# Patient Record
Sex: FEMALE | Race: WHITE | Employment: PART TIME | URBAN - NONMETROPOLITAN AREA
[De-identification: names, ages, dates, MRNs, and addresses within clinical notes are randomized per-mention and may not be internally consistent; named-entity substitution may affect disease eponyms.]

---

## 2017-02-11 DIAGNOSIS — E03.9 HYPOTHYROIDISM, UNSPECIFIED TYPE: ICD-10-CM

## 2017-02-11 DIAGNOSIS — E78.2 MIXED HYPERLIPIDEMIA: ICD-10-CM

## 2017-02-13 RX ORDER — SIMVASTATIN 20 MG
TABLET ORAL
Qty: 30 TABLET | Refills: 1 | Status: SHIPPED | OUTPATIENT
Start: 2017-02-13 | End: 2017-04-09 | Stop reason: SDUPTHER

## 2017-02-13 RX ORDER — LEVOTHYROXINE SODIUM 0.15 MG/1
TABLET ORAL
Qty: 30 TABLET | Refills: 1 | Status: SHIPPED | OUTPATIENT
Start: 2017-02-13 | End: 2017-04-09 | Stop reason: SDUPTHER

## 2017-02-22 RX ORDER — TRAZODONE HYDROCHLORIDE 50 MG/1
TABLET ORAL
Qty: 30 TABLET | Refills: 5 | Status: SHIPPED | OUTPATIENT
Start: 2017-02-22 | End: 2017-07-27 | Stop reason: ALTCHOICE

## 2017-04-09 DIAGNOSIS — E03.9 HYPOTHYROIDISM, UNSPECIFIED TYPE: ICD-10-CM

## 2017-04-09 DIAGNOSIS — E78.2 MIXED HYPERLIPIDEMIA: ICD-10-CM

## 2017-04-10 RX ORDER — LEVOTHYROXINE SODIUM 0.15 MG/1
TABLET ORAL
Qty: 30 TABLET | Refills: 1 | Status: SHIPPED | OUTPATIENT
Start: 2017-04-10 | End: 2017-06-29 | Stop reason: DRUGHIGH

## 2017-04-10 RX ORDER — CELECOXIB 100 MG/1
CAPSULE ORAL
Qty: 60 CAPSULE | Refills: 3 | Status: SHIPPED | OUTPATIENT
Start: 2017-04-10 | End: 2017-08-01 | Stop reason: SDUPTHER

## 2017-04-10 RX ORDER — SIMVASTATIN 20 MG
TABLET ORAL
Qty: 30 TABLET | Refills: 1 | Status: SHIPPED | OUTPATIENT
Start: 2017-04-10 | End: 2018-07-25 | Stop reason: SDUPTHER

## 2017-05-10 ENCOUNTER — TELEPHONE (OUTPATIENT)
Dept: FAMILY MEDICINE CLINIC | Age: 62
End: 2017-05-10

## 2017-05-10 DIAGNOSIS — E11.9 DIET-CONTROLLED TYPE 2 DIABETES MELLITUS (HCC): ICD-10-CM

## 2017-05-10 DIAGNOSIS — E03.9 HYPOTHYROIDISM, UNSPECIFIED TYPE: ICD-10-CM

## 2017-05-10 DIAGNOSIS — E78.2 MIXED HYPERLIPIDEMIA: Primary | ICD-10-CM

## 2017-05-13 ENCOUNTER — HOSPITAL ENCOUNTER (OUTPATIENT)
Dept: OTHER | Age: 62
Discharge: HOME | End: 2017-05-13
Attending: NURSE PRACTITIONER | Admitting: NURSE PRACTITIONER

## 2017-05-13 LAB
ALBUMIN SERPL-MCNC: 3.9 G/DL (ref 3.4–5)
ALP BLD-CCNC: 66 U/L (ref 40–129)
ALT SERPL-CCNC: 10 U/L (ref 10–40)
AST SERPL-CCNC: 19 U/L (ref 15–37)
BILIRUB SERPL-MCNC: 0.7 MG/DL (ref 0–1)
BILIRUBIN DIRECT: <0.2 MG/DL (ref 0–0.3)
BILIRUBIN, INDIRECT: NORMAL MG/DL (ref 0–1)
CHOLESTEROL, TOTAL: 157 MG/DL (ref 0–199)
HDLC SERPL-MCNC: 65 MG/DL (ref 40–60)
LDL CHOLESTEROL CALCULATED: 70 MG/DL
TOTAL PROTEIN: 6.7 G/DL (ref 6.4–8.2)
TRIGL SERPL-MCNC: 110 MG/DL (ref 0–150)
TSH SERPL DL<=0.05 MIU/L-ACNC: 0.52 UIU/ML (ref 0.27–4.2)
VLDLC SERPL CALC-MCNC: 22 MG/DL

## 2017-05-15 LAB
ESTIMATED AVERAGE GLUCOSE: 108.3 MG/DL
HBA1C MFR BLD: 5.4 %

## 2017-05-23 DIAGNOSIS — D64.9 ANEMIA, UNSPECIFIED TYPE: Primary | ICD-10-CM

## 2017-05-23 RX ORDER — FERROUS SULFATE 325(65) MG
TABLET ORAL
Qty: 90 TABLET | Refills: 1 | Status: SHIPPED | OUTPATIENT
Start: 2017-05-23 | End: 2017-11-16 | Stop reason: SDUPTHER

## 2017-05-24 RX ORDER — DULOXETIN HYDROCHLORIDE 60 MG/1
CAPSULE, DELAYED RELEASE ORAL
Qty: 30 CAPSULE | Refills: 5 | Status: SHIPPED | OUTPATIENT
Start: 2017-05-24 | End: 2017-10-30 | Stop reason: SDUPTHER

## 2017-06-16 DIAGNOSIS — M53.3 SACROILIAC PAIN: ICD-10-CM

## 2017-06-19 RX ORDER — PREDNISONE 1 MG/1
TABLET ORAL
Qty: 30 TABLET | Refills: 4 | Status: SHIPPED | OUTPATIENT
Start: 2017-06-19 | End: 2017-08-21 | Stop reason: SDUPTHER

## 2017-06-21 RX ORDER — BUSPIRONE HYDROCHLORIDE 10 MG/1
TABLET ORAL
Qty: 90 TABLET | Refills: 5 | Status: SHIPPED | OUTPATIENT
Start: 2017-06-21 | End: 2018-02-27 | Stop reason: ALTCHOICE

## 2017-06-29 ENCOUNTER — OFFICE VISIT (OUTPATIENT)
Dept: FAMILY MEDICINE CLINIC | Age: 62
End: 2017-06-29

## 2017-06-29 VITALS
BODY MASS INDEX: 38.99 KG/M2 | SYSTOLIC BLOOD PRESSURE: 110 MMHG | DIASTOLIC BLOOD PRESSURE: 74 MMHG | WEIGHT: 228.4 LBS | OXYGEN SATURATION: 96 % | HEART RATE: 86 BPM | HEIGHT: 64 IN

## 2017-06-29 DIAGNOSIS — D50.0 IRON DEFICIENCY ANEMIA DUE TO CHRONIC BLOOD LOSS: ICD-10-CM

## 2017-06-29 DIAGNOSIS — Z23 NEED FOR TDAP VACCINATION: ICD-10-CM

## 2017-06-29 DIAGNOSIS — F32.A ANXIETY AND DEPRESSION: ICD-10-CM

## 2017-06-29 DIAGNOSIS — M25.551 BILATERAL HIP PAIN: Primary | ICD-10-CM

## 2017-06-29 DIAGNOSIS — E78.2 MIXED HYPERLIPIDEMIA: ICD-10-CM

## 2017-06-29 DIAGNOSIS — E53.8 B12 DEFICIENCY: ICD-10-CM

## 2017-06-29 DIAGNOSIS — E03.9 HYPOTHYROIDISM, UNSPECIFIED TYPE: ICD-10-CM

## 2017-06-29 DIAGNOSIS — Z11.4 SCREENING FOR HIV (HUMAN IMMUNODEFICIENCY VIRUS): ICD-10-CM

## 2017-06-29 DIAGNOSIS — E55.9 VITAMIN D DEFICIENCY: ICD-10-CM

## 2017-06-29 DIAGNOSIS — I10 ESSENTIAL HYPERTENSION: ICD-10-CM

## 2017-06-29 DIAGNOSIS — M25.552 BILATERAL HIP PAIN: Primary | ICD-10-CM

## 2017-06-29 DIAGNOSIS — Z12.11 COLON CANCER SCREENING: ICD-10-CM

## 2017-06-29 DIAGNOSIS — F41.9 ANXIETY AND DEPRESSION: ICD-10-CM

## 2017-06-29 DIAGNOSIS — R10.12 LEFT UPPER QUADRANT PAIN: ICD-10-CM

## 2017-06-29 DIAGNOSIS — E11.9 DIET-CONTROLLED TYPE 2 DIABETES MELLITUS (HCC): Primary | ICD-10-CM

## 2017-06-29 LAB
BASOPHILS ABSOLUTE: 0.1 K/UL (ref 0–0.2)
BASOPHILS RELATIVE PERCENT: 0.7 %
EOSINOPHILS ABSOLUTE: 0.2 K/UL (ref 0–0.6)
EOSINOPHILS RELATIVE PERCENT: 2.8 %
HCT VFR BLD CALC: 38.1 % (ref 36–48)
HEMOGLOBIN: 12.6 G/DL (ref 12–16)
LYMPHOCYTES ABSOLUTE: 1.4 K/UL (ref 1–5.1)
LYMPHOCYTES RELATIVE PERCENT: 17.6 %
MCH RBC QN AUTO: 32.4 PG (ref 26–34)
MCHC RBC AUTO-ENTMCNC: 33.2 G/DL (ref 31–36)
MCV RBC AUTO: 97.7 FL (ref 80–100)
MONOCYTES ABSOLUTE: 0.5 K/UL (ref 0–1.3)
MONOCYTES RELATIVE PERCENT: 6.9 %
NEUTROPHILS ABSOLUTE: 5.7 K/UL (ref 1.7–7.7)
NEUTROPHILS RELATIVE PERCENT: 72 %
PDW BLD-RTO: 14.9 % (ref 12.4–15.4)
PLATELET # BLD: 198 K/UL (ref 135–450)
PMV BLD AUTO: 9.6 FL (ref 5–10.5)
RBC # BLD: 3.9 M/UL (ref 4–5.2)
WBC # BLD: 7.9 K/UL (ref 4–11)

## 2017-06-29 PROCEDURE — 36415 COLL VENOUS BLD VENIPUNCTURE: CPT | Performed by: NURSE PRACTITIONER

## 2017-06-29 PROCEDURE — 90715 TDAP VACCINE 7 YRS/> IM: CPT | Performed by: NURSE PRACTITIONER

## 2017-06-29 PROCEDURE — G0444 DEPRESSION SCREEN ANNUAL: HCPCS | Performed by: NURSE PRACTITIONER

## 2017-06-29 PROCEDURE — 99214 OFFICE O/P EST MOD 30 MIN: CPT | Performed by: NURSE PRACTITIONER

## 2017-06-29 PROCEDURE — 73521 X-RAY EXAM HIPS BI 2 VIEWS: CPT | Performed by: ORTHOPAEDIC SURGERY

## 2017-06-29 PROCEDURE — 90471 IMMUNIZATION ADMIN: CPT | Performed by: NURSE PRACTITIONER

## 2017-06-29 RX ORDER — NEBIVOLOL 2.5 MG/1
TABLET ORAL
Qty: 30 TABLET | Refills: 11 | Status: SHIPPED | OUTPATIENT
Start: 2017-06-29 | End: 2017-10-25 | Stop reason: ALTCHOICE

## 2017-06-29 RX ORDER — LEVOTHYROXINE SODIUM 0.15 MG/1
150 TABLET ORAL DAILY
COMMUNITY
End: 2017-09-28 | Stop reason: SDUPTHER

## 2017-06-29 ASSESSMENT — PATIENT HEALTH QUESTIONNAIRE - PHQ9
2. FEELING DOWN, DEPRESSED OR HOPELESS: 3
10. IF YOU CHECKED OFF ANY PROBLEMS, HOW DIFFICULT HAVE THESE PROBLEMS MADE IT FOR YOU TO DO YOUR WORK, TAKE CARE OF THINGS AT HOME, OR GET ALONG WITH OTHER PEOPLE: 0
7. TROUBLE CONCENTRATING ON THINGS, SUCH AS READING THE NEWSPAPER OR WATCHING TELEVISION: 3
6. FEELING BAD ABOUT YOURSELF - OR THAT YOU ARE A FAILURE OR HAVE LET YOURSELF OR YOUR FAMILY DOWN: 3
8. MOVING OR SPEAKING SO SLOWLY THAT OTHER PEOPLE COULD HAVE NOTICED. OR THE OPPOSITE, BEING SO FIGETY OR RESTLESS THAT YOU HAVE BEEN MOVING AROUND A LOT MORE THAN USUAL: 3
1. LITTLE INTEREST OR PLEASURE IN DOING THINGS: 3
SUM OF ALL RESPONSES TO PHQ QUESTIONS 1-9: 24
5. POOR APPETITE OR OVEREATING: 3
3. TROUBLE FALLING OR STAYING ASLEEP: 3
9. THOUGHTS THAT YOU WOULD BE BETTER OFF DEAD, OR OF HURTING YOURSELF: 0
SUM OF ALL RESPONSES TO PHQ9 QUESTIONS 1 & 2: 6
4. FEELING TIRED OR HAVING LITTLE ENERGY: 3

## 2017-06-29 ASSESSMENT — ENCOUNTER SYMPTOMS
EYES NEGATIVE: 1
RESPIRATORY NEGATIVE: 1
GASTROINTESTINAL NEGATIVE: 1

## 2017-06-30 LAB
AMYLASE: 48 U/L (ref 25–115)
FERRITIN: 98.1 NG/ML (ref 15–150)
FOLATE: >20 NG/ML (ref 4.78–24.2)
HIV-1 AND HIV-2 ANTIBODIES: NORMAL
IRON SATURATION: 21 % (ref 15–50)
IRON: 63 UG/DL (ref 37–145)
LIPASE: 38 U/L (ref 13–60)
TOTAL IRON BINDING CAPACITY: 304 UG/DL (ref 260–445)
VITAMIN B-12: 326 PG/ML (ref 211–911)
VITAMIN D 25-HYDROXY: 48.1 NG/ML

## 2017-07-07 ENCOUNTER — NURSE ONLY (OUTPATIENT)
Dept: FAMILY MEDICINE CLINIC | Age: 62
End: 2017-07-07

## 2017-07-07 DIAGNOSIS — Z12.11 COLON CANCER SCREENING: ICD-10-CM

## 2017-07-07 DIAGNOSIS — R19.5 POSITIVE FIT (FECAL IMMUNOCHEMICAL TEST): Primary | ICD-10-CM

## 2017-07-07 LAB
CONTROL: PRESENT
HEMOCCULT STL QL: POSITIVE

## 2017-07-07 PROCEDURE — 82274 ASSAY TEST FOR BLOOD FECAL: CPT | Performed by: NURSE PRACTITIONER

## 2017-07-12 ENCOUNTER — OFFICE VISIT (OUTPATIENT)
Dept: ORTHOPEDIC SURGERY | Age: 62
End: 2017-07-12

## 2017-07-12 VITALS
HEART RATE: 74 BPM | HEIGHT: 63 IN | BODY MASS INDEX: 40.47 KG/M2 | SYSTOLIC BLOOD PRESSURE: 121 MMHG | WEIGHT: 228.4 LBS | DIASTOLIC BLOOD PRESSURE: 73 MMHG

## 2017-07-12 DIAGNOSIS — M51.36 DDD (DEGENERATIVE DISC DISEASE), LUMBAR: Primary | ICD-10-CM

## 2017-07-12 DIAGNOSIS — M43.16 LUMBAR ADJACENT SEGMENT DISEASE WITH SPONDYLOLISTHESIS: ICD-10-CM

## 2017-07-12 DIAGNOSIS — M51.36 LUMBAR ADJACENT SEGMENT DISEASE WITH SPONDYLOLISTHESIS: ICD-10-CM

## 2017-07-12 DIAGNOSIS — M54.16 LUMBAR RADICULITIS: ICD-10-CM

## 2017-07-12 PROCEDURE — 72110 X-RAY EXAM L-2 SPINE 4/>VWS: CPT | Performed by: PHYSICIAN ASSISTANT

## 2017-07-12 PROCEDURE — 99214 OFFICE O/P EST MOD 30 MIN: CPT | Performed by: PHYSICIAN ASSISTANT

## 2017-07-12 RX ORDER — HYDROCODONE BITARTRATE AND ACETAMINOPHEN 5; 325 MG/1; MG/1
TABLET ORAL
Qty: 21 TABLET | Refills: 0 | Status: SHIPPED | OUTPATIENT
Start: 2017-07-12 | End: 2017-10-09

## 2017-07-20 ENCOUNTER — HOSPITAL ENCOUNTER (OUTPATIENT)
Dept: MRI IMAGING | Age: 62
Discharge: HOME | End: 2017-07-20
Attending: PHYSICIAN ASSISTANT | Admitting: PHYSICIAN ASSISTANT

## 2017-07-20 DIAGNOSIS — M43.16 LUMBAR ADJACENT SEGMENT DISEASE WITH SPONDYLOLISTHESIS: ICD-10-CM

## 2017-07-20 DIAGNOSIS — M51.36 LUMBAR ADJACENT SEGMENT DISEASE WITH SPONDYLOLISTHESIS: ICD-10-CM

## 2017-07-20 DIAGNOSIS — M54.16 LUMBAR RADICULITIS: ICD-10-CM

## 2017-07-20 DIAGNOSIS — M51.36 OTHER INTERVERTEBRAL DISC DEGENERATION, LUMBAR REGION: ICD-10-CM

## 2017-07-20 DIAGNOSIS — M51.36 DDD (DEGENERATIVE DISC DISEASE), LUMBAR: ICD-10-CM

## 2017-07-24 ENCOUNTER — OFFICE VISIT (OUTPATIENT)
Dept: ORTHOPEDIC SURGERY | Age: 62
End: 2017-07-24

## 2017-07-24 VITALS
DIASTOLIC BLOOD PRESSURE: 72 MMHG | WEIGHT: 228.4 LBS | SYSTOLIC BLOOD PRESSURE: 114 MMHG | HEIGHT: 63 IN | HEART RATE: 74 BPM | BODY MASS INDEX: 40.47 KG/M2

## 2017-07-24 DIAGNOSIS — M54.16 LUMBAR RADICULITIS: ICD-10-CM

## 2017-07-24 DIAGNOSIS — M51.36 LUMBAR ADJACENT SEGMENT DISEASE WITH SPONDYLOLISTHESIS: ICD-10-CM

## 2017-07-24 DIAGNOSIS — M43.16 LUMBAR ADJACENT SEGMENT DISEASE WITH SPONDYLOLISTHESIS: ICD-10-CM

## 2017-07-24 DIAGNOSIS — M51.36 DDD (DEGENERATIVE DISC DISEASE), LUMBAR: Primary | ICD-10-CM

## 2017-07-24 PROCEDURE — 99214 OFFICE O/P EST MOD 30 MIN: CPT | Performed by: PHYSICIAN ASSISTANT

## 2017-07-25 ENCOUNTER — TELEPHONE (OUTPATIENT)
Dept: ORTHOPEDIC SURGERY | Age: 62
End: 2017-07-25

## 2017-07-27 ENCOUNTER — PAT TELEPHONE (OUTPATIENT)
Dept: PREADMISSION TESTING | Age: 62
End: 2017-07-27

## 2017-08-01 ENCOUNTER — HOSPITAL ENCOUNTER (OUTPATIENT)
Dept: PAIN MANAGEMENT | Age: 62
Discharge: HOME | End: 2017-08-01
Attending: PHYSICAL MEDICINE & REHABILITATION | Admitting: PHYSICAL MEDICINE & REHABILITATION

## 2017-08-01 VITALS
WEIGHT: 228 LBS | HEART RATE: 61 BPM | OXYGEN SATURATION: 100 % | BODY MASS INDEX: 37.99 KG/M2 | TEMPERATURE: 97 F | HEIGHT: 65 IN | RESPIRATION RATE: 16 BRPM | DIASTOLIC BLOOD PRESSURE: 76 MMHG | SYSTOLIC BLOOD PRESSURE: 118 MMHG

## 2017-08-01 RX ORDER — CELECOXIB 100 MG/1
CAPSULE ORAL
Qty: 60 CAPSULE | Refills: 0 | Status: SHIPPED | OUTPATIENT
Start: 2017-08-01 | End: 2017-09-02 | Stop reason: SDUPTHER

## 2017-08-01 ASSESSMENT — PAIN DESCRIPTION - DESCRIPTORS: DESCRIPTORS: BURNING;NUMBNESS;SORE

## 2017-08-01 ASSESSMENT — PAIN - FUNCTIONAL ASSESSMENT
PAIN_FUNCTIONAL_ASSESSMENT: 0-10
PAIN_FUNCTIONAL_ASSESSMENT: 0-10

## 2017-08-01 ASSESSMENT — ACTIVITIES OF DAILY LIVING (ADL): EFFECT OF PAIN ON DAILY ACTIVITIES: STANDING, WALKING

## 2017-08-14 RX ORDER — GABAPENTIN 100 MG/1
CAPSULE ORAL
Qty: 90 CAPSULE | Refills: 2 | Status: SHIPPED | OUTPATIENT
Start: 2017-08-14 | End: 2018-01-30 | Stop reason: SDUPTHER

## 2017-08-15 ENCOUNTER — OFFICE VISIT (OUTPATIENT)
Dept: ORTHOPEDIC SURGERY | Age: 62
End: 2017-08-15

## 2017-08-15 ENCOUNTER — TELEPHONE (OUTPATIENT)
Dept: ORTHOPEDIC SURGERY | Age: 62
End: 2017-08-15

## 2017-08-15 VITALS
WEIGHT: 227.96 LBS | DIASTOLIC BLOOD PRESSURE: 78 MMHG | BODY MASS INDEX: 37.98 KG/M2 | HEART RATE: 83 BPM | SYSTOLIC BLOOD PRESSURE: 130 MMHG | HEIGHT: 65 IN

## 2017-08-15 DIAGNOSIS — M51.36 DDD (DEGENERATIVE DISC DISEASE), LUMBAR: Primary | ICD-10-CM

## 2017-08-15 DIAGNOSIS — M48.061 LUMBAR STENOSIS: ICD-10-CM

## 2017-08-15 DIAGNOSIS — M54.16 LUMBAR RADICULITIS: ICD-10-CM

## 2017-08-15 PROCEDURE — 99214 OFFICE O/P EST MOD 30 MIN: CPT | Performed by: PHYSICIAN ASSISTANT

## 2017-08-16 ENCOUNTER — OFFICE VISIT (OUTPATIENT)
Dept: FAMILY MEDICINE CLINIC | Age: 62
End: 2017-08-16

## 2017-08-16 VITALS
HEIGHT: 64 IN | WEIGHT: 236.2 LBS | BODY MASS INDEX: 40.33 KG/M2 | SYSTOLIC BLOOD PRESSURE: 124 MMHG | DIASTOLIC BLOOD PRESSURE: 82 MMHG | OXYGEN SATURATION: 98 % | HEART RATE: 67 BPM

## 2017-08-16 DIAGNOSIS — Z12.4 CERVICAL CANCER SCREENING: ICD-10-CM

## 2017-08-16 DIAGNOSIS — F32.A ANXIETY AND DEPRESSION: ICD-10-CM

## 2017-08-16 DIAGNOSIS — Z01.419 WELL WOMAN EXAM WITH ROUTINE GYNECOLOGICAL EXAM: ICD-10-CM

## 2017-08-16 DIAGNOSIS — Z23 NEED FOR INFLUENZA VACCINATION: ICD-10-CM

## 2017-08-16 DIAGNOSIS — F41.9 ANXIETY AND DEPRESSION: ICD-10-CM

## 2017-08-16 DIAGNOSIS — Z00.00 ROUTINE PHYSICAL EXAMINATION: Primary | ICD-10-CM

## 2017-08-16 DIAGNOSIS — Z78.0 MENOPAUSE: ICD-10-CM

## 2017-08-16 PROCEDURE — 99396 PREV VISIT EST AGE 40-64: CPT | Performed by: NURSE PRACTITIONER

## 2017-08-16 RX ORDER — ARIPIPRAZOLE 5 MG/1
5 TABLET ORAL DAILY
Qty: 30 TABLET | Refills: 3 | Status: SHIPPED | OUTPATIENT
Start: 2017-08-16 | End: 2017-12-30 | Stop reason: SDUPTHER

## 2017-08-21 ENCOUNTER — HOSPITAL ENCOUNTER (OUTPATIENT)
Dept: PAIN MANAGEMENT | Age: 62
Discharge: HOME | End: 2017-08-21
Attending: PHYSICAL MEDICINE & REHABILITATION | Admitting: PHYSICAL MEDICINE & REHABILITATION

## 2017-08-21 VITALS
SYSTOLIC BLOOD PRESSURE: 144 MMHG | RESPIRATION RATE: 18 BRPM | HEART RATE: 63 BPM | BODY MASS INDEX: 39.15 KG/M2 | HEIGHT: 65 IN | OXYGEN SATURATION: 96 % | WEIGHT: 235 LBS | DIASTOLIC BLOOD PRESSURE: 83 MMHG | TEMPERATURE: 97.4 F

## 2017-08-21 DIAGNOSIS — M53.3 SACROILIAC PAIN: ICD-10-CM

## 2017-08-21 LAB
GLUCOSE BLD-MCNC: 89 MG/DL (ref 70–99)
HPV COMMENT: NORMAL
HPV TYPE 16: NOT DETECTED
HPV TYPE 18: NOT DETECTED
HPVOH (OTHER TYPES): NOT DETECTED
PERFORMED ON: NORMAL

## 2017-08-21 RX ORDER — TRAZODONE HYDROCHLORIDE 50 MG/1
TABLET ORAL
Qty: 30 TABLET | Refills: 5 | Status: SHIPPED | OUTPATIENT
Start: 2017-08-21 | End: 2017-09-07

## 2017-08-21 RX ORDER — PREDNISONE 1 MG/1
TABLET ORAL
Qty: 30 TABLET | Refills: 4 | Status: SHIPPED | OUTPATIENT
Start: 2017-08-21 | End: 2017-10-09

## 2017-08-21 ASSESSMENT — PAIN - FUNCTIONAL ASSESSMENT
PAIN_FUNCTIONAL_ASSESSMENT: 0-10
PAIN_FUNCTIONAL_ASSESSMENT: 0-10

## 2017-08-21 ASSESSMENT — ACTIVITIES OF DAILY LIVING (ADL): EFFECT OF PAIN ON DAILY ACTIVITIES: WALKING, STANDING

## 2017-08-21 ASSESSMENT — PAIN DESCRIPTION - DESCRIPTORS: DESCRIPTORS: BURNING

## 2017-08-22 ENCOUNTER — HOSPITAL ENCOUNTER (OUTPATIENT)
Dept: PAIN MANAGEMENT | Age: 62
Discharge: HOME | End: 2017-08-22
Attending: PHYSICAL MEDICINE & REHABILITATION | Admitting: PHYSICAL MEDICINE & REHABILITATION

## 2017-09-06 RX ORDER — CELECOXIB 100 MG/1
CAPSULE ORAL
Qty: 60 CAPSULE | Refills: 11 | Status: SHIPPED | OUTPATIENT
Start: 2017-09-06 | End: 2018-08-22

## 2017-09-06 RX ORDER — FOLIC ACID 1 MG/1
TABLET ORAL
Qty: 30 TABLET | Refills: 11 | Status: SHIPPED | OUTPATIENT
Start: 2017-09-06 | End: 2018-08-07 | Stop reason: SDUPTHER

## 2017-09-12 ENCOUNTER — OFFICE VISIT (OUTPATIENT)
Dept: ORTHOPEDIC SURGERY | Age: 62
End: 2017-09-12

## 2017-09-12 VITALS
WEIGHT: 235.01 LBS | HEIGHT: 65 IN | BODY MASS INDEX: 39.16 KG/M2 | SYSTOLIC BLOOD PRESSURE: 113 MMHG | DIASTOLIC BLOOD PRESSURE: 68 MMHG | HEART RATE: 66 BPM

## 2017-09-12 DIAGNOSIS — M54.16 LUMBAR RADICULITIS: ICD-10-CM

## 2017-09-12 DIAGNOSIS — M51.36 DDD (DEGENERATIVE DISC DISEASE), LUMBAR: Primary | ICD-10-CM

## 2017-09-12 DIAGNOSIS — M48.061 LUMBAR STENOSIS: ICD-10-CM

## 2017-09-12 PROCEDURE — 99213 OFFICE O/P EST LOW 20 MIN: CPT | Performed by: PHYSICIAN ASSISTANT

## 2017-09-15 ENCOUNTER — HOSPITAL ENCOUNTER (OUTPATIENT)
Dept: VASCULAR LAB | Age: 62
Discharge: HOME | End: 2017-09-15
Attending: NURSE PRACTITIONER | Admitting: NURSE PRACTITIONER

## 2017-09-27 ENCOUNTER — OFFICE VISIT (OUTPATIENT)
Dept: FAMILY MEDICINE CLINIC | Age: 62
End: 2017-09-27

## 2017-09-27 ENCOUNTER — HOSPITAL ENCOUNTER (OUTPATIENT)
Dept: OTHER | Age: 62
Discharge: HOME | End: 2017-09-27
Attending: NURSE PRACTITIONER | Admitting: NURSE PRACTITIONER

## 2017-09-27 VITALS
SYSTOLIC BLOOD PRESSURE: 120 MMHG | DIASTOLIC BLOOD PRESSURE: 74 MMHG | HEIGHT: 64 IN | BODY MASS INDEX: 42.2 KG/M2 | HEART RATE: 68 BPM | WEIGHT: 247.2 LBS | OXYGEN SATURATION: 98 %

## 2017-09-27 DIAGNOSIS — R60.9 EDEMA, UNSPECIFIED TYPE: ICD-10-CM

## 2017-09-27 DIAGNOSIS — R53.83 FATIGUE, UNSPECIFIED TYPE: Primary | ICD-10-CM

## 2017-09-27 DIAGNOSIS — R63.5 WEIGHT GAIN: ICD-10-CM

## 2017-09-27 LAB
A/G RATIO: 1.5 (ref 1.1–2.2)
ALBUMIN SERPL-MCNC: 4.2 G/DL (ref 3.4–5)
ALP BLD-CCNC: 63 U/L (ref 40–129)
ALT SERPL-CCNC: 24 U/L (ref 10–40)
ANION GAP SERPL CALCULATED.3IONS-SCNC: 15 MMOL/L (ref 3–16)
AST SERPL-CCNC: 40 U/L (ref 15–37)
BASOPHILS ABSOLUTE: 0.1 K/UL (ref 0–0.2)
BASOPHILS RELATIVE PERCENT: 0.8 %
BILIRUB SERPL-MCNC: 0.4 MG/DL (ref 0–1)
BILIRUBIN, POC: NEGATIVE
BLOOD URINE, POC: NEGATIVE
BUN BLDV-MCNC: 15 MG/DL (ref 7–20)
CALCIUM SERPL-MCNC: 9.6 MG/DL (ref 8.3–10.6)
CHLORIDE BLD-SCNC: 99 MMOL/L (ref 99–110)
CLARITY, POC: YELLOW
CO2: 28 MMOL/L (ref 21–32)
COLOR, POC: NORMAL
CREAT SERPL-MCNC: 1.2 MG/DL (ref 0.6–1.2)
EOSINOPHILS ABSOLUTE: 0.2 K/UL (ref 0–0.6)
EOSINOPHILS RELATIVE PERCENT: 3.3 %
GFR AFRICAN AMERICAN: 55
GFR NON-AFRICAN AMERICAN: 45
GLOBULIN: 2.8 G/DL
GLUCOSE BLD-MCNC: 121 MG/DL (ref 70–99)
GLUCOSE URINE, POC: NEGATIVE
HCT VFR BLD CALC: 34.7 % (ref 36–48)
HEMOGLOBIN: 11.7 G/DL (ref 12–16)
IRON SATURATION: 26 % (ref 15–50)
IRON: 85 UG/DL (ref 37–145)
KETONES, POC: NEGATIVE
LEUKOCYTE EST, POC: NEGATIVE
LYMPHOCYTES ABSOLUTE: 1.5 K/UL (ref 1–5.1)
LYMPHOCYTES RELATIVE PERCENT: 22.7 %
MCH RBC QN AUTO: 34.2 PG (ref 26–34)
MCHC RBC AUTO-ENTMCNC: 33.7 G/DL (ref 31–36)
MCV RBC AUTO: 101.5 FL (ref 80–100)
MONOCYTES ABSOLUTE: 0.4 K/UL (ref 0–1.3)
MONOCYTES RELATIVE PERCENT: 5.5 %
NEUTROPHILS ABSOLUTE: 4.5 K/UL (ref 1.7–7.7)
NEUTROPHILS RELATIVE PERCENT: 67.7 %
NITRITE, POC: NEGATIVE
PDW BLD-RTO: 15 % (ref 12.4–15.4)
PH, POC: 7
PLATELET # BLD: 188 K/UL (ref 135–450)
PMV BLD AUTO: 10 FL (ref 5–10.5)
POTASSIUM SERPL-SCNC: 4.3 MMOL/L (ref 3.5–5.1)
PROTEIN, POC: NEGATIVE
RBC # BLD: 3.41 M/UL (ref 4–5.2)
SODIUM BLD-SCNC: 142 MMOL/L (ref 136–145)
SPECIFIC GRAVITY, POC: 1.02
TOTAL IRON BINDING CAPACITY: 330 UG/DL (ref 260–445)
TOTAL PROTEIN: 7 G/DL (ref 6.4–8.2)
UROBILINOGEN, POC: 1
WBC # BLD: 6.6 K/UL (ref 4–11)

## 2017-09-27 PROCEDURE — 81002 URINALYSIS NONAUTO W/O SCOPE: CPT | Performed by: NURSE PRACTITIONER

## 2017-09-27 PROCEDURE — 36415 COLL VENOUS BLD VENIPUNCTURE: CPT | Performed by: NURSE PRACTITIONER

## 2017-09-27 PROCEDURE — 99213 OFFICE O/P EST LOW 20 MIN: CPT | Performed by: NURSE PRACTITIONER

## 2017-09-27 RX ORDER — FUROSEMIDE 20 MG/1
20 TABLET ORAL DAILY
Qty: 3 TABLET | Refills: 0 | Status: ON HOLD | OUTPATIENT
Start: 2017-09-27 | End: 2017-10-12 | Stop reason: HOSPADM

## 2017-09-27 ASSESSMENT — ENCOUNTER SYMPTOMS
VOMITING: 0
VISUAL CHANGE: 0
NAUSEA: 0
SWOLLEN GLANDS: 0
ABDOMINAL PAIN: 0
CHANGE IN BOWEL HABIT: 0
SORE THROAT: 0
COUGH: 0

## 2017-09-28 ENCOUNTER — NURSE ONLY (OUTPATIENT)
Dept: FAMILY MEDICINE CLINIC | Age: 62
End: 2017-09-28

## 2017-09-28 DIAGNOSIS — E03.9 HYPOTHYROIDISM, UNSPECIFIED TYPE: ICD-10-CM

## 2017-09-28 DIAGNOSIS — D64.9 LOW HEMOGLOBIN AND LOW HEMATOCRIT: ICD-10-CM

## 2017-09-28 DIAGNOSIS — D64.9 LOW HEMOGLOBIN AND LOW HEMATOCRIT: Primary | ICD-10-CM

## 2017-09-28 LAB
FERRITIN: 131.6 NG/ML (ref 15–150)
FOLATE: >20 NG/ML (ref 4.78–24.2)
TSH SERPL DL<=0.05 MIU/L-ACNC: 166.8 UIU/ML (ref 0.27–4.2)
VITAMIN B-12: 272 PG/ML (ref 211–911)

## 2017-09-28 RX ORDER — LEVOTHYROXINE SODIUM 0.2 MG/1
150 TABLET ORAL DAILY
Qty: 30 TABLET | Refills: 3 | Status: SHIPPED | OUTPATIENT
Start: 2017-09-28 | End: 2018-01-23 | Stop reason: SDUPTHER

## 2017-09-28 ASSESSMENT — ENCOUNTER SYMPTOMS
RESPIRATORY NEGATIVE: 1
EYES NEGATIVE: 1
ORTHOPNEA: 0
GASTROINTESTINAL NEGATIVE: 1

## 2017-10-02 ENCOUNTER — NURSE ONLY (OUTPATIENT)
Dept: FAMILY MEDICINE CLINIC | Age: 62
End: 2017-10-02

## 2017-10-02 DIAGNOSIS — D64.9 LOW HEMOGLOBIN AND LOW HEMATOCRIT: Primary | ICD-10-CM

## 2017-10-02 LAB
HEMOCCULT STL QL: NEGATIVE
HEMOCCULT STL QL: NEGATIVE
HEMOCCULT STL QL: NORMAL

## 2017-10-02 PROCEDURE — 82272 OCCULT BLD FECES 1-3 TESTS: CPT | Performed by: NURSE PRACTITIONER

## 2017-10-09 PROBLEM — A41.9 SEPSIS (HCC): Status: ACTIVE | Noted: 2017-10-09

## 2017-10-10 PROBLEM — R57.9 SHOCK (HCC): Status: ACTIVE | Noted: 2017-10-10

## 2017-10-19 ENCOUNTER — HOSPITAL ENCOUNTER (OUTPATIENT)
Dept: MRI IMAGING | Age: 62
Discharge: HOME | End: 2017-10-19
Attending: ORTHOPAEDIC SURGERY | Admitting: ORTHOPAEDIC SURGERY

## 2017-10-19 ENCOUNTER — HOSPITAL ENCOUNTER (OUTPATIENT)
Dept: ULTRASOUND IMAGING | Age: 62
Discharge: HOME | End: 2017-10-19
Attending: NURSE PRACTITIONER | Admitting: NURSE PRACTITIONER

## 2017-10-19 DIAGNOSIS — M25.562 LEFT KNEE PAIN, UNSPECIFIED CHRONICITY: ICD-10-CM

## 2017-10-19 DIAGNOSIS — E03.9 HYPOTHYROIDISM: ICD-10-CM

## 2017-10-19 DIAGNOSIS — M25.562 PAIN IN LEFT KNEE: ICD-10-CM

## 2017-10-19 DIAGNOSIS — E03.9 HYPOTHYROIDISM, UNSPECIFIED TYPE: ICD-10-CM

## 2017-10-25 ENCOUNTER — OFFICE VISIT (OUTPATIENT)
Dept: FAMILY MEDICINE CLINIC | Age: 62
End: 2017-10-25

## 2017-10-25 VITALS
WEIGHT: 235.4 LBS | BODY MASS INDEX: 39.22 KG/M2 | OXYGEN SATURATION: 99 % | DIASTOLIC BLOOD PRESSURE: 79 MMHG | HEIGHT: 65 IN | SYSTOLIC BLOOD PRESSURE: 121 MMHG | HEART RATE: 83 BPM

## 2017-10-25 DIAGNOSIS — A41.9 SEPSIS, DUE TO UNSPECIFIED ORGANISM: Primary | ICD-10-CM

## 2017-10-25 DIAGNOSIS — E03.9 HYPOTHYROIDISM, UNSPECIFIED TYPE: ICD-10-CM

## 2017-10-25 DIAGNOSIS — Z09 HOSPITAL DISCHARGE FOLLOW-UP: ICD-10-CM

## 2017-10-25 DIAGNOSIS — R10.12 LEFT UPPER QUADRANT PAIN: ICD-10-CM

## 2017-10-25 DIAGNOSIS — R57.9 SHOCK (HCC): ICD-10-CM

## 2017-10-25 DIAGNOSIS — E83.42 HYPOMAGNESEMIA: ICD-10-CM

## 2017-10-25 DIAGNOSIS — I10 ESSENTIAL HYPERTENSION: ICD-10-CM

## 2017-10-25 LAB
BASOPHILS ABSOLUTE: 0 K/UL (ref 0–0.2)
BASOPHILS RELATIVE PERCENT: 0.9 %
EOSINOPHILS ABSOLUTE: 0 K/UL (ref 0–0.6)
EOSINOPHILS RELATIVE PERCENT: 0.6 %
HCT VFR BLD CALC: 35.6 % (ref 36–48)
HEMOGLOBIN: 11.7 G/DL (ref 12–16)
LYMPHOCYTES ABSOLUTE: 1 K/UL (ref 1–5.1)
LYMPHOCYTES RELATIVE PERCENT: 19.1 %
MCH RBC QN AUTO: 33.1 PG (ref 26–34)
MCHC RBC AUTO-ENTMCNC: 32.9 G/DL (ref 31–36)
MCV RBC AUTO: 100.7 FL (ref 80–100)
MONOCYTES ABSOLUTE: 0.3 K/UL (ref 0–1.3)
MONOCYTES RELATIVE PERCENT: 6.4 %
NEUTROPHILS ABSOLUTE: 4 K/UL (ref 1.7–7.7)
NEUTROPHILS RELATIVE PERCENT: 73 %
PDW BLD-RTO: 14.2 % (ref 12.4–15.4)
PLATELET # BLD: 231 K/UL (ref 135–450)
PMV BLD AUTO: 10 FL (ref 5–10.5)
RBC # BLD: 3.54 M/UL (ref 4–5.2)
WBC # BLD: 5.4 K/UL (ref 4–11)

## 2017-10-25 PROCEDURE — 36415 COLL VENOUS BLD VENIPUNCTURE: CPT | Performed by: NURSE PRACTITIONER

## 2017-10-25 PROCEDURE — 99213 OFFICE O/P EST LOW 20 MIN: CPT | Performed by: NURSE PRACTITIONER

## 2017-10-25 NOTE — PROGRESS NOTES
Post-Discharge Transitional Care Management Services      Jakob Smith   YOB: 1955    Date of Visit:  10/25/2017    Allergies   Allergen Reactions    Pravastatin Rash    Wellbutrin [Bupropion] Other (See Comments) and Rash     Caused sores in mouth     No outpatient prescriptions have been marked as taking for the 10/25/17 encounter (Appointment) with Kareem Peralta CNP. Vitals:    10/25/17 1135   BP: 121/79   Site: Left Arm   Position: Sitting   Cuff Size: Large Adult   Pulse: 83   SpO2: 99%   Weight: 235 lb 6.4 oz (106.8 kg)   Height: 5' 5\" (1.651 m)     Body mass index is 39.17 kg/m². Wt Readings from Last 3 Encounters:   10/12/17 250 lb 3.3 oz (113.5 kg)   09/27/17 247 lb 3.2 oz (112.1 kg)   09/12/17 235 lb 0.2 oz (106.6 kg)     BP Readings from Last 3 Encounters:   10/12/17 (!) 138/90   09/27/17 120/74   09/12/17 113/68        Patient was admitted to [facility] from 10/9/17 to 10/12/17 for septic shock. Inpatient course: Discharge summary reviewed- see chart. Current status: has finished atb (levaquin). Still feels fatigued and weak. Had low magnesium level in hospital.      Review of Systems:  A comprehensive review of systems was negative except for what was noted in the HPI.     Physical Exam:  General Appearance: alert and oriented to person, place and time, well developed and well- nourished, in no acute distress  Skin: warm and dry, no rash or erythema  Head: normocephalic and atraumatic  Eyes: pupils equal, round, and reactive to light, extraocular eye movements intact, conjunctivae normal  ENT: tympanic membrane, external ear and ear canal normal bilaterally, nose without deformity, nasal mucosa and turbinates normal without polyps  Neck: supple and non-tender without mass, no thyromegaly or thyroid nodules, no cervical lymphadenopathy  Pulmonary/Chest: clear to auscultation bilaterally- no wheezes, rales or rhonchi, normal air movement, no respiratory distress  Cardiovascular: normal rate, regular rhythm, normal S1 and S2, no murmurs, rubs, clicks, or gallops, distal pulses intact, no carotid bruits  Abdomen: soft, non-tender, non-distended, normal bowel sounds, no masses or organomegaly  Extremities: no cyanosis, clubbing or edema  Musculoskeletal: normal range of motion, no joint swelling, deformity or tenderness  Neurologic: reflexes normal and symmetric, no cranial nerve deficit, gait, coordination and speech normal        Assessment/Plan:  Ahmet Gallegos was seen today for follow-up from hospital.    Diagnoses and all orders for this visit:    Sepsis, due to unspecified organism ([facility]    Shock ([facility]    Left upper quadrant pain  -     Comprehensive Metabolic Panel    Essential hypertension  -     CBC Auto Differential    Hypothyroidism, unspecified type  -     TSH without Reflex    Hypomagnesemia  -     [facility] discharge follow-up          Diagnostic test results reviewed: inpatient labs, EKG, chest x-ray and CT-abd/pelvis    Patient risk of morbidity and mortality: moderate    Medical Decision Making: moderate complexity

## 2017-10-26 LAB
A/G RATIO: 1.2 (ref 1.1–2.2)
ALBUMIN SERPL-MCNC: 3.9 G/DL (ref 3.4–5)
ALP BLD-CCNC: 64 U/L (ref 40–129)
ALT SERPL-CCNC: 12 U/L (ref 10–40)
ANION GAP SERPL CALCULATED.3IONS-SCNC: 17 MMOL/L (ref 3–16)
AST SERPL-CCNC: 20 U/L (ref 15–37)
BILIRUB SERPL-MCNC: 0.6 MG/DL (ref 0–1)
BUN BLDV-MCNC: 28 MG/DL (ref 7–20)
CALCIUM SERPL-MCNC: 9.5 MG/DL (ref 8.3–10.6)
CHLORIDE BLD-SCNC: 101 MMOL/L (ref 99–110)
CO2: 23 MMOL/L (ref 21–32)
CREAT SERPL-MCNC: 1.2 MG/DL (ref 0.6–1.2)
GFR AFRICAN AMERICAN: 55
GFR NON-AFRICAN AMERICAN: 45
GLOBULIN: 3.3 G/DL
GLUCOSE BLD-MCNC: 124 MG/DL (ref 70–99)
MAGNESIUM: 1.7 MG/DL (ref 1.8–2.4)
POTASSIUM SERPL-SCNC: 4.2 MMOL/L (ref 3.5–5.1)
SODIUM BLD-SCNC: 141 MMOL/L (ref 136–145)
TOTAL PROTEIN: 7.2 G/DL (ref 6.4–8.2)
TSH SERPL DL<=0.05 MIU/L-ACNC: 0.67 UIU/ML (ref 0.27–4.2)

## 2017-10-30 RX ORDER — DULOXETIN HYDROCHLORIDE 60 MG/1
60 CAPSULE, DELAYED RELEASE ORAL DAILY
Qty: 90 CAPSULE | Refills: 1 | Status: SHIPPED | OUTPATIENT
Start: 2017-10-30 | End: 2018-02-27 | Stop reason: ALTCHOICE

## 2017-11-16 DIAGNOSIS — D64.9 ANEMIA, UNSPECIFIED TYPE: ICD-10-CM

## 2017-11-17 RX ORDER — FERROUS SULFATE 325(65) MG
TABLET ORAL
Qty: 90 TABLET | Refills: 1 | Status: SHIPPED | OUTPATIENT
Start: 2017-11-17 | End: 2018-06-14 | Stop reason: SDUPTHER

## 2017-11-19 ENCOUNTER — PATIENT MESSAGE (OUTPATIENT)
Dept: FAMILY MEDICINE CLINIC | Age: 62
End: 2017-11-19

## 2017-11-20 NOTE — TELEPHONE ENCOUNTER
From: Debi Broom  To: Zachery Pleitez CNP  Sent: 11/19/2017 12:18 PM EST  Subject: Non-Urgent Medical Question    Just a note to say I had my annual eye exam this month. Minor changes but improvement. Now have cataracts but deemed not serious enough to operate in order to get covered by criteria of Medicare (which other insurers take their lead on). Test taken at [facility].

## 2017-12-13 ENCOUNTER — OFFICE VISIT (OUTPATIENT)
Dept: FAMILY MEDICINE CLINIC | Age: 62
End: 2017-12-13

## 2017-12-13 VITALS
OXYGEN SATURATION: 96 % | HEIGHT: 65 IN | BODY MASS INDEX: 38.39 KG/M2 | TEMPERATURE: 97.9 F | RESPIRATION RATE: 16 BRPM | WEIGHT: 230.4 LBS | HEART RATE: 101 BPM | SYSTOLIC BLOOD PRESSURE: 112 MMHG | DIASTOLIC BLOOD PRESSURE: 70 MMHG

## 2017-12-13 DIAGNOSIS — E11.9 DIET-CONTROLLED TYPE 2 DIABETES MELLITUS (HCC): ICD-10-CM

## 2017-12-13 DIAGNOSIS — L03.119 CELLULITIS OF LOWER EXTREMITY, UNSPECIFIED LATERALITY: Primary | ICD-10-CM

## 2017-12-13 LAB
CREATININE URINE: 80.6 MG/DL (ref 28–259)
HBA1C MFR BLD: 5.5 %
MICROALBUMIN UR-MCNC: <1.2 MG/DL
MICROALBUMIN/CREAT UR-RTO: NORMAL MG/G (ref 0–30)

## 2017-12-13 PROCEDURE — 99213 OFFICE O/P EST LOW 20 MIN: CPT | Performed by: NURSE PRACTITIONER

## 2017-12-13 PROCEDURE — 83036 HEMOGLOBIN GLYCOSYLATED A1C: CPT | Performed by: NURSE PRACTITIONER

## 2017-12-13 RX ORDER — PREDNISONE 1 MG/1
TABLET ORAL
Refills: 4 | COMMUNITY
Start: 2017-12-02 | End: 2018-06-01 | Stop reason: ALTCHOICE

## 2017-12-13 RX ORDER — CEPHALEXIN 500 MG/1
500 CAPSULE ORAL 3 TIMES DAILY
Qty: 30 CAPSULE | Refills: 0 | Status: SHIPPED | OUTPATIENT
Start: 2017-12-13 | End: 2017-12-23

## 2017-12-13 NOTE — PROGRESS NOTES
Past Medical History:   Diagnosis Date    Acute pancreatitis 10/21/2014    Acute renal failure (ARF) ([facility] 7/10/2015    Angina pectoris ([facility] 5/23/2011    Arthritis     osteoarthritis/rheumatoid    Cellulitis of left leg     Clostridium difficile infection 7/9/15, 6/4/15    Diabetes mellitus ([facility]     diet controlled    Hyperlipidemia     Hypertension     Palpitations 7/17/2013    [facility]     Varicose veins     Venous ulcer of left leg ([facility]      Family History   Problem Relation Age of Onset    Diabetes Mother     Alzheimer's Disease Mother     Anemia Mother     Diabetes Father     Cancer Father 68     pancreatic    Cancer Maternal Grandmother [de-identified]     Bone Marrow Ca     Past Surgical History:   Procedure Laterality Date    APPENDECTOMY      CARDIAC CATHETERIZATION  05/2014    COLONOSCOPY      HIP ARTHROPLASTY Left 1/25/16    JOINT REPLACEMENT Left 01/19/2016    [facility]    TONSILLECTOMY      VARICOSE VEIN SURGERY       Social History     Social History    Marital status:      Spouse name: Kristal Segundo Number of children: N/A    Years of education: N/A     Occupational History    Not on file. Social History Main Topics    Smoking status: Never Smoker    Smokeless tobacco: Never Used      Comment: never even tried it    Alcohol use No      Comment: rarely    Drug use: No    Sexual activity: Not Currently     Other Topics Concern    Not on file     Social History Narrative    No narrative on file     Current Outpatient Prescriptions   Medication Sig Dispense Refill    ferrous sulfate 325 (65 Fe) MG tablet TAKE 1 TABLET BY MOUTH DAILY.  90 tablet 1    DULoxetine (CYMBALTA) 60 MG extended release capsule Take 1 capsule by mouth daily 90 capsule 1    Magnesium 100 MG TABS Take 1 tablet by mouth daily 30 tablet 5    levothyroxine (SYNTHROID) 200 MCG tablet Take 1 tablet by mouth daily 30 tablet 3    folic acid (FOLVITE) 1 MG tablet TAKE 1 TABLET BY MOUTH DAILY 30 tablet 11    celecoxib (CELEBREX) 100 MG capsule TAKE 2 CAPSULES BY MOUTH DAILY (Patient taking differently: one tablet BID) 60 capsule 11    ARIPiprazole (ABILIFY) 5 MG tablet Take 1 tablet by mouth daily 30 tablet 3    gabapentin (NEURONTIN) 100 MG capsule TAKE 1 CAPSULE BY MOUTH TWICE A DAY AND AT BEDTIME (Patient taking differently: TAKE 1 CAPSULE BY MOUTH TWICE A DAY One during the day and one  AT BEDTIME) 90 capsule 2    busPIRone (BUSPAR) 10 MG tablet TAKE 1 TABLET BY MOUTH 3 TIMES DAILY AS NEEDED (Patient taking differently: TAKE 2 TABLET BY MOUTH daily) 90 tablet 5    MAXIMUM D3 56824 UNITS CAPS capsule TAKE ONE CAPSULE BY MOUTH EVERY WEEK 5 capsule 5    simvastatin (ZOCOR) 20 MG tablet TAKE 1 TABLET BY MOUTH NIGHTLY 30 tablet 1    ranolazine (RANEXA) 500 MG extended release tablet Take 1 tablet by mouth 2 times daily 60 tablet 11    aspirin 325 MG EC tablet Take 325 mg by mouth daily      mesalamine (LIALDA) 1.2 G EC tablet Take 300 mg by mouth 4 times daily  120 tablet 0    Elastic Bandages & Supports (T.E.D. BELOW KNEE/L X-LGTH) MISC On q am and remove q bedtime 2 each 2    ACCU-CHEK FASTCLIX LANCETS MISC Patient test TID  Dx : 250.00 200 each 3    albuterol (VENTOLIN HFA) 108 (90 BASE) MCG/ACT inhaler Inhale 2 puffs into the lungs every 6 hours as needed for Wheezing. 1 Inhaler 1    Glucose Blood (BLOOD GLUCOSE TEST STRIPS) STRP Check  Glucose three times daily 200 strip 11    predniSONE (DELTASONE) 5 MG tablet TAKE 1 TABLET DAILY  4     No current facility-administered medications for this visit. Objective:       Physical Exam   Constitutional: She is oriented to person, place, and time. Vital signs are normal. She appears well-developed and well-nourished. She is cooperative. Non-toxic appearance. She does not have a sickly appearance. No distress. HENT:   Head: Normocephalic and atraumatic.    Right Ear: Hearing, tympanic membrane and ear canal reviewed and updated as appropriate.

## 2017-12-29 ASSESSMENT — ENCOUNTER SYMPTOMS
GASTROINTESTINAL NEGATIVE: 1
EYES NEGATIVE: 1
RESPIRATORY NEGATIVE: 1

## 2017-12-30 DIAGNOSIS — F32.A ANXIETY AND DEPRESSION: ICD-10-CM

## 2017-12-30 DIAGNOSIS — F41.9 ANXIETY AND DEPRESSION: ICD-10-CM

## 2018-01-02 RX ORDER — ARIPIPRAZOLE 5 MG/1
5 TABLET ORAL DAILY
Qty: 30 TABLET | Refills: 5 | Status: SHIPPED | OUTPATIENT
Start: 2018-01-02 | End: 2018-02-27 | Stop reason: ALTCHOICE

## 2018-01-08 RX ORDER — RANOLAZINE 500 MG/1
500 TABLET, FILM COATED, EXTENDED RELEASE ORAL 2 TIMES DAILY
Qty: 60 TABLET | Refills: 0 | Status: SHIPPED | OUTPATIENT
Start: 2018-01-08 | End: 2018-02-23 | Stop reason: SDUPTHER

## 2018-01-12 ENCOUNTER — OFFICE VISIT (OUTPATIENT)
Dept: FAMILY MEDICINE CLINIC | Age: 63
End: 2018-01-12

## 2018-01-12 VITALS
OXYGEN SATURATION: 94 % | WEIGHT: 230 LBS | HEIGHT: 65 IN | DIASTOLIC BLOOD PRESSURE: 80 MMHG | HEART RATE: 95 BPM | BODY MASS INDEX: 38.32 KG/M2 | SYSTOLIC BLOOD PRESSURE: 122 MMHG

## 2018-01-12 DIAGNOSIS — L03.116 CELLULITIS OF LEFT LOWER EXTREMITY: Primary | ICD-10-CM

## 2018-01-12 PROCEDURE — 99213 OFFICE O/P EST LOW 20 MIN: CPT | Performed by: NURSE PRACTITIONER

## 2018-01-12 RX ORDER — AMOXICILLIN AND CLAVULANATE POTASSIUM 875; 125 MG/1; MG/1
1 TABLET, FILM COATED ORAL 2 TIMES DAILY
Qty: 20 TABLET | Refills: 0 | Status: SHIPPED | OUTPATIENT
Start: 2018-01-12 | End: 2018-01-22

## 2018-01-12 RX ORDER — CEPHALEXIN 500 MG/1
500 CAPSULE ORAL 3 TIMES DAILY
Qty: 30 CAPSULE | Refills: 0 | Status: SHIPPED | OUTPATIENT
Start: 2018-01-12 | End: 2018-01-22

## 2018-01-12 ASSESSMENT — ENCOUNTER SYMPTOMS
RESPIRATORY NEGATIVE: 1
GASTROINTESTINAL NEGATIVE: 1
EYES NEGATIVE: 1

## 2018-01-12 NOTE — PROGRESS NOTES
1 TABLET BY MOUTH 3 TIMES DAILY AS NEEDED (Patient taking differently: TAKE 2 TABLET BY MOUTH daily) 90 tablet 5    MAXIMUM D3 67873 UNITS CAPS capsule TAKE ONE CAPSULE BY MOUTH EVERY WEEK 5 capsule 5    simvastatin (ZOCOR) 20 MG tablet TAKE 1 TABLET BY MOUTH NIGHTLY 30 tablet 1    aspirin 325 MG EC tablet Take 325 mg by mouth daily      mesalamine (LIALDA) 1.2 G EC tablet Take 300 mg by mouth 4 times daily  120 tablet 0    Elastic Bandages & Supports (T.E.D. BELOW KNEE/L X-LGTH) MISC On q am and remove q bedtime 2 each 2    ACCU-CHEK FASTCLIX LANCETS MISC Patient test TID  Dx : 250.00 200 each 3    albuterol (VENTOLIN HFA) 108 (90 BASE) MCG/ACT inhaler Inhale 2 puffs into the lungs every 6 hours as needed for Wheezing. 1 Inhaler 1    Glucose Blood (BLOOD GLUCOSE TEST STRIPS) STRP Check  Glucose three times daily 200 strip 11     No current facility-administered medications for this visit. Objective:       Physical Exam   Constitutional: She is oriented to person, place, and time. Vital signs are normal. She appears well-developed and well-nourished. She is cooperative. Non-toxic appearance. She does not have a sickly appearance. No distress. HENT:   Head: Normocephalic and atraumatic. Right Ear: Hearing, tympanic membrane and ear canal normal.   Left Ear: Hearing, tympanic membrane and ear canal normal.   Nose: Nose normal.   Mouth/Throat: Uvula is midline, oropharynx is clear and moist and mucous membranes are normal.   Eyes: Conjunctivae and lids are normal.   Neck: Neck supple. Cardiovascular: Normal rate, regular rhythm, normal heart sounds and normal pulses. Pulmonary/Chest: Effort normal and breath sounds normal. No accessory muscle usage. No respiratory distress. Abdominal: Soft. Normal appearance. There is no tenderness. Lymphadenopathy:        Head (right side): No submental and no submandibular adenopathy present.         Head (left side): No submental and no submandibular

## 2018-01-15 ENCOUNTER — OFFICE VISIT (OUTPATIENT)
Dept: FAMILY MEDICINE CLINIC | Age: 63
End: 2018-01-15

## 2018-01-15 VITALS
BODY MASS INDEX: 38.07 KG/M2 | SYSTOLIC BLOOD PRESSURE: 130 MMHG | OXYGEN SATURATION: 98 % | DIASTOLIC BLOOD PRESSURE: 70 MMHG | HEART RATE: 93 BPM | WEIGHT: 228.8 LBS

## 2018-01-15 DIAGNOSIS — S81.802D OPEN WOUND OF LEFT LOWER LEG, SUBSEQUENT ENCOUNTER: ICD-10-CM

## 2018-01-15 DIAGNOSIS — L03.116 CELLULITIS OF LEFT LEG: Primary | ICD-10-CM

## 2018-01-15 LAB
GRAM STAIN RESULT: ABNORMAL
ORGANISM: ABNORMAL
WOUND/ABSCESS: ABNORMAL
WOUND/ABSCESS: ABNORMAL

## 2018-01-15 PROCEDURE — 99213 OFFICE O/P EST LOW 20 MIN: CPT | Performed by: NURSE PRACTITIONER

## 2018-01-15 NOTE — PROGRESS NOTES
Problem Relation Age of Onset    Diabetes Mother     Alzheimer's Disease Mother     Anemia Mother     Diabetes Father     Cancer Father 68     pancreatic    Cancer Maternal Grandmother [de-identified]     Bone Marrow Ca     Past Surgical History:   Procedure Laterality Date    APPENDECTOMY      CARDIAC CATHETERIZATION  05/2014    COLONOSCOPY      HIP ARTHROPLASTY Left 1/25/16    JOINT REPLACEMENT Left 01/19/2016    [facility] Maninder Hong    TONSILLECTOMY      VARICOSE VEIN SURGERY       Social History     Social History    Marital status:      Spouse name: Deysi Santamaria Number of children: N/A    Years of education: N/A     Occupational History    Not on file. Social History Main Topics    Smoking status: Never Smoker    Smokeless tobacco: Never Used      Comment: never even tried it    Alcohol use No      Comment: rarely    Drug use: No    Sexual activity: Not Currently     Other Topics Concern    Not on file     Social History Narrative    No narrative on file     Current Outpatient Prescriptions   Medication Sig Dispense Refill    amoxicillin-clavulanate (AUGMENTIN) 875-125 MG per tablet Take 1 tablet by mouth 2 times daily for 10 days 20 tablet 0    cephALEXin (KEFLEX) 500 MG capsule Take 1 capsule by mouth 3 times daily for 10 days 30 capsule 0    RANEXA 500 MG extended release tablet TAKE 1 TABLET BY MOUTH 2 TIMES DAILY 60 tablet 0    ARIPiprazole (ABILIFY) 5 MG tablet TAKE 1 TABLET BY MOUTH DAILY 30 tablet 5    predniSONE (DELTASONE) 5 MG tablet TAKE 1 TABLET DAILY  4    ferrous sulfate 325 (65 Fe) MG tablet TAKE 1 TABLET BY MOUTH DAILY.  90 tablet 1    DULoxetine (CYMBALTA) 60 MG extended release capsule Take 1 capsule by mouth daily 90 capsule 1    Magnesium 100 MG TABS Take 1 tablet by mouth daily 30 tablet 5    levothyroxine (SYNTHROID) 200 MCG tablet Take 1 tablet by mouth daily 30 tablet 3    folic acid (FOLVITE) 1 MG tablet TAKE 1 TABLET BY MOUTH DAILY 30 tablet 11    rhythm, normal heart sounds and normal pulses. Pulmonary/Chest: Effort normal and breath sounds normal. No accessory muscle usage. No respiratory distress. Abdominal: Soft. Normal appearance. There is no tenderness. Lymphadenopathy:        Head (right side): No submental and no submandibular adenopathy present. Head (left side): No submental and no submandibular adenopathy present. She has no cervical adenopathy. Neurological: She is alert and oriented to person, place, and time. Skin: Skin is warm and dry. No lesion and no rash noted. Psychiatric: She has a normal mood and affect. Her speech is normal and behavior is normal. Cognition and memory are normal.       /70 (Site: Left Arm, Position: Sitting, Cuff Size: Large Adult)   Pulse 93   Wt 228 lb 12.8 oz (103.8 kg)   SpO2 98%   BMI 38.07 kg/m²   Body mass index is 38.07 kg/m². BP Readings from Last 2 Encounters:   01/15/18 130/70   01/12/18 122/80       Wt Readings from Last 3 Encounters:   01/15/18 228 lb 12.8 oz (103.8 kg)   01/12/18 230 lb (104.3 kg)   12/13/17 230 lb 6.4 oz (104.5 kg)       Lab Review   Office Visit on 01/12/2018   Component Date Value    Gram Stain Result 01/14/2018 *                    Value:No WBC's seen  1+ Gram positive cocci      Organism 01/14/2018 Staphylococcus species*    WOUND/ABSCESS 01/14/2018                      Value: Moderate growth  Information to follow     Office Visit on 12/13/2017   Component Date Value    Microalbumin, Random Uri* 12/13/2017 <1.20     Creatinine, Ur 12/13/2017 80.6     Microalbumin Creatinine * 12/13/2017 see below     Hemoglobin A1C 12/13/2017 5.5        No results found for this visit on 01/15/18. Assessment:       1. Cellulitis of left leg    2. Open wound of left lower leg, subsequent encounter               Plan:       Isael Govea was seen today for cellulitis.     Diagnoses and all orders for this visit:    Cellulitis of left leg  Continue antibiotics until wound culture is finalized. Patient is afebrile with a fair appetite. She was educated if she develops fever, chills, nausea or vomiting she is to report to the ER immediately. Open wound of left lower leg, subsequent encounter  Patient has been to the wound clinic previously. Did place called to the wound clinic today and patient will be seen later this week. .  Left lower extremity was cleansed with Hibiclens and rinsed with normal saline. A dry sterile dressing was placed to the area and covered with gauze. The patient was instructed to remove the dressing and clean the wounds one to 2 times per day until seen by the wound clinic        Patient has been instructed call the office immediately with new symptoms, change in symptoms or worsening of symptoms. If this is not feasible, patient is instructed to report to the emergency room. Medication profile reviewed. Medication side effects and possible impairments from medications were discussed as applicable. Allergies were reviewed. Health maintenance was reviewed and updated as appropriate.

## 2018-01-17 ENCOUNTER — HOSPITAL ENCOUNTER (OUTPATIENT)
Dept: WOUND CARE | Age: 63
Discharge: HOME | End: 2018-01-17
Attending: INTERNAL MEDICINE | Admitting: INTERNAL MEDICINE

## 2018-01-17 VITALS
RESPIRATION RATE: 18 BRPM | TEMPERATURE: 97 F | SYSTOLIC BLOOD PRESSURE: 148 MMHG | DIASTOLIC BLOOD PRESSURE: 87 MMHG | BODY MASS INDEX: 37.79 KG/M2 | WEIGHT: 226.8 LBS | HEART RATE: 93 BPM | HEIGHT: 65 IN

## 2018-01-17 DIAGNOSIS — R60.0 BILATERAL LOWER EXTREMITY EDEMA: ICD-10-CM

## 2018-01-17 DIAGNOSIS — L97.921 CHRONIC ULCER OF LEFT LEG, LIMITED TO BREAKDOWN OF SKIN (HCC): ICD-10-CM

## 2018-01-17 DIAGNOSIS — I83.90 VARICOSE VEIN OF LEG: ICD-10-CM

## 2018-01-17 PROCEDURE — 99214 OFFICE O/P EST MOD 30 MIN: CPT | Performed by: INTERNAL MEDICINE

## 2018-01-17 PROCEDURE — 29581 APPL MULTLAYER CMPRN SYS LEG: CPT | Performed by: INTERNAL MEDICINE

## 2018-01-17 RX ORDER — SULFAMETHOXAZOLE AND TRIMETHOPRIM 800; 160 MG/1; MG/1
1 TABLET ORAL 2 TIMES DAILY
Qty: 20 TABLET | Refills: 0 | Status: SHIPPED | OUTPATIENT
Start: 2018-01-17 | End: 2018-01-27

## 2018-01-19 ASSESSMENT — ENCOUNTER SYMPTOMS
VOMITING: 0
RESPIRATORY NEGATIVE: 1
NAUSEA: 0
DIARRHEA: 0
GASTROINTESTINAL NEGATIVE: 1
EYES NEGATIVE: 1

## 2018-01-24 ENCOUNTER — HOSPITAL ENCOUNTER (OUTPATIENT)
Dept: WOUND CARE | Age: 63
Discharge: HOME | End: 2018-01-24
Attending: INTERNAL MEDICINE | Admitting: INTERNAL MEDICINE

## 2018-01-24 VITALS
HEART RATE: 70 BPM | TEMPERATURE: 98.2 F | SYSTOLIC BLOOD PRESSURE: 127 MMHG | HEIGHT: 65 IN | RESPIRATION RATE: 18 BRPM | WEIGHT: 222 LBS | BODY MASS INDEX: 36.99 KG/M2 | DIASTOLIC BLOOD PRESSURE: 77 MMHG

## 2018-01-24 PROCEDURE — 29581 APPL MULTLAYER CMPRN SYS LEG: CPT | Performed by: INTERNAL MEDICINE

## 2018-01-25 PROBLEM — A41.9 SEPSIS (HCC): Status: RESOLVED | Noted: 2017-10-09 | Resolved: 2018-01-25

## 2018-01-25 PROBLEM — R60.0 BILATERAL LOWER EXTREMITY EDEMA: Status: ACTIVE | Noted: 2018-01-25

## 2018-01-25 PROBLEM — R57.9 SHOCK (HCC): Status: RESOLVED | Noted: 2017-10-10 | Resolved: 2018-01-25

## 2018-01-25 PROBLEM — L97.921 CHRONIC ULCER OF LEFT LEG, LIMITED TO BREAKDOWN OF SKIN (HCC): Status: ACTIVE | Noted: 2018-01-25

## 2018-01-25 NOTE — PROGRESS NOTES
Triamcinolone to red areas, aquaphor to dry areas, adaptic to fragile areas, ABD, compri 2 and single F tubi
not drink alcohol or use drugs. Her current medication list consists of ACCU-CHEK FASTCLIX LANCETS, ARIPiprazole, BLOOD GLUCOSE TEST STRIPS, DULoxetine, Magnesium, albuterol sulfate HFA, aspirin, busPIRone, celecoxib, ferrous sulfate, folic acid, gabapentin, levothyroxine, mesalamine, predniSONE, ranolazine, simvastatin, Augmentin, Keflex, and vitamin D. Allergies: Pravastatin and Wellbutrin [bupropion]    Pertinent items from the review of systems are discussed in the HPI; the remainder of the ROS was reviewed and is negative. Objective:     Vitals:    01/17/18 1244   BP: (!) 148/87   Pulse: 93   Resp: 18   Temp: 97 °F (36.1 °C)   TempSrc: Oral   Weight: 226 lb 12.8 oz (102.9 kg)   Height: 5' 5\" (1.651 m)     REANNA today: 1.07 right, 9.78 left (arm systolics 337 & 574, right ankle 148 & 150, left ankle 148 & 154). Constitutional:  well-developed, well-nourished, overweight, NAD  Psychiatric:  oriented to person, place and time; mood and affect appropriate for the situation   Eyes:  pupils equal, round and reactive to light; sclerae anicteric, conjunctivae not pale  ENT: no thrush or oral ulcers, mucous membranes moist  Abd: soft, NT, ND, good BS  Cardiovascular:  bilateral pedal pulses palpable; moderate lower extremity edema, some prominent distal cutaneous veins, mild hemosiderin changes, mild stasis dermatitis, some peeling hyperkeratosis  Lymphatic:  no inguinal or popliteal adenopathy, no angitis, no evidence of cellulitis   Musculoskeletal:  no clubbing, cyanosis or petechiae; RLE and LLE with no gross effusions, joint misalignment or acute arthritis  Lulu-ulcer skin:  Induration, Erythema, Warmth, Pink and hyperkeratotic. Ulcer(s):  partial thickness, pink-red, part granular, part just bland exposed dermal tissue, some loose fibrin, serous exudate, no pus, no deep tissue exposed. Today's ulcer measurements are in the electronic flowsheet. Photos also saved in electronic chart.     Lab Results

## 2018-01-29 RX ORDER — LIDOCAINE HCL 4 %
CREAM (GRAM) TOPICAL
Qty: 5 CAPSULE | Refills: 5 | Status: SHIPPED | OUTPATIENT
Start: 2018-01-29 | End: 2018-08-03 | Stop reason: SDUPTHER

## 2018-01-30 PROBLEM — L97.921 CHRONIC ULCER OF LEFT LEG, LIMITED TO BREAKDOWN OF SKIN (HCC): Status: RESOLVED | Noted: 2018-01-25 | Resolved: 2018-01-30

## 2018-01-30 NOTE — PROGRESS NOTES
blister-Wound Width (cm): 0 cm    Wound 01/17/18 #2- L medial lower leg, venous, partial thickness, onset 1/2018, blister-Wound Depth (cm) : 0  Wound 01/17/18 #1- L pretib, venous, partial thickness, onset 1/2018, blister-Wound Depth (cm) : 0    Assessment:     Patient Active Problem List   Diagnosis Code    Diet-controlled type 2 diabetes mellitus ([facility] E11.9    Hyperlipidemia E78.5    HTN (hypertension) I10    Ulcerative colitis (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 L97.921    Bilateral lower extremity edema R60.0       Assessment of today's active condition(s): venous insufficiency, chronic skin changes, chronic leg edema, recent stasis dermatitis and shallow ulcers associated with increased edema, perhaps cellulitis but that's now resolved; at risk for more ulcerations in the future, but if she can maintain good compression, elevation, leg exercise, etc, she could do quite well. Factors contributing to occurrence and/or persistence of the chronic ulcer include edema, venous stasis, decreased mobility, obesity and immunosuppression. Medical necessity of today's visit is shown by the above documentation. Sharp debridement is not indicated today, based upon the exam findings in the wound(s) above. She does have an indication for a weekly compression wrap. Procedure note:     Consent obtained. Time out performed per [facility] policy.     Anesthetic  Anesthetic: 4% Lidocaine Liquid Topical     After cleansing of the wounds with saline and applying skin-care and/or dressing materials as listed below, Bilateral application of a

## 2018-01-31 RX ORDER — GABAPENTIN 100 MG/1
CAPSULE ORAL
Qty: 90 CAPSULE | Refills: 1 | Status: SHIPPED | OUTPATIENT
Start: 2018-01-31 | End: 2018-07-25 | Stop reason: ALTCHOICE

## 2018-02-05 ENCOUNTER — TELEPHONE (OUTPATIENT)
Dept: ORTHOPEDIC SURGERY | Age: 63
End: 2018-02-05

## 2018-02-07 RX ORDER — PREDNISONE 1 MG/1
5 TABLET ORAL DAILY
Qty: 90 TABLET | Refills: 3 | OUTPATIENT
Start: 2018-02-07

## 2018-02-19 ENCOUNTER — OFFICE VISIT (OUTPATIENT)
Dept: ORTHOPEDIC SURGERY | Age: 63
End: 2018-02-19

## 2018-02-19 ENCOUNTER — TELEPHONE (OUTPATIENT)
Dept: ORTHOPEDIC SURGERY | Age: 63
End: 2018-02-19

## 2018-02-19 VITALS
BODY MASS INDEX: 36.99 KG/M2 | DIASTOLIC BLOOD PRESSURE: 58 MMHG | HEIGHT: 65 IN | SYSTOLIC BLOOD PRESSURE: 122 MMHG | WEIGHT: 222 LBS | HEART RATE: 71 BPM

## 2018-02-19 DIAGNOSIS — M54.5 LOW BACK PAIN, UNSPECIFIED BACK PAIN LATERALITY, UNSPECIFIED CHRONICITY, WITH SCIATICA PRESENCE UNSPECIFIED: Primary | ICD-10-CM

## 2018-02-19 PROCEDURE — 99214 OFFICE O/P EST MOD 30 MIN: CPT | Performed by: PHYSICIAN ASSISTANT

## 2018-02-19 NOTE — ADDENDUM NOTE
Encounter addended by: Tammie Bernal MA on: 2/19/2018  3:46 PM<BR>    Actions taken: Letter status changed

## 2018-02-23 ENCOUNTER — PAT TELEPHONE (OUTPATIENT)
Dept: PREADMISSION TESTING | Age: 63
End: 2018-02-23

## 2018-02-24 RX ORDER — RANOLAZINE 500 MG/1
500 TABLET, EXTENDED RELEASE ORAL 2 TIMES DAILY
Qty: 60 TABLET | Refills: 0 | Status: SHIPPED | OUTPATIENT
Start: 2018-02-24 | End: 2018-03-26 | Stop reason: SDUPTHER

## 2018-02-26 NOTE — H&P
TABLET BY MOUTH NIGHTLY 4/10/17   Bekah Murphy CNP   aspirin 325 MG EC tablet Take 325 mg by mouth daily    Historical Provider, MD   mesalamine (LIALDA) 1.2 G EC tablet Take 300 mg by mouth 4 times daily  6/28/16   Bekah Murphy CNP   ACCU-CHEK FASTCLIX LANCETS [facility] [place] test TID  Dx : 250.00 12/4/14   Bekah Murphy CNP   albuterol (VENTOLIN HFA) 108 (90 BASE) MCG/ACT inhaler Inhale 2 puffs into the lungs every 6 hours as needed for Wheezing. 11/15/14   Gurdeep Hawthorne MD   Glucose Blood (BLOOD GLUCOSE TEST STRIPS) STRP Check  Glucose three times daily 10/30/14   Bekah Murphy CNP       Vitals: Stable       PHYSICAL EXAM:  HENT: Airway patent and reviewed  Cardiovascular: Normal rate, regular rhythm, normal heart sounds. Pulmonary/Chest: No wheezes. No rhonchi. No rales. Abdominal: Soft. Bowel sounds are normal. No distension. ASA CLASS:         []   I. Normal, healthy adult           [x]   II.  Mild systemic disease            []   III. Severe systemic disease      Sedation plan:   [x]  Local              []  Minimal                  []  General anesthesia    Patient's condition acceptable for planned procedure/sedation. Post Procedure Plan   Return to same level of care   ______________________     The risks and benefits as well as alternatives to the procedure have been discussed with the patient and or family. The patient and or next of kin understands and agrees to proceed.     Pelon Blanca M.D.

## 2018-02-27 ENCOUNTER — HOSPITAL ENCOUNTER (OUTPATIENT)
Dept: PAIN MANAGEMENT | Age: 63
Discharge: HOME | End: 2018-02-27
Attending: PHYSICAL MEDICINE & REHABILITATION | Admitting: PHYSICAL MEDICINE & REHABILITATION

## 2018-02-27 VITALS
HEART RATE: 68 BPM | RESPIRATION RATE: 16 BRPM | BODY MASS INDEX: 35.82 KG/M2 | OXYGEN SATURATION: 98 % | SYSTOLIC BLOOD PRESSURE: 142 MMHG | HEIGHT: 65 IN | DIASTOLIC BLOOD PRESSURE: 78 MMHG | TEMPERATURE: 97.2 F | WEIGHT: 215 LBS

## 2018-02-27 LAB
GLUCOSE BLD-MCNC: 76 MG/DL (ref 70–99)
PERFORMED ON: NORMAL

## 2018-02-27 ASSESSMENT — PAIN - FUNCTIONAL ASSESSMENT
PAIN_FUNCTIONAL_ASSESSMENT: 0-10
PAIN_FUNCTIONAL_ASSESSMENT: 0-10

## 2018-02-27 ASSESSMENT — ACTIVITIES OF DAILY LIVING (ADL): EFFECT OF PAIN ON DAILY ACTIVITIES: USING CANE FOR AMBULATION

## 2018-02-27 NOTE — PROGRESS NOTES
Pt arrived to PACU,A&O,no  numbness or tingling, vitals stable, site unremarkable  States pain is tolerable

## 2018-02-27 NOTE — PROGRESS NOTES
Lumbar Transforaminal Injection   Injection Note    Sight marked/ confirmed with x-ray: yes  Position: Prone with head on pillow  Prepped with: Chloraprep      Depomedrol (40 mg/ml)-2ml  Omnipaque 240mg/ml-2ml    Monitor by: Nolvia Johnson RN  C - Arm operated by: Alissa Leung RT  Tech: JOANNA Wise SA  Prepped by:  Hugo Monge SA

## 2018-03-13 ENCOUNTER — OFFICE VISIT (OUTPATIENT)
Dept: ORTHOPEDIC SURGERY | Age: 63
End: 2018-03-13

## 2018-03-13 VITALS
SYSTOLIC BLOOD PRESSURE: 117 MMHG | DIASTOLIC BLOOD PRESSURE: 63 MMHG | HEIGHT: 65 IN | WEIGHT: 214.95 LBS | HEART RATE: 95 BPM | BODY MASS INDEX: 35.81 KG/M2

## 2018-03-13 DIAGNOSIS — M54.16 LUMBAR RADICULITIS: ICD-10-CM

## 2018-03-13 DIAGNOSIS — M51.36 DDD (DEGENERATIVE DISC DISEASE), LUMBAR: Primary | ICD-10-CM

## 2018-03-13 DIAGNOSIS — M48.062 SPINAL STENOSIS OF LUMBAR REGION WITH NEUROGENIC CLAUDICATION: ICD-10-CM

## 2018-03-13 PROCEDURE — 99212 OFFICE O/P EST SF 10 MIN: CPT | Performed by: PHYSICIAN ASSISTANT

## 2018-03-26 RX ORDER — RANOLAZINE 500 MG/1
500 TABLET, EXTENDED RELEASE ORAL 2 TIMES DAILY
Qty: 60 TABLET | Refills: 0 | Status: SHIPPED | OUTPATIENT
Start: 2018-03-26 | End: 2018-04-16 | Stop reason: SDUPTHER

## 2018-04-16 DIAGNOSIS — E03.9 HYPOTHYROIDISM, UNSPECIFIED TYPE: ICD-10-CM

## 2018-04-16 RX ORDER — LEVOTHYROXINE SODIUM 0.2 MG/1
200 TABLET ORAL DAILY
Qty: 30 TABLET | Refills: 3 | Status: SHIPPED | OUTPATIENT
Start: 2018-04-16 | End: 2018-04-17 | Stop reason: SDUPTHER

## 2018-04-16 RX ORDER — RANOLAZINE 500 MG/1
500 TABLET, EXTENDED RELEASE ORAL 2 TIMES DAILY
Qty: 60 TABLET | Refills: 0 | Status: SHIPPED | OUTPATIENT
Start: 2018-04-16 | End: 2018-04-17 | Stop reason: SDUPTHER

## 2018-04-17 DIAGNOSIS — E03.9 HYPOTHYROIDISM, UNSPECIFIED TYPE: ICD-10-CM

## 2018-04-18 RX ORDER — LEVOTHYROXINE SODIUM 0.2 MG/1
200 TABLET ORAL DAILY
Qty: 90 TABLET | Refills: 0 | Status: SHIPPED | OUTPATIENT
Start: 2018-04-18 | End: 2018-07-25 | Stop reason: SDUPTHER

## 2018-04-18 RX ORDER — RANOLAZINE 500 MG/1
500 TABLET, EXTENDED RELEASE ORAL 2 TIMES DAILY
Qty: 180 TABLET | Refills: 2 | Status: SHIPPED | OUTPATIENT
Start: 2018-04-18 | End: 2018-07-25

## 2018-04-20 ENCOUNTER — OFFICE VISIT (OUTPATIENT)
Dept: FAMILY MEDICINE CLINIC | Age: 63
End: 2018-04-20

## 2018-04-20 VITALS
BODY MASS INDEX: 36.15 KG/M2 | WEIGHT: 217 LBS | SYSTOLIC BLOOD PRESSURE: 138 MMHG | HEIGHT: 65 IN | DIASTOLIC BLOOD PRESSURE: 80 MMHG

## 2018-04-20 DIAGNOSIS — Z23 NEED FOR SHINGLES VACCINE: ICD-10-CM

## 2018-04-20 DIAGNOSIS — M54.16 LEFT LUMBAR RADICULITIS: ICD-10-CM

## 2018-04-20 DIAGNOSIS — I10 ESSENTIAL HYPERTENSION: Primary | ICD-10-CM

## 2018-04-20 PROCEDURE — 99213 OFFICE O/P EST LOW 20 MIN: CPT | Performed by: NURSE PRACTITIONER

## 2018-04-20 RX ORDER — PREDNISONE 1 MG/1
TABLET ORAL
Refills: 4 | Status: CANCELLED | OUTPATIENT
Start: 2018-04-20

## 2018-04-20 RX ORDER — NEBIVOLOL 2.5 MG/1
2.5 TABLET ORAL DAILY
Qty: 30 TABLET | Refills: 3 | Status: CANCELLED | OUTPATIENT
Start: 2018-04-20

## 2018-04-20 RX ORDER — NEBIVOLOL 2.5 MG/1
TABLET ORAL
Qty: 30 TABLET | Refills: 11 | Status: SHIPPED | OUTPATIENT
Start: 2018-04-20 | End: 2018-09-12 | Stop reason: SDUPTHER

## 2018-04-25 ENCOUNTER — OFFICE VISIT (OUTPATIENT)
Dept: ORTHOPEDIC SURGERY | Age: 63
End: 2018-04-25

## 2018-04-25 ENCOUNTER — TELEPHONE (OUTPATIENT)
Dept: ORTHOPEDIC SURGERY | Age: 63
End: 2018-04-25

## 2018-04-25 VITALS
SYSTOLIC BLOOD PRESSURE: 129 MMHG | WEIGHT: 216.93 LBS | HEART RATE: 92 BPM | HEIGHT: 65 IN | DIASTOLIC BLOOD PRESSURE: 67 MMHG | BODY MASS INDEX: 36.14 KG/M2

## 2018-04-25 DIAGNOSIS — M51.36 LUMBAR ADJACENT SEGMENT DISEASE WITH SPONDYLOLISTHESIS: ICD-10-CM

## 2018-04-25 DIAGNOSIS — M48.062 SPINAL STENOSIS OF LUMBAR REGION WITH NEUROGENIC CLAUDICATION: ICD-10-CM

## 2018-04-25 DIAGNOSIS — R20.0 LEFT LEG NUMBNESS: Primary | ICD-10-CM

## 2018-04-25 DIAGNOSIS — M43.16 LUMBAR ADJACENT SEGMENT DISEASE WITH SPONDYLOLISTHESIS: ICD-10-CM

## 2018-04-25 PROCEDURE — 99214 OFFICE O/P EST MOD 30 MIN: CPT | Performed by: PHYSICIAN ASSISTANT

## 2018-04-25 RX ORDER — PREGABALIN 75 MG/1
CAPSULE ORAL
Qty: 60 CAPSULE | Refills: 2 | Status: SHIPPED | OUTPATIENT
Start: 2018-04-25 | End: 2018-06-14 | Stop reason: ALTCHOICE

## 2018-05-03 DIAGNOSIS — F41.9 ANXIETY: Primary | ICD-10-CM

## 2018-05-03 RX ORDER — BUSPIRONE HYDROCHLORIDE 15 MG/1
15 TABLET ORAL 3 TIMES DAILY PRN
Qty: 90 TABLET | Refills: 1 | Status: SHIPPED | OUTPATIENT
Start: 2018-05-03 | End: 2018-06-01 | Stop reason: ALTCHOICE

## 2018-05-05 ASSESSMENT — ENCOUNTER SYMPTOMS
GASTROINTESTINAL NEGATIVE: 1
RESPIRATORY NEGATIVE: 1
EYES NEGATIVE: 1

## 2018-06-01 ENCOUNTER — OFFICE VISIT (OUTPATIENT)
Dept: FAMILY MEDICINE CLINIC | Age: 63
End: 2018-06-01

## 2018-06-01 DIAGNOSIS — M79.89 LEG SWELLING: ICD-10-CM

## 2018-06-01 DIAGNOSIS — L03.116 CELLULITIS OF LEFT LOWER EXTREMITY: Primary | ICD-10-CM

## 2018-06-01 DIAGNOSIS — I87.2 VENOUS INSUFFICIENCY: ICD-10-CM

## 2018-06-01 PROCEDURE — 99214 OFFICE O/P EST MOD 30 MIN: CPT | Performed by: NURSE PRACTITIONER

## 2018-06-01 RX ORDER — SULFAMETHOXAZOLE AND TRIMETHOPRIM 800; 160 MG/1; MG/1
1 TABLET ORAL 2 TIMES DAILY
Qty: 20 TABLET | Refills: 0 | Status: SHIPPED | OUTPATIENT
Start: 2018-06-01 | End: 2018-06-11

## 2018-06-01 ASSESSMENT — ENCOUNTER SYMPTOMS
NAUSEA: 0
RECTAL PAIN: 0
VOMITING: 0

## 2018-06-04 ENCOUNTER — OFFICE VISIT (OUTPATIENT)
Dept: FAMILY MEDICINE CLINIC | Age: 63
End: 2018-06-04

## 2018-06-04 VITALS
HEART RATE: 81 BPM | WEIGHT: 215.6 LBS | HEIGHT: 65 IN | TEMPERATURE: 98.8 F | SYSTOLIC BLOOD PRESSURE: 112 MMHG | BODY MASS INDEX: 35.92 KG/M2 | DIASTOLIC BLOOD PRESSURE: 71 MMHG | OXYGEN SATURATION: 98 %

## 2018-06-04 VITALS
BODY MASS INDEX: 36.14 KG/M2 | DIASTOLIC BLOOD PRESSURE: 80 MMHG | SYSTOLIC BLOOD PRESSURE: 142 MMHG | WEIGHT: 217.2 LBS | HEART RATE: 84 BPM

## 2018-06-04 DIAGNOSIS — Z22.322 MRSA (METHICILLIN RESISTANT STAPH AUREUS) CULTURE POSITIVE: ICD-10-CM

## 2018-06-04 DIAGNOSIS — L53.9 LEG ERYTHEMA: ICD-10-CM

## 2018-06-04 DIAGNOSIS — L03.116 CELLULITIS OF LEFT LOWER EXTREMITY: Primary | ICD-10-CM

## 2018-06-04 LAB
GRAM STAIN RESULT: ABNORMAL
ORGANISM: ABNORMAL
WOUND/ABSCESS: ABNORMAL
WOUND/ABSCESS: ABNORMAL

## 2018-06-04 PROCEDURE — 99214 OFFICE O/P EST MOD 30 MIN: CPT | Performed by: NURSE PRACTITIONER

## 2018-06-04 ASSESSMENT — ENCOUNTER SYMPTOMS
GASTROINTESTINAL NEGATIVE: 1
GASTROINTESTINAL NEGATIVE: 1
COLOR CHANGE: 0
RESPIRATORY NEGATIVE: 1
RESPIRATORY NEGATIVE: 1
COLOR CHANGE: 1

## 2018-06-05 DIAGNOSIS — R79.0 LOW BLOOD MAGNESIUM: Primary | ICD-10-CM

## 2018-06-05 DIAGNOSIS — E83.42 HYPOMAGNESEMIA: Primary | ICD-10-CM

## 2018-06-05 LAB
A/G RATIO: 1.4 (ref 1.1–2.2)
ALBUMIN SERPL-MCNC: 4.2 G/DL (ref 3.4–5)
ALP BLD-CCNC: 76 U/L (ref 40–129)
ALT SERPL-CCNC: 10 U/L (ref 10–40)
ANION GAP SERPL CALCULATED.3IONS-SCNC: 22 MMOL/L (ref 3–16)
AST SERPL-CCNC: 20 U/L (ref 15–37)
BASOPHILS ABSOLUTE: 0 K/UL (ref 0–0.2)
BASOPHILS RELATIVE PERCENT: 0.7 %
BILIRUB SERPL-MCNC: 0.3 MG/DL (ref 0–1)
BUN BLDV-MCNC: 25 MG/DL (ref 7–20)
CALCIUM SERPL-MCNC: 9.8 MG/DL (ref 8.3–10.6)
CHLORIDE BLD-SCNC: 102 MMOL/L (ref 99–110)
CO2: 19 MMOL/L (ref 21–32)
CREAT SERPL-MCNC: 1.4 MG/DL (ref 0.6–1.2)
EOSINOPHILS ABSOLUTE: 0.4 K/UL (ref 0–0.6)
EOSINOPHILS RELATIVE PERCENT: 5.1 %
GFR AFRICAN AMERICAN: 46
GFR NON-AFRICAN AMERICAN: 38
GLOBULIN: 3 G/DL
GLUCOSE BLD-MCNC: 117 MG/DL (ref 70–99)
HCT VFR BLD CALC: 34 % (ref 36–48)
HEMOGLOBIN: 11.7 G/DL (ref 12–16)
LYMPHOCYTES ABSOLUTE: 1.4 K/UL (ref 1–5.1)
LYMPHOCYTES RELATIVE PERCENT: 19.9 %
MAGNESIUM: 1.6 MG/DL (ref 1.8–2.4)
MCH RBC QN AUTO: 31.7 PG (ref 26–34)
MCHC RBC AUTO-ENTMCNC: 34.4 G/DL (ref 31–36)
MCV RBC AUTO: 92.1 FL (ref 80–100)
MONOCYTES ABSOLUTE: 0.5 K/UL (ref 0–1.3)
MONOCYTES RELATIVE PERCENT: 7.7 %
NEUTROPHILS ABSOLUTE: 4.6 K/UL (ref 1.7–7.7)
NEUTROPHILS RELATIVE PERCENT: 66.6 %
PDW BLD-RTO: 14.5 % (ref 12.4–15.4)
PLATELET # BLD: 204 K/UL (ref 135–450)
PMV BLD AUTO: 9.6 FL (ref 5–10.5)
POTASSIUM SERPL-SCNC: 4.6 MMOL/L (ref 3.5–5.1)
RBC # BLD: 3.69 M/UL (ref 4–5.2)
SODIUM BLD-SCNC: 143 MMOL/L (ref 136–145)
TOTAL PROTEIN: 7.2 G/DL (ref 6.4–8.2)
WBC # BLD: 6.9 K/UL (ref 4–11)

## 2018-06-05 RX ORDER — MAGNESIUM OXIDE 400 MG/1
800 TABLET ORAL 2 TIMES DAILY
Qty: 60 TABLET | Refills: 0 | Status: SHIPPED | OUTPATIENT
Start: 2018-06-05 | End: 2018-06-22 | Stop reason: SDUPTHER

## 2018-06-06 ENCOUNTER — OFFICE VISIT (OUTPATIENT)
Dept: FAMILY MEDICINE CLINIC | Age: 63
End: 2018-06-06

## 2018-06-06 VITALS
WEIGHT: 216 LBS | BODY MASS INDEX: 35.99 KG/M2 | HEIGHT: 65 IN | TEMPERATURE: 98.4 F | SYSTOLIC BLOOD PRESSURE: 110 MMHG | DIASTOLIC BLOOD PRESSURE: 70 MMHG

## 2018-06-06 DIAGNOSIS — Z22.322 MRSA (METHICILLIN RESISTANT STAPH AUREUS) CULTURE POSITIVE: ICD-10-CM

## 2018-06-06 DIAGNOSIS — L03.116 CELLULITIS OF LEFT LOWER EXTREMITY: Primary | ICD-10-CM

## 2018-06-06 DIAGNOSIS — T14.8XXA WOUND OF SKIN: ICD-10-CM

## 2018-06-06 PROCEDURE — 99213 OFFICE O/P EST LOW 20 MIN: CPT | Performed by: NURSE PRACTITIONER

## 2018-06-06 RX ORDER — SULFAMETHOXAZOLE AND TRIMETHOPRIM 800; 160 MG/1; MG/1
1 TABLET ORAL 2 TIMES DAILY
Qty: 8 TABLET | Refills: 0 | Status: SHIPPED | OUTPATIENT
Start: 2018-06-06 | End: 2018-06-10

## 2018-06-07 ASSESSMENT — ENCOUNTER SYMPTOMS
GASTROINTESTINAL NEGATIVE: 1
EYES NEGATIVE: 1
RESPIRATORY NEGATIVE: 1

## 2018-06-08 ENCOUNTER — HOSPITAL ENCOUNTER (OUTPATIENT)
Dept: MRI IMAGING | Age: 63
Discharge: HOME | End: 2018-06-08
Attending: PHYSICIAN ASSISTANT | Admitting: PHYSICIAN ASSISTANT

## 2018-06-08 DIAGNOSIS — R20.0 ANESTHESIA OF SKIN: ICD-10-CM

## 2018-06-08 DIAGNOSIS — M43.16 LUMBAR ADJACENT SEGMENT DISEASE WITH SPONDYLOLISTHESIS: ICD-10-CM

## 2018-06-08 DIAGNOSIS — M51.36 LUMBAR ADJACENT SEGMENT DISEASE WITH SPONDYLOLISTHESIS: ICD-10-CM

## 2018-06-13 ENCOUNTER — TELEPHONE (OUTPATIENT)
Dept: RHEUMATOLOGY | Age: 63
End: 2018-06-13

## 2018-06-14 ENCOUNTER — OFFICE VISIT (OUTPATIENT)
Dept: FAMILY MEDICINE CLINIC | Age: 63
End: 2018-06-14

## 2018-06-14 VITALS
HEIGHT: 65 IN | BODY MASS INDEX: 36.49 KG/M2 | HEART RATE: 95 BPM | DIASTOLIC BLOOD PRESSURE: 80 MMHG | WEIGHT: 219 LBS | SYSTOLIC BLOOD PRESSURE: 122 MMHG | OXYGEN SATURATION: 96 %

## 2018-06-14 DIAGNOSIS — L03.116 CELLULITIS OF LEFT LOWER EXTREMITY: Primary | ICD-10-CM

## 2018-06-14 DIAGNOSIS — D64.9 ANEMIA, UNSPECIFIED TYPE: ICD-10-CM

## 2018-06-14 PROCEDURE — 99212 OFFICE O/P EST SF 10 MIN: CPT | Performed by: NURSE PRACTITIONER

## 2018-06-14 RX ORDER — FERROUS SULFATE 325(65) MG
TABLET ORAL
Qty: 90 TABLET | Refills: 1 | Status: SHIPPED | OUTPATIENT
Start: 2018-06-14 | End: 2018-12-11 | Stop reason: SDUPTHER

## 2018-06-15 RX ORDER — GABAPENTIN 100 MG/1
200 CAPSULE ORAL 3 TIMES DAILY
COMMUNITY
Start: 2018-06-14 | End: 2018-10-23 | Stop reason: SDUPTHER

## 2018-06-19 ASSESSMENT — ENCOUNTER SYMPTOMS
RESPIRATORY NEGATIVE: 1
VOMITING: 0
EYES NEGATIVE: 1
GASTROINTESTINAL NEGATIVE: 1
NAUSEA: 0
ROS SKIN COMMENTS: LLE CELLULITIS

## 2018-06-22 DIAGNOSIS — E83.42 HYPOMAGNESEMIA: ICD-10-CM

## 2018-06-22 RX ORDER — MAGNESIUM OXIDE 400 MG/1
TABLET ORAL
Qty: 60 TABLET | Refills: 5 | Status: SHIPPED | OUTPATIENT
Start: 2018-06-22 | End: 2018-09-12 | Stop reason: SDUPTHER

## 2018-06-29 ENCOUNTER — OFFICE VISIT (OUTPATIENT)
Dept: ORTHOPEDIC SURGERY | Age: 63
End: 2018-06-29

## 2018-06-29 DIAGNOSIS — M54.16 LUMBAR RADICULOPATHY: Primary | ICD-10-CM

## 2018-06-29 PROCEDURE — 95886 MUSC TEST DONE W/N TEST COMP: CPT | Performed by: PHYSICAL MEDICINE & REHABILITATION

## 2018-06-29 PROCEDURE — 95908 NRV CNDJ TST 3-4 STUDIES: CPT | Performed by: PHYSICAL MEDICINE & REHABILITATION

## 2018-07-16 ENCOUNTER — OFFICE VISIT (OUTPATIENT)
Dept: ORTHOPEDIC SURGERY | Age: 63
End: 2018-07-16

## 2018-07-16 VITALS
SYSTOLIC BLOOD PRESSURE: 128 MMHG | DIASTOLIC BLOOD PRESSURE: 65 MMHG | BODY MASS INDEX: 49.99 KG/M2 | HEART RATE: 84 BPM | WEIGHT: 216 LBS | HEIGHT: 55 IN

## 2018-07-16 DIAGNOSIS — M48.062 LUMBAR STENOSIS WITH NEUROGENIC CLAUDICATION: ICD-10-CM

## 2018-07-16 DIAGNOSIS — M54.16 LUMBAR RADICULITIS: ICD-10-CM

## 2018-07-16 DIAGNOSIS — M51.36 DDD (DEGENERATIVE DISC DISEASE), LUMBAR: Primary | ICD-10-CM

## 2018-07-16 PROCEDURE — 99214 OFFICE O/P EST MOD 30 MIN: CPT | Performed by: PHYSICIAN ASSISTANT

## 2018-07-16 RX ORDER — GABAPENTIN 100 MG/1
CAPSULE ORAL
Qty: 180 CAPSULE | Refills: 2 | Status: ON HOLD | OUTPATIENT
Start: 2018-07-16 | End: 2018-09-03

## 2018-07-25 ENCOUNTER — OFFICE VISIT (OUTPATIENT)
Dept: FAMILY MEDICINE CLINIC | Age: 63
End: 2018-07-25

## 2018-07-25 VITALS
HEIGHT: 65 IN | BODY MASS INDEX: 34.66 KG/M2 | DIASTOLIC BLOOD PRESSURE: 60 MMHG | WEIGHT: 208 LBS | SYSTOLIC BLOOD PRESSURE: 112 MMHG

## 2018-07-25 DIAGNOSIS — E11.9 DIET-CONTROLLED TYPE 2 DIABETES MELLITUS (HCC): ICD-10-CM

## 2018-07-25 DIAGNOSIS — F41.9 ANXIETY AND DEPRESSION: ICD-10-CM

## 2018-07-25 DIAGNOSIS — E03.9 HYPOTHYROIDISM, UNSPECIFIED TYPE: ICD-10-CM

## 2018-07-25 DIAGNOSIS — E55.9 VITAMIN D DEFICIENCY: ICD-10-CM

## 2018-07-25 DIAGNOSIS — D50.0 IRON DEFICIENCY ANEMIA DUE TO CHRONIC BLOOD LOSS: ICD-10-CM

## 2018-07-25 DIAGNOSIS — E83.42 HYPOMAGNESEMIA: ICD-10-CM

## 2018-07-25 DIAGNOSIS — E53.8 FOLATE DEFICIENCY: ICD-10-CM

## 2018-07-25 DIAGNOSIS — I10 ESSENTIAL HYPERTENSION: ICD-10-CM

## 2018-07-25 DIAGNOSIS — L03.116 CELLULITIS OF LEFT LEG: Primary | ICD-10-CM

## 2018-07-25 DIAGNOSIS — F32.A ANXIETY AND DEPRESSION: ICD-10-CM

## 2018-07-25 DIAGNOSIS — Z23 NEED FOR SHINGLES VACCINE: ICD-10-CM

## 2018-07-25 DIAGNOSIS — E53.8 B12 DEFICIENCY: ICD-10-CM

## 2018-07-25 DIAGNOSIS — E78.2 MIXED HYPERLIPIDEMIA: ICD-10-CM

## 2018-07-25 PROCEDURE — 99213 OFFICE O/P EST LOW 20 MIN: CPT | Performed by: NURSE PRACTITIONER

## 2018-07-25 RX ORDER — SIMVASTATIN 20 MG
TABLET ORAL
Qty: 30 TABLET | Refills: 1 | Status: SHIPPED | OUTPATIENT
Start: 2018-07-25 | End: 2018-09-12 | Stop reason: SDUPTHER

## 2018-07-25 RX ORDER — LEVOTHYROXINE SODIUM 0.2 MG/1
200 TABLET ORAL DAILY
Qty: 90 TABLET | Refills: 0 | Status: SHIPPED | OUTPATIENT
Start: 2018-07-25 | End: 2018-07-30 | Stop reason: SDUPTHER

## 2018-07-25 RX ORDER — ISOSORBIDE MONONITRATE 60 MG/1
60 TABLET, EXTENDED RELEASE ORAL EVERY MORNING
Qty: 30 TABLET | Refills: 3 | Status: SHIPPED | OUTPATIENT
Start: 2018-07-25 | End: 2018-09-12 | Stop reason: SDUPTHER

## 2018-07-25 NOTE — PROGRESS NOTES
Superficial thrombophlebitis of left leg 7/8/2015    Venous ulcer of left leg (HCC)      Family History   Problem Relation Age of Onset    Diabetes Mother     Alzheimer's Disease Mother     Anemia Mother     Diabetes Father     Cancer Father 68        pancreatic    Cancer Maternal Grandmother [de-identified]        Bone Marrow Ca     Past Surgical History:   Procedure Laterality Date    APPENDECTOMY      CARDIAC CATHETERIZATION  05/2014    COLONOSCOPY      HIP ARTHROPLASTY Left 01/19/2016    TONSILLECTOMY      VARICOSE VEIN SURGERY       Social History     Social History    Marital status:      Spouse name: Shalonda Pratt Number of children: N/A    Years of education: N/A     Occupational History    Not on file. Social History Main Topics    Smoking status: Never Smoker    Smokeless tobacco: Never Used      Comment: never even tried it    Alcohol use No      Comment: rarely    Drug use: No    Sexual activity: Not Currently     Other Topics Concern    Not on file     Social History Narrative    No narrative on file     Current Outpatient Prescriptions   Medication Sig Dispense Refill    gabapentin (NEURONTIN) 100 MG capsule I-II PO TID. 180 capsule 2    magnesium oxide (MAG-OX) 400 MG tablet TAKE 2 TABLETS BY MOUTH TWICE A DAY 60 tablet 5    gabapentin (NEURONTIN) 100 MG capsule Take 2 capsules by mouth 3 times daily. Ashley Rosario ferrous sulfate 325 (65 Fe) MG tablet TAKE 1 TABLET BY MOUTH DAILY.  90 tablet 1    nebivolol (BYSTOLIC) 2.5 MG tablet TAKE 1 TABLET BY MOUTH DAILY 30 tablet 11    levothyroxine (SYNTHROID) 200 MCG tablet Take 1 tablet by mouth daily 90 tablet 0    gabapentin (NEURONTIN) 100 MG capsule TAKE 1 CAPSULE BY MOUTH TWICE A DAY AND AT BEDTIME 90 capsule 1    CVS VITAMIN D3 47254 units CAPS capsule TAKE ONE CAPSULE BY MOUTH EVERY WEEK 5 capsule 5    folic acid (FOLVITE) 1 MG tablet TAKE 1 TABLET BY MOUTH DAILY 30 tablet 11    celecoxib (CELEBREX) 100 MG capsule TAKE 2 CAPSULES BY 06/04/2018 38*    GFR  06/04/2018 46*    Calcium 06/04/2018 9.8     Total Protein 06/04/2018 7.2     Alb 06/04/2018 4.2     Albumin/Globulin Ratio 06/04/2018 1.4     Total Bilirubin 06/04/2018 0.3     Alkaline Phosphatase 06/04/2018 76     ALT 06/04/2018 10     AST 06/04/2018 20     Globulin 06/04/2018 3.0     Magnesium 06/04/2018 1.60*   Office Visit on 06/01/2018   Component Date Value    Gram Stain Result 06/01/2018 *                    Value:No WBC's seen  No Epithelial Cells seen  3+ Gram positive cocci      Organism 06/01/2018 Staph aureus MRSA*    WOUND/ABSCESS 06/01/2018 *                    Value:Heavy growth  CONTACT PRECAUTIONS INDICATED         No results found for this visit on 07/25/18. Assessment:       1. Cellulitis of left leg    2. Essential hypertension    3. Hypothyroidism, unspecified type    4. Mixed hyperlipidemia    5. Anxiety and depression    6. Diet-controlled type 2 diabetes mellitus ([address]    7. B12 deficiency    8. Iron deficiency anemia due to chronic blood loss    9. Folate deficiency    10. Hypomagnesemia    11. Vitamin D deficiency    12. Need for shingles vaccine        No results found for this visit on 07/25/18. Plan:       Raul Connelly was seen today for cellulitis, medication adjustment and medication problem. Diagnoses and all orders for this visit:    Cellulitis of left leg  Resolving. Patient should finish any antibiotics that she is on    Essential hypertension/angina per patient report  Discontinue Ranexa due to cost and start the following  -     isosorbide mononitrate (IMDUR) 60 MG extended release tablet; Take 1 tablet by mouth every morning  -     CBC Auto Differential; Future  -     Comprehensive Metabolic Panel; Future    Hypothyroidism, unspecified type  -     Discontinue: levothyroxine (SYNTHROID) 200 MCG tablet; Take 1 tablet by mouth daily  -     TSH without Reflex;  Future    Mixed hyperlipidemia  -     simvastatin (ZOCOR)

## 2018-07-29 DIAGNOSIS — E03.9 HYPOTHYROIDISM, UNSPECIFIED TYPE: ICD-10-CM

## 2018-07-30 ENCOUNTER — TELEPHONE (OUTPATIENT)
Dept: FAMILY MEDICINE CLINIC | Age: 63
End: 2018-07-30

## 2018-07-30 DIAGNOSIS — L22 DIAPER RASH: Primary | ICD-10-CM

## 2018-07-30 DIAGNOSIS — R21 SKIN RASH: ICD-10-CM

## 2018-07-30 RX ORDER — LEVOTHYROXINE SODIUM 0.2 MG/1
200 TABLET ORAL DAILY
Qty: 90 TABLET | Refills: 0 | Status: SHIPPED | OUTPATIENT
Start: 2018-07-30 | End: 2018-08-02 | Stop reason: SDUPTHER

## 2018-07-30 RX ORDER — UREA 40 %
CREAM (GRAM) TOPICAL
Qty: 28 G | Refills: 0 | Status: ON HOLD | OUTPATIENT
Start: 2018-07-30 | End: 2018-09-03

## 2018-08-01 DIAGNOSIS — R21 SKIN RASH: ICD-10-CM

## 2018-08-01 DIAGNOSIS — E03.9 HYPOTHYROIDISM, UNSPECIFIED TYPE: ICD-10-CM

## 2018-08-01 RX ORDER — GLUCOSAMINE HCL/CHONDROITIN SU 500-400 MG
CAPSULE ORAL
Qty: 200 STRIP | Refills: 2 | Status: SHIPPED | OUTPATIENT
Start: 2018-08-01 | End: 2018-08-02 | Stop reason: SDUPTHER

## 2018-08-01 NOTE — TELEPHONE ENCOUNTER
Recommend patient waiting to see what Reino Patches would recommend. She may try Amlactin OTC until then.

## 2018-08-01 NOTE — TELEPHONE ENCOUNTER
I LM for pt with Bibi's OTC recommendation and stated we're going to wait until Germain Byrnes returns next week to decide what we should substitute the urea cream with. Pt advised to call back and ask to speak with me with questions.

## 2018-08-02 RX ORDER — GLUCOSAMINE HCL/CHONDROITIN SU 500-400 MG
CAPSULE ORAL
Qty: 200 STRIP | Refills: 1 | Status: SHIPPED | OUTPATIENT
Start: 2018-08-02 | End: 2018-08-13 | Stop reason: SDUPTHER

## 2018-08-02 RX ORDER — LEVOTHYROXINE SODIUM 0.2 MG/1
200 TABLET ORAL DAILY
Qty: 90 TABLET | Refills: 0 | Status: SHIPPED | OUTPATIENT
Start: 2018-08-02 | End: 2018-08-03 | Stop reason: SDUPTHER

## 2018-08-07 RX ORDER — UREA 40 %
CREAM (GRAM) TOPICAL
Qty: 28 G | Refills: 0 | OUTPATIENT
Start: 2018-08-07

## 2018-08-11 ENCOUNTER — HOSPITAL ENCOUNTER (OUTPATIENT)
Age: 63
Discharge: HOME | End: 2018-08-11
Payer: COMMERCIAL

## 2018-08-11 DIAGNOSIS — E53.8 B12 DEFICIENCY: ICD-10-CM

## 2018-08-11 DIAGNOSIS — E11.9 DIET-CONTROLLED TYPE 2 DIABETES MELLITUS (HCC): ICD-10-CM

## 2018-08-11 DIAGNOSIS — I10 ESSENTIAL HYPERTENSION: ICD-10-CM

## 2018-08-11 DIAGNOSIS — E78.2 MIXED HYPERLIPIDEMIA: ICD-10-CM

## 2018-08-11 DIAGNOSIS — D50.0 IRON DEFICIENCY ANEMIA DUE TO CHRONIC BLOOD LOSS: ICD-10-CM

## 2018-08-11 DIAGNOSIS — E03.9 HYPOTHYROIDISM, UNSPECIFIED TYPE: ICD-10-CM

## 2018-08-11 DIAGNOSIS — E55.9 VITAMIN D DEFICIENCY: ICD-10-CM

## 2018-08-11 DIAGNOSIS — E83.42 HYPOMAGNESEMIA: ICD-10-CM

## 2018-08-11 LAB
A/G RATIO: 1 (ref 1.1–2.2)
ALBUMIN SERPL-MCNC: 3.7 G/DL (ref 3.4–5)
ALP BLD-CCNC: 81 U/L (ref 40–129)
ALT SERPL-CCNC: 8 U/L (ref 10–40)
ANION GAP SERPL CALCULATED.3IONS-SCNC: 10 MMOL/L (ref 3–16)
AST SERPL-CCNC: 17 U/L (ref 15–37)
BASOPHILS ABSOLUTE: 0 K/UL (ref 0–0.2)
BASOPHILS RELATIVE PERCENT: 0.7 %
BILIRUB SERPL-MCNC: 0.7 MG/DL (ref 0–1)
BUN BLDV-MCNC: 14 MG/DL (ref 7–20)
CALCIUM SERPL-MCNC: 9.5 MG/DL (ref 8.3–10.6)
CHLORIDE BLD-SCNC: 105 MMOL/L (ref 99–110)
CO2: 27 MMOL/L (ref 21–32)
CREAT SERPL-MCNC: 0.8 MG/DL (ref 0.6–1.2)
EOSINOPHILS ABSOLUTE: 0.3 K/UL (ref 0–0.6)
EOSINOPHILS RELATIVE PERCENT: 5.6 %
GFR AFRICAN AMERICAN: >60
GFR NON-AFRICAN AMERICAN: >60
GLOBULIN: 3.6 G/DL
GLUCOSE BLD-MCNC: 85 MG/DL (ref 70–99)
HCT VFR BLD CALC: 32.2 % (ref 36–48)
HEMOGLOBIN: 10.9 G/DL (ref 12–16)
LYMPHOCYTES ABSOLUTE: 1.1 K/UL (ref 1–5.1)
LYMPHOCYTES RELATIVE PERCENT: 20.9 %
MAGNESIUM: 1.5 MG/DL (ref 1.8–2.4)
MCH RBC QN AUTO: 30.7 PG (ref 26–34)
MCHC RBC AUTO-ENTMCNC: 33.7 G/DL (ref 31–36)
MCV RBC AUTO: 91 FL (ref 80–100)
MONOCYTES ABSOLUTE: 0.4 K/UL (ref 0–1.3)
MONOCYTES RELATIVE PERCENT: 6.7 %
NEUTROPHILS ABSOLUTE: 3.6 K/UL (ref 1.7–7.7)
NEUTROPHILS RELATIVE PERCENT: 66.1 %
PDW BLD-RTO: 14.7 % (ref 12.4–15.4)
PLATELET # BLD: 173 K/UL (ref 135–450)
PMV BLD AUTO: 8.7 FL (ref 5–10.5)
POTASSIUM SERPL-SCNC: 4.5 MMOL/L (ref 3.5–5.1)
RBC # BLD: 3.54 M/UL (ref 4–5.2)
SODIUM BLD-SCNC: 142 MMOL/L (ref 136–145)
TOTAL PROTEIN: 7.3 G/DL (ref 6.4–8.2)
WBC # BLD: 5.5 K/UL (ref 4–11)

## 2018-08-11 PROCEDURE — 80053 COMPREHEN METABOLIC PANEL: CPT

## 2018-08-11 PROCEDURE — 83540 ASSAY OF IRON: CPT

## 2018-08-11 PROCEDURE — 82728 ASSAY OF FERRITIN: CPT

## 2018-08-11 PROCEDURE — 82746 ASSAY OF FOLIC ACID SERUM: CPT

## 2018-08-11 PROCEDURE — 82306 VITAMIN D 25 HYDROXY: CPT

## 2018-08-11 PROCEDURE — 84443 ASSAY THYROID STIM HORMONE: CPT

## 2018-08-11 PROCEDURE — 85025 COMPLETE CBC W/AUTO DIFF WBC: CPT

## 2018-08-11 PROCEDURE — 83036 HEMOGLOBIN GLYCOSYLATED A1C: CPT

## 2018-08-11 PROCEDURE — 83735 ASSAY OF MAGNESIUM: CPT

## 2018-08-11 PROCEDURE — 80061 LIPID PANEL: CPT

## 2018-08-11 PROCEDURE — 83550 IRON BINDING TEST: CPT

## 2018-08-11 PROCEDURE — 82607 VITAMIN B-12: CPT

## 2018-08-11 PROCEDURE — 36415 COLL VENOUS BLD VENIPUNCTURE: CPT

## 2018-08-12 LAB
CHOLESTEROL, TOTAL: 143 MG/DL (ref 0–199)
ESTIMATED AVERAGE GLUCOSE: 93.9 MG/DL
FERRITIN: 177.8 NG/ML (ref 15–150)
FOLATE: >20 NG/ML (ref 4.78–24.2)
HBA1C MFR BLD: 4.9 %
HDLC SERPL-MCNC: 48 MG/DL (ref 40–60)
IRON SATURATION: 26 % (ref 15–50)
IRON: 66 UG/DL (ref 37–145)
LDL CHOLESTEROL CALCULATED: 76 MG/DL
TOTAL IRON BINDING CAPACITY: 255 UG/DL (ref 260–445)
TRIGL SERPL-MCNC: 97 MG/DL (ref 0–150)
TSH SERPL DL<=0.05 MIU/L-ACNC: 0.39 UIU/ML (ref 0.27–4.2)
VITAMIN B-12: 455 PG/ML (ref 211–911)
VITAMIN D 25-HYDROXY: 55.1 NG/ML
VLDLC SERPL CALC-MCNC: 19 MG/DL

## 2018-08-13 RX ORDER — BLOOD-GLUCOSE METER
1 EACH MISCELLANEOUS DAILY PRN
Qty: 1 KIT | Refills: 0 | Status: SHIPPED | OUTPATIENT
Start: 2018-08-13 | End: 2018-09-06 | Stop reason: SDUPTHER

## 2018-08-17 ASSESSMENT — ENCOUNTER SYMPTOMS
EYES NEGATIVE: 1
GASTROINTESTINAL NEGATIVE: 1
RESPIRATORY NEGATIVE: 1

## 2018-08-22 RX ORDER — DICLOFENAC SODIUM 75 MG/1
75 TABLET, DELAYED RELEASE ORAL 2 TIMES DAILY PRN
Qty: 60 TABLET | Refills: 5 | Status: SHIPPED | OUTPATIENT
Start: 2018-08-22 | End: 2018-08-24

## 2018-08-24 ENCOUNTER — HOSPITAL ENCOUNTER (EMERGENCY)
Age: 63
Discharge: HOME | End: 2018-08-24
Attending: EMERGENCY MEDICINE
Payer: COMMERCIAL

## 2018-08-24 VITALS
SYSTOLIC BLOOD PRESSURE: 171 MMHG | WEIGHT: 204 LBS | BODY MASS INDEX: 33.99 KG/M2 | OXYGEN SATURATION: 95 % | RESPIRATION RATE: 16 BRPM | TEMPERATURE: 98.4 F | HEART RATE: 79 BPM | DIASTOLIC BLOOD PRESSURE: 99 MMHG | HEIGHT: 65 IN

## 2018-08-24 DIAGNOSIS — E86.0 DEHYDRATION: ICD-10-CM

## 2018-08-24 DIAGNOSIS — M54.32 SCIATICA OF LEFT SIDE: Primary | ICD-10-CM

## 2018-08-24 DIAGNOSIS — N17.9 AKI (ACUTE KIDNEY INJURY) (HCC): ICD-10-CM

## 2018-08-24 LAB
A/G RATIO: 1.1 (ref 1.1–2.2)
ALBUMIN SERPL-MCNC: 3.5 G/DL (ref 3.4–5)
ALP BLD-CCNC: 88 U/L (ref 40–129)
ALT SERPL-CCNC: 10 U/L (ref 10–40)
ANION GAP SERPL CALCULATED.3IONS-SCNC: 11 MMOL/L (ref 3–16)
AST SERPL-CCNC: 27 U/L (ref 15–37)
BILIRUB SERPL-MCNC: 0.4 MG/DL (ref 0–1)
BUN BLDV-MCNC: 23 MG/DL (ref 7–20)
CALCIUM SERPL-MCNC: 8.7 MG/DL (ref 8.3–10.6)
CHLORIDE BLD-SCNC: 109 MMOL/L (ref 99–110)
CO2: 23 MMOL/L (ref 21–32)
CREAT SERPL-MCNC: 1.7 MG/DL (ref 0.6–1.2)
GFR AFRICAN AMERICAN: 37
GFR NON-AFRICAN AMERICAN: 30
GLOBULIN: 3.1 G/DL
GLUCOSE BLD-MCNC: 85 MG/DL (ref 70–99)
POTASSIUM SERPL-SCNC: 4 MMOL/L (ref 3.5–5.1)
SODIUM BLD-SCNC: 143 MMOL/L (ref 136–145)
TOTAL PROTEIN: 6.6 G/DL (ref 6.4–8.2)

## 2018-08-24 PROCEDURE — 80053 COMPREHEN METABOLIC PANEL: CPT

## 2018-08-24 PROCEDURE — 96361 HYDRATE IV INFUSION ADD-ON: CPT

## 2018-08-24 PROCEDURE — 2580000003 HC RX 258: Performed by: EMERGENCY MEDICINE

## 2018-08-24 PROCEDURE — 6370000000 HC RX 637 (ALT 250 FOR IP): Performed by: EMERGENCY MEDICINE

## 2018-08-24 PROCEDURE — 6360000002 HC RX W HCPCS: Performed by: EMERGENCY MEDICINE

## 2018-08-24 PROCEDURE — 99283 EMERGENCY DEPT VISIT LOW MDM: CPT

## 2018-08-24 PROCEDURE — 96374 THER/PROPH/DIAG INJ IV PUSH: CPT

## 2018-08-24 RX ORDER — CYCLOBENZAPRINE HCL 10 MG
10 TABLET ORAL ONCE
Status: COMPLETED | OUTPATIENT
Start: 2018-08-24 | End: 2018-08-24

## 2018-08-24 RX ORDER — CELECOXIB 100 MG/1
100 CAPSULE ORAL 2 TIMES DAILY
Status: ON HOLD | COMMUNITY
End: 2018-09-03

## 2018-08-24 RX ORDER — HYDROCODONE BITARTRATE AND ACETAMINOPHEN 5; 325 MG/1; MG/1
1 TABLET ORAL ONCE
Status: COMPLETED | OUTPATIENT
Start: 2018-08-24 | End: 2018-08-24

## 2018-08-24 RX ORDER — CYCLOBENZAPRINE HCL 10 MG
10 TABLET ORAL 3 TIMES DAILY PRN
Qty: 21 TABLET | Refills: 0 | Status: SHIPPED | OUTPATIENT
Start: 2018-08-24 | End: 2018-08-31

## 2018-08-24 RX ORDER — PREDNISONE 10 MG/1
40 TABLET ORAL DAILY
Qty: 20 TABLET | Refills: 0 | Status: SHIPPED | OUTPATIENT
Start: 2018-08-24 | End: 2018-08-29

## 2018-08-24 RX ORDER — 0.9 % SODIUM CHLORIDE 0.9 %
500 INTRAVENOUS SOLUTION INTRAVENOUS ONCE
Status: COMPLETED | OUTPATIENT
Start: 2018-08-24 | End: 2018-08-24

## 2018-08-24 RX ORDER — DEXAMETHASONE SODIUM PHOSPHATE 10 MG/ML
10 INJECTION INTRAMUSCULAR; INTRAVENOUS ONCE
Status: COMPLETED | OUTPATIENT
Start: 2018-08-24 | End: 2018-08-24

## 2018-08-24 RX ADMIN — CYCLOBENZAPRINE HYDROCHLORIDE 10 MG: 10 TABLET, FILM COATED ORAL at 08:07

## 2018-08-24 RX ADMIN — SODIUM CHLORIDE 500 ML: 9 INJECTION, SOLUTION INTRAVENOUS at 08:06

## 2018-08-24 RX ADMIN — DEXAMETHASONE SODIUM PHOSPHATE 10 MG: 10 INJECTION, SOLUTION INTRAMUSCULAR; INTRAVENOUS at 08:08

## 2018-08-24 RX ADMIN — HYDROCODONE BITARTRATE AND ACETAMINOPHEN 1 TABLET: 5; 325 TABLET ORAL at 08:07

## 2018-08-24 ASSESSMENT — PAIN SCALES - GENERAL: PAINLEVEL_OUTOF10: 1

## 2018-08-24 NOTE — ED PROVIDER NOTES
CHIEF COMPLAINT  Back Pain (Left sided back pain radiates down left side onset yesterday, became lightheaded at work this am. )      HISTORY OF PRESENT ILLNESS  Radha Ramirez is a 60 y.o. female presents to the ED with pain down her left leg, diarrhea and lightheadedness. The patient has been dealing with sciatic pain involving her left leg for the past month, she is minimal point with the spine surgeon on Monday, and secondarily has been taking less of her mesalamine because of cost as been having increasing diarrhea. She states that she is on her feet all day yesterday was having a fair amount of pain, went to work this morning was having more pain, has been having more and more loose stools as well, felt lightheaded and an ambulance was called. She did not faint did not feel palpitations not any chest pain, shortness of breath or fever. She's had no swelling in either of her legs. She denies bowel or bladder incontinence, denies trauma or saddle anesthesia. She has no weakness in her legs. No other complaints, modifying factors or associated symptoms. I have reviewed the following from the nursing documentation.     Past Medical History:   Diagnosis Date    Acute pancreatitis 10/21/2014    Acute renal failure with tubular necrosis (HCC) 7/10/2015    Acute stasis dermatitis of left lower extremity 7/8/2015    Angina pectoris ([address] 5/23/2011    Cellulitis of left leg     Clostridium difficile infection 7/9/15, 6/4/15    Left THR 1/25/2016    Palpitations 7/17/2013    Primary osteoarthritis of left hip 11/18/2015    Shock ([address] 10/10/2017    Superficial thrombophlebitis of left leg 7/8/2015    Venous ulcer of left leg [facility])      Past Surgical History:   Procedure Laterality Date    APPENDECTOMY      CARDIAC CATHETERIZATION  05/2014    COLONOSCOPY      HIP ARTHROPLASTY Left 01/19/2016    TONSILLECTOMY      VARICOSE VEIN SURGERY       Family History   Problem Relation Age of Onset   

## 2018-08-27 ENCOUNTER — OFFICE VISIT (OUTPATIENT)
Dept: ORTHOPEDIC SURGERY | Age: 63
End: 2018-08-27

## 2018-08-27 VITALS
BODY MASS INDEX: 33.82 KG/M2 | HEART RATE: 86 BPM | WEIGHT: 203 LBS | HEIGHT: 65 IN | DIASTOLIC BLOOD PRESSURE: 82 MMHG | SYSTOLIC BLOOD PRESSURE: 142 MMHG

## 2018-08-27 DIAGNOSIS — M48.062 LUMBAR STENOSIS WITH NEUROGENIC CLAUDICATION: Primary | ICD-10-CM

## 2018-08-27 PROCEDURE — 99243 OFF/OP CNSLTJ NEW/EST LOW 30: CPT | Performed by: PHYSICIAN ASSISTANT

## 2018-08-30 ENCOUNTER — OFFICE VISIT (OUTPATIENT)
Dept: ORTHOPEDIC SURGERY | Age: 63
End: 2018-08-30

## 2018-08-30 VITALS
SYSTOLIC BLOOD PRESSURE: 128 MMHG | WEIGHT: 199 LBS | HEART RATE: 85 BPM | BODY MASS INDEX: 33.15 KG/M2 | DIASTOLIC BLOOD PRESSURE: 79 MMHG | HEIGHT: 65 IN

## 2018-08-30 DIAGNOSIS — M43.10 DEGENERATIVE SPONDYLOLISTHESIS: Primary | ICD-10-CM

## 2018-08-30 PROCEDURE — 99213 OFFICE O/P EST LOW 20 MIN: CPT | Performed by: ORTHOPAEDIC SURGERY

## 2018-09-03 ENCOUNTER — APPOINTMENT (OUTPATIENT)
Dept: GENERAL RADIOLOGY | Age: 63
DRG: 683 | End: 2018-09-03
Payer: COMMERCIAL

## 2018-09-03 ENCOUNTER — HOSPITAL ENCOUNTER (INPATIENT)
Age: 63
LOS: 2 days | Discharge: HOME | DRG: 683 | End: 2018-09-05
Attending: EMERGENCY MEDICINE | Admitting: INTERNAL MEDICINE
Payer: COMMERCIAL

## 2018-09-03 DIAGNOSIS — E86.0 DEHYDRATION: Primary | ICD-10-CM

## 2018-09-03 DIAGNOSIS — N30.00 ACUTE CYSTITIS WITHOUT HEMATURIA: ICD-10-CM

## 2018-09-03 DIAGNOSIS — I95.9 HYPOTENSION, UNSPECIFIED HYPOTENSION TYPE: ICD-10-CM

## 2018-09-03 DIAGNOSIS — R19.7 DIARRHEA, UNSPECIFIED TYPE: ICD-10-CM

## 2018-09-03 PROBLEM — N39.0 UTI (URINARY TRACT INFECTION): Status: ACTIVE | Noted: 2018-09-03

## 2018-09-03 PROBLEM — R53.1 GENERALIZED WEAKNESS: Status: ACTIVE | Noted: 2018-09-03

## 2018-09-03 LAB
A/G RATIO: 1.2 (ref 1.1–2.2)
ALBUMIN SERPL-MCNC: 3.6 G/DL (ref 3.4–5)
ALP BLD-CCNC: 91 U/L (ref 40–129)
ALT SERPL-CCNC: 9 U/L (ref 10–40)
AMORPHOUS: ABNORMAL /HPF
ANION GAP SERPL CALCULATED.3IONS-SCNC: 12 MMOL/L (ref 3–16)
AST SERPL-CCNC: 14 U/L (ref 15–37)
BACTERIA: ABNORMAL /HPF
BASOPHILS ABSOLUTE: 0 K/UL (ref 0–0.2)
BASOPHILS RELATIVE PERCENT: 0.1 %
BILIRUB SERPL-MCNC: 0.8 MG/DL (ref 0–1)
BILIRUBIN URINE: ABNORMAL
BLOOD, URINE: NEGATIVE
BUN BLDV-MCNC: 25 MG/DL (ref 7–20)
CALCIUM SERPL-MCNC: 9.2 MG/DL (ref 8.3–10.6)
CHLORIDE BLD-SCNC: 104 MMOL/L (ref 99–110)
CLARITY: ABNORMAL
CO2: 24 MMOL/L (ref 21–32)
COLOR: YELLOW
CREAT SERPL-MCNC: 1.4 MG/DL (ref 0.6–1.2)
EOSINOPHILS ABSOLUTE: 0.1 K/UL (ref 0–0.6)
EOSINOPHILS RELATIVE PERCENT: 0.4 %
EPITHELIAL CELLS, UA: ABNORMAL /HPF
GFR AFRICAN AMERICAN: 46
GFR NON-AFRICAN AMERICAN: 38
GLOBULIN: 3.1 G/DL
GLUCOSE BLD-MCNC: 110 MG/DL (ref 70–99)
GLUCOSE BLD-MCNC: 74 MG/DL (ref 70–99)
GLUCOSE BLD-MCNC: 92 MG/DL (ref 70–99)
GLUCOSE BLD-MCNC: 94 MG/DL (ref 70–99)
GLUCOSE URINE: NEGATIVE MG/DL
HCT VFR BLD CALC: 37.6 % (ref 36–48)
HEMOGLOBIN: 12.2 G/DL (ref 12–16)
KETONES, URINE: ABNORMAL MG/DL
LACTIC ACID: 1.1 MMOL/L (ref 0.4–2)
LACTIC ACID: 1.5 MMOL/L (ref 0.4–2)
LEUKOCYTE ESTERASE, URINE: NEGATIVE
LIPASE: 49 U/L (ref 13–60)
LYMPHOCYTES ABSOLUTE: 0.5 K/UL (ref 1–5.1)
LYMPHOCYTES RELATIVE PERCENT: 2.4 %
MCH RBC QN AUTO: 30.3 PG (ref 26–34)
MCHC RBC AUTO-ENTMCNC: 32.4 G/DL (ref 31–36)
MCV RBC AUTO: 93.5 FL (ref 80–100)
MICROSCOPIC EXAMINATION: YES
MONOCYTES ABSOLUTE: 0.1 K/UL (ref 0–1.3)
MONOCYTES RELATIVE PERCENT: 0.6 %
MUCUS: ABNORMAL /LPF
NEUTROPHILS ABSOLUTE: 19.1 K/UL (ref 1.7–7.7)
NEUTROPHILS RELATIVE PERCENT: 96.5 %
NITRITE, URINE: POSITIVE
PDW BLD-RTO: 15.8 % (ref 12.4–15.4)
PERFORMED ON: NORMAL
PH UA: 5
PLATELET # BLD: 148 K/UL (ref 135–450)
PMV BLD AUTO: 9 FL (ref 5–10.5)
POTASSIUM SERPL-SCNC: 3.5 MMOL/L (ref 3.5–5.1)
PROTEIN UA: ABNORMAL MG/DL
RBC # BLD: 4.02 M/UL (ref 4–5.2)
RBC UA: ABNORMAL /HPF (ref 0–2)
SODIUM BLD-SCNC: 140 MMOL/L (ref 136–145)
SPECIFIC GRAVITY UA: 1.02
TOTAL CK: 24 U/L (ref 26–192)
TOTAL PROTEIN: 6.7 G/DL (ref 6.4–8.2)
URINE REFLEX TO CULTURE: YES
URINE TYPE: ABNORMAL
UROBILINOGEN, URINE: 0.2 E.U./DL
WBC # BLD: 19.8 K/UL (ref 4–11)
WBC UA: ABNORMAL /HPF (ref 0–5)

## 2018-09-03 PROCEDURE — 80053 COMPREHEN METABOLIC PANEL: CPT

## 2018-09-03 PROCEDURE — 6370000000 HC RX 637 (ALT 250 FOR IP): Performed by: PHYSICIAN ASSISTANT

## 2018-09-03 PROCEDURE — 85025 COMPLETE CBC W/AUTO DIFF WBC: CPT

## 2018-09-03 PROCEDURE — 2580000003 HC RX 258: Performed by: EMERGENCY MEDICINE

## 2018-09-03 PROCEDURE — G8978 MOBILITY CURRENT STATUS: HCPCS

## 2018-09-03 PROCEDURE — 84443 ASSAY THYROID STIM HORMONE: CPT

## 2018-09-03 PROCEDURE — 2580000003 HC RX 258

## 2018-09-03 PROCEDURE — 71045 X-RAY EXAM CHEST 1 VIEW: CPT

## 2018-09-03 PROCEDURE — G8988 SELF CARE GOAL STATUS: HCPCS

## 2018-09-03 PROCEDURE — 6360000002 HC RX W HCPCS: Performed by: EMERGENCY MEDICINE

## 2018-09-03 PROCEDURE — 93010 ELECTROCARDIOGRAM REPORT: CPT | Performed by: INTERNAL MEDICINE

## 2018-09-03 PROCEDURE — 83690 ASSAY OF LIPASE: CPT

## 2018-09-03 PROCEDURE — 97530 THERAPEUTIC ACTIVITIES: CPT

## 2018-09-03 PROCEDURE — 93005 ELECTROCARDIOGRAM TRACING: CPT | Performed by: EMERGENCY MEDICINE

## 2018-09-03 PROCEDURE — G8979 MOBILITY GOAL STATUS: HCPCS

## 2018-09-03 PROCEDURE — 87086 URINE CULTURE/COLONY COUNT: CPT

## 2018-09-03 PROCEDURE — 82550 ASSAY OF CK (CPK): CPT

## 2018-09-03 PROCEDURE — 1200000000 HC SEMI PRIVATE

## 2018-09-03 PROCEDURE — G8987 SELF CARE CURRENT STATUS: HCPCS

## 2018-09-03 PROCEDURE — 99285 EMERGENCY DEPT VISIT HI MDM: CPT

## 2018-09-03 PROCEDURE — 2580000003 HC RX 258: Performed by: PHYSICIAN ASSISTANT

## 2018-09-03 PROCEDURE — 94664 DEMO&/EVAL PT USE INHALER: CPT

## 2018-09-03 PROCEDURE — 36415 COLL VENOUS BLD VENIPUNCTURE: CPT

## 2018-09-03 PROCEDURE — 81001 URINALYSIS AUTO W/SCOPE: CPT

## 2018-09-03 PROCEDURE — 83036 HEMOGLOBIN GLYCOSYLATED A1C: CPT

## 2018-09-03 PROCEDURE — 96361 HYDRATE IV INFUSION ADD-ON: CPT

## 2018-09-03 PROCEDURE — 6360000002 HC RX W HCPCS: Performed by: PHYSICIAN ASSISTANT

## 2018-09-03 PROCEDURE — 97165 OT EVAL LOW COMPLEX 30 MIN: CPT

## 2018-09-03 PROCEDURE — 97161 PT EVAL LOW COMPLEX 20 MIN: CPT

## 2018-09-03 PROCEDURE — 94150 VITAL CAPACITY TEST: CPT

## 2018-09-03 PROCEDURE — 96374 THER/PROPH/DIAG INJ IV PUSH: CPT

## 2018-09-03 PROCEDURE — G8989 SELF CARE D/C STATUS: HCPCS

## 2018-09-03 PROCEDURE — 83605 ASSAY OF LACTIC ACID: CPT

## 2018-09-03 RX ORDER — 0.9 % SODIUM CHLORIDE 0.9 %
1000 INTRAVENOUS SOLUTION INTRAVENOUS ONCE
Status: COMPLETED | OUTPATIENT
Start: 2018-09-03 | End: 2018-09-03

## 2018-09-03 RX ORDER — FOLIC ACID 1 MG/1
1000 TABLET ORAL DAILY
Status: DISCONTINUED | OUTPATIENT
Start: 2018-09-03 | End: 2018-09-05 | Stop reason: HOSPADM

## 2018-09-03 RX ORDER — DEXTROSE MONOHYDRATE 25 G/50ML
12.5 INJECTION, SOLUTION INTRAVENOUS PRN
Status: DISCONTINUED | OUTPATIENT
Start: 2018-09-03 | End: 2018-09-05 | Stop reason: HOSPADM

## 2018-09-03 RX ORDER — SODIUM CHLORIDE 9 MG/ML
INJECTION, SOLUTION INTRAVENOUS
Status: DISCONTINUED
Start: 2018-09-03 | End: 2018-09-03

## 2018-09-03 RX ORDER — GLUCAGON 1 MG/ML
1 KIT INJECTION PRN
Status: DISCONTINUED | OUTPATIENT
Start: 2018-09-03 | End: 2018-09-05 | Stop reason: HOSPADM

## 2018-09-03 RX ORDER — FERROUS SULFATE 325(65) MG
325 TABLET ORAL DAILY
Status: DISCONTINUED | OUTPATIENT
Start: 2018-09-03 | End: 2018-09-05 | Stop reason: HOSPADM

## 2018-09-03 RX ORDER — GABAPENTIN 100 MG/1
200 CAPSULE ORAL 3 TIMES DAILY
Status: DISCONTINUED | OUTPATIENT
Start: 2018-09-03 | End: 2018-09-05 | Stop reason: HOSPADM

## 2018-09-03 RX ORDER — LEVOTHYROXINE SODIUM 0.2 MG/1
200 TABLET ORAL
Status: DISCONTINUED | OUTPATIENT
Start: 2018-09-03 | End: 2018-09-05 | Stop reason: HOSPADM

## 2018-09-03 RX ORDER — SODIUM CHLORIDE 9 MG/ML
INJECTION, SOLUTION INTRAVENOUS
Status: COMPLETED
Start: 2018-09-03 | End: 2018-09-03

## 2018-09-03 RX ORDER — MESALAMINE 400 MG/1
400 CAPSULE, DELAYED RELEASE ORAL 3 TIMES DAILY
Status: DISCONTINUED | OUTPATIENT
Start: 2018-09-03 | End: 2018-09-05 | Stop reason: HOSPADM

## 2018-09-03 RX ORDER — NICOTINE POLACRILEX 4 MG
15 LOZENGE BUCCAL PRN
Status: DISCONTINUED | OUTPATIENT
Start: 2018-09-03 | End: 2018-09-05 | Stop reason: HOSPADM

## 2018-09-03 RX ORDER — SODIUM CHLORIDE 0.9 % (FLUSH) 0.9 %
10 SYRINGE (ML) INJECTION PRN
Status: DISCONTINUED | OUTPATIENT
Start: 2018-09-03 | End: 2018-09-05 | Stop reason: HOSPADM

## 2018-09-03 RX ORDER — SODIUM CHLORIDE 0.9 % (FLUSH) 0.9 %
10 SYRINGE (ML) INJECTION EVERY 12 HOURS SCHEDULED
Status: DISCONTINUED | OUTPATIENT
Start: 2018-09-03 | End: 2018-09-05 | Stop reason: HOSPADM

## 2018-09-03 RX ORDER — ONDANSETRON 2 MG/ML
4 INJECTION INTRAMUSCULAR; INTRAVENOUS EVERY 6 HOURS PRN
Status: DISCONTINUED | OUTPATIENT
Start: 2018-09-03 | End: 2018-09-05 | Stop reason: HOSPADM

## 2018-09-03 RX ORDER — DICLOFENAC SODIUM 75 MG/1
75 TABLET, DELAYED RELEASE ORAL 2 TIMES DAILY
COMMUNITY
End: 2018-09-12 | Stop reason: ALTCHOICE

## 2018-09-03 RX ORDER — ACETAMINOPHEN 325 MG/1
650 TABLET ORAL EVERY 4 HOURS PRN
Status: DISCONTINUED | OUTPATIENT
Start: 2018-09-03 | End: 2018-09-05 | Stop reason: HOSPADM

## 2018-09-03 RX ORDER — DEXTROSE MONOHYDRATE 50 MG/ML
100 INJECTION, SOLUTION INTRAVENOUS PRN
Status: DISCONTINUED | OUTPATIENT
Start: 2018-09-03 | End: 2018-09-05 | Stop reason: HOSPADM

## 2018-09-03 RX ORDER — ALBUTEROL SULFATE 90 UG/1
2 AEROSOL, METERED RESPIRATORY (INHALATION) EVERY 6 HOURS PRN
Status: DISCONTINUED | OUTPATIENT
Start: 2018-09-03 | End: 2018-09-05 | Stop reason: HOSPADM

## 2018-09-03 RX ORDER — SODIUM CHLORIDE 9 MG/ML
INJECTION, SOLUTION INTRAVENOUS CONTINUOUS
Status: DISCONTINUED | OUTPATIENT
Start: 2018-09-03 | End: 2018-09-04

## 2018-09-03 RX ADMIN — SODIUM CHLORIDE 1000 ML: 9 INJECTION, SOLUTION INTRAVENOUS at 05:41

## 2018-09-03 RX ADMIN — CEFTRIAXONE SODIUM 1 G: 1 INJECTION, POWDER, FOR SOLUTION INTRAMUSCULAR; INTRAVENOUS at 08:34

## 2018-09-03 RX ADMIN — MESALAMINE 400 MG: 400 CAPSULE, DELAYED RELEASE ORAL at 14:13

## 2018-09-03 RX ADMIN — SODIUM CHLORIDE 1000 ML: 9 INJECTION, SOLUTION INTRAVENOUS at 08:35

## 2018-09-03 RX ADMIN — FOLIC ACID 1000 MCG: 1 TABLET ORAL at 12:18

## 2018-09-03 RX ADMIN — SODIUM CHLORIDE 1000 ML: 9 INJECTION, SOLUTION INTRAVENOUS at 06:26

## 2018-09-03 RX ADMIN — MESALAMINE 400 MG: 400 CAPSULE, DELAYED RELEASE ORAL at 20:21

## 2018-09-03 RX ADMIN — Medication 10 ML: at 12:18

## 2018-09-03 RX ADMIN — ENOXAPARIN SODIUM 40 MG: 100 INJECTION SUBCUTANEOUS at 12:19

## 2018-09-03 RX ADMIN — GABAPENTIN 200 MG: 100 CAPSULE ORAL at 20:21

## 2018-09-03 RX ADMIN — LEVOTHYROXINE SODIUM 200 MCG: 200 TABLET ORAL at 12:23

## 2018-09-03 RX ADMIN — Medication 1000 ML: at 08:35

## 2018-09-03 RX ADMIN — GABAPENTIN 200 MG: 100 CAPSULE ORAL at 14:13

## 2018-09-03 RX ADMIN — SODIUM CHLORIDE: 9 INJECTION, SOLUTION INTRAVENOUS at 12:20

## 2018-09-03 RX ADMIN — FERROUS SULFATE TAB 325 MG (65 MG ELEMENTAL FE) 325 MG: 325 (65 FE) TAB at 14:13

## 2018-09-03 RX ADMIN — ASPIRIN 325 MG: 325 TABLET, DELAYED RELEASE ORAL at 12:18

## 2018-09-03 ASSESSMENT — PAIN SCALES - GENERAL: PAINLEVEL_OUTOF10: 0

## 2018-09-03 NOTE — PLAN OF CARE
Problem: DAILY CARE  Goal: Daily care needs are met  Outcome: Ongoing      Problem: KNOWLEDGE DEFICIT  Goal: Patient/S.O. demonstrates understanding of disease process, treatment plan, medications, and discharge instructions.   Outcome: Ongoing      Problem: Falls - Risk of:  Goal: Will remain free from falls  Will remain free from falls   Outcome: Ongoing      Problem: Sensory:  Goal: General experience of comfort will improve  General experience of comfort will improve   Outcome: Ongoing      Problem: Urinary Elimination:  Goal: Ability to reestablish a normal urinary elimination pattern will improve - after catheter removal  Ability to reestablish a normal urinary elimination pattern will improve   Outcome: Ongoing    Goal: Complications related to the disease process, condition or treatment will be avoided or minimized  Complications related to the disease process, condition or treatment will be avoided or minimized   Outcome: Ongoing

## 2018-09-03 NOTE — PROGRESS NOTES
4 Eyes Skin Assessment     The patient is being assess for   Admission    I agree that 2 RN's have performed a thorough Head to Toe Skin Assessment on the patient. ALL assessment sites listed below have been assessed. Areas assessed by both nurses:   [x]   Head, Face, and Ears   [x]   Shoulders, Back, and Chest, Abdomen  [x]   Arms, Elbows, and Hands   [x]   Coccyx, Sacrum, and Ischium  [x]   Legs, Feet, and Heels        Scattered scabs to BLE, from cellulitis in the past.     **SHARE this note so that the co-signing nurse is able to place an eSignature**    Co-signer eSignature: {Esignature:[id number]}    Does the Patient have Skin Breakdown?   No          Ernie Prevention initiated:  No   Wound Care Orders initiated:  No      [facility] nurse consulted for Pressure Injury (Stage 3,4, Unstageable, DTI, NWPT, Complex wounds)and New or Established Ostomies:  No      Primary Nurse eSignature: Electronically signed by Kaila Bailey RN on 9/3/18 at 3:49 PM

## 2018-09-03 NOTE — ED NOTES
Pt denies need to void after 1L NS infused. Pt remains hypotensive at 86/57. Dizziness is \"a little better\" per pt.      [facility]  09/03/18 1724

## 2018-09-03 NOTE — ED NOTES
Pt ambulated with stand-by assist and tolerated well.   Pt unable to void at this time     [facility] Numbers, [id number] [facility]  09/03/18 0294

## 2018-09-03 NOTE — PROGRESS NOTES
Strengths for achieving goals include: PLOF, motivation  Barriers to achieving goals include: weakness, impaired mobility    Plan    To be seen for 1-2 follow-up visits to progress toward goals while in acute care setting for therapeutic exercises, transfers, progressive gait training, balance training, and family/patient education. Timed Code Treatment Minutes: 10 minutes  Total Treatment Time:  20 minutes    Tate Hurst PT, DPT # [id number]    If patient discharges from this facility prior to next visit, this note will serve as the Discharge Summary.

## 2018-09-03 NOTE — PROGRESS NOTES
Signs   /65   Pulse 65   Temp 97 °F (36.1 °C) (Oral)   Resp 16   Ht 5' 5\" (1.651 m)   Wt 210 lb 6.4 oz (95.4 kg)   SpO2 99%   BMI 35.01 kg/m²   SPO2 (COPD values may differ): Greater than or equal to 92% on room air = 0    Peak Flow (asthma only): not applicable = 0    RSI: 0-4 = See once and convert to home regimen or discontinue        Plan       Goals: none apparent    Patient/caregiver was educated on the proper method of use for Respiratory Care Devices:  Yes      Level of patient/caregiver understanding able to:   [] Verbalize understanding   [] Demonstrate understanding       [] Teach back        [] Needs reinforcement       []  No available caregiver               []  Other:     Response to education:  Good     Is patient being placed on Home Treatment Regimen? Yes     Does the patient have everything they need prior to discharge? Yes     Comments: patient assessed/interviewed    Plan of Care: continue home routine    Electronically signed by Rick Oscar RCP on 9/3/2018 at 1:23 PM    Respiratory Protocol Guidelines     1. Assessment and treatment by Respiratory Therapy will be initiated for medication and therapeutic interventions upon initiation of aerosolized medication. 2. Physician will be contacted for respiratory rate (RR) greater than 35 breaths per minute. Therapy will be held for heart rate (HR) greater than 140 beats per minute, pending direction from physician. 3. Bronchodilators will be administered via Metered Dose Inhaler (MDI) with spacer when the following criteria are met:  a. Alert and cooperative     b. HR < 140 bpm  c. RR < 30 bpm                d. Can demonstrate a 2-3 second inspiratory hold  4. Bronchodilators will be administered via Hand Held Nebulizer ELLEN [facility]) to patients when ANY of the following criteria are met  a. Incognizant or uncooperative          b. Patients treated with HHN at Home        c.  Unable to demonstrate proper use of MDI with spacer     d. RR > 30 bpm   5. Bronchodilators will be delivered via Metered Dose Inhaler (MDI), HHN, Aerogen to intubated patients on mechanical ventilation. 6. Inhalation medication orders will be delivered and/or substituted as outlined below. Aerosolized Medications Ordering and Administration Guidelines:    1. All Medications will be ordered by a physician, and their frequency and/or modality will be adjusted as defined by the patients Respiratory Severity Index (RSI) score. 2. If the patient does not have documented COPD, consider discontinuing anticholinergics when RSI is less than 9.  3. If the bronchospasm worsens (increased RSI), then the bronchodilator frequency can be increased to a maximum of every 4 hours. If greater than every 4 hours is required, the physician will be contacted. 4. If the bronchospasm improves, the frequency of the bronchodilator can be decreased, based on the patient's RSI, but not less than home treatment regimen frequency. 5. Bronchodilator(s) will be discontinued if patient has a RSI less than 9 and has received no scheduled or as needed treatment for 72  Hrs. Patients Ordered on a Mucolytic Agent:    1. Must always be administered with a bronchodilator. 2. Discontinue if patient experiences worsened bronchospasm, or secretions have lessened to the point that the patient is able to clear them with a cough. Anti-inflammatory and Combination Medications:    1. If the patient lacks prior history of lung disease, is not using inhaled anti-inflammatory medication at home, and lacks wheezing by examination or by history for at least 24 hours, contact physician for possible discontinuation.

## 2018-09-03 NOTE — ED NOTES
This RN in room and spoke with patient, patient states she lives alone and has no family to speak of. Patient states she has multiple step-children, however, none of her own and the step-children are not involved with her \"since their father . \"       Harrison López, RN  [phone]

## 2018-09-03 NOTE — PROGRESS NOTES
intact    Coordination and Tone:    WNL     Balance:    Static Sitting:IND  Dynamic Sitting:IND  Static Standing:IND  Dynamic Standing:IND    Bed mobility:    Supine to sit:IND  Sit to supine:NT  Scooting to head of bed:NT  Scooting in sitting:IND  Rolling:NT  Bridging:NT    Transfers:    Sit to stand:IND from EOB  Stand to sit:IND to EOB  Bed to Chair:Ambulatory with sup  Bed to BSC:Ambulatory with Sup    Dressing:   UE:NT  LE:IND to elton shoes sitting EOB    Bathing:  UE:NT  LE:NT    Eating:    IND    Activity Tolerance   BP sitting EOB =  157/87; asymptomatic  BP standing = 121/81; asymptomatic         Positioning Needs:   Call light and needs in reach    Exercise / Activities Initiated:   none    Patient/Family Education:   Role of OT    Assessment of Deficits: Self Care [id number]  Pt demonstrated functioning at or very near her baseline for functional moibility and ADLs. Pt reports she is limited at times by Left hip pain, and uses her cane as needed at home and in the community. Pt demos no acute therapy needs at this time. Pt. Limited during evaluation by pain in LLE/hip with mobility     . At end of evaluation, pt. Sitting EOB, with call light and needs within reach, lunch tray setup         Plan:  DC from OT caseload.      Timed Code Treatment Minutes: 10    Total Treatment Time: 20     minutes    KULWANT Albright/L  Lic # [id number]            If patient discharges from this facility prior to next visit, this note will serve as the Discharge Summary

## 2018-09-03 NOTE — ED PROVIDER NOTES
 gabapentin (NEURONTIN) 100 MG capsule Take 2 capsules by mouth 3 times daily. Lita العراقي ferrous sulfate 325 (65 Fe) MG tablet TAKE 1 TABLET BY MOUTH DAILY. 90 tablet 1    nebivolol (BYSTOLIC) 2.5 MG tablet TAKE 1 TABLET BY MOUTH DAILY 30 tablet 11    aspirin 325 MG EC tablet Take 325 mg by mouth daily      mesalamine (LIALDA) 1.2 G EC tablet Take 300 mg by mouth 2 times daily  120 tablet 0    [facility]  Dx : 250.00 200 each 3    albuterol (VENTOLIN HFA) 108 (90 BASE) MCG/ACT inhaler Inhale 2 puffs into the lungs every 6 hours as needed for Wheezing. 1 Inhaler 1    gabapentin (NEURONTIN) 100 MG capsule I-II PO TID. 180 capsule 2     Allergies   Allergen Reactions    Pravastatin Rash    Wellbutrin [Bupropion] Other (See Comments) and Rash     Caused sores in mouth     Nursing Notes Reviewed    Physical Exam:  ED Triage Vitals [09/03/18 0529]   Enc Vitals Group      BP 90/61      Pulse 79      Resp 15      Temp 97.6 °F (36.4 °C)      Temp Source Oral      SpO2 94 %      Weight 200 lb (90.7 kg)      Height 5' 5\" (1.651 m)      Head Circumference       Peak Flow       Pain Score       Pain Loc       Pain Edu? Excl. in [address]? GENERAL APPEARANCE: A well-developed well-nourished pleasant uncomfortable 60-year-old female in mild to moderate distress   HEAD: Normocephalic, atraumatic  EYES: Sclera anicteric.no conjunctival injection,   ENT: Mucous membranes moist, no nasal discharge, no stridor, dysphonia, dysphagia or trismus  NECK: Supple, no meningismus, no adenopathy,  HEART: RRR without rubs murmurs or gallops  LUNGS:  Clear good air movement no wheezing no retraction or accessory muscle use,  ABDOMEN: Soft, non-tender to palpation, no guarding or rebound. , no mass or distention and no hepatosplenomegaly.  Clearly no evidence of an acute abdomen or peritoneal signs at time of exam  EXTREMITIES: No acute deformities, no peripheral edema, no cord no erythema no unilateral leg swelling  SKIN: Warm and dry.  Normal color, no rash,  capillary refill less than 2 seconds  MENTAL STATUS: Alert, oriented, interactive,   NEUROLOGICAL:  No facial drooping. moves all 4 extremities, sensation intact, no focal findings     Nursing note and vital signs reviewed     I have reviewed and interpreted all of the currently available lab results from this visit (if applicable):  Results for orders placed or performed during the hospital encounter of 09/03/18   CBC Auto Differential   Result Value Ref Range    WBC 19.8 (H) 4.0 - 11.0 K/uL    RBC 4.02 4.00 - 5.20 M/uL    Hemoglobin 12.2 12.0 - 16.0 g/dL    Hematocrit 37.6 36.0 - 48.0 %    MCV 93.5 80.0 - 100.0 fL    MCH 30.3 26.0 - 34.0 pg    MCHC 32.4 31.0 - 36.0 g/dL    RDW 15.8 (H) 12.4 - 15.4 %    Platelets 470 036 - 644 K/uL    MPV 9.0 5.0 - 10.5 fL    Neutrophils % 96.5 %    Lymphocytes % 2.4 %    Monocytes % 0.6 %    Eosinophils % 0.4 %    Basophils % 0.1 %    Neutrophils # 19.1 (H) 1.7 - 7.7 K/uL    Lymphocytes # 0.5 (L) 1.0 - 5.1 K/uL    Monocytes # 0.1 0.0 - 1.3 K/uL    Eosinophils # 0.1 0.0 - 0.6 K/uL    Basophils # 0.0 0.0 - 0.2 K/uL   Comprehensive Metabolic Panel   Result Value Ref Range    Sodium 140 136 - 145 mmol/L    Potassium 3.5 3.5 - 5.1 mmol/L    Chloride 104 99 - 110 mmol/L    CO2 24 21 - 32 mmol/L    Anion Gap 12 3 - 16    Glucose 110 (H) 70 - 99 mg/dL    BUN 25 (H) 7 - 20 mg/dL    CREATININE 1.4 (H) 0.6 - 1.2 mg/dL    GFR Non- 38 (A) >60    GFR  46 (A) >60    Calcium 9.2 8.3 - 10.6 mg/dL    Total Protein 6.7 6.4 - 8.2 g/dL    Alb 3.6 3.4 - 5.0 g/dL    Albumin/Globulin Ratio 1.2 1.1 - 2.2    Total Bilirubin 0.8 0.0 - 1.0 mg/dL    Alkaline Phosphatase 91 40 - 129 U/L    ALT 9 (L) 10 - 40 U/L    AST 14 (L) 15 - 37 U/L    Globulin 3.1 g/dL   Lipase   Result Value Ref Range    Lipase 49.0 13.0 - 60.0 U/L   Lactic Acid, Plasma   Result Value Ref Range    Lactic Acid 1.5 0.4 - 2.0 mmol/L Radiographs (if obtained):  [] Radiologist's Report Reviewed:  XR CHEST PORTABLE    (Results Pending)       [] Discussed with Radiologist:     [] The following radiograph was interpreted by myself in the absence of a radiologist:     EKG (if obtained): (All EKG's are interpreted by myself in the absence of a cardiologist)  EKG demonstrates normal sinus rhythm with sinus arrhythmia, a rightward axis, she has inferior and anterior Q waves but no other acute ischemic changes or arrhythmia identified and this is much the same as her previous EKG    MDM:   Patient with her second visit in 2 weeks for dehydration. She felt generally weak as though she were about to fall but was not vertiginous. She did not have a headache chest pain or abdominal pain but has had continued diarrhea. Vision has had poor overall fluid consumption. Her labs were not diagnostically abnormal although her urine is still pending and she is being hydrated at present. She'll be checked out to Dr. Mala Francis who is my relief this morning and she will add an addendum to this chart with the results and her ultimate disposition. Final Impression:  1. Dehydration    2. Diarrhea, unspecified type        Critical Care:       Disposition referral (if applicable):  No follow-up provider specified. Disposition medications (if applicable):  New Prescriptions    No medications on file       Comment: Please note this report has been produced using speech recognition software and may contain errors related to that system including errors in grammar, punctuation, and spelling, as well as words and phrases that may be inappropriate. If there are any questions or concerns please feel free to contact the dictating provider for clarification.       (Please note that portions of this note may have been completed with a voice recognition program. Efforts were made to edit the dictations but occasionally words are mis-transcribed.)    Blanco Byrd MD Cole Rowe MD  09/03/18 7099

## 2018-09-04 LAB
ANION GAP SERPL CALCULATED.3IONS-SCNC: 9 MMOL/L (ref 3–16)
BASOPHILS ABSOLUTE: 0 K/UL (ref 0–0.2)
BASOPHILS RELATIVE PERCENT: 0.5 %
BUN BLDV-MCNC: 19 MG/DL (ref 7–20)
CALCIUM SERPL-MCNC: 8.7 MG/DL (ref 8.3–10.6)
CHLORIDE BLD-SCNC: 109 MMOL/L (ref 99–110)
CO2: 23 MMOL/L (ref 21–32)
CREAT SERPL-MCNC: 0.8 MG/DL (ref 0.6–1.2)
EKG ATRIAL RATE: 73 BPM
EKG DIAGNOSIS: NORMAL
EKG P AXIS: 60 DEGREES
EKG P-R INTERVAL: 156 MS
EKG Q-T INTERVAL: 418 MS
EKG QRS DURATION: 110 MS
EKG QTC CALCULATION (BAZETT): 460 MS
EKG R AXIS: 103 DEGREES
EKG T AXIS: -2 DEGREES
EKG VENTRICULAR RATE: 73 BPM
EOSINOPHILS ABSOLUTE: 0.3 K/UL (ref 0–0.6)
EOSINOPHILS RELATIVE PERCENT: 5.2 %
ESTIMATED AVERAGE GLUCOSE: 99.7 MG/DL
GFR AFRICAN AMERICAN: >60
GFR NON-AFRICAN AMERICAN: >60
GLUCOSE BLD-MCNC: 60 MG/DL (ref 70–99)
GLUCOSE BLD-MCNC: 67 MG/DL (ref 70–99)
GLUCOSE BLD-MCNC: 69 MG/DL (ref 70–99)
GLUCOSE BLD-MCNC: 74 MG/DL (ref 70–99)
GLUCOSE BLD-MCNC: 78 MG/DL (ref 70–99)
GLUCOSE BLD-MCNC: 87 MG/DL (ref 70–99)
GLUCOSE BLD-MCNC: 98 MG/DL (ref 70–99)
HBA1C MFR BLD: 5.1 %
HCT VFR BLD CALC: 33.8 % (ref 36–48)
HEMOGLOBIN: 11.2 G/DL (ref 12–16)
LYMPHOCYTES ABSOLUTE: 1.1 K/UL (ref 1–5.1)
LYMPHOCYTES RELATIVE PERCENT: 18.9 %
MCH RBC QN AUTO: 30.9 PG (ref 26–34)
MCHC RBC AUTO-ENTMCNC: 33.1 G/DL (ref 31–36)
MCV RBC AUTO: 93.3 FL (ref 80–100)
MONOCYTES ABSOLUTE: 0.5 K/UL (ref 0–1.3)
MONOCYTES RELATIVE PERCENT: 8 %
NEUTROPHILS ABSOLUTE: 3.9 K/UL (ref 1.7–7.7)
NEUTROPHILS RELATIVE PERCENT: 67.4 %
PDW BLD-RTO: 16.1 % (ref 12.4–15.4)
PERFORMED ON: ABNORMAL
PERFORMED ON: NORMAL
PLATELET # BLD: 119 K/UL (ref 135–450)
PMV BLD AUTO: 9.4 FL (ref 5–10.5)
POTASSIUM REFLEX MAGNESIUM: 4.1 MMOL/L (ref 3.5–5.1)
RBC # BLD: 3.63 M/UL (ref 4–5.2)
SODIUM BLD-SCNC: 141 MMOL/L (ref 136–145)
TSH REFLEX: 1.22 UIU/ML (ref 0.27–4.2)
WBC # BLD: 5.8 K/UL (ref 4–11)

## 2018-09-04 PROCEDURE — 2580000003 HC RX 258: Performed by: PHYSICIAN ASSISTANT

## 2018-09-04 PROCEDURE — 6370000000 HC RX 637 (ALT 250 FOR IP): Performed by: PHYSICIAN ASSISTANT

## 2018-09-04 PROCEDURE — 6370000000 HC RX 637 (ALT 250 FOR IP): Performed by: INTERNAL MEDICINE

## 2018-09-04 PROCEDURE — 85025 COMPLETE CBC W/AUTO DIFF WBC: CPT

## 2018-09-04 PROCEDURE — 6360000002 HC RX W HCPCS: Performed by: PHYSICIAN ASSISTANT

## 2018-09-04 PROCEDURE — 80048 BASIC METABOLIC PNL TOTAL CA: CPT

## 2018-09-04 PROCEDURE — 1200000000 HC SEMI PRIVATE

## 2018-09-04 PROCEDURE — 36415 COLL VENOUS BLD VENIPUNCTURE: CPT

## 2018-09-04 PROCEDURE — 99223 1ST HOSP IP/OBS HIGH 75: CPT | Performed by: PHYSICIAN ASSISTANT

## 2018-09-04 RX ORDER — ISOSORBIDE MONONITRATE 30 MG/1
30 TABLET, EXTENDED RELEASE ORAL EVERY MORNING
Status: DISCONTINUED | OUTPATIENT
Start: 2018-09-04 | End: 2018-09-05 | Stop reason: HOSPADM

## 2018-09-04 RX ORDER — LOPERAMIDE HYDROCHLORIDE 2 MG/1
2 CAPSULE ORAL 4 TIMES DAILY PRN
Status: DISCONTINUED | OUTPATIENT
Start: 2018-09-04 | End: 2018-09-05 | Stop reason: HOSPADM

## 2018-09-04 RX ORDER — HYDROCODONE BITARTRATE AND ACETAMINOPHEN 5; 325 MG/1; MG/1
1 TABLET ORAL EVERY 6 HOURS PRN
Status: DISCONTINUED | OUTPATIENT
Start: 2018-09-04 | End: 2018-09-05 | Stop reason: HOSPADM

## 2018-09-04 RX ADMIN — SODIUM CHLORIDE: 9 INJECTION, SOLUTION INTRAVENOUS at 09:01

## 2018-09-04 RX ADMIN — FERROUS SULFATE TAB 325 MG (65 MG ELEMENTAL FE) 325 MG: 325 (65 FE) TAB at 08:35

## 2018-09-04 RX ADMIN — LEVOTHYROXINE SODIUM 200 MCG: 200 TABLET ORAL at 06:15

## 2018-09-04 RX ADMIN — GABAPENTIN 200 MG: 100 CAPSULE ORAL at 08:37

## 2018-09-04 RX ADMIN — FOLIC ACID 1000 MCG: 1 TABLET ORAL at 08:36

## 2018-09-04 RX ADMIN — ISOSORBIDE MONONITRATE 30 MG: 30 TABLET ORAL at 12:12

## 2018-09-04 RX ADMIN — LOPERAMIDE HYDROCHLORIDE 2 MG: 2 CAPSULE ORAL at 15:13

## 2018-09-04 RX ADMIN — MESALAMINE 400 MG: 400 CAPSULE, DELAYED RELEASE ORAL at 14:45

## 2018-09-04 RX ADMIN — GABAPENTIN 200 MG: 100 CAPSULE ORAL at 14:45

## 2018-09-04 RX ADMIN — ASPIRIN 325 MG: 325 TABLET, DELAYED RELEASE ORAL at 08:36

## 2018-09-04 RX ADMIN — GABAPENTIN 200 MG: 100 CAPSULE ORAL at 20:39

## 2018-09-04 RX ADMIN — ENOXAPARIN SODIUM 40 MG: 100 INJECTION SUBCUTANEOUS at 08:36

## 2018-09-04 RX ADMIN — Medication 10 ML: at 20:39

## 2018-09-04 RX ADMIN — MESALAMINE 400 MG: 400 CAPSULE, DELAYED RELEASE ORAL at 08:36

## 2018-09-04 RX ADMIN — CEFTRIAXONE SODIUM 1 G: 1 INJECTION, POWDER, FOR SOLUTION INTRAMUSCULAR; INTRAVENOUS at 08:36

## 2018-09-04 RX ADMIN — MESALAMINE 400 MG: 400 CAPSULE, DELAYED RELEASE ORAL at 20:39

## 2018-09-04 ASSESSMENT — PAIN DESCRIPTION - PROGRESSION: CLINICAL_PROGRESSION: NOT CHANGED

## 2018-09-04 ASSESSMENT — PAIN DESCRIPTION - DESCRIPTORS: DESCRIPTORS: ACHING

## 2018-09-04 ASSESSMENT — PAIN DESCRIPTION - ORIENTATION: ORIENTATION: LOWER

## 2018-09-04 ASSESSMENT — PAIN DESCRIPTION - PAIN TYPE: TYPE: CHRONIC PAIN

## 2018-09-04 ASSESSMENT — PAIN SCALES - GENERAL
PAINLEVEL_OUTOF10: 0
PAINLEVEL_OUTOF10: 6
PAINLEVEL_OUTOF10: 0

## 2018-09-04 ASSESSMENT — PAIN DESCRIPTION - FREQUENCY: FREQUENCY: CONTINUOUS

## 2018-09-04 ASSESSMENT — PAIN DESCRIPTION - ONSET: ONSET: ON-GOING

## 2018-09-04 ASSESSMENT — PAIN DESCRIPTION - LOCATION: LOCATION: BACK

## 2018-09-04 NOTE — FLOWSHEET NOTE
09/04/18 0815   Vital Signs   Temp 96.5 °F (35.8 °C)   Temp Source Axillary   Pulse 86   Heart Rate Source Monitor   Resp 16   /89   BP Location Left upper arm   BP Upper/Lower Upper   Patient Position Sitting  (up in chair )   Level of Consciousness 0   MEWS Score 1   Patient Currently in Pain Denies   Oxygen Therapy   SpO2 98 %   O2 Device None (Room air)   AM assessment completed, see flow sheet. Pt is alert and oriented. Vital signs are WNL. Respirations are even & easy. No c/o flank pain, urgency, or burning. Urine now a light yellow color and is clear without any foul odor. Pt tearful this AM because of a friend that is sick across the country. Writer listened to pt as pt spoke about her friend and was able to talk about her feeling regarding friend. Offered pt to have  come to see her. Pt refused at this time because she states that she won't be able to talk, just cry. Pt reassured that  and nurse are here and that if pt needs to talk to one of us regarding this, or anything, to please let writer know. Pt verbalize understanding. Pt denies needs at this time. SR up x 2, and bed in low position. Call light is within reach. Will monitor.

## 2018-09-04 NOTE — CARE COORDINATION
Case Management Assessment  Initial Evaluation    Date/Time of Evaluation: 9/4/2018 10:30 AM  Assessment Completed by: Frankie Chiang    Patient Name: Deyanira Warren  YOB: 1955  Diagnosis: Generalized weakness [R53.1]  Generalized weakness [R53.1]  UTI (urinary tract infection) [N39.0]  UTI (urinary tract infection) [N39.0]  Date / Time: 9/3/2018  5:27 AM  Admission status/Date: Inpatient 9/3/2018  Chart Reviewed: Yes      Patient Interviewed: Yes   Family Interviewed:  No      Hospitalization in the last 30 days:  No    Contacts  : Rosie Manzano    Relationship to Patient:sister in law   Phone Number: [phone] home   [phone]  Alternate Contact:     Relationship to Patient:     Phone Number:      Current PCP- YAHAIRA Fields CNP    Financial  [employer]    ADLS  Support Systems: Other (Comment)  Transportation: self    Meal Preparation: self    Housing  Home Environment: house/alone  Steps: ramp  Plans to Return to Present Housing: Yes  Other Identified Issues: NA    Home Care Information  [address] active with [address] : No  Type of Home Care Services: None  Passport/Waiver : No  Passport/Waiver Services: NA    Durable Medical Equipment   DME Provider: NA  Equipment: cane (PRN) walker    Has Home O2 in place on admit:  No  Informed of need to bring portable home O2 tank on day of discharge for nursing to connect prior to leaving:   No  Verbalized agreement/Understanding:   No    Community Service Affiliation  Dialysis:  No  Outpatient PT/OT: No    Cancer Center: No     CHF Clinic: No     Pulmonary Rehab: No  Pain Clinic: No  Community Mental Health: No    Wound Clinic: No     Other: NA    DISCHARGE PLAN: Chart reviewed. Met with pt at bedside and explained the role of the CM. Plan: Lives alone in house. Recently . Ambulates w/ cane. Will return home. Denies any HC needs.

## 2018-09-04 NOTE — PROGRESS NOTES
Pt c/o sciatic/back pain. She takes diclofenac at home for this and it is currently not being given during this admission. Spoke with Kellee Velasquez, regarding this and she stated she will look at what we can do for pts pain.

## 2018-09-04 NOTE — PLAN OF CARE
Problem: SAFETY  Goal: Free from accidental physical injury  Outcome: Ongoing      Problem: DAILY CARE  Goal: Daily care needs are met  Outcome: Ongoing      Problem: SKIN INTEGRITY  Goal: Skin integrity is maintained or improved  Outcome: Ongoing      Problem: Falls - Risk of:  Goal: Will remain free from falls  Will remain free from falls   Outcome: Ongoing      Problem: Sensory:  Goal: General experience of comfort will improve  General experience of comfort will improve   Outcome: Ongoing      Problem: Urinary Elimination:  Goal: Ability to reestablish a normal urinary elimination pattern will improve - after catheter removal  Ability to reestablish a normal urinary elimination pattern will improve   Outcome: Ongoing

## 2018-09-04 NOTE — H&P
Hospital Medicine History & Physical      PCP: YAHAIRA Perez CNP    Date of Admission: 9/3/2018    Date of Service: Pt seen/examined on 9/4/2018     Chief Complaint:    Chief Complaint   Patient presents with    Extremity Weakness         History Of Present Illness: The patient is a 60 y.o. female with a PMH of Ulcerative Colitis (prev followed with Dr. Megha Bolivar) who presented to [facility] ED with complaint of increasing weakness and fatigue. Pt states she noticed over the last couple of days she has been more fatigued. Yesterday, pt stood up from a seated position and felt dizzy & lightheaded, like she might pass out. She did not lose consciousness. No facial drooping, paresthesias, hx of seizures, or difficulty with gait. Pt state she has had poor PO intake over the last month 2/2 to death of her spouse. Pt states it has been a very tough time adjusting. Upon admission, pt was noted to be hypotensive in the 61H systolic and her Cr was elevated at 1.4. Pt also had positive nitrites in her urine. She denies any urinary symptoms of hematuria, frequency or dysuria. Pt was admitted to Med Surg for further work up and management.     Past Medical History:        Diagnosis Date    Acute pancreatitis 10/21/2014    Acute renal failure with tubular necrosis (HCC) 7/10/2015    Acute stasis dermatitis of left lower extremity 7/8/2015    Angina pectoris ([address] 5/23/2011    Cellulitis of left leg     Clostridium difficile infection 7/9/15, 6/4/15    Left THR 1/25/2016    Palpitations 7/17/2013    Primary osteoarthritis of left hip 11/18/2015    Shock ([address] 10/10/2017    Superficial thrombophlebitis of left leg 7/8/2015    Venous ulcer of left leg [facility])        Past Surgical History:        Procedure Laterality Date    APPENDECTOMY      CARDIAC CATHETERIZATION  05/2014    COLONOSCOPY      HIP ARTHROPLASTY Left 01/19/2016    TONSILLECTOMY      VARICOSE VEIN SURGERY         Medications Prior to replaced nonspecific T wave abnormality in Inferior leads  Confirmed by Sea Cervantes MD, Liz Campbell ([id number]) on 9/3/2018 1:10:11 PM     RADIOLOGY    XR CHEST PORTABLE 9/3/2018   Final Result   No acute cardiopulmonary abnormality identified. Pertinent previous results reviewed     TTE 6/19/2014  Summary   Normal left ventricle size and systolic function with an estimated ejection   fraction of 60-70%. No regional wall motion abnormalities are seen.  Dale [place] is mild concentric left ventricular hypertrophy.  Dale [place] is reversal of E/A inflow velocities across the mitral valve   suggesting impaired left ventricular relaxation.   Mitral valve is structurally normal.   Mitral valve leaflets appear to open adequately.   Mild mitral regurgitation is present.   The aortic valve is normal in structure and function. There is trivial   aortic regurgitation.   Right ventricular systolic function is normal .   The right ventricle is mildly enlarged.   Tricuspid valve is structurally normal.   The tricuspid valve leaflets appear to open adequately.   There is trivial tricuspid regurgitation with the systolic pulmonary artery   pressure (SPAP) estimated at 30 mmHg (estimated RA pressure of 3 mmHg   included).     ASSESSMENT/PLAN:    Generalized Weakness  Hypotension  - BP noted to be in 25A systolic on arrival  - given fluids in ED with some improvement  - Admit to Med Surg  - IVF, held statin, held BB  - PT/OT    NICKI - Resolved  - Cr on arrival: 1.4  - IVF, hold Diclofenac  - monitor renal function  - repeat Cr 0.8    UTI  - Nitrite positive  - check urine cx  - Rocephin    Leukocytosis - Resolved  - 19.8  - likely 2/2 UTI and above  - mgmt as above  - monitor --> repeat 5.8    HTN  - as above  - held home BP meds  - controlled w/o meds  - monitor    Type II DM  - controlled w/ diet  - last Hgb A1c: 4.9 on 8/11/18  - low dose SSI  - POC Glucose, monitor    Hypothyroidism  - check TSH  - cont Synthroid 200 mcg    Iron Deficiency Anemia  - hgb on arrival: 12.2  - baseline: ~11.7  - cont Ferrous sulfate    Ulcerative Colitis  - previously saw Dr. Melissa Leon of spouse  - discussed with patient about having spiritual services or psych come and talk with her. Pt declines to be seen at this current time       DVT Prophylaxis: Lovenox  Diet: DIET CARB CONTROL;  Code Status: Full Code     Discussed plan of care with Dr. Anibal Erickson.     Kiara Robledo PA-C 11:32 AM 9/4/2018

## 2018-09-05 VITALS
TEMPERATURE: 98.6 F | SYSTOLIC BLOOD PRESSURE: 127 MMHG | HEIGHT: 65 IN | HEART RATE: 96 BPM | OXYGEN SATURATION: 96 % | BODY MASS INDEX: 35.06 KG/M2 | WEIGHT: 210.4 LBS | DIASTOLIC BLOOD PRESSURE: 82 MMHG | RESPIRATION RATE: 16 BRPM

## 2018-09-05 PROBLEM — N39.0 UTI (URINARY TRACT INFECTION): Status: RESOLVED | Noted: 2018-09-03 | Resolved: 2018-09-05

## 2018-09-05 PROBLEM — R53.1 GENERALIZED WEAKNESS: Status: RESOLVED | Noted: 2018-09-03 | Resolved: 2018-09-05

## 2018-09-05 LAB
ANION GAP SERPL CALCULATED.3IONS-SCNC: 11 MMOL/L (ref 3–16)
BUN BLDV-MCNC: 14 MG/DL (ref 7–20)
CALCIUM SERPL-MCNC: 9.4 MG/DL (ref 8.3–10.6)
CHLORIDE BLD-SCNC: 104 MMOL/L (ref 99–110)
CO2: 23 MMOL/L (ref 21–32)
CREAT SERPL-MCNC: 0.8 MG/DL (ref 0.6–1.2)
GFR AFRICAN AMERICAN: >60
GFR NON-AFRICAN AMERICAN: >60
GLUCOSE BLD-MCNC: 70 MG/DL (ref 70–99)
GLUCOSE BLD-MCNC: 81 MG/DL (ref 70–99)
GLUCOSE BLD-MCNC: 82 MG/DL (ref 70–99)
GLUCOSE BLD-MCNC: 88 MG/DL (ref 70–99)
HCT VFR BLD CALC: 37.2 % (ref 36–48)
HEMOGLOBIN: 12.4 G/DL (ref 12–16)
MAGNESIUM: 1.5 MG/DL (ref 1.8–2.4)
MCH RBC QN AUTO: 30.9 PG (ref 26–34)
MCHC RBC AUTO-ENTMCNC: 33.3 G/DL (ref 31–36)
MCV RBC AUTO: 92.8 FL (ref 80–100)
PDW BLD-RTO: 16 % (ref 12.4–15.4)
PERFORMED ON: NORMAL
PHOSPHORUS: 2.8 MG/DL (ref 2.5–4.9)
PLATELET # BLD: 175 K/UL (ref 135–450)
PMV BLD AUTO: 9.6 FL (ref 5–10.5)
POTASSIUM SERPL-SCNC: 4 MMOL/L (ref 3.5–5.1)
RBC # BLD: 4.01 M/UL (ref 4–5.2)
SODIUM BLD-SCNC: 138 MMOL/L (ref 136–145)
URINE CULTURE, ROUTINE: NORMAL
WBC # BLD: 6.7 K/UL (ref 4–11)

## 2018-09-05 PROCEDURE — 85027 COMPLETE CBC AUTOMATED: CPT

## 2018-09-05 PROCEDURE — 6370000000 HC RX 637 (ALT 250 FOR IP): Performed by: INTERNAL MEDICINE

## 2018-09-05 PROCEDURE — 6370000000 HC RX 637 (ALT 250 FOR IP): Performed by: PHYSICIAN ASSISTANT

## 2018-09-05 PROCEDURE — 99238 HOSP IP/OBS DSCHRG MGMT 30/<: CPT | Performed by: INTERNAL MEDICINE

## 2018-09-05 PROCEDURE — 2580000003 HC RX 258: Performed by: PHYSICIAN ASSISTANT

## 2018-09-05 PROCEDURE — 80048 BASIC METABOLIC PNL TOTAL CA: CPT

## 2018-09-05 PROCEDURE — 84100 ASSAY OF PHOSPHORUS: CPT

## 2018-09-05 PROCEDURE — 6360000002 HC RX W HCPCS: Performed by: PHYSICIAN ASSISTANT

## 2018-09-05 PROCEDURE — G8979 MOBILITY GOAL STATUS: HCPCS

## 2018-09-05 PROCEDURE — 36415 COLL VENOUS BLD VENIPUNCTURE: CPT

## 2018-09-05 PROCEDURE — 83735 ASSAY OF MAGNESIUM: CPT

## 2018-09-05 PROCEDURE — G8980 MOBILITY D/C STATUS: HCPCS

## 2018-09-05 RX ADMIN — FOLIC ACID 1000 MCG: 1 TABLET ORAL at 09:33

## 2018-09-05 RX ADMIN — GABAPENTIN 200 MG: 100 CAPSULE ORAL at 09:08

## 2018-09-05 RX ADMIN — Medication 10 ML: at 09:34

## 2018-09-05 RX ADMIN — GABAPENTIN 200 MG: 100 CAPSULE ORAL at 14:34

## 2018-09-05 RX ADMIN — ISOSORBIDE MONONITRATE 30 MG: 30 TABLET ORAL at 09:08

## 2018-09-05 RX ADMIN — FERROUS SULFATE TAB 325 MG (65 MG ELEMENTAL FE) 325 MG: 325 (65 FE) TAB at 09:08

## 2018-09-05 RX ADMIN — ENOXAPARIN SODIUM 40 MG: 100 INJECTION SUBCUTANEOUS at 09:09

## 2018-09-05 RX ADMIN — MESALAMINE 400 MG: 400 CAPSULE, DELAYED RELEASE ORAL at 09:32

## 2018-09-05 RX ADMIN — MESALAMINE 400 MG: 400 CAPSULE, DELAYED RELEASE ORAL at 14:34

## 2018-09-05 RX ADMIN — ASPIRIN 325 MG: 325 TABLET, DELAYED RELEASE ORAL at 09:07

## 2018-09-05 RX ADMIN — CEFTRIAXONE SODIUM 1 G: 1 INJECTION, POWDER, FOR SOLUTION INTRAMUSCULAR; INTRAVENOUS at 09:08

## 2018-09-05 RX ADMIN — LEVOTHYROXINE SODIUM 200 MCG: 200 TABLET ORAL at 06:10

## 2018-09-05 ASSESSMENT — PAIN SCALES - GENERAL
PAINLEVEL_OUTOF10: 0

## 2018-09-05 NOTE — PLAN OF CARE
Problem: SAFETY  Goal: Free from accidental physical injury  Outcome: Ongoing      Problem: PAIN  Goal: Patient's pain/discomfort is manageable  Outcome: Ongoing      Problem: SKIN INTEGRITY  Goal: Skin integrity is maintained or improved  Outcome: Ongoing

## 2018-09-05 NOTE — DISCHARGE SUMMARY
09/03/18   0535  09/04/18   0537  09/05/18   0548   NA  140  141  138   K  3.5  4.1  4.0   CL  104  109  104   CO2  24  23  23   PHOS   --    --   2.8   BUN  25*  19  14   CREATININE  1.4*  0.8  0.8     LIVER PROFILE:   Recent Labs      09/03/18   0535   AST  14*   ALT  9*   LIPASE  49.0   BILITOT  0.8   ALKPHOS  91     UA:  Recent Labs      09/03/18   0650   COLORU  Yellow   PHUR  5.0   WBCUA  3-5   RBCUA  0-2   MUCUS  Rare*   BACTERIA  Rare*   CLARITYU  SL CLOUDY*   SPECGRAV  1.025   LEUKOCYTESUR  Negative   UROBILINOGEN  0.2   BILIRUBINUR  MODERATE*   BLOODU  Negative   GLUCOSEU  Negative   AMORPHOUS  3+*     CULTURES    Urine cx: NGTD    RADIOLOGY    XR CHEST PORTABLE 9/3/2018   Final Result   No acute cardiopulmonary abnormality identified. Discharge Medications     Medication List      START taking these medications    mupirocin 2 % ointment  Commonly known as:  BACTROBAN  Apply 2cm to each nostril BID for 5 days prior to surgery        CHANGE how you take these medications    gabapentin 100 MG capsule  Commonly known as:  NEURONTIN  What changed:  Another medication with the same name was removed. Continue taking this medication, and follow the directions you see here.     magnesium oxide 400 MG tablet  Commonly known as:  MAG-OX  TAKE 2 TABLETS BY MOUTH TWICE A DAY  What changed:  See the new instructions. CONTINUE taking these medications    * ACCU-CHEK FASTCLIX LANCETS Misc  Patient test TID  Dx : 250.00     * ONE TOUCH LANCETS Misc  1 each by Does not apply route daily     albuterol sulfate  (90 Base) MCG/ACT inhaler  Commonly known as:  VENTOLIN HFA  Inhale 2 puffs into the lungs every 6 hours as needed for Wheezing. aspirin 325 MG EC tablet     blood glucose test strips strip  Commonly known as:  ASCENSIA AUTODISC VI;ONE TOUCH ULTRA TEST VI  1 each by In Vitro route daily As needed.      [employer] VITAMIN D3 07165 units Caps capsule  Generic drug:  vitamin D  TAKE ONE CAPSULE BY MOUTH EVERY WEEK     diclofenac 75 MG EC tablet  Commonly known as:  VOLTAREN     ferrous sulfate 325 (65 Fe) MG tablet  TAKE 1 TABLET BY MOUTH DAILY. folic acid 1 MG tablet  Commonly known as:  FOLVITE  TAKE 1 TABLET BY MOUTH DAILY     isosorbide mononitrate 60 MG extended release tablet  Commonly known as:  IMDUR  Take 1 tablet by mouth every morning     levothyroxine 200 MCG tablet  Commonly known as:  SYNTHROID  TAKE 1 TABLET BY MOUTH DAILY     mesalamine 1.2 g EC tablet  Commonly known as:  LIALDA     nebivolol 2.5 MG tablet  Commonly known as:  BYSTOLIC  TAKE 1 TABLET BY MOUTH DAILY     ONE TOUCH ULTRA 2 w/Device Kit  1 kit by Does not apply route daily as needed (daily)     simvastatin 20 MG tablet  Commonly known as:  ZOCOR  TAKE 1 TABLET BY MOUTH NIGHTLY        * This list has 2 medication(s) that are the same as other medications prescribed for you. Read the directions carefully, and ask your doctor or other care provider to review them with you. STOP taking these medications    celecoxib 100 MG capsule  Commonly known as:  CELEBREX     dicloxacillin 500 MG capsule  Commonly known as:  DYNAPEN     Urea 40 % cream  Commonly known as:  CARMOL           Where to Get Your Medications      These medications were sent to [facility] [address] [place] [id number]    Phone:  [phone]   · mupirocin 2 % ointment            Discharged in stable condition to home. Follow Up: Follow up with PCP in 1 week.         Celi Plunkett MD   9/5/18

## 2018-09-05 NOTE — PROGRESS NOTES
Shift assessment complete. See flow sheet. Due medications administered per MD orders. See MAR. Vital signs stable. Patient is alert and oriented x 4. Pt denies any present needs/concerns. Call light explained and in reach. Will continue to monitor.

## 2018-09-05 NOTE — PROGRESS NOTES
Pt has been educated to hospital falls prevention policy. They are aware they will be assessed every shift, and with any condition changes, by the nursing staff on their ability to perform their ADL's without need for assistance. Pt understands that based on the number of their score they are given a base score that will assign a level of low, medium, or high risk for falls. This patient has rated a high which requires a bed / chair alarm for their safety to prevent a fall. The patient is alert and oriented, and acknowledges and understands the need for intervention but refuses the application and use of the bed / chair alarm that is required per policy. Pt agrees to use call light and wait for help to arrive to assist them to get up when they need to. Call light is within reach and all other safety measures in place.

## 2018-09-05 NOTE — CARE COORDINATION
DISCHARGE ORDER  Date/Time 2018 3:36 PM  Completed by: Abel Nieves, Case Management    Patient Name: Braeden Guerrero    : 1955  Admitting Diagnosis: Generalized weakness [R53.1]  Generalized weakness [R53.1]  UTI (urinary tract infection) [N39.0]  UTI (urinary tract infection) [N39.0]  Admit Date/Time: 9/3/2018  5:27 AM    Noted discharge order. Confirmed discharge plan with patient: Yes   Discharge Plan: Order for dc noted. Met with pt who cont  home alone. tis having surgery on back on the . Discussed HHC and pt declines states will be fine at home. tfeels able to care for self and not feeling weak any longer. Chart reviewed and no other dc needs identified.

## 2018-09-06 ENCOUNTER — TELEPHONE (OUTPATIENT)
Dept: ORTHOPEDIC SURGERY | Age: 63
End: 2018-09-06

## 2018-09-06 DIAGNOSIS — E03.9 HYPOTHYROIDISM, UNSPECIFIED TYPE: ICD-10-CM

## 2018-09-06 RX ORDER — LEVOTHYROXINE SODIUM 0.2 MG/1
200 TABLET ORAL DAILY
Qty: 90 TABLET | Refills: 3 | Status: SHIPPED | OUTPATIENT
Start: 2018-09-06 | End: 2018-09-12 | Stop reason: SDUPTHER

## 2018-09-06 RX ORDER — BLOOD-GLUCOSE METER
1 EACH MISCELLANEOUS DAILY PRN
Qty: 1 KIT | Refills: 0 | Status: SHIPPED | OUTPATIENT
Start: 2018-09-06 | End: 2018-09-12

## 2018-09-12 ENCOUNTER — OFFICE VISIT (OUTPATIENT)
Dept: FAMILY MEDICINE CLINIC | Age: 63
End: 2018-09-12

## 2018-09-12 VITALS
OXYGEN SATURATION: 98 % | BODY MASS INDEX: 33.49 KG/M2 | SYSTOLIC BLOOD PRESSURE: 118 MMHG | WEIGHT: 201 LBS | HEIGHT: 65 IN | DIASTOLIC BLOOD PRESSURE: 64 MMHG | HEART RATE: 83 BPM

## 2018-09-12 DIAGNOSIS — E78.2 MIXED HYPERLIPIDEMIA: ICD-10-CM

## 2018-09-12 DIAGNOSIS — I10 ESSENTIAL HYPERTENSION: ICD-10-CM

## 2018-09-12 DIAGNOSIS — J45.909 REACTIVE AIRWAY DISEASE WITHOUT COMPLICATION, UNSPECIFIED ASTHMA SEVERITY, UNSPECIFIED WHETHER PERSISTENT: ICD-10-CM

## 2018-09-12 DIAGNOSIS — E53.8 B12 DEFICIENCY: ICD-10-CM

## 2018-09-12 DIAGNOSIS — E83.42 HYPOMAGNESEMIA: ICD-10-CM

## 2018-09-12 DIAGNOSIS — D64.9 NORMOCYTIC ANEMIA: ICD-10-CM

## 2018-09-12 DIAGNOSIS — Z01.818 PRE-OP EXAMINATION: Primary | ICD-10-CM

## 2018-09-12 DIAGNOSIS — F32.A ANXIETY AND DEPRESSION: ICD-10-CM

## 2018-09-12 DIAGNOSIS — F41.9 ANXIETY AND DEPRESSION: ICD-10-CM

## 2018-09-12 DIAGNOSIS — I87.2 VENOUS INSUFFICIENCY OF BOTH LOWER EXTREMITIES: ICD-10-CM

## 2018-09-12 DIAGNOSIS — E11.9 DIET-CONTROLLED TYPE 2 DIABETES MELLITUS (HCC): ICD-10-CM

## 2018-09-12 DIAGNOSIS — E03.9 HYPOTHYROIDISM, UNSPECIFIED TYPE: ICD-10-CM

## 2018-09-12 DIAGNOSIS — M43.10 DEGENERATIVE SPONDYLOLISTHESIS: ICD-10-CM

## 2018-09-12 DIAGNOSIS — I83.90 VARICOSE VEIN OF LEG: ICD-10-CM

## 2018-09-12 PROCEDURE — 93000 ELECTROCARDIOGRAM COMPLETE: CPT | Performed by: NURSE PRACTITIONER

## 2018-09-12 PROCEDURE — 99242 OFF/OP CONSLTJ NEW/EST SF 20: CPT | Performed by: NURSE PRACTITIONER

## 2018-09-12 RX ORDER — SIMVASTATIN 20 MG
TABLET ORAL
Qty: 90 TABLET | Refills: 0 | Status: SHIPPED | OUTPATIENT
Start: 2018-09-12 | End: 2018-10-31 | Stop reason: SDUPTHER

## 2018-09-12 RX ORDER — ISOSORBIDE MONONITRATE 60 MG/1
60 TABLET, EXTENDED RELEASE ORAL EVERY MORNING
Qty: 90 TABLET | Refills: 0 | Status: ON HOLD | OUTPATIENT
Start: 2018-09-12 | End: 2018-10-01 | Stop reason: HOSPADM

## 2018-09-12 RX ORDER — LEVOTHYROXINE SODIUM 0.2 MG/1
200 TABLET ORAL DAILY
Qty: 90 TABLET | Refills: 0 | Status: SHIPPED | OUTPATIENT
Start: 2018-09-12 | End: 2018-11-18 | Stop reason: SDUPTHER

## 2018-09-12 RX ORDER — MAGNESIUM OXIDE 400 MG/1
TABLET ORAL
Qty: 180 TABLET | Refills: 1 | Status: SHIPPED | OUTPATIENT
Start: 2018-09-12 | End: 2019-02-16 | Stop reason: SDUPTHER

## 2018-09-12 RX ORDER — CELECOXIB 100 MG/1
100 CAPSULE ORAL 2 TIMES DAILY
Qty: 180 CAPSULE | Refills: 3 | Status: ON HOLD | OUTPATIENT
Start: 2018-09-12 | End: 2018-10-01 | Stop reason: HOSPADM

## 2018-09-12 RX ORDER — NEBIVOLOL 2.5 MG/1
TABLET ORAL
Qty: 90 TABLET | Refills: 0 | Status: ON HOLD | OUTPATIENT
Start: 2018-09-12 | End: 2018-10-01 | Stop reason: HOSPADM

## 2018-09-12 RX ORDER — FOLIC ACID 1 MG/1
TABLET ORAL
Qty: 90 TABLET | Refills: 1 | Status: SHIPPED | OUTPATIENT
Start: 2018-09-12 | End: 2018-11-07 | Stop reason: SDUPTHER

## 2018-09-12 ASSESSMENT — PATIENT HEALTH QUESTIONNAIRE - PHQ9
2. FEELING DOWN, DEPRESSED OR HOPELESS: 0
SUM OF ALL RESPONSES TO PHQ9 QUESTIONS 1 & 2: 0
SUM OF ALL RESPONSES TO PHQ QUESTIONS 1-9: 0
SUM OF ALL RESPONSES TO PHQ QUESTIONS 1-9: 0
1. LITTLE INTEREST OR PLEASURE IN DOING THINGS: 0

## 2018-09-12 NOTE — PROGRESS NOTES
Acute renal failure with tubular necrosis (HCC) 7/10/2015    Acute stasis dermatitis of left lower extremity 7/8/2015    [address]ina p[address]is ([facility] [address] 5/23/2011    Cellulitis of left leg     Clostridium difficile infection 7/9/15, 6/4/15    Left THR 1/25/2016    Palpitations 7/17/2013    Primary osteoarthritis of left hip 11/18/2015    Shock ([address] 10/10/2017    Superficial thrombophlebitis of left leg 7/8/2015    Venous ulcer of left leg [facility])      Past Surgical History:   Procedure Laterality Date    APPENDECTOMY      CARDIAC CATHETERIZATION  05/2014    COLONOSCOPY      HIP ARTHROPLASTY Left 01/19/2016    TONSILLECTOMY      VARICOSE VEIN SURGERY       Family History   Problem Relation Age of Onset    Diabetes Mother     Alzheimer's Disease Mother     Anemia Mother     Diabetes Father     Cancer Father 68        pancreatic    Cancer Maternal Grandmother [de-identified]        Bone Marrow Ca     Social History     Social History    Marital status:      Spouse name: Mark Carbone Number of children: N/A    Years of education: N/A     Occupational History    Not on file. Social History Main Topics    Smoking status: Never Smoker    Smokeless tobacco: Never Used      Comment: never even tried it    Alcohol use No      Comment: rarely    Drug use: No    Sexual activity: Not Currently     Other Topics Concern    Not on file     Social History Narrative    No narrative on file       Review of Systems  A comprehensive review of systems was negative except for what was noted in the HPI. Physical Exam   Constitutional: She is oriented to person, place, and time. She appears well-developed and well-nourished. No distress. HENT:   Head: Normocephalic and atraumatic. Mouth/Throat: Uvula is midline, oropharynx is clear and moist and mucous membranes are normal.   Eyes: Conjunctivae and EOM are normal. Pupils are equal, round, and reactive to light. Neck: Trachea normal and normal range of motion.  Neck supple. No JVD present. Carotid bruit is not present. No mass and no thyromegaly present. Cardiovascular: Normal rate, regular rhythm, normal heart sounds and intact distal pulses. Exam reveals no gallop and no friction rub. No murmur heard. Pulmonary/Chest: Effort normal and breath sounds normal. No respiratory distress. She has no wheezes. She has no rales. Abdominal: Soft. Normal aorta and bowel sounds are normal. She exhibits no distension and no mass. There is no hepatosplenomegaly. No tenderness. Musculoskeletal: She exhibits no edema and no tenderness. Neurological: She is alert and oriented to person, place, and time. She has normal strength. No cranial nerve deficit or sensory deficit. Coordination and gait normal.   Skin: Skin is warm and dry. No rash noted. No erythema. Psychiatric: She has a normal mood and affect.  Her behavior is normal.     EKG Interpretation:  Normal sinus rhythm with sinus arrhythmiaRightward axisPoor R wave progressionAbnormal ECGWhen compared with ECG of 09-OCT-2017 07:45,Questionable change in initial forces of Anterior leadsInverted T waves have replaced nonspecific T wave abnormality in Inferior leadsConfirmed by Adryan Dexter MD, Mario Pleitez ([id number]) on 9/3/2018 1:10:11 PM      Lab Review   Admission on 09/03/2018, Discharged on 09/05/2018   Component Date Value    WBC 09/03/2018 19.8*    RBC 09/03/2018 4.02     Hemoglobin 09/03/2018 12.2     Hematocrit 09/03/2018 37.6     MCV 09/03/2018 93.5     MCH 09/03/2018 30.3     MCHC 09/03/2018 32.4     RDW 09/03/2018 15.8*    Platelets 15/67/0020 148     MPV 09/03/2018 9.0     Neutrophils % 09/03/2018 96.5     Lymphocytes % 09/03/2018 2.4     Monocytes % 09/03/2018 0.6     Eosinophils % 09/03/2018 0.4     Basophils % 09/03/2018 0.1     Neutrophils # 09/03/2018 19.1*    Lymphocytes # 09/03/2018 0.5*    Monocytes # 09/03/2018 0.1     Eosinophils # 09/03/2018 0.1     Basophils # 09/03/2018 0.0     Sodium 09/03/2018 140 09/05/2018 30.9     MCHC 09/05/2018 33.3     RDW 09/05/2018 16.0*    Platelets 58/86/4121 175     MPV 09/05/2018 9.6     Sodium 09/05/2018 138     Potassium 09/05/2018 4.0     Chloride 09/05/2018 104     CO2 09/05/2018 23     Anion Gap 09/05/2018 11     Glucose 09/05/2018 88     BUN 09/05/2018 14     CREATININE 09/05/2018 0.8     GFR Non- 09/05/2018 >60     GFR  09/05/2018 >60     Calcium 09/05/2018 9.4     Magnesium 09/05/2018 1.50*    Phosphorus 09/05/2018 2.8     POC Glucose 09/04/2018 98     Performed on 09/04/2018 ACCU-CHEK     POC Glucose 09/05/2018 81     Performed on 09/05/2018 ACCU-CHEK     POC Glucose 09/05/2018 82     Performed on 09/05/2018 ACCU-CHEK     POC Glucose 09/05/2018 70     Performed on 09/05/2018 ACCU-CHEK    Admission on 08/24/2018, Discharged on 08/24/2018   Component Date Value    Sodium 08/24/2018 143     Potassium 08/24/2018 4.0     Chloride 08/24/2018 109     CO2 08/24/2018 23     Anion Gap 08/24/2018 11     Glucose 08/24/2018 85     BUN 08/24/2018 23*    CREATININE 08/24/2018 1.7*    GFR Non- 08/24/2018 30*    GFR  08/24/2018 37*    Calcium 08/24/2018 8.7     Total Protein 08/24/2018 6.6     Alb 08/24/2018 3.5     Albumin/Globulin Ratio 08/24/2018 1.1     Total Bilirubin 08/24/2018 0.4     Alkaline Phosphatase 08/24/2018 88     ALT 08/24/2018 10     AST 08/24/2018 27     Globulin 08/24/2018 3.1    Hospital Outpatient Visit on 08/11/2018   Component Date Value    WBC 08/11/2018 5.5     RBC 08/11/2018 3.54*    Hemoglobin 08/11/2018 10.9*    Hematocrit 08/11/2018 32.2*    MCV 08/11/2018 91.0     MCH 08/11/2018 30.7     MCHC 08/11/2018 33.7     RDW 08/11/2018 14.7     Platelets 81/47/5276 173     MPV 08/11/2018 8.7     Neutrophils % 08/11/2018 66.1     Lymphocytes % 08/11/2018 20.9     Monocytes % 08/11/2018 6.7     Eosinophils % 08/11/2018 5.6     Basophils % 08/11/2018 0.7     Neutrophils # 08/11/2018 3.6     Lymphocytes # 08/11/2018 1.1     Monocytes # 08/11/2018 0.4     Eosinophils # 08/11/2018 0.3     Basophils # 08/11/2018 0.0     Sodium 08/11/2018 142     Potassium 08/11/2018 4.5     Chloride 08/11/2018 105     CO2 08/11/2018 27     Anion Gap 08/11/2018 10     Glucose 08/11/2018 85     BUN 08/11/2018 14     CREATININE 08/11/2018 0.8     GFR Non- 08/11/2018 >60     GFR  08/11/2018 >60     Calcium 08/11/2018 9.5     Total Protein 08/11/2018 7.3     Alb 08/11/2018 3.7     Albumin/Globulin Ratio 08/11/2018 1.0*    Total Bilirubin 08/11/2018 0.7     Alkaline Phosphatase 08/11/2018 81     ALT 08/11/2018 8*    AST 08/11/2018 17     Globulin 08/11/2018 3.6     TSH 08/11/2018 0.39     Vit D, 25-Hydroxy 08/11/2018 55.1     Cholesterol, Total 08/11/2018 143     Triglycerides 08/11/2018 97     HDL 08/11/2018 48     LDL Calculated 08/11/2018 76     VLDL Cholesterol Calcula* 08/11/2018 19     Vitamin B-12 08/11/2018 455     Folate 08/11/2018 >20.00     Ferritin 08/11/2018 177.8*    Iron 08/11/2018 66     TIBC 08/11/2018 255*    Iron Saturation 08/11/2018 26     Hemoglobin A1C 08/11/2018 4.9     eAG 08/11/2018 93.9     Magnesium 08/11/2018 1.50*   Office Visit on 06/04/2018   Component Date Value    WBC 06/04/2018 6.9     RBC 06/04/2018 3.69*    Hemoglobin 06/04/2018 11.7*    Hematocrit 06/04/2018 34.0*    MCV 06/04/2018 92.1     MCH 06/04/2018 31.7     MCHC 06/04/2018 34.4     RDW 06/04/2018 14.5     Platelets 49/71/8209 204     MPV 06/04/2018 9.6     Neutrophils % 06/04/2018 66.6     Lymphocytes % 06/04/2018 19.9     Monocytes % 06/04/2018 7.7     Eosinophils % 06/04/2018 5.1     Basophils % 06/04/2018 0.7     Neutrophils # 06/04/2018 4.6     Lymphocytes # 06/04/2018 1.4     Monocytes # 06/04/2018 0.5     Eosinophils # 06/04/2018 0.4     Basophils # 06/04/2018 0.0     Sodium 06/04/2018 days before surgery, Discontinue NSAIDs (diclofenac) 7 days before surgery  3. Prophylaxis for cardiac events with perioperative beta-blockers: Currently taking  bystolic  ACC/AHA indications for pre-operative beta-blocker use:    · Vascular surgery with history of postitive stress test  · Intermediate or high risk surgery with history of CAD   · Intermediate or high risk surgery with multiple clinical predictors of CAD- 2 of the following: history of compensated or prior heart failure, history of cerebrovascular disease, DM, or renal insufficiency    Routine administration of higher-dose, long-acting metoprolol in beta-blockernaïve patients on the day of surgery, and in the absence of dose titration is associated with an overall increase in mortality. Beta-blockers should be started days to weeks prior to surgery and titrated to pulse < 70.  4. Deep vein thrombosis prophylaxis: regimen to be chosen by surgical team  5. Patient needs cardiac Clearance due to recent EKG changes. Addendum 9/19/18  Received cardiac clearance from her cardiologist. The patient may proceed with surgery as scheduled    If you have questions, please do not hesitate to call me ([phone]). Sincerely,    Ramy Barger.  Kaushal Rodriguez, CNP

## 2018-09-17 ENCOUNTER — OFFICE VISIT (OUTPATIENT)
Dept: CARDIOLOGY CLINIC | Age: 63
End: 2018-09-17

## 2018-09-17 ENCOUNTER — TELEPHONE (OUTPATIENT)
Dept: FAMILY MEDICINE CLINIC | Age: 63
End: 2018-09-17

## 2018-09-17 VITALS
WEIGHT: 201 LBS | SYSTOLIC BLOOD PRESSURE: 114 MMHG | DIASTOLIC BLOOD PRESSURE: 64 MMHG | HEIGHT: 65 IN | BODY MASS INDEX: 33.49 KG/M2 | OXYGEN SATURATION: 97 % | HEART RATE: 72 BPM

## 2018-09-17 DIAGNOSIS — R94.31 PRE-OPERATIVE CARDIOVASCULAR EXAM, NEW EKG ABNORMALITIES C/W ISCHEMIA: Primary | ICD-10-CM

## 2018-09-17 DIAGNOSIS — Z01.810 PRE-OPERATIVE CARDIOVASCULAR EXAM, NEW EKG ABNORMALITIES C/W ISCHEMIA: Primary | ICD-10-CM

## 2018-09-17 PROCEDURE — 99214 OFFICE O/P EST MOD 30 MIN: CPT | Performed by: INTERNAL MEDICINE

## 2018-09-17 NOTE — PROGRESS NOTES
[address]     Outpatient Follow Up Note    Subjective:      Jaison Ley is 60 y.o. female who presents today for a cardiology preoperatve evaluation after abnormal EKG with PCP. She is scheduled to undergo back surgery 9/26/18 with Dr Hubert Hood  She has back pain and sciatica left leg with spontaneous left leg numbness. Today she reports to feeling well from cardiac standpoint. Denies chest pain, shortness of breath, edema, dizziness, palpitations and syncope. She reports  feeling adrenaline surge in extremities at times. CHIEF COMPLAINT / HPI:  She has history of angina with normal cor angio. Her most recent stress test from 8/12 that showed Fixed defect involving the anterior wall of the left ventricle may represent breast attenuation. An area of previous myocardial infarction is considered less likely an EKG correlation may be helpful. No reversible defects to suggest myocardial ischemia. LVEF 73 % 24 hr holter showed infrequent PVC and PAC. She is sp [facility] 5/22/14 which showed ectatic LAD otherwise  normal coronaries. EF 55%.           Past Medical History:   Diagnosis Date    Acute pancreatitis 10/21/2014    Acute renal failure with tubular necrosis (HCC) 7/10/2015    Acute stasis dermatitis of left lower extremity 7/8/2015    Angina pectoris ([address] 5/23/2011    Cellulitis of left leg     Clostridium difficile infection 7/9/15, 6/4/15    Left THR 1/25/2016    Palpitations 7/17/2013    Primary osteoarthritis of left hip 11/18/2015    Shock ([address] 10/10/2017    Superficial thrombophlebitis of left leg 7/8/2015    Venous ulcer of left leg (HCC)      Social History:    History   Smoking Status    Never Smoker   Smokeless Tobacco    Never Used     Comment: never even tried it     Current Medications:  Current Outpatient Prescriptions   Medication Sig Dispense Refill    isosorbide mononitrate (IMDUR) 60 MG extended release tablet Take 1 tablet by mouth every morning 90 tablet 0    supple, no elevation of JVP, normal carotid pulses with no bruits and thyroid normal size. LUNGS:  No increased work of breathing and clear to auscultation, no crackles or wheezing  CARDIOVASCULAR:  Regular rate and rhythm HR 56 bpmwith 2/6 systolic ejection murmurs in aortic area, No gallops, rubs,  normal PMI. The apical impulses not displaced  JVP less than 8 cm H2O  The carotid upstroke is normal in amplitude and contour without delay or bruit  JVP is not elevated  ABDOMEN:  Normal bowel sounds, non-distended and non-tender to palpation  EXT: 1+ BLE  edema, no calf tenderness. Pulses are present bilaterally. Bilat venous insuff. Imaging:    EKG 9/12/18 my  interpretation  Sinus  Rhythm Low voltage in limb leads. poor R-wave progression otherwise normal EKG  She never had atrialflutter EKG reading in epic is misread. EKG 9/3/18  Diagnosis Final 09/03/2018 5:45 AM 14   Normal sinus rhythm with sinus arrhythmiaRightward axisPoor R wave progressionAbnormal ECGWhen compared with ECG of 09-OCT-2017 07:45,Questionable change in initial forces of Anterior leadsInverted T waves have replaced nonspecific T wave abnormality in Inferior leadsConfirmed by Sandra Reinoso MD, Daniel Rogel ([id number]) on 9/3/2018 1:10:11 PM    ABNORMAL   Cardiac Cath 5/22/14:   Normal coronary arteries   LVEF 55%  CTPA 5/23/14  IMPRESSION: No definite scan evidence for pulmonary embolus or other acute process. Echo 6-19-14  Summary  Normal left ventricle size and systolic function with an estimated   Ejection fraction of 60-70%. No regional wall motion abnormalities are seen. There is mild concentric left ventricular hypertrophy. There is reversal of E/A inflow velocities across the mitral valve  suggesting impaired left ventricular relaxation. Mitral valve is structurally normal.  Mitral valve leaflets appear to open adequately. Mild mitral regurgitation is present. The aortic valve is normal in structure and function.  There is trivial  aortic

## 2018-09-17 NOTE — COMMUNICATION BODY
[address]     Outpatient Follow Up Note    Subjective:      Ed Vuong is 60 y.o. female who presents today for a cardiology preoperatve evaluation after abnormal EKG with PCP. She is scheduled to undergo back surgery 9/26/18 with Dr Martinez  She has back pain and sciatica left leg with spontaneous left leg numbness. Today she reports to feeling well from cardiac standpoint. Denies chest pain, shortness of breath, edema, dizziness, palpitations and syncope. She reports  feeling adrenaline surge in extremities at times. CHIEF COMPLAINT / HPI:  She has history of angina with normal cor angio. Her most recent stress test from 8/12 that showed Fixed defect involving the anterior wall of the left ventricle may represent breast attenuation. An area of previous myocardial infarction is considered less likely an EKG correlation may be helpful. No reversible defects to suggest myocardial ischemia. LVEF 73 % 24 hr holter showed infrequent PVC and PAC. She is sp [facility] 5/22/14 which showed ectatic LAD otherwise  normal coronaries. EF 55%.           Past Medical History:   Diagnosis Date    Acute pancreatitis 10/21/2014    Acute renal failure with tubular necrosis (HCC) 7/10/2015    Acute stasis dermatitis of left lower extremity 7/8/2015    Angina pectoris ([address] 5/23/2011    Cellulitis of left leg     Clostridium difficile infection 7/9/15, 6/4/15    Left THR 1/25/2016    Palpitations 7/17/2013    Primary osteoarthritis of left hip 11/18/2015    Shock ([address] 10/10/2017    Superficial thrombophlebitis of left leg 7/8/2015    Venous ulcer of left leg (HCC)      Social History:    History   Smoking Status    Never Smoker   Smokeless Tobacco    Never Used     Comment: never even tried it     Current Medications:  Current Outpatient Prescriptions   Medication Sig Dispense Refill    isosorbide mononitrate (IMDUR) 60 MG extended release tablet Take 1 tablet by mouth every morning 90 tablet 0    supple, no elevation of JVP, normal carotid pulses with no bruits and thyroid normal size. LUNGS:  No increased work of breathing and clear to auscultation, no crackles or wheezing  CARDIOVASCULAR:  Regular rate and rhythm HR 56 bpmwith 2/6 systolic ejection murmurs in aortic area, No gallops, rubs,  normal PMI. The apical impulses not displaced  JVP less than 8 cm H2O  The carotid upstroke is normal in amplitude and contour without delay or bruit  JVP is not elevated  ABDOMEN:  Normal bowel sounds, non-distended and non-tender to palpation  EXT: 1+ BLE  edema, no calf tenderness. Pulses are present bilaterally. Bilat venous insuff. Imaging:    EKG 9/12/18 my  interpretation  Sinus  Rhythm Low voltage in limb leads. poor R-wave progression otherwise normal EKG  She never had atrialflutter EKG reading in epic is misread. EKG 9/3/18  Diagnosis Final 09/03/2018 5:45 AM 14   Normal sinus rhythm with sinus arrhythmiaRightward axisPoor R wave progressionAbnormal ECGWhen compared with ECG of 09-OCT-2017 07:45,Questionable change in initial forces of Anterior leadsInverted T waves have replaced nonspecific T wave abnormality in Inferior leadsConfirmed by Carrol Dietrich MD, Virginia Martell ([id number]) on 9/3/2018 1:10:11 PM    ABNORMAL   Cardiac Cath 5/22/14:   Normal coronary arteries   LVEF 55%  CTPA 5/23/14  IMPRESSION: No definite scan evidence for pulmonary embolus or other acute process. Echo 6-19-14  Summary  Normal left ventricle size and systolic function with an estimated   Ejection fraction of 60-70%. No regional wall motion abnormalities are seen. There is mild concentric left ventricular hypertrophy. There is reversal of E/A inflow velocities across the mitral valve  suggesting impaired left ventricular relaxation. Mitral valve is structurally normal.  Mitral valve leaflets appear to open adequately. Mild mitral regurgitation is present. The aortic valve is normal in structure and function.  There is trivial  aortic

## 2018-09-17 NOTE — PATIENT INSTRUCTIONS
1. Advised to stop aspirin no strong indication of need  2. Otherwise she is doing well overall. 3. She will follow up with me prn   4.  She is low cardiac risk for intermediate risk surgery OK to proceed with planned surgery without further cardiac testing

## 2018-09-17 NOTE — TELEPHONE ENCOUNTER
Pt called and wanted to let us know she's okay for surgery by Dr. Mariela Houser and that there will be stuff for PCP to sign off on.

## 2018-09-21 ENCOUNTER — TELEPHONE (OUTPATIENT)
Dept: FAMILY MEDICINE CLINIC | Age: 63
End: 2018-09-21

## 2018-09-21 DIAGNOSIS — I10 ESSENTIAL HYPERTENSION: ICD-10-CM

## 2018-09-21 DIAGNOSIS — K51.90 ULCERATIVE COLITIS WITHOUT COMPLICATIONS, UNSPECIFIED LOCATION (HCC): Primary | ICD-10-CM

## 2018-09-21 NOTE — TELEPHONE ENCOUNTER
Pt said she was at work when she felt this way at first she at some salty food and took her bp when she got home it was  100/76 pulse of 75  She said she just feels really tired right now

## 2018-09-21 NOTE — TELEPHONE ENCOUNTER
No bleeding or diarrhea  Recommend patient decrease her imdur to 30 mg daily and check CBC and CMP on Monday also monitor blood pressure daily and report those on Monday as well  To ER for any new symptoms

## 2018-09-24 ENCOUNTER — HOSPITAL ENCOUNTER (EMERGENCY)
Age: 63
Discharge: HOME | End: 2018-09-24
Attending: EMERGENCY MEDICINE
Payer: COMMERCIAL

## 2018-09-24 VITALS
DIASTOLIC BLOOD PRESSURE: 72 MMHG | SYSTOLIC BLOOD PRESSURE: 101 MMHG | OXYGEN SATURATION: 98 % | RESPIRATION RATE: 16 BRPM | HEART RATE: 62 BPM | WEIGHT: 200 LBS | BODY MASS INDEX: 33.32 KG/M2 | HEIGHT: 65 IN

## 2018-09-24 DIAGNOSIS — R42 ORTHOSTATIC LIGHTHEADEDNESS: Primary | ICD-10-CM

## 2018-09-24 DIAGNOSIS — N28.9 ACUTE RENAL INSUFFICIENCY: ICD-10-CM

## 2018-09-24 DIAGNOSIS — E86.0 DEHYDRATION: ICD-10-CM

## 2018-09-24 LAB
A/G RATIO: 0.9 (ref 1.1–2.2)
ALBUMIN SERPL-MCNC: 2.8 G/DL (ref 3.4–5)
ALP BLD-CCNC: 136 U/L (ref 40–129)
ALT SERPL-CCNC: 8 U/L (ref 10–40)
ANION GAP SERPL CALCULATED.3IONS-SCNC: 10 MMOL/L (ref 3–16)
ANION GAP SERPL CALCULATED.3IONS-SCNC: 14 MMOL/L (ref 3–16)
AST SERPL-CCNC: 19 U/L (ref 15–37)
BACTERIA: ABNORMAL /HPF
BASOPHILS ABSOLUTE: 0 K/UL (ref 0–0.2)
BASOPHILS RELATIVE PERCENT: 0.4 %
BILIRUB SERPL-MCNC: 0.4 MG/DL (ref 0–1)
BILIRUBIN URINE: NEGATIVE
BLOOD, URINE: NEGATIVE
BUN BLDV-MCNC: 37 MG/DL (ref 7–20)
BUN BLDV-MCNC: 40 MG/DL (ref 7–20)
C DIFFICILE TOXIN, EIA: NORMAL
CALCIUM SERPL-MCNC: 8.1 MG/DL (ref 8.3–10.6)
CALCIUM SERPL-MCNC: 8.5 MG/DL (ref 8.3–10.6)
CASTS: ABNORMAL /LPF
CHLORIDE BLD-SCNC: 109 MMOL/L (ref 99–110)
CHLORIDE BLD-SCNC: 110 MMOL/L (ref 99–110)
CLARITY: ABNORMAL
CO2: 18 MMOL/L (ref 21–32)
CO2: 21 MMOL/L (ref 21–32)
COLOR: ABNORMAL
CREAT SERPL-MCNC: 1.6 MG/DL (ref 0.6–1.2)
CREAT SERPL-MCNC: 1.8 MG/DL (ref 0.6–1.2)
EOSINOPHILS ABSOLUTE: 0 K/UL (ref 0–0.6)
EOSINOPHILS RELATIVE PERCENT: 0.4 %
EPITHELIAL CELLS, UA: ABNORMAL /HPF
GFR AFRICAN AMERICAN: 34
GFR AFRICAN AMERICAN: 39
GFR NON-AFRICAN AMERICAN: 28
GFR NON-AFRICAN AMERICAN: 32
GLOBULIN: 3 G/DL
GLUCOSE BLD-MCNC: 120 MG/DL (ref 70–99)
GLUCOSE BLD-MCNC: 131 MG/DL (ref 70–99)
GLUCOSE URINE: NEGATIVE MG/DL
HCT VFR BLD CALC: 32.4 % (ref 36–48)
HEMOGLOBIN: 10.8 G/DL (ref 12–16)
KETONES, URINE: ABNORMAL MG/DL
LEUKOCYTE ESTERASE, URINE: NEGATIVE
LIPASE: 59 U/L (ref 13–60)
LYMPHOCYTES ABSOLUTE: 0.3 K/UL (ref 1–5.1)
LYMPHOCYTES RELATIVE PERCENT: 6.4 %
MCH RBC QN AUTO: 29.9 PG (ref 26–34)
MCHC RBC AUTO-ENTMCNC: 33.3 G/DL (ref 31–36)
MCV RBC AUTO: 89.8 FL (ref 80–100)
MICROSCOPIC EXAMINATION: YES
MONOCYTES ABSOLUTE: 0.3 K/UL (ref 0–1.3)
MONOCYTES RELATIVE PERCENT: 5.4 %
NEUTROPHILS ABSOLUTE: 4.6 K/UL (ref 1.7–7.7)
NEUTROPHILS RELATIVE PERCENT: 87.4 %
NITRITE, URINE: NEGATIVE
OCCULT BLOOD DIAGNOSTIC: NORMAL
PDW BLD-RTO: 15.4 % (ref 12.4–15.4)
PH UA: 5
PLATELET # BLD: 100 K/UL (ref 135–450)
PMV BLD AUTO: 9.7 FL (ref 5–10.5)
POTASSIUM SERPL-SCNC: 4 MMOL/L (ref 3.5–5.1)
POTASSIUM SERPL-SCNC: 4.3 MMOL/L (ref 3.5–5.1)
PROTEIN UA: 30 MG/DL
RBC # BLD: 3.61 M/UL (ref 4–5.2)
RBC UA: ABNORMAL /HPF (ref 0–2)
SODIUM BLD-SCNC: 141 MMOL/L (ref 136–145)
SODIUM BLD-SCNC: 141 MMOL/L (ref 136–145)
SPECIFIC GRAVITY UA: 1.02
TOTAL PROTEIN: 5.8 G/DL (ref 6.4–8.2)
TROPONIN: <0.01 NG/ML
URINE TYPE: ABNORMAL
UROBILINOGEN, URINE: 0.2 E.U./DL
WBC # BLD: 5.3 K/UL (ref 4–11)
WBC UA: ABNORMAL /HPF (ref 0–5)

## 2018-09-24 PROCEDURE — 87324 CLOSTRIDIUM AG IA: CPT

## 2018-09-24 PROCEDURE — 96360 HYDRATION IV INFUSION INIT: CPT

## 2018-09-24 PROCEDURE — 6370000000 HC RX 637 (ALT 250 FOR IP): Performed by: EMERGENCY MEDICINE

## 2018-09-24 PROCEDURE — 99284 EMERGENCY DEPT VISIT MOD MDM: CPT

## 2018-09-24 PROCEDURE — 2580000003 HC RX 258: Performed by: EMERGENCY MEDICINE

## 2018-09-24 PROCEDURE — 83690 ASSAY OF LIPASE: CPT

## 2018-09-24 PROCEDURE — 96372 THER/PROPH/DIAG INJ SC/IM: CPT

## 2018-09-24 PROCEDURE — 81001 URINALYSIS AUTO W/SCOPE: CPT

## 2018-09-24 PROCEDURE — 6360000002 HC RX W HCPCS: Performed by: EMERGENCY MEDICINE

## 2018-09-24 PROCEDURE — 85025 COMPLETE CBC W/AUTO DIFF WBC: CPT

## 2018-09-24 PROCEDURE — 87493 C DIFF AMPLIFIED PROBE: CPT

## 2018-09-24 PROCEDURE — 87449 NOS EACH ORGANISM AG IA: CPT

## 2018-09-24 PROCEDURE — 93005 ELECTROCARDIOGRAM TRACING: CPT | Performed by: EMERGENCY MEDICINE

## 2018-09-24 PROCEDURE — 80053 COMPREHEN METABOLIC PANEL: CPT

## 2018-09-24 PROCEDURE — 84484 ASSAY OF TROPONIN QUANT: CPT

## 2018-09-24 PROCEDURE — G0328 FECAL BLOOD SCRN IMMUNOASSAY: HCPCS

## 2018-09-24 PROCEDURE — 93010 ELECTROCARDIOGRAM REPORT: CPT | Performed by: INTERNAL MEDICINE

## 2018-09-24 PROCEDURE — 96361 HYDRATE IV INFUSION ADD-ON: CPT

## 2018-09-24 RX ORDER — 0.9 % SODIUM CHLORIDE 0.9 %
1000 INTRAVENOUS SOLUTION INTRAVENOUS ONCE
Status: COMPLETED | OUTPATIENT
Start: 2018-09-24 | End: 2018-09-24

## 2018-09-24 RX ORDER — LOPERAMIDE HYDROCHLORIDE 2 MG/1
2 CAPSULE ORAL ONCE
Status: COMPLETED | OUTPATIENT
Start: 2018-09-24 | End: 2018-09-24

## 2018-09-24 RX ORDER — DICYCLOMINE HYDROCHLORIDE 10 MG/ML
20 INJECTION INTRAMUSCULAR ONCE
Status: COMPLETED | OUTPATIENT
Start: 2018-09-24 | End: 2018-09-24

## 2018-09-24 RX ADMIN — SODIUM CHLORIDE 1000 ML: 9 INJECTION, SOLUTION INTRAVENOUS at 07:37

## 2018-09-24 RX ADMIN — SODIUM CHLORIDE 1000 ML: 9 INJECTION, SOLUTION INTRAVENOUS at 10:14

## 2018-09-24 RX ADMIN — LOPERAMIDE HYDROCHLORIDE 2 MG: 2 CAPSULE ORAL at 11:56

## 2018-09-24 RX ADMIN — DICYCLOMINE HYDROCHLORIDE 20 MG: 20 INJECTION, SOLUTION INTRAMUSCULAR at 11:56

## 2018-09-24 NOTE — ED PROVIDER NOTES
CHIEF COMPLAINT  Dizziness (was at work and felt lightheaded and dizzy; this is the 4th time this has happened in the last 2 weeks; was seen at [facility] for 2 of the times)      [address] is a 60 y.o. female with a history of pancreatitis, angina pectoris, and recent diarrhea who presents to the ED complaining of dizziness. Patient was reportedly getting up for work this morning when she began feeling lightheaded and dizzy. She reports is the fourth time over the last couple of weeks with similar. She has been seen and admitted within the month for similar as well. Patient reports intermittent episodes of diarrhea consistent with previous colitis. No abdominal pain noted at this time. No fevers. She also reports that during her last admission that her Imdur was. No other complaints, modifying factors or associated symptoms. I have reviewed the following from the nursing documentation.     Past Medical History:   Diagnosis Date    Acute pancreatitis 10/21/2014    Acute renal failure with tubular necrosis (HCC) 7/10/2015    Acute stasis dermatitis of left lower extremity 7/8/2015    Angina pectoris ([address] 5/23/2011    Cellulitis of left leg     Clostridium difficile infection 7/9/15, 6/4/15    Hyperlipidemia     Hypertension     Left THR 1/25/2016    Palpitations 7/17/2013    Primary osteoarthritis of left hip 11/18/2015    Shock ([address] 10/10/2017    Superficial thrombophlebitis of left leg 7/8/2015    Thyroid disease     Venous ulcer of left leg [facility])      Past Surgical History:   Procedure Laterality Date    APPENDECTOMY      CARDIAC CATHETERIZATION  05/2014    COLONOSCOPY      HIP ARTHROPLASTY Left 01/19/2016    TONSILLECTOMY      VARICOSE VEIN SURGERY       Family History   Problem Relation Age of Onset    Diabetes Mother     Alzheimer's Disease Mother     Anemia Mother     Diabetes Father     Cancer Father 68        pancreatic    gabapentin (NEURONTIN) 100 MG capsule Take 2 capsules by mouth 3 times daily. [place] Jordin ferrous sulfate 325 (65 Fe) MG tablet TAKE 1 TABLET BY MOUTH DAILY. 90 tablet 1    mesalamine (LIALDA) 1.2 G EC tablet Take 300 mg by mouth 2 times daily  120 tablet 0    albuterol (VENTOLIN HFA) 108 (90 BASE) MCG/ACT inhaler Inhale 2 puffs into the lungs every 6 hours as needed for Wheezing. 1 Inhaler 1     Allergies   Allergen Reactions    Pravastatin Rash    Wellbutrin [Bupropion] Other (See Comments) and Rash     Caused sores in mouth       REVIEW OF SYSTEMS  10 systems reviewed, pertinent positives per HPI otherwise noted to be negative. PHYSICAL EXAM  BP (!) 74/50   Pulse 78   Resp 17   Ht 5' 5\" (1.651 m)   Wt 200 lb (90.7 kg)   SpO2 93%   BMI 33.28 kg/m²   GENERAL APPEARANCE: Awake and alert. Cooperative. No acute distress. HEAD: Normocephalic. Atraumatic. EYES: PERRL. EOM's grossly intact. ENT: Mucous membranes are moist.   NECK: Supple, trachea midline. HEART: RRR. Normal S1S2, no rubs, gallops, or murmurs noted  LUNGS: Respirations unlabored. CTAB. Good air exchange. No wheezes, rales, or rhonchi. Speaking comfortably in full sentences. ABDOMEN: Soft. Non-distended. Non-tender. No guarding or rebound. Normal bowel sounds. EXTREMITIES: No peripheral edema. MAEE. No acute deformities. SKIN: Warm and dry. No acute rashes. NEUROLOGICAL: Alert and oriented X 3. CN II-XII intact. No gross facial drooping. Strength 5/5, sensation intact. Normal coordination. No pronator drift. Gait normal.   PSYCHIATRIC: Normal mood and affect. LABS  I have reviewed all labs for this visit.    Results for orders placed or performed during the hospital encounter of 09/24/18   CBC Auto Differential   Result Value Ref Range    WBC 5.3 4.0 - 11.0 K/uL    RBC 3.61 (L) 4.00 - 5.20 M/uL    Hemoglobin 10.8 (L) 12.0 - 16.0 g/dL    Hematocrit 32.4 (L) 36.0 - 48.0 %    MCV 89.8 80.0 - 100.0 fL    MCH 29.9 26.0 - 34.0 pg    MCHC

## 2018-09-24 NOTE — ED NOTES
Pt resting comfortably in cart. No needs at this time. Will continue to monitor.      Nevin Gottlieb RN  09/24/18 [id number]

## 2018-09-25 ENCOUNTER — HOSPITAL ENCOUNTER (OUTPATIENT)
Age: 63
Discharge: HOME | DRG: 684 | End: 2018-09-25
Payer: COMMERCIAL

## 2018-09-25 ENCOUNTER — TELEPHONE (OUTPATIENT)
Dept: FAMILY MEDICINE CLINIC | Age: 63
End: 2018-09-25

## 2018-09-25 LAB
A/G RATIO: 0.8 (ref 1.1–2.2)
ALBUMIN SERPL-MCNC: 2.7 G/DL (ref 3.4–5)
ALP BLD-CCNC: 165 U/L (ref 40–129)
ALT SERPL-CCNC: 9 U/L (ref 10–40)
ANION GAP SERPL CALCULATED.3IONS-SCNC: 13 MMOL/L (ref 3–16)
AST SERPL-CCNC: 21 U/L (ref 15–37)
BILIRUB SERPL-MCNC: 0.5 MG/DL (ref 0–1)
BUN BLDV-MCNC: 33 MG/DL (ref 7–20)
CALCIUM SERPL-MCNC: 8.7 MG/DL (ref 8.3–10.6)
CHLORIDE BLD-SCNC: 109 MMOL/L (ref 99–110)
CLOSTRIDIUM DIFFICILE DNA AMPLIFICATION: NORMAL
CO2: 18 MMOL/L (ref 21–32)
CREAT SERPL-MCNC: 1.4 MG/DL (ref 0.6–1.2)
EKG ATRIAL RATE: 79 BPM
EKG DIAGNOSIS: NORMAL
EKG P AXIS: 41 DEGREES
EKG P-R INTERVAL: 142 MS
EKG Q-T INTERVAL: 408 MS
EKG QRS DURATION: 102 MS
EKG QTC CALCULATION (BAZETT): 467 MS
EKG R AXIS: -2 DEGREES
EKG T AXIS: 7 DEGREES
EKG VENTRICULAR RATE: 79 BPM
GFR AFRICAN AMERICAN: 46
GFR NON-AFRICAN AMERICAN: 38
GLOBULIN: 3.4 G/DL
GLUCOSE BLD-MCNC: 151 MG/DL (ref 70–99)
HCT VFR BLD CALC: 32.9 % (ref 36–48)
HEMOGLOBIN: 10.9 G/DL (ref 12–16)
MCH RBC QN AUTO: 29.8 PG (ref 26–34)
MCHC RBC AUTO-ENTMCNC: 33 G/DL (ref 31–36)
MCV RBC AUTO: 90.5 FL (ref 80–100)
PDW BLD-RTO: 15.5 % (ref 12.4–15.4)
PLATELET # BLD: 87 K/UL (ref 135–450)
PMV BLD AUTO: 9.8 FL (ref 5–10.5)
POTASSIUM REFLEX MAGNESIUM: 4.4 MMOL/L (ref 3.5–5.1)
RBC # BLD: 3.64 M/UL (ref 4–5.2)
SODIUM BLD-SCNC: 140 MMOL/L (ref 136–145)
TOTAL PROTEIN: 6.1 G/DL (ref 6.4–8.2)
WBC # BLD: 4.9 K/UL (ref 4–11)

## 2018-09-25 PROCEDURE — 85027 COMPLETE CBC AUTOMATED: CPT

## 2018-09-25 PROCEDURE — 80053 COMPREHEN METABOLIC PANEL: CPT

## 2018-09-25 PROCEDURE — 36415 COLL VENOUS BLD VENIPUNCTURE: CPT

## 2018-09-26 ENCOUNTER — APPOINTMENT (OUTPATIENT)
Dept: CT IMAGING | Age: 63
End: 2018-09-26
Payer: COMMERCIAL

## 2018-09-26 ENCOUNTER — TELEPHONE (OUTPATIENT)
Dept: FAMILY MEDICINE CLINIC | Age: 63
End: 2018-09-26

## 2018-09-26 ENCOUNTER — HOSPITAL ENCOUNTER (EMERGENCY)
Age: 63
Discharge: HOME | End: 2018-09-26
Attending: EMERGENCY MEDICINE
Payer: COMMERCIAL

## 2018-09-26 VITALS
WEIGHT: 199 LBS | TEMPERATURE: 98.6 F | RESPIRATION RATE: 16 BRPM | SYSTOLIC BLOOD PRESSURE: 138 MMHG | HEART RATE: 86 BPM | OXYGEN SATURATION: 97 % | BODY MASS INDEX: 33.15 KG/M2 | DIASTOLIC BLOOD PRESSURE: 80 MMHG | HEIGHT: 65 IN

## 2018-09-26 DIAGNOSIS — R10.84 GENERALIZED ABDOMINAL PAIN: ICD-10-CM

## 2018-09-26 DIAGNOSIS — R11.0 NAUSEA: ICD-10-CM

## 2018-09-26 DIAGNOSIS — K52.9 COLITIS: Primary | ICD-10-CM

## 2018-09-26 DIAGNOSIS — N17.9 ACUTE KIDNEY INJURY (HCC): ICD-10-CM

## 2018-09-26 LAB
A/G RATIO: 0.9 (ref 1.1–2.2)
ALBUMIN SERPL-MCNC: 3.1 G/DL (ref 3.4–5)
ALP BLD-CCNC: 191 U/L (ref 40–129)
ALT SERPL-CCNC: 10 U/L (ref 10–40)
ANION GAP SERPL CALCULATED.3IONS-SCNC: 12 MMOL/L (ref 3–16)
AST SERPL-CCNC: 22 U/L (ref 15–37)
BACTERIA: ABNORMAL /HPF
BANDED NEUTROPHILS RELATIVE PERCENT: 22 % (ref 0–7)
BASOPHILS ABSOLUTE: 0 K/UL (ref 0–0.2)
BASOPHILS RELATIVE PERCENT: 0 %
BILIRUB SERPL-MCNC: 0.9 MG/DL (ref 0–1)
BILIRUBIN URINE: NEGATIVE
BLOOD, URINE: NEGATIVE
BUN BLDV-MCNC: 21 MG/DL (ref 7–20)
CALCIUM SERPL-MCNC: 9.2 MG/DL (ref 8.3–10.6)
CASTS: ABNORMAL /LPF
CHLORIDE BLD-SCNC: 104 MMOL/L (ref 99–110)
CLARITY: CLEAR
CO2: 22 MMOL/L (ref 21–32)
COLOR: YELLOW
CREAT SERPL-MCNC: 1 MG/DL (ref 0.6–1.2)
EOSINOPHILS ABSOLUTE: 0 K/UL (ref 0–0.6)
EOSINOPHILS RELATIVE PERCENT: 0 %
EPITHELIAL CELLS, UA: ABNORMAL /HPF
GFR AFRICAN AMERICAN: >60
GFR NON-AFRICAN AMERICAN: 56
GLOBULIN: 3.6 G/DL
GLUCOSE BLD-MCNC: 85 MG/DL (ref 70–99)
GLUCOSE URINE: NEGATIVE MG/DL
HCT VFR BLD CALC: 35.7 % (ref 36–48)
HEMOGLOBIN: 12.1 G/DL (ref 12–16)
KETONES, URINE: NEGATIVE MG/DL
LEUKOCYTE ESTERASE, URINE: ABNORMAL
LIPASE: 42 U/L (ref 13–60)
LYMPHOCYTES ABSOLUTE: 2 K/UL (ref 1–5.1)
LYMPHOCYTES RELATIVE PERCENT: 28 %
MCH RBC QN AUTO: 30.2 PG (ref 26–34)
MCHC RBC AUTO-ENTMCNC: 33.9 G/DL (ref 31–36)
MCV RBC AUTO: 89.1 FL (ref 80–100)
MICROSCOPIC EXAMINATION: YES
MONOCYTES ABSOLUTE: 0.4 K/UL (ref 0–1.3)
MONOCYTES RELATIVE PERCENT: 6 %
MUCUS: ABNORMAL /LPF
NEUTROPHILS ABSOLUTE: 4.8 K/UL (ref 1.7–7.7)
NEUTROPHILS RELATIVE PERCENT: 44 %
NITRITE, URINE: NEGATIVE
PDW BLD-RTO: 15 % (ref 12.4–15.4)
PH UA: 6
PLATELET # BLD: 131 K/UL (ref 135–450)
PLATELET SLIDE REVIEW: ADEQUATE
PMV BLD AUTO: 9.4 FL (ref 5–10.5)
POTASSIUM REFLEX MAGNESIUM: 3.9 MMOL/L (ref 3.5–5.1)
PROTEIN UA: ABNORMAL MG/DL
RBC # BLD: 4.01 M/UL (ref 4–5.2)
RBC UA: ABNORMAL /HPF (ref 0–2)
SLIDE REVIEW: ABNORMAL
SODIUM BLD-SCNC: 138 MMOL/L (ref 136–145)
SPECIFIC GRAVITY UA: 1.02
TOTAL PROTEIN: 6.7 G/DL (ref 6.4–8.2)
URINE REFLEX TO CULTURE: YES
URINE TYPE: ABNORMAL
UROBILINOGEN, URINE: 0.2 E.U./DL
WBC # BLD: 7.2 K/UL (ref 4–11)
WBC UA: ABNORMAL /HPF (ref 0–5)

## 2018-09-26 PROCEDURE — 87086 URINE CULTURE/COLONY COUNT: CPT

## 2018-09-26 PROCEDURE — 80053 COMPREHEN METABOLIC PANEL: CPT

## 2018-09-26 PROCEDURE — 74177 CT ABD & PELVIS W/CONTRAST: CPT

## 2018-09-26 PROCEDURE — 85025 COMPLETE CBC W/AUTO DIFF WBC: CPT

## 2018-09-26 PROCEDURE — 6370000000 HC RX 637 (ALT 250 FOR IP): Performed by: PHYSICIAN ASSISTANT

## 2018-09-26 PROCEDURE — 99284 EMERGENCY DEPT VISIT MOD MDM: CPT

## 2018-09-26 PROCEDURE — 83690 ASSAY OF LIPASE: CPT

## 2018-09-26 PROCEDURE — 81001 URINALYSIS AUTO W/SCOPE: CPT

## 2018-09-26 PROCEDURE — 93010 ELECTROCARDIOGRAM REPORT: CPT | Performed by: INTERNAL MEDICINE

## 2018-09-26 PROCEDURE — 93005 ELECTROCARDIOGRAM TRACING: CPT | Performed by: PHYSICIAN ASSISTANT

## 2018-09-26 PROCEDURE — 2500000003 HC RX 250 WO HCPCS: Performed by: PHYSICIAN ASSISTANT

## 2018-09-26 PROCEDURE — 2580000003 HC RX 258: Performed by: PHYSICIAN ASSISTANT

## 2018-09-26 PROCEDURE — S0028 INJECTION, FAMOTIDINE, 20 MG: HCPCS | Performed by: PHYSICIAN ASSISTANT

## 2018-09-26 PROCEDURE — 96375 TX/PRO/DX INJ NEW DRUG ADDON: CPT

## 2018-09-26 PROCEDURE — 6360000004 HC RX CONTRAST MEDICATION: Performed by: PHYSICIAN ASSISTANT

## 2018-09-26 PROCEDURE — 96367 TX/PROPH/DG ADDL SEQ IV INF: CPT

## 2018-09-26 PROCEDURE — 96361 HYDRATE IV INFUSION ADD-ON: CPT

## 2018-09-26 PROCEDURE — 6360000002 HC RX W HCPCS: Performed by: PHYSICIAN ASSISTANT

## 2018-09-26 PROCEDURE — 96365 THER/PROPH/DIAG IV INF INIT: CPT

## 2018-09-26 RX ORDER — PANTOPRAZOLE SODIUM 20 MG/1
40 TABLET, DELAYED RELEASE ORAL DAILY
Qty: 30 TABLET | Refills: 0 | Status: SHIPPED | OUTPATIENT
Start: 2018-09-26 | End: 2019-05-21 | Stop reason: ALTCHOICE

## 2018-09-26 RX ORDER — METRONIDAZOLE 500 MG/1
500 TABLET ORAL 2 TIMES DAILY
Qty: 20 TABLET | Refills: 0 | Status: SHIPPED | OUTPATIENT
Start: 2018-09-26 | End: 2018-10-06

## 2018-09-26 RX ORDER — CIPROFLOXACIN 2 MG/ML
400 INJECTION, SOLUTION INTRAVENOUS ONCE
Status: COMPLETED | OUTPATIENT
Start: 2018-09-26 | End: 2018-09-26

## 2018-09-26 RX ORDER — BACITRACIN ZINC AND POLYMYXIN B SULFATE 500; 10000 [USP'U]/G; [USP'U]/G
1 OINTMENT TOPICAL DAILY
Qty: 30 CAPSULE | Refills: 0 | Status: SHIPPED | OUTPATIENT
Start: 2018-09-26 | End: 2018-10-26

## 2018-09-26 RX ORDER — 0.9 % SODIUM CHLORIDE 0.9 %
2000 INTRAVENOUS SOLUTION INTRAVENOUS ONCE
Status: COMPLETED | OUTPATIENT
Start: 2018-09-26 | End: 2018-09-26

## 2018-09-26 RX ORDER — LACTOBACILLUS RHAMNOSUS GG 10B CELL
1 CAPSULE ORAL
Status: DISCONTINUED | OUTPATIENT
Start: 2018-09-27 | End: 2018-09-26

## 2018-09-26 RX ORDER — ONDANSETRON 2 MG/ML
4 INJECTION INTRAMUSCULAR; INTRAVENOUS ONCE
Status: COMPLETED | OUTPATIENT
Start: 2018-09-26 | End: 2018-09-26

## 2018-09-26 RX ORDER — CIPROFLOXACIN 500 MG/1
500 TABLET, FILM COATED ORAL 2 TIMES DAILY
Qty: 20 TABLET | Refills: 0 | Status: SHIPPED | OUTPATIENT
Start: 2018-09-26 | End: 2018-10-06

## 2018-09-26 RX ORDER — LACTOBACILLUS RHAMNOSUS GG 10B CELL
1 CAPSULE ORAL
Status: DISCONTINUED | OUTPATIENT
Start: 2018-09-26 | End: 2018-09-26 | Stop reason: HOSPADM

## 2018-09-26 RX ADMIN — CIPROFLOXACIN 400 MG: 2 INJECTION, SOLUTION INTRAVENOUS at 15:12

## 2018-09-26 RX ADMIN — Medication 1 CAPSULE: at 14:36

## 2018-09-26 RX ADMIN — IOPAMIDOL 75 ML: 755 INJECTION, SOLUTION INTRAVENOUS at 12:40

## 2018-09-26 RX ADMIN — FAMOTIDINE 20 MG: 10 INJECTION, SOLUTION INTRAVENOUS at 14:08

## 2018-09-26 RX ADMIN — METRONIDAZOLE 500 MG: 500 INJECTION, SOLUTION INTRAVENOUS at 14:09

## 2018-09-26 RX ADMIN — SODIUM CHLORIDE 2000 ML: 9 INJECTION, SOLUTION INTRAVENOUS at 11:57

## 2018-09-26 RX ADMIN — ONDANSETRON 4 MG: 2 INJECTION INTRAMUSCULAR; INTRAVENOUS at 14:09

## 2018-09-26 ASSESSMENT — ENCOUNTER SYMPTOMS
ABDOMINAL PAIN: 1
DIARRHEA: 1
EYES NEGATIVE: 1
BACK PAIN: 1
SHORTNESS OF BREATH: 0
HEARTBURN: 0
NAUSEA: 1
VOMITING: 1
BLOOD IN STOOL: 1
CONSTIPATION: 0

## 2018-09-26 ASSESSMENT — PAIN DESCRIPTION - PAIN TYPE: TYPE: ACUTE PAIN

## 2018-09-26 ASSESSMENT — PAIN DESCRIPTION - DESCRIPTORS: DESCRIPTORS: DISCOMFORT

## 2018-09-26 ASSESSMENT — PAIN SCALES - GENERAL: PAINLEVEL_OUTOF10: 2

## 2018-09-26 ASSESSMENT — PAIN DESCRIPTION - LOCATION: LOCATION: ABDOMEN

## 2018-09-26 ASSESSMENT — PAIN DESCRIPTION - ORIENTATION: ORIENTATION: MID;UPPER

## 2018-09-26 NOTE — ED PROVIDER NOTES
I independently performed a history and physical on [address] [place] [address]. This is a very pleasant 60 y.o. female  who presents with abdominal pain. Patient is in the process of getting back surgery but she was treated for UTI. She now presents with abdominal cramping. Focused exam:  The physical exam reveals an alert and oriented patient who does not appear to be confused, non-ill-appearing, no abnormal heart or lung sounds, mild diffuse lower abdominal tenderness without rebound and without regarding    Brief ED course/MDM:    This is a very pleasant patient with abdominal pain without significant evidence of toxicity, shock, sepsis, hemodynamic or cardiopulmonary instability, acute appendicitis, acute cholecystitis, AAA, bowel obstruction, bowel perforation, herpes zoster, a cardiac or pulmonary etiology as a cause for the abdominal symptoms, or any disease process requiring other immediate surgical or medical intervention at this time. After treatment in the ER, patient had improvement of their symptoms. On repeat physical exam, patient has a benign abdominal exam.  Pain management and follow-up plan were discussed with the patient. It is understood that if the patient is not improving as expected or if other new symptoms or signs of concern develop, other etiologies or diagnoses may need to be considered requiring other tests, treatments, consultations, and/or admission. The diagnosis, plan, expected course, follow-up, and return precautions were discussed and all questions were answered. Final Impression    1. Colitis    2. Generalized abdominal pain    3. Nausea    4. Acute kidney injury ([address]        Blood pressure 138/80, pulse 86, temperature 98.6 °F (37 °C), resp. rate 16, height 5' 5\" (1.651 m), weight 199 lb (90.3 kg), SpO2 97 %, not currently breastfeeding. All diagnostic, treatment, and disposition decisions were made by myself in conjunction with the TEJ/resident.   For further details

## 2018-09-26 NOTE — TELEPHONE ENCOUNTER
Patient called in, had a bad night, she has started vomiting, has a high fever and started bleeding from her bowels this morning. Sent pt to ED for STAT evaluation. Patient stated she fainted 2 days ago as well. Her surgery for today was cancelled because surgeon thought she had a UTI and wanted her evaluated and re-cleared once it was resolved.     Cancelled appt with our office today for the UTI

## 2018-09-26 NOTE — ED PROVIDER NOTES
CHIEF COMPLAINT   Abdominal Pain (abd pain started 2 weeks ago, multiple issues with bowel (r/o rectal bleeding and UTI)  Pt was denied back sugery today because of +UTI and last night pt started with the abd cramping again.  )      PATIENT INFORMATION  Chani Ford is a 60 y.o. female with a history of pancreatitis, angina pectoris, and recent diarrhea who presents to the ED for evaluation of 2-3 weeks of near syncopal episodes, which occur with standing, accompanied by vision changes, she has had work ups for these advised they are due to dehydration. Also c/o abdominal pain which is 2/10 cramping, diffuse with decreased appetite, last episode on Monday. Tues dx with uti. Work ups said it was dehydration. +emesis, mild fever. Loose stool, bright red blood in her stool last night, this am.   Right now complains her stomach is \"sore\" all over, was able to eat two pieces of toast this am.   H/o colitis years ago. She reports is the fifth time over the last couple of weeks with similar episode. She has been seen and admitted within the month for similar episodes as well. Patient reports intermittent episodes of diarrhea consistent with previous colitis. .  No fevers. She also reports that during her last admission that her Imdur was. Believes imdur maybe contributing to symptoms   No other complaints, modifying factors or associated symptoms.        I have reviewed the following from the nursing documentation, and I have confirmed the past medical history, medications, allergies, social history and family history with the patient.     Past Medical History:   Diagnosis Date    Acute pancreatitis 10/21/2014    Acute renal failure with tubular necrosis (HCC) 7/10/2015    Acute stasis dermatitis of left lower extremity 7/8/2015    [facility]ina p[facility] [address] 5/23/2011    Cellulitis of left leg     Clostridium difficile infection 7/9/15, 6/4/15    Hyperlipidemia     Hypertension     Left THR have seen and evaluated this patient with supervising physician. On reevaluation the patient is feeling improved will be discharged in stable condition with close follow-up with her primary care provider. Old records reviewed. Labs and imaging reviewed and results discussed. Patient was given the following medications in the ED:  Medications   0.9 % sodium chloride bolus (0 mLs Intravenous Stopped 9/26/18 1257)   iopamidol (ISOVUE-370) 76 % injection 75 mL (75 mLs Intravenous Given 9/26/18 1240)   famotidine (PEPCID) injection 20 mg (20 mg Intravenous Given 9/26/18 1408)   ondansetron (ZOFRAN) injection 4 mg (4 mg Intravenous Given 9/26/18 1409)   ciprofloxacin (CIPRO) IVPB 400 mg (0 mg Intravenous Stopped 9/26/18 1625)   metronidazole (FLAGYL) 500 mg in NaCl 100 mL IVPB premix (0 mg Intravenous Stopped 9/26/18 1509)     At this time, patient is ready for d/c      Patient was given scripts for the following medications. I counseled patient how to take these medications. Discharge Medication List as of 9/26/2018  2:51 PM      START taking these medications    Details   ciprofloxacin (CIPRO) 500 MG tablet Take 1 tablet by mouth 2 times daily for 10 days, Disp-20 tablet, R-0Print      metroNIDAZOLE (FLAGYL) 500 MG tablet Take 1 tablet by mouth 2 times daily for 10 days, Disp-20 tablet, R-0Print      pantoprazole (PROTONIX) 20 MG tablet Take 2 tablets by mouth daily, Disp-30 tablet, R-0Print      Bacillus Coagulans-Inulin (PROBIOTIC FORMULA) 1-250 BILLION-MG CAPS Take 1 Dose by mouth daily, Disp-30 capsule, R-0Print           Results for orders placed or performed during the hospital encounter of 09/26/18   Urine Culture   Result Value Ref Range    Urine Culture, Routine       <50,000 CFU/ml mixed skin/urogenital maggy.  No further workup   CBC auto differential   Result Value Ref Range    WBC 7.2 4.0 - 11.0 K/uL    RBC 4.01 4.00 - 5.20 M/uL    Hemoglobin 12.1 12.0 - 16.0 g/dL    Hematocrit 35.7 (L) 36.0 - 48.0 %    MCV 89.1 80.0 - 100.0 fL    MCH 30.2 26.0 - 34.0 pg    MCHC 33.9 31.0 - 36.0 g/dL    RDW 15.0 12.4 - 15.4 %    Platelets 764 (L) 502 - 450 K/uL    MPV 9.4 5.0 - 10.5 fL    PLATELET SLIDE REVIEW Adequate     SLIDE REVIEW see below     Neutrophils % 44.0 %    Lymphocytes % 28.0 %    Monocytes % 6.0 %    Eosinophils % 0.0 %    Basophils % 0.0 %    Neutrophils # 4.8 1.7 - 7.7 K/uL    Lymphocytes # 2.0 1.0 - 5.1 K/uL    Monocytes # 0.4 0.0 - 1.3 K/uL    Eosinophils # 0.0 0.0 - 0.6 K/uL    Basophils # 0.0 0.0 - 0.2 K/uL    Bands Relative 22 (H) 0 - 7 %   Comprehensive Metabolic Panel w/ Reflex to MG   Result Value Ref Range    Sodium 138 136 - 145 mmol/L    Potassium reflex Magnesium 3.9 3.5 - 5.1 mmol/L    Chloride 104 99 - 110 mmol/L    CO2 22 21 - 32 mmol/L    Anion Gap 12 3 - 16    Glucose 85 70 - 99 mg/dL    BUN 21 (H) 7 - 20 mg/dL    CREATININE 1.0 0.6 - 1.2 mg/dL    GFR Non- 56 (A) >60    GFR African American >60 >60    Calcium 9.2 8.3 - 10.6 mg/dL    Total Protein 6.7 6.4 - 8.2 g/dL    Alb 3.1 (L) 3.4 - 5.0 g/dL    Albumin/Globulin Ratio 0.9 (L) 1.1 - 2.2    Total Bilirubin 0.9 0.0 - 1.0 mg/dL    Alkaline Phosphatase 191 (H) 40 - 129 U/L    ALT 10 10 - 40 U/L    AST 22 15 - 37 U/L    Globulin 3.6 g/dL   Lipase   Result Value Ref Range    Lipase 42.0 13.0 - 60.0 U/L   Urine Reflex to Culture   Result Value Ref Range    Color, UA Yellow Straw/Yellow    Clarity, UA Clear Clear    Glucose, Ur Negative Negative mg/dL    Bilirubin Urine Negative Negative    Ketones, Urine Negative Negative mg/dL    Specific Gravity, UA 1.025 1.005 - 1.030    Blood, Urine Negative Negative    pH, UA 6.0 5.0 - 8.0    Protein, UA TRACE (A) Negative mg/dL    Urobilinogen, Urine 0.2 <2.0 E.U./dL    Nitrite, Urine Negative Negative    Leukocyte Esterase, Urine TRACE (A) Negative    Microscopic Examination YES     Urine Reflex to Culture Yes     Urine Type Not Specified    Microscopic Urinalysis   Result Value Ref Range Casts 0-1 Coarse Gran (A) /LPF    Mucus, UA 1+ (A) /LPF    WBC, UA 3-5 0 - 5 /HPF    RBC, UA None seen 0 - 2 /HPF    Epi Cells 3-5 /HPF    Bacteria, UA 1+ (A) /HPF   EKG 12 lead   Result Value Ref Range    Ventricular Rate 87 BPM    Atrial Rate 87 BPM    P-R Interval 140 ms    QRS Duration 96 ms    Q-T Interval 360 ms    QTc Calculation (Bazett) 433 ms    P Axis 53 degrees    R Axis -34 degrees    T Axis 11 degrees    Diagnosis       Normal sinus rhythm with sinus arrhythmiaLeft axis deviationPoor R wave progressionNonspecific ST abnormalityAbnormal ECGNo previous ECGs availableConfirmed by ALFONSO SWANSON MD ([id number]) on 9/26/2018 3:22:51 PM         I estimate there is LOW risk for ACUTE APPENDICITIS, BOWEL OBSTRUCTION, CHOLECYSTITIS, DIVERTICULITIS, INCARCERATED HERNIA, PANCREATITIS, or PERFORATED BOWEL or ULCER, thus I consider the discharge disposition reasonable. Also, there is no evidence or peritonitis, sepsis, or toxicity. Quinton Wise and I have discussed the diagnosis and risks, and we agree with discharging home to follow-up with their primary doctor. We also discussed returning to the Emergency Department immediately if new or worsening symptoms occur. We have discussed the symptoms which are most concerning (e.g., bloody stool, fever, changing or worsening pain, vomiting) that necessitate immediate return. FINAL Impression    1. Colitis    2. Generalized abdominal pain    3. Nausea    4. Acute kidney injury ([address]        Blood pressure 138/80, pulse 86, temperature 98.6 °F (37 °C), resp. rate 16, height 5' 5\" (1.651 m), weight 199 lb (90.3 kg), SpO2 97 %, not currently breastfeeding. DISPOSITION  Quinton Nurse is in stable condition upon Discharge to home.           Kaylene Paulson PA-C  09/28/18 [id number]

## 2018-09-27 LAB — URINE CULTURE, ROUTINE: NORMAL

## 2018-09-28 ENCOUNTER — HOSPITAL ENCOUNTER (INPATIENT)
Age: 63
LOS: 3 days | Discharge: HOME | DRG: 684 | End: 2018-10-01
Attending: EMERGENCY MEDICINE | Admitting: INTERNAL MEDICINE
Payer: COMMERCIAL

## 2018-09-28 ENCOUNTER — APPOINTMENT (OUTPATIENT)
Dept: GENERAL RADIOLOGY | Age: 63
DRG: 684 | End: 2018-09-28
Payer: COMMERCIAL

## 2018-09-28 ENCOUNTER — APPOINTMENT (OUTPATIENT)
Dept: CT IMAGING | Age: 63
DRG: 684 | End: 2018-09-28
Payer: COMMERCIAL

## 2018-09-28 ENCOUNTER — TELEPHONE (OUTPATIENT)
Dept: ORTHOPEDIC SURGERY | Age: 63
End: 2018-09-28

## 2018-09-28 DIAGNOSIS — K52.9 COLITIS: ICD-10-CM

## 2018-09-28 DIAGNOSIS — I95.9 HYPOTENSION, UNSPECIFIED HYPOTENSION TYPE: Primary | ICD-10-CM

## 2018-09-28 LAB
A/G RATIO: 0.9 (ref 1.1–2.2)
ALBUMIN SERPL-MCNC: 2.8 G/DL (ref 3.4–5)
ALP BLD-CCNC: 128 U/L (ref 40–129)
ALT SERPL-CCNC: 10 U/L (ref 10–40)
AMMONIA: 33 UMOL/L (ref 11–51)
AMORPHOUS: ABNORMAL /HPF
ANION GAP SERPL CALCULATED.3IONS-SCNC: 13 MMOL/L (ref 3–16)
AST SERPL-CCNC: 24 U/L (ref 15–37)
BACTERIA: ABNORMAL /HPF
BASOPHILS ABSOLUTE: 0.1 K/UL (ref 0–0.2)
BASOPHILS RELATIVE PERCENT: 0.7 %
BILIRUB SERPL-MCNC: 0.4 MG/DL (ref 0–1)
BILIRUBIN URINE: NEGATIVE
BLOOD, URINE: NEGATIVE
BUN BLDV-MCNC: 17 MG/DL (ref 7–20)
CALCIUM SERPL-MCNC: 8.8 MG/DL (ref 8.3–10.6)
CHLORIDE BLD-SCNC: 105 MMOL/L (ref 99–110)
CLARITY: ABNORMAL
CO2: 24 MMOL/L (ref 21–32)
COLOR: YELLOW
CREAT SERPL-MCNC: 1.6 MG/DL (ref 0.6–1.2)
EKG ATRIAL RATE: 87 BPM
EKG DIAGNOSIS: NORMAL
EKG P AXIS: 53 DEGREES
EKG P-R INTERVAL: 140 MS
EKG Q-T INTERVAL: 360 MS
EKG QRS DURATION: 96 MS
EKG QTC CALCULATION (BAZETT): 433 MS
EKG R AXIS: -34 DEGREES
EKG T AXIS: 11 DEGREES
EKG VENTRICULAR RATE: 87 BPM
EOSINOPHILS ABSOLUTE: 0.1 K/UL (ref 0–0.6)
EOSINOPHILS RELATIVE PERCENT: 0.5 %
EPITHELIAL CELLS, UA: ABNORMAL /HPF
GFR AFRICAN AMERICAN: 39
GFR NON-AFRICAN AMERICAN: 32
GLOBULIN: 3.1 G/DL
GLUCOSE BLD-MCNC: 140 MG/DL (ref 70–99)
GLUCOSE URINE: NEGATIVE MG/DL
HCT VFR BLD CALC: 30.1 % (ref 36–48)
HEMOGLOBIN: 10.2 G/DL (ref 12–16)
KETONES, URINE: NEGATIVE MG/DL
LACTIC ACID: 1.7 MMOL/L (ref 0.4–2)
LEUKOCYTE ESTERASE, URINE: ABNORMAL
LIPASE: 74 U/L (ref 13–60)
LYMPHOCYTES ABSOLUTE: 0.6 K/UL (ref 1–5.1)
LYMPHOCYTES RELATIVE PERCENT: 6 %
MCH RBC QN AUTO: 30.6 PG (ref 26–34)
MCHC RBC AUTO-ENTMCNC: 34.1 G/DL (ref 31–36)
MCV RBC AUTO: 89.8 FL (ref 80–100)
MICROSCOPIC EXAMINATION: YES
MONOCYTES ABSOLUTE: 0.9 K/UL (ref 0–1.3)
MONOCYTES RELATIVE PERCENT: 8.6 %
MUCUS: ABNORMAL /LPF
NEUTROPHILS ABSOLUTE: 8.4 K/UL (ref 1.7–7.7)
NEUTROPHILS RELATIVE PERCENT: 84.2 %
NITRITE, URINE: NEGATIVE
PDW BLD-RTO: 15 % (ref 12.4–15.4)
PH UA: 5.5
PLATELET # BLD: 135 K/UL (ref 135–450)
PMV BLD AUTO: 8.6 FL (ref 5–10.5)
POTASSIUM SERPL-SCNC: 3.9 MMOL/L (ref 3.5–5.1)
PROTEIN UA: ABNORMAL MG/DL
RBC # BLD: 3.35 M/UL (ref 4–5.2)
RBC UA: ABNORMAL /HPF (ref 0–2)
SODIUM BLD-SCNC: 142 MMOL/L (ref 136–145)
SPECIFIC GRAVITY UA: 1.02
TOTAL PROTEIN: 5.9 G/DL (ref 6.4–8.2)
TROPONIN: <0.01 NG/ML
URINE TYPE: ABNORMAL
UROBILINOGEN, URINE: 0.2 E.U./DL
WBC # BLD: 10 K/UL (ref 4–11)
WBC UA: ABNORMAL /HPF (ref 0–5)

## 2018-09-28 PROCEDURE — 82140 ASSAY OF AMMONIA: CPT

## 2018-09-28 PROCEDURE — 74176 CT ABD & PELVIS W/O CONTRAST: CPT

## 2018-09-28 PROCEDURE — 6370000000 HC RX 637 (ALT 250 FOR IP): Performed by: INTERNAL MEDICINE

## 2018-09-28 PROCEDURE — 81001 URINALYSIS AUTO W/SCOPE: CPT

## 2018-09-28 PROCEDURE — 99285 EMERGENCY DEPT VISIT HI MDM: CPT

## 2018-09-28 PROCEDURE — 83690 ASSAY OF LIPASE: CPT

## 2018-09-28 PROCEDURE — 71045 X-RAY EXAM CHEST 1 VIEW: CPT

## 2018-09-28 PROCEDURE — 96360 HYDRATION IV INFUSION INIT: CPT

## 2018-09-28 PROCEDURE — 2580000003 HC RX 258: Performed by: EMERGENCY MEDICINE

## 2018-09-28 PROCEDURE — 96361 HYDRATE IV INFUSION ADD-ON: CPT

## 2018-09-28 PROCEDURE — 85025 COMPLETE CBC W/AUTO DIFF WBC: CPT

## 2018-09-28 PROCEDURE — 1200000000 HC SEMI PRIVATE

## 2018-09-28 PROCEDURE — 36415 COLL VENOUS BLD VENIPUNCTURE: CPT

## 2018-09-28 PROCEDURE — 83605 ASSAY OF LACTIC ACID: CPT

## 2018-09-28 PROCEDURE — 84484 ASSAY OF TROPONIN QUANT: CPT

## 2018-09-28 PROCEDURE — 80053 COMPREHEN METABOLIC PANEL: CPT

## 2018-09-28 PROCEDURE — 93005 ELECTROCARDIOGRAM TRACING: CPT | Performed by: EMERGENCY MEDICINE

## 2018-09-28 RX ORDER — ALBUTEROL SULFATE 90 UG/1
2 AEROSOL, METERED RESPIRATORY (INHALATION) EVERY 6 HOURS PRN
Status: DISCONTINUED | OUTPATIENT
Start: 2018-09-28 | End: 2018-09-29

## 2018-09-28 RX ORDER — SODIUM CHLORIDE 0.9 % (FLUSH) 0.9 %
10 SYRINGE (ML) INJECTION EVERY 12 HOURS SCHEDULED
Status: DISCONTINUED | OUTPATIENT
Start: 2018-09-28 | End: 2018-10-02 | Stop reason: HOSPADM

## 2018-09-28 RX ORDER — 0.9 % SODIUM CHLORIDE 0.9 %
1000 INTRAVENOUS SOLUTION INTRAVENOUS ONCE
Status: COMPLETED | OUTPATIENT
Start: 2018-09-28 | End: 2018-09-28

## 2018-09-28 RX ORDER — POTASSIUM CHLORIDE 20MEQ/15ML
40 LIQUID (ML) ORAL PRN
Status: DISCONTINUED | OUTPATIENT
Start: 2018-09-28 | End: 2018-10-02 | Stop reason: HOSPADM

## 2018-09-28 RX ORDER — ONDANSETRON 2 MG/ML
4 INJECTION INTRAMUSCULAR; INTRAVENOUS EVERY 6 HOURS PRN
Status: DISCONTINUED | OUTPATIENT
Start: 2018-09-28 | End: 2018-10-02 | Stop reason: HOSPADM

## 2018-09-28 RX ORDER — CIPROFLOXACIN 500 MG/1
500 TABLET, FILM COATED ORAL 2 TIMES DAILY
Status: DISCONTINUED | OUTPATIENT
Start: 2018-09-28 | End: 2018-10-02 | Stop reason: HOSPADM

## 2018-09-28 RX ORDER — SIMVASTATIN 10 MG
20 TABLET ORAL NIGHTLY
Status: DISCONTINUED | OUTPATIENT
Start: 2018-09-28 | End: 2018-10-02 | Stop reason: HOSPADM

## 2018-09-28 RX ORDER — SODIUM CHLORIDE 0.9 % (FLUSH) 0.9 %
10 SYRINGE (ML) INJECTION PRN
Status: DISCONTINUED | OUTPATIENT
Start: 2018-09-28 | End: 2018-10-02 | Stop reason: HOSPADM

## 2018-09-28 RX ORDER — MAGNESIUM SULFATE 1 G/100ML
1 INJECTION INTRAVENOUS PRN
Status: DISCONTINUED | OUTPATIENT
Start: 2018-09-28 | End: 2018-10-02 | Stop reason: HOSPADM

## 2018-09-28 RX ORDER — MESALAMINE 400 MG/1
400 CAPSULE, DELAYED RELEASE ORAL 3 TIMES DAILY
Status: DISCONTINUED | OUTPATIENT
Start: 2018-09-29 | End: 2018-10-02 | Stop reason: HOSPADM

## 2018-09-28 RX ORDER — PANTOPRAZOLE SODIUM 40 MG/1
40 TABLET, DELAYED RELEASE ORAL DAILY
Status: DISCONTINUED | OUTPATIENT
Start: 2018-09-29 | End: 2018-10-02 | Stop reason: HOSPADM

## 2018-09-28 RX ORDER — FOLIC ACID 1 MG/1
1 TABLET ORAL DAILY
Status: DISCONTINUED | OUTPATIENT
Start: 2018-09-29 | End: 2018-10-02 | Stop reason: HOSPADM

## 2018-09-28 RX ORDER — POTASSIUM CHLORIDE 7.45 MG/ML
10 INJECTION INTRAVENOUS PRN
Status: DISCONTINUED | OUTPATIENT
Start: 2018-09-28 | End: 2018-10-02 | Stop reason: HOSPADM

## 2018-09-28 RX ORDER — GABAPENTIN 100 MG/1
200 CAPSULE ORAL 3 TIMES DAILY
Status: DISCONTINUED | OUTPATIENT
Start: 2018-09-28 | End: 2018-10-02 | Stop reason: HOSPADM

## 2018-09-28 RX ORDER — METRONIDAZOLE 250 MG/1
500 TABLET ORAL 2 TIMES DAILY
Status: DISCONTINUED | OUTPATIENT
Start: 2018-09-28 | End: 2018-10-02 | Stop reason: HOSPADM

## 2018-09-28 RX ORDER — LEVOTHYROXINE SODIUM 0.2 MG/1
200 TABLET ORAL DAILY
Status: DISCONTINUED | OUTPATIENT
Start: 2018-09-29 | End: 2018-10-02 | Stop reason: HOSPADM

## 2018-09-28 RX ORDER — LACTOBACILLUS RHAMNOSUS GG 10B CELL
1 CAPSULE ORAL DAILY
Status: DISCONTINUED | OUTPATIENT
Start: 2018-09-29 | End: 2018-09-29

## 2018-09-28 RX ORDER — FERROUS SULFATE 325(65) MG
325 TABLET ORAL DAILY
Status: DISCONTINUED | OUTPATIENT
Start: 2018-09-29 | End: 2018-09-29

## 2018-09-28 RX ORDER — POTASSIUM CHLORIDE 20 MEQ/1
40 TABLET, EXTENDED RELEASE ORAL PRN
Status: DISCONTINUED | OUTPATIENT
Start: 2018-09-28 | End: 2018-10-02 | Stop reason: HOSPADM

## 2018-09-28 RX ORDER — SODIUM CHLORIDE 9 MG/ML
INJECTION, SOLUTION INTRAVENOUS CONTINUOUS
Status: DISCONTINUED | OUTPATIENT
Start: 2018-09-28 | End: 2018-10-02 | Stop reason: HOSPADM

## 2018-09-28 RX ADMIN — SODIUM CHLORIDE 1000 ML: 9 INJECTION, SOLUTION INTRAVENOUS at 16:15

## 2018-09-28 RX ADMIN — SODIUM CHLORIDE 1000 ML: 9 INJECTION, SOLUTION INTRAVENOUS at 17:25

## 2018-09-28 ASSESSMENT — ENCOUNTER SYMPTOMS
SORE THROAT: 0
SHORTNESS OF BREATH: 0
BACK PAIN: 0
VOMITING: 0

## 2018-09-28 NOTE — ED PROVIDER NOTES
9/3/2018  EXAMINATION: SINGLE XRAY VIEW OF THE CHEST 9/3/2018 5:39 am COMPARISON: 10/09/2017 HISTORY: ORDERING SYSTEM PROVIDED HISTORY: Dizziness and hypotension TECHNOLOGIST PROVIDED HISTORY: Reason for exam:->Dizziness and hypotension Ordering Physician Provided Reason for Exam: Extremity Weakness Acuity: Acute Type of Exam: Initial FINDINGS: Mediastinal silhouette is prominent. Artifact projecting over left chest wall external to the patient. Lungs appear clear no acute bony abnormality detected. No acute cardiopulmonary abnormality identified. EKG Interpretation. EKG interpreted by Rhonda Lazcano MD:    Rhythm: normal sinus  and sinus arrhythmia  Rate: 75 bpm  Axis: normal  Ectopy: none  Conduction: normal  ST Segments: no acute change  T Waves: no acute change  Q Waves: none      Emergency Department Course:  Patient states that when she has been to the emergency Department or when she was admitted a changes were made her blood pressure medications. She has not had fevers or chills earaches or sore throat no cough no cold. She states that when she \"doesn't feel good\" she takes her blood pressure and it has been low. Discussed with the patient admission to the hospital for further evaluation and treatment and possible ages and medications if it was indicated. Also hydration. Patient is agreeable to this.  5:29 PM  Spoke with Dr. Yuliya Garcia and discussed symptoms, exam, objective data and clinical course. Disposition and plan agreed upon. She will admit the patient and give/enter admitting orders. She remembers this patient well from her previous admission. I spoke with the patient and she again remains agreeable to admission to the hospital.  She awaits transportation. Disposition and examination and disposition were discussed with Dr. Servando Vasquez who will be the oncoming ER physician will be present while the patient awaits transfer.        This document serves as a record of the services

## 2018-09-29 LAB
ANION GAP SERPL CALCULATED.3IONS-SCNC: 12 MMOL/L (ref 3–16)
BANDED NEUTROPHILS RELATIVE PERCENT: 11 % (ref 0–7)
BASOPHILS ABSOLUTE: 0 K/UL (ref 0–0.2)
BASOPHILS RELATIVE PERCENT: 0 %
BUN BLDV-MCNC: 16 MG/DL (ref 7–20)
CALCIUM SERPL-MCNC: 8.2 MG/DL (ref 8.3–10.6)
CHLORIDE BLD-SCNC: 105 MMOL/L (ref 99–110)
CO2: 23 MMOL/L (ref 21–32)
CREAT SERPL-MCNC: 1.3 MG/DL (ref 0.6–1.2)
EOSINOPHILS ABSOLUTE: 0.1 K/UL (ref 0–0.6)
EOSINOPHILS RELATIVE PERCENT: 2 %
FERRITIN: 310.2 NG/ML (ref 15–150)
GFR AFRICAN AMERICAN: 50
GFR NON-AFRICAN AMERICAN: 41
GLUCOSE BLD-MCNC: 94 MG/DL (ref 70–99)
HCT VFR BLD CALC: 28 % (ref 36–48)
HEMOGLOBIN: 9.6 G/DL (ref 12–16)
IRON SATURATION: 17 % (ref 15–50)
IRON: 37 UG/DL (ref 37–145)
LYMPHOCYTES ABSOLUTE: 1.2 K/UL (ref 1–5.1)
LYMPHOCYTES RELATIVE PERCENT: 23 %
MCH RBC QN AUTO: 30.3 PG (ref 26–34)
MCHC RBC AUTO-ENTMCNC: 34.3 G/DL (ref 31–36)
MCV RBC AUTO: 88.3 FL (ref 80–100)
METAMYELOCYTES RELATIVE PERCENT: 3 %
MONOCYTES ABSOLUTE: 0.1 K/UL (ref 0–1.3)
MONOCYTES RELATIVE PERCENT: 2 %
MYELOCYTE PERCENT: 2 %
NEUTROPHILS ABSOLUTE: 3.7 K/UL (ref 1.7–7.7)
NEUTROPHILS RELATIVE PERCENT: 57 %
PDW BLD-RTO: 14.7 % (ref 12.4–15.4)
PLATELET # BLD: 111 K/UL (ref 135–450)
PLATELET SLIDE REVIEW: ABNORMAL
PMV BLD AUTO: 8.8 FL (ref 5–10.5)
POTASSIUM REFLEX MAGNESIUM: 3.9 MMOL/L (ref 3.5–5.1)
RBC # BLD: 3.18 M/UL (ref 4–5.2)
SLIDE REVIEW: ABNORMAL
SODIUM BLD-SCNC: 140 MMOL/L (ref 136–145)
TOTAL IRON BINDING CAPACITY: 215 UG/DL (ref 260–445)
TOXIC GRANULATION: PRESENT
WBC # BLD: 5 K/UL (ref 4–11)

## 2018-09-29 PROCEDURE — 83540 ASSAY OF IRON: CPT

## 2018-09-29 PROCEDURE — 6370000000 HC RX 637 (ALT 250 FOR IP): Performed by: INTERNAL MEDICINE

## 2018-09-29 PROCEDURE — 85025 COMPLETE CBC W/AUTO DIFF WBC: CPT

## 2018-09-29 PROCEDURE — 1200000000 HC SEMI PRIVATE

## 2018-09-29 PROCEDURE — 83550 IRON BINDING TEST: CPT

## 2018-09-29 PROCEDURE — 82728 ASSAY OF FERRITIN: CPT

## 2018-09-29 PROCEDURE — 82746 ASSAY OF FOLIC ACID SERUM: CPT

## 2018-09-29 PROCEDURE — 2580000003 HC RX 258: Performed by: INTERNAL MEDICINE

## 2018-09-29 PROCEDURE — 82607 VITAMIN B-12: CPT

## 2018-09-29 PROCEDURE — 6370000000 HC RX 637 (ALT 250 FOR IP): Performed by: PHYSICIAN ASSISTANT

## 2018-09-29 PROCEDURE — 6360000002 HC RX W HCPCS: Performed by: INTERNAL MEDICINE

## 2018-09-29 PROCEDURE — 80048 BASIC METABOLIC PNL TOTAL CA: CPT

## 2018-09-29 PROCEDURE — 36415 COLL VENOUS BLD VENIPUNCTURE: CPT

## 2018-09-29 PROCEDURE — 99222 1ST HOSP IP/OBS MODERATE 55: CPT | Performed by: PHYSICIAN ASSISTANT

## 2018-09-29 PROCEDURE — 93010 ELECTROCARDIOGRAM REPORT: CPT | Performed by: INTERNAL MEDICINE

## 2018-09-29 RX ORDER — LANOLIN ALCOHOL/MO/W.PET/CERES
3 CREAM (GRAM) TOPICAL NIGHTLY PRN
Status: DISCONTINUED | OUTPATIENT
Start: 2018-09-29 | End: 2018-10-02 | Stop reason: HOSPADM

## 2018-09-29 RX ORDER — FERROUS SULFATE 325(65) MG
325 TABLET ORAL NIGHTLY
Status: DISCONTINUED | OUTPATIENT
Start: 2018-09-29 | End: 2018-10-02 | Stop reason: HOSPADM

## 2018-09-29 RX ORDER — LACTOBACILLUS RHAMNOSUS GG 10B CELL
1 CAPSULE ORAL
Status: DISCONTINUED | OUTPATIENT
Start: 2018-09-29 | End: 2018-10-02 | Stop reason: HOSPADM

## 2018-09-29 RX ORDER — ALBUTEROL SULFATE 90 UG/1
2 AEROSOL, METERED RESPIRATORY (INHALATION) EVERY 6 HOURS PRN
Status: DISCONTINUED | OUTPATIENT
Start: 2018-09-29 | End: 2018-10-02 | Stop reason: HOSPADM

## 2018-09-29 RX ADMIN — MESALAMINE 400 MG: 400 CAPSULE, DELAYED RELEASE ORAL at 08:25

## 2018-09-29 RX ADMIN — PANTOPRAZOLE SODIUM 40 MG: 40 TABLET, DELAYED RELEASE ORAL at 08:24

## 2018-09-29 RX ADMIN — GABAPENTIN 200 MG: 100 CAPSULE ORAL at 14:39

## 2018-09-29 RX ADMIN — METRONIDAZOLE 500 MG: 250 TABLET ORAL at 08:24

## 2018-09-29 RX ADMIN — MESALAMINE 400 MG: 400 CAPSULE, DELAYED RELEASE ORAL at 14:40

## 2018-09-29 RX ADMIN — SODIUM CHLORIDE: 9 INJECTION, SOLUTION INTRAVENOUS at 11:10

## 2018-09-29 RX ADMIN — SIMVASTATIN 20 MG: 10 TABLET, FILM COATED ORAL at 21:51

## 2018-09-29 RX ADMIN — CIPROFLOXACIN 500 MG: 500 TABLET, FILM COATED ORAL at 08:24

## 2018-09-29 RX ADMIN — Medication 1 CAPSULE: at 13:07

## 2018-09-29 RX ADMIN — GABAPENTIN 200 MG: 100 CAPSULE ORAL at 00:24

## 2018-09-29 RX ADMIN — GABAPENTIN 200 MG: 100 CAPSULE ORAL at 08:23

## 2018-09-29 RX ADMIN — Medication 1 CAPSULE: at 18:13

## 2018-09-29 RX ADMIN — GABAPENTIN 200 MG: 100 CAPSULE ORAL at 21:51

## 2018-09-29 RX ADMIN — Medication 400 MG: at 08:24

## 2018-09-29 RX ADMIN — FOLIC ACID 1 MG: 1 TABLET ORAL at 08:24

## 2018-09-29 RX ADMIN — METRONIDAZOLE 500 MG: 250 TABLET ORAL at 00:24

## 2018-09-29 RX ADMIN — Medication 10 ML: at 00:20

## 2018-09-29 RX ADMIN — Medication 10 ML: at 21:52

## 2018-09-29 RX ADMIN — CIPROFLOXACIN 500 MG: 500 TABLET, FILM COATED ORAL at 00:25

## 2018-09-29 RX ADMIN — Medication 400 MG: at 21:51

## 2018-09-29 RX ADMIN — MELATONIN 3 MG ORAL TABLET 3 MG: 3 TABLET ORAL at 21:50

## 2018-09-29 RX ADMIN — CIPROFLOXACIN 500 MG: 500 TABLET, FILM COATED ORAL at 21:50

## 2018-09-29 RX ADMIN — LEVOTHYROXINE SODIUM 200 MCG: 200 TABLET ORAL at 08:24

## 2018-09-29 RX ADMIN — ENOXAPARIN SODIUM 40 MG: 40 INJECTION SUBCUTANEOUS at 08:23

## 2018-09-29 RX ADMIN — FERROUS SULFATE TAB 325 MG (65 MG ELEMENTAL FE) 325 MG: 325 (65 FE) TAB at 21:50

## 2018-09-29 RX ADMIN — Medication 400 MG: at 00:25

## 2018-09-29 RX ADMIN — METRONIDAZOLE 500 MG: 250 TABLET ORAL at 21:51

## 2018-09-29 RX ADMIN — SODIUM CHLORIDE: 9 INJECTION, SOLUTION INTRAVENOUS at 00:13

## 2018-09-29 RX ADMIN — VITAMIN D, TAB 1000IU (100/BT) 10000 UNITS: 25 TAB at 08:24

## 2018-09-29 RX ADMIN — Medication 1 CAPSULE: at 08:24

## 2018-09-29 RX ADMIN — MESALAMINE 400 MG: 400 CAPSULE, DELAYED RELEASE ORAL at 21:52

## 2018-09-29 ASSESSMENT — PAIN SCALES - GENERAL: PAINLEVEL_OUTOF10: 0

## 2018-09-29 NOTE — PROGRESS NOTES
Bedside report given to Mukesh KULKARNI at this time. Pt in stable condition and has no needs at this time.  Errol Dixon RN

## 2018-09-29 NOTE — H&P
Neurological: Negative for syncope   Hematological: Negative for adenopathy   Psychiatric/Behavorial: Negative for anxiety    PHYSICAL EXAM:    /69   Pulse 60   Temp 97.3 °F (36.3 °C) (Oral)   Resp 16   Ht 5' 5\" (1.651 m)   Wt 208 lb 1.6 oz (94.4 kg)   SpO2 98%   BMI 34.63 kg/m²     Gen: Middle aged female. No distress. Alert. Eyes: PERRL. No sclera icterus. No conjunctival injection. ENT: No discharge. Pharynx clear. Neck: No JVD. No Carotid Bruit. Trachea midline. Resp: No accessory muscle use. No crackles. No wheezes. No rhonchi. CV: Regular rate. Regular rhythm. + systolic murmur. No rub. No edema. Capillary Refill: Brisk,< 3 seconds   Peripheral Pulses: +2 palpable, equal bilaterally   GI: Non-tender. Non-distended. No masses. No organomegaly. Normal bowel sounds. No hernia. Skin: Warm and dry. No nodule on exposed extremities. No rash on exposed extremities. Chronic skin changes to lower extremities   M/S: No cyanosis. No joint deformity. No clubbing. Neuro: Awake. Grossly nonfocal    Psych: Oriented x 3. No anxiety or agitation.      CBC:   Recent Labs      09/26/18 1135 09/28/18   1525  09/29/18   0607   WBC  7.2  10.0  5.0   HGB  12.1  10.2*  9.6*   HCT  35.7*  30.1*  28.0*   MCV  89.1  89.8  88.3   PLT  131*  135  111*     BMP:   Recent Labs      09/26/18 1135 09/28/18   1525  09/29/18   0607   NA  138  142  140   K  3.9  3.9  3.9   CL  104  105  105   CO2  22  24  23   BUN  21*  17  16   CREATININE  1.0  1.6*  1.3*     LIVER PROFILE:   Recent Labs      09/26/18 1135  09/28/18   1525   AST  22  24   ALT  10  10   LIPASE  42.0  74.0*   BILITOT  0.9  0.4   ALKPHOS  191*  128     UA:  Recent Labs      09/26/18   1135  09/28/18   1720   COLORU  Yellow  Yellow   PHUR  6.0  5.5   LABCAST  0-1 Coarse Gran*   --    WBCUA  3-5  3-5   RBCUA  None seen  0-2   MUCUS  1+*  3+*   BACTERIA  1+*  Rare*   CLARITYU  Clear  SL CLOUDY*   SPECGRAV  1.025  1.025   LEUKOCYTESUR  TRACE*

## 2018-09-30 LAB
ANION GAP SERPL CALCULATED.3IONS-SCNC: 9 MMOL/L (ref 3–16)
ANISOCYTOSIS: ABNORMAL
BASOPHILS ABSOLUTE: 0 K/UL (ref 0–0.2)
BASOPHILS RELATIVE PERCENT: 0 %
BUN BLDV-MCNC: 10 MG/DL (ref 7–20)
CALCIUM SERPL-MCNC: 8.1 MG/DL (ref 8.3–10.6)
CHLORIDE BLD-SCNC: 104 MMOL/L (ref 99–110)
CO2: 23 MMOL/L (ref 21–32)
CREAT SERPL-MCNC: 1 MG/DL (ref 0.6–1.2)
EOSINOPHILS ABSOLUTE: 0.3 K/UL (ref 0–0.6)
EOSINOPHILS RELATIVE PERCENT: 4 %
FOLATE: >20 NG/ML (ref 4.78–24.2)
GFR AFRICAN AMERICAN: >60
GFR NON-AFRICAN AMERICAN: 56
GLUCOSE BLD-MCNC: 90 MG/DL (ref 70–99)
HCT VFR BLD CALC: 28.5 % (ref 36–48)
HEMOGLOBIN: 9.6 G/DL (ref 12–16)
LYMPHOCYTES ABSOLUTE: 2 K/UL (ref 1–5.1)
LYMPHOCYTES RELATIVE PERCENT: 29 %
MCH RBC QN AUTO: 30 PG (ref 26–34)
MCHC RBC AUTO-ENTMCNC: 33.9 G/DL (ref 31–36)
MCV RBC AUTO: 88.5 FL (ref 80–100)
MONOCYTES ABSOLUTE: 0.3 K/UL (ref 0–1.3)
MONOCYTES RELATIVE PERCENT: 4 %
NEUTROPHILS ABSOLUTE: 4.3 K/UL (ref 1.7–7.7)
NEUTROPHILS RELATIVE PERCENT: 63 %
NUCLEATED RED BLOOD CELLS: 1 /100 WBC
PDW BLD-RTO: 15.1 % (ref 12.4–15.4)
PLATELET # BLD: 128 K/UL (ref 135–450)
PLATELET SLIDE REVIEW: ABNORMAL
PMV BLD AUTO: 9.1 FL (ref 5–10.5)
POLYCHROMASIA: ABNORMAL
POTASSIUM REFLEX MAGNESIUM: 3.8 MMOL/L (ref 3.5–5.1)
RBC # BLD: 3.22 M/UL (ref 4–5.2)
SLIDE REVIEW: ABNORMAL
SODIUM BLD-SCNC: 136 MMOL/L (ref 136–145)
VITAMIN B-12: 885 PG/ML (ref 211–911)
WBC # BLD: 6.9 K/UL (ref 4–11)

## 2018-09-30 PROCEDURE — 6370000000 HC RX 637 (ALT 250 FOR IP): Performed by: INTERNAL MEDICINE

## 2018-09-30 PROCEDURE — 80048 BASIC METABOLIC PNL TOTAL CA: CPT

## 2018-09-30 PROCEDURE — 85025 COMPLETE CBC W/AUTO DIFF WBC: CPT

## 2018-09-30 PROCEDURE — 36415 COLL VENOUS BLD VENIPUNCTURE: CPT

## 2018-09-30 PROCEDURE — 2580000003 HC RX 258: Performed by: INTERNAL MEDICINE

## 2018-09-30 PROCEDURE — 99232 SBSQ HOSP IP/OBS MODERATE 35: CPT | Performed by: INTERNAL MEDICINE

## 2018-09-30 PROCEDURE — 1200000000 HC SEMI PRIVATE

## 2018-09-30 PROCEDURE — 6370000000 HC RX 637 (ALT 250 FOR IP): Performed by: PHYSICIAN ASSISTANT

## 2018-09-30 PROCEDURE — 6360000002 HC RX W HCPCS: Performed by: INTERNAL MEDICINE

## 2018-09-30 RX ADMIN — ENOXAPARIN SODIUM 40 MG: 40 INJECTION SUBCUTANEOUS at 08:54

## 2018-09-30 RX ADMIN — SODIUM CHLORIDE: 9 INJECTION, SOLUTION INTRAVENOUS at 14:38

## 2018-09-30 RX ADMIN — Medication 1 CAPSULE: at 17:43

## 2018-09-30 RX ADMIN — Medication 10 ML: at 21:51

## 2018-09-30 RX ADMIN — MESALAMINE 400 MG: 400 CAPSULE, DELAYED RELEASE ORAL at 08:55

## 2018-09-30 RX ADMIN — Medication 1 CAPSULE: at 12:11

## 2018-09-30 RX ADMIN — Medication 400 MG: at 21:51

## 2018-09-30 RX ADMIN — PANTOPRAZOLE SODIUM 40 MG: 40 TABLET, DELAYED RELEASE ORAL at 08:54

## 2018-09-30 RX ADMIN — SIMVASTATIN 20 MG: 10 TABLET, FILM COATED ORAL at 21:51

## 2018-09-30 RX ADMIN — Medication 400 MG: at 08:54

## 2018-09-30 RX ADMIN — GABAPENTIN 200 MG: 100 CAPSULE ORAL at 21:51

## 2018-09-30 RX ADMIN — METRONIDAZOLE 500 MG: 250 TABLET ORAL at 21:50

## 2018-09-30 RX ADMIN — METRONIDAZOLE 500 MG: 250 TABLET ORAL at 08:54

## 2018-09-30 RX ADMIN — CIPROFLOXACIN 500 MG: 500 TABLET, FILM COATED ORAL at 08:54

## 2018-09-30 RX ADMIN — MELATONIN 3 MG ORAL TABLET 3 MG: 3 TABLET ORAL at 21:52

## 2018-09-30 RX ADMIN — MESALAMINE 400 MG: 400 CAPSULE, DELAYED RELEASE ORAL at 22:06

## 2018-09-30 RX ADMIN — MESALAMINE 400 MG: 400 CAPSULE, DELAYED RELEASE ORAL at 15:09

## 2018-09-30 RX ADMIN — FOLIC ACID 1 MG: 1 TABLET ORAL at 08:54

## 2018-09-30 RX ADMIN — Medication 1 CAPSULE: at 08:54

## 2018-09-30 RX ADMIN — CIPROFLOXACIN 500 MG: 500 TABLET, FILM COATED ORAL at 21:51

## 2018-09-30 RX ADMIN — GABAPENTIN 200 MG: 100 CAPSULE ORAL at 14:39

## 2018-09-30 RX ADMIN — LEVOTHYROXINE SODIUM 200 MCG: 200 TABLET ORAL at 08:54

## 2018-09-30 RX ADMIN — FERROUS SULFATE TAB 325 MG (65 MG ELEMENTAL FE) 325 MG: 325 (65 FE) TAB at 21:50

## 2018-09-30 RX ADMIN — SODIUM CHLORIDE: 9 INJECTION, SOLUTION INTRAVENOUS at 01:54

## 2018-09-30 RX ADMIN — GABAPENTIN 200 MG: 100 CAPSULE ORAL at 08:54

## 2018-09-30 NOTE — PROGRESS NOTES
Progress Note    Admit Date:  9/28/2018    Subjective:  Ms. Jona Monahan feels much better today    awake, alert. BP stable . she recalls that she had been taking trazadone at home - likely causing hypotension      C/O persistent diarrhea     Objective:   /70   Pulse 73   Temp 97.6 °F (36.4 °C) (Oral)   Resp 16   Ht 5' 5\" (1.651 m)   Wt 211 lb 6.4 oz (95.9 kg)   SpO2 97%   BMI 35.18 kg/m²     Intake/Output Summary (Last 24 hours) at 09/30/18 1606  Last data filed at 09/30/18 1312   Gross per 24 hour   Intake              980 ml   Output                0 ml   Net              980 ml         Physical Exam:  Gen: Middle aged female. No distress. Alert. Eyes: PERRL. No sclera icterus. No conjunctival injection. ENT: No discharge. Pharynx clear. Neck: No JVD. No Carotid Bruit. Trachea midline. Resp: No accessory muscle use. No crackles. No wheezes. No rhonchi. CV: Regular rate. Regular rhythm. + systolic murmur. No rub. No edema. Capillary Refill: Brisk,< 3 seconds   Peripheral Pulses: +2 palpable, equal bilaterally   GI: Non-tender. Non-distended. No masses. No organomegaly. Normal bowel sounds. No hernia. Skin: Warm and dry. No nodule on exposed extremities. No rash on exposed extremities. Chronic skin changes to lower extremities   M/S: No cyanosis. No joint deformity. No clubbing. Neuro: Awake. Grossly nonfocal    Psych: Oriented x 3. No anxiety or agitation.        Medications:   Scheduled Meds:   ferrous sulfate  325 mg Oral Nightly    lactobacillus  1 capsule Oral TID     mesalamine  400 mg Oral TID    gabapentin  200 mg Oral TID    vitamin D  10,000 Units Oral Weekly    simvastatin  20 mg Oral Nightly    magnesium oxide  400 mg Oral BID    folic acid  1 mg Oral Daily    levothyroxine  200 mcg Oral Daily    mupirocin   Topical TID    ciprofloxacin  500 mg Oral BID    metroNIDAZOLE  500 mg Oral BID    pantoprazole  40 mg Oral Daily    sodium chloride flush  10 mL episodes, near syncope, and murmur heard on exam  - cont IVF, her BP is now improved  - treat colitis  - encourage PO intake  - stop imdur and bystolic and trazodone - do not resume on discharge   - ECHO Pending      Acute renal failure   - suspect pre renal related to hypotension  - improved with IVF  - cont to monitor      Hypothyroidism  - home dose of synthroid continued   - TSH was 1.22 on 9/3/18     Acute colitis  - continued on cipro and flagyl as prescribed form ER  - repeat CT scan with improvement     History of UC on mesalamine  - she has worsening diarrhea. Has not seen GI recently. Will consult GI      Chronic anemia  - on iron supplements  - check iron studies   - should have colonoscopy- reports not having one for many years      DVT Prophylaxis: Lovenox   Diet: DIET GENERAL;  Code Status: Full Code     Dispo: likely dc In AM  pending echo.   Await Gi consult  Renal function, and BP- stable         Stewart Gibbons MD 9/30/2018 4:06 PM

## 2018-09-30 NOTE — PROGRESS NOTES
4 Eyes Skin Assessment     The patient is being assess for   Admission    I agree that 2 RN's have performed a thorough Head to Toe Skin Assessment on the patient. ALL assessment sites listed below have been assessed. Areas assessed by both nurses:   [x]   Head, Face, and Ears   [x]   Shoulders, Back, and Chest, Abdomen  [x]   Arms, Elbows, and Hands   [x]   Coccyx, Sacrum, and Ischium  [x]   Legs, Feet, and Heels (scattered abrasions on BLE from scratching)  Scattered bruising. **SHARE this note so that the co-signing nurse is able to place an eSignature**    Co-signer eSignature: Electronically signed by Georgi Payton RN on 9/29/18 at 11:47 PM    Does the Patient have Skin Breakdown?   No          Ernie Prevention initiated:  No   Wound Care Orders initiated:  No      [facility] nurse consulted for Pressure Injury (Stage 3,4, Unstageable, DTI, NWPT, Complex wounds)and New or Established Ostomies:  No      Primary Nurse eSignature: Electronically signed by Michelle Paulino RN on 9/29/18 at 11:43 PM

## 2018-09-30 NOTE — PROGRESS NOTES
Bedside report given to DANAY GUTIERREZ [facility], RN, pt stable at this time. Call light within reach.

## 2018-10-01 VITALS
SYSTOLIC BLOOD PRESSURE: 105 MMHG | TEMPERATURE: 99 F | RESPIRATION RATE: 15 BRPM | OXYGEN SATURATION: 96 % | DIASTOLIC BLOOD PRESSURE: 64 MMHG | BODY MASS INDEX: 34.26 KG/M2 | HEART RATE: 70 BPM | WEIGHT: 205.6 LBS | HEIGHT: 65 IN

## 2018-10-01 LAB
INR BLD: 1.23 (ref 0.86–1.14)
LV EF: 58 %
LVEF MODALITY: NORMAL
PROTHROMBIN TIME: 14 SEC (ref 9.8–13)

## 2018-10-01 PROCEDURE — 2580000003 HC RX 258: Performed by: INTERNAL MEDICINE

## 2018-10-01 PROCEDURE — 36415 COLL VENOUS BLD VENIPUNCTURE: CPT

## 2018-10-01 PROCEDURE — 85610 PROTHROMBIN TIME: CPT

## 2018-10-01 PROCEDURE — 6360000002 HC RX W HCPCS: Performed by: INTERNAL MEDICINE

## 2018-10-01 PROCEDURE — 99238 HOSP IP/OBS DSCHRG MGMT 30/<: CPT | Performed by: INTERNAL MEDICINE

## 2018-10-01 PROCEDURE — 6370000000 HC RX 637 (ALT 250 FOR IP): Performed by: INTERNAL MEDICINE

## 2018-10-01 PROCEDURE — 93306 TTE W/DOPPLER COMPLETE: CPT

## 2018-10-01 PROCEDURE — 6370000000 HC RX 637 (ALT 250 FOR IP): Performed by: PHYSICIAN ASSISTANT

## 2018-10-01 RX ADMIN — CIPROFLOXACIN 500 MG: 500 TABLET, FILM COATED ORAL at 09:45

## 2018-10-01 RX ADMIN — METRONIDAZOLE 500 MG: 250 TABLET ORAL at 09:45

## 2018-10-01 RX ADMIN — LEVOTHYROXINE SODIUM 200 MCG: 200 TABLET ORAL at 09:45

## 2018-10-01 RX ADMIN — MESALAMINE 400 MG: 400 CAPSULE, DELAYED RELEASE ORAL at 14:41

## 2018-10-01 RX ADMIN — Medication 1 CAPSULE: at 16:56

## 2018-10-01 RX ADMIN — FOLIC ACID 1 MG: 1 TABLET ORAL at 09:45

## 2018-10-01 RX ADMIN — GABAPENTIN 200 MG: 100 CAPSULE ORAL at 09:46

## 2018-10-01 RX ADMIN — Medication 400 MG: at 09:46

## 2018-10-01 RX ADMIN — GABAPENTIN 200 MG: 100 CAPSULE ORAL at 14:41

## 2018-10-01 RX ADMIN — SODIUM CHLORIDE: 9 INJECTION, SOLUTION INTRAVENOUS at 03:29

## 2018-10-01 RX ADMIN — Medication 1 CAPSULE: at 11:57

## 2018-10-01 RX ADMIN — Medication 10 ML: at 09:41

## 2018-10-01 RX ADMIN — PANTOPRAZOLE SODIUM 40 MG: 40 TABLET, DELAYED RELEASE ORAL at 09:46

## 2018-10-01 RX ADMIN — Medication 1 CAPSULE: at 09:46

## 2018-10-01 RX ADMIN — ENOXAPARIN SODIUM 40 MG: 40 INJECTION SUBCUTANEOUS at 09:45

## 2018-10-01 RX ADMIN — MESALAMINE 400 MG: 400 CAPSULE, DELAYED RELEASE ORAL at 09:51

## 2018-10-01 NOTE — DISCHARGE SUMMARY
artery   pressure. Discharge Medications     Medication List      CHANGE how you take these medications    ferrous sulfate 325 (65 Fe) MG tablet  TAKE 1 TABLET BY MOUTH DAILY. What changed:  See the new instructions. CONTINUE taking these medications    albuterol sulfate  (90 Base) MCG/ACT inhaler  Commonly known as:  VENTOLIN HFA  Inhale 2 puffs into the lungs every 6 hours as needed for Wheezing. blood glucose test strips strip  Commonly known as:  ASCENSIA AUTODISC VI;ONE TOUCH ULTRA TEST VI  1 each by In Vitro route daily As needed. ciprofloxacin 500 MG tablet  Commonly known as:  CIPRO  Take 1 tablet by mouth 2 times daily for 10 days     [employer] VITAMIN D3 54358 units Caps capsule  Generic drug:  vitamin D  TAKE ONE CAPSULE BY MOUTH EVERY WEEK     folic acid 1 MG tablet  Commonly known as:  FOLVITE  TAKE 1 TABLET BY MOUTH DAILY     gabapentin 100 MG capsule  Commonly known as:  NEURONTIN     levothyroxine 200 MCG tablet  Commonly known as:  SYNTHROID  Take 1 tablet by mouth daily     magnesium oxide 400 MG tablet  Commonly known as:  MAG-OX  1 tab bid     mesalamine 1.2 g EC tablet  Commonly known as:  LIALDA     metroNIDAZOLE 500 MG tablet  Commonly known as:  FLAGYL  Take 1 tablet by mouth 2 times daily for 10 days     ONE TOUCH LANCETS Misc  1 each by Does not apply route daily     pantoprazole 20 MG tablet  Commonly known as:  PROTONIX  Take 2 tablets by mouth daily     PROBIOTIC FORMULA 1-250 BILLION-MG Caps  Take 1 Dose by mouth daily     simvastatin 20 MG tablet  Commonly known as:  ZOCOR  TAKE 1 TABLET BY MOUTH NIGHTLY        STOP taking these medications    BACTROBAN 2 % ointment  Generic drug:  mupirocin     celecoxib 100 MG capsule  Commonly known as:  CELEBREX     isosorbide mononitrate 60 MG extended release tablet  Commonly known as:  IMDUR     nebivolol 2.5 MG tablet  Commonly known as:  BYSTOLIC              Discharged in stable condition to home. Follow Up:   Follow up

## 2018-10-01 NOTE — FLOWSHEET NOTE
10/01/18 0803   Vital Signs   Temp 98.2 °F (36.8 °C)   Temp Source Oral   Pulse 68   Heart Rate Source Monitor   Resp 17   /71   BP Location Left upper arm   BP Upper/Lower Upper   Patient Position Semi fowlers   Level of Consciousness 0   MEWS Score 1   Oxygen Therapy   SpO2 99 %   O2 Device None (Room air)     AM assessment complete. Pt going off the floor for ECHO.

## 2018-10-01 NOTE — CONSULTS
ALKPHOS  128       Assessment:     60year old female with history of thyroid disease, arthritis, HTN, HLP and ulcerative colitis admitted for hypotension and acute renal failure likely due to medications, poor po intake, and diarrhea is seen in consultation regarding persistent diarrhea. Plan:   1. Please do not discharge until patient is seen by Dr. Brigitte Perry  2. Continue Cipro/flagyl  3. Follow up as outpatient for further evaluation of diarrhea, UC, and liver workup. 4. Will eventually need colonoscopy      Attending Supervising [de-identified] Attestation Statement  The patient is a 60 y.o. female. I have performed a history and physical examination of the patient. I discussed the case with my physician assistant Nupur Dixon. I reviewed the patient's Past Medical History, Past Surgical History, Medications, and Allergies. Physical Exam:  Vitals:    10/01/18 0242 10/01/18 0803 10/01/18 1136 10/01/18 1514   BP: 112/70 123/71 100/65 105/64   Pulse: 63 68 72 70   Resp: 16 17 16 15   Temp: 97.9 °F (36.6 °C) 98.2 °F (36.8 °C) 98.3 °F (36.8 °C) 99 °F (37.2 °C)   TempSrc: Oral Oral Oral Oral   SpO2: 98% 99% 97% 96%   Weight: 205 lb 9.6 oz (93.3 kg)      Height:           Physical Examination: General appearance - alert, well appearing, and in no distress  Mental status - alert, oriented to person, place, and time  Eyes - pupils equal and reactive, extraocular eye movements intact  Neck - supple, no significant adenopathy  Chest - clear to auscultation, no wheezes, rales or rhonchi, symmetric air entry  Heart - normal rate, regular rhythm, normal S1, S2, no murmurs, rubs, clicks or gallops  Abdomen - soft, nontender, nondistended, no masses or organomegaly  Extremities - peripheral pulses normal, no pedal edema, no clubbing or cyanosis  Skin - normal coloration and turgor, no rashes, no suspicious skin lesions noted    Service planning patient discharge today.   Will need further workup of her reported nodular liver

## 2018-10-02 ENCOUNTER — TELEPHONE (OUTPATIENT)
Dept: ORTHOPEDIC SURGERY | Age: 63
End: 2018-10-02

## 2018-10-02 NOTE — TELEPHONE ENCOUNTER
Patient is calling back to reschedule a surgery she had earlier that she had to cancel. She also wants to have her chart reviewed so she can be told what her next step should be.

## 2018-10-04 ENCOUNTER — OFFICE VISIT (OUTPATIENT)
Dept: FAMILY MEDICINE CLINIC | Age: 63
End: 2018-10-04
Payer: COMMERCIAL

## 2018-10-04 VITALS
SYSTOLIC BLOOD PRESSURE: 108 MMHG | DIASTOLIC BLOOD PRESSURE: 68 MMHG | WEIGHT: 200 LBS | OXYGEN SATURATION: 98 % | HEIGHT: 65 IN | HEART RATE: 115 BPM | BODY MASS INDEX: 33.32 KG/M2

## 2018-10-04 DIAGNOSIS — I95.9 HYPOTENSION, UNSPECIFIED HYPOTENSION TYPE: ICD-10-CM

## 2018-10-04 DIAGNOSIS — K52.9 COLITIS: Primary | ICD-10-CM

## 2018-10-04 DIAGNOSIS — N28.9 RENAL INSUFFICIENCY: ICD-10-CM

## 2018-10-04 DIAGNOSIS — Z09 HOSPITAL DISCHARGE FOLLOW-UP: ICD-10-CM

## 2018-10-04 LAB
ANION GAP SERPL CALCULATED.3IONS-SCNC: 15 MMOL/L (ref 3–16)
BUN BLDV-MCNC: 9 MG/DL (ref 7–20)
CALCIUM SERPL-MCNC: 8.6 MG/DL (ref 8.3–10.6)
CHLORIDE BLD-SCNC: 102 MMOL/L (ref 99–110)
CO2: 23 MMOL/L (ref 21–32)
CREAT SERPL-MCNC: 1 MG/DL (ref 0.6–1.2)
GFR AFRICAN AMERICAN: >60
GFR NON-AFRICAN AMERICAN: 56
GLUCOSE BLD-MCNC: 127 MG/DL (ref 70–99)
POTASSIUM SERPL-SCNC: 3.9 MMOL/L (ref 3.5–5.1)
SODIUM BLD-SCNC: 140 MMOL/L (ref 136–145)

## 2018-10-04 PROCEDURE — 99214 OFFICE O/P EST MOD 30 MIN: CPT | Performed by: NURSE PRACTITIONER

## 2018-10-04 PROCEDURE — 1111F DSCHRG MED/CURRENT MED MERGE: CPT | Performed by: NURSE PRACTITIONER

## 2018-10-04 NOTE — PROGRESS NOTES
ciprofloxacin (CIPRO) 500 MG tablet  Take 1 tablet by mouth 2 times daily for 10 days             [employer]S VITAMIN D3 34839 units CAPS capsule  TAKE ONE CAPSULE BY MOUTH EVERY WEEK             ferrous sulfate 325 (65 Fe) MG tablet  TAKE 1 TABLET BY MOUTH DAILY. folic acid (FOLVITE) 1 MG tablet  TAKE 1 TABLET BY MOUTH DAILY             gabapentin (NEURONTIN) 100 MG capsule  Take 2 capsules by mouth 3 times daily. Ricardo Palm              levothyroxine (SYNTHROID) 200 MCG tablet  Take 1 tablet by mouth daily             magnesium oxide (MAG-OX) 400 MG tablet  1 tab bid             mesalamine (LIALDA) 1.2 G EC tablet  Take 300 mg by mouth 2 times daily              metroNIDAZOLE (FLAGYL) 500 MG tablet  Take 1 tablet by mouth 2 times daily for 10 days             ONE TOUCH LANCETS MISC  1 each by Does not apply route daily             pantoprazole (PROTONIX) 20 MG tablet  Take 2 tablets by mouth daily             simvastatin (ZOCOR) 20 MG tablet  TAKE 1 TABLET BY MOUTH NIGHTLY                   Medications marked \"taking\" at this time  Outpatient Prescriptions Marked as Taking for the 10/4/18 encounter (Office Visit) with YAHAIRA Lau CNP   Medication Sig Dispense Refill    ciprofloxacin (CIPRO) 500 MG tablet Take 1 tablet by mouth 2 times daily for 10 days 20 tablet 0    metroNIDAZOLE (FLAGYL) 500 MG tablet Take 1 tablet by mouth 2 times daily for 10 days 20 tablet 0    pantoprazole (PROTONIX) 20 MG tablet Take 2 tablets by mouth daily 30 tablet 0    Bacillus Coagulans-Inulin (PROBIOTIC FORMULA) 1-250 BILLION-MG CAPS Take 1 Dose by mouth daily 30 capsule 0    simvastatin (ZOCOR) 20 MG tablet TAKE 1 TABLET BY MOUTH NIGHTLY 90 tablet 0    magnesium oxide (MAG-OX) 400 MG tablet 1 tab bid 951 tablet 1    folic acid (FOLVITE) 1 MG tablet TAKE 1 TABLET BY MOUTH DAILY 90 tablet 1    levothyroxine (SYNTHROID) 200 MCG tablet Take 1 tablet by mouth daily 90 tablet 0    blood glucose test strips (ASCENSIA Wt Readings from Last 3 Encounters:   10/04/18 200 lb (90.7 kg)   10/01/18 205 lb 9.6 oz (93.3 kg)   09/26/18 199 lb (90.3 kg)     BP Readings from Last 3 Encounters:   10/04/18 (!) 98/52   10/01/18 105/64   09/26/18 138/80        Physical Exam:  General Appearance: alert and oriented to person, place and time, well developed and well- nourished, in no acute distress  Skin: warm and dry, no rash or erythema  Head: normocephalic and atraumatic  Eyes: pupils equal, round, and reactive to light, extraocular eye movements intact, conjunctivae normal  ENT: tympanic membrane, external ear and ear canal normal bilaterally, nose without deformity, nasal mucosa and turbinates normal without polyps  Neck: supple and non-tender without mass, no thyromegaly or thyroid nodules, no cervical lymphadenopathy  Pulmonary/Chest: clear to auscultation bilaterally- no wheezes, rales or rhonchi, normal air movement, no respiratory distress  Cardiovascular: normal rate, regular rhythm, normal S1 and S2, no murmurs, rubs, clicks, or gallops, distal pulses intact, no carotid bruits  Abdomen: soft, non-tender, non-distended, normal bowel sounds, no masses or organomegaly  Extremities: no cyanosis, clubbing or edema  Musculoskeletal: normal range of motion, no joint swelling, deformity or tenderness  Neurologic: reflexes normal and symmetric, no cranial nerve deficit, gait, coordination and speech normal    Assessment/Plan:  1. Colitis  Complete cipro and flagyl  Follow up with GI  Needs colonoscopy and repeat CT abdomen for follow up on nodular liver found during hospital stay  Continue with mesalamine    2. Renal insufficiency  - Basic Metabolic Panel  If kidney function back to baseline then patient would like to restart celebrex    3.  Hypotension, unspecified hypotension type  Stay off imdur and bystolic  You should monitor your blood pressure and pulse closely at home, then call or send a [employer] message in 1-2 weeks to report the

## 2018-10-05 LAB
EKG ATRIAL RATE: 75 BPM
EKG DIAGNOSIS: NORMAL
EKG P AXIS: 73 DEGREES
EKG P-R INTERVAL: 144 MS
EKG Q-T INTERVAL: 428 MS
EKG QRS DURATION: 104 MS
EKG QTC CALCULATION (BAZETT): 477 MS
EKG R AXIS: 84 DEGREES
EKG T AXIS: 0 DEGREES
EKG VENTRICULAR RATE: 75 BPM

## 2018-10-08 RX ORDER — ALBUTEROL SULFATE 90 UG/1
2 AEROSOL, METERED RESPIRATORY (INHALATION) EVERY 6 HOURS PRN
Qty: 1 INHALER | Refills: 1 | Status: SHIPPED | OUTPATIENT
Start: 2018-10-08 | End: 2020-04-06

## 2018-10-10 ENCOUNTER — HOSPITAL ENCOUNTER (OUTPATIENT)
Age: 63
Discharge: HOME | End: 2018-10-10
Payer: COMMERCIAL

## 2018-10-10 PROCEDURE — 83993 ASSAY FOR CALPROTECTIN FECAL: CPT

## 2018-10-14 LAB — CALPROTECTIN: >1250 UG/G

## 2018-10-15 ENCOUNTER — ANESTHESIA EVENT (OUTPATIENT)
Dept: ENDOSCOPY | Age: 63
End: 2018-10-15
Payer: COMMERCIAL

## 2018-10-15 ENCOUNTER — HOSPITAL ENCOUNTER (OUTPATIENT)
Age: 63
Setting detail: OUTPATIENT SURGERY
Discharge: HOME | End: 2018-10-15
Attending: INTERNAL MEDICINE | Admitting: INTERNAL MEDICINE
Payer: COMMERCIAL

## 2018-10-15 ENCOUNTER — ANESTHESIA (OUTPATIENT)
Dept: ENDOSCOPY | Age: 63
End: 2018-10-15
Payer: COMMERCIAL

## 2018-10-15 VITALS
OXYGEN SATURATION: 99 % | DIASTOLIC BLOOD PRESSURE: 84 MMHG | HEART RATE: 65 BPM | SYSTOLIC BLOOD PRESSURE: 132 MMHG | RESPIRATION RATE: 20 BRPM | HEIGHT: 65 IN | WEIGHT: 200 LBS | TEMPERATURE: 96.9 F | BODY MASS INDEX: 33.32 KG/M2

## 2018-10-15 VITALS — SYSTOLIC BLOOD PRESSURE: 139 MMHG | OXYGEN SATURATION: 94 % | DIASTOLIC BLOOD PRESSURE: 85 MMHG

## 2018-10-15 LAB
GLUCOSE BLD-MCNC: 82 MG/DL (ref 70–99)
GLUCOSE BLD-MCNC: 83 MG/DL (ref 70–99)
PERFORMED ON: NORMAL
PERFORMED ON: NORMAL

## 2018-10-15 PROCEDURE — 6360000002 HC RX W HCPCS: Performed by: NURSE ANESTHETIST, CERTIFIED REGISTERED

## 2018-10-15 PROCEDURE — 3609018500 HC EGD US SCOPE W/ADJACENT STRUCTURES: Performed by: INTERNAL MEDICINE

## 2018-10-15 PROCEDURE — 3609012400 HC EGD TRANSORAL BIOPSY SINGLE/MULTIPLE: Performed by: INTERNAL MEDICINE

## 2018-10-15 PROCEDURE — 3700000000 HC ANESTHESIA ATTENDED CARE: Performed by: INTERNAL MEDICINE

## 2018-10-15 PROCEDURE — 88342 IMHCHEM/IMCYTCHM 1ST ANTB: CPT

## 2018-10-15 PROCEDURE — 2709999900 HC NON-CHARGEABLE SUPPLY: Performed by: INTERNAL MEDICINE

## 2018-10-15 PROCEDURE — 7100000011 HC PHASE II RECOVERY - ADDTL 15 MIN: Performed by: INTERNAL MEDICINE

## 2018-10-15 PROCEDURE — 3700000001 HC ADD 15 MINUTES (ANESTHESIA): Performed by: INTERNAL MEDICINE

## 2018-10-15 PROCEDURE — 88305 TISSUE EXAM BY PATHOLOGIST: CPT

## 2018-10-15 PROCEDURE — 2580000003 HC RX 258: Performed by: INTERNAL MEDICINE

## 2018-10-15 PROCEDURE — 7100000010 HC PHASE II RECOVERY - FIRST 15 MIN: Performed by: INTERNAL MEDICINE

## 2018-10-15 PROCEDURE — 88313 SPECIAL STAINS GROUP 2: CPT

## 2018-10-15 RX ORDER — HYDROMORPHONE HCL 110MG/55ML
0.5 PATIENT CONTROLLED ANALGESIA SYRINGE INTRAVENOUS EVERY 5 MIN PRN
Status: DISCONTINUED | OUTPATIENT
Start: 2018-10-15 | End: 2018-10-15 | Stop reason: HOSPADM

## 2018-10-15 RX ORDER — OXYCODONE HYDROCHLORIDE AND ACETAMINOPHEN 5; 325 MG/1; MG/1
1 TABLET ORAL PRN
Status: DISCONTINUED | OUTPATIENT
Start: 2018-10-15 | End: 2018-10-15 | Stop reason: HOSPADM

## 2018-10-15 RX ORDER — MAGNESIUM OXIDE 500 MG
1 TABLET ORAL NIGHTLY
COMMUNITY
End: 2019-11-27 | Stop reason: ALTCHOICE

## 2018-10-15 RX ORDER — MORPHINE SULFATE 10 MG/ML
2 INJECTION, SOLUTION INTRAMUSCULAR; INTRAVENOUS EVERY 5 MIN PRN
Status: DISCONTINUED | OUTPATIENT
Start: 2018-10-15 | End: 2018-10-15 | Stop reason: HOSPADM

## 2018-10-15 RX ORDER — PROPOFOL 10 MG/ML
INJECTION, EMULSION INTRAVENOUS PRN
Status: DISCONTINUED | OUTPATIENT
Start: 2018-10-15 | End: 2018-10-15 | Stop reason: SDUPTHER

## 2018-10-15 RX ORDER — OXYCODONE HYDROCHLORIDE AND ACETAMINOPHEN 5; 325 MG/1; MG/1
2 TABLET ORAL PRN
Status: DISCONTINUED | OUTPATIENT
Start: 2018-10-15 | End: 2018-10-15 | Stop reason: HOSPADM

## 2018-10-15 RX ORDER — DIPHENHYDRAMINE HYDROCHLORIDE 50 MG/ML
12.5 INJECTION INTRAMUSCULAR; INTRAVENOUS
Status: DISCONTINUED | OUTPATIENT
Start: 2018-10-15 | End: 2018-10-15 | Stop reason: HOSPADM

## 2018-10-15 RX ORDER — SODIUM CHLORIDE, SODIUM LACTATE, POTASSIUM CHLORIDE, CALCIUM CHLORIDE 600; 310; 30; 20 MG/100ML; MG/100ML; MG/100ML; MG/100ML
INJECTION, SOLUTION INTRAVENOUS CONTINUOUS
Status: DISCONTINUED | OUTPATIENT
Start: 2018-10-15 | End: 2018-10-15 | Stop reason: HOSPADM

## 2018-10-15 RX ORDER — HYDRALAZINE HYDROCHLORIDE 20 MG/ML
5 INJECTION INTRAMUSCULAR; INTRAVENOUS EVERY 10 MIN PRN
Status: DISCONTINUED | OUTPATIENT
Start: 2018-10-15 | End: 2018-10-15 | Stop reason: HOSPADM

## 2018-10-15 RX ORDER — HYDROMORPHONE HCL 110MG/55ML
0.25 PATIENT CONTROLLED ANALGESIA SYRINGE INTRAVENOUS EVERY 5 MIN PRN
Status: DISCONTINUED | OUTPATIENT
Start: 2018-10-15 | End: 2018-10-15 | Stop reason: HOSPADM

## 2018-10-15 RX ORDER — MEPERIDINE HYDROCHLORIDE 25 MG/ML
12.5 INJECTION INTRAMUSCULAR; INTRAVENOUS; SUBCUTANEOUS EVERY 5 MIN PRN
Status: DISCONTINUED | OUTPATIENT
Start: 2018-10-15 | End: 2018-10-15 | Stop reason: HOSPADM

## 2018-10-15 RX ORDER — PROMETHAZINE HYDROCHLORIDE 25 MG/ML
6.25 INJECTION, SOLUTION INTRAMUSCULAR; INTRAVENOUS
Status: DISCONTINUED | OUTPATIENT
Start: 2018-10-15 | End: 2018-10-15 | Stop reason: HOSPADM

## 2018-10-15 RX ORDER — CELECOXIB 100 MG/1
200 CAPSULE ORAL 2 TIMES DAILY
COMMUNITY
End: 2019-08-28 | Stop reason: SDUPTHER

## 2018-10-15 RX ORDER — LABETALOL HYDROCHLORIDE 5 MG/ML
5 INJECTION, SOLUTION INTRAVENOUS EVERY 10 MIN PRN
Status: DISCONTINUED | OUTPATIENT
Start: 2018-10-15 | End: 2018-10-15 | Stop reason: HOSPADM

## 2018-10-15 RX ORDER — ONDANSETRON 2 MG/ML
4 INJECTION INTRAMUSCULAR; INTRAVENOUS PRN
Status: DISCONTINUED | OUTPATIENT
Start: 2018-10-15 | End: 2018-10-15 | Stop reason: HOSPADM

## 2018-10-15 RX ORDER — MORPHINE SULFATE 10 MG/ML
1 INJECTION, SOLUTION INTRAMUSCULAR; INTRAVENOUS EVERY 5 MIN PRN
Status: DISCONTINUED | OUTPATIENT
Start: 2018-10-15 | End: 2018-10-15 | Stop reason: HOSPADM

## 2018-10-15 RX ORDER — ACETAMINOPHEN 500 MG
1000 TABLET ORAL EVERY 6 HOURS PRN
COMMUNITY
End: 2019-05-21 | Stop reason: ALTCHOICE

## 2018-10-15 RX ADMIN — PROPOFOL 50 MG: 10 INJECTION, EMULSION INTRAVENOUS at 09:30

## 2018-10-15 RX ADMIN — PROPOFOL 50 MG: 10 INJECTION, EMULSION INTRAVENOUS at 09:35

## 2018-10-15 RX ADMIN — PROPOFOL 100 MG: 10 INJECTION, EMULSION INTRAVENOUS at 09:22

## 2018-10-15 RX ADMIN — PROPOFOL 50 MG: 10 INJECTION, EMULSION INTRAVENOUS at 09:32

## 2018-10-15 RX ADMIN — PROPOFOL 50 MG: 10 INJECTION, EMULSION INTRAVENOUS at 09:28

## 2018-10-15 RX ADMIN — SODIUM CHLORIDE, POTASSIUM CHLORIDE, SODIUM LACTATE AND CALCIUM CHLORIDE: 600; 310; 30; 20 INJECTION, SOLUTION INTRAVENOUS at 09:16

## 2018-10-15 RX ADMIN — PROPOFOL 50 MG: 10 INJECTION, EMULSION INTRAVENOUS at 09:37

## 2018-10-15 RX ADMIN — PROPOFOL 100 MG: 10 INJECTION, EMULSION INTRAVENOUS at 09:25

## 2018-10-15 ASSESSMENT — PULMONARY FUNCTION TESTS
PIF_VALUE: 0

## 2018-10-15 ASSESSMENT — PAIN - FUNCTIONAL ASSESSMENT: PAIN_FUNCTIONAL_ASSESSMENT: 0-10

## 2018-10-15 NOTE — PROGRESS NOTES
Recovery completed. Patient/family received discharge instructions, understanding was verbalized. Patient was discharged via wheelchair to personal vehicle.

## 2018-10-15 NOTE — H&P
Return to same level of care    I assessed the patient and find that the patient is in satisfactory condition to proceed with the planned procedure and sedation plan. I have explained the risk, benefits, and alternatives to the procedure; the patient understands and agrees to proceed.        Erin Bray  10/15/2018

## 2018-10-16 ENCOUNTER — TELEPHONE (OUTPATIENT)
Dept: FAMILY MEDICINE CLINIC | Age: 63
End: 2018-10-16

## 2018-10-16 DIAGNOSIS — K52.9 COLITIS: ICD-10-CM

## 2018-10-16 DIAGNOSIS — M07.60 ENTEROPATHIC ARTHRITIS: ICD-10-CM

## 2018-10-16 DIAGNOSIS — M16.12 PRIMARY OSTEOARTHRITIS OF LEFT HIP: ICD-10-CM

## 2018-10-16 DIAGNOSIS — I87.2 VENOUS INSUFFICIENCY OF BOTH LOWER EXTREMITIES: Primary | ICD-10-CM

## 2018-10-23 ENCOUNTER — TELEPHONE (OUTPATIENT)
Dept: FAMILY MEDICINE CLINIC | Age: 63
End: 2018-10-23

## 2018-10-23 NOTE — LETTER
[facility]  [address] [facility]  Phone: [phone]  Fax: [phone]    YAHAIRA Cheney CNP        October 24, 2018     Patient: Tru Carrington   YOB: 1955   Date of Visit: 10/23/2018       To Whom It May Concern: It is my medical opinion that Evaline Blocker was off work starting on 9/28/18 through 10/15/18 due to her medical condition. If you have any questions or concerns, please don't hesitate to call.     Sincerely,        YAHAIRA Cheney CNP

## 2018-10-25 ENCOUNTER — TELEPHONE (OUTPATIENT)
Dept: FAMILY MEDICINE CLINIC | Age: 63
End: 2018-10-25

## 2018-10-25 RX ORDER — GABAPENTIN 100 MG/1
200 CAPSULE ORAL 3 TIMES DAILY
Qty: 180 CAPSULE | Refills: 1 | Status: SHIPPED | OUTPATIENT
Start: 2018-10-25 | End: 2018-11-07 | Stop reason: SDUPTHER

## 2018-10-26 ENCOUNTER — TELEPHONE (OUTPATIENT)
Dept: ORTHOPEDIC SURGERY | Age: 63
End: 2018-10-26

## 2018-10-26 NOTE — TELEPHONE ENCOUNTER
Spoke to the patient about her surgery date. We went with 12-5-18 at [facility], so she could get her paperwork done. She will get her H&P done at primary care doctor.

## 2018-10-29 ENCOUNTER — TELEPHONE (OUTPATIENT)
Dept: FAMILY MEDICINE CLINIC | Age: 63
End: 2018-10-29

## 2018-10-29 NOTE — TELEPHONE ENCOUNTER
Please see my previous telephone note from 10/23/18 stating the following    She can have note for 9/24/18 through 10/15/18 when she saw GI. She will need a separate note from Dr John Bourne regarding her back and how long she will need off.

## 2018-10-31 ENCOUNTER — TELEPHONE (OUTPATIENT)
Dept: FAMILY MEDICINE CLINIC | Age: 63
End: 2018-10-31

## 2018-10-31 ENCOUNTER — TELEPHONE (OUTPATIENT)
Dept: ORTHOPEDIC SURGERY | Age: 63
End: 2018-10-31

## 2018-10-31 DIAGNOSIS — E78.2 MIXED HYPERLIPIDEMIA: ICD-10-CM

## 2018-10-31 RX ORDER — SIMVASTATIN 20 MG
TABLET ORAL
Qty: 90 TABLET | Refills: 0 | Status: SHIPPED | OUTPATIENT
Start: 2018-10-31 | End: 2018-11-08 | Stop reason: SDUPTHER

## 2018-11-07 ENCOUNTER — TELEPHONE (OUTPATIENT)
Dept: ORTHOPEDIC SURGERY | Age: 63
End: 2018-11-07

## 2018-11-07 ENCOUNTER — OFFICE VISIT (OUTPATIENT)
Dept: FAMILY MEDICINE CLINIC | Age: 63
End: 2018-11-07
Payer: COMMERCIAL

## 2018-11-07 VITALS
SYSTOLIC BLOOD PRESSURE: 122 MMHG | HEIGHT: 65 IN | WEIGHT: 199 LBS | DIASTOLIC BLOOD PRESSURE: 80 MMHG | HEART RATE: 71 BPM | OXYGEN SATURATION: 98 % | BODY MASS INDEX: 33.15 KG/M2

## 2018-11-07 DIAGNOSIS — K51.90 ULCERATIVE COLITIS WITHOUT COMPLICATIONS, UNSPECIFIED LOCATION (HCC): ICD-10-CM

## 2018-11-07 DIAGNOSIS — F32.A ANXIETY AND DEPRESSION: ICD-10-CM

## 2018-11-07 DIAGNOSIS — G89.29 CHRONIC LOW BACK PAIN, UNSPECIFIED BACK PAIN LATERALITY, WITH SCIATICA PRESENCE UNSPECIFIED: ICD-10-CM

## 2018-11-07 DIAGNOSIS — D64.9 NORMOCYTIC ANEMIA: ICD-10-CM

## 2018-11-07 DIAGNOSIS — I87.2 VENOUS INSUFFICIENCY OF BOTH LOWER EXTREMITIES: ICD-10-CM

## 2018-11-07 DIAGNOSIS — M54.5 CHRONIC LOW BACK PAIN, UNSPECIFIED BACK PAIN LATERALITY, WITH SCIATICA PRESENCE UNSPECIFIED: ICD-10-CM

## 2018-11-07 DIAGNOSIS — E78.2 MIXED HYPERLIPIDEMIA: ICD-10-CM

## 2018-11-07 DIAGNOSIS — E11.9 DIET-CONTROLLED TYPE 2 DIABETES MELLITUS (HCC): ICD-10-CM

## 2018-11-07 DIAGNOSIS — Z23 NEED FOR TUBERCULOSIS VACCINATION: ICD-10-CM

## 2018-11-07 DIAGNOSIS — I10 ESSENTIAL HYPERTENSION: ICD-10-CM

## 2018-11-07 DIAGNOSIS — J45.909 REACTIVE AIRWAY DISEASE WITHOUT COMPLICATION, UNSPECIFIED ASTHMA SEVERITY, UNSPECIFIED WHETHER PERSISTENT: ICD-10-CM

## 2018-11-07 DIAGNOSIS — F41.9 ANXIETY AND DEPRESSION: ICD-10-CM

## 2018-11-07 DIAGNOSIS — Z01.818 PRE-OP EXAMINATION: Primary | ICD-10-CM

## 2018-11-07 DIAGNOSIS — E03.9 HYPOTHYROIDISM, UNSPECIFIED TYPE: ICD-10-CM

## 2018-11-07 DIAGNOSIS — E53.8 B12 DEFICIENCY: ICD-10-CM

## 2018-11-07 LAB
A/G RATIO: 1.6 (ref 1.1–2.2)
ALBUMIN SERPL-MCNC: 4.4 G/DL (ref 3.4–5)
ALP BLD-CCNC: 81 U/L (ref 40–129)
ALT SERPL-CCNC: 15 U/L (ref 10–40)
ANION GAP SERPL CALCULATED.3IONS-SCNC: 13 MMOL/L (ref 3–16)
AST SERPL-CCNC: 22 U/L (ref 15–37)
BILIRUB SERPL-MCNC: 0.8 MG/DL (ref 0–1)
BUN BLDV-MCNC: 18 MG/DL (ref 7–20)
CALCIUM SERPL-MCNC: 9.7 MG/DL (ref 8.3–10.6)
CHLORIDE BLD-SCNC: 104 MMOL/L (ref 99–110)
CO2: 27 MMOL/L (ref 21–32)
CREAT SERPL-MCNC: 0.9 MG/DL (ref 0.6–1.2)
GFR AFRICAN AMERICAN: >60
GFR NON-AFRICAN AMERICAN: >60
GLOBULIN: 2.8 G/DL
GLUCOSE BLD-MCNC: 86 MG/DL (ref 70–99)
HCT VFR BLD CALC: 36 % (ref 36–48)
HEMOGLOBIN: 11.9 G/DL (ref 12–16)
MCH RBC QN AUTO: 30.3 PG (ref 26–34)
MCHC RBC AUTO-ENTMCNC: 33 G/DL (ref 31–36)
MCV RBC AUTO: 91.7 FL (ref 80–100)
PDW BLD-RTO: 17.4 % (ref 12.4–15.4)
PLATELET # BLD: 196 K/UL (ref 135–450)
PMV BLD AUTO: 9.1 FL (ref 5–10.5)
POTASSIUM SERPL-SCNC: 5 MMOL/L (ref 3.5–5.1)
RBC # BLD: 3.92 M/UL (ref 4–5.2)
SODIUM BLD-SCNC: 144 MMOL/L (ref 136–145)
TOTAL PROTEIN: 7.2 G/DL (ref 6.4–8.2)
WBC # BLD: 7.2 K/UL (ref 4–11)

## 2018-11-07 PROCEDURE — 86580 TB INTRADERMAL TEST: CPT | Performed by: NURSE PRACTITIONER

## 2018-11-07 PROCEDURE — 99213 OFFICE O/P EST LOW 20 MIN: CPT | Performed by: NURSE PRACTITIONER

## 2018-11-07 RX ORDER — FOLIC ACID 1 MG/1
TABLET ORAL
Qty: 90 TABLET | Refills: 1 | Status: SHIPPED | OUTPATIENT
Start: 2018-11-07 | End: 2019-05-17 | Stop reason: SDUPTHER

## 2018-11-07 RX ORDER — GABAPENTIN 100 MG/1
200 CAPSULE ORAL 3 TIMES DAILY
Qty: 540 CAPSULE | Refills: 0 | Status: SHIPPED | OUTPATIENT
Start: 2018-11-07 | End: 2019-05-21 | Stop reason: ALTCHOICE

## 2018-11-07 NOTE — PROGRESS NOTES
 magnesium oxide (MAG-OX) 400 MG tablet 1 tab bid 857 tablet 1    folic acid (FOLVITE) 1 MG tablet TAKE 1 TABLET BY MOUTH DAILY 90 tablet 1    levothyroxine (SYNTHROID) 200 MCG tablet Take 1 tablet by mouth daily 90 tablet 0    blood glucose test strips (ASCENSIA AUTODISC VI;ONE TOUCH ULTRA TEST VI) strip 1 each by In Vitro route daily As needed. 100 each 3    ONE TOUCH LANCETS MISC 1 each by Does not apply route daily 100 each 3    ferrous sulfate 325 (65 Fe) MG tablet TAKE 1 TABLET BY MOUTH DAILY. (Patient taking differently: PT TAKES AT NIGHT.) 90 tablet 1    mesalamine (LIALDA) 1.2 G EC tablet Take 300 mg by mouth 2 times daily  120 tablet 0       This patient presents to the office today for a preoperative consultation at the request of surgeon, Dr. Jak Abarca, who plans on performing Microlaminectomy L4-5, probable spinal fusion L4-5 with fibrillar allograft, possible microlumbar laminectomy L3-4 on December 5 at [facility].  The current problem began several months ago, and symptoms have been worsening with time. Conservative therapy: N/A.     Planned anesthesia: General   Known anesthesia problems: None   Bleeding risk: No recent or remote history of abnormal bleeding  Personal or FH of DVT/PE: No    Patient objection to receiving blood products: No    Patient Active Problem List   Diagnosis    Diet-controlled type 2 diabetes mellitus ([address]    Hyperlipidemia    HTN (hypertension)    Ulcerative colitis ([address]    Venous insufficiency of both lower extremities    Hypothyroidism    Obesity    Reactive airway disease    Anxiety and depression    Normocytic anemia    Baker's cyst in left popliteal fossa    Anemia    B12 deficiency    Enteropathic arthritis    Varicose vein of leg    Bilateral lower extremity edema    Urinary tract infection without hematuria    Colitis    Primary osteoarthritis of left hip       Past Medical History:   Diagnosis Date    Acute  Glucose, Ur 09/26/2018 Negative     Bilirubin Urine 09/26/2018 Negative     Ketones, Urine 09/26/2018 Negative     Specific Gravity, UA 09/26/2018 1.025     Blood, Urine 09/26/2018 Negative     pH, UA 09/26/2018 6.0     Protein, UA 09/26/2018 TRACE*    Urobilinogen, Urine 09/26/2018 0.2     Nitrite, Urine 09/26/2018 Negative     Leukocyte Esterase, Urine 09/26/2018 TRACE*    Microscopic Examination 09/26/2018 YES     Urine Reflex to Culture 09/26/2018 Yes     Urine Type 09/26/2018 Not Specified     Urine Culture, Routine 09/26/2018 <50,000 CFU/ml mixed skin/urogenital maggy.  No further workup     Casts 09/26/2018 0-1 Coarse Gran*    Mucus, UA 09/26/2018 1+*    WBC, UA 09/26/2018 3-5     RBC, UA 09/26/2018 None seen     Epi Cells 09/26/2018 3-5     Bacteria, UA 09/26/2018 1+*   Hospital Outpatient Visit on 09/25/2018   Component Date Value    Sodium 09/25/2018 140     Potassium reflex Magnesi* 09/25/2018 4.4     Chloride 09/25/2018 109     CO2 09/25/2018 18*    Anion Gap 09/25/2018 13     Glucose 09/25/2018 151*    BUN 09/25/2018 33*    CREATININE 09/25/2018 1.4*    GFR Non- 09/25/2018 38*    GFR  09/25/2018 46*    Calcium 09/25/2018 8.7     Total Protein 09/25/2018 6.1*    Alb 09/25/2018 2.7*    Albumin/Globulin Ratio 09/25/2018 0.8*    Total Bilirubin 09/25/2018 0.5     Alkaline Phosphatase 09/25/2018 165*    ALT 09/25/2018 9*    AST 09/25/2018 21     Globulin 09/25/2018 3.4     WBC 09/25/2018 4.9     RBC 09/25/2018 3.64*    Hemoglobin 09/25/2018 10.9*    Hematocrit 09/25/2018 32.9*    MCV 09/25/2018 90.5     MCH 09/25/2018 29.8     MCHC 09/25/2018 33.0     RDW 09/25/2018 15.5*    Platelets 92/41/6484 87*    MPV 09/25/2018 9.8    Admission on 09/24/2018, Discharged on 09/24/2018   Component Date Value    WBC 09/24/2018 5.3     RBC 09/24/2018 3.61*    Hemoglobin 09/24/2018 10.8*    Hematocrit 09/24/2018 32.4*    MCV 09/24/2018 09/03/2018 3.5     Chloride 09/03/2018 104     CO2 09/03/2018 24     Anion Gap 09/03/2018 12     Glucose 09/03/2018 110*    BUN 09/03/2018 25*    CREATININE 09/03/2018 1.4*    GFR Non- 09/03/2018 38*    GFR  09/03/2018 46*    Calcium 09/03/2018 9.2     Total Protein 09/03/2018 6.7     Alb 09/03/2018 3.6     Albumin/Globulin Ratio 09/03/2018 1.2     Total Bilirubin 09/03/2018 0.8     Alkaline Phosphatase 09/03/2018 91     ALT 09/03/2018 9*    AST 09/03/2018 14*    Globulin 09/03/2018 3.1     Lipase 09/03/2018 49.0     Lactic Acid 09/03/2018 1.5     Ventricular Rate 09/03/2018 73     Atrial Rate 09/03/2018 73     P-R Interval 09/03/2018 156     QRS Duration 09/03/2018 110     Q-T Interval 09/03/2018 418     QTc Calculation (Bazett) 09/03/2018 460     P Axis 09/03/2018 60     R Axis 09/03/2018 103     T Axis 09/03/2018 -2     Diagnosis 09/03/2018 Normal sinus rhythm with sinus arrhythmiaRightward axisPoor R wave progressionAbnormal ECGWhen compared with ECG of 09-OCT-2017 07:45,Questionable change in initial forces of Anterior leadsInverted T waves have replaced nonspecific T wave abnormality in Inferior leadsConfirmed by Ori Robles MD, Manual Push ([id number]) on 9/3/2018 1:10:11 PM     Color, UA 09/03/2018 Yellow     Clarity, UA 09/03/2018 SL CLOUDY*    Glucose, Ur 09/03/2018 Negative     Bilirubin Urine 09/03/2018 MODERATE*    Ketones, Urine 09/03/2018 TRACE*    Specific Gravity, UA 09/03/2018 1.025     Blood, Urine 09/03/2018 Negative     pH, UA 09/03/2018 5.0     Protein, UA 09/03/2018 TRACE*    Urobilinogen, Urine 09/03/2018 0.2     Nitrite, Urine 09/03/2018 POSITIVE*    Leukocyte Esterase, Urine 09/03/2018 Negative     Microscopic Examination 09/03/2018 YES     Urine Reflex to Culture 09/03/2018 Yes     Urine Type 09/03/2018 Not Specified     Urine Culture, Routine 09/03/2018 No growth at 18-36 hours     Mucus, UA 09/03/2018 Rare*    WBC, UA 08/11/2018 0.7     Neutrophils # 08/11/2018 3.6     Lymphocytes # 08/11/2018 1.1     Monocytes # 08/11/2018 0.4     Eosinophils # 08/11/2018 0.3     Basophils # 08/11/2018 0.0     Sodium 08/11/2018 142     Potassium 08/11/2018 4.5     Chloride 08/11/2018 105     CO2 08/11/2018 27     Anion Gap 08/11/2018 10     Glucose 08/11/2018 85     BUN 08/11/2018 14     CREATININE 08/11/2018 0.8     GFR Non- 08/11/2018 >60     GFR  08/11/2018 >60     Calcium 08/11/2018 9.5     Total Protein 08/11/2018 7.3     Alb 08/11/2018 3.7     Albumin/Globulin Ratio 08/11/2018 1.0*    Total Bilirubin 08/11/2018 0.7     Alkaline Phosphatase 08/11/2018 81     ALT 08/11/2018 8*    AST 08/11/2018 17     Globulin 08/11/2018 3.6     TSH 08/11/2018 0.39     Vit D, 25-Hydroxy 08/11/2018 55.1     Cholesterol, Total 08/11/2018 143     Triglycerides 08/11/2018 97     HDL 08/11/2018 48     LDL Calculated 08/11/2018 76     VLDL Cholesterol Calcula* 08/11/2018 19     Vitamin B-12 08/11/2018 455     Folate 08/11/2018 >20.00     Ferritin 08/11/2018 177.8*    Iron 08/11/2018 66     TIBC 08/11/2018 255*    Iron Saturation 08/11/2018 26     Hemoglobin A1C 08/11/2018 4.9     eAG 08/11/2018 93.9     Magnesium 08/11/2018 1.50*   There may be more visits with results that are not included. Assessment:       60 y.o. patient with planned surgery as above. Known risk factors for perioperative complications: Anemia, Asthma, Hypertension, Renal dysfunction  Current medications which may produce withdrawal symptoms if withheld perioperatively: none     1. Pre-op examination    2. Diet-controlled type 2 diabetes mellitus ([address]    3. Ulcerative colitis without complications, unspecified location ([address]    4. Mixed hyperlipidemia    5. Essential hypertension    6. Venous insufficiency of both lower extremities    7. Hypothyroidism, unspecified type    8.  Reactive airway disease without

## 2018-11-08 DIAGNOSIS — E78.2 MIXED HYPERLIPIDEMIA: ICD-10-CM

## 2018-11-08 RX ORDER — SIMVASTATIN 20 MG
TABLET ORAL
Qty: 90 TABLET | Refills: 1 | Status: SHIPPED | OUTPATIENT
Start: 2018-11-08 | End: 2019-09-04 | Stop reason: SDUPTHER

## 2018-11-09 ENCOUNTER — TELEPHONE (OUTPATIENT)
Dept: ORTHOPEDIC SURGERY | Age: 63
End: 2018-11-09

## 2018-11-09 LAB
INDURATION: NORMAL
TB SKIN TEST: NORMAL

## 2018-11-09 NOTE — TELEPHONE ENCOUNTER
FAXED [id number] [facility] 08/30/2018 DR. Zeng 77 @ [address] [facility] MANAGEMENT @ [phone]

## 2018-11-12 ENCOUNTER — OFFICE VISIT (OUTPATIENT)
Dept: RHEUMATOLOGY | Age: 63
End: 2018-11-12
Payer: COMMERCIAL

## 2018-11-12 VITALS
DIASTOLIC BLOOD PRESSURE: 72 MMHG | BODY MASS INDEX: 32.99 KG/M2 | HEIGHT: 65 IN | SYSTOLIC BLOOD PRESSURE: 132 MMHG | HEART RATE: 70 BPM | WEIGHT: 198 LBS | TEMPERATURE: 97.8 F

## 2018-11-12 DIAGNOSIS — M15.9 OSTEOARTHRITIS, GENERALIZED: ICD-10-CM

## 2018-11-12 DIAGNOSIS — K51.80 OTHER ULCERATIVE COLITIS WITHOUT COMPLICATION (HCC): ICD-10-CM

## 2018-11-12 DIAGNOSIS — M07.60 ENTEROPATHIC ARTHRITIS: Primary | ICD-10-CM

## 2018-11-12 PROCEDURE — 99245 OFF/OP CONSLTJ NEW/EST HI 55: CPT | Performed by: INTERNAL MEDICINE

## 2018-11-12 RX ORDER — BUDESONIDE 9 MG/1
9 TABLET, FILM COATED, EXTENDED RELEASE ORAL DAILY
COMMUNITY
End: 2019-05-21 | Stop reason: ALTCHOICE

## 2018-11-12 NOTE — PROGRESS NOTES
[address], [place]                                                           [address] [place] [address] [address] [facility] [facility]                                                             [phone] (D) [phone] (F)      Dear Dr. Oksana Harper, APRN - CNP:  Please find Rheumatology assessment. Thank you for giving me the opportunity to be involved in Saran Conte's care and I look forward following Saran Ortiz along with you. If you have any questions or concerns please feel free to reach me. Note is transcribed using voice recognition software. Inadvertent computerized transcription errors may be present. Patient identification: Cynthia Conte,: 1955,62 y.o. Sex: female     Assessment / Plan:  Saran Ortiz was seen today for joint pain. Diagnoses and all orders for this visit:    Enteropathic arthritis    Osteoarthritis, generalized    Other ulcerative colitis without complication ([facility]      Joint pain-combination of noninflammatory arthritis-osteoarthritis ( DIPS, knees, hips and spine), as well as inflammatory arthritis ( tanna MCPs, PIP and ankles)-likely related to ulcerative colitis. Joint pain were much better on Humira, however had recurrent  infections. Jackqueline Ing is planned. Methotrexate- ? Pancreatitis per patient. RF, CCP negative. Plan-  Recommend anti TNF agent ( Remicade or Cimzia) that will likely to help with inflammatory arthritis as well as inflammatory bowel diseases. She is open on trying anti TNF agent, was reserved in past because of recurrent infections and Humira. Recommend that she minimizes use of Celebrex, is taking 400 mg daily at this time. Encouraged patient to try Tylenol prn. She is also on budesonide 9 mg a day.     Lumbar fusion is planned early

## 2018-11-14 ENCOUNTER — TELEPHONE (OUTPATIENT)
Dept: FAMILY MEDICINE CLINIC | Age: 63
End: 2018-11-14

## 2018-11-16 ENCOUNTER — TELEPHONE (OUTPATIENT)
Dept: ORTHOPEDIC SURGERY | Age: 63
End: 2018-11-16

## 2018-11-16 NOTE — TELEPHONE ENCOUNTER
DOS   12/05/2018  CPT   77709  [id number]  [id number]  [id number]   DX   M43.10  OP SX AUTH  NPR    MICROLUMBAR LAMINECTOMY  L4 - L5  PROBABLE SPINAL FUSION L4 - L5  WITH FIBULAR ALLOGRAGT, POSSIBLE MICROLUMBAR LAMINECTOMY  L3 - L4  DR. LIZBETH RUELAS  INSURANCE:   BCBS  . ..  AN PLAN

## 2018-11-18 DIAGNOSIS — E03.9 HYPOTHYROIDISM, UNSPECIFIED TYPE: ICD-10-CM

## 2018-11-19 RX ORDER — LEVOTHYROXINE SODIUM 0.2 MG/1
TABLET ORAL
Qty: 90 TABLET | Refills: 0 | Status: SHIPPED | OUTPATIENT
Start: 2018-11-19 | End: 2019-02-16 | Stop reason: SDUPTHER

## 2018-11-29 ENCOUNTER — TELEPHONE (OUTPATIENT)
Dept: ORTHOPEDIC SURGERY | Age: 63
End: 2018-11-29

## 2018-12-03 ENCOUNTER — OFFICE VISIT (OUTPATIENT)
Dept: ORTHOPEDIC SURGERY | Age: 63
End: 2018-12-03
Payer: COMMERCIAL

## 2018-12-03 VITALS
DIASTOLIC BLOOD PRESSURE: 73 MMHG | HEART RATE: 84 BPM | HEIGHT: 65 IN | SYSTOLIC BLOOD PRESSURE: 126 MMHG | BODY MASS INDEX: 33.65 KG/M2 | WEIGHT: 201.94 LBS

## 2018-12-03 DIAGNOSIS — M48.062 SPINAL STENOSIS OF LUMBAR REGION WITH NEUROGENIC CLAUDICATION: Primary | ICD-10-CM

## 2018-12-03 PROCEDURE — 99213 OFFICE O/P EST LOW 20 MIN: CPT | Performed by: ORTHOPAEDIC SURGERY

## 2018-12-05 ENCOUNTER — TELEPHONE (OUTPATIENT)
Dept: ORTHOPEDIC SURGERY | Age: 63
End: 2018-12-05

## 2018-12-11 DIAGNOSIS — D64.9 ANEMIA, UNSPECIFIED TYPE: ICD-10-CM

## 2018-12-11 RX ORDER — FERROUS SULFATE 325(65) MG
TABLET ORAL
Qty: 90 TABLET | Refills: 3 | Status: SHIPPED | OUTPATIENT
Start: 2018-12-11 | End: 2018-12-31 | Stop reason: SDUPTHER

## 2018-12-31 DIAGNOSIS — D64.9 ANEMIA, UNSPECIFIED TYPE: ICD-10-CM

## 2018-12-31 RX ORDER — FERROUS SULFATE 325(65) MG
TABLET ORAL
Qty: 90 TABLET | Refills: 0 | Status: SHIPPED | OUTPATIENT
Start: 2018-12-31 | End: 2019-04-16 | Stop reason: SDUPTHER

## 2018-12-31 NOTE — TELEPHONE ENCOUNTER
From: Katy Atkinson  Sent: 12/29/2018 3:34 PM EST  Subject: Medication Renewal Request    Rory Gaytan.  Bakari Patel would like a refill of the following medications:     ferrous sulfate 325 (65 Fe) MG tablet Naty Filler Aris Jordan, APRN - CNP]   Patient Comment: please order 90 day supply from [facility]    Preferred pharmacy: [address], [address] [facility] [phone] - F [phone]

## 2019-01-09 ENCOUNTER — OFFICE VISIT (OUTPATIENT)
Dept: ORTHOPEDIC SURGERY | Age: 64
End: 2019-01-09
Payer: COMMERCIAL

## 2019-01-09 VITALS
DIASTOLIC BLOOD PRESSURE: 80 MMHG | HEART RATE: 93 BPM | HEIGHT: 65 IN | SYSTOLIC BLOOD PRESSURE: 146 MMHG | BODY MASS INDEX: 33.65 KG/M2 | WEIGHT: 201.94 LBS

## 2019-01-09 DIAGNOSIS — M43.10 DEGENERATIVE SPONDYLOLISTHESIS: ICD-10-CM

## 2019-01-09 DIAGNOSIS — M48.062 SPINAL STENOSIS OF LUMBAR REGION WITH NEUROGENIC CLAUDICATION: Primary | ICD-10-CM

## 2019-01-09 PROCEDURE — 99214 OFFICE O/P EST MOD 30 MIN: CPT | Performed by: PHYSICIAN ASSISTANT

## 2019-01-16 RX ORDER — GABAPENTIN 100 MG/1
CAPSULE ORAL
Qty: 540 CAPSULE | Refills: 0 | OUTPATIENT
Start: 2019-01-16

## 2019-01-21 ENCOUNTER — TELEPHONE (OUTPATIENT)
Dept: ORTHOPEDIC SURGERY | Age: 64
End: 2019-01-21

## 2019-01-22 ENCOUNTER — HOSPITAL ENCOUNTER (OUTPATIENT)
Age: 64
Setting detail: OUTPATIENT SURGERY
Discharge: HOME | End: 2019-01-22
Attending: PHYSICAL MEDICINE & REHABILITATION | Admitting: PHYSICAL MEDICINE & REHABILITATION
Payer: COMMERCIAL

## 2019-01-22 VITALS
HEART RATE: 68 BPM | WEIGHT: 208 LBS | HEIGHT: 65 IN | RESPIRATION RATE: 16 BRPM | BODY MASS INDEX: 34.66 KG/M2 | OXYGEN SATURATION: 100 % | SYSTOLIC BLOOD PRESSURE: 166 MMHG | TEMPERATURE: 97 F | DIASTOLIC BLOOD PRESSURE: 66 MMHG

## 2019-01-22 LAB
GLUCOSE BLD-MCNC: 81 MG/DL (ref 70–99)
PERFORMED ON: NORMAL

## 2019-01-22 PROCEDURE — 3600000002 HC SURGERY LEVEL 2 BASE: Performed by: PHYSICAL MEDICINE & REHABILITATION

## 2019-01-22 PROCEDURE — 6360000004 HC RX CONTRAST MEDICATION: Performed by: PHYSICAL MEDICINE & REHABILITATION

## 2019-01-22 PROCEDURE — 6360000002 HC RX W HCPCS: Performed by: PHYSICAL MEDICINE & REHABILITATION

## 2019-01-22 PROCEDURE — 2709999900 HC NON-CHARGEABLE SUPPLY: Performed by: PHYSICAL MEDICINE & REHABILITATION

## 2019-01-22 PROCEDURE — 7100000010 HC PHASE II RECOVERY - FIRST 15 MIN: Performed by: PHYSICAL MEDICINE & REHABILITATION

## 2019-01-22 RX ORDER — METHYLPREDNISOLONE ACETATE 40 MG/ML
INJECTION, SUSPENSION INTRA-ARTICULAR; INTRALESIONAL; INTRAMUSCULAR; SOFT TISSUE PRN
Status: DISCONTINUED | OUTPATIENT
Start: 2019-01-22 | End: 2019-01-22 | Stop reason: HOSPADM

## 2019-01-22 ASSESSMENT — PAIN - FUNCTIONAL ASSESSMENT
PAIN_FUNCTIONAL_ASSESSMENT: 0-10
PAIN_FUNCTIONAL_ASSESSMENT: PREVENTS OR INTERFERES SOME ACTIVE ACTIVITIES AND ADLS

## 2019-01-22 ASSESSMENT — PAIN DESCRIPTION - DESCRIPTORS: DESCRIPTORS: ACHING;SHARP

## 2019-02-05 ENCOUNTER — OFFICE VISIT (OUTPATIENT)
Dept: ORTHOPEDIC SURGERY | Age: 64
End: 2019-02-05
Payer: COMMERCIAL

## 2019-02-05 VITALS
DIASTOLIC BLOOD PRESSURE: 67 MMHG | HEIGHT: 65 IN | BODY MASS INDEX: 34.64 KG/M2 | SYSTOLIC BLOOD PRESSURE: 119 MMHG | WEIGHT: 207.89 LBS | HEART RATE: 88 BPM

## 2019-02-05 DIAGNOSIS — M43.10 DEGENERATIVE SPONDYLOLISTHESIS: ICD-10-CM

## 2019-02-05 DIAGNOSIS — M48.062 SPINAL STENOSIS OF LUMBAR REGION WITH NEUROGENIC CLAUDICATION: Primary | ICD-10-CM

## 2019-02-05 DIAGNOSIS — M48.062 LUMBAR STENOSIS WITH NEUROGENIC CLAUDICATION: ICD-10-CM

## 2019-02-05 PROCEDURE — 99212 OFFICE O/P EST SF 10 MIN: CPT | Performed by: PHYSICIAN ASSISTANT

## 2019-02-11 ENCOUNTER — OFFICE VISIT (OUTPATIENT)
Dept: RHEUMATOLOGY | Age: 64
End: 2019-02-11
Payer: COMMERCIAL

## 2019-02-11 VITALS
DIASTOLIC BLOOD PRESSURE: 78 MMHG | HEIGHT: 65 IN | HEART RATE: 74 BPM | TEMPERATURE: 98.5 F | BODY MASS INDEX: 35.82 KG/M2 | WEIGHT: 215 LBS | SYSTOLIC BLOOD PRESSURE: 118 MMHG

## 2019-02-11 DIAGNOSIS — Z79.1 NSAID LONG-TERM USE: ICD-10-CM

## 2019-02-11 DIAGNOSIS — M15.9 GENERALIZED OSTEOARTHRITIS: ICD-10-CM

## 2019-02-11 DIAGNOSIS — M25.50 POLYARTHRALGIA: Primary | ICD-10-CM

## 2019-02-11 PROCEDURE — 99214 OFFICE O/P EST MOD 30 MIN: CPT | Performed by: INTERNAL MEDICINE

## 2019-02-16 DIAGNOSIS — E83.42 HYPOMAGNESEMIA: ICD-10-CM

## 2019-02-16 DIAGNOSIS — E03.9 HYPOTHYROIDISM, UNSPECIFIED TYPE: ICD-10-CM

## 2019-02-18 RX ORDER — LEVOTHYROXINE SODIUM 0.2 MG/1
TABLET ORAL
Qty: 90 TABLET | Refills: 0 | Status: SHIPPED | OUTPATIENT
Start: 2019-02-18 | End: 2019-05-19 | Stop reason: SDUPTHER

## 2019-02-27 ENCOUNTER — TELEPHONE (OUTPATIENT)
Dept: ADMINISTRATIVE | Age: 64
End: 2019-02-27

## 2019-02-28 ENCOUNTER — HOSPITAL ENCOUNTER (OUTPATIENT)
Age: 64
Discharge: HOME | End: 2019-02-28
Payer: COMMERCIAL

## 2019-02-28 ENCOUNTER — HOSPITAL ENCOUNTER (OUTPATIENT)
Dept: MAMMOGRAPHY | Age: 64
Discharge: HOME | End: 2019-03-05
Payer: COMMERCIAL

## 2019-02-28 DIAGNOSIS — Z12.31 VISIT FOR SCREENING MAMMOGRAM: ICD-10-CM

## 2019-02-28 DIAGNOSIS — Z79.1 NSAID LONG-TERM USE: ICD-10-CM

## 2019-02-28 LAB
BASOPHILS ABSOLUTE: 0.1 K/UL (ref 0–0.2)
BASOPHILS RELATIVE PERCENT: 0.5 %
CREAT SERPL-MCNC: 1 MG/DL (ref 0.6–1.2)
EOSINOPHILS ABSOLUTE: 0.2 K/UL (ref 0–0.6)
EOSINOPHILS RELATIVE PERCENT: 1.4 %
GFR AFRICAN AMERICAN: >60
GFR NON-AFRICAN AMERICAN: 56
HCT VFR BLD CALC: 38 % (ref 36–48)
HEMOGLOBIN: 13 G/DL (ref 12–16)
LYMPHOCYTES ABSOLUTE: 1.8 K/UL (ref 1–5.1)
LYMPHOCYTES RELATIVE PERCENT: 15.5 %
MCH RBC QN AUTO: 31 PG (ref 26–34)
MCHC RBC AUTO-ENTMCNC: 34.2 G/DL (ref 31–36)
MCV RBC AUTO: 90.6 FL (ref 80–100)
MONOCYTES ABSOLUTE: 0.6 K/UL (ref 0–1.3)
MONOCYTES RELATIVE PERCENT: 5 %
NEUTROPHILS ABSOLUTE: 8.9 K/UL (ref 1.7–7.7)
NEUTROPHILS RELATIVE PERCENT: 77.6 %
PDW BLD-RTO: 14.2 % (ref 12.4–15.4)
PLATELET # BLD: 220 K/UL (ref 135–450)
PMV BLD AUTO: 8.7 FL (ref 5–10.5)
RBC # BLD: 4.2 M/UL (ref 4–5.2)
WBC # BLD: 11.5 K/UL (ref 4–11)

## 2019-02-28 PROCEDURE — 77067 SCR MAMMO BI INCL CAD: CPT

## 2019-02-28 PROCEDURE — 36415 COLL VENOUS BLD VENIPUNCTURE: CPT

## 2019-02-28 PROCEDURE — 85025 COMPLETE CBC W/AUTO DIFF WBC: CPT

## 2019-02-28 PROCEDURE — 82565 ASSAY OF CREATININE: CPT

## 2019-03-30 ENCOUNTER — HOSPITAL ENCOUNTER (OUTPATIENT)
Age: 64
Discharge: HOME | End: 2019-03-30
Payer: COMMERCIAL

## 2019-03-30 PROCEDURE — 87340 HEPATITIS B SURFACE AG IA: CPT

## 2019-03-30 PROCEDURE — 36415 COLL VENOUS BLD VENIPUNCTURE: CPT

## 2019-03-30 PROCEDURE — 87385 HISTOPLASMA CAPSUL AG IA: CPT

## 2019-03-30 PROCEDURE — 86706 HEP B SURFACE ANTIBODY: CPT

## 2019-03-31 LAB
HBV SURFACE AB TITR SER: <3.5 MIU/ML
HEPATITIS B SURFACE ANTIGEN INTERPRETATION: NORMAL

## 2019-04-03 ENCOUNTER — OFFICE VISIT (OUTPATIENT)
Dept: ORTHOPEDIC SURGERY | Age: 64
End: 2019-04-03
Payer: COMMERCIAL

## 2019-04-03 VITALS
HEIGHT: 65 IN | SYSTOLIC BLOOD PRESSURE: 122 MMHG | WEIGHT: 214.95 LBS | HEART RATE: 88 BPM | DIASTOLIC BLOOD PRESSURE: 79 MMHG | BODY MASS INDEX: 35.81 KG/M2

## 2019-04-03 DIAGNOSIS — M48.062 LUMBAR STENOSIS WITH NEUROGENIC CLAUDICATION: Primary | ICD-10-CM

## 2019-04-03 DIAGNOSIS — M54.16 LUMBAR RADICULITIS: ICD-10-CM

## 2019-04-03 LAB
HISTOPLASMA ANTIGEN, SERUM: <2 U/ML
HISTOPLASMA INTERPETATION: NEGATIVE

## 2019-04-03 PROCEDURE — 99214 OFFICE O/P EST MOD 30 MIN: CPT | Performed by: PHYSICIAN ASSISTANT

## 2019-04-03 RX ORDER — GABAPENTIN 100 MG/1
CAPSULE ORAL
Qty: 180 CAPSULE | Refills: 2 | Status: SHIPPED | OUTPATIENT
Start: 2019-04-03 | End: 2019-09-24 | Stop reason: SDUPTHER

## 2019-04-03 NOTE — PROGRESS NOTES
Follow up: SPINE    CHIEF COMPLAINT:    Chief Complaint   Patient presents with    Back Pain       HISTORY OF PRESENT ILLNESS:                The patient is a 58 y.o. female well known to us, here for a history of subacute/chronic recurrent aching and burning left low back/buttock/hip pain with numbness in the calf and now even extending to the lateral thigh. Symptoms have been increased over the last 2-3mo without trauma. Pain was worsened with prolonged walking or standing and improves some with sitting or resting. She received significant is very temporary relief with her last ANDRAE which was in January 2019. She is looking for a more permanent solution. She currently denies any contralateral radiating symptoms, no progressive weakness. No recent bowel or bladder changes. Past/Current Treatment       PT: Yes & aquatic therapy   Chiro: no   Injections:   8/16/16: Rt L2 TX ANDRAE  11/8/16: Rt L2-3, L4-5 TX ANDRAE-->50% improved   8/1/17: Lt L4-5 TX ANDRAE--40% improved  8/21/17 Lt L4-5 TX ANDRAE--70% improved  2/27/18 Left L4 5 TX ANDRAE--90% improved  1/22/19 Left L4 5 TX ANDRAE #1--85% IMPROVED--3-4wks only     Medications: Prednisone 5mg qd chronically, Gabapentin 100 I-II TID, NSAIDs, norco 5 sparingly   Surgery: Dr. Duncan Alt con't ESIs prior to sx.  S/p L THR--Dr. Wolf Fearing w/prior eval     Employment: [employer]     Past Medical History: Medical history form was reviewed & scanned into the chart until Media tab  Past Medical History:   Diagnosis Date    Acute pancreatitis 10/21/2014    Acute renal failure with tubular necrosis (HCC) 7/10/2015    Acute stasis dermatitis of left lower extremity 7/8/2015    Angina pectoris ([address] 5/23/2011    Cellulitis of left leg     Clostridium difficile infection 7/9/15, 6/4/15    Colitis     Hyperlipidemia     Hypertension     Left THR 1/25/2016    Palpitations 7/17/2013    Primary osteoarthritis of left hip 11/18/2015    Shock ([address] 10/10/2017    Superficial thrombophlebitis of left leg 7/8/2015    Thyroid disease     Venous ulcer of left leg (HCC)         REVIEW OF SYSTEMS:   CONSTITUTIONAL: Denies unexplained weight loss, fevers, chills or fatigue  NEUROLOGIC: Denies tremors or seizures         PHYSICAL EXAM:    Vitals: Blood pressure 122/79, pulse 88, height 5' 5\" (1.651 m), weight 214 lb 15.2 oz (97.5 kg), not currently breastfeeding. GENERAL EXAM:  · General Apparence: Patient is adequately groomed with no evidence of malnutrition. · Orientation: The patient is oriented to time, place and person. · Mood & Affect:The patient's mood and affect are appropriate   · Sensation: Sensation is intact without deficit  ·   LUMBAR/SACRAL EXAMINATION:  · Inspection: Local inspection shows no step-off or bruising. Mild kyphosis. · Palpation:   No evidence of tenderness at the midline. No tenderness bilaterally at the paraspinal or trochanters. There is no step-off or paraspinal spasm. · Range of Motion: 45° of flexion, 10° extension. Flexion alleviates her pain  · Strength:   Strength testing is 5/5 in all muscle groups tested. · Special Tests:   Straight leg raise and crossed SLR negative. Leg length and pelvis level.  0 out of 5 Coni's signs. · Skin: There are no rashes, ulcerations or lesions. · Reflexes: Reflexes are symmetrically 1+ at the patellar and ankle tendons exception of left patellar trace. Clonus absent bilaterally at the feet. · Gait & station: forward flexed with cane     · Additional Examinations:   · RIGHT LOWER EXTREMITY: Inspection/examination of the right lower extremity does not show any tenderness, deformity or injury. Range of motion is full. There is no gross instability. There are no rashes, ulcerations or lesions. Strength and tone are normal.  · LEFT LOWER EXTREMITY:  Inspection/examination of the left lower extremity does not show any tenderness, deformity or injury. Range of motion is full.  There is no gross instability. There are no rashes, ulcerations or lesions. Strength and tone are normal.    Diagnostic Testing:   Updated lumbar MRI scan report reviewed from 6/8/2018 showing L4 5 spondylolisthesis with moderate to severe central stenosis and severe left foraminal stenosis correlating with her clinical symptoms, multilevel DDD/spondylosis and facet arthropathy. Left lower extremity EMG report reviewed 6/29/2018 showing mild chronic left L5 radiculopathy    2 views lumbar spine 2018 show L4 5 spondylolisthesis with multilevel moderate to severe DDD/spondylosis and facet arthropathy, appears progressed compared to prior. Likely multilevel degenerative endplate changes mostly L1-2 (reviewed w/[facility]V)     Updated lumbar MRI scan report reviewed 7/20/2017 L4 5 spondylolisthesis with moderate central and moderate to severe left foraminal stenosis, moderate central stenosis L3 4 severe DDD L5-S1      Bilateral lower extremity EMGs 2016 shows chronic mild bilateral L5 and S1 polyradiculopathy       WBCs normal from 6-              Impression:  1) Subacute/chronic left LBP, left lumbar radiculitis, neurogenic claudication  2) Mod-severe L4-5 stenosis, spondy, DDD  3) Hip eval, Dr. Elina Renee, s/p L THR  4) DM, Colitis w/chronic prednisone  5) Sx consult, Dr. Kayy Rico prior to sx, temporary relief with recent ESIs      Plan:  1) We had a long discussion. Her last ANDRAE granted benefit only for a few weeks.   I recommend follow up with Dr. Sandra Thomas for reevaluation  2) Gabapentin 100mg I-II PO TID   3) Declining time off work  4) We discussed she may call directly if wishing to proceed with left L4 5 TX ANDRAE #2          Avery Dan [facility]

## 2019-04-05 ENCOUNTER — TELEPHONE (OUTPATIENT)
Dept: FAMILY MEDICINE CLINIC | Age: 64
End: 2019-04-05

## 2019-04-05 DIAGNOSIS — Z23 NEED FOR HEPATITIS A IMMUNIZATION: Primary | ICD-10-CM

## 2019-04-09 NOTE — TELEPHONE ENCOUNTER
HPV is normally recommended by the CDC until the age of 32. Will route to Ade to see her recommendations. Please wait for Ade's response:  Shaji Mijares may come in for her Hep A vaccine - this is a 2 shot series. She will get one today and then again in 6 months.

## 2019-04-09 NOTE — TELEPHONE ENCOUNTER
Called the pt back to clarify and she stated that she needs two injections. One for Hep A and the other HPV. Pt stated there are No orders.

## 2019-04-10 NOTE — TELEPHONE ENCOUNTER
Okay for the hepatitis A vaccine. I recommend that the patient call her rheumatologist and clarify that she really once her to have an HPV vaccine since it is not indicated at her age.

## 2019-04-11 DIAGNOSIS — K52.9 COLITIS: Primary | ICD-10-CM

## 2019-04-11 DIAGNOSIS — K51.90 ULCERATIVE COLITIS WITHOUT COMPLICATIONS, UNSPECIFIED LOCATION (HCC): ICD-10-CM

## 2019-04-11 DIAGNOSIS — M07.60 ENTEROPATHIC ARTHRITIS: ICD-10-CM

## 2019-04-11 DIAGNOSIS — D84.9 IMMUNOCOMPROMISED (HCC): ICD-10-CM

## 2019-04-11 NOTE — TELEPHONE ENCOUNTER
I spoke to Dr Amanda Phillips (GI) who will be putting patient on Rhonda Fellamy and will be imunocompromised.     He recommends (as well as cdc recommends) shingles vaccine, hep A, prevnar 13 (she had pneumovax in 2014) and HPV

## 2019-04-11 NOTE — TELEPHONE ENCOUNTER
Pt informed and Hep A is future ordered pt will have th  who is suggesting these injections call us to confirm they meant HPV.

## 2019-04-16 ENCOUNTER — NURSE ONLY (OUTPATIENT)
Dept: FAMILY MEDICINE CLINIC | Age: 64
End: 2019-04-16
Payer: COMMERCIAL

## 2019-04-16 DIAGNOSIS — M07.60 ENTEROPATHIC ARTHRITIS: ICD-10-CM

## 2019-04-16 DIAGNOSIS — D84.9 IMMUNOCOMPROMISED (HCC): ICD-10-CM

## 2019-04-16 DIAGNOSIS — D64.9 ANEMIA, UNSPECIFIED TYPE: ICD-10-CM

## 2019-04-16 DIAGNOSIS — K51.90 ULCERATIVE COLITIS WITHOUT COMPLICATIONS, UNSPECIFIED LOCATION (HCC): ICD-10-CM

## 2019-04-16 PROCEDURE — 90472 IMMUNIZATION ADMIN EACH ADD: CPT | Performed by: NURSE PRACTITIONER

## 2019-04-16 PROCEDURE — 90632 HEPA VACCINE ADULT IM: CPT | Performed by: NURSE PRACTITIONER

## 2019-04-16 PROCEDURE — 90471 IMMUNIZATION ADMIN: CPT | Performed by: NURSE PRACTITIONER

## 2019-04-16 PROCEDURE — 90670 PCV13 VACCINE IM: CPT | Performed by: NURSE PRACTITIONER

## 2019-04-17 RX ORDER — FERROUS SULFATE 325(65) MG
TABLET ORAL
Qty: 90 TABLET | Refills: 0 | Status: SHIPPED | OUTPATIENT
Start: 2019-04-17 | End: 2019-06-23 | Stop reason: SDUPTHER

## 2019-04-18 ENCOUNTER — OFFICE VISIT (OUTPATIENT)
Dept: ORTHOPEDIC SURGERY | Age: 64
End: 2019-04-18
Payer: COMMERCIAL

## 2019-04-18 ENCOUNTER — PRE-EVALUATION (OUTPATIENT)
Dept: ORTHOPEDIC SURGERY | Age: 64
End: 2019-04-18

## 2019-04-18 VITALS — BODY MASS INDEX: 35.81 KG/M2 | HEIGHT: 65 IN | WEIGHT: 214.95 LBS

## 2019-04-18 DIAGNOSIS — M48.062 SPINAL STENOSIS OF LUMBAR REGION WITH NEUROGENIC CLAUDICATION: Primary | ICD-10-CM

## 2019-04-18 DIAGNOSIS — M48.062 LUMBAR STENOSIS WITH NEUROGENIC CLAUDICATION: ICD-10-CM

## 2019-04-18 DIAGNOSIS — M43.10 DEGENERATIVE SPONDYLOLISTHESIS: ICD-10-CM

## 2019-04-18 PROCEDURE — 99213 OFFICE O/P EST LOW 20 MIN: CPT | Performed by: ORTHOPAEDIC SURGERY

## 2019-04-18 PROCEDURE — APPNB30 APP NON BILLABLE TIME 0-30 MINS: Performed by: PHYSICIAN ASSISTANT

## 2019-04-18 RX ORDER — TOFACITINIB 10 MG/1
TABLET, FILM COATED ORAL
COMMUNITY
Start: 2019-04-13 | End: 2019-06-20

## 2019-04-18 NOTE — PROGRESS NOTES
History of present illness:   Ms. Ellie Hollins  is a pleasant 58 y.o. female with a PMH of venous ulcer, C difficile, colitis, and pancreatitis, kindly referred by Vera Heart PA-C and Gregg Tan PA-C for consultation regarding her LBP and left leg numbness. She states her pain began insidiously about 2 years ago. The pain originates in her left low back and radiates into her left buttock. She notes numbness and tingling down the anterior aspect of her left leg to her foot. She denies saddle numbness or bowel or bladder dysfunction. Her pain is worst with prolonged standing, relieved with walking and sitting. However, she does note pain with sitting after working a full day. She states she can sit for a maximum of 1 hour and stand for a maximum of 10 minutes. The pain moderately disrupts her sleep. She has tried several epidural injections with temporary relief as well as physical therapy and aquatic therapy. She takes Celebrex, Gabapentin, and Flexeril. General exam:  She is well-developed and well-nourished, is in obvious pain and alert and oriented to person, place, and time. She demonstrates appropriate mood and affect. Her skin is warm and dry. Her gait is normal and she walks heel to toe without limp or instability. Back:  She stands with slight lumbar flexion. Her lumbar flexion, extension and lateral bending are moderately reduced with pain. She has mild tenderness over her lumbar spine without obvious muscle spasm. The skin over her lumbar spine is normal without a surgical scar. Lower extremities:  She has 5/5 motor strength of bilateral lower extremities. She has a negative straight leg raise, bilaterally. Deep tendon reflexes at knees and achilles are 2+. Sensation is intact to light touch L3 to S1 bilaterally. She has no clonus. Hip range of motion painless. Abdomen:  Non-tender and non-distended. Abdomen is not obese. No rash.     Imaging:  I reviewed MRI images of her lumbar spine from 6/8/18. They note multilevel spondylosis with degenerative scoliosis, grade 1 spondylolisthesis and severe central stenosis at L4-5, moderate to severe left foraminal stenosis for L5, and moderate stenosis L2-3 and L3-4. Assessment:  Lumbar degenerative scoliosis, degenerative spondylolisthesis with severe central stenosis, and moderate to severe foraminal stenosis L4-L5 and moderate central stenosis L2-L3    Plan:  We discussed treatment options including observation, additional oral steroids, physical therapy, epidural injection and microlumbar decompression or microlumbar decompression with fibula allograft. She wishes to proceed with L4-5 MLL with posterior fusion with fibula allograft possible and pssibile MLL L2-L3. We discussed the risks, benefits, and alternatives to surgery including the risks of nerve or vessel injury, paralysis, spinal blood clot, spinal fluid leak, death, infection, need for additional surgery for recurrent herniation or low back pain, failure of surgery to alleviate her symptoms and worse symptoms following surgery. She understands these risks and wishes to proceed with surgery. Her questions were answered. She was instructed to call us emergently if she begins to experience bowel or bladder dysfunction, saddle anesthesia, increasing muscle weakness, or worsening leg symptoms.

## 2019-04-19 ENCOUNTER — TELEPHONE (OUTPATIENT)
Dept: ORTHOPEDIC SURGERY | Age: 64
End: 2019-04-19

## 2019-04-19 NOTE — TELEPHONE ENCOUNTER
Call patient she is changing facilites with her mri , she now wants to go to [facility].

## 2019-04-19 NOTE — TELEPHONE ENCOUNTER
Left message for patient regarding MRI approval. Patient can call and schedule appointment at [facility] and Dr. Saravanan Henderson will give her a call with her results.

## 2019-04-23 NOTE — TELEPHONE ENCOUNTER
Spoke with patient and let her know that I will have to put in for a facility change on the MRI. We will call her once it has been approved again.

## 2019-04-24 ENCOUNTER — TELEPHONE (OUTPATIENT)
Dept: ORTHOPEDIC SURGERY | Age: 64
End: 2019-04-24

## 2019-04-24 NOTE — TELEPHONE ENCOUNTER
Called patient to let them know of MRI/CT SCAN approval. Gagandeep Laura has been faxed auth# and demographic information. Patient was instructed to call and set up appointment and Dr. Jimmy Maurer office will call with results.

## 2019-04-29 ENCOUNTER — TELEPHONE (OUTPATIENT)
Dept: ORTHOPEDIC SURGERY | Age: 64
End: 2019-04-29

## 2019-04-29 ENCOUNTER — TELEPHONE (OUTPATIENT)
Dept: FAMILY MEDICINE CLINIC | Age: 64
End: 2019-04-29

## 2019-04-29 NOTE — TELEPHONE ENCOUNTER
Pt called following up on a request she made last week to send a Rx for Shingles vaccine to be sent to [facility] to be sent to our office for pt to get vaccine(Not seeing anything documented in her chart regarding this). Pt also requesting a Rx for HPV vaccine to be sent to [facility] as well. Call back pt [phone].

## 2019-04-30 DIAGNOSIS — Z23 NEED FOR SHINGLES VACCINE: ICD-10-CM

## 2019-04-30 DIAGNOSIS — Z23 NEED FOR HPV VACCINE: Primary | ICD-10-CM

## 2019-05-01 DIAGNOSIS — Z23 NEED FOR HPV VACCINE: ICD-10-CM

## 2019-05-08 ENCOUNTER — TELEPHONE (OUTPATIENT)
Dept: ORTHOPEDIC SURGERY | Age: 64
End: 2019-05-08

## 2019-05-09 ENCOUNTER — TELEPHONE (OUTPATIENT)
Dept: ORTHOPEDIC SURGERY | Age: 64
End: 2019-05-09

## 2019-05-09 ENCOUNTER — NURSE ONLY (OUTPATIENT)
Dept: FAMILY MEDICINE CLINIC | Age: 64
End: 2019-05-09
Payer: COMMERCIAL

## 2019-05-09 DIAGNOSIS — Z23 NEED FOR HPV VACCINE: Primary | ICD-10-CM

## 2019-05-09 PROCEDURE — 90651 9VHPV VACCINE 2/3 DOSE IM: CPT | Performed by: NURSE PRACTITIONER

## 2019-05-09 PROCEDURE — 90471 IMMUNIZATION ADMIN: CPT | Performed by: NURSE PRACTITIONER

## 2019-05-17 RX ORDER — FOLIC ACID 1 MG/1
TABLET ORAL
Qty: 90 TABLET | Refills: 0 | Status: SHIPPED | OUTPATIENT
Start: 2019-05-17 | End: 2019-08-16 | Stop reason: SDUPTHER

## 2019-05-19 DIAGNOSIS — E03.9 HYPOTHYROIDISM, UNSPECIFIED TYPE: ICD-10-CM

## 2019-05-20 RX ORDER — LEVOTHYROXINE SODIUM 0.2 MG/1
TABLET ORAL
Qty: 90 TABLET | Refills: 1 | Status: SHIPPED | OUTPATIENT
Start: 2019-05-20 | End: 2019-05-22

## 2019-05-21 ENCOUNTER — OFFICE VISIT (OUTPATIENT)
Dept: FAMILY MEDICINE CLINIC | Age: 64
End: 2019-05-21
Payer: COMMERCIAL

## 2019-05-21 VITALS
WEIGHT: 222 LBS | OXYGEN SATURATION: 97 % | HEART RATE: 94 BPM | SYSTOLIC BLOOD PRESSURE: 127 MMHG | DIASTOLIC BLOOD PRESSURE: 83 MMHG | HEIGHT: 65 IN | BODY MASS INDEX: 36.99 KG/M2 | TEMPERATURE: 98 F

## 2019-05-21 DIAGNOSIS — R53.82 CHRONIC FATIGUE: ICD-10-CM

## 2019-05-21 DIAGNOSIS — E03.9 HYPOTHYROIDISM, UNSPECIFIED TYPE: ICD-10-CM

## 2019-05-21 DIAGNOSIS — Z01.818 PRE-OP EXAM: Primary | ICD-10-CM

## 2019-05-21 DIAGNOSIS — E11.9 DIET-CONTROLLED TYPE 2 DIABETES MELLITUS (HCC): ICD-10-CM

## 2019-05-21 DIAGNOSIS — R01.1 HEART MURMUR: ICD-10-CM

## 2019-05-21 LAB
A/G RATIO: 1.6 (ref 1.1–2.2)
ALBUMIN SERPL-MCNC: 4.5 G/DL (ref 3.4–5)
ALP BLD-CCNC: 83 U/L (ref 40–129)
ALT SERPL-CCNC: 13 U/L (ref 10–40)
ANION GAP SERPL CALCULATED.3IONS-SCNC: 16 MMOL/L (ref 3–16)
AST SERPL-CCNC: 24 U/L (ref 15–37)
BASOPHILS ABSOLUTE: 0 K/UL (ref 0–0.2)
BASOPHILS RELATIVE PERCENT: 0.5 %
BILIRUB SERPL-MCNC: 1.3 MG/DL (ref 0–1)
BUN BLDV-MCNC: 19 MG/DL (ref 7–20)
CALCIUM SERPL-MCNC: 9.9 MG/DL (ref 8.3–10.6)
CHLORIDE BLD-SCNC: 101 MMOL/L (ref 99–110)
CO2: 25 MMOL/L (ref 21–32)
CREAT SERPL-MCNC: 1 MG/DL (ref 0.6–1.2)
EOSINOPHILS ABSOLUTE: 0.2 K/UL (ref 0–0.6)
EOSINOPHILS RELATIVE PERCENT: 3 %
GFR AFRICAN AMERICAN: >60
GFR NON-AFRICAN AMERICAN: 56
GLOBULIN: 2.8 G/DL
GLUCOSE BLD-MCNC: 80 MG/DL (ref 70–99)
HCT VFR BLD CALC: 33 % (ref 36–48)
HEMOGLOBIN: 11.4 G/DL (ref 12–16)
LYMPHOCYTES ABSOLUTE: 1.2 K/UL (ref 1–5.1)
LYMPHOCYTES RELATIVE PERCENT: 19.5 %
MAGNESIUM: 1.5 MG/DL (ref 1.8–2.4)
MCH RBC QN AUTO: 31.6 PG (ref 26–34)
MCHC RBC AUTO-ENTMCNC: 34.7 G/DL (ref 31–36)
MCV RBC AUTO: 91 FL (ref 80–100)
MONOCYTES ABSOLUTE: 0.6 K/UL (ref 0–1.3)
MONOCYTES RELATIVE PERCENT: 10.4 %
NEUTROPHILS ABSOLUTE: 4.1 K/UL (ref 1.7–7.7)
NEUTROPHILS RELATIVE PERCENT: 66.6 %
PDW BLD-RTO: 14.5 % (ref 12.4–15.4)
PLATELET # BLD: 185 K/UL (ref 135–450)
PMV BLD AUTO: 9.2 FL (ref 5–10.5)
POTASSIUM SERPL-SCNC: 4.1 MMOL/L (ref 3.5–5.1)
PRO-BNP: 253 PG/ML (ref 0–124)
RBC # BLD: 3.62 M/UL (ref 4–5.2)
SODIUM BLD-SCNC: 142 MMOL/L (ref 136–145)
T4 FREE: 2.1 NG/DL (ref 0.9–1.8)
TOTAL PROTEIN: 7.3 G/DL (ref 6.4–8.2)
TSH REFLEX: <0.01 UIU/ML (ref 0.27–4.2)
WBC # BLD: 6.2 K/UL (ref 4–11)

## 2019-05-21 PROCEDURE — 99243 OFF/OP CNSLTJ NEW/EST LOW 30: CPT | Performed by: NURSE PRACTITIONER

## 2019-05-21 PROCEDURE — 93000 ELECTROCARDIOGRAM COMPLETE: CPT | Performed by: NURSE PRACTITIONER

## 2019-05-21 PROCEDURE — 36415 COLL VENOUS BLD VENIPUNCTURE: CPT | Performed by: NURSE PRACTITIONER

## 2019-05-21 ASSESSMENT — PATIENT HEALTH QUESTIONNAIRE - PHQ9
SUM OF ALL RESPONSES TO PHQ QUESTIONS 1-9: 0
2. FEELING DOWN, DEPRESSED OR HOPELESS: 0
1. LITTLE INTEREST OR PLEASURE IN DOING THINGS: 0
SUM OF ALL RESPONSES TO PHQ QUESTIONS 1-9: 0
SUM OF ALL RESPONSES TO PHQ9 QUESTIONS 1 & 2: 0

## 2019-05-21 NOTE — PROGRESS NOTES
[address]                             Preoperative Evaluation        Micah Torres  YOB: 1955    Date of Service:  5/21/2019    Vitals:    05/21/19 1344   BP: 127/83   Site: Left Upper Arm   Position: Sitting   Cuff Size: Large Adult   Pulse: 94   Temp: 98 °F (36.7 °C)   SpO2: 97%   Weight: 222 lb (100.7 kg)   Height: 5' 5\" (1.651 m)      Wt Readings from Last 2 Encounters:   05/21/19 222 lb (100.7 kg)   04/18/19 214 lb 15.2 oz (97.5 kg)     BP Readings from Last 3 Encounters:   05/21/19 127/83   04/03/19 122/79   02/11/19 118/78        Chief Complaint   Patient presents with    Pre-op Exam     pt is here for pre op for spinal surgery. SUrgery is being done by Dr Norma Andrew on 6/5/19 at [facility] Fax [phone]     Allergies   Allergen Reactions    Amoxicillin      \"colitis\"    Bactroban [Mupirocin Calcium]      Abdominal pain    Pravastatin Rash    Wellbutrin [Bupropion] Other (See Comments) and Rash     Caused sores in mouth     Outpatient Medications Marked as Taking for the 5/21/19 encounter (Office Visit) with YAHAIRA Lara CNP   Medication Sig Dispense Refill    levothyroxine (SYNTHROID) 200 MCG tablet TAKE 1 TABLET DAILY 90 tablet 1    folic acid (FOLVITE) 1 MG tablet TAKE 1 TABLET DAILY 90 tablet 0    ferrous sulfate 325 (65 Fe) MG tablet TAKE 1 TABLET DAILY 90 tablet 0    gabapentin (NEURONTIN) 100 MG capsule I-II PO  capsule 2    magnesium oxide (MAG-OX) 400 (241.3 Mg) MG TABS tablet TAKE 1 TABLET TWICE A  tablet 1    Cholecalciferol (VITAMIN D3) 72848 units CAPS TAKE 1 CAPSULE EVERY WEEK 5 capsule 1    blood glucose test strips (ASCENSIA AUTODISC VI;ONE TOUCH ULTRA TEST VI) strip 1 each by In Vitro route daily As needed.  100 each 3    simvastatin (ZOCOR) 20 MG tablet TAKE 1 TABLET BY MOUTH NIGHTLY 90 tablet 1    celecoxib (CELEBREX) 100 MG capsule Take 200 mg by mouth 2 times daily      Melatonin CR 3 MG TBCR Take 1 tablet by mouth nightly       albuterol sulfate HFA (VENTOLIN HFA) 108 (90 Base) MCG/ACT inhaler Inhale 2 puffs into the lungs every 6 hours as needed for Wheezing 1 Inhaler 1    ONE TOUCH LANCETS MISC 1 each by Does not apply route daily 100 each 3    mesalamine (LIALDA) 1.2 G EC tablet Take 2,400 mg by mouth 2 times daily  120 tablet 0       This patient presents to the office today for a preoperative consultation at the request of surgeon, Dr. Arlet Zheng who plans on performing microlumbar laminectomy L4-L5, probable spinal fusion of L4 -L5 with fibular allograft, possible microlumbar laminectomy L3 to L4 on June 5 at [address] [place] [address].  The current problem began 2 years ago, and symptoms have been worsening with time. Conservative therapy: epidural injections, physical therapy, aquatic therapy, Celebrex, Gabapentin, and Flexeril.     Planned anesthesia: General   Known anesthesia problems: None   Bleeding risk: No recent or remote history of abnormal bleeding  Personal or FH of DVT/PE: No      Patient Active Problem List   Diagnosis    Diet-controlled type 2 diabetes mellitus ([address]    Hyperlipidemia    HTN (hypertension)    Ulcerative colitis ([address]    Venous insufficiency of both lower extremities    Hypothyroidism    Obesity    Reactive airway disease    Anxiety and depression    Normocytic anemia    Baker's cyst in left popliteal fossa    Anemia    B12 deficiency    Enteropathic arthritis    Varicose vein of leg    Bilateral lower extremity edema    Urinary tract infection without hematuria    Colitis    Primary osteoarthritis of left hip       Past Medical History:   Diagnosis Date    Acute pancreatitis 10/21/2014    Acute renal failure with tubular necrosis (HCC) 7/10/2015    Acute stasis dermatitis of left lower extremity 7/8/2015    Angina pectoris ([address] 5/23/2011    Cellulitis of left leg  Chronic back pain     Clostridium difficile infection 7/9/15, 6/4/15    Colitis     Hyperlipidemia     Hypertension     Left THR 1/25/2016    Palpitations 7/17/2013    Primary osteoarthritis of left hip 11/18/2015    Shock ([address] 10/10/2017    Superficial thrombophlebitis of left leg 7/8/2015    Thyroid disease     Venous ulcer of left leg [facility])      Past Surgical History:   Procedure Laterality Date    APPENDECTOMY      CARDIAC CATHETERIZATION  05/2014    COLONOSCOPY      EPIDURAL STEROID INJECTION Left 1/22/2019    LEFT LUMBAR FOUR FIVE EPIDURAL STEROID INJECTION SITE CONFIRMED BY FLUOROSCOPY performed by Jeremiah Coffey MD at [address] [facility] Left 01/19/2016    [place] EGD INTRMURAL NEEDLE ASPIR/BIOP ALTERED ANATOMY  10/15/2018    EGD ESOPHAGOGASTRODUODENOSCOPY ULTRASOUND 0403, 1583 performed by Leonardo Nunez DO at [address] EGD TRANSORAL BIOPSY SINGLE/MULTIPLE  10/15/2018    EGD BIOPSY performed by Leonardo Nunez DO at [address] [facility], [address] ENDOSCOPY  10/15/2018    EGD [facility] EUS    VARICOSE VEIN SURGERY       Family History   Problem Relation Age of Onset    Diabetes Mother     Alzheimer's Disease Mother     Anemia Mother     Diabetes Father     Cancer Father 68        pancreatic    Cancer Maternal Grandmother [de-identified]        Bone Marrow Ca     Social History     Socioeconomic History    Marital status:       Spouse name: Dorothy Recio Number of children: Not on file    Years of education: Not on file    Highest education level: Not on file   Occupational History    Not on file   Social Needs    Financial resource strain: Not on file    Food insecurity:     Worry: Not on file     Inability: Not on file    Transportation needs:     Medical: Not on file     Non-medical: Not on file   Tobacco Use    Smoking status: Never Smoker    Smokeless tobacco: Never Used    Tobacco comment: never even tried it   Substance and Sexual Activity    Alcohol use: No     Alcohol/week: 0.0 oz     Comment: rarely    Drug use: No    Sexual activity: Not Currently   Lifestyle    Physical activity:     Days per week: Not on file     Minutes per session: Not on file    Stress: Not on file   Relationships    Social connections:     Talks on phone: Not on file     Gets together: Not on file     Attends Oriental orthodox service: Not on file     Active member of club or organization: Not on file     Attends meetings of clubs or organizations: Not on file     Relationship status: Not on file    Intimate partner violence:     Fear of current or ex partner: Not on file     Emotionally abused: Not on file     Physically abused: Not on file     Forced sexual activity: Not on file   Other Topics Concern    Not on file   Social History Narrative    Not on file       Review of Systems  A comprehensive review of systems was negative except for: Fatigue, weight gain, low back pain     Physical Exam   Constitutional: She is oriented to person, place, and time. She appears well-developed and well-nourished. No distress. Obese  HENT:   Head: Normocephalic and atraumatic. Mouth/Throat: Uvula is midline, oropharynx is clear and moist and mucous membranes are normal.   Eyes: Conjunctivae and EOM are normal. Pupils are equal, round, and reactive to light. Neck: Trachea normal and normal range of motion. Neck supple. No JVD present. Carotid bruit is not present. No mass and no thyromegaly present. Cardiovascular: Normal rate, regular rhythm, normal heart sounds and intact distal pulses. Exam reveals no gallop and no friction rub. 2/6 systolic murmur noted. Pulmonary/Chest: Effort normal and breath sounds normal. No respiratory distress. She has no wheezes. She has no rales. Abdominal: Soft. Normal aorta and bowel sounds are normal. She exhibits no distension and no mass. There is no hepatosplenomegaly. No tenderness. Musculoskeletal: She exhibits no edema and no tenderness. Neurological: She is alert and oriented to person, place, and time. She has normal strength. No cranial nerve deficit or sensory deficit. Coordination and gait normal.   Skin: Skin is warm and dry. No rash noted. No erythema. Psychiatric: She has a normal mood and affect. Her behavior is normal.     EKG Interpretation:  Sinus arrhythmia, PVC noted. .    Lab Review   Hospital Outpatient Visit on 03/30/2019   Component Date Value    Histoplasma Antigen, Ser* 03/30/2019 <2.0     HISTOPLASMA INTERPETATION 03/30/2019 Negative     Hep B S Ag Interp 03/30/2019 Non-reactive     Hep B S Ab 03/30/2019 <3.50    Hospital Outpatient Visit on 02/28/2019   Component Date Value    WBC 02/28/2019 11.5*    RBC 02/28/2019 4.20     Hemoglobin 02/28/2019 13.0     Hematocrit 02/28/2019 38.0     MCV 02/28/2019 90.6     MCH 02/28/2019 31.0     MCHC 02/28/2019 34.2     RDW 02/28/2019 14.2     Platelets 33/68/3872 220     MPV 02/28/2019 8.7     Neutrophils % 02/28/2019 77.6     Lymphocytes % 02/28/2019 15.5     Monocytes % 02/28/2019 5.0     Eosinophils % 02/28/2019 1.4     Basophils % 02/28/2019 0.5     Neutrophils # 02/28/2019 8.9*    Lymphocytes # 02/28/2019 1.8     Monocytes # 02/28/2019 0.6     Eosinophils # 02/28/2019 0.2     Basophils # 02/28/2019 0.1     CREATININE 02/28/2019 1.0     GFR Non- 02/28/2019 56*    GFR  02/28/2019 >60    Admission on 01/22/2019, Discharged on 01/22/2019   Component Date Value    POC Glucose 01/22/2019 81     Performed on 01/22/2019 ACCU-CHEK            Assessment:       58 y.o. patient with planned surgery as above. Known risk factors for perioperative complications: DM, reactive airway disease, HTN, HLD  Current medications which may produce withdrawal symptoms if withheld perioperatively: none     1. Pre-op exam    2. Hypothyroidism, unspecified type    3.  Diet-controlled type (BNP)    Chronic fatigue  -     Vitamin B12 & Folate  -     Vitamin D 25 Hydroxy

## 2019-05-21 NOTE — PATIENT INSTRUCTIONS
Patient Education        Learning About How to Prepare for Surgery  How can you prepare before surgery? You can do some things that will help you safely prepare for surgery. · Understand exactly what surgery is planned. You should know the risks, benefits, and other options. · Tell your doctors ALL the medicines, vitamins, supplements, and herbal remedies you take. Some of these can increase the risk of bleeding. Or they may interact with anesthesia. · Follow your doctor's instructions about which medicines to take or stop before your surgery. ? You may need to stop taking some medicines a week or more before surgery. ? If you take aspirin or some other blood thinner, be sure to talk to your doctor. · Follow any other instructions your doctor gave you. · If you have an advance directive, let your doctor know, and bring a copy to the hospital.   It may include a living will and a durable power of  for health care. It lets your doctor and loved ones know your health care wishes. If you don't have one, you may want to prepare one. How can you prepare on the day of surgery? Here are some tips about what to do at home before you leave for your surgery. · If your doctor told you to take your medicines on the day of surgery, take them with only a sip of water. · Follow the instructions about when to stop eating and drinking. If you don't, your surgery may be canceled. · Follow your doctor's instructions about when to bathe or shower before your surgery. · Do not shave the surgical site yourself. · Take off all jewelry and piercings. · Take out contact lenses, if you wear them. · Have a picture ID ready to take with you. Your ID will be checked before your surgery. · Know when to call your doctor. Call your doctor if you:  ? Become ill before surgery. ? Need to reschedule. ? Have changed your mind about having the surgery. What happens before surgery?   Here are some things you can expect to happen before your surgery. · Your picture ID will be checked. · The area of your body that needs surgery is often marked to make sure there are no errors. · You will be kept comfortable and safe by your anesthesia provider. The anesthesia may make you sleep. Or it may just numb the area being worked on. What happens when you are ready to go home? Be sure you have someone drive you home. Anesthesia and pain medicine make it unsafe for you to drive. You will get instructions about recovering from your surgery. This is called a discharge plan. It will cover things like diet, wound care, follow-up care, driving, and getting back to your normal routine. Follow-up care is a key part of your treatment and safety. Be sure to make and go to all appointments, and call your doctor if you are having problems. It's also a good idea to know your test results and keep a list of the medicines you take. Where can you learn more? Go to https://Practice Management e-Tools.[employer]. org and sign in to your [facility] account. Enter Q270 in the Keego box to learn more about \"Learning About How to Prepare for Surgery. \"     If you do not have an account, please click on the \"Sign Up Now\" link. Current as of: December 13, 2018  Content Version: 12.0  © 2744-8297 Healthwise, Incorporated. Care instructions adapted under license by [facility] ([facility]). If you have questions about a medical condition or this instruction, always ask your healthcare professional. [place] [address] any warranty or liability for your use of this information.

## 2019-05-22 DIAGNOSIS — D64.9 ANEMIA, UNSPECIFIED TYPE: ICD-10-CM

## 2019-05-22 DIAGNOSIS — D64.9 ANEMIA, UNSPECIFIED TYPE: Primary | ICD-10-CM

## 2019-05-22 DIAGNOSIS — E83.42 HYPOMAGNESEMIA: ICD-10-CM

## 2019-05-22 DIAGNOSIS — E53.8 LOW SERUM VITAMIN B12: ICD-10-CM

## 2019-05-22 DIAGNOSIS — E03.9 HYPOTHYROIDISM, UNSPECIFIED TYPE: ICD-10-CM

## 2019-05-22 LAB
ESTIMATED AVERAGE GLUCOSE: 116.9 MG/DL
FERRITIN: 258.9 NG/ML (ref 15–150)
FOLATE: >20 NG/ML (ref 4.78–24.2)
HBA1C MFR BLD: 5.7 %
IRON SATURATION: 19 % (ref 15–50)
IRON: 62 UG/DL (ref 37–145)
TOTAL IRON BINDING CAPACITY: 331 UG/DL (ref 260–445)
VITAMIN B-12: 261 PG/ML (ref 211–911)
VITAMIN D 25-HYDROXY: 57 NG/ML

## 2019-05-22 RX ORDER — LEVOTHYROXINE SODIUM 175 UG/1
175 TABLET ORAL DAILY
Qty: 30 TABLET | Refills: 2 | Status: SHIPPED | OUTPATIENT
Start: 2019-05-22 | End: 2019-06-06 | Stop reason: SDUPTHER

## 2019-05-23 ENCOUNTER — TELEPHONE (OUTPATIENT)
Dept: ORTHOPEDIC SURGERY | Age: 64
End: 2019-05-23

## 2019-05-23 LAB
MRSA CULTURE ONLY: ABNORMAL
MRSA CULTURE ONLY: ABNORMAL
ORGANISM: ABNORMAL

## 2019-06-06 DIAGNOSIS — E03.9 HYPOTHYROIDISM, UNSPECIFIED TYPE: ICD-10-CM

## 2019-06-06 RX ORDER — LEVOTHYROXINE SODIUM 175 UG/1
175 TABLET ORAL DAILY
Qty: 90 TABLET | Refills: 4 | Status: SHIPPED | OUTPATIENT
Start: 2019-06-06 | End: 2019-06-25

## 2019-06-07 ENCOUNTER — TELEPHONE (OUTPATIENT)
Dept: ORTHOPEDIC SURGERY | Age: 64
End: 2019-06-07

## 2019-06-08 ENCOUNTER — HOSPITAL ENCOUNTER (OUTPATIENT)
Age: 64
Discharge: HOME | End: 2019-06-08
Payer: COMMERCIAL

## 2019-06-08 PROCEDURE — 83993 ASSAY FOR CALPROTECTIN FECAL: CPT

## 2019-06-11 ENCOUNTER — TELEPHONE (OUTPATIENT)
Dept: FAMILY MEDICINE CLINIC | Age: 64
End: 2019-06-11

## 2019-06-12 LAB — CALPROTECTIN: 362 UG/G

## 2019-06-13 NOTE — TELEPHONE ENCOUNTER
Lew Bolton the fax regarding clarification on this yesterday.   It is okay to fill medication as requested

## 2019-06-18 NOTE — PROGRESS NOTES
Sharla Celeste    Age 58 y.o.    female    1955    MRN [id number]    Date___________   Arrival Time_____________  OR Time____________Duration____     Procedure(s):  PHACOEMULSIFICATION OF LEFT EYE CATARACT WITH INTRAOCULAR LENS IMPLANT    Surgeon  ________________________________  [facility]   General   [place]       Phone [phone] (home)       [address]  Cell Work  ______________________________________________________________________________________________________________________________________________________________________________________________________________________________________________________________________________________________________________________________________________________________    PCP__________________________Phone__________________________________      H&P__________________Bringing      Chart              Epic    DOS           Called_______  EKG__________________Bringing      Chart              Epic    DOS           Called_______  LAB__________________ Bringing      Chart              Epic    DOS           Called_______  CardiacClearance _______Bringing      Chart              Epic    DOS           Called_______      Cardiologist________________________ Phone___________________________      ? Muslim concerns / Waiver on Chart            PAT Communications________________  ? Pre-op Instructions Given [place]          _________________________________  ? Directions to Surgery Center                          _________________________________  ? Transportation Home_______________      _________________________________  ?  Crutches/Walker__________________        _________________________________      ________Pre-op Orders   _______Transcribed    _______Wt.  ________Pharmacy          _______SCD  ______VTE     ______[employer]  ________Consent

## 2019-06-23 DIAGNOSIS — D64.9 ANEMIA, UNSPECIFIED TYPE: ICD-10-CM

## 2019-06-24 ENCOUNTER — OFFICE VISIT (OUTPATIENT)
Dept: FAMILY MEDICINE CLINIC | Age: 64
End: 2019-06-24
Payer: COMMERCIAL

## 2019-06-24 VITALS
HEART RATE: 86 BPM | SYSTOLIC BLOOD PRESSURE: 130 MMHG | WEIGHT: 221 LBS | TEMPERATURE: 98.6 F | OXYGEN SATURATION: 97 % | DIASTOLIC BLOOD PRESSURE: 78 MMHG | HEIGHT: 65 IN | BODY MASS INDEX: 36.82 KG/M2

## 2019-06-24 DIAGNOSIS — H25.813 COMBINED FORMS OF AGE-RELATED CATARACT OF BOTH EYES: ICD-10-CM

## 2019-06-24 DIAGNOSIS — Z01.818 PRE-OP EXAM: Primary | ICD-10-CM

## 2019-06-24 LAB
BASOPHILS ABSOLUTE: 0 K/UL (ref 0–0.2)
BASOPHILS RELATIVE PERCENT: 0.7 %
EOSINOPHILS ABSOLUTE: 0.3 K/UL (ref 0–0.6)
EOSINOPHILS RELATIVE PERCENT: 4.6 %
HCT VFR BLD CALC: 36.4 % (ref 36–48)
HEMOGLOBIN: 12.4 G/DL (ref 12–16)
LYMPHOCYTES ABSOLUTE: 1.1 K/UL (ref 1–5.1)
LYMPHOCYTES RELATIVE PERCENT: 16.8 %
MCH RBC QN AUTO: 31.7 PG (ref 26–34)
MCHC RBC AUTO-ENTMCNC: 34.2 G/DL (ref 31–36)
MCV RBC AUTO: 92.7 FL (ref 80–100)
MONOCYTES ABSOLUTE: 0.6 K/UL (ref 0–1.3)
MONOCYTES RELATIVE PERCENT: 8.9 %
NEUTROPHILS ABSOLUTE: 4.5 K/UL (ref 1.7–7.7)
NEUTROPHILS RELATIVE PERCENT: 69 %
PDW BLD-RTO: 14.7 % (ref 12.4–15.4)
PLATELET # BLD: 191 K/UL (ref 135–450)
PMV BLD AUTO: 9.9 FL (ref 5–10.5)
RBC # BLD: 3.92 M/UL (ref 4–5.2)
WBC # BLD: 6.5 K/UL (ref 4–11)

## 2019-06-24 PROCEDURE — 36415 COLL VENOUS BLD VENIPUNCTURE: CPT | Performed by: NURSE PRACTITIONER

## 2019-06-24 PROCEDURE — 99243 OFF/OP CNSLTJ NEW/EST LOW 30: CPT | Performed by: NURSE PRACTITIONER

## 2019-06-24 PROCEDURE — 93000 ELECTROCARDIOGRAM COMPLETE: CPT | Performed by: NURSE PRACTITIONER

## 2019-06-24 RX ORDER — MOXIFLOXACIN 5 MG/ML
SOLUTION/ DROPS OPHTHALMIC
COMMUNITY
Start: 2019-06-10 | End: 2019-10-02

## 2019-06-24 RX ORDER — PREDNISOLONE ACETATE 10 MG/ML
SUSPENSION/ DROPS OPHTHALMIC
Refills: 3 | COMMUNITY
Start: 2019-06-10 | End: 2019-10-02

## 2019-06-24 RX ORDER — FERROUS SULFATE 325(65) MG
TABLET ORAL
Qty: 90 TABLET | Refills: 0 | Status: SHIPPED | OUTPATIENT
Start: 2019-06-24 | End: 2019-10-11 | Stop reason: ALTCHOICE

## 2019-06-24 RX ORDER — BROMFENAC 0.76 MG/ML
SOLUTION/ DROPS OPHTHALMIC
Refills: 3 | COMMUNITY
Start: 2019-06-10 | End: 2019-10-02

## 2019-06-24 RX ORDER — OMEGA-3S/DHA/EPA/FISH OIL/D3 300MG-1000
CAPSULE ORAL
COMMUNITY
End: 2019-10-02

## 2019-06-24 NOTE — PATIENT INSTRUCTIONS
Patient Education        Cataract Surgery: Before Your Surgery  What is cataract surgery? Cataracts are cloudy areas in the lens of your eye. Your lens is behind the colored part of your eye (iris). Its job is to focus light onto the back of your eye. In some people, cataracts prevent light from reaching the back of the eye. This can cause vision problems. Cataract surgery helps you see better. It replaces your natural lens, which has become cloudy, with a clear artificial one. There are two types of cataract surgery. Phacoemulsification (say \"usav-lb-od-yih-dpu-ebk-ADRIEN-shun\") is the most common type. The doctor makes a small cut in your eye. This cut is called an incision. The doctor uses a special ultrasound tool to break your cloudy lens apart. Sometimes a laser is used too. Then he or she removes the small pieces of the lens through the incision. In most cases, the doctor then inserts an artificial lens through the incision. Most people do not need stitches, because the incision is so small. If the doctor is not able to put in an artificial lens, you can wear a contact lens or thick glasses in place of your natural lens. Extracapsular extraction is a less common type of cataract surgery. The doctor makes a larger incision to remove the whole lens at once. After the doctor removes the lens, he or she stitches up the incision. Recovery from this type of surgery takes longer. Before either surgery, the doctor puts numbing drops in your eye. Some doctors use a shot instead. You may also get medicine to make you feel relaxed. You probably will not feel much pain. The surgery takes about 20 to 40 minutes. After surgery, you may have a bandage or shield on your eye. You will probably go home from surgery after 1 hour in the recovery room. Most people see better in 1 to 3 days. You may be able to go back to work or your normal routine in a few days.  It could take 3 to 10 weeks for your eye to completely heal.

## 2019-06-24 NOTE — PROGRESS NOTES
[address]                             Preoperative Evaluation        Bruce Hyde  YOB: 1955    Date of Service:  6/24/2019    Vitals:    06/24/19 1307   BP: 130/78   Site: Left Upper Arm   Position: Sitting   Cuff Size: Large Adult   Pulse: 86   Temp: 98.6 °F (37 °C)   SpO2: 97%   Weight: 221 lb (100.2 kg)   Height: 5' 5\" (1.651 m)      Wt Readings from Last 2 Encounters:   06/24/19 221 lb (100.2 kg)   05/21/19 222 lb (100.7 kg)     BP Readings from Last 3 Encounters:   06/24/19 130/78   05/21/19 127/83   04/03/19 122/79        Chief Complaint   Patient presents with    Pre-op Exam     pt is here for pre op for cataract surfgery for her left eye. Surgery is being done by Dr Elina Rangel on 4/26/19 at surgery center in [facility].  Fax [phone], [phone]     Allergies   Allergen Reactions    Amoxicillin      \"colitis\"    Pravastatin Rash    Wellbutrin [Bupropion] Other (See Comments) and Rash     Caused sores in mouth     Outpatient Medications Marked as Taking for the 6/24/19 encounter (Office Visit) with YAHAIRA Borrego CNP   Medication Sig Dispense Refill    ferrous sulfate 325 (65 Fe) MG tablet TAKE 1 TABLET DAILY 90 tablet 0    BROMSITE 0.075 % SOLN   3    moxifloxacin (VIGAMOX) 0.5 % ophthalmic solution       prednisoLONE acetate (PRED FORTE) 1 % ophthalmic suspension   3    Omega-3 Fatty Acids (FISH OIL BURP-LESS) 1000 MG CAPS Take by mouth      Cholecalciferol (VITAMIN D3) 21663 units CAPS TAKE 1 CAPSULE EVERY WEEK 5 capsule 1    levothyroxine (SYNTHROID) 175 MCG tablet Take 1 tablet by mouth Daily 90 tablet 4    magnesium oxide (MAG-OX) 400 (241.3 Mg) MG TABS tablet Take 2 tablets by mouth 2 times daily 565 tablet 1    folic acid (FOLVITE) 1 MG tablet TAKE 1 TABLET DAILY 90 tablet 0    gabapentin (NEURONTIN) 100 MG capsule I-II PO TID (Patient taking differently: 3 times daily. I-II PO TID) 180 capsule 2    blood glucose test strips (ASCENSIA AUTODISC VI;ONE TOUCH ULTRA TEST VI) strip 1 each by In Vitro route daily As needed. 100 each 3    simvastatin (ZOCOR) 20 MG tablet TAKE 1 TABLET BY MOUTH NIGHTLY 90 tablet 1    celecoxib (CELEBREX) 100 MG capsule Take 200 mg by mouth 2 times daily      Melatonin CR 3 MG TBCR Take 1 tablet by mouth nightly       albuterol sulfate HFA (VENTOLIN HFA) 108 (90 Base) MCG/ACT inhaler Inhale 2 puffs into the lungs every 6 hours as needed for Wheezing 1 Inhaler 1    ONE TOUCH LANCETS MISC 1 each by Does not apply route daily 100 each 3    mesalamine (LIALDA) 1.2 G EC tablet Take 2,400 mg by mouth 2 times daily  120 tablet 0       This patient presents to the office today for a preoperative consultation at the request of surgeon, Dr. January Freeman, who plans on performing left eye cataract surgery on June 26 and right eye cataracts on July 24th at [facility].  The current problem began two years ago, and symptoms have been worsening with time. Conservative therapy: Yes: monitoring , which has been not very effective. .    Planned anesthesia: IV sedation   Known anesthesia problems: None   Bleeding risk: No recent or remote history of abnormal bleeding  Personal or FH of DVT/PE: No      Patient Active Problem List   Diagnosis    Diet-controlled type 2 diabetes mellitus ([address]    Hyperlipidemia    HTN (hypertension)    Ulcerative colitis ([address]    Venous insufficiency of both lower extremities    Hypothyroidism    Obesity    Reactive airway disease    Anxiety and depression    Normocytic anemia    Baker's cyst in left popliteal fossa    Anemia    B12 deficiency    Enteropathic arthritis    Varicose vein of leg    Bilateral lower extremity edema    Urinary tract infection without hematuria    Colitis    Primary osteoarthritis of left hip       Past Medical History:   Diagnosis Date    Acute pancreatitis 10/21/2014    Acute renal failure with tubular necrosis (HCC) 7/10/2015    Acute stasis dermatitis of left lower extremity 7/8/2015    Angina pectoris ([address] 5/23/2011    Cellulitis of left leg     Chronic back pain     Clostridium difficile infection 7/9/15, 6/4/15    Colitis     Hyperlipidemia     Hypertension     Left THR 1/25/2016    Palpitations 7/17/2013    Primary osteoarthritis of left hip 11/18/2015    Shock ([address] 10/10/2017    Superficial thrombophlebitis of left leg 7/8/2015    Thyroid disease     Venous ulcer of left leg [facility])      Past Surgical History:   Procedure Laterality Date    APPENDECTOMY      CARDIAC CATHETERIZATION  05/2014    COLONOSCOPY      EPIDURAL STEROID INJECTION Left 1/22/2019    LEFT LUMBAR FOUR FIVE EPIDURAL STEROID INJECTION SITE CONFIRMED BY FLUOROSCOPY performed by Pablo Cotto MD at [address] [facility] Left 01/19/2016    [place] EGD INTRMURAL NEEDLE ASPIR/BIOP ALTERED ANATOMY  10/15/2018    EGD ESOPHAGOGASTRODUODENOSCOPY ULTRASOUND 0403, 1583 performed by Jonathan Ann DO at [address] EGD TRANSORAL BIOPSY SINGLE/MULTIPLE  10/15/2018    EGD BIOPSY performed by Jonathan Ann DO at [address] UPPER GASTROINTESTINAL ENDOSCOPY  10/15/2018    EGD [facility] EUS    VARICOSE VEIN SURGERY       Family History   Problem Relation Age of Onset    Diabetes Mother     Alzheimer's Disease Mother     Anemia Mother     Diabetes Father     Cancer Father 68        pancreatic    Cancer Maternal Grandmother [de-identified]        Bone Marrow Ca     Social History     Socioeconomic History    Marital status:       Spouse name: Scott eVlarde Number of children: Not on file    Years of education: Not on file    Highest education level: Not on file   Occupational History    Not on file   Social Needs    Financial resource strain: Not on file    Food insecurity:     Worry: Not on file     Inability: Not on file   Quibly needs:     Medical: Not on file     Non-medical: Not on file   Tobacco Use    Smoking status: Never Smoker    Smokeless tobacco: Never Used    Tobacco comment: never even tried it   Substance and Sexual Activity    Alcohol use: No     Alcohol/week: 0.0 oz     Comment: rarely    Drug use: No    Sexual activity: Not Currently   Lifestyle    Physical activity:     Days per week: Not on file     Minutes per session: Not on file    Stress: Not on file   Relationships    Social connections:     Talks on phone: Not on file     Gets together: Not on file     Attends Buddhist service: Not on file     Active member of club or organization: Not on file     Attends meetings of clubs or organizations: Not on file     Relationship status: Not on file    Intimate partner violence:     Fear of current or ex partner: Not on file     Emotionally abused: Not on file     Physically abused: Not on file     Forced sexual activity: Not on file   Other Topics Concern    Not on file   Social History Narrative    Not on file       Review of Systems  A comprehensive review of systems was negative except for: chronic fatigue, back pain     Physical Exam   Constitutional: She is oriented to person, place, and time. She appears well-developed and well-nourished. No distress. Obese   HENT:   Head: Normocephalic and atraumatic. Mouth/Throat: Uvula is midline, oropharynx is clear and moist and mucous membranes are normal.   Eyes: Conjunctivae and EOM are normal. Pupils are equal, round, and reactive to light. Neck: Trachea normal and normal range of motion. Neck supple. No JVD present. Carotid bruit is not present. No mass and no thyromegaly present. Cardiovascular: Normal rate, regular rhythm, normal heart sounds and intact distal pulses. Exam reveals no gallop and no friction rub.  +4/6 systolic murmur   Pulmonary/Chest: Effort normal and breath sounds normal. No respiratory distress.  She has no wheezes. She has no rales. Abdominal: Soft. Normal aorta and bowel sounds are normal. She exhibits no distension and no mass. There is no hepatosplenomegaly. No tenderness. Musculoskeletal: She exhibits no edema and no tenderness. Neurological: She is alert and oriented to person, place, and time. She has normal strength. No cranial nerve deficit or sensory deficit. Coordination and gait normal.   Skin: Skin is warm and dry. No rash noted. No erythema. Psychiatric: She has a normal mood and affect.  Her behavior is normal.     EKG Interpretation:  normal sinus rhythm with occasional PACs     Lab Review   Hospital Outpatient Visit on 06/08/2019   Component Date Value    Calprotectin 06/08/2019 362*   Orders Only on 05/22/2019   Component Date Value    Ferritin 05/21/2019 258.9*    Iron 05/21/2019 62     TIBC 05/21/2019 331     Iron Saturation 05/21/2019 19    Office Visit on 05/21/2019   Component Date Value    WBC 05/21/2019 6.2     RBC 05/21/2019 3.62*    Hemoglobin 05/21/2019 11.4*    Hematocrit 05/21/2019 33.0*    MCV 05/21/2019 91.0     MCH 05/21/2019 31.6     MCHC 05/21/2019 34.7     RDW 05/21/2019 14.5     Platelets 76/91/5858 185     MPV 05/21/2019 9.2     Neutrophils % 05/21/2019 66.6     Lymphocytes % 05/21/2019 19.5     Monocytes % 05/21/2019 10.4     Eosinophils % 05/21/2019 3.0     Basophils % 05/21/2019 0.5     Neutrophils # 05/21/2019 4.1     Lymphocytes # 05/21/2019 1.2     Monocytes # 05/21/2019 0.6     Eosinophils # 05/21/2019 0.2     Basophils # 05/21/2019 0.0     Sodium 05/21/2019 142     Potassium 05/21/2019 4.1     Chloride 05/21/2019 101     CO2 05/21/2019 25     Anion Gap 05/21/2019 16     Glucose 05/21/2019 80     BUN 05/21/2019 19     CREATININE 05/21/2019 1.0     GFR Non- 05/21/2019 56*    GFR  05/21/2019 >60     Calcium 05/21/2019 9.9     Total Protein 05/21/2019 7.3     Alb 05/21/2019 4.5     Albumin/Globulin medications which may produce withdrawal symptoms if withheld perioperatively: none     1. Pre-op exam         Plan:     1. Preoperative workup as follows: ECG, hemoglobin, hematocrit, electrolytes, glucose, liver function studies  2. Change in medication regimen before surgery: Take Levothyroxine on morning of surgery with sip of water, and hold all other medications until after surgery. Discontinue Celebrex/NSAIDS 7 to 10 days prior to surgery  3. Prophylaxis for cardiac events with perioperative beta-blockers: Not indicated  ACC/AHA indications for pre-operative beta-blocker use:    · Vascular surgery with history of postitive stress test  · Intermediate or high risk surgery with history of CAD   · Intermediate or high risk surgery with multiple clinical predictors of CAD- 2 of the following: history of compensated or prior heart failure, history of cerebrovascular disease, DM, or renal insufficiency    Routine administration of higher-dose, long-acting metoprolol in beta-blocker-naïve patients on the day of surgery, and in the absence of dose titration is associated with an overall increase in mortality. Beta-blockers should be started days to weeks prior to surgery and titrated to pulse < 70.  4. Deep vein thrombosis prophylaxis: regimen to be chosen by surgical team  5. No contraindications to planned surgery pending appropriate blood work      If you have questions, please do not hesitate to call me ([phone]).      Sincerely,                Indy Diamond, CNP

## 2019-06-25 ENCOUNTER — ANESTHESIA EVENT (OUTPATIENT)
Dept: OPERATING ROOM | Age: 64
End: 2019-06-25
Payer: COMMERCIAL

## 2019-06-25 DIAGNOSIS — E03.9 HYPOTHYROIDISM, UNSPECIFIED TYPE: ICD-10-CM

## 2019-06-25 LAB
A/G RATIO: 1.5 (ref 1.1–2.2)
ALBUMIN SERPL-MCNC: 4.4 G/DL (ref 3.4–5)
ALP BLD-CCNC: 84 U/L (ref 40–129)
ALT SERPL-CCNC: 14 U/L (ref 10–40)
ANION GAP SERPL CALCULATED.3IONS-SCNC: 14 MMOL/L (ref 3–16)
AST SERPL-CCNC: 27 U/L (ref 15–37)
BILIRUB SERPL-MCNC: 0.5 MG/DL (ref 0–1)
BUN BLDV-MCNC: 28 MG/DL (ref 7–20)
CALCIUM SERPL-MCNC: 9.9 MG/DL (ref 8.3–10.6)
CHLORIDE BLD-SCNC: 106 MMOL/L (ref 99–110)
CO2: 23 MMOL/L (ref 21–32)
CREAT SERPL-MCNC: 1.2 MG/DL (ref 0.6–1.2)
GFR AFRICAN AMERICAN: 55
GFR NON-AFRICAN AMERICAN: 45
GLOBULIN: 2.9 G/DL
GLUCOSE BLD-MCNC: 84 MG/DL (ref 70–99)
MAGNESIUM: 1.7 MG/DL (ref 1.8–2.4)
POTASSIUM SERPL-SCNC: 4.5 MMOL/L (ref 3.5–5.1)
SODIUM BLD-SCNC: 143 MMOL/L (ref 136–145)
T4 FREE: 1.9 NG/DL (ref 0.9–1.8)
TOTAL PROTEIN: 7.3 G/DL (ref 6.4–8.2)
TSH REFLEX: 0.04 UIU/ML (ref 0.27–4.2)

## 2019-06-25 RX ORDER — LEVOTHYROXINE SODIUM 0.15 MG/1
150 TABLET ORAL DAILY
Qty: 30 TABLET | Refills: 2 | Status: SHIPPED | OUTPATIENT
Start: 2019-06-25 | End: 2019-09-04 | Stop reason: SDUPTHER

## 2019-06-26 ENCOUNTER — HOSPITAL ENCOUNTER (OUTPATIENT)
Age: 64
Setting detail: OUTPATIENT SURGERY
Discharge: HOME | End: 2019-06-26
Attending: OPHTHALMOLOGY | Admitting: OPHTHALMOLOGY
Payer: COMMERCIAL

## 2019-06-26 ENCOUNTER — ANESTHESIA (OUTPATIENT)
Dept: OPERATING ROOM | Age: 64
End: 2019-06-26
Payer: COMMERCIAL

## 2019-06-26 VITALS — SYSTOLIC BLOOD PRESSURE: 134 MMHG | OXYGEN SATURATION: 100 % | DIASTOLIC BLOOD PRESSURE: 82 MMHG

## 2019-06-26 VITALS
HEART RATE: 70 BPM | OXYGEN SATURATION: 98 % | BODY MASS INDEX: 36.65 KG/M2 | HEIGHT: 65 IN | TEMPERATURE: 97.3 F | WEIGHT: 220 LBS | SYSTOLIC BLOOD PRESSURE: 111 MMHG | RESPIRATION RATE: 18 BRPM | DIASTOLIC BLOOD PRESSURE: 59 MMHG

## 2019-06-26 PROCEDURE — 3700000001 HC ADD 15 MINUTES (ANESTHESIA): Performed by: OPHTHALMOLOGY

## 2019-06-26 PROCEDURE — 3700000000 HC ANESTHESIA ATTENDED CARE: Performed by: OPHTHALMOLOGY

## 2019-06-26 PROCEDURE — 3600000003 HC SURGERY LEVEL 3 BASE: Performed by: OPHTHALMOLOGY

## 2019-06-26 PROCEDURE — 2580000003 HC RX 258: Performed by: ANESTHESIOLOGY

## 2019-06-26 PROCEDURE — 6360000002 HC RX W HCPCS: Performed by: OPHTHALMOLOGY

## 2019-06-26 PROCEDURE — 6360000002 HC RX W HCPCS: Performed by: NURSE ANESTHETIST, CERTIFIED REGISTERED

## 2019-06-26 PROCEDURE — 2500000003 HC RX 250 WO HCPCS: Performed by: OPHTHALMOLOGY

## 2019-06-26 PROCEDURE — 6370000000 HC RX 637 (ALT 250 FOR IP): Performed by: OPHTHALMOLOGY

## 2019-06-26 PROCEDURE — 2500000003 HC RX 250 WO HCPCS: Performed by: NURSE ANESTHETIST, CERTIFIED REGISTERED

## 2019-06-26 PROCEDURE — 2580000003 HC RX 258: Performed by: OPHTHALMOLOGY

## 2019-06-26 PROCEDURE — 3600000013 HC SURGERY LEVEL 3 ADDTL 15MIN: Performed by: OPHTHALMOLOGY

## 2019-06-26 PROCEDURE — 2709999900 HC NON-CHARGEABLE SUPPLY: Performed by: OPHTHALMOLOGY

## 2019-06-26 PROCEDURE — V2632 POST CHMBR INTRAOCULAR LENS: HCPCS | Performed by: OPHTHALMOLOGY

## 2019-06-26 PROCEDURE — 7100000010 HC PHASE II RECOVERY - FIRST 15 MIN: Performed by: OPHTHALMOLOGY

## 2019-06-26 DEVICE — ACRYSOF(R) IQ ASPHERIC IOL SP ACRYLIC FOLDABLELENS WULTRASERT(TM) DELIVERY SYSTEM UV WBLUE LIGHT FILTER. 13.0MM LENGTH 6.0MM ANTERIORASYMMETRIC BICONVEX OPTIC PLANAR HAPTICS.
Type: IMPLANTABLE DEVICE | Site: EYE | Status: FUNCTIONAL
Brand: ACRYSOF ULTRASERT

## 2019-06-26 RX ORDER — LIDOCAINE HYDROCHLORIDE 10 MG/ML
0.3 INJECTION, SOLUTION EPIDURAL; INFILTRATION; INTRACAUDAL; PERINEURAL
Status: DISCONTINUED | OUTPATIENT
Start: 2019-06-26 | End: 2019-06-26 | Stop reason: HOSPADM

## 2019-06-26 RX ORDER — PROPOFOL 10 MG/ML
INJECTION, EMULSION INTRAVENOUS PRN
Status: DISCONTINUED | OUTPATIENT
Start: 2019-06-26 | End: 2019-06-26 | Stop reason: SDUPTHER

## 2019-06-26 RX ORDER — MAGNESIUM HYDROXIDE 1200 MG/15ML
LIQUID ORAL PRN
Status: DISCONTINUED | OUTPATIENT
Start: 2019-06-26 | End: 2019-06-26 | Stop reason: ALTCHOICE

## 2019-06-26 RX ORDER — SODIUM CHLORIDE 0.9 % (FLUSH) 0.9 %
10 SYRINGE (ML) INJECTION PRN
Status: DISCONTINUED | OUTPATIENT
Start: 2019-06-26 | End: 2019-06-26 | Stop reason: HOSPADM

## 2019-06-26 RX ORDER — SODIUM CHLORIDE 0.9 % (FLUSH) 0.9 %
10 SYRINGE (ML) INJECTION EVERY 12 HOURS SCHEDULED
Status: DISCONTINUED | OUTPATIENT
Start: 2019-06-26 | End: 2019-06-26 | Stop reason: HOSPADM

## 2019-06-26 RX ORDER — SODIUM CHLORIDE, SODIUM LACTATE, POTASSIUM CHLORIDE, CALCIUM CHLORIDE 600; 310; 30; 20 MG/100ML; MG/100ML; MG/100ML; MG/100ML
INJECTION, SOLUTION INTRAVENOUS CONTINUOUS
Status: DISCONTINUED | OUTPATIENT
Start: 2019-06-26 | End: 2019-06-26 | Stop reason: HOSPADM

## 2019-06-26 RX ORDER — LIDOCAINE HYDROCHLORIDE 20 MG/ML
INJECTION, SOLUTION INFILTRATION; PERINEURAL PRN
Status: DISCONTINUED | OUTPATIENT
Start: 2019-06-26 | End: 2019-06-26 | Stop reason: SDUPTHER

## 2019-06-26 RX ADMIN — PROPOFOL 100 MG: 10 INJECTION, EMULSION INTRAVENOUS at 08:29

## 2019-06-26 RX ADMIN — Medication 0.3 ML: at 07:15

## 2019-06-26 RX ADMIN — Medication 0.3 ML: at 07:35

## 2019-06-26 RX ADMIN — Medication 0.3 ML: at 07:25

## 2019-06-26 RX ADMIN — SODIUM CHLORIDE, POTASSIUM CHLORIDE, SODIUM LACTATE AND CALCIUM CHLORIDE: 600; 310; 30; 20 INJECTION, SOLUTION INTRAVENOUS at 07:23

## 2019-06-26 RX ADMIN — LIDOCAINE HYDROCHLORIDE 40 MG: 20 INJECTION, SOLUTION INFILTRATION; PERINEURAL at 08:29

## 2019-06-26 ASSESSMENT — PULMONARY FUNCTION TESTS
PIF_VALUE: 1
PIF_VALUE: 0
PIF_VALUE: 1

## 2019-06-26 ASSESSMENT — PAIN SCALES - GENERAL: PAINLEVEL_OUTOF10: 0

## 2019-06-26 ASSESSMENT — PAIN - FUNCTIONAL ASSESSMENT: PAIN_FUNCTIONAL_ASSESSMENT: 0-10

## 2019-06-26 NOTE — OP NOTE
[address] [address]                                OPERATIVE REPORT    PATIENT NAME: Ramos Scherer                   :        1955  MED REC NO:   [id number]                          ROOM:  ACCOUNT NO:   [de-identified]                           ADMIT DATE: 2019  PROVIDER:     Morenita Beckwith MD    DATE OF PROCEDURE:  2019    PREOPERATIVE DIAGNOSIS:  Cataract, left eye. POSTOPERATIVE DIAGNOSIS:  Cataract, left eye. OPERATION:  Phacoemulsification of the cataract of the left eye with a  posterior chamber lens implant. ANESTHESIA:  General / Monitored Anesthesia Care / Retrobulbar. A 2 mL  retrobulbar block and 10 mL modified Harmony block using a 1:1 mixture  of 0.75% Marcaine, 4% Xylocaine with epinephrine, and hyaluronidase. SURGICAL INDICATIONS:  The patient has had a painless progressive loss  of vision due to the cataractous degeneration of the lens and for that  reason, surgery is indicated. The patient is having visual problems  with current lifestyle. A new eyeglasses prescription was unable to  adequately improve functional vision. DETAILS OF PROCEDURE:  The patient was taken to the operating room and  was prepped and draped in the usual manner after obtaining satisfactory  local anesthesia. Attention was turned towards the operative eye and a lid speculum was  inserted. A 2.5 mm keratome was used to make a limbal incision at 180  degrees. Viscoat was then placed in the eye to fill the anterior  chamber and a side port incision was made with a Superblade through the  clear cornea. The incision was located about 2 o'clock to the left of  the original incision. A bent needle was then used to make a cut in the  anterior capsule and start a capsulorrhexis tear. This was then grasped  with Utrata forceps and a 360 degree tear was completed.   [place]  dissection was then done with BSS to separate the capsule and the  cataract. The cataract was seen to be spinning easily within the  capsular bag and at this point, the phacoemulsification hand piece was  called for. Using a divide and conquer technique, the phacoemulsifier cut the lens  into four pieces approximately following the pattern of a cross. The  grooves were cut deeply and then the lens was cracked along these axes. The lens quarters were then rotated into the mid pupillary space and  phacoemulsified. The IA hand piece removed all the cortical material.   A Milton Jumbo was used to polish the posterior capsule. Viscoat and  Provisc were used to fill the capsular bag. The incision was opened  slightly with a crescent knife and an Yves acrylic foldable lens of the  appropriate power was called for. The lens was loaded into the injector  and injected into the eye. The lead haptic was injected into the  capsular bag with the trailing haptic left in the pupillary space. Using a Sinskey hook, the lens was gently nudged into the capsular bag  and rotated into position. After noting that the alignment and  centration was adequate, the IA hand piece was called for and used to  remove all the viscoelastic material.  Miostat was then injected through  the side port incision to bring the pupil down. The wound was checked  to make sure it was water tight. At this time, 2-3 drops of timolol and  2-3 drops of Vigamox were placed on the eye. The eye was taped closed,  patched and shielded. The patient went to the recovery room in good  condition. ADDENDUM:  The phaco time was 9.37 CDE with 200 mL of fluid. The  patient had a near-clear, no-stitch surgery, axis 180. Tolerated the  procedure well and went to the recovery room in good condition.         Annalee Vásquez MD    D: 06/26/2019 9:13:10       T: 06/26/2019 13:21:52     [email or web address]  Job#: [id number]     Doc#: [id number]    CC:

## 2019-06-26 NOTE — BRIEF OP NOTE
Brief Postoperative Note  ______________________________________________________________    Patient: Michael Cook  YOB: 1955  MRN: [id number]  Date of Procedure: 6/26/2019    Pre-Op Diagnosis: CATARACT LEFT EYE    Post-Op Diagnosis: Same       Procedure(s):  PHACOEMULSIFICATION OF LEFT EYE CATARACT WITH INTRAOCULAR LENS IMPLANT    Anesthesia: Monitor Anesthesia Care    Surgeon(s):  Hermann Mcqueen MD    Assistant: none    Estimated Blood Loss (mL): less than 50     Complications: None    Specimens:   * No specimens in log *    Implants:  Implant Name Type Inv.  Item Serial No.  Lot No. LRB No. Used   LENS IOL Sukhjinder Hurtado - [id number] Eye LENS IOL Sukhjinder Hurtado [id number]  Left 1         Drains: * No LDAs found *    Findings: none    Anita Townsend MD  Date: 6/26/2019  Time: 9:04 AM

## 2019-06-26 NOTE — H&P
I have examined the patient and reviewed the history and physical and find no relevant changes. I have reviewed with the patient and/or family the risks, benefits, and alternatives to the procedure and they have agreed to proceed.     Emily Leong.

## 2019-06-26 NOTE — ANESTHESIA PRE PROCEDURE
Department of Anesthesiology  Preprocedure Note       Name:  Norah Litten   Age:  58 y.o.  :  1955                                          MRN:  [id number]         Date:  2019      Surgeon: Danny Cunningham):  Antoine Bass MD    Procedure: PHACOEMULSIFICATION OF LEFT EYE CATARACT WITH INTRAOCULAR LENS IMPLANT (Left Eye)    Medications prior to admission:   Prior to Admission medications    Medication Sig Start Date End Date Taking? Authorizing Provider   levothyroxine (SYNTHROID) 150 MCG tablet Take 1 tablet by mouth Daily 19  Yes YAHAIRA Rios CNP   ferrous sulfate 325 (65 Fe) MG tablet TAKE 1 TABLET DAILY 19  Yes YAHAIRA Wise CNP   BROMSITE 0.075 % SOLN  6/10/19  Yes Historical Provider, MD   moxifloxacin (VIGAMOX) 0.5 % ophthalmic solution  6/10/19  Yes Historical Provider, MD   prednisoLONE acetate (PRED FORTE) 1 % ophthalmic suspension  6/10/19  Yes Historical Provider, MD   folic acid (FOLVITE) 1 MG tablet TAKE 1 TABLET DAILY 19  Yes YAHAIRA Rios CNP   simvastatin (ZOCOR) 20 MG tablet TAKE 1 TABLET BY MOUTH NIGHTLY 18  Yes YAHAIRA Wise CNP   mesalamine (LIALDA) 1.2 G EC tablet Take 2,400 mg by mouth 2 times daily  16  Yes YAHAIRA Valentine CNP   Omega-3 Fatty Acids (FISH OIL BURP-LESS) 1000 MG CAPS Take by mouth    Historical Provider, MD   Cholecalciferol (VITAMIN D3) 08087 units CAPS TAKE 1 CAPSULE EVERY WEEK 19   YAHAIRA Wise CNP   magnesium oxide (MAG-OX) 400 (241.3 Mg) MG TABS tablet Take 2 tablets by mouth 2 times daily 19   YAHAIRA Rios CNP   gabapentin (NEURONTIN) 100 MG capsule I-II PO TID  Patient taking differently: 3 times daily. I-II PO TID 4/3/19 6/24/19  Marcelino Gonzalez PA-C   blood glucose test strips (ASCENSIA AUTODISC VI;ONE TOUCH ULTRA TEST VI) strip 1 each by In Vitro route daily As needed.  18   YAHAIRA Wise CNP celecoxib (CELEBREX) 100 MG capsule Take 200 mg by mouth 2 times daily    Historical Provider, MD   Melatonin CR 3 MG TBCR Take 1 tablet by mouth nightly     Historical Provider, MD   albuterol sulfate HFA (VENTOLIN HFA) 108 (90 Base) MCG/ACT inhaler Inhale 2 puffs into the lungs every 6 hours as needed for Wheezing 10/8/18   YAHAIRA Bennett CNP   ONE TOUCH LANCETS [address] [facility] 1 each by Does not apply route daily 8/13/18   YAHAIRA Bennett CNP       Current medications:    Current Facility-Administered Medications   Medication Dose Route Frequency Provider Last Rate Last Dose    lactated ringers infusion   Intravenous Continuous BJORN Peraza  mL/hr at 06/26/19 0723      sodium chloride flush 0.9 % injection 10 mL  10 mL Intravenous 2 times per day BJORN Peraza MD        sodium chloride flush 0.9 % injection 10 mL  10 mL Intravenous PRN BJORN Peraza MD        lidocaine PF 1 % injection 0.3 mL  0.3 mL Intradermal Once PRN BJORN Peraza MD        bupivacaine 0.75% and lidocaine 2% in hylenex injection (4.5 mL)   Intraocular Once Ashly Siegel MD        bupivacaine 0.75% and lidocaine 2% in hylenex injection (10.5 mL)   Intraocular Once Ashly Siegel MD        OPHTHALMIC SOLUTION COMPOUND ophtahlmic solution                Allergies:     Allergies   Allergen Reactions    Amoxicillin      \"colitis\"    Pravastatin Rash    Wellbutrin [Bupropion] Other (See Comments) and Rash     Caused sores in mouth       Problem List:    Patient Active Problem List   Diagnosis Code    Diet-controlled type 2 diabetes mellitus ([facility] E11.9    Hyperlipidemia E78.5    HTN (hypertension) I10    Ulcerative colitis ([facility] K51.90    Venous insufficiency of both lower extremities I87.2    Hypothyroidism E03.9    Obesity E66.9    Reactive airway disease J45.909    Anxiety and depression F41.9, F32.9    Normocytic anemia D64.9    Baker's cyst in left popliteal fossa M71.20    Anemia D64.9    B12 deficiency E53.8    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    Varicose vein of leg I83.90    Bilateral lower extremity edema R60.0    Urinary tract infection without hematuria N39.0    Colitis K52.9    Primary osteoarthritis of left hip M16.12       Past Medical History:        Diagnosis Date    Acute pancreatitis 10/21/2014    Acute renal failure with tubular necrosis (HCC) 7/10/2015    Acute stasis dermatitis of left lower extremity 7/8/2015    Angina pectoris ([address] 5/23/2011    Cellulitis of left leg     Chronic back pain     Clostridium difficile infection 7/9/15, 6/4/15    Colitis     Hyperlipidemia     Hypertension     Left THR 1/25/2016    Palpitations 7/17/2013    Primary osteoarthritis of left hip 11/18/2015    Shock ([address] 10/10/2017    Superficial thrombophlebitis of left leg 7/8/2015    Thyroid disease     Venous ulcer of left leg ([address]        Past Surgical History:        Procedure Laterality Date    APPENDECTOMY      CARDIAC CATHETERIZATION  05/2014    COLONOSCOPY      EPIDURAL STEROID INJECTION Left 1/22/2019    LEFT LUMBAR FOUR FIVE EPIDURAL STEROID INJECTION SITE CONFIRMED BY FLUOROSCOPY performed by Erickson Foster MD at [address] [facility] Left 01/19/2016    [place] EGD INTRMURAL NEEDLE ASPIR/BIOP ALTERED ANATOMY  10/15/2018    EGD ESOPHAGOGASTRODUODENOSCOPY ULTRASOUND 0403, 1583 performed by Georgia Venegas DO at [address] EGD TRANSORAL BIOPSY SINGLE/MULTIPLE  10/15/2018    EGD BIOPSY performed by Georgia Venegas DO at [address] [facility] [address] ENDOSCOPY  10/15/2018    EGD [facility] EUS    VARICOSE VEIN SURGERY         Social History:    Social History     Tobacco Use    Smoking status: Never Smoker    Smokeless tobacco: Never Used    Tobacco comment: never even tried it   Substance Use Topics    Alcohol use: No     Alcohol/week: 0.0 oz     Comment: rarely Counseling given: Not Answered  Comment: never even tried it      Vital Signs (Current):   Vitals:    06/20/19 1553 06/26/19 0727   BP:  139/76   Pulse:  66   Resp:  20   Temp:  97.1 °F (36.2 °C)   TempSrc:  Tympanic   SpO2:  97%   Weight: 220 lb (99.8 kg)    Height: 5' 5\" (1.651 m)                                               BP Readings from Last 3 Encounters:   06/26/19 139/76   06/24/19 130/78   05/21/19 127/83       NPO Status: Time of last liquid consumption: 2100                        Time of last solid consumption: 1700                        Date of last liquid consumption: 06/25/19                        Date of last solid food consumption: 06/25/19    BMI:   Wt Readings from Last 3 Encounters:   06/20/19 220 lb (99.8 kg)   06/24/19 221 lb (100.2 kg)   05/21/19 222 lb (100.7 kg)     Body mass index is 36.61 kg/m². CBC:   Lab Results   Component Value Date    WBC 6.5 06/24/2019    RBC 3.92 06/24/2019    RBC 4.15 06/17/2016    HGB 12.4 06/24/2019    HCT 36.4 06/24/2019    MCV 92.7 06/24/2019    RDW 14.7 06/24/2019     06/24/2019       CMP:   Lab Results   Component Value Date     06/24/2019    K 4.5 06/24/2019    K 3.8 09/30/2018     06/24/2019    CO2 23 06/24/2019    BUN 28 06/24/2019    CREATININE 1.2 06/24/2019    GFRAA 55 06/24/2019    AGRATIO 1.5 06/24/2019    LABGLOM 45 06/24/2019    GLUCOSE 84 06/24/2019    PROT 7.3 06/24/2019    CALCIUM 9.9 06/24/2019    BILITOT 0.5 06/24/2019    ALKPHOS 84 06/24/2019    AST 27 06/24/2019    ALT 14 06/24/2019       POC Tests: No results for input(s): POCGLU, POCNA, POCK, POCCL, POCBUN, POCHEMO, POCHCT in the last 72 hours.     Coags:   Lab Results   Component Value Date    PROTIME 14.0 10/01/2018    INR 1.23 10/01/2018    APTT 27.5 10/09/2017       HCG (If Applicable): No results found for: PREGTESTUR, PREGSERUM, HCG, HCGQUANT     ABGs: No results found for: PHART, PO2ART, IEB0QTS, VSO3IVV, BEART, J9OTNCGJ

## 2019-07-23 ENCOUNTER — ANESTHESIA EVENT (OUTPATIENT)
Dept: OPERATING ROOM | Age: 64
End: 2019-07-23
Payer: COMMERCIAL

## 2019-07-23 RX ORDER — SODIUM CHLORIDE 9 MG/ML
INJECTION, SOLUTION INTRAVENOUS CONTINUOUS
Status: CANCELLED | OUTPATIENT
Start: 2019-07-23

## 2019-07-24 ENCOUNTER — HOSPITAL ENCOUNTER (OUTPATIENT)
Age: 64
Setting detail: OUTPATIENT SURGERY
Discharge: HOME | End: 2019-07-24
Attending: OPHTHALMOLOGY | Admitting: OPHTHALMOLOGY
Payer: COMMERCIAL

## 2019-07-24 ENCOUNTER — ANESTHESIA (OUTPATIENT)
Dept: OPERATING ROOM | Age: 64
End: 2019-07-24
Payer: COMMERCIAL

## 2019-07-24 VITALS
TEMPERATURE: 96.8 F | DIASTOLIC BLOOD PRESSURE: 78 MMHG | OXYGEN SATURATION: 100 % | HEART RATE: 58 BPM | RESPIRATION RATE: 16 BRPM | SYSTOLIC BLOOD PRESSURE: 133 MMHG | BODY MASS INDEX: 36.82 KG/M2 | HEIGHT: 65 IN | WEIGHT: 221 LBS

## 2019-07-24 VITALS — DIASTOLIC BLOOD PRESSURE: 75 MMHG | SYSTOLIC BLOOD PRESSURE: 129 MMHG | OXYGEN SATURATION: 100 %

## 2019-07-24 LAB
GLUCOSE BLD-MCNC: 103 MG/DL (ref 70–99)
PERFORMED ON: ABNORMAL

## 2019-07-24 PROCEDURE — 3700000000 HC ANESTHESIA ATTENDED CARE: Performed by: OPHTHALMOLOGY

## 2019-07-24 PROCEDURE — 2580000003 HC RX 258: Performed by: OPHTHALMOLOGY

## 2019-07-24 PROCEDURE — 3700000001 HC ADD 15 MINUTES (ANESTHESIA): Performed by: OPHTHALMOLOGY

## 2019-07-24 PROCEDURE — 7100000010 HC PHASE II RECOVERY - FIRST 15 MIN: Performed by: OPHTHALMOLOGY

## 2019-07-24 PROCEDURE — 6370000000 HC RX 637 (ALT 250 FOR IP): Performed by: OPHTHALMOLOGY

## 2019-07-24 PROCEDURE — 2500000003 HC RX 250 WO HCPCS: Performed by: OPHTHALMOLOGY

## 2019-07-24 PROCEDURE — 7100000011 HC PHASE II RECOVERY - ADDTL 15 MIN: Performed by: OPHTHALMOLOGY

## 2019-07-24 PROCEDURE — 6360000002 HC RX W HCPCS: Performed by: OPHTHALMOLOGY

## 2019-07-24 PROCEDURE — 6360000002 HC RX W HCPCS: Performed by: NURSE ANESTHETIST, CERTIFIED REGISTERED

## 2019-07-24 PROCEDURE — V2632 POST CHMBR INTRAOCULAR LENS: HCPCS | Performed by: OPHTHALMOLOGY

## 2019-07-24 PROCEDURE — 2709999900 HC NON-CHARGEABLE SUPPLY: Performed by: OPHTHALMOLOGY

## 2019-07-24 PROCEDURE — 2500000003 HC RX 250 WO HCPCS: Performed by: NURSE ANESTHETIST, CERTIFIED REGISTERED

## 2019-07-24 PROCEDURE — 2580000003 HC RX 258: Performed by: ANESTHESIOLOGY

## 2019-07-24 PROCEDURE — 3600000013 HC SURGERY LEVEL 3 ADDTL 15MIN: Performed by: OPHTHALMOLOGY

## 2019-07-24 PROCEDURE — 3600000003 HC SURGERY LEVEL 3 BASE: Performed by: OPHTHALMOLOGY

## 2019-07-24 DEVICE — ACRYSOF(R) IQ ASPHERIC IOL SP ACRYLIC FOLDABLELENS WULTRASERT(TM) DELIVERY SYSTEM UV WBLUE LIGHT FILTER. 13.0MM LENGTH 6.0MM ANTERIORASYMMETRIC BICONVEX OPTIC PLANAR HAPTICS.
Type: IMPLANTABLE DEVICE | Site: LENS | Status: FUNCTIONAL
Brand: ACRYSOF ULTRASERT

## 2019-07-24 RX ORDER — ONDANSETRON 2 MG/ML
4 INJECTION INTRAMUSCULAR; INTRAVENOUS
Status: DISCONTINUED | OUTPATIENT
Start: 2019-07-24 | End: 2019-07-24 | Stop reason: HOSPADM

## 2019-07-24 RX ORDER — MAGNESIUM HYDROXIDE 1200 MG/15ML
LIQUID ORAL PRN
Status: DISCONTINUED | OUTPATIENT
Start: 2019-07-24 | End: 2019-07-24 | Stop reason: ALTCHOICE

## 2019-07-24 RX ORDER — MORPHINE SULFATE 2 MG/ML
1 INJECTION, SOLUTION INTRAMUSCULAR; INTRAVENOUS EVERY 5 MIN PRN
Status: DISCONTINUED | OUTPATIENT
Start: 2019-07-24 | End: 2019-07-24 | Stop reason: HOSPADM

## 2019-07-24 RX ORDER — SODIUM CHLORIDE 0.9 % (FLUSH) 0.9 %
10 SYRINGE (ML) INJECTION EVERY 12 HOURS SCHEDULED
Status: DISCONTINUED | OUTPATIENT
Start: 2019-07-24 | End: 2019-07-24 | Stop reason: HOSPADM

## 2019-07-24 RX ORDER — HYDRALAZINE HYDROCHLORIDE 20 MG/ML
5 INJECTION INTRAMUSCULAR; INTRAVENOUS EVERY 10 MIN PRN
Status: DISCONTINUED | OUTPATIENT
Start: 2019-07-24 | End: 2019-07-24 | Stop reason: HOSPADM

## 2019-07-24 RX ORDER — LIDOCAINE HYDROCHLORIDE 20 MG/ML
INJECTION, SOLUTION INFILTRATION; PERINEURAL PRN
Status: DISCONTINUED | OUTPATIENT
Start: 2019-07-24 | End: 2019-07-24 | Stop reason: SDUPTHER

## 2019-07-24 RX ORDER — MEPERIDINE HYDROCHLORIDE 50 MG/ML
12.5 INJECTION INTRAMUSCULAR; INTRAVENOUS; SUBCUTANEOUS EVERY 5 MIN PRN
Status: DISCONTINUED | OUTPATIENT
Start: 2019-07-24 | End: 2019-07-24 | Stop reason: HOSPADM

## 2019-07-24 RX ORDER — PROMETHAZINE HYDROCHLORIDE 25 MG/ML
6.25 INJECTION, SOLUTION INTRAMUSCULAR; INTRAVENOUS
Status: DISCONTINUED | OUTPATIENT
Start: 2019-07-24 | End: 2019-07-24 | Stop reason: HOSPADM

## 2019-07-24 RX ORDER — DIPHENHYDRAMINE HYDROCHLORIDE 50 MG/ML
12.5 INJECTION INTRAMUSCULAR; INTRAVENOUS
Status: DISCONTINUED | OUTPATIENT
Start: 2019-07-24 | End: 2019-07-24 | Stop reason: HOSPADM

## 2019-07-24 RX ORDER — SODIUM CHLORIDE, SODIUM LACTATE, POTASSIUM CHLORIDE, CALCIUM CHLORIDE 600; 310; 30; 20 MG/100ML; MG/100ML; MG/100ML; MG/100ML
INJECTION, SOLUTION INTRAVENOUS CONTINUOUS
Status: DISCONTINUED | OUTPATIENT
Start: 2019-07-24 | End: 2019-07-24 | Stop reason: HOSPADM

## 2019-07-24 RX ORDER — OXYCODONE HYDROCHLORIDE AND ACETAMINOPHEN 5; 325 MG/1; MG/1
1 TABLET ORAL PRN
Status: DISCONTINUED | OUTPATIENT
Start: 2019-07-24 | End: 2019-07-24 | Stop reason: HOSPADM

## 2019-07-24 RX ORDER — MORPHINE SULFATE 2 MG/ML
2 INJECTION, SOLUTION INTRAMUSCULAR; INTRAVENOUS EVERY 5 MIN PRN
Status: DISCONTINUED | OUTPATIENT
Start: 2019-07-24 | End: 2019-07-24 | Stop reason: HOSPADM

## 2019-07-24 RX ORDER — OXYCODONE HYDROCHLORIDE AND ACETAMINOPHEN 5; 325 MG/1; MG/1
2 TABLET ORAL PRN
Status: DISCONTINUED | OUTPATIENT
Start: 2019-07-24 | End: 2019-07-24 | Stop reason: HOSPADM

## 2019-07-24 RX ORDER — SODIUM CHLORIDE 0.9 % (FLUSH) 0.9 %
10 SYRINGE (ML) INJECTION PRN
Status: DISCONTINUED | OUTPATIENT
Start: 2019-07-24 | End: 2019-07-24 | Stop reason: HOSPADM

## 2019-07-24 RX ORDER — PROPOFOL 10 MG/ML
INJECTION, EMULSION INTRAVENOUS PRN
Status: DISCONTINUED | OUTPATIENT
Start: 2019-07-24 | End: 2019-07-24 | Stop reason: SDUPTHER

## 2019-07-24 RX ORDER — LABETALOL HYDROCHLORIDE 5 MG/ML
5 INJECTION, SOLUTION INTRAVENOUS EVERY 10 MIN PRN
Status: DISCONTINUED | OUTPATIENT
Start: 2019-07-24 | End: 2019-07-24 | Stop reason: HOSPADM

## 2019-07-24 RX ORDER — TIMOLOL MALEATE 5 MG/ML
SOLUTION/ DROPS OPHTHALMIC PRN
Status: DISCONTINUED | OUTPATIENT
Start: 2019-07-24 | End: 2019-07-24 | Stop reason: ALTCHOICE

## 2019-07-24 RX ADMIN — SODIUM CHLORIDE, POTASSIUM CHLORIDE, SODIUM LACTATE AND CALCIUM CHLORIDE: 600; 310; 30; 20 INJECTION, SOLUTION INTRAVENOUS at 06:47

## 2019-07-24 RX ADMIN — Medication 0.3 ML: at 06:39

## 2019-07-24 RX ADMIN — PROPOFOL 100 MG: 10 INJECTION, EMULSION INTRAVENOUS at 07:13

## 2019-07-24 RX ADMIN — Medication 0.3 ML: at 06:26

## 2019-07-24 RX ADMIN — Medication 0.3 ML: at 06:48

## 2019-07-24 RX ADMIN — LIDOCAINE HYDROCHLORIDE 40 MG: 20 INJECTION, SOLUTION INFILTRATION; PERINEURAL at 07:13

## 2019-07-24 ASSESSMENT — PULMONARY FUNCTION TESTS
PIF_VALUE: 0
PIF_VALUE: 1
PIF_VALUE: 0

## 2019-07-24 ASSESSMENT — PAIN - FUNCTIONAL ASSESSMENT: PAIN_FUNCTIONAL_ASSESSMENT: 0-10

## 2019-07-24 NOTE — ANESTHESIA PRE PROCEDURE
R60.0    Urinary tract infection without hematuria N39.0    Colitis K52.9    Primary osteoarthritis of left hip M16.12       Past Medical History:        Diagnosis Date    Acute pancreatitis 10/21/2014    Acute renal failure with tubular necrosis (HCC) 7/10/2015    Acute stasis dermatitis of left lower extremity 7/8/2015    Angina pectoris ([address] 5/23/2011    Cellulitis of left leg     Chronic back pain     Clostridium difficile infection 7/9/15, 6/4/15    Colitis     Diabetes mellitus ([address]     diet controlled    Hyperlipidemia     Hypertension     Left THR 1/25/2016    Palpitations 7/17/2013    Primary osteoarthritis of left hip 11/18/2015    Shock ([address] 10/10/2017    Superficial thrombophlebitis of left leg 7/8/2015    Thyroid disease     Venous ulcer of left leg ([address]        Past Surgical History:        Procedure Laterality Date    APPENDECTOMY      CARDIAC CATHETERIZATION  05/2014    COLONOSCOPY      EPIDURAL STEROID INJECTION Left 1/22/2019    LEFT LUMBAR FOUR FIVE EPIDURAL STEROID INJECTION SITE CONFIRMED BY FLUOROSCOPY performed by Kamala Cuellar MD at [address] [place] [address] Left 01/19/2016    INTRACAPSULAR CATARACT EXTRACTION Left 6/26/2019    PHACOEMULSIFICATION OF LEFT EYE CATARACT WITH INTRAOCULAR LENS IMPLANT performed by Rosalie Mccoy MD at [place] [address]  [facility] ASPIR/BIOP ALTERED ANATOMY  10/15/2018    EGD ESOPHAGOGASTRODUODENOSCOPY ULTRASOUND 0403, 1583 performed by Gaetana Gottron, DO at [address] EGD TRANSORAL BIOPSY SINGLE/MULTIPLE  10/15/2018    EGD BIOPSY performed by Gaetana Gottron, DO at [address] [facility] [address] ENDOSCOPY  10/15/2018    EGD [facility] EUS    VARICOSE VEIN SURGERY         Social History:    Social History     Tobacco Use    Smoking status: Never Smoker    Smokeless tobacco: Never Used    Tobacco comment: never even tried it   Substance Use Topics    results found for: LABABO, LABRH    Anesthesia Evaluation   no history of anesthetic complications:   Airway: Mallampati: III  TM distance: <3 FB   Neck ROM: limited  Mouth opening: > = 3 FB Dental: normal exam         Pulmonary:   (+) asthma:                            Cardiovascular:    (+) hypertension:, hyperlipidemia    (-)  angina                Neuro/Psych:   Negative Neuro/Psych ROS              GI/Hepatic/Renal:   (+) PUD,           Endo/Other:    (+) DiabetesType II DM, , hypothyroidism: arthritis: OA., .                 Abdominal:           Vascular: negative vascular ROS. Pre-Operative Diagnosis: Other age-related cataract of right eye [H25.89]    58 y.o.   BMI:  Body mass index is 36.78 kg/m².      Vitals:    07/18/19 1357 07/24/19 0640   BP:  (!) 135/59   Pulse:  72   Resp:  16   Temp:  97.2 °F (36.2 °C)   TempSrc:  Temporal   SpO2:  97%   Weight: 221 lb (100.2 kg) 221 lb (100.2 kg)   Height: 5' 5\" (1.651 m) 5' 5\" (1.651 m)       Allergies   Allergen Reactions    Amoxicillin      \"colitis\"    Pravastatin Rash    Wellbutrin [Bupropion] Other (See Comments) and Rash     Caused sores in mouth       Social History     Tobacco Use    Smoking status: Never Smoker    Smokeless tobacco: Never Used    Tobacco comment: never even tried it   Substance Use Topics    Alcohol use: No     Alcohol/week: 0.0 standard drinks     Comment: rarely       LABS:    CBC  Lab Results   Component Value Date/Time    WBC 6.5 06/24/2019 01:38 PM    HGB 12.4 06/24/2019 01:38 PM    HCT 36.4 06/24/2019 01:38 PM     06/24/2019 01:38 PM     RENAL  Lab Results   Component Value Date/Time     06/24/2019 01:38 PM    K 4.5 06/24/2019 01:38 PM    K 3.8 09/30/2018 06:07 AM     06/24/2019 01:38 PM    CO2 23 06/24/2019 01:38 PM    BUN 28 (H) 06/24/2019 01:38 PM    CREATININE 1.2 06/24/2019 01:38 PM    GLUCOSE 84 06/24/2019 01:38 PM     COAGS  Lab Results   Component Value Date/Time PROTIME 14.0 (H) 10/01/2018 06:24 PM    INR 1.23 (H) 10/01/2018 06:24 PM    APTT 27.5 10/09/2017 07:44 AM        Anesthesia Plan      general     ASA 3     (Risks, benefits and alternatives of GA discussed with pt. Questions answered. Willing to proceed.)  Induction: intravenous. Anesthetic plan and risks discussed with patient.                       Gurinder Ernst MD   7/24/2019

## 2019-07-24 NOTE — PROGRESS NOTES
POCT      Blood Glucose:     103      (Normal Range 70-99)    PT:      (Normal Range 9.8 - 13)    INR:      (Normal Range 0.86-1.14)

## 2019-07-24 NOTE — BRIEF OP NOTE
Brief Postoperative Note  ______________________________________________________________    Patient: Belen Waite  YOB: 1955  MRN: [id number]  Date of Procedure: 7/24/2019    Pre-Op Diagnosis: Other age-related cataract of right eye [H25.89]    Post-Op Diagnosis: Same       Procedure(s):  PHACOEMULSIFICATION OF CATARACT RIGHT EYE WITH INTRAOCULAR LENS IMPLANT    Anesthesia: General    Surgeon(s):  Rosa Maria Clancy MD    Assistant: none    Estimated Blood Loss (mL): less than 50     Complications: None    Specimens:   * No specimens in log *    Implants:  Implant Name Type Inv.  Item Serial No.  Lot No. LRB No. Used   LENS IOL [id number] ULTRASERT - [id number] Eye LENS IOL [id number] Dori Cone [id number] ALYSSA  Right 1         Drains: * No LDAs found *    Findings: none    Abril Noble MD  Date: 7/24/2019  Time: 7:51 AM

## 2019-08-09 RX ORDER — SODIUM CHLORIDE 9 MG/ML
INJECTION, SOLUTION INTRAVENOUS CONTINUOUS
Status: CANCELLED | OUTPATIENT
Start: 2019-08-29

## 2019-08-09 RX ORDER — SODIUM CHLORIDE 0.9 % (FLUSH) 0.9 %
10 SYRINGE (ML) INJECTION PRN
Status: CANCELLED | OUTPATIENT
Start: 2019-08-29

## 2019-08-15 RX ORDER — CELECOXIB 100 MG/1
CAPSULE ORAL
Qty: 180 CAPSULE | Refills: 3 | OUTPATIENT
Start: 2019-08-15

## 2019-08-15 NOTE — TELEPHONE ENCOUNTER
Patient's chart states that the medication was discontinued upon discharge from her last hospitalization.   Please call the patient and confirm she is requesting this medication

## 2019-08-16 RX ORDER — FOLIC ACID 1 MG/1
TABLET ORAL
Qty: 90 TABLET | Refills: 3 | Status: SHIPPED | OUTPATIENT
Start: 2019-08-16 | End: 2020-03-27 | Stop reason: SDUPTHER

## 2019-08-28 DIAGNOSIS — E03.9 HYPOTHYROIDISM, UNSPECIFIED TYPE: ICD-10-CM

## 2019-08-28 DIAGNOSIS — E78.2 MIXED HYPERLIPIDEMIA: ICD-10-CM

## 2019-08-29 ENCOUNTER — HOSPITAL ENCOUNTER (OUTPATIENT)
Dept: NURSING | Age: 64
Setting detail: INFUSION SERIES
Discharge: HOME | End: 2019-08-29
Payer: COMMERCIAL

## 2019-08-29 VITALS
SYSTOLIC BLOOD PRESSURE: 124 MMHG | BODY MASS INDEX: 35.65 KG/M2 | DIASTOLIC BLOOD PRESSURE: 76 MMHG | HEART RATE: 91 BPM | WEIGHT: 214 LBS | HEIGHT: 65 IN | RESPIRATION RATE: 22 BRPM

## 2019-08-29 DIAGNOSIS — K52.9 COLITIS: Primary | ICD-10-CM

## 2019-08-29 DIAGNOSIS — K51.90 ULCERATIVE COLITIS WITHOUT COMPLICATIONS, UNSPECIFIED LOCATION (HCC): ICD-10-CM

## 2019-08-29 LAB
A/G RATIO: 1.1 (ref 1.1–2.2)
ALBUMIN SERPL-MCNC: 4.2 G/DL (ref 3.4–5)
ALP BLD-CCNC: ABNORMAL U/L (ref 40–129)
ALT SERPL-CCNC: ABNORMAL U/L (ref 10–40)
ANION GAP SERPL CALCULATED.3IONS-SCNC: 8 MMOL/L (ref 3–16)
AST SERPL-CCNC: ABNORMAL U/L (ref 15–37)
BILIRUB SERPL-MCNC: 0.6 MG/DL (ref 0–1)
BUN BLDV-MCNC: 31 MG/DL (ref 7–20)
CALCIUM SERPL-MCNC: 9.2 MG/DL (ref 8.3–10.6)
CHLORIDE BLD-SCNC: 104 MMOL/L (ref 99–110)
CO2: 25 MMOL/L (ref 21–32)
CREAT SERPL-MCNC: 1 MG/DL (ref 0.6–1.2)
GFR AFRICAN AMERICAN: >60
GFR NON-AFRICAN AMERICAN: 56
GLOBULIN: 3.7 G/DL
GLUCOSE BLD-MCNC: 139 MG/DL (ref 70–99)
HCT VFR BLD CALC: 34.3 % (ref 36–48)
HEMOGLOBIN: 11.6 G/DL (ref 12–16)
MCH RBC QN AUTO: 30.9 PG (ref 26–34)
MCHC RBC AUTO-ENTMCNC: 33.8 G/DL (ref 31–36)
MCV RBC AUTO: 91.5 FL (ref 80–100)
PDW BLD-RTO: 15.3 % (ref 12.4–15.4)
PLATELET # BLD: 202 K/UL (ref 135–450)
PMV BLD AUTO: 9.1 FL (ref 5–10.5)
POTASSIUM SERPL-SCNC: ABNORMAL MMOL/L (ref 3.5–5.1)
RBC # BLD: 3.75 M/UL (ref 4–5.2)
SODIUM BLD-SCNC: 137 MMOL/L (ref 136–145)
TOTAL PROTEIN: 7.9 G/DL (ref 6.4–8.2)
WBC # BLD: 6.8 K/UL (ref 4–11)

## 2019-08-29 PROCEDURE — 80053 COMPREHEN METABOLIC PANEL: CPT

## 2019-08-29 PROCEDURE — 2580000003 HC RX 258: Performed by: INTERNAL MEDICINE

## 2019-08-29 PROCEDURE — 96365 THER/PROPH/DIAG IV INF INIT: CPT

## 2019-08-29 PROCEDURE — 85027 COMPLETE CBC AUTOMATED: CPT

## 2019-08-29 RX ORDER — SODIUM CHLORIDE 9 MG/ML
INJECTION, SOLUTION INTRAVENOUS CONTINUOUS
Status: CANCELLED | OUTPATIENT
Start: 2019-09-12

## 2019-08-29 RX ORDER — SODIUM CHLORIDE 0.9 % (FLUSH) 0.9 %
10 SYRINGE (ML) INJECTION PRN
Status: CANCELLED | OUTPATIENT
Start: 2019-09-12

## 2019-08-29 RX ORDER — SODIUM CHLORIDE 0.9 % (FLUSH) 0.9 %
10 SYRINGE (ML) INJECTION PRN
Status: DISCONTINUED | OUTPATIENT
Start: 2019-08-29 | End: 2019-08-30 | Stop reason: HOSPADM

## 2019-08-29 RX ORDER — SODIUM CHLORIDE 9 MG/ML
INJECTION, SOLUTION INTRAVENOUS CONTINUOUS
Status: ACTIVE | OUTPATIENT
Start: 2019-08-29 | End: 2019-08-29

## 2019-08-29 RX ADMIN — SODIUM CHLORIDE: 9 INJECTION, SOLUTION INTRAVENOUS at 12:29

## 2019-08-29 RX ADMIN — Medication 10 ML: at 12:25

## 2019-08-29 NOTE — PROGRESS NOTES
entyvio infused and tubing was flushed with 100 cc NS  IV tubing was disconnected  Pt informed that I will remove INT closer to time of discharge  Pt receptive  Pt without c/o's at present time pt stated that she is feeling ok right now  Will monitor

## 2019-08-29 NOTE — PROGRESS NOTES
Pt without c/o's  Resp easy and even pt reports that she feels fine  INT removed without difficulty Cath tip intact  Pressure then pressure dressing was applied and secured with a coban dressing  IV site unremarkable  No signs of allergic reactions noted  Discharge instructions reviewed with pt and copy was given  Understanding verbalized  Pt will return in 2 weeks for her next infusion  Pt discharged ambulatory in stable condition

## 2019-08-29 NOTE — PROGRESS NOTES
Pt here for first dose of entyvio  Pt reports she was on humara a long time ago but not recently  Pt reports she spoke with Dr. Antoinette Newell about this medication and she has read about it Print out on entyvio given to pt for her to review Infusion process, possible side effects and benefits reviewed with her  Pt receptive to all information and she denies any further questions  Pt is agreeable to wait extra time today to monitor for signs of allergic reactions  Consent for entyvio infusions obtained then IV # 24 started in Rt hand on 3rd attempt  Blood for lab work drawn without difficulty  Pt gayla well  Entyvio 600 mg IVPB starting to infuse  Pt gayla well  Will monitor

## 2019-09-04 RX ORDER — CELECOXIB 100 MG/1
200 CAPSULE ORAL 2 TIMES DAILY
Qty: 360 CAPSULE | Refills: 0 | Status: SHIPPED | OUTPATIENT
Start: 2019-09-04 | End: 2019-10-02

## 2019-09-04 RX ORDER — SIMVASTATIN 20 MG
TABLET ORAL
Qty: 90 TABLET | Refills: 0 | Status: SHIPPED | OUTPATIENT
Start: 2019-09-04 | End: 2019-11-16 | Stop reason: SDUPTHER

## 2019-09-04 RX ORDER — LEVOTHYROXINE SODIUM 0.15 MG/1
150 TABLET ORAL DAILY
Qty: 90 TABLET | Refills: 0 | Status: SHIPPED | OUTPATIENT
Start: 2019-09-04 | End: 2019-11-16 | Stop reason: SDUPTHER

## 2019-09-12 ENCOUNTER — HOSPITAL ENCOUNTER (OUTPATIENT)
Dept: NURSING | Age: 64
Setting detail: INFUSION SERIES
Discharge: HOME | End: 2019-09-12
Payer: COMMERCIAL

## 2019-09-12 VITALS
HEART RATE: 72 BPM | HEIGHT: 65 IN | SYSTOLIC BLOOD PRESSURE: 136 MMHG | RESPIRATION RATE: 20 BRPM | BODY MASS INDEX: 35.65 KG/M2 | DIASTOLIC BLOOD PRESSURE: 73 MMHG | WEIGHT: 214 LBS

## 2019-09-12 DIAGNOSIS — K51.90 ULCERATIVE COLITIS WITHOUT COMPLICATIONS, UNSPECIFIED LOCATION (HCC): ICD-10-CM

## 2019-09-12 DIAGNOSIS — K52.9 COLITIS: Primary | ICD-10-CM

## 2019-09-12 PROCEDURE — 96365 THER/PROPH/DIAG IV INF INIT: CPT

## 2019-09-12 PROCEDURE — 2580000003 HC RX 258: Performed by: INTERNAL MEDICINE

## 2019-09-12 RX ORDER — SODIUM CHLORIDE 9 MG/ML
INJECTION, SOLUTION INTRAVENOUS CONTINUOUS
Status: DISCONTINUED | OUTPATIENT
Start: 2019-09-12 | End: 2019-09-13 | Stop reason: HOSPADM

## 2019-09-12 RX ORDER — SODIUM CHLORIDE 9 MG/ML
INJECTION, SOLUTION INTRAVENOUS CONTINUOUS
Status: CANCELLED | OUTPATIENT
Start: 2019-10-10

## 2019-09-12 RX ORDER — SODIUM CHLORIDE 0.9 % (FLUSH) 0.9 %
10 SYRINGE (ML) INJECTION PRN
Status: DISCONTINUED | OUTPATIENT
Start: 2019-09-12 | End: 2019-09-13 | Stop reason: HOSPADM

## 2019-09-12 RX ORDER — SODIUM CHLORIDE 0.9 % (FLUSH) 0.9 %
10 SYRINGE (ML) INJECTION PRN
Status: CANCELLED | OUTPATIENT
Start: 2019-10-10

## 2019-09-12 RX ADMIN — SODIUM CHLORIDE: 9 INJECTION, SOLUTION INTRAVENOUS at 13:11

## 2019-09-12 RX ADMIN — Medication 10 ML: at 12:58

## 2019-09-12 NOTE — PROGRESS NOTES
Pt here for 2nd dose of entyvio  Pt reports she had some palpitations a few days after the first infusion but is unsure if the entyvio was the cause  Pt reports if it happens again then she will speak with Dr. Lucas Chakraborty  No c/o's voiced  IV # 22 started in Rt hand on 1st  attempt per Charlene Ogden RN  No blood work needed for today ty  Pt gayla well  Entyvio 300 mg IVPB starting to infuse  IV site unremarkable  Will monitor

## 2019-09-17 RX ORDER — DIPHENHYDRAMINE HCL 25 MG
25 TABLET ORAL ONCE
Status: CANCELLED
Start: 2019-09-17

## 2019-09-17 RX ORDER — DIPHENHYDRAMINE HCL 25 MG
25 TABLET ORAL ONCE
Status: CANCELLED
Start: 2019-09-19

## 2019-09-17 RX ORDER — ACETAMINOPHEN 325 MG/1
650 TABLET ORAL ONCE
Status: CANCELLED
Start: 2019-09-17

## 2019-09-17 RX ORDER — ACETAMINOPHEN 325 MG/1
650 TABLET ORAL ONCE
Status: CANCELLED
Start: 2019-09-19

## 2019-09-24 DIAGNOSIS — M54.16 LUMBAR RADICULITIS: ICD-10-CM

## 2019-09-24 DIAGNOSIS — M48.062 LUMBAR STENOSIS WITH NEUROGENIC CLAUDICATION: ICD-10-CM

## 2019-09-24 RX ORDER — GABAPENTIN 100 MG/1
CAPSULE ORAL
Qty: 180 CAPSULE | Refills: 0 | Status: SHIPPED | OUTPATIENT
Start: 2019-09-24 | End: 2019-10-24 | Stop reason: SDUPTHER

## 2019-10-01 PROBLEM — R01.1 HEART MURMUR: Status: ACTIVE | Noted: 2019-10-01

## 2019-10-01 PROBLEM — M54.16 LUMBAR RADICULITIS: Status: ACTIVE | Noted: 2019-10-01

## 2019-10-01 PROBLEM — M48.062 LUMBAR STENOSIS WITH NEUROGENIC CLAUDICATION: Status: ACTIVE | Noted: 2019-10-01

## 2019-10-01 PROBLEM — E53.8 FOLATE DEFICIENCY: Status: ACTIVE | Noted: 2019-10-01

## 2019-10-01 PROBLEM — R53.82 CHRONIC FATIGUE: Status: ACTIVE | Noted: 2018-09-03

## 2019-10-01 PROBLEM — H25.813 COMBINED FORMS OF AGE-RELATED CATARACT OF BOTH EYES: Status: ACTIVE | Noted: 2019-10-01

## 2019-10-01 PROBLEM — E83.42 HYPOMAGNESEMIA: Status: ACTIVE | Noted: 2019-10-01

## 2019-10-02 ENCOUNTER — OFFICE VISIT (OUTPATIENT)
Dept: FAMILY MEDICINE CLINIC | Age: 64
End: 2019-10-02
Payer: COMMERCIAL

## 2019-10-02 VITALS
SYSTOLIC BLOOD PRESSURE: 122 MMHG | WEIGHT: 219 LBS | DIASTOLIC BLOOD PRESSURE: 74 MMHG | HEART RATE: 90 BPM | BODY MASS INDEX: 36.49 KG/M2 | OXYGEN SATURATION: 97 % | HEIGHT: 65 IN

## 2019-10-02 DIAGNOSIS — E11.9 DIET-CONTROLLED TYPE 2 DIABETES MELLITUS (HCC): Primary | ICD-10-CM

## 2019-10-02 DIAGNOSIS — D64.9 NORMOCYTIC ANEMIA: ICD-10-CM

## 2019-10-02 DIAGNOSIS — E55.9 VITAMIN D DEFICIENCY: ICD-10-CM

## 2019-10-02 DIAGNOSIS — E03.9 HYPOTHYROIDISM, UNSPECIFIED TYPE: ICD-10-CM

## 2019-10-02 DIAGNOSIS — I10 ESSENTIAL HYPERTENSION: ICD-10-CM

## 2019-10-02 DIAGNOSIS — M54.16 LUMBAR RADICULITIS: ICD-10-CM

## 2019-10-02 DIAGNOSIS — M48.062 LUMBAR STENOSIS WITH NEUROGENIC CLAUDICATION: ICD-10-CM

## 2019-10-02 DIAGNOSIS — E53.8 B12 DEFICIENCY: ICD-10-CM

## 2019-10-02 DIAGNOSIS — E83.42 HYPOMAGNESEMIA: ICD-10-CM

## 2019-10-02 DIAGNOSIS — E78.2 MIXED HYPERLIPIDEMIA: ICD-10-CM

## 2019-10-02 DIAGNOSIS — E53.8 FOLATE DEFICIENCY: ICD-10-CM

## 2019-10-02 LAB
BILIRUBIN, POC: NORMAL
BLOOD URINE, POC: NORMAL
CLARITY, POC: NORMAL
COLOR, POC: NORMAL
GLUCOSE URINE, POC: NORMAL
HBA1C MFR BLD: 5 %
KETONES, POC: NORMAL
LEUKOCYTE EST, POC: NORMAL
NITRITE, POC: NORMAL
PH, POC: 6.5
PROTEIN, POC: NORMAL
SPECIFIC GRAVITY, POC: 1.02
UROBILINOGEN, POC: 0.2

## 2019-10-02 PROCEDURE — 81002 URINALYSIS NONAUTO W/O SCOPE: CPT | Performed by: NURSE PRACTITIONER

## 2019-10-02 PROCEDURE — 83036 HEMOGLOBIN GLYCOSYLATED A1C: CPT | Performed by: NURSE PRACTITIONER

## 2019-10-02 PROCEDURE — 99214 OFFICE O/P EST MOD 30 MIN: CPT | Performed by: NURSE PRACTITIONER

## 2019-10-02 RX ORDER — CELECOXIB 100 MG/1
100 CAPSULE ORAL 2 TIMES DAILY PRN
Qty: 180 CAPSULE | Refills: 3 | Status: SHIPPED | OUTPATIENT
Start: 2019-10-02 | End: 2019-10-09 | Stop reason: CLARIF

## 2019-10-03 LAB
CREATININE URINE: 67.8 MG/DL (ref 28–259)
MICROALBUMIN UR-MCNC: <1.2 MG/DL
MICROALBUMIN/CREAT UR-RTO: NORMAL MG/G (ref 0–30)

## 2019-10-04 ASSESSMENT — ENCOUNTER SYMPTOMS
RESPIRATORY NEGATIVE: 1
BACK PAIN: 1
GASTROINTESTINAL NEGATIVE: 1
EYES NEGATIVE: 1

## 2019-10-06 ENCOUNTER — HOSPITAL ENCOUNTER (OUTPATIENT)
Age: 64
Discharge: HOME | End: 2019-10-06
Payer: COMMERCIAL

## 2019-10-06 DIAGNOSIS — E78.2 MIXED HYPERLIPIDEMIA: ICD-10-CM

## 2019-10-06 DIAGNOSIS — E83.42 HYPOMAGNESEMIA: ICD-10-CM

## 2019-10-06 DIAGNOSIS — I10 ESSENTIAL HYPERTENSION: ICD-10-CM

## 2019-10-06 DIAGNOSIS — D64.9 NORMOCYTIC ANEMIA: ICD-10-CM

## 2019-10-06 DIAGNOSIS — E03.9 HYPOTHYROIDISM, UNSPECIFIED TYPE: ICD-10-CM

## 2019-10-06 DIAGNOSIS — E53.8 B12 DEFICIENCY: ICD-10-CM

## 2019-10-06 DIAGNOSIS — E53.8 FOLATE DEFICIENCY: ICD-10-CM

## 2019-10-06 DIAGNOSIS — E55.9 VITAMIN D DEFICIENCY: ICD-10-CM

## 2019-10-06 LAB
A/G RATIO: 1.2 (ref 1.1–2.2)
ALBUMIN SERPL-MCNC: 4.1 G/DL (ref 3.4–5)
ALP BLD-CCNC: 77 U/L (ref 40–129)
ALT SERPL-CCNC: 18 U/L (ref 10–40)
ANION GAP SERPL CALCULATED.3IONS-SCNC: 11 MMOL/L (ref 3–16)
AST SERPL-CCNC: 35 U/L (ref 15–37)
BASOPHILS ABSOLUTE: 0.1 K/UL (ref 0–0.2)
BASOPHILS RELATIVE PERCENT: 1.1 %
BILIRUB SERPL-MCNC: 0.9 MG/DL (ref 0–1)
BUN BLDV-MCNC: 21 MG/DL (ref 7–20)
CALCIUM SERPL-MCNC: 9.7 MG/DL (ref 8.3–10.6)
CHLORIDE BLD-SCNC: 104 MMOL/L (ref 99–110)
CHOLESTEROL, TOTAL: 159 MG/DL (ref 0–199)
CO2: 27 MMOL/L (ref 21–32)
CREAT SERPL-MCNC: 1 MG/DL (ref 0.6–1.2)
EOSINOPHILS ABSOLUTE: 0.1 K/UL (ref 0–0.6)
EOSINOPHILS RELATIVE PERCENT: 1.4 %
FERRITIN: 175.3 NG/ML (ref 15–150)
FOLATE: >20 NG/ML (ref 4.78–24.2)
GFR AFRICAN AMERICAN: >60
GFR NON-AFRICAN AMERICAN: 56
GLOBULIN: 3.5 G/DL
GLUCOSE BLD-MCNC: 86 MG/DL (ref 70–99)
HCT VFR BLD CALC: 39.1 % (ref 36–48)
HDLC SERPL-MCNC: 88 MG/DL (ref 40–60)
HEMOGLOBIN: 13.2 G/DL (ref 12–16)
IRON SATURATION: 28 % (ref 15–50)
IRON: 90 UG/DL (ref 37–145)
LDL CHOLESTEROL CALCULATED: 56 MG/DL
LYMPHOCYTES ABSOLUTE: 1.6 K/UL (ref 1–5.1)
LYMPHOCYTES RELATIVE PERCENT: 21 %
MAGNESIUM: 2 MG/DL (ref 1.8–2.4)
MCH RBC QN AUTO: 31.4 PG (ref 26–34)
MCHC RBC AUTO-ENTMCNC: 33.6 G/DL (ref 31–36)
MCV RBC AUTO: 93.4 FL (ref 80–100)
MONOCYTES ABSOLUTE: 0.4 K/UL (ref 0–1.3)
MONOCYTES RELATIVE PERCENT: 5.7 %
NEUTROPHILS ABSOLUTE: 5.4 K/UL (ref 1.7–7.7)
NEUTROPHILS RELATIVE PERCENT: 70.8 %
PDW BLD-RTO: 14.7 % (ref 12.4–15.4)
PLATELET # BLD: 180 K/UL (ref 135–450)
PMV BLD AUTO: 9 FL (ref 5–10.5)
POTASSIUM SERPL-SCNC: 4.5 MMOL/L (ref 3.5–5.1)
RBC # BLD: 4.19 M/UL (ref 4–5.2)
SODIUM BLD-SCNC: 142 MMOL/L (ref 136–145)
TOTAL IRON BINDING CAPACITY: 319 UG/DL (ref 260–445)
TOTAL PROTEIN: 7.6 G/DL (ref 6.4–8.2)
TRIGL SERPL-MCNC: 73 MG/DL (ref 0–150)
TSH SERPL DL<=0.05 MIU/L-ACNC: 0.56 UIU/ML (ref 0.27–4.2)
VITAMIN B-12: 227 PG/ML (ref 211–911)
VITAMIN D 25-HYDROXY: 51.1 NG/ML
VLDLC SERPL CALC-MCNC: 15 MG/DL
WBC # BLD: 7.7 K/UL (ref 4–11)

## 2019-10-06 PROCEDURE — 80053 COMPREHEN METABOLIC PANEL: CPT

## 2019-10-06 PROCEDURE — 82728 ASSAY OF FERRITIN: CPT

## 2019-10-06 PROCEDURE — 84443 ASSAY THYROID STIM HORMONE: CPT

## 2019-10-06 PROCEDURE — 80061 LIPID PANEL: CPT

## 2019-10-06 PROCEDURE — 36415 COLL VENOUS BLD VENIPUNCTURE: CPT

## 2019-10-06 PROCEDURE — 82607 VITAMIN B-12: CPT

## 2019-10-06 PROCEDURE — 82306 VITAMIN D 25 HYDROXY: CPT

## 2019-10-06 PROCEDURE — 83540 ASSAY OF IRON: CPT

## 2019-10-06 PROCEDURE — 85025 COMPLETE CBC W/AUTO DIFF WBC: CPT

## 2019-10-06 PROCEDURE — 83735 ASSAY OF MAGNESIUM: CPT

## 2019-10-06 PROCEDURE — 82746 ASSAY OF FOLIC ACID SERUM: CPT

## 2019-10-06 PROCEDURE — 83550 IRON BINDING TEST: CPT

## 2019-10-07 ENCOUNTER — TELEPHONE (OUTPATIENT)
Dept: FAMILY MEDICINE CLINIC | Age: 64
End: 2019-10-07

## 2019-10-08 ENCOUNTER — HOSPITAL ENCOUNTER (OUTPATIENT)
Dept: NURSING | Age: 64
Setting detail: INFUSION SERIES
Discharge: HOME | End: 2019-10-08
Payer: COMMERCIAL

## 2019-10-08 VITALS
RESPIRATION RATE: 20 BRPM | DIASTOLIC BLOOD PRESSURE: 65 MMHG | HEIGHT: 65 IN | WEIGHT: 219 LBS | HEART RATE: 83 BPM | SYSTOLIC BLOOD PRESSURE: 121 MMHG | BODY MASS INDEX: 36.49 KG/M2

## 2019-10-08 DIAGNOSIS — K51.90 ULCERATIVE COLITIS WITHOUT COMPLICATIONS, UNSPECIFIED LOCATION (HCC): ICD-10-CM

## 2019-10-08 DIAGNOSIS — K52.9 COLITIS: Primary | ICD-10-CM

## 2019-10-08 PROCEDURE — 96365 THER/PROPH/DIAG IV INF INIT: CPT

## 2019-10-08 PROCEDURE — 2580000003 HC RX 258: Performed by: INTERNAL MEDICINE

## 2019-10-08 RX ORDER — SODIUM CHLORIDE 0.9 % (FLUSH) 0.9 %
10 SYRINGE (ML) INJECTION PRN
Status: CANCELLED | OUTPATIENT
Start: 2019-10-10

## 2019-10-08 RX ORDER — ACETAMINOPHEN 325 MG/1
650 TABLET ORAL ONCE
Status: CANCELLED
Start: 2019-10-10

## 2019-10-08 RX ORDER — DIPHENHYDRAMINE HCL 25 MG
25 TABLET ORAL ONCE
Status: CANCELLED
Start: 2019-10-10

## 2019-10-08 RX ORDER — SODIUM CHLORIDE 0.9 % (FLUSH) 0.9 %
10 SYRINGE (ML) INJECTION PRN
Status: DISCONTINUED | OUTPATIENT
Start: 2019-10-08 | End: 2019-10-09 | Stop reason: HOSPADM

## 2019-10-08 RX ORDER — DIPHENHYDRAMINE HCL 25 MG
25 TABLET ORAL ONCE
Status: DISCONTINUED | OUTPATIENT
Start: 2019-10-08 | End: 2019-10-09 | Stop reason: HOSPADM

## 2019-10-08 RX ORDER — SODIUM CHLORIDE 9 MG/ML
INJECTION, SOLUTION INTRAVENOUS CONTINUOUS
Status: CANCELLED | OUTPATIENT
Start: 2019-10-10

## 2019-10-08 RX ORDER — SODIUM CHLORIDE 9 MG/ML
INJECTION, SOLUTION INTRAVENOUS CONTINUOUS
Status: ACTIVE | OUTPATIENT
Start: 2019-10-08 | End: 2019-10-08

## 2019-10-08 RX ORDER — ACETAMINOPHEN 325 MG/1
650 TABLET ORAL ONCE
Status: DISCONTINUED | OUTPATIENT
Start: 2019-10-08 | End: 2019-10-09 | Stop reason: HOSPADM

## 2019-10-08 RX ADMIN — SODIUM CHLORIDE: 9 INJECTION, SOLUTION INTRAVENOUS at 12:16

## 2019-10-08 RX ADMIN — Medication 10 ML: at 12:16

## 2019-10-08 NOTE — PROGRESS NOTES
Pt here for 3rd dose of entyvio  Pt reports the diarrhea is better depending on what she eats  Pt denies any cramping or bleeding but she does report some cramping in her legs and hands on occasion IV  # 24 started in Rt hand on 1st attempt per myself    No blood work today as pt just had blood work done on 10-6-19  Pt gayla well  Entyvio 300 mg IVPB starting to infuse  IV site unremarkable  Will monitor

## 2019-10-24 DIAGNOSIS — M48.062 LUMBAR STENOSIS WITH NEUROGENIC CLAUDICATION: ICD-10-CM

## 2019-10-24 DIAGNOSIS — M54.16 LUMBAR RADICULITIS: ICD-10-CM

## 2019-10-24 RX ORDER — GABAPENTIN 100 MG/1
200 CAPSULE ORAL 3 TIMES DAILY
Qty: 540 CAPSULE | Refills: 1 | Status: SHIPPED | OUTPATIENT
Start: 2019-10-24 | End: 2020-03-27 | Stop reason: SDUPTHER

## 2019-11-27 ENCOUNTER — HOSPITAL ENCOUNTER (OUTPATIENT)
Dept: OPERATING ROOM | Age: 64
Setting detail: OUTPATIENT SURGERY
Discharge: HOME | End: 2019-11-27
Payer: COMMERCIAL

## 2019-11-27 VITALS
SYSTOLIC BLOOD PRESSURE: 160 MMHG | DIASTOLIC BLOOD PRESSURE: 84 MMHG | OXYGEN SATURATION: 96 % | RESPIRATION RATE: 16 BRPM | HEART RATE: 92 BPM

## 2019-11-27 PROCEDURE — 66821 AFTER CATARACT LASER SURGERY: CPT

## 2019-11-27 PROCEDURE — 6370000000 HC RX 637 (ALT 250 FOR IP): Performed by: OPHTHALMOLOGY

## 2019-11-27 PROCEDURE — 2500000003 HC RX 250 WO HCPCS: Performed by: OPHTHALMOLOGY

## 2019-11-27 RX ORDER — TROPICAMIDE 10 MG/ML
1 SOLUTION/ DROPS OPHTHALMIC
Status: COMPLETED | OUTPATIENT
Start: 2019-11-27 | End: 2019-11-27

## 2019-11-27 RX ORDER — PHENYLEPHRINE HCL 2.5 %
1 DROPS OPHTHALMIC (EYE)
Status: COMPLETED | OUTPATIENT
Start: 2019-11-27 | End: 2019-11-27

## 2019-11-27 RX ORDER — PROPARACAINE HYDROCHLORIDE 5 MG/ML
1 SOLUTION/ DROPS OPHTHALMIC ONCE
Status: COMPLETED | OUTPATIENT
Start: 2019-11-27 | End: 2019-11-27

## 2019-11-27 RX ORDER — ACETAMINOPHEN 500 MG
500 TABLET ORAL DAILY PRN
COMMUNITY

## 2019-11-27 RX ADMIN — TROPICAMIDE 1 DROP: 10 SOLUTION/ DROPS OPHTHALMIC at 14:52

## 2019-11-27 RX ADMIN — PROPARACAINE HYDROCHLORIDE 1 DROP: 5 SOLUTION/ DROPS OPHTHALMIC at 15:04

## 2019-11-27 RX ADMIN — APRACLONIDINE HYDROCHLORIDE 1 DROP: 10 SOLUTION/ DROPS OPHTHALMIC at 14:53

## 2019-11-27 RX ADMIN — PHENYLEPHRINE HYDROCHLORIDE 1 DROP: 25 SOLUTION/ DROPS OPHTHALMIC at 14:59

## 2019-11-27 RX ADMIN — TROPICAMIDE 1 DROP: 10 SOLUTION/ DROPS OPHTHALMIC at 14:58

## 2019-11-27 RX ADMIN — APRACLONIDINE HYDROCHLORIDE 1 DROP: 10 SOLUTION/ DROPS OPHTHALMIC at 15:07

## 2019-11-27 RX ADMIN — PHENYLEPHRINE HYDROCHLORIDE 1 DROP: 25 SOLUTION/ DROPS OPHTHALMIC at 14:54

## 2019-12-03 ENCOUNTER — HOSPITAL ENCOUNTER (OUTPATIENT)
Dept: NURSING | Age: 64
Setting detail: INFUSION SERIES
Discharge: HOME | End: 2019-12-03
Payer: MEDICARE

## 2019-12-03 VITALS
HEIGHT: 65 IN | RESPIRATION RATE: 16 BRPM | BODY MASS INDEX: 37.82 KG/M2 | DIASTOLIC BLOOD PRESSURE: 67 MMHG | WEIGHT: 227 LBS | HEART RATE: 95 BPM | SYSTOLIC BLOOD PRESSURE: 142 MMHG

## 2019-12-03 DIAGNOSIS — K52.9 COLITIS: Primary | ICD-10-CM

## 2019-12-03 DIAGNOSIS — K51.90 ULCERATIVE COLITIS WITHOUT COMPLICATIONS, UNSPECIFIED LOCATION (HCC): ICD-10-CM

## 2019-12-03 LAB
A/G RATIO: 1.1 (ref 1.1–2.2)
ALBUMIN SERPL-MCNC: 3.8 G/DL (ref 3.4–5)
ALP BLD-CCNC: 80 U/L (ref 40–129)
ALT SERPL-CCNC: 22 U/L (ref 10–40)
ANION GAP SERPL CALCULATED.3IONS-SCNC: 12 MMOL/L (ref 3–16)
AST SERPL-CCNC: 38 U/L (ref 15–37)
BILIRUB SERPL-MCNC: 0.4 MG/DL (ref 0–1)
BUN BLDV-MCNC: 31 MG/DL (ref 7–20)
CALCIUM SERPL-MCNC: 9.6 MG/DL (ref 8.3–10.6)
CHLORIDE BLD-SCNC: 106 MMOL/L (ref 99–110)
CO2: 23 MMOL/L (ref 21–32)
CREAT SERPL-MCNC: 1.3 MG/DL (ref 0.6–1.2)
GFR AFRICAN AMERICAN: 50
GFR NON-AFRICAN AMERICAN: 41
GLOBULIN: 3.6 G/DL
GLUCOSE BLD-MCNC: 151 MG/DL (ref 70–99)
HCT VFR BLD CALC: 33.2 % (ref 36–48)
HEMOGLOBIN: 11.3 G/DL (ref 12–16)
MCH RBC QN AUTO: 31.6 PG (ref 26–34)
MCHC RBC AUTO-ENTMCNC: 34.1 G/DL (ref 31–36)
MCV RBC AUTO: 92.6 FL (ref 80–100)
PDW BLD-RTO: 13.5 % (ref 12.4–15.4)
PLATELET # BLD: 154 K/UL (ref 135–450)
PMV BLD AUTO: 9 FL (ref 5–10.5)
POTASSIUM SERPL-SCNC: 4.3 MMOL/L (ref 3.5–5.1)
RBC # BLD: 3.58 M/UL (ref 4–5.2)
SODIUM BLD-SCNC: 141 MMOL/L (ref 136–145)
TOTAL PROTEIN: 7.4 G/DL (ref 6.4–8.2)
WBC # BLD: 4.8 K/UL (ref 4–11)

## 2019-12-03 PROCEDURE — 85027 COMPLETE CBC AUTOMATED: CPT

## 2019-12-03 PROCEDURE — 2580000003 HC RX 258: Performed by: INTERNAL MEDICINE

## 2019-12-03 PROCEDURE — 96365 THER/PROPH/DIAG IV INF INIT: CPT

## 2019-12-03 PROCEDURE — 80053 COMPREHEN METABOLIC PANEL: CPT

## 2019-12-03 PROCEDURE — 86480 TB TEST CELL IMMUN MEASURE: CPT

## 2019-12-03 PROCEDURE — 99211 OFF/OP EST MAY X REQ PHY/QHP: CPT

## 2019-12-03 RX ORDER — DIPHENHYDRAMINE HCL 25 MG
25 TABLET ORAL ONCE
Status: DISCONTINUED | OUTPATIENT
Start: 2019-12-03 | End: 2019-12-04 | Stop reason: HOSPADM

## 2019-12-03 RX ORDER — SODIUM CHLORIDE 0.9 % (FLUSH) 0.9 %
10 SYRINGE (ML) INJECTION PRN
Status: CANCELLED | OUTPATIENT
Start: 2019-12-05

## 2019-12-03 RX ORDER — SODIUM CHLORIDE 9 MG/ML
INJECTION, SOLUTION INTRAVENOUS CONTINUOUS
Status: DISCONTINUED | OUTPATIENT
Start: 2019-12-03 | End: 2019-12-04 | Stop reason: HOSPADM

## 2019-12-03 RX ORDER — SODIUM CHLORIDE 9 MG/ML
INJECTION, SOLUTION INTRAVENOUS CONTINUOUS
Status: CANCELLED | OUTPATIENT
Start: 2019-12-05

## 2019-12-03 RX ORDER — ACETAMINOPHEN 325 MG/1
650 TABLET ORAL ONCE
Status: CANCELLED
Start: 2019-12-05

## 2019-12-03 RX ORDER — ACETAMINOPHEN 325 MG/1
650 TABLET ORAL ONCE
Status: DISCONTINUED | OUTPATIENT
Start: 2019-12-03 | End: 2019-12-04 | Stop reason: HOSPADM

## 2019-12-03 RX ORDER — DIPHENHYDRAMINE HCL 25 MG
25 TABLET ORAL ONCE
Status: CANCELLED
Start: 2019-12-05

## 2019-12-03 RX ORDER — SODIUM CHLORIDE 0.9 % (FLUSH) 0.9 %
10 SYRINGE (ML) INJECTION PRN
Status: DISCONTINUED | OUTPATIENT
Start: 2019-12-03 | End: 2019-12-04 | Stop reason: HOSPADM

## 2019-12-03 RX ADMIN — SODIUM CHLORIDE: 9 INJECTION, SOLUTION INTRAVENOUS at 12:49

## 2019-12-03 RX ADMIN — Medication 10 ML: at 12:15

## 2019-12-03 ASSESSMENT — PAIN SCALES - GENERAL: PAINLEVEL_OUTOF10: 7

## 2019-12-03 ASSESSMENT — PAIN DESCRIPTION - LOCATION: LOCATION: BACK

## 2019-12-03 ASSESSMENT — PAIN DESCRIPTION - ORIENTATION: ORIENTATION: LEFT;LOWER

## 2019-12-03 ASSESSMENT — PAIN DESCRIPTION - FREQUENCY: FREQUENCY: CONTINUOUS

## 2019-12-03 ASSESSMENT — PAIN DESCRIPTION - DESCRIPTORS: DESCRIPTORS: BURNING

## 2019-12-03 ASSESSMENT — PAIN DESCRIPTION - PAIN TYPE: TYPE: CHRONIC PAIN

## 2019-12-03 NOTE — PROGRESS NOTES
Pt here for entyvio infusion  Pt reports the diarrhea is better depending on what she eats  Pt denies any cramping or bleeding but she does report some cramping in her legs and hands at times as well as a skin rash on her legs  Pt reports that she is seeing Dr. Meadows January after this infusion is completed today and she will discuss her issues with him  Lab here for blood work  Pt is having a TB Gold test done today  Pt was informed and is agreeable  IV # 22 started in Rt hand on 1st attempt per myself  Pt gayla well  Entyvio 300 mg IVPB starting to infuse  IV site unremarkable    Pt refused ordered meds of tylenol 650 mg and benadryl 25 mg  Will monitor

## 2019-12-03 NOTE — PROGRESS NOTES
Entyvio infused and tubing was flushed with 100 cc NS  IV removed without difficulty Cath tip intact  Pressure then pressure dressing was applied and secured with a coban dressing  IV site unremarkable  Discharge instructions reviewed with pt and copy was given  Understanding verbalized  Pt will return in 8 weeks for her next infusion  Pt discharged ambulatory in stable condition

## 2019-12-05 ENCOUNTER — HOSPITAL ENCOUNTER (OUTPATIENT)
Age: 64
Discharge: HOME | End: 2019-12-05
Payer: MEDICARE

## 2019-12-05 PROCEDURE — 83993 ASSAY FOR CALPROTECTIN FECAL: CPT

## 2019-12-06 LAB
QUANTI TB GOLD PLUS: NEGATIVE
QUANTI TB1 MINUS NIL: 0 IU/ML (ref 0–0.34)
QUANTI TB2 MINUS NIL: 0 IU/ML (ref 0–0.34)
QUANTIFERON MITOGEN: >10 IU/ML
QUANTIFERON NIL: 0.05 IU/ML

## 2019-12-08 DIAGNOSIS — E83.42 HYPOMAGNESEMIA: ICD-10-CM

## 2019-12-08 LAB — CALPROTECTIN: 38 UG/G

## 2019-12-12 ENCOUNTER — OFFICE VISIT (OUTPATIENT)
Dept: FAMILY MEDICINE CLINIC | Age: 64
End: 2019-12-12
Payer: MEDICARE

## 2019-12-12 VITALS
BODY MASS INDEX: 41.11 KG/M2 | DIASTOLIC BLOOD PRESSURE: 70 MMHG | OXYGEN SATURATION: 96 % | HEART RATE: 82 BPM | HEIGHT: 63 IN | SYSTOLIC BLOOD PRESSURE: 122 MMHG | WEIGHT: 232 LBS

## 2019-12-12 DIAGNOSIS — I87.2 VENOUS STASIS DERMATITIS OF BOTH LOWER EXTREMITIES: ICD-10-CM

## 2019-12-12 DIAGNOSIS — R63.5 WEIGHT GAIN: Primary | ICD-10-CM

## 2019-12-12 DIAGNOSIS — Z87.2 HISTORY OF ERYTHEMA NODOSUM: ICD-10-CM

## 2019-12-12 LAB
ANION GAP SERPL CALCULATED.3IONS-SCNC: 14 MMOL/L (ref 3–16)
BUN BLDV-MCNC: 35 MG/DL (ref 7–20)
CALCIUM SERPL-MCNC: 9.6 MG/DL (ref 8.3–10.6)
CHLORIDE BLD-SCNC: 101 MMOL/L (ref 99–110)
CO2: 22 MMOL/L (ref 21–32)
CREAT SERPL-MCNC: 1.3 MG/DL (ref 0.6–1.2)
GFR AFRICAN AMERICAN: 50
GFR NON-AFRICAN AMERICAN: 41
GLUCOSE BLD-MCNC: 72 MG/DL (ref 70–99)
POTASSIUM SERPL-SCNC: 5.2 MMOL/L (ref 3.5–5.1)
PRO-BNP: 122 PG/ML (ref 0–124)
SODIUM BLD-SCNC: 137 MMOL/L (ref 136–145)
TSH REFLEX: 1.33 UIU/ML (ref 0.27–4.2)

## 2019-12-12 PROCEDURE — G8482 FLU IMMUNIZE ORDER/ADMIN: HCPCS | Performed by: NURSE PRACTITIONER

## 2019-12-12 PROCEDURE — 3017F COLORECTAL CA SCREEN DOC REV: CPT | Performed by: NURSE PRACTITIONER

## 2019-12-12 PROCEDURE — G8428 CUR MEDS NOT DOCUMENT: HCPCS | Performed by: NURSE PRACTITIONER

## 2019-12-12 PROCEDURE — 1036F TOBACCO NON-USER: CPT | Performed by: NURSE PRACTITIONER

## 2019-12-12 PROCEDURE — 99214 OFFICE O/P EST MOD 30 MIN: CPT | Performed by: NURSE PRACTITIONER

## 2019-12-12 PROCEDURE — 36415 COLL VENOUS BLD VENIPUNCTURE: CPT | Performed by: NURSE PRACTITIONER

## 2019-12-12 PROCEDURE — G8417 CALC BMI ABV UP PARAM F/U: HCPCS | Performed by: NURSE PRACTITIONER

## 2019-12-12 RX ORDER — TRIAMCINOLONE ACETONIDE 1 MG/G
CREAM TOPICAL
Qty: 45 G | Refills: 1 | Status: SHIPPED | OUTPATIENT
Start: 2019-12-12 | End: 2020-04-14

## 2019-12-29 ASSESSMENT — ENCOUNTER SYMPTOMS
EYES NEGATIVE: 1
RESPIRATORY NEGATIVE: 1
GASTROINTESTINAL NEGATIVE: 1

## 2020-02-05 ENCOUNTER — HOSPITAL ENCOUNTER (OUTPATIENT)
Dept: NURSING | Age: 65
Setting detail: INFUSION SERIES
Discharge: HOME | End: 2020-02-05
Payer: MEDICARE

## 2020-02-05 VITALS
WEIGHT: 227 LBS | HEIGHT: 65 IN | DIASTOLIC BLOOD PRESSURE: 67 MMHG | RESPIRATION RATE: 16 BRPM | SYSTOLIC BLOOD PRESSURE: 108 MMHG | BODY MASS INDEX: 37.82 KG/M2 | HEART RATE: 76 BPM

## 2020-02-05 DIAGNOSIS — K52.9 COLITIS: Primary | ICD-10-CM

## 2020-02-05 DIAGNOSIS — K51.90 ULCERATIVE COLITIS WITHOUT COMPLICATIONS, UNSPECIFIED LOCATION (HCC): ICD-10-CM

## 2020-02-05 PROCEDURE — 99211 OFF/OP EST MAY X REQ PHY/QHP: CPT

## 2020-02-05 PROCEDURE — 2580000003 HC RX 258: Performed by: INTERNAL MEDICINE

## 2020-02-05 PROCEDURE — 6360000002 HC RX W HCPCS: Performed by: INTERNAL MEDICINE

## 2020-02-05 PROCEDURE — 96365 THER/PROPH/DIAG IV INF INIT: CPT

## 2020-02-05 RX ORDER — SODIUM CHLORIDE 9 MG/ML
INJECTION, SOLUTION INTRAVENOUS CONTINUOUS
Status: CANCELLED | OUTPATIENT
Start: 2020-03-25

## 2020-02-05 RX ORDER — DIPHENHYDRAMINE HCL 25 MG
25 TABLET ORAL ONCE
Status: CANCELLED
Start: 2020-03-25

## 2020-02-05 RX ORDER — SODIUM CHLORIDE 0.9 % (FLUSH) 0.9 %
10 SYRINGE (ML) INJECTION PRN
Status: DISCONTINUED | OUTPATIENT
Start: 2020-02-05 | End: 2020-02-06 | Stop reason: HOSPADM

## 2020-02-05 RX ORDER — SODIUM CHLORIDE 9 MG/ML
INJECTION, SOLUTION INTRAVENOUS CONTINUOUS
Status: ACTIVE | OUTPATIENT
Start: 2020-02-05 | End: 2020-02-05

## 2020-02-05 RX ORDER — SODIUM CHLORIDE 0.9 % (FLUSH) 0.9 %
10 SYRINGE (ML) INJECTION PRN
Status: CANCELLED | OUTPATIENT
Start: 2020-03-25

## 2020-02-05 RX ORDER — DIPHENHYDRAMINE HCL 25 MG
25 TABLET ORAL ONCE
Status: DISCONTINUED | OUTPATIENT
Start: 2020-02-05 | End: 2020-02-06 | Stop reason: HOSPADM

## 2020-02-05 RX ORDER — ACETAMINOPHEN 325 MG/1
650 TABLET ORAL ONCE
Status: CANCELLED
Start: 2020-03-25

## 2020-02-05 RX ORDER — ACETAMINOPHEN 325 MG/1
650 TABLET ORAL ONCE
Status: DISCONTINUED | OUTPATIENT
Start: 2020-02-05 | End: 2020-02-06 | Stop reason: HOSPADM

## 2020-02-05 RX ADMIN — VEDOLIZUMAB 300 MG: 300 INJECTION, POWDER, LYOPHILIZED, FOR SOLUTION INTRAVENOUS at 08:57

## 2020-02-05 RX ADMIN — SODIUM CHLORIDE: 9 INJECTION, SOLUTION INTRAVENOUS at 08:56

## 2020-02-05 RX ADMIN — Medication 10 ML: at 08:55

## 2020-02-05 ASSESSMENT — PAIN SCALES - GENERAL: PAINLEVEL_OUTOF10: 0

## 2020-02-05 NOTE — PROGRESS NOTES
Pt here for entyvio infusion  Pt reports the diarrhea is better depending on what she eats  Pt denies any cramping, bloating or bleeding at present time  New consent for entyvio infusions was obtained   IV # 22 started in Rt hand on 1st attempt per myself  Pt gayla well  Entyvio 300 mg IVPB starting to infuse  IV site unremarkable    Pt refused ordered pre meds of tylenol 650 mg and benadryl 25 mg  Will monitor

## 2020-02-05 NOTE — PROGRESS NOTES
Entyvio infused and tubing was flushed with 100 cc NS  IV removed without difficulty Cath tip intact  Pressure then pressure dressing was applied and secured with a coban dressing  IV site unremarkable  Discharge instructions reviewed with pt and copy was given  Understanding verbalized  Pt was up to the BR then she was discharged ambulatory in stable condition

## 2020-03-02 ENCOUNTER — TELEPHONE (OUTPATIENT)
Dept: FAMILY MEDICINE CLINIC | Age: 65
End: 2020-03-02

## 2020-03-02 RX ORDER — CELECOXIB 100 MG/1
200 CAPSULE ORAL 2 TIMES DAILY
Qty: 120 CAPSULE | Refills: 0 | Status: SHIPPED | OUTPATIENT
Start: 2020-03-02 | End: 2020-03-30

## 2020-03-02 RX ORDER — CELECOXIB 100 MG/1
100 CAPSULE ORAL 2 TIMES DAILY PRN
Qty: 180 CAPSULE | Refills: 3 | Status: CANCELLED | OUTPATIENT
Start: 2020-03-02 | End: 2020-05-31

## 2020-03-02 NOTE — TELEPHONE ENCOUNTER
Dr. Milvia Poe is a primary care physician in [facility] and is not a dermatologist  May refer to a dermatologist and may also have a referral to rheumatology as requested

## 2020-03-18 ENCOUNTER — HOSPITAL ENCOUNTER (OUTPATIENT)
Dept: ULTRASOUND IMAGING | Age: 65
Discharge: HOME | End: 2020-03-18
Payer: MEDICARE

## 2020-03-18 ENCOUNTER — HOSPITAL ENCOUNTER (OUTPATIENT)
Age: 65
Discharge: HOME | End: 2020-03-18
Payer: MEDICARE

## 2020-03-18 LAB
ALBUMIN SERPL-MCNC: 3.8 G/DL (ref 3.4–5)
ANION GAP SERPL CALCULATED.3IONS-SCNC: 12 MMOL/L (ref 3–16)
BILIRUBIN URINE: NEGATIVE
BLOOD, URINE: NEGATIVE
BUN BLDV-MCNC: 24 MG/DL (ref 7–20)
CALCIUM SERPL-MCNC: 9.2 MG/DL (ref 8.3–10.6)
CHLORIDE BLD-SCNC: 101 MMOL/L (ref 99–110)
CLARITY: CLEAR
CO2: 25 MMOL/L (ref 21–32)
COLOR: YELLOW
CREAT SERPL-MCNC: 1.1 MG/DL (ref 0.6–1.2)
CREATININE URINE: 93.2 MG/DL (ref 28–259)
EPITHELIAL CELLS, UA: NORMAL /HPF (ref 0–5)
GFR AFRICAN AMERICAN: >60
GFR NON-AFRICAN AMERICAN: 50
GLUCOSE BLD-MCNC: 90 MG/DL (ref 70–99)
GLUCOSE URINE: NEGATIVE MG/DL
HCT VFR BLD CALC: 33 % (ref 36–48)
HEMOGLOBIN: 11.4 G/DL (ref 12–16)
KETONES, URINE: NEGATIVE MG/DL
LEUKOCYTE ESTERASE, URINE: ABNORMAL
MCH RBC QN AUTO: 32.1 PG (ref 26–34)
MCHC RBC AUTO-ENTMCNC: 34.7 G/DL (ref 31–36)
MCV RBC AUTO: 92.6 FL (ref 80–100)
MICROSCOPIC EXAMINATION: YES
NITRITE, URINE: NEGATIVE
PARATHYROID HORMONE INTACT: 77 PG/ML (ref 14–72)
PDW BLD-RTO: 14.6 % (ref 12.4–15.4)
PH UA: 6 (ref 5–8)
PHOSPHORUS: 3.5 MG/DL (ref 2.5–4.9)
PLATELET # BLD: 174 K/UL (ref 135–450)
PMV BLD AUTO: 10 FL (ref 5–10.5)
POTASSIUM SERPL-SCNC: 4.7 MMOL/L (ref 3.5–5.1)
PROTEIN PROTEIN: 11 MG/DL
PROTEIN UA: NEGATIVE MG/DL
PROTEIN/CREAT RATIO: 0.1 MG/DL
RBC # BLD: 3.56 M/UL (ref 4–5.2)
RBC UA: NORMAL /HPF (ref 0–4)
SODIUM BLD-SCNC: 138 MMOL/L (ref 136–145)
SPECIFIC GRAVITY UA: 1.02 (ref 1–1.03)
URINE TYPE: ABNORMAL
UROBILINOGEN, URINE: 0.2 E.U./DL
VITAMIN D 25-HYDROXY: 61.6 NG/ML
WBC # BLD: 6.4 K/UL (ref 4–11)
WBC UA: NORMAL /HPF (ref 0–5)

## 2020-03-18 PROCEDURE — 83883 ASSAY NEPHELOMETRY NOT SPEC: CPT

## 2020-03-18 PROCEDURE — 84165 PROTEIN E-PHORESIS SERUM: CPT

## 2020-03-18 PROCEDURE — 84156 ASSAY OF PROTEIN URINE: CPT

## 2020-03-18 PROCEDURE — 86334 IMMUNOFIX E-PHORESIS SERUM: CPT

## 2020-03-18 PROCEDURE — 82570 ASSAY OF URINE CREATININE: CPT

## 2020-03-18 PROCEDURE — 36415 COLL VENOUS BLD VENIPUNCTURE: CPT

## 2020-03-18 PROCEDURE — 76770 US EXAM ABDO BACK WALL COMP: CPT

## 2020-03-18 PROCEDURE — 84155 ASSAY OF PROTEIN SERUM: CPT

## 2020-03-18 PROCEDURE — 85027 COMPLETE CBC AUTOMATED: CPT

## 2020-03-18 PROCEDURE — 80069 RENAL FUNCTION PANEL: CPT

## 2020-03-18 PROCEDURE — 82306 VITAMIN D 25 HYDROXY: CPT

## 2020-03-18 PROCEDURE — 81001 URINALYSIS AUTO W/SCOPE: CPT

## 2020-03-18 PROCEDURE — 83970 ASSAY OF PARATHORMONE: CPT

## 2020-03-20 LAB
KAPPA, FREE LIGHT CHAINS, SERUM: 60.3 MG/L (ref 3.3–19.4)
KAPPA/LAMBDA RATIO: 1.83 (ref 0.26–1.65)
KAPPA/LAMBDA TEST COMMENT: ABNORMAL
LAMBDA, FREE LIGHT CHAINS, SERUM: 32.92 MG/L (ref 5.71–26.3)

## 2020-03-22 LAB
ALBUMIN SERPL-MCNC: 3.1 G/DL (ref 3.1–4.9)
ALPHA-1-GLOBULIN: 0.3 G/DL (ref 0.2–0.4)
ALPHA-2-GLOBULIN: 0.7 G/DL (ref 0.4–1.1)
BETA GLOBULIN: 1.4 G/DL (ref 0.9–1.6)
GAMMA GLOBULIN: 1.3 G/DL (ref 0.6–1.8)
TOTAL PROTEIN: 6.9 G/DL (ref 6.4–8.2)

## 2020-03-23 LAB — SPE/IFE INTERPRETATION: NORMAL

## 2020-03-25 ENCOUNTER — TELEMEDICINE (OUTPATIENT)
Dept: RHEUMATOLOGY | Age: 65
End: 2020-03-25
Payer: MEDICARE

## 2020-03-25 PROCEDURE — G8400 PT W/DXA NO RESULTS DOC: HCPCS | Performed by: INTERNAL MEDICINE

## 2020-03-25 PROCEDURE — 1123F ACP DISCUSS/DSCN MKR DOCD: CPT | Performed by: INTERNAL MEDICINE

## 2020-03-25 PROCEDURE — 4040F PNEUMOC VAC/ADMIN/RCVD: CPT | Performed by: INTERNAL MEDICINE

## 2020-03-25 PROCEDURE — 99214 OFFICE O/P EST MOD 30 MIN: CPT | Performed by: INTERNAL MEDICINE

## 2020-03-25 PROCEDURE — 3017F COLORECTAL CA SCREEN DOC REV: CPT | Performed by: INTERNAL MEDICINE

## 2020-03-25 PROCEDURE — 1090F PRES/ABSN URINE INCON ASSESS: CPT | Performed by: INTERNAL MEDICINE

## 2020-03-25 PROCEDURE — G8428 CUR MEDS NOT DOCUMENT: HCPCS | Performed by: INTERNAL MEDICINE

## 2020-03-25 NOTE — PROGRESS NOTES
[address], MD                                                                                                                        [phone] (O) [phone] (F)                      Due to president's emergency declarations under the [address] and coronavirus preparedness and response supplemental appropriation act proportion of this visit are being provided by telehealth. Video consent was obtained. My office is using my chart video visit, doxy. me depending on what patient as access to. Dear Dr. Claudette Gaines, APRN - CNP:  Please find Rheumatology assessment. Thank you for giving me the opportunity to be involved in Kay Conte's care and I look forward following Kay Annette along with you. If you have any questions or concerns please feel free to reach me. Note is transcribed using voice recognition software. Inadvertent computerized transcription errors may be present. Patient identification: Aden Gaens,: ,89 y.o. Sex: female     Assessment / Plan:  Diagnoses and all orders for this visit:    Primary osteoarthritis involving multiple joints    NSAID long-term use    Other ulcerative colitis with complication [facility]      Ulcerative colitis-under good control on Entyvio. Followed by gastroenterology. 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 Affecting her finger joints, lower back, knees. Was doing well on Celebrex, was held because of fluctuating creatinine, had worsening joint symptoms, now on Celebrex 100 mg a day again.     Previous medications included Xeljanz-did not help with colitis, methotrexate caused pancreatitis, Humira made her joint symptoms worse. Plan-  Labs reviewed, stable. Recommend taking Tylenol arthritis, agree with gabapentin. Advised patient not to take Celebrex in a daily basis due to the concerns of nephrotoxicity as well as can worsen inflammatory bowel disease. All information whatever I could get from her, does not necessitate the need for further immunosuppressive agent for arthritis. She was advised to call me with any worsening symptoms, follow-up in 6 months/prn. Patient indicates understanding and agrees with the management plan. I reviewed patient's history, referral documents and electronic medical records. Video visit time including documentation was 25 mints, 18 mints on device. 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 Ulcerative colitis-diagnosis 2013-followed by gastroenterology. Last seen a year ago, was on [place] that really help with her musculoskeletal discomfort mainly hand pain, however was not too effective for the bowels and started Entyvio infusion that has helped with colitis. Colitis appears to be in remission. She is off of budesonide. She did fairly well while she was taking Celebrex for arthritis, joint symptoms got worse when Celebrex was discontinued with fluctuating creatinine. She continues to have discomfort mainly in her finger joints, has bony knots in her PIPs, DIPs. Low back pain and knee pain is also persistent. She denies any overt flares since last seen a year ago. ADLs and recreational activities are normal.  She has been taking Tylenol arthritis and Celebrex 1 a day to control her musculoskeletal symptoms. She denies any GI bleed or GI upset. All other review of systems are negative.     Past Medical History:   Diagnosis Date    Acute pancreatitis 10/21/2014    Acute renal failure with tubular necrosis ([address] 7/10/2015    Acute stasis dermatitis of left lower extremity 7/8/2015    Angina pectoris ([address] 5/23/2011    Cellulitis of left leg     Chronic back pain     Clostridium difficile infection 7/9/15, 6/4/15    Colitis     Diabetes mellitus ([address]     diet controlled    Heart murmur 10/1/2019    Hyperlipidemia     Hypertension     Left THR 1/25/2016    Lumbar radiculitis 10/1/2019    Palpitations 7/17/2013    Primary osteoarthritis of left hip 11/18/2015    Shock ([address] 10/10/2017    Superficial thrombophlebitis of left leg 7/8/2015    Thyroid disease     Venous ulcer of left leg ([address]      Past Surgical History:   Procedure Laterality Date    APPENDECTOMY      CARDIAC CATHETERIZATION  05/2014    COLONOSCOPY      EPIDURAL STEROID INJECTION Left 1/22/2019    LEFT LUMBAR FOUR FIVE EPIDURAL STEROID INJECTION SITE CONFIRMED BY FLUOROSCOPY performed by Mandy Briscoe MD at [address] [facility] Left 01/19/2016    INTRACAPSULAR CATARACT EXTRACTION Left 6/26/2019    PHACOEMULSIFICATION OF LEFT EYE CATARACT WITH INTRAOCULAR LENS IMPLANT performed by Bakari Brownlee MD at [facility] Right 7/24/2019    PHACOEMULSIFICATION OF CATARACT RIGHT EYE WITH INTRAOCULAR LENS IMPLANT performed by Bakari Brownlee MD at [place] [address] EGD [facility] ASPIR/BIOP ALTERED ANATOMY  10/15/2018    EGD ESOPHAGOGASTRODUODENOSCOPY ULTRASOUND 0403, 1583 performed by Leeanna Fierro DO at [address] EGD TRANSORAL BIOPSY SINGLE/MULTIPLE  10/15/2018    EGD BIOPSY performed by Leeanna Fierro DO at [address] [facility] ENDOSCOPY  10/15/2018    EGD [facility] EUS    VARICOSE VEIN SURGERY       Social History     Socioeconomic History    Marital status:       Spouse name: Rashi Treadwell Number of children: Not on file    Years of education: Not on file    Highest education  simvastatin (ZOCOR) 20 MG tablet TAKE 1 TABLET NIGHTLY 90 tablet 0    gabapentin (NEURONTIN) 100 MG capsule Take 2 capsules by mouth 3 times daily for 180 days. I PO  capsule 1    Vedolizumab (ENTYVIO IV) Infuse 300 mg intravenously See Admin Instructions LAST DOSE GIVEN ON [id number]      folic acid (FOLVITE) 1 MG tablet TAKE 1 TABLET DAILY 90 tablet 3    blood glucose test strips (ASCENSIA AUTODISC VI;ONE TOUCH ULTRA TEST VI) strip 1 each by In Vitro route daily As needed. 100 each 3    albuterol sulfate HFA (VENTOLIN HFA) 108 (90 Base) MCG/ACT inhaler Inhale 2 puffs into the lungs every 6 hours as needed for Wheezing 1 Inhaler 1    ONE TOUCH LANCETS MISC 1 each by Does not apply route daily 100 each 3     No current facility-administered medications for this visit. Allergies   Allergen Reactions    Amoxicillin      \"colitis\"    Pravastatin Rash    Wellbutrin [Bupropion] Other (See Comments) and Rash     Caused sores in mouth       PHYSICAL EXAM:    Vitals:    LMP  (Approximate) Comment: 15 years ago  There was nothing specifically to be seen in review to visit, therefore examination was waived.        DATA:   Lab Results   Component Value Date    WBC 6.4 03/18/2020    HGB 11.4 (L) 03/18/2020    HCT 33.0 (L) 03/18/2020    MCV 92.6 03/18/2020     03/18/2020         Chemistry        Component Value Date/Time     03/18/2020 1041    K 4.7 03/18/2020 1041    K 3.8 09/30/2018 0607     03/18/2020 1041    CO2 25 03/18/2020 1041    BUN 24 (H) 03/18/2020 1041    CREATININE 1.1 03/18/2020 1041        Component Value Date/Time    CALCIUM 9.2 03/18/2020 1041    ALKPHOS 80 12/03/2019 1222    AST 38 (H) 12/03/2019 1222    ALT 22 12/03/2019 1222    BILITOT 0.4 12/03/2019 1222            Lab Results   Component Value Date    CKTOTAL 24 (L) 09/03/2018     Lab Results   Component Value Date    TSH 0.56 10/06/2019     Lab Results   Component Value Date    VITD25 61.6 03/18/2020 Radiology Review:    MRI lumbar spine-6 levels 2018-   Multilevel degenerative thecal sac narrowing and neural foraminal stenosis,   worst at L4-5. A/P- See above.

## 2020-03-27 RX ORDER — FOLIC ACID 1 MG/1
1 TABLET ORAL DAILY
Qty: 90 TABLET | Refills: 0 | Status: SHIPPED | OUTPATIENT
Start: 2020-03-27 | End: 2020-06-15

## 2020-03-27 RX ORDER — LEVOTHYROXINE SODIUM 0.15 MG/1
150 TABLET ORAL DAILY
Qty: 90 TABLET | Refills: 0 | Status: SHIPPED | OUTPATIENT
Start: 2020-03-27 | End: 2020-06-25

## 2020-03-27 RX ORDER — GABAPENTIN 100 MG/1
200 CAPSULE ORAL 3 TIMES DAILY
Qty: 540 CAPSULE | Refills: 0 | Status: SHIPPED | OUTPATIENT
Start: 2020-03-27 | End: 2020-11-05 | Stop reason: SDUPTHER

## 2020-03-27 RX ORDER — SIMVASTATIN 20 MG
TABLET ORAL
Qty: 90 TABLET | Refills: 0 | Status: SHIPPED | OUTPATIENT
Start: 2020-03-27 | End: 2020-06-25

## 2020-03-30 RX ORDER — CELECOXIB 100 MG/1
CAPSULE ORAL
Qty: 120 CAPSULE | Refills: 0 | Status: SHIPPED | OUTPATIENT
Start: 2020-03-30 | End: 2020-04-30 | Stop reason: SDUPTHER

## 2020-04-03 ENCOUNTER — HOSPITAL ENCOUNTER (OUTPATIENT)
Age: 65
Discharge: HOME | End: 2020-04-03
Payer: MEDICARE

## 2020-04-03 ENCOUNTER — HOSPITAL ENCOUNTER (OUTPATIENT)
Dept: NURSING | Age: 65
Setting detail: INFUSION SERIES
Discharge: HOME | End: 2020-04-03
Payer: MEDICARE

## 2020-04-03 VITALS
DIASTOLIC BLOOD PRESSURE: 76 MMHG | HEART RATE: 68 BPM | HEIGHT: 65 IN | WEIGHT: 227 LBS | SYSTOLIC BLOOD PRESSURE: 147 MMHG | BODY MASS INDEX: 37.82 KG/M2 | TEMPERATURE: 98.3 F | RESPIRATION RATE: 16 BRPM

## 2020-04-03 DIAGNOSIS — K51.90 ULCERATIVE COLITIS WITHOUT COMPLICATIONS, UNSPECIFIED LOCATION (HCC): ICD-10-CM

## 2020-04-03 DIAGNOSIS — K52.9 COLITIS: Primary | ICD-10-CM

## 2020-04-03 LAB
A/G RATIO: 1.2 (ref 1.1–2.2)
ALBUMIN SERPL-MCNC: 4.1 G/DL (ref 3.4–5)
ALP BLD-CCNC: 99 U/L (ref 40–129)
ALT SERPL-CCNC: 36 U/L (ref 10–40)
ANION GAP SERPL CALCULATED.3IONS-SCNC: 14 MMOL/L (ref 3–16)
AST SERPL-CCNC: 62 U/L (ref 15–37)
BILIRUB SERPL-MCNC: 0.6 MG/DL (ref 0–1)
BILIRUBIN URINE: NEGATIVE
BLOOD, URINE: NEGATIVE
BUN BLDV-MCNC: 22 MG/DL (ref 7–20)
CALCIUM SERPL-MCNC: 9.5 MG/DL (ref 8.3–10.6)
CHLORIDE BLD-SCNC: 100 MMOL/L (ref 99–110)
CLARITY: CLEAR
CO2: 26 MMOL/L (ref 21–32)
COLOR: YELLOW
CREAT SERPL-MCNC: 1 MG/DL (ref 0.6–1.2)
CREATININE URINE: 37 MG/DL (ref 28–259)
GFR AFRICAN AMERICAN: >60
GFR NON-AFRICAN AMERICAN: 56
GLOBULIN: 3.5 G/DL
GLUCOSE BLD-MCNC: 136 MG/DL (ref 70–99)
GLUCOSE URINE: NEGATIVE MG/DL
HCT VFR BLD CALC: 35.4 % (ref 36–48)
HEMOGLOBIN: 12 G/DL (ref 12–16)
KETONES, URINE: NEGATIVE MG/DL
LEUKOCYTE ESTERASE, URINE: NEGATIVE
MCH RBC QN AUTO: 31.2 PG (ref 26–34)
MCHC RBC AUTO-ENTMCNC: 34 G/DL (ref 31–36)
MCV RBC AUTO: 91.9 FL (ref 80–100)
MICROSCOPIC EXAMINATION: NORMAL
NITRITE, URINE: NEGATIVE
PARATHYROID HORMONE INTACT: 38.1 PG/ML (ref 14–72)
PDW BLD-RTO: 14 % (ref 12.4–15.4)
PH UA: 6 (ref 5–8)
PHOSPHORUS: 2.8 MG/DL (ref 2.5–4.9)
PLATELET # BLD: 166 K/UL (ref 135–450)
PMV BLD AUTO: 9.8 FL (ref 5–10.5)
POTASSIUM SERPL-SCNC: 4.2 MMOL/L (ref 3.5–5.1)
PROTEIN PROTEIN: <4 MG/DL
PROTEIN UA: NEGATIVE MG/DL
PROTEIN/CREAT RATIO: NORMAL MG/DL
RBC # BLD: 3.85 M/UL (ref 4–5.2)
SODIUM BLD-SCNC: 140 MMOL/L (ref 136–145)
SPECIFIC GRAVITY UA: 1.01 (ref 1–1.03)
TOTAL PROTEIN: 7.6 G/DL (ref 6.4–8.2)
URINE TYPE: NORMAL
UROBILINOGEN, URINE: 0.2 E.U./DL
VITAMIN D 25-HYDROXY: 51.4 NG/ML
WBC # BLD: 5.4 K/UL (ref 4–11)

## 2020-04-03 PROCEDURE — 96365 THER/PROPH/DIAG IV INF INIT: CPT

## 2020-04-03 PROCEDURE — 2580000003 HC RX 258: Performed by: INTERNAL MEDICINE

## 2020-04-03 PROCEDURE — 36415 COLL VENOUS BLD VENIPUNCTURE: CPT

## 2020-04-03 PROCEDURE — 84165 PROTEIN E-PHORESIS SERUM: CPT

## 2020-04-03 PROCEDURE — 84100 ASSAY OF PHOSPHORUS: CPT

## 2020-04-03 PROCEDURE — 82306 VITAMIN D 25 HYDROXY: CPT

## 2020-04-03 PROCEDURE — 85027 COMPLETE CBC AUTOMATED: CPT

## 2020-04-03 PROCEDURE — 99211 OFF/OP EST MAY X REQ PHY/QHP: CPT

## 2020-04-03 PROCEDURE — 84156 ASSAY OF PROTEIN URINE: CPT

## 2020-04-03 PROCEDURE — 84155 ASSAY OF PROTEIN SERUM: CPT

## 2020-04-03 PROCEDURE — 82570 ASSAY OF URINE CREATININE: CPT

## 2020-04-03 PROCEDURE — 83883 ASSAY NEPHELOMETRY NOT SPEC: CPT

## 2020-04-03 PROCEDURE — 83970 ASSAY OF PARATHORMONE: CPT

## 2020-04-03 PROCEDURE — 81003 URINALYSIS AUTO W/O SCOPE: CPT

## 2020-04-03 PROCEDURE — 6360000002 HC RX W HCPCS: Performed by: INTERNAL MEDICINE

## 2020-04-03 PROCEDURE — 80053 COMPREHEN METABOLIC PANEL: CPT

## 2020-04-03 RX ORDER — SODIUM CHLORIDE 9 MG/ML
INJECTION, SOLUTION INTRAVENOUS CONTINUOUS
Status: CANCELLED | OUTPATIENT
Start: 2020-04-10

## 2020-04-03 RX ORDER — ACETAMINOPHEN 325 MG/1
650 TABLET ORAL ONCE
Status: DISCONTINUED | OUTPATIENT
Start: 2020-04-03 | End: 2020-04-04 | Stop reason: HOSPADM

## 2020-04-03 RX ORDER — SODIUM CHLORIDE 0.9 % (FLUSH) 0.9 %
10 SYRINGE (ML) INJECTION PRN
Status: CANCELLED | OUTPATIENT
Start: 2020-04-10

## 2020-04-03 RX ORDER — DIPHENHYDRAMINE HCL 25 MG
25 TABLET ORAL ONCE
Status: CANCELLED
Start: 2020-04-10

## 2020-04-03 RX ORDER — ACETAMINOPHEN 325 MG/1
650 TABLET ORAL ONCE
Status: CANCELLED
Start: 2020-04-10

## 2020-04-03 RX ORDER — DIPHENHYDRAMINE HCL 25 MG
25 TABLET ORAL ONCE
Status: DISCONTINUED | OUTPATIENT
Start: 2020-04-03 | End: 2020-04-04 | Stop reason: HOSPADM

## 2020-04-03 RX ORDER — SODIUM CHLORIDE 0.9 % (FLUSH) 0.9 %
10 SYRINGE (ML) INJECTION PRN
Status: DISCONTINUED | OUTPATIENT
Start: 2020-04-03 | End: 2020-04-04 | Stop reason: HOSPADM

## 2020-04-03 RX ORDER — SODIUM CHLORIDE 9 MG/ML
INJECTION, SOLUTION INTRAVENOUS CONTINUOUS
Status: DISCONTINUED | OUTPATIENT
Start: 2020-04-03 | End: 2020-04-03 | Stop reason: HOSPADM

## 2020-04-03 RX ADMIN — SODIUM CHLORIDE: 9 INJECTION, SOLUTION INTRAVENOUS at 10:05

## 2020-04-03 RX ADMIN — Medication 10 ML: at 10:05

## 2020-04-03 RX ADMIN — VEDOLIZUMAB 300 MG: 300 INJECTION, POWDER, LYOPHILIZED, FOR SOLUTION INTRAVENOUS at 10:05

## 2020-04-03 ASSESSMENT — PAIN SCALES - GENERAL: PAINLEVEL_OUTOF10: 0

## 2020-04-03 NOTE — PROGRESS NOTES
Pt here for entyvio infusion  Pt reports the diarrhea is better depending on what she eats  Pt denies any cramping, bloating or bleeding at present time   IV # 22 started in RAC on 1st attempt per myself  Blood for lab work was drawn without difficulty  Pt gayla well  Entyvio 300 mg IVPB starting to infuse  IV site unremarkable    Pt refused ordered pre meds of tylenol 650 mg and benadryl 25 mg  Pt states she will take at home as she is afraid benadryl will make her too sleepy to drive home  Will monitor

## 2020-04-04 LAB
KAPPA, FREE LIGHT CHAINS, SERUM: 57.91 MG/L (ref 3.3–19.4)
KAPPA/LAMBDA RATIO: 1.7 (ref 0.26–1.65)
KAPPA/LAMBDA TEST COMMENT: ABNORMAL
LAMBDA, FREE LIGHT CHAINS, SERUM: 34 MG/L (ref 5.71–26.3)

## 2020-04-07 LAB
ALBUMIN SERPL-MCNC: 3.3 G/DL (ref 3.1–4.9)
ALPHA-1-GLOBULIN: 0.3 G/DL (ref 0.2–0.4)
ALPHA-2-GLOBULIN: 0.7 G/DL (ref 0.4–1.1)
BETA GLOBULIN: 1.4 G/DL (ref 0.9–1.6)
GAMMA GLOBULIN: 1.4 G/DL (ref 0.6–1.8)
SPE/IFE INTERPRETATION: NORMAL
TOTAL PROTEIN: 7.1 G/DL (ref 6.4–8.2)

## 2020-04-14 RX ORDER — TRIAMCINOLONE ACETONIDE 1 MG/G
CREAM TOPICAL
Qty: 45 G | Refills: 1 | Status: SHIPPED | OUTPATIENT
Start: 2020-04-14 | End: 2020-11-05 | Stop reason: SDUPTHER

## 2020-04-30 RX ORDER — CELECOXIB 100 MG/1
CAPSULE ORAL
Qty: 120 CAPSULE | Refills: 2 | Status: SHIPPED | OUTPATIENT
Start: 2020-04-30 | End: 2020-07-09 | Stop reason: DRUGHIGH

## 2020-04-30 RX ORDER — CELECOXIB 200 MG/1
200 CAPSULE ORAL 2 TIMES DAILY
Qty: 60 CAPSULE | Refills: 2 | OUTPATIENT
Start: 2020-04-30

## 2020-06-02 ENCOUNTER — HOSPITAL ENCOUNTER (OUTPATIENT)
Dept: NURSING | Age: 65
Setting detail: INFUSION SERIES
Discharge: HOME | End: 2020-06-02
Payer: MEDICARE

## 2020-06-02 VITALS
RESPIRATION RATE: 16 BRPM | HEIGHT: 65 IN | BODY MASS INDEX: 37.82 KG/M2 | WEIGHT: 227 LBS | HEART RATE: 78 BPM | SYSTOLIC BLOOD PRESSURE: 134 MMHG | DIASTOLIC BLOOD PRESSURE: 69 MMHG | TEMPERATURE: 98.3 F

## 2020-06-02 DIAGNOSIS — K52.9 COLITIS: Primary | ICD-10-CM

## 2020-06-02 DIAGNOSIS — K51.90 ULCERATIVE COLITIS WITHOUT COMPLICATIONS, UNSPECIFIED LOCATION (HCC): ICD-10-CM

## 2020-06-02 PROCEDURE — 6360000002 HC RX W HCPCS: Performed by: INTERNAL MEDICINE

## 2020-06-02 PROCEDURE — 96365 THER/PROPH/DIAG IV INF INIT: CPT

## 2020-06-02 PROCEDURE — 99211 OFF/OP EST MAY X REQ PHY/QHP: CPT

## 2020-06-02 PROCEDURE — 2580000003 HC RX 258: Performed by: INTERNAL MEDICINE

## 2020-06-02 RX ORDER — DIPHENHYDRAMINE HCL 25 MG
25 TABLET ORAL ONCE
Status: DISCONTINUED | OUTPATIENT
Start: 2020-06-02 | End: 2020-06-03 | Stop reason: HOSPADM

## 2020-06-02 RX ORDER — SODIUM CHLORIDE 0.9 % (FLUSH) 0.9 %
10 SYRINGE (ML) INJECTION PRN
Status: DISCONTINUED | OUTPATIENT
Start: 2020-06-02 | End: 2020-06-03 | Stop reason: HOSPADM

## 2020-06-02 RX ORDER — SODIUM CHLORIDE 9 MG/ML
INJECTION, SOLUTION INTRAVENOUS CONTINUOUS
Status: CANCELLED | OUTPATIENT
Start: 2020-07-21

## 2020-06-02 RX ORDER — ACETAMINOPHEN 325 MG/1
650 TABLET ORAL ONCE
Status: DISCONTINUED | OUTPATIENT
Start: 2020-06-02 | End: 2020-06-03 | Stop reason: HOSPADM

## 2020-06-02 RX ORDER — DIPHENHYDRAMINE HCL 25 MG
25 TABLET ORAL ONCE
Status: CANCELLED
Start: 2020-07-21

## 2020-06-02 RX ORDER — SODIUM CHLORIDE 9 MG/ML
INJECTION, SOLUTION INTRAVENOUS CONTINUOUS
Status: ACTIVE | OUTPATIENT
Start: 2020-06-02 | End: 2020-06-02

## 2020-06-02 RX ORDER — SODIUM CHLORIDE 0.9 % (FLUSH) 0.9 %
10 SYRINGE (ML) INJECTION PRN
Status: CANCELLED | OUTPATIENT
Start: 2020-07-21

## 2020-06-02 RX ORDER — ACETAMINOPHEN 325 MG/1
650 TABLET ORAL ONCE
Status: CANCELLED
Start: 2020-07-21

## 2020-06-02 RX ADMIN — SODIUM CHLORIDE: 9 INJECTION, SOLUTION INTRAVENOUS at 09:49

## 2020-06-02 RX ADMIN — Medication 10 ML: at 09:41

## 2020-06-02 RX ADMIN — VEDOLIZUMAB 300 MG: 300 INJECTION, POWDER, LYOPHILIZED, FOR SOLUTION INTRAVENOUS at 09:50

## 2020-06-02 ASSESSMENT — PAIN SCALES - GENERAL: PAINLEVEL_OUTOF10: 0

## 2020-06-02 NOTE — PROGRESS NOTES
Pt here for entyvio infusion  Pt reports the diarrhea is about the same depending on what she eats  Pt denies any cramping, bloating or bleeding at present time   Pt reports that she doesn't see any difference between this entyvio and the pill she used to take  Pt reports I don't think anything much is different but they want me to keep taking this   IV # 22 started in RAC on 1st attempt per myself   No blood work needed today  Pt gayla well  Entyvio 300 mg IVPB starting to infuse  IV site unremarkable    Pt refused ordered pre meds of tylenol 650 mg and benadryl 25 mg  Pt states she will take at home as she is afraid benadryl will make her too sleepy to drive home  Will monitor

## 2020-06-03 RX ORDER — LIDOCAINE HCL 4 %
CREAM (GRAM) TOPICAL
Qty: 12 CAPSULE | Refills: 1 | Status: SHIPPED | OUTPATIENT
Start: 2020-06-03 | End: 2020-12-21 | Stop reason: SDUPTHER

## 2020-06-05 RX ORDER — LANOLIN ALCOHOL/MO/W.PET/CERES
CREAM (GRAM) TOPICAL
Qty: 360 TABLET | Refills: 1 | Status: SHIPPED | OUTPATIENT
Start: 2020-06-05 | End: 2020-11-05 | Stop reason: SDUPTHER

## 2020-06-25 RX ORDER — LEVOTHYROXINE SODIUM 0.15 MG/1
TABLET ORAL
Qty: 90 TABLET | Refills: 0 | Status: SHIPPED | OUTPATIENT
Start: 2020-06-25 | End: 2020-10-26

## 2020-06-25 RX ORDER — SIMVASTATIN 20 MG
TABLET ORAL
Qty: 90 TABLET | Refills: 0 | Status: SHIPPED | OUTPATIENT
Start: 2020-06-25 | End: 2020-07-09

## 2020-06-25 NOTE — TELEPHONE ENCOUNTER
Called pt and scheduled appt for 7/9/2020. Pt not taking simvastatin and doesn't want it filled until talking with PCP.

## 2020-07-09 ENCOUNTER — VIRTUAL VISIT (OUTPATIENT)
Dept: FAMILY MEDICINE CLINIC | Age: 65
End: 2020-07-09
Payer: MEDICARE

## 2020-07-09 PROBLEM — J45.909 UNCOMPLICATED ASTHMA: Status: ACTIVE | Noted: 2020-07-09

## 2020-07-09 PROCEDURE — 99443 PR PHYS/QHP TELEPHONE EVALUATION 21-30 MIN: CPT | Performed by: NURSE PRACTITIONER

## 2020-07-09 RX ORDER — CELECOXIB 100 MG/1
CAPSULE ORAL
Qty: 60 CAPSULE | Refills: 2 | Status: SHIPPED
Start: 2020-07-09 | End: 2020-11-03 | Stop reason: SDUPTHER

## 2020-07-09 NOTE — PROGRESS NOTES
2020        [facility] PHYSICIAN PRACTICES  [facility]  [address] [place] [address]  Dept: [phone]  Dept Fax: [phone]  Loc: Franck Wadsworth is a 71 y.o. female evaluated via telephone on 2020. Consent:  She and/or health care decision maker is aware that that she may receive a bill for this telephone service, depending on her insurance coverage, and has provided verbal consent to proceed: Yes    Amos Cuellar is a 71 y.o. female who presents today for her medical conditions/complaints as noted below. Amos Cuellar is c/o of Hypertension (pt is checking her bp at home 148/89 today ); Hyperlipidemia; Other (vitd ); and Diabetes (pt is checking her sugars at home )      Chief Complaint   Patient presents with    Hypertension     pt is checking her bp at home 148/89 today     Hyperlipidemia    Other     vitd     Diabetes     pt is checking her sugars at home        HPI:  Amos Cuellar (:  1955) has requested an audio/video evaluation for the following concern(s):    IFG: Current symptoms/problems include none. Home blood sugar records: fasting range: 94, patient tests few time(s) per month  Any episodes of hypoglycemia? no  Eye exam current (within one year): yes  Tobacco history: She  reports that she has never smoked. She has never used smokeless tobacco.   Daily Aspirin? No: on celebrex--changed to 2 capsules daily/am along with tylenol and doing well. Hypertension:  Home blood pressure monitoring: Yes - 148/89 today. She is adherent to a low sodium diet. Patient denies chest pain, shortness of breath, headache, lightheadedness, blurred vision, peripheral edema, palpitations, dry cough and fatigue. Antihypertensive medication side effects: no medication side effects noted. Use of agents associated with hypertension: thyroid hormones and supplements, including \"thyroid booster\".      Hyperlipidemia:  Quit taking statin about 1 month ago. She feels there is too much risk in taking them  The 10-year ASCVD risk score (Crystal Guajardo, et al., 2013) is: 8.4%    Values used to calculate the score:      Age: 71 years      Sex: Female      Is Non- : No      Diabetic: Yes      Tobacco smoker: No      Systolic Blood Pressure: 473 mmHg      Is BP treated: No      HDL Cholesterol: 88 mg/dL      Total Cholesterol: 159 mg/dL      Lab Results   Component Value Date    LABA1C 5.0 10/02/2019    LABA1C 5.7 05/21/2019    LABA1C 5.1 09/03/2018     Lab Results   Component Value Date    LABMICR Not Indicated 04/03/2020    CREATININE 1.0 04/03/2020     Lab Results   Component Value Date    ALT 36 04/03/2020    AST 62 (H) 04/03/2020     Lab Results   Component Value Date    CHOL 159 10/06/2019    TRIG 73 10/06/2019    HDL 88 (H) 10/06/2019    LDLCALC 56 10/06/2019        Vitamin D Deficiency  Patient is here to follow up on vitamin D deficiency. Patient is taking vitamin D supplement without any adverse effects as prescribed. Lab Results   Component Value Date    VITD25 51.4 04/03/2020     Asthma:  Current treatment includes beta agonist inhalers. Using preventive medication(s) consistently: not applicable. Residual symptoms: none. Patient denies any other symptoms. She requires her rescue inhaler 1 time(s) per month. Hypothyroidism: Recent symptoms: none. She denies none. Patient is  taking her medication consistently on an empty stomach. Lab Results   Component Value Date    TSHREFLEX 1.33 12/12/2019    TSHREFLEX 0.04 06/24/2019    TSHREFLEX <0.01 05/21/2019     Lab Results   Component Value Date    TSH 0.56 10/06/2019    TSH 0.39 08/11/2018    TSH 0.67 10/25/2017     Anemia  Patient presents for evaluation of anemia. She has both folate and iron deficiency. Anemia was found by routine CBC. It has been present for a few years.  Associated signs & symptoms: none.     Has history low magnesium and currently on magnesium supplement. Review of Systems    Prior to Visit Medications    Medication Sig Taking? Authorizing Provider   levothyroxine (SYNTHROID) 150 MCG tablet TAKE 1 TABLET BY MOUTH EVERY DAY  YAHAIRA Robertson CNP   simvastatin (ZOCOR) 20 MG tablet TAKE 1 TABLET BY MOUTH EVERY DAY AT NIGHT  YAHAIRA Robertson CNP   folic acid (FOLVITE) 1 MG tablet TAKE 1 TABLET BY MOUTH EVERY DAY  YAHAIRA Robertson CNP   VENTOLIN  (90 Base) MCG/ACT inhaler TAKE 2 PUFFS BY MOUTH EVERY 6 HOURS AS NEEDED FOR WHEEZE  YAHAIRA Robertson CNP   magnesium oxide (MAG-OX) 400 (240 Mg) MG tablet TAKE 2 TABLETS BY MOUTH TWICE A DAY  YAHAIRA Robertson CNP   vitamin D ([facility] VITAMIN D3) 250 MCG (23401 UT) CAPS capsule TAKE 1 CAPSULE BY MOUTH ONE TIME PER WEEK  YAHAIRA Robertson CNP   celecoxib (CELEBREX) 100 MG capsule TAKE 2 CAPSULES BY MOUTH TWICE A DAY  YAHAIRA Robertson CNP   triamcinolone (KENALOG) 0.1 % cream APPLY TO AFFECTED AREA TWICE A DAY  YAHAIRA Coffey CNP   gabapentin (NEURONTIN) 100 MG capsule Take 2 capsules by mouth 3 times daily for 180 days. I PO TID  YAHAIRA Robertson CNP   acetaminophen (TYLENOL) 500 MG tablet Take 1,000 mg by mouth 2 times daily as needed for Pain  Historical Provider, MD   Vedolizumab (ENTYVIO IV) Infuse 300 mg intravenously See Admin Instructions LAST DOSE GIVEN ON 6-2-2020  Historical Provider, MD   blood glucose test strips (ASCENSIA AUTODISC VI;ONE TOUCH ULTRA TEST VI) strip 1 each by In Vitro route daily As needed. YAHAIRA Robertson CNP   ONE TOUCH LANCETS MISC 1 each by Does not apply route daily  YAHAIRA Toledo CNP       Social History     Tobacco Use    Smoking status: Never Smoker    Smokeless tobacco: Never Used    Tobacco comment: never even tried it   Substance Use Topics    Alcohol use: No     Alcohol/week: 0.0 standard drinks     Comment: rarely    Drug use:  No Allergies   Allergen Reactions    Amoxicillin      \"colitis\"    Pravastatin Rash    Wellbutrin [Bupropion] Other (See Comments) and Rash     Caused sores in mouth   ,   Past Medical History:   Diagnosis Date    Acute pancreatitis 10/21/2014    Acute renal failure with tubular necrosis (HCC) 7/10/2015    Acute stasis dermatitis of left lower extremity 7/8/2015    Angina pectoris ([address] 5/23/2011    Cellulitis of left leg     Chronic back pain     Clostridium difficile infection 7/9/15, 6/4/15    Colitis     Diabetes mellitus ([address]     diet controlled    Heart murmur 10/1/2019    Hyperlipidemia     Hypertension     Left THR 1/25/2016    Lumbar radiculitis 10/1/2019    Palpitations 7/17/2013    Primary osteoarthritis of left hip 11/18/2015    Shock ([address] 10/10/2017    Superficial thrombophlebitis of left leg 7/8/2015    Thyroid disease     Uncomplicated asthma 1/1/7881    Venous ulcer of left leg ([address]    ,   Past Surgical History:   Procedure Laterality Date    APPENDECTOMY      CARDIAC CATHETERIZATION  05/2014    COLONOSCOPY      EPIDURAL STEROID INJECTION Left 1/22/2019    LEFT LUMBAR FOUR FIVE EPIDURAL STEROID INJECTION SITE CONFIRMED BY FLUOROSCOPY performed by Chayo Heaton MD at [facility] Left 01/19/2016    INTRACAPSULAR CATARACT EXTRACTION Left 6/26/2019    PHACOEMULSIFICATION OF LEFT EYE CATARACT WITH INTRAOCULAR LENS IMPLANT performed by Jennifer Suarez MD at [facility] Right 7/24/2019    PHACOEMULSIFICATION OF CATARACT RIGHT EYE WITH INTRAOCULAR LENS IMPLANT performed by Jennifer Suarez MD at [place] [address] EGD [facility] ASPIR/BIOP ALTERED ANATOMY  10/15/2018    EGD ESOPHAGOGASTRODUODENOSCOPY ULTRASOUND 0403, 1583 performed by Sánchez Loera DO at [address] EGD TRANSORAL BIOPSY SINGLE/MULTIPLE  10/15/2018    EGD BIOPSY performed by Sánchez Loera DO at [address] ENDOSCOPY    TONSILLECTOMY      UPPER GASTROINTESTINAL ENDOSCOPY  10/15/2018    EGD [facility] EUS    VARICOSE VEIN SURGERY     ,   Social History     Tobacco Use    Smoking status: Never Smoker    Smokeless tobacco: Never Used    Tobacco comment: never even tried it   Substance Use Topics    Alcohol use: No     Alcohol/week: 0.0 standard drinks     Comment: rarely    Drug use: No   ,   Family History   Problem Relation Age of Onset    Diabetes Mother     Alzheimer's Disease Mother     Anemia Mother     Diabetes Father     Cancer Father 68        pancreatic    Cancer Maternal Grandmother [de-identified]        Bone Marrow Ca   ,   Immunization History   Administered Date(s) Administered    HPV 9-valent Ravi Shoemaker) 05/09/2019    Hepatitis A Adult (Vaqta) 04/16/2019    Hepatitis B 09/10/2010, 10/12/2010, 03/15/2011    Hepatitis B (Recombivax HB) 09/10/2010, 10/12/2010, 03/15/2011    Hepatitis B Ped/Adol (Engerix-B, Recombivax HB) 03/15/2014    Influenza Virus Vaccine 01/26/2016, 08/22/2017, 09/14/2019    Influenza Whole 11/08/2011, 10/28/2014    Influenza, High Dose (Fluzone 65 yrs and older) 11/07/2018    Influenza, Hipolito Iannel, IM, PF (6 mo and older Fluzone, Flulaval, Fluarix, and 3 yrs and older Afluria) 08/22/2017, 11/07/2018, 09/14/2019    PPD Test 08/04/2014, 10/21/2014, 12/21/2015, 11/07/2018    Pneumococcal Conjugate 13-valent ([id number]) 04/16/2019    Pneumococcal Polysaccharide ([id number]) 10/22/2014    Tdap (Boostrix, Adacel) 06/29/2017    Zoster Live (Zostavax) 06/06/2016   ,   Health Maintenance   Topic Date Due    DEXA (modify frequency per FRAX score)  12/22/2009    Shingles Vaccine (2 of 3) 08/01/2016    Colon Cancer Screen FIT/FOBT  09/24/2019    Annual Wellness Visit (AWV)  01/03/2020    Pneumococcal 65+ years Vaccine (2 of 2 - PPSV23) 04/16/2020    Flu vaccine (1) 09/01/2020    Diabetic foot exam  10/02/2020    A1C test (Diabetic or Prediabetic)  10/02/2020    Diabetic microalbuminuria test  10/02/2020    Lipid screen  10/06/2020    TSH testing  12/12/2020    Diabetic retinal exam  12/21/2020    Breast cancer screen  02/28/2021    Cervical cancer screen  08/16/2022    DTaP/Tdap/Td vaccine (2 - Td) 06/29/2027    Hepatitis C screen  Completed    HIV screen  Completed    Hepatitis A vaccine  Aged Out    Hib vaccine  Aged Out    Meningococcal (ACWY) vaccine  Aged Out       PHYSICAL EXAMINATION:  Not applicable d/t telephone visit    ASSESSMENT/PLAN:  1. Diet-controlled type 2 diabetes mellitus ([facility]  Diet controlled  Previously controlled  - Hemoglobin A1C; Future    2. Morbidly obese ([facility]   Discussed healthy lifestyle choices to attempt to incorporate into daily routine to attempt to obtain and maintain a healthy weight. General weight loss/lifestyle modification strategies discussed (elicit support from others; identify saboteurs; non-food rewards, etc). Discussed healthy nutritional options as well. Diet interventions: moderate (500 kCal/d) deficit diet. Disscussed trying to incorporate routine physical activity into daily regimen. Informal exercise measures discussed, e.g. taking stairs instead of elevator. Regular aerobic exercise program discussed. Surgical Procedure: was not discussed  Behavioral treatment: discussed commercial programs (Weight Watchers, Nutrisystem,etc), Overeaters Anonymous, Slim Fast, stress management and TOPS. Discussed consequences of obesity in terms of medical conditions that may develop in the future. Patient verbalized understanding. 3. Essential hypertension  Hypertension, Blood pressure is not well controlled on current medication regimen. Medication: not currently on medication. patient does not want to restart medication at this time. I educated the patient that her \"thyroid booster\" supplement may be causing an elevation in her blood pressure.   I recommend that she stop the supplement for 2 weeks to see if her blood pressure comes down.  She is to bring in her blood pressure readings in 3 to 4 weeks  Dietary sodium restriction. Regular aerobic exercise. Check blood pressures daily and record. - Comprehensive Metabolic Panel; Future    4. Hypothyroidism, unspecified type  Patient using \"thyroid booster\"  Educated the patient that this could interfere with her Synthroid and that she will need to have a TSH checked at her next visit  - TSH without Reflex; Future    5. Mixed hyperlipidemia  The patient quit taking her statin medication because she felt the risk of taking the medication did not outweigh any benefits. I discussed the ASCVD risk score that shows she is high risk and that statin medication is her best option to reduce the risk of cardiovascular events. The patient does not want to restart medication and would like to be reevaluated after 6 months of being off the medication  - Comprehensive Metabolic Panel; Future  - Lipid Panel; Future    6. Uncomplicated asthma, unspecified asthma severity, unspecified whether persistent  Condition appears stable with current medication regimen. Continue current medications. No reported or noted side effects of medication. Will continue to monitor at routine intervals as appropriate. 7. Hypomagnesemia  - MAGNESIUM; Future    8. Vitamin D deficiency  Discussed with patient that we make vitamin D from the sun and get it from some food sources, but it is very common to be deficient. Discussed the need for vitamin D replacement because low vitamin D can cause fatigue, joint aches and has been implicated in heart disease, bone disease like osteoporosis, and some other chronic illnesses. Patient will start/continue vitamin D supplement as per order. Recommend vitamin D to be rechecked in 6 months.    - Vitamin D 25 Hydroxy; Future    9.  Enteropathic arthritis  Also taking tumeric  Has decreased celebrex from 4/day to 2/day  - celecoxib (CELEBREX) 100 MG capsule; TAKE 2 CAPSULES BY MOUTH ONCE A DAY  Dispense: 60 capsule; Refill: 2      Return in about 5 months (around 12/9/2020) for [facility]. --YAHAIRA Ahuja - CNP on 7/9/2020 at 10:00 AM    An electronic signature was used to authenticate this note. Documentation:  I communicated with the patient and/or health care decision maker about above conditions. Details of this discussion including any medical advice provided: as noted above      I affirm this is a Patient Initiated Episode with a Patient who has not had a related appointment within my department in the past 7 days or scheduled within the next 24 hours.     Patient identification was verified at the start of the visit: Yes    Total Time: minutes: 21-30 minutes    Note: not billable if this call serves to triage the patient into an appointment for the relevant concern      Oscar Sharpe

## 2020-07-09 NOTE — PROGRESS NOTES
Thi Rios is a 71 y.o. female evaluated via telephone on 7/9/2020.       Consent:  She and/or health care decision maker is aware that that she may receive a bill for this telephone service, depending on her insurance coverage, and has provided verbal consent to proceed: Yes      Ney Munson

## 2020-07-27 ENCOUNTER — HOSPITAL ENCOUNTER (OUTPATIENT)
Dept: NURSING | Age: 65
Setting detail: INFUSION SERIES
Discharge: HOME | End: 2020-07-27
Payer: MEDICARE

## 2020-07-27 VITALS
BODY MASS INDEX: 37.82 KG/M2 | HEART RATE: 84 BPM | TEMPERATURE: 97.1 F | DIASTOLIC BLOOD PRESSURE: 66 MMHG | WEIGHT: 227 LBS | SYSTOLIC BLOOD PRESSURE: 148 MMHG | HEIGHT: 65 IN | RESPIRATION RATE: 18 BRPM

## 2020-07-27 DIAGNOSIS — K51.90 ULCERATIVE COLITIS WITHOUT COMPLICATIONS, UNSPECIFIED LOCATION (HCC): ICD-10-CM

## 2020-07-27 DIAGNOSIS — K52.9 COLITIS: Primary | ICD-10-CM

## 2020-07-27 LAB
A/G RATIO: 1.1 (ref 1.1–2.2)
ALBUMIN SERPL-MCNC: 4 G/DL (ref 3.4–5)
ALP BLD-CCNC: 92 U/L (ref 40–129)
ALT SERPL-CCNC: 37 U/L (ref 10–40)
ANION GAP SERPL CALCULATED.3IONS-SCNC: 11 MMOL/L (ref 3–16)
AST SERPL-CCNC: 52 U/L (ref 15–37)
BILIRUB SERPL-MCNC: 0.5 MG/DL (ref 0–1)
BUN BLDV-MCNC: 24 MG/DL (ref 7–20)
CALCIUM SERPL-MCNC: 9 MG/DL (ref 8.3–10.6)
CHLORIDE BLD-SCNC: 103 MMOL/L (ref 99–110)
CO2: 24 MMOL/L (ref 21–32)
CREAT SERPL-MCNC: 1.1 MG/DL (ref 0.6–1.2)
GFR AFRICAN AMERICAN: >60
GFR NON-AFRICAN AMERICAN: 50
GLOBULIN: 3.5 G/DL
GLUCOSE BLD-MCNC: 112 MG/DL (ref 70–99)
HCT VFR BLD CALC: 37.8 % (ref 36–48)
HEMOGLOBIN: 12.6 G/DL (ref 12–16)
MCH RBC QN AUTO: 30.1 PG (ref 26–34)
MCHC RBC AUTO-ENTMCNC: 33.2 G/DL (ref 31–36)
MCV RBC AUTO: 90.6 FL (ref 80–100)
PDW BLD-RTO: 15 % (ref 12.4–15.4)
PLATELET # BLD: 178 K/UL (ref 135–450)
PMV BLD AUTO: 9.5 FL (ref 5–10.5)
POTASSIUM SERPL-SCNC: 4.1 MMOL/L (ref 3.5–5.1)
RBC # BLD: 4.17 M/UL (ref 4–5.2)
SODIUM BLD-SCNC: 138 MMOL/L (ref 136–145)
TOTAL PROTEIN: 7.5 G/DL (ref 6.4–8.2)
WBC # BLD: 7.8 K/UL (ref 4–11)

## 2020-07-27 PROCEDURE — 96365 THER/PROPH/DIAG IV INF INIT: CPT

## 2020-07-27 PROCEDURE — 80053 COMPREHEN METABOLIC PANEL: CPT

## 2020-07-27 PROCEDURE — 2580000003 HC RX 258: Performed by: INTERNAL MEDICINE

## 2020-07-27 PROCEDURE — 6360000002 HC RX W HCPCS: Performed by: INTERNAL MEDICINE

## 2020-07-27 PROCEDURE — 85027 COMPLETE CBC AUTOMATED: CPT

## 2020-07-27 PROCEDURE — 99211 OFF/OP EST MAY X REQ PHY/QHP: CPT

## 2020-07-27 RX ORDER — MELATONIN 10 MG
1 CAPSULE ORAL NIGHTLY
COMMUNITY
End: 2021-03-03

## 2020-07-27 RX ORDER — SODIUM CHLORIDE 0.9 % (FLUSH) 0.9 %
10 SYRINGE (ML) INJECTION PRN
Status: CANCELLED | OUTPATIENT
Start: 2020-09-21

## 2020-07-27 RX ORDER — DIPHENHYDRAMINE HCL 25 MG
25 TABLET ORAL ONCE
Status: DISCONTINUED | OUTPATIENT
Start: 2020-07-27 | End: 2020-07-28 | Stop reason: HOSPADM

## 2020-07-27 RX ORDER — ACETAMINOPHEN 325 MG/1
650 TABLET ORAL ONCE
Status: DISCONTINUED | OUTPATIENT
Start: 2020-07-27 | End: 2020-07-28 | Stop reason: HOSPADM

## 2020-07-27 RX ORDER — DIPHENHYDRAMINE HCL 25 MG
25 TABLET ORAL ONCE
Status: CANCELLED
Start: 2020-09-21

## 2020-07-27 RX ORDER — ACETAMINOPHEN 325 MG/1
650 TABLET ORAL ONCE
Status: CANCELLED
Start: 2020-09-21

## 2020-07-27 RX ORDER — VIT C/B6/B5/MAGNESIUM/HERB 173 50-5-6-5MG
1 CAPSULE ORAL DAILY
COMMUNITY
End: 2020-11-09 | Stop reason: ALTCHOICE

## 2020-07-27 RX ORDER — SODIUM CHLORIDE 9 MG/ML
INJECTION, SOLUTION INTRAVENOUS CONTINUOUS
Status: CANCELLED | OUTPATIENT
Start: 2020-09-21

## 2020-07-27 RX ORDER — SODIUM CHLORIDE 9 MG/ML
INJECTION, SOLUTION INTRAVENOUS CONTINUOUS
Status: ACTIVE | OUTPATIENT
Start: 2020-07-27 | End: 2020-07-27

## 2020-07-27 RX ADMIN — SODIUM CHLORIDE: 9 INJECTION, SOLUTION INTRAVENOUS at 10:51

## 2020-07-27 RX ADMIN — VEDOLIZUMAB 300 MG: 300 INJECTION, POWDER, LYOPHILIZED, FOR SOLUTION INTRAVENOUS at 10:51

## 2020-07-27 ASSESSMENT — PAIN SCALES - GENERAL: PAINLEVEL_OUTOF10: 0

## 2020-07-27 NOTE — PROGRESS NOTES
Pt returned to the dept at 1035  No changes noted  IV # 22 started in RAC on 1st attempt per myself without difficulty  Blood for lab work was drawn per orders  Pt gayla well  Entyvio 300 mg IVPB starting to infuse  IV site unremarkable    Pt refused ordered pre meds of tylenol 650 mg and benadryl 25 mg  Will monitor

## 2020-07-27 NOTE — PROGRESS NOTES
Pt here for entyvio infusion  Pt reports the diarrhea varies as some days she will have 8 to 10 watery diarrhea stools for which she will take an anti diarrhea tablet then he stools will be ok for a few days  Pt denies any cramping,or bleeding but does admit to some bloating at present time   Pt still reports that she doesn't see any difference between this entyvio and the pill she used to take but was told to continue with the entyvio for now   Pharmacy called and reported they didn't have any entyvio in stock but they could get it from [place] [facility] in roughly 1 hr  Pt agreeable with delay and requested to leave so she could get some breakfast   Pt left the dept ambulatory in stable condition and reported she would return in 1 hr  Pt is aware

## 2020-09-22 ENCOUNTER — HOSPITAL ENCOUNTER (OUTPATIENT)
Dept: NURSING | Age: 65
Setting detail: INFUSION SERIES
Discharge: HOME | End: 2020-09-22
Payer: MEDICARE

## 2020-09-22 VITALS
WEIGHT: 234 LBS | HEIGHT: 65 IN | TEMPERATURE: 97.8 F | SYSTOLIC BLOOD PRESSURE: 140 MMHG | DIASTOLIC BLOOD PRESSURE: 80 MMHG | HEART RATE: 71 BPM | RESPIRATION RATE: 16 BRPM | BODY MASS INDEX: 38.99 KG/M2

## 2020-09-22 DIAGNOSIS — K51.90 ULCERATIVE COLITIS WITHOUT COMPLICATIONS, UNSPECIFIED LOCATION (HCC): ICD-10-CM

## 2020-09-22 DIAGNOSIS — K52.9 COLITIS: Primary | ICD-10-CM

## 2020-09-22 PROCEDURE — 6360000002 HC RX W HCPCS: Performed by: INTERNAL MEDICINE

## 2020-09-22 PROCEDURE — 82397 CHEMILUMINESCENT ASSAY: CPT

## 2020-09-22 PROCEDURE — 80280 DRUG ASSAY VEDOLIZUMAB: CPT

## 2020-09-22 PROCEDURE — 99211 OFF/OP EST MAY X REQ PHY/QHP: CPT

## 2020-09-22 PROCEDURE — 96365 THER/PROPH/DIAG IV INF INIT: CPT

## 2020-09-22 PROCEDURE — 2580000003 HC RX 258: Performed by: INTERNAL MEDICINE

## 2020-09-22 RX ORDER — SODIUM CHLORIDE 0.9 % (FLUSH) 0.9 %
10 SYRINGE (ML) INJECTION PRN
Status: DISCONTINUED | OUTPATIENT
Start: 2020-09-22 | End: 2020-09-23 | Stop reason: HOSPADM

## 2020-09-22 RX ORDER — DIPHENHYDRAMINE HCL 25 MG
25 TABLET ORAL ONCE
Status: DISCONTINUED | OUTPATIENT
Start: 2020-09-22 | End: 2020-09-23 | Stop reason: HOSPADM

## 2020-09-22 RX ORDER — SODIUM CHLORIDE 9 MG/ML
INJECTION, SOLUTION INTRAVENOUS CONTINUOUS
Status: CANCELLED | OUTPATIENT
Start: 2020-11-17

## 2020-09-22 RX ORDER — DIPHENHYDRAMINE HCL 25 MG
25 TABLET ORAL ONCE
Status: CANCELLED
Start: 2020-11-17

## 2020-09-22 RX ORDER — SODIUM CHLORIDE 0.9 % (FLUSH) 0.9 %
10 SYRINGE (ML) INJECTION PRN
Status: CANCELLED | OUTPATIENT
Start: 2020-11-17

## 2020-09-22 RX ORDER — ACETAMINOPHEN 325 MG/1
650 TABLET ORAL ONCE
Status: DISCONTINUED | OUTPATIENT
Start: 2020-09-22 | End: 2020-09-23 | Stop reason: HOSPADM

## 2020-09-22 RX ORDER — SODIUM CHLORIDE 9 MG/ML
INJECTION, SOLUTION INTRAVENOUS CONTINUOUS
Status: ACTIVE | OUTPATIENT
Start: 2020-09-22 | End: 2020-09-22

## 2020-09-22 RX ORDER — ACETAMINOPHEN 325 MG/1
650 TABLET ORAL ONCE
Status: CANCELLED
Start: 2020-11-17

## 2020-09-22 RX ADMIN — SODIUM CHLORIDE: 9 INJECTION, SOLUTION INTRAVENOUS at 09:16

## 2020-09-22 RX ADMIN — Medication 10 ML: at 09:15

## 2020-09-22 RX ADMIN — VEDOLIZUMAB 300 MG: 300 INJECTION, POWDER, LYOPHILIZED, FOR SOLUTION INTRAVENOUS at 09:20

## 2020-09-22 ASSESSMENT — PAIN SCALES - GENERAL: PAINLEVEL_OUTOF10: 0

## 2020-09-22 NOTE — PROGRESS NOTES
Entyvio infused and tubing was flushed with 100 cc NS  IV removed without difficulty Cath tip intact  Pressure then pressure dressing was applied and secured with a coban dressing  IV site unremarkable  Discharge instructions reviewed with pt and copy was given  Understanding verbalized  Pt was then discharged ambulatory in stable condition

## 2020-09-22 NOTE — PROGRESS NOTES
Pt here for entyvio infusion  Pt reports she still doesn't think this is working for her but Dr. Chuy Rolon wants to check some labs before he decides what to do about her infusion   Pt reports that her stools are mostly soft but she has a lot of diarrhea as well  Sometimes 8 to 10 watery diarrhea stools a day for which she will take an anti diarrhea tablet then her stools will be ok for a few days  Pt denies any cramping,or bleeding but reports some bloating   IV # 22 started in RAC on 1st attempt per myself without difficulty  Blood for lab work was drawn per orders  Pt gayla well  Entyvio 300 mg IVPB starting to infuse  IV site unremarkable Will monitor

## 2020-09-23 ENCOUNTER — TELEPHONE (OUTPATIENT)
Dept: FAMILY MEDICINE CLINIC | Age: 65
End: 2020-09-23

## 2020-09-23 ENCOUNTER — HOSPITAL ENCOUNTER (OUTPATIENT)
Age: 65
Discharge: HOME | End: 2020-09-23
Payer: MEDICARE

## 2020-09-23 LAB
ALBUMIN SERPL-MCNC: 3.8 G/DL (ref 3.4–5)
ANION GAP SERPL CALCULATED.3IONS-SCNC: 9 MMOL/L (ref 3–16)
BACTERIA: ABNORMAL /HPF
BILIRUBIN URINE: NEGATIVE
BLOOD, URINE: NEGATIVE
BUN BLDV-MCNC: 18 MG/DL (ref 7–20)
CALCIUM SERPL-MCNC: 9.2 MG/DL (ref 8.3–10.6)
CHLORIDE BLD-SCNC: 105 MMOL/L (ref 99–110)
CLARITY: CLEAR
CO2: 26 MMOL/L (ref 21–32)
COLOR: YELLOW
CREAT SERPL-MCNC: 1 MG/DL (ref 0.6–1.2)
EPITHELIAL CELLS, UA: ABNORMAL /HPF (ref 0–5)
GFR AFRICAN AMERICAN: >60
GFR NON-AFRICAN AMERICAN: 56
GLUCOSE BLD-MCNC: 105 MG/DL (ref 70–99)
GLUCOSE URINE: NEGATIVE MG/DL
HCT VFR BLD CALC: 37.4 % (ref 36–48)
HEMOGLOBIN: 12.8 G/DL (ref 12–16)
KETONES, URINE: NEGATIVE MG/DL
LEUKOCYTE ESTERASE, URINE: ABNORMAL
MCH RBC QN AUTO: 31.9 PG (ref 26–34)
MCHC RBC AUTO-ENTMCNC: 34.1 G/DL (ref 31–36)
MCV RBC AUTO: 93.5 FL (ref 80–100)
MICROSCOPIC EXAMINATION: YES
NITRITE, URINE: NEGATIVE
PDW BLD-RTO: 14.1 % (ref 12.4–15.4)
PH UA: 7 (ref 5–8)
PHOSPHORUS: 3.3 MG/DL (ref 2.5–4.9)
PLATELET # BLD: 150 K/UL (ref 135–450)
PMV BLD AUTO: 9.1 FL (ref 5–10.5)
POTASSIUM SERPL-SCNC: 4.9 MMOL/L (ref 3.5–5.1)
PROTEIN UA: NEGATIVE MG/DL
RBC # BLD: 4 M/UL (ref 4–5.2)
RBC UA: ABNORMAL /HPF (ref 0–4)
SODIUM BLD-SCNC: 140 MMOL/L (ref 136–145)
SPECIFIC GRAVITY UA: 1.01 (ref 1–1.03)
URINE TYPE: ABNORMAL
UROBILINOGEN, URINE: 0.2 E.U./DL
WBC # BLD: 6 K/UL (ref 4–11)
WBC UA: ABNORMAL /HPF (ref 0–5)

## 2020-09-23 PROCEDURE — 82570 ASSAY OF URINE CREATININE: CPT

## 2020-09-23 PROCEDURE — 84156 ASSAY OF PROTEIN URINE: CPT

## 2020-09-23 PROCEDURE — 85027 COMPLETE CBC AUTOMATED: CPT

## 2020-09-23 PROCEDURE — 83970 ASSAY OF PARATHORMONE: CPT

## 2020-09-23 PROCEDURE — 80069 RENAL FUNCTION PANEL: CPT

## 2020-09-23 PROCEDURE — 36415 COLL VENOUS BLD VENIPUNCTURE: CPT

## 2020-09-23 PROCEDURE — 82306 VITAMIN D 25 HYDROXY: CPT

## 2020-09-23 PROCEDURE — 81001 URINALYSIS AUTO W/SCOPE: CPT

## 2020-09-24 LAB
CREATININE URINE: 41.5 MG/DL (ref 28–259)
PARATHYROID HORMONE INTACT: 71.6 PG/ML (ref 14–72)
PROTEIN PROTEIN: <4 MG/DL
PROTEIN/CREAT RATIO: NORMAL MG/DL
VITAMIN D 25-HYDROXY: 59 NG/ML

## 2020-09-25 ENCOUNTER — PATIENT MESSAGE (OUTPATIENT)
Dept: FAMILY MEDICINE CLINIC | Age: 65
End: 2020-09-25

## 2020-09-25 RX ORDER — NITROFURANTOIN 25; 75 MG/1; MG/1
100 CAPSULE ORAL 2 TIMES DAILY
Qty: 14 CAPSULE | Refills: 0 | Status: SHIPPED | OUTPATIENT
Start: 2020-09-25 | End: 2020-10-02

## 2020-09-25 NOTE — TELEPHONE ENCOUNTER
Recommend patient bring in urine for u/a and culture if possible  But may start Macrobid 100 mg 1 p.o. twice daily x7 days #14--3 send prescription to pharmacy of choice and notify patient

## 2020-09-25 NOTE — TELEPHONE ENCOUNTER
From: Hazel Miranda  To: YAHAIRA Conklin - CNP  Sent: 9/25/2020 4:19 PM EDT  Subject: Non-Urgent Medical Question    Have been getting a burning in my private tissue area when I urinate. Not a cramping or pain in my abdomen that to me would indicate a bladder infection. It just seems to be limited to the exterior tissue area. Have tried treating with some Mupirocin I had here or just general Eucerin healing cream but it is not achieving any results. Have had some recent blood & urine testing for Dr. Cassandra Stevens. ..dont know if that will show you anything. Is there something you are comfortable prescribing or trying over the counter that might help w/o having to take up your time in an appt? That rash that I was getting that I thought was from [facility] seems to be spreading throughout my torso. Dont know what to do.

## 2020-09-28 ENCOUNTER — VIRTUAL VISIT (OUTPATIENT)
Dept: RHEUMATOLOGY | Age: 65
End: 2020-09-28
Payer: MEDICARE

## 2020-09-28 PROCEDURE — 3017F COLORECTAL CA SCREEN DOC REV: CPT | Performed by: INTERNAL MEDICINE

## 2020-09-28 PROCEDURE — G8427 DOCREV CUR MEDS BY ELIG CLIN: HCPCS | Performed by: INTERNAL MEDICINE

## 2020-09-28 PROCEDURE — 1123F ACP DISCUSS/DSCN MKR DOCD: CPT | Performed by: INTERNAL MEDICINE

## 2020-09-28 PROCEDURE — G8400 PT W/DXA NO RESULTS DOC: HCPCS | Performed by: INTERNAL MEDICINE

## 2020-09-28 PROCEDURE — 99442 PR PHYS/QHP TELEPHONE EVALUATION 11-20 MIN: CPT | Performed by: INTERNAL MEDICINE

## 2020-09-28 PROCEDURE — G8417 CALC BMI ABV UP PARAM F/U: HCPCS | Performed by: INTERNAL MEDICINE

## 2020-09-28 PROCEDURE — 1036F TOBACCO NON-USER: CPT | Performed by: INTERNAL MEDICINE

## 2020-09-28 PROCEDURE — 1090F PRES/ABSN URINE INCON ASSESS: CPT | Performed by: INTERNAL MEDICINE

## 2020-09-28 PROCEDURE — 4040F PNEUMOC VAC/ADMIN/RCVD: CPT | Performed by: INTERNAL MEDICINE

## 2020-09-28 RX ORDER — CELECOXIB 100 MG/1
100 CAPSULE ORAL 2 TIMES DAILY
Qty: 180 CAPSULE | Refills: 0 | Status: SHIPPED | OUTPATIENT
Start: 2020-09-28 | End: 2020-11-09

## 2020-09-29 ENCOUNTER — HOSPITAL ENCOUNTER (OUTPATIENT)
Age: 65
Discharge: HOME | End: 2020-09-29
Payer: MEDICARE

## 2020-09-29 PROCEDURE — 87086 URINE CULTURE/COLONY COUNT: CPT

## 2020-09-30 LAB — URINE CULTURE, ROUTINE: NORMAL

## 2020-10-01 ENCOUNTER — TELEPHONE (OUTPATIENT)
Dept: FAMILY MEDICINE CLINIC | Age: 65
End: 2020-10-01

## 2020-10-01 NOTE — TELEPHONE ENCOUNTER
----- Message from Razia Moreau sent at 10/1/2020 11:06 AM EDT -----  Subject: Message to Provider    QUESTIONS  Information for Provider? Patient would like a call to go over the results   of a lab. Patient would like to know if she should still take the   medication prescribed for a UTI if the labs show negative.   ---------------------------------------------------------------------------  --------------  CALL BACK INFO  What is the best way for the office to contact you? OK to leave message on   voicemail  Preferred Call Back Phone Number? [phone]  ---------------------------------------------------------------------------  --------------  SCRIPT ANSWERS  Relationship to Patient?  Self

## 2020-10-04 LAB
Lab: NORMAL
REPORT: NORMAL
THIS TEST SENT TO: NORMAL

## 2020-10-20 PROBLEM — K51.00 ULCERATIVE (CHRONIC) ENTEROCOLITIS (HCC): Status: ACTIVE | Noted: 2020-10-20

## 2020-10-20 RX ORDER — SODIUM CHLORIDE 9 MG/ML
INJECTION, SOLUTION INTRAVENOUS CONTINUOUS
Status: CANCELLED | OUTPATIENT
Start: 2020-10-20

## 2020-10-26 RX ORDER — LEVOTHYROXINE SODIUM 0.15 MG/1
TABLET ORAL
Qty: 90 TABLET | Refills: 0 | Status: SHIPPED | OUTPATIENT
Start: 2020-10-26 | End: 2020-11-03 | Stop reason: SDUPTHER

## 2020-11-03 RX ORDER — LEVOTHYROXINE SODIUM 0.15 MG/1
150 TABLET ORAL DAILY
Qty: 90 TABLET | Refills: 3 | Status: SHIPPED | OUTPATIENT
Start: 2020-11-03 | End: 2021-01-21

## 2020-11-03 RX ORDER — CELECOXIB 100 MG/1
CAPSULE ORAL
Qty: 180 CAPSULE | Refills: 3 | Status: SHIPPED | OUTPATIENT
Start: 2020-11-03 | End: 2020-12-21

## 2020-11-03 RX ORDER — FOLIC ACID 1 MG/1
1 TABLET ORAL DAILY
Qty: 90 TABLET | Refills: 3 | Status: SHIPPED | OUTPATIENT
Start: 2020-11-03 | End: 2020-12-21 | Stop reason: SDUPTHER

## 2020-11-05 RX ORDER — GABAPENTIN 100 MG/1
200 CAPSULE ORAL 3 TIMES DAILY
Qty: 540 CAPSULE | Refills: 1 | Status: SHIPPED | OUTPATIENT
Start: 2020-11-05 | End: 2020-11-09 | Stop reason: DRUGHIGH

## 2020-11-05 RX ORDER — LANOLIN ALCOHOL/MO/W.PET/CERES
400 CREAM (GRAM) TOPICAL 2 TIMES DAILY
Qty: 360 TABLET | Refills: 0 | Status: SHIPPED | OUTPATIENT
Start: 2020-11-05 | End: 2020-12-21 | Stop reason: SDUPTHER

## 2020-11-05 RX ORDER — TRIAMCINOLONE ACETONIDE 1 MG/G
CREAM TOPICAL
Qty: 45 G | Refills: 1 | Status: SHIPPED | OUTPATIENT
Start: 2020-11-05

## 2020-11-09 ENCOUNTER — OFFICE VISIT (OUTPATIENT)
Dept: FAMILY MEDICINE CLINIC | Age: 65
End: 2020-11-09
Payer: MEDICARE

## 2020-11-09 VITALS
TEMPERATURE: 96.8 F | DIASTOLIC BLOOD PRESSURE: 78 MMHG | HEIGHT: 65 IN | BODY MASS INDEX: 38.32 KG/M2 | SYSTOLIC BLOOD PRESSURE: 118 MMHG | HEART RATE: 80 BPM | WEIGHT: 230 LBS | OXYGEN SATURATION: 97 %

## 2020-11-09 LAB
A/G RATIO: 1.2 (ref 1.1–2.2)
ALBUMIN SERPL-MCNC: 3.8 G/DL (ref 3.4–5)
ALP BLD-CCNC: 110 U/L (ref 40–129)
ALT SERPL-CCNC: 39 U/L (ref 10–40)
ANION GAP SERPL CALCULATED.3IONS-SCNC: 11 MMOL/L (ref 3–16)
AST SERPL-CCNC: 61 U/L (ref 15–37)
BILIRUB SERPL-MCNC: 0.7 MG/DL (ref 0–1)
BILIRUBIN, POC: NORMAL
BLOOD URINE, POC: NORMAL
BUN BLDV-MCNC: 16 MG/DL (ref 7–20)
CALCIUM SERPL-MCNC: 9.6 MG/DL (ref 8.3–10.6)
CHLORIDE BLD-SCNC: 102 MMOL/L (ref 99–110)
CHOLESTEROL, TOTAL: 201 MG/DL (ref 0–199)
CLARITY, POC: NORMAL
CO2: 25 MMOL/L (ref 21–32)
COLOR, POC: NORMAL
CREAT SERPL-MCNC: 0.9 MG/DL (ref 0.6–1.2)
CREATININE URINE: 73.4 MG/DL (ref 28–259)
FERRITIN: 275.1 NG/ML (ref 15–150)
FOLATE: >20 NG/ML (ref 4.78–24.2)
GFR AFRICAN AMERICAN: >60
GFR NON-AFRICAN AMERICAN: >60
GLOBULIN: 3.2 G/DL
GLUCOSE BLD-MCNC: 111 MG/DL (ref 70–99)
GLUCOSE URINE, POC: NORMAL
HDLC SERPL-MCNC: 41 MG/DL (ref 40–60)
IRON SATURATION: 30 % (ref 15–50)
IRON: 94 UG/DL (ref 37–145)
KETONES, POC: NORMAL
LDL CHOLESTEROL CALCULATED: 132 MG/DL
LEUKOCYTE EST, POC: NORMAL
MAGNESIUM: 1.6 MG/DL (ref 1.8–2.4)
MICROALBUMIN UR-MCNC: 1.7 MG/DL
MICROALBUMIN/CREAT UR-RTO: 23.2 MG/G (ref 0–30)
NITRITE, POC: NORMAL
PH, POC: 6
POTASSIUM SERPL-SCNC: 4.6 MMOL/L (ref 3.5–5.1)
PROTEIN, POC: NORMAL
SODIUM BLD-SCNC: 138 MMOL/L (ref 136–145)
SPECIFIC GRAVITY, POC: 1.02
T3 TOTAL: 1.2 NG/ML (ref 0.8–2)
T4 FREE: 1.6 NG/DL (ref 0.9–1.8)
TOTAL IRON BINDING CAPACITY: 317 UG/DL (ref 260–445)
TOTAL PROTEIN: 7 G/DL (ref 6.4–8.2)
TRIGL SERPL-MCNC: 142 MG/DL (ref 0–150)
TSH REFLEX: 0.31 UIU/ML (ref 0.27–4.2)
UROBILINOGEN, POC: 0.2
VITAMIN B-12: 306 PG/ML (ref 211–911)
VLDLC SERPL CALC-MCNC: 28 MG/DL

## 2020-11-09 PROCEDURE — G8484 FLU IMMUNIZE NO ADMIN: HCPCS | Performed by: NURSE PRACTITIONER

## 2020-11-09 PROCEDURE — 1090F PRES/ABSN URINE INCON ASSESS: CPT | Performed by: NURSE PRACTITIONER

## 2020-11-09 PROCEDURE — 1123F ACP DISCUSS/DSCN MKR DOCD: CPT | Performed by: NURSE PRACTITIONER

## 2020-11-09 PROCEDURE — 2022F DILAT RTA XM EVC RTNOPTHY: CPT | Performed by: NURSE PRACTITIONER

## 2020-11-09 PROCEDURE — 81002 URINALYSIS NONAUTO W/O SCOPE: CPT | Performed by: NURSE PRACTITIONER

## 2020-11-09 PROCEDURE — 36415 COLL VENOUS BLD VENIPUNCTURE: CPT | Performed by: NURSE PRACTITIONER

## 2020-11-09 PROCEDURE — G8417 CALC BMI ABV UP PARAM F/U: HCPCS | Performed by: NURSE PRACTITIONER

## 2020-11-09 PROCEDURE — G8427 DOCREV CUR MEDS BY ELIG CLIN: HCPCS | Performed by: NURSE PRACTITIONER

## 2020-11-09 PROCEDURE — 99214 OFFICE O/P EST MOD 30 MIN: CPT | Performed by: NURSE PRACTITIONER

## 2020-11-09 PROCEDURE — 3017F COLORECTAL CA SCREEN DOC REV: CPT | Performed by: NURSE PRACTITIONER

## 2020-11-09 PROCEDURE — 1036F TOBACCO NON-USER: CPT | Performed by: NURSE PRACTITIONER

## 2020-11-09 PROCEDURE — G8400 PT W/DXA NO RESULTS DOC: HCPCS | Performed by: NURSE PRACTITIONER

## 2020-11-09 PROCEDURE — 4040F PNEUMOC VAC/ADMIN/RCVD: CPT | Performed by: NURSE PRACTITIONER

## 2020-11-09 PROCEDURE — 3046F HEMOGLOBIN A1C LEVEL >9.0%: CPT | Performed by: NURSE PRACTITIONER

## 2020-11-09 RX ORDER — GABAPENTIN 100 MG/1
200 CAPSULE ORAL NIGHTLY
Qty: 180 CAPSULE | Refills: 1 | Status: SHIPPED | OUTPATIENT
Start: 2020-11-09 | End: 2022-07-17 | Stop reason: SDUPTHER

## 2020-11-09 ASSESSMENT — ENCOUNTER SYMPTOMS
ALLERGIC/IMMUNOLOGIC NEGATIVE: 1
GASTROINTESTINAL NEGATIVE: 1
ABDOMINAL PAIN: 0
BLOOD IN STOOL: 0
EYES NEGATIVE: 1
RESPIRATORY NEGATIVE: 1
NAUSEA: 0
ANAL BLEEDING: 0
SHORTNESS OF BREATH: 0

## 2020-11-09 ASSESSMENT — PATIENT HEALTH QUESTIONNAIRE - PHQ9
SUM OF ALL RESPONSES TO PHQ QUESTIONS 1-9: 0
SUM OF ALL RESPONSES TO PHQ9 QUESTIONS 1 & 2: 0
SUM OF ALL RESPONSES TO PHQ QUESTIONS 1-9: 0
SUM OF ALL RESPONSES TO PHQ QUESTIONS 1-9: 0
2. FEELING DOWN, DEPRESSED OR HOPELESS: 0
1. LITTLE INTEREST OR PLEASURE IN DOING THINGS: 0

## 2020-11-09 NOTE — PROGRESS NOTES
[address] [facility]  [address] [facility] [address]  Dept: [phone]  Dept Fax: [phone]  Loc: [phone]    Himanshu Charles is a 71 y.o. female who presents today for her medical conditions/complaints as noted below. Himanshu Charles is c/o of Diabetes (pt jut checks every now and then at home ); Hyperlipidemia (pt is fasting for blood work ); Other (vit d ); and Hypertension       Subjective:     Chief Complaint   Patient presents with    Diabetes     pt jut checks every now and then at home     Hyperlipidemia     pt is fasting for blood work     Other     vit d     Hypertension       HPI    IFG:  Patient is here for follow up on pre-diabetes. Treatment Adherence:   Medication compliance:  Taking medication as prescribed. Diet compliance:  Trying to eat a healthy, diabetic diet. Weight trend: Wt Readings from Last 3 Encounters:   11/09/20 230 lb (104.3 kg)   09/22/20 234 lb (106.1 kg)   07/27/20 227 lb (103 kg)     Denies any hypoglycemia episodes. Current symptoms/problems include:  Neuropathy is not present---see note below  Denies any open areas on feet/ulcerations  Denies any polyuria, polydipsia, or polyphagia, nausea, vomiting or diarrhea. Home blood sugar records: patient tests 1 time(s) per month  Eye exam current (withinone year): yes  Denies blurry vision   reports that she has never smoked. She has never used smokeless tobacco.    Patient states neurontin originally prescribed for \"leg cramps\". Only using 2 tablets at  Bedtime instead of 2 tabs TID  Does have history of low back pain with sciatica on left LE. Patient denies numbness/tingling/buringing in BLE and only has cramps and muscle pain in left buttock and thigh. Hypertension:    The patient is here for routine follow up on HTN.    Patient denies chest pain, shortness of breath, headache, lightheadedness, blurred vision, peripheral edema, palpitations, dry cough, and fatigue. She is adherent to a low sodium diet. Patient denies antihypertensive medication side effects of: fatigue, dry cough, swelling in ankles, weakness, orthostatic lightheadedness, myalgias, rash, nausea, abdominal discomfort, headaches, insomnia, weight gain, palpitations, slow heart rate, excessive urination, depression, and wheezing. She is not checking her BP outside of the office. Sodium (mmol/L)   Date Value   09/23/2020 140    BUN (mg/dL)   Date Value   09/23/2020 18    Glucose (mg/dL)   Date Value   09/23/2020 105 (H)      Potassium (mmol/L)   Date Value   09/23/2020 4.9     Potassium reflex Magnesium (mmol/L)   Date Value   09/30/2018 3.8    CREATININE (mg/dL)   Date Value   09/23/2020 1.0         BP Readings from Last 3 Encounters:   11/09/20 118/78   09/22/20 (!) 140/80   07/27/20 (!) 148/66     Hypothyroidism: Recent symptoms: none. She denies fatigue, weight gain, weight loss, cold intolerance, heat intolerance, hair loss, dry skin, constipation, diarrhea, edema, anxiety, tremor, palpitations and dysphagia. Patient is  taking her medication consistently on an empty stomach.     Lab Results   Component Value Date    TSHREFLEX 1.33 12/12/2019    TSHREFLEX 0.04 06/24/2019    TSHREFLEX <0.01 05/21/2019     Lab Results   Component Value Date    TSH 0.56 10/06/2019    TSH 0.39 08/11/2018    TSH 0.67 10/25/2017           Hyperlipidemia:  Not on medication--quit statin about 6 months ago    The 10-year ASCVD risk score (Alise Forbes, et al., 2013) is: 6.6%    Values used to calculate the score:      Age: 71 years      Sex: Female      Is Non- : No      Diabetic: Yes      Tobacco smoker: No      Systolic Blood Pressure: 255 mmHg      Is BP treated: No      HDL Cholesterol: 88 mg/dL      Total Cholesterol: 159 mg/dL    Lab Results   Component Value Date    LABA1C 5.0 10/02/2019    LABA1C 5.7 05/21/2019    LABA1C 5.1 09/03/2018     Lab Results   Component Value Date    LABMICR YES 09/23/2020    [address] 55 10/06/2019    CREATININE 1.0 09/23/2020       Anemia  Patient presents for evaluation of anemia. She has both folate and iron deficiency.  Anemia was found by routine CBC. It has been present for a few years. Associated signs & symptoms: none. No longer taking any iron for at least 6 months    Has history low magnesium and currently on magnesium supplement. Taking 800 mg bid    Vitamin D Deficiency  Patient with vitamin d deficiencyand currently on supplement without adverse reactions. Lab Results   Component Value Date    VITD25 59.0 09/23/2020         Review of Systems   Constitutional: Negative. Negative for appetite change, fatigue and unexpected weight change. HENT: Negative. Eyes: Negative. Respiratory: Negative. Negative for shortness of breath. Cardiovascular: Negative. Negative for chest pain, palpitations and leg swelling. Gastrointestinal: Negative. Negative for abdominal pain, anal bleeding, blood in stool and nausea. Endocrine: Negative. Genitourinary: Negative. Negative for hematuria. Musculoskeletal: Negative. Skin: Negative. Negative for rash. Allergic/Immunologic: Negative. Neurological: Negative. Negative for dizziness, syncope, light-headedness and numbness. Hematological: Negative. Does not bruise/bleed easily. Psychiatric/Behavioral: Negative. All other systems reviewed and are negative.       Past Medical History:   Diagnosis Date    Acute pancreatitis 10/21/2014    Acute renal failure with tubular necrosis (HCC) 7/10/2015    Acute stasis dermatitis of left lower extremity 7/8/2015    Angina pectoris ([address] 5/23/2011    Cellulitis of left leg     Chronic back pain     Clostridium difficile infection 7/9/15, 6/4/15    Colitis     Diabetes mellitus ([address]     diet controlled    Heart murmur 10/1/2019    Hyperlipidemia     Hypertension     Left THR 1/25/2016    Lumbar Needs    Financial resource strain: Not on file    Food insecurity     Worry: Not on file     Inability: Not on file    Transportation needs     Medical: Not on file     Non-medical: Not on file   Tobacco Use    Smoking status: Never Smoker    Smokeless tobacco: Never Used    Tobacco comment: never even tried it   Substance and Sexual Activity    Alcohol use: No     Alcohol/week: 0.0 standard drinks     Comment: rarely    Drug use: No    Sexual activity: Not Currently   Lifestyle    Physical activity     Days per week: Not on file     Minutes per session: Not on file    Stress: Not on file   Relationships    Social connections     Talks on phone: Not on file     Gets together: Not on file     Attends [employer] service: Not on file     Active member of club or organization: Not on file     Attends meetings of clubs or organizations: Not on file     Relationship status: Not on file    Intimate partner violence     Fear of current or ex partner: Not on file     Emotionally abused: Not on file     Physically abused: Not on file     Forced sexual activity: Not on file   Other Topics Concern    Not on file   Social History Narrative    Not on file     Current Outpatient Medications   Medication Sig Dispense Refill    magnesium oxide (MAG-OX) 400 (240 Mg) MG tablet Take 1 tablet by mouth 2 times daily (Patient taking differently: Take 800 mg by mouth 2 times daily ) 360 tablet 0    triamcinolone (KENALOG) 0.1 % cream APPLY TO AFFECTED AREA TWICE A DAY 45 g 1    gabapentin (NEURONTIN) 100 MG capsule Take 2 capsules by mouth 3 times daily for 180 days.  I PO  capsule 1    folic acid (FOLVITE) 1 MG tablet Take 1 tablet by mouth daily 90 tablet 3    celecoxib (CELEBREX) 100 MG capsule TAKE 2 CAPSULES BY MOUTH ONCE A  capsule 3    levothyroxine (SYNTHROID) 150 MCG tablet Take 1 tablet by mouth Daily 90 tablet 3    Melatonin 10 MG CAPS Take 1 capsule by mouth nightly      VENTOLIN  (90 Base) MCG/ACT inhaler TAKE 2 PUFFS BY MOUTH EVERY 6 HOURS AS NEEDED FOR WHEEZE 1 Inhaler 5    vitamin D ([employer] VITAMIN D3) 250 MCG (93622 UT) CAPS capsule TAKE 1 CAPSULE BY MOUTH ONE TIME PER WEEK 12 capsule 1    acetaminophen (TYLENOL) 500 MG tablet Take 500 mg by mouth daily as needed for Pain       Vedolizumab (ENTYVIO IV) Infuse 300 mg intravenously See Admin Instructions LAST DOSE GIVEN ON 9-      blood glucose test strips (ASCENSIA AUTODISC VI;ONE TOUCH ULTRA TEST VI) strip 1 each by In Vitro route daily As needed. 100 each 3    ONE TOUCH LANCETS MISC 1 each by Does not apply route daily 100 each 3    Turmeric 500 MG CAPS Take 1 capsule by mouth daily       No current facility-administered medications for this visit. No changes in past medicalhistory, past surgical history, social history, or family history were noted during the patient encounter unless specifically listed above. All updates of past medical history, past surgical history, social history, orfamily history were reviewed personally by me during the office visit. All problems listed in the assessment are stable unless noted otherwise. Medication profile reviewed personally by me during the office visit. Medication side effects and possible impairments from medications were discussed as applicable. Objective:       Physical Exam  Vitals signs and nursing note reviewed. Constitutional:       General: She is not in acute distress. Appearance: Normal appearance. She is well-developed. HENT:      Head: Normocephalic and atraumatic. Right Ear: Tympanic membrane, ear canal and external ear normal.      Left Ear: Tympanic membrane, ear canal and external ear normal.      Nose: Nose normal.      Mouth/Throat:      Pharynx: Uvula midline. No oropharyngeal exudate. Eyes:      General: Lids are normal.      Conjunctiva/sclera: Conjunctivae normal.      Pupils: Pupils are equal, round, and reactive to light.    Neck: Musculoskeletal: Normal range of motion and neck supple. Thyroid: No thyromegaly. Vascular: No carotid bruit or JVD. Cardiovascular:      Rate and Rhythm: Normal rate and regular rhythm. Pulses: Normal pulses. Radial pulses are 2+ on the right side and 2+ on the left side. Dorsalis pedis pulses are 2+ on the right side and 2+ on the left side. Posterior tibial pulses are 2+ on the right side and 2+ on the left side. Heart sounds: Normal heart sounds. No murmur. No friction rub. No gallop. Pulmonary:      Effort: Pulmonary effort is normal.      Breath sounds: Normal breath sounds. Abdominal:      General: Bowel sounds are normal.      Palpations: Abdomen is soft. There is no mass. Tenderness: There is no abdominal tenderness. Musculoskeletal: Normal range of motion. Right foot: Normal range of motion. Left foot: Normal range of motion. Feet:      Right foot:      Protective Sensation: 10 sites tested. 10 sites sensed. Skin integrity: Skin integrity normal.      Toenail Condition: Right toenails are abnormally thick. Left foot:      Protective Sensation: 10 sites tested. 10 sites sensed. Skin integrity: Skin integrity normal.      Toenail Condition: Left toenails are abnormally thick. Lymphadenopathy:      Head:      Right side of head: No submandibular adenopathy. Left side of head: No submandibular adenopathy. Cervical: No cervical adenopathy. Skin:     General: Skin is warm and dry. Findings: No lesion or rash. Neurological:      Mental Status: She is alert and oriented to person, place, and time. Gait: Gait normal.   Psychiatric:         Speech: Speech normal.         Behavior: Behavior normal.         Thought Content:  Thought content normal.         Judgment: Judgment normal.         /78 (Site: Right Upper Arm, Position: Sitting, Cuff Size: Large Adult)   Pulse 80   Temp 96.8 °F (36 °C) (Temporal)   Ht 5' 5\" (1.651 m)   Wt 230 lb (104.3 kg)   SpO2 97%   BMI 38.27 kg/m²   Body mass index is 38.27 kg/m².     Lab Review   Hospital Outpatient Visit on 09/29/2020   Component Date Value    Urine Culture, Routine 09/29/2020 No growth at 18 to 36 hours    Hospital Outpatient Visit on 09/23/2020   Component Date Value    WBC 09/23/2020 6.0     RBC 09/23/2020 4.00     Hemoglobin 09/23/2020 12.8     Hematocrit 09/23/2020 37.4     MCV 09/23/2020 93.5     MCH 09/23/2020 31.9     MCHC 09/23/2020 34.1     RDW 09/23/2020 14.1     Platelets 89/84/9479 150     MPV 09/23/2020 9.1     PTH 09/23/2020 71.6     Protein, Ur 09/23/2020 <4.00     Creatinine, Ur 09/23/2020 41.5     Protein/Creat Ratio 09/23/2020 see below     Sodium 09/23/2020 140     Potassium 09/23/2020 4.9     Chloride 09/23/2020 105     CO2 09/23/2020 26     Anion Gap 09/23/2020 9     Glucose 09/23/2020 105*    BUN 09/23/2020 18     CREATININE 09/23/2020 1.0     GFR Non- 09/23/2020 56*    GFR  09/23/2020 >60     Calcium 09/23/2020 9.2     Phosphorus 09/23/2020 3.3     Alb 09/23/2020 3.8     Color, UA 09/23/2020 Yellow     Clarity, UA 09/23/2020 Clear     Glucose, Ur 09/23/2020 Negative     Bilirubin Urine 09/23/2020 Negative     Ketones, Urine 09/23/2020 Negative     Specific Gravity, UA 09/23/2020 1.015     Blood, Urine 09/23/2020 Negative     pH, UA 09/23/2020 7.0     Protein, UA 09/23/2020 Negative     Urobilinogen, Urine 09/23/2020 0.2     Nitrite, Urine 09/23/2020 Negative     Leukocyte Esterase, Urine 09/23/2020 SMALL*    Microscopic Examination 09/23/2020 YES     Urine Type 09/23/2020 [employer]     Vit D, 25-Hydroxy 09/23/2020 59.0     WBC, UA 09/23/2020 0-2     RBC, UA 09/23/2020 0-2     Epithelial Cells, UA 09/23/2020 0-1     Bacteria, UA 09/23/2020 [facility] Outpatient Visit on 09/22/2020   Component Date Value    test code 09/22/2020 Zaida Ivan Test Sent To 09/22/2020 [facility] to [place]     Report 09/22/2020 See report               Assessment:       1. Diet-controlled type 2 diabetes mellitus ([address]    2. Morbidly obese ([address]    3. Essential hypertension    4. Mixed hyperlipidemia    5. Hypothyroidism, unspecified type    6. Hypomagnesemia    7. B12 deficiency    8. Vitamin D deficiency    9. Lumbar stenosis with neurogenic claudication    10. Lumbar radiculitis    11. Iron deficiency anemia due to chronic blood loss        No results found for this visit on 11/09/20. Plan:       Radha Sifuentes was seen today for diabetes, hyperlipidemia, other and hypertension. Diagnoses and all orders for this visit:    Diet-controlled type 2 diabetes mellitus ([address]  -     Hemoglobin A1C  -     Microalbumin / Creatinine Urine Ratio  -     POCT Urinalysis no Micro  No polyuria, polydipsia, blurry vision, chest pain, dyspnea or claudication. No foot burning, numbness or pain. Follows diet fairly well. Morbidly obese ([address]   Discussed healthy lifestyle choices to attempt to incorporate into daily routine to attempt to obtain and maintain a healthy weight. General weight loss/lifestyle modification strategies discussed (elicit support from others; identify saboteurs; non-food rewards, etc). Discussed healthy nutritional options as well. Diet interventions: moderate (500 kCal/d) deficit diet and qualitative changes (increase low-fat,  high-fiber foods). Disscussed trying to incorporate routine physical activity into daily regimen. Informal exercise measures discussed, e.g. taking stairs instead of elevator. Regular aerobic exercise program discussed. Surgical Procedure: was not discussed  Behavioral treatment: discussed commercial programs (Weight Watchers, Nutrisystem,etc), Overeaters Anonymous, Slim Fast, stress management and TOPS. Discussed consequences of obesity in terms of medical conditions that may develop in the future. Patient verbalized understanding. Essential hypertension  Hypertension, Blood pressure is  well controlled on current medication regimen. Medication: no change. Dietary sodium restriction. Regular aerobic exercise. Check blood pressures monthly and record. Mixed hyperlipidemia  -     Lipid Panel  -     Comprehensive Metabolic Panel  The patient is asked to make an attempt to improve diet and exercise patterns to aid in medical management of this problem. Hypothyroidism, unspecified type  -     TSH with Reflex  -     T4, Free    Hypomagnesemia  -     MAGNESIUM    B12 deficiency  -     Vitamin B12 & Folate  -     Cancel: Vitamin B12 & Folate    Vitamin D deficiency  Discussed with patient that we make vitamin D from the sun and get it from some food sources, but it is very common to be deficient. Discussed the need for vitamin D replacement because low vitamin D can cause fatigue, joint aches and has been implicated in heart disease, bone disease like osteoporosis, and some other chronic illnesses. Patient will start/continue vitamin D supplement as per order. Recommend vitamin D to be rechecked in 6 months. Lumbar stenosis with neurogenic claudication  discontinue  -     gabapentin (NEURONTIN) 100 MG capsule; Take 2 capsules by mouth nightly for 30 days. I PO TID  Patient currently taking the following  -     gabapentin (NEURONTIN) 100 MG capsule; Take 2 capsules by mouth nightly for 30 days.  I PO TID  Patient will cut down to 1 tablet at bedtime for about 2 weeks and if no change in her \"leg cramps\" then she will discontinue medication and call with update 2 more weeks/after discontinuation     Lumbar radiculitis      Iron deficiency anemia due to chronic blood loss  -     Ferritin  -     Iron and TIBC      Time spent: 40  Minutes, >50% of which was spent face to face with patient counseling, discussing HPI/plan/reviewing records and coordinating care    Patient has been instructed call the office immediately with new symptoms, change in symptoms or worseningof symptoms. If this is not feasible, patient is instructed to report to the emergency room. Medication profile reviewed. Medication side effects and possible impairments from medications were discussed as applicable. Allergies were reviewed. Health maintenance was reviewed and updated as appropriate. Return in about 6 months (around 5/9/2021) for [facility]. (Comment: Please note this report has been produced using a combination of typing and speech recognition software and may contain errors related to that system including errors in grammar, punctuation, and spelling, as well as words and phrases that may be inappropriate.  If there are any questions or concerns please feel free to contact the dictating provider for clarification.)

## 2020-11-10 LAB
ESTIMATED AVERAGE GLUCOSE: 114 MG/DL
HBA1C MFR BLD: 5.6 %

## 2020-11-11 ENCOUNTER — HOSPITAL ENCOUNTER (OUTPATIENT)
Dept: NURSING | Age: 65
Setting detail: INFUSION SERIES
Discharge: HOME | End: 2020-11-11
Payer: MEDICARE

## 2020-11-11 VITALS
HEART RATE: 65 BPM | TEMPERATURE: 97.5 F | WEIGHT: 230 LBS | DIASTOLIC BLOOD PRESSURE: 73 MMHG | BODY MASS INDEX: 38.32 KG/M2 | HEIGHT: 65 IN | RESPIRATION RATE: 16 BRPM | SYSTOLIC BLOOD PRESSURE: 123 MMHG

## 2020-11-11 DIAGNOSIS — K51.90 ULCERATIVE COLITIS WITHOUT COMPLICATIONS, UNSPECIFIED LOCATION (HCC): ICD-10-CM

## 2020-11-11 DIAGNOSIS — K52.9 COLITIS: ICD-10-CM

## 2020-11-11 DIAGNOSIS — K51.00 CHRONIC ULCERATIVE ENTEROCOLITIS WITHOUT COMPLICATION (HCC): Primary | ICD-10-CM

## 2020-11-11 LAB
HCT VFR BLD CALC: 35.7 % (ref 36–48)
HEMOGLOBIN: 12.1 G/DL (ref 12–16)
MCH RBC QN AUTO: 32.1 PG (ref 26–34)
MCHC RBC AUTO-ENTMCNC: 33.8 G/DL (ref 31–36)
MCV RBC AUTO: 94.9 FL (ref 80–100)
PDW BLD-RTO: 14 % (ref 12.4–15.4)
PLATELET # BLD: 144 K/UL (ref 135–450)
PMV BLD AUTO: 9.6 FL (ref 5–10.5)
RBC # BLD: 3.76 M/UL (ref 4–5.2)
WBC # BLD: 5.4 K/UL (ref 4–11)

## 2020-11-11 PROCEDURE — 99211 OFF/OP EST MAY X REQ PHY/QHP: CPT

## 2020-11-11 PROCEDURE — 85027 COMPLETE CBC AUTOMATED: CPT

## 2020-11-11 PROCEDURE — 6360000002 HC RX W HCPCS: Performed by: INTERNAL MEDICINE

## 2020-11-11 PROCEDURE — 2580000003 HC RX 258: Performed by: INTERNAL MEDICINE

## 2020-11-11 PROCEDURE — 96365 THER/PROPH/DIAG IV INF INIT: CPT

## 2020-11-11 RX ORDER — SODIUM CHLORIDE 0.9 % (FLUSH) 0.9 %
10 SYRINGE (ML) INJECTION PRN
Status: DISCONTINUED | OUTPATIENT
Start: 2020-11-11 | End: 2020-11-12 | Stop reason: HOSPADM

## 2020-11-11 RX ORDER — SODIUM CHLORIDE 0.9 % (FLUSH) 0.9 %
10 SYRINGE (ML) INJECTION PRN
Status: CANCELLED | OUTPATIENT
Start: 2020-11-18

## 2020-11-11 RX ORDER — SODIUM CHLORIDE 9 MG/ML
INJECTION, SOLUTION INTRAVENOUS CONTINUOUS
Status: CANCELLED | OUTPATIENT
Start: 2020-11-18

## 2020-11-11 RX ORDER — SODIUM CHLORIDE 9 MG/ML
100 INJECTION, SOLUTION INTRAVENOUS CONTINUOUS
Status: ACTIVE | OUTPATIENT
Start: 2020-11-11 | End: 2020-11-11

## 2020-11-11 RX ADMIN — SODIUM CHLORIDE 100 ML: 9 INJECTION, SOLUTION INTRAVENOUS at 09:32

## 2020-11-11 RX ADMIN — Medication 10 ML: at 09:26

## 2020-11-11 RX ADMIN — VEDOLIZUMAB 300 MG: 300 INJECTION, POWDER, LYOPHILIZED, FOR SOLUTION INTRAVENOUS at 09:35

## 2020-11-11 ASSESSMENT — PAIN SCALES - GENERAL: PAINLEVEL_OUTOF10: 0

## 2020-11-11 NOTE — PROGRESS NOTES
Pt here for entyvio infusion   Pt reports that her stools are mostly soft but she has a lot of diarrhea as well  Sometimes 8 to 10 watery diarrhea stools a day for which she will take an anti diarrhea tablet then her stools will be ok for a few days  Pt denies any cramping,or bleeding but reports some bloating   Pt will now be getting entyvio every 4 weeks to see if she gets any improvement with her symptoms  IV # 22 started in [facility] on 1st attempt per ELVIRA Carrero RN without difficulty  Blood for lab work was drawn per orders  Pt gayla well  Entyvio 300 mg IVPB starting to infuse  IV site unremarkable Will monitor

## 2020-12-08 ENCOUNTER — HOSPITAL ENCOUNTER (OUTPATIENT)
Dept: NURSING | Age: 65
Setting detail: INFUSION SERIES
Discharge: HOME | End: 2020-12-08
Payer: MEDICARE

## 2020-12-08 VITALS
HEIGHT: 65 IN | BODY MASS INDEX: 38.32 KG/M2 | DIASTOLIC BLOOD PRESSURE: 90 MMHG | RESPIRATION RATE: 16 BRPM | HEART RATE: 86 BPM | SYSTOLIC BLOOD PRESSURE: 146 MMHG | TEMPERATURE: 97.8 F | WEIGHT: 230 LBS

## 2020-12-08 DIAGNOSIS — K51.00 CHRONIC ULCERATIVE ENTEROCOLITIS WITHOUT COMPLICATION (HCC): Primary | ICD-10-CM

## 2020-12-08 DIAGNOSIS — K51.90 ULCERATIVE COLITIS WITHOUT COMPLICATIONS, UNSPECIFIED LOCATION (HCC): ICD-10-CM

## 2020-12-08 DIAGNOSIS — K52.9 COLITIS: ICD-10-CM

## 2020-12-08 PROCEDURE — 99211 OFF/OP EST MAY X REQ PHY/QHP: CPT

## 2020-12-08 PROCEDURE — 86480 TB TEST CELL IMMUN MEASURE: CPT

## 2020-12-08 PROCEDURE — 96365 THER/PROPH/DIAG IV INF INIT: CPT

## 2020-12-08 PROCEDURE — 2580000003 HC RX 258: Performed by: INTERNAL MEDICINE

## 2020-12-08 PROCEDURE — 6360000002 HC RX W HCPCS: Performed by: INTERNAL MEDICINE

## 2020-12-08 RX ORDER — SODIUM CHLORIDE 9 MG/ML
INJECTION, SOLUTION INTRAVENOUS CONTINUOUS
Status: CANCELLED | OUTPATIENT
Start: 2021-01-05

## 2020-12-08 RX ORDER — SODIUM CHLORIDE 0.9 % (FLUSH) 0.9 %
10 SYRINGE (ML) INJECTION PRN
Status: DISCONTINUED | OUTPATIENT
Start: 2020-12-08 | End: 2020-12-09 | Stop reason: HOSPADM

## 2020-12-08 RX ORDER — SODIUM CHLORIDE 0.9 % (FLUSH) 0.9 %
10 SYRINGE (ML) INJECTION PRN
Status: CANCELLED | OUTPATIENT
Start: 2021-01-05

## 2020-12-08 RX ORDER — SODIUM CHLORIDE 9 MG/ML
INJECTION, SOLUTION INTRAVENOUS CONTINUOUS
Status: ACTIVE | OUTPATIENT
Start: 2020-12-08 | End: 2020-12-08

## 2020-12-08 RX ADMIN — Medication 10 ML: at 09:00

## 2020-12-08 RX ADMIN — VEDOLIZUMAB 300 MG: 300 INJECTION, POWDER, LYOPHILIZED, FOR SOLUTION INTRAVENOUS at 09:28

## 2020-12-08 RX ADMIN — SODIUM CHLORIDE: 9 INJECTION, SOLUTION INTRAVENOUS at 09:28

## 2020-12-08 ASSESSMENT — PAIN SCALES - GENERAL: PAINLEVEL_OUTOF10: 0

## 2020-12-08 NOTE — PROGRESS NOTES
Pt here for entyvio infusion   Pt reports that her bowels issues haven't changed since last time  Pt states that her stools are mostly soft but she has a lot of diarrhea as well  Sometimes 8 to 10 watery diarrhea stools a day for which she will take an anti diarrhea tablet then her stools will be ok for a few days  Pt denies any cramping,or bleeding but reports some bloating  Pt was informed that she is due for her yearly TB test  pt receptive  Lab was here to draw the Quantiferon Gold test  Pt gayla well  IV # 22 started in [facility] on 1st attempt per myself without difficulty  No other lab work needed today  Pt gayla well  Entyvio 300 mg IVPB starting to infuse  IV site unremarkable Will monitor

## 2020-12-13 LAB
REASON FOR REJECTION: NORMAL
REJECTED TEST: NORMAL

## 2020-12-21 ENCOUNTER — PATIENT MESSAGE (OUTPATIENT)
Dept: FAMILY MEDICINE CLINIC | Age: 65
End: 2020-12-21

## 2020-12-21 RX ORDER — LIDOCAINE HCL 4 %
CREAM (GRAM) TOPICAL
Qty: 4 CAPSULE | Refills: 0 | Status: SHIPPED | OUTPATIENT
Start: 2020-12-21 | End: 2021-01-13

## 2020-12-21 RX ORDER — FOLIC ACID 1 MG/1
1 TABLET ORAL DAILY
Qty: 30 TABLET | Refills: 0 | Status: SHIPPED | OUTPATIENT
Start: 2020-12-21 | End: 2021-11-09

## 2020-12-21 RX ORDER — LANOLIN ALCOHOL/MO/W.PET/CERES
400 CREAM (GRAM) TOPICAL 2 TIMES DAILY
Qty: 60 TABLET | Refills: 0 | Status: SHIPPED | OUTPATIENT
Start: 2020-12-21 | End: 2020-12-28

## 2020-12-21 RX ORDER — CELECOXIB 100 MG/1
CAPSULE ORAL
Qty: 120 CAPSULE | Refills: 2 | Status: SHIPPED | OUTPATIENT
Start: 2020-12-21 | End: 2021-05-10 | Stop reason: DRUGHIGH

## 2020-12-21 NOTE — TELEPHONE ENCOUNTER
From: Rom Duran  To: Leann Arndt APRN - CNP  Sent: 12/21/2020 1:13 PM EST  Subject: Prescription Question    My prescriptions are being switched to a different provider starting Jan 1/2021. I know you have forwarded prescriptions there for them to process starting January. I am going to be just short to cover December into first 2 weeks of January. What I am in need of is Vitamin D3 10,000 1 per week. Also short for Folic Acid 1MG 1 QD, and Magnesium oxide 400 mg because of the increase of dosage. All of these I am requesting only a 1 month supply which will carry me over til my new provider processes their home delivery prescriptions. Could you also update the RX for Magnesium to this new provider. I think you gave them the old one. Thanks.

## 2020-12-21 NOTE — TELEPHONE ENCOUNTER
Last office visit was 11/9/20  Pt is switching providers on January 1/2021 but she just needs the scripts below filled for one month until she switches practices

## 2020-12-21 NOTE — TELEPHONE ENCOUNTER
Last OV 11/9/20  Future Appointments   Date Time Provider Ginan Phyllis   1/5/2021  8:30 AM [facility] NURSING ROOM 1 [facility] CAYLA [facility]   2/2/2021  8:30 AM [facility] OP NURSING ROOM 1 [facility] RN [facility]   3/2/2021  8:30 AM [facility] NURSING ROOM 1 [facility] RN [facility]   3/30/2021  8:30 AM [facility] NURSING ROOM 1 [facility] CAYLA [facility]   4/27/2021  8:30 AM [facility] NURSING ROOM 1 [facility] RN [facility]   5/10/2021  9:20 AM YAHAIRA Hoover - CNP [facility] [facility]   5/25/2021  9:00 AM [facility] OP NURSING ROOM 1 [facility] RN [facility]

## 2020-12-29 RX ORDER — LANOLIN ALCOHOL/MO/W.PET/CERES
CREAM (GRAM) TOPICAL
Qty: 180 TABLET | Refills: 2 | Status: SHIPPED | OUTPATIENT
Start: 2020-12-29 | End: 2021-01-15 | Stop reason: SDUPTHER

## 2021-01-05 ENCOUNTER — HOSPITAL ENCOUNTER (OUTPATIENT)
Dept: NURSING | Age: 66
Setting detail: INFUSION SERIES
Discharge: HOME | End: 2021-01-05
Payer: MEDICARE

## 2021-01-05 VITALS
RESPIRATION RATE: 16 BRPM | HEART RATE: 95 BPM | WEIGHT: 230 LBS | HEIGHT: 65 IN | BODY MASS INDEX: 38.32 KG/M2 | DIASTOLIC BLOOD PRESSURE: 89 MMHG | SYSTOLIC BLOOD PRESSURE: 149 MMHG | TEMPERATURE: 98.1 F

## 2021-01-05 DIAGNOSIS — K51.90 ULCERATIVE COLITIS WITHOUT COMPLICATIONS, UNSPECIFIED LOCATION (HCC): ICD-10-CM

## 2021-01-05 DIAGNOSIS — K52.9 COLITIS: ICD-10-CM

## 2021-01-05 DIAGNOSIS — K51.00 CHRONIC ULCERATIVE ENTEROCOLITIS WITHOUT COMPLICATION (HCC): Primary | ICD-10-CM

## 2021-01-05 LAB
A/G RATIO: 1 (ref 1.1–2.2)
ALBUMIN SERPL-MCNC: 3.9 G/DL (ref 3.4–5)
ALP BLD-CCNC: 124 U/L (ref 40–129)
ALT SERPL-CCNC: 38 U/L (ref 10–40)
ANION GAP SERPL CALCULATED.3IONS-SCNC: 8 MMOL/L (ref 3–16)
AST SERPL-CCNC: 64 U/L (ref 15–37)
BILIRUB SERPL-MCNC: 0.4 MG/DL (ref 0–1)
BUN BLDV-MCNC: 19 MG/DL (ref 7–20)
CALCIUM SERPL-MCNC: 9.4 MG/DL (ref 8.3–10.6)
CHLORIDE BLD-SCNC: 100 MMOL/L (ref 99–110)
CO2: 25 MMOL/L (ref 21–32)
CREAT SERPL-MCNC: 1 MG/DL (ref 0.6–1.2)
GFR AFRICAN AMERICAN: >60
GFR NON-AFRICAN AMERICAN: 55
GLOBULIN: 3.8 G/DL
GLUCOSE BLD-MCNC: 125 MG/DL (ref 70–99)
HCT VFR BLD CALC: 36.8 % (ref 36–48)
HEMOGLOBIN: 12.6 G/DL (ref 12–16)
MCH RBC QN AUTO: 32.1 PG (ref 26–34)
MCHC RBC AUTO-ENTMCNC: 34.1 G/DL (ref 31–36)
MCV RBC AUTO: 93.9 FL (ref 80–100)
PDW BLD-RTO: 13.8 % (ref 12.4–15.4)
PLATELET # BLD: 150 K/UL (ref 135–450)
PMV BLD AUTO: 10.8 FL (ref 5–10.5)
POTASSIUM SERPL-SCNC: 4 MMOL/L (ref 3.5–5.1)
RBC # BLD: 3.92 M/UL (ref 4–5.2)
SODIUM BLD-SCNC: 133 MMOL/L (ref 136–145)
TOTAL PROTEIN: 7.7 G/DL (ref 6.4–8.2)
WBC # BLD: 5.6 K/UL (ref 4–11)

## 2021-01-05 PROCEDURE — 80053 COMPREHEN METABOLIC PANEL: CPT

## 2021-01-05 PROCEDURE — 85027 COMPLETE CBC AUTOMATED: CPT

## 2021-01-05 PROCEDURE — 6360000002 HC RX W HCPCS: Performed by: INTERNAL MEDICINE

## 2021-01-05 PROCEDURE — 99211 OFF/OP EST MAY X REQ PHY/QHP: CPT

## 2021-01-05 PROCEDURE — 2580000003 HC RX 258: Performed by: INTERNAL MEDICINE

## 2021-01-05 PROCEDURE — 96365 THER/PROPH/DIAG IV INF INIT: CPT

## 2021-01-05 PROCEDURE — 86480 TB TEST CELL IMMUN MEASURE: CPT

## 2021-01-05 RX ORDER — SODIUM CHLORIDE 0.9 % (FLUSH) 0.9 %
10 SYRINGE (ML) INJECTION PRN
Status: CANCELLED | OUTPATIENT
Start: 2021-02-02

## 2021-01-05 RX ORDER — SODIUM CHLORIDE 9 MG/ML
INJECTION, SOLUTION INTRAVENOUS CONTINUOUS
Status: ACTIVE | OUTPATIENT
Start: 2021-01-05 | End: 2021-01-05

## 2021-01-05 RX ORDER — SODIUM CHLORIDE 9 MG/ML
INJECTION, SOLUTION INTRAVENOUS CONTINUOUS
Status: CANCELLED | OUTPATIENT
Start: 2021-02-02

## 2021-01-05 RX ORDER — SODIUM CHLORIDE 0.9 % (FLUSH) 0.9 %
10 SYRINGE (ML) INJECTION PRN
Status: DISCONTINUED | OUTPATIENT
Start: 2021-01-05 | End: 2021-01-06 | Stop reason: HOSPADM

## 2021-01-05 RX ADMIN — VEDOLIZUMAB 300 MG: 300 INJECTION, POWDER, LYOPHILIZED, FOR SOLUTION INTRAVENOUS at 09:00

## 2021-01-05 RX ADMIN — Medication 10 ML: at 09:00

## 2021-01-05 RX ADMIN — SODIUM CHLORIDE: 9 INJECTION, SOLUTION INTRAVENOUS at 09:00

## 2021-01-05 ASSESSMENT — PAIN SCALES - GENERAL: PAINLEVEL_OUTOF10: 0

## 2021-01-05 NOTE — PLAN OF CARE
IV INFUSION / INJECTION CARE PLAN    HEMODYNAMIC STATUS  INTERDISCIPLINARY   Goal: Hemodynamic Baseline Staus Maintained / Procedure Completed  Interventions     1. Obtain Patient  Medical /  Surgical History     1 Assess & Review Allergies Prior to IV Infusion or Injection & All  Meds (PRN)     2. Assess Patient  & Obtain  Vital Signs / LOC upon   admission and (PRN)     3.    Start IV as appropriate for Procedure & check Patency (PRN)   NURSING   SAFETY & PSYCHO SOCIAL  INTERDISCIPLINARY   Goal: Patient Returns to Baseline Activity  Interventions     1. Greet Patient with ID Badge/ Picture in View (PRN)     2. Be Available & Sensitive to Patients Needs (PRN)     3. Communicate referral to Pastoral Care as Appropriate (PRN)     4. Provide Age Specific Measures (PRN)     4. Admission Data Base Reviewed     5. Administer Meds Per Orders (PRN)     5. Maintain Baseline Activity  Or Activity as Ordered Per Physician     NUTRITION   INTERDISCIPLINARY   Goal: Patient to baseline/ Improved Nutrition  Interventions     1. Assess Nutritional Status (PRN)       LAB & DIAGNOSTICS   Goal: Additional Tests per Physicians   Orders  Interventions     1. Lab & Diagnostics per Physician Orders (PRN)     2. Assess Lab Time Out  for  Patient Safety as Needed (PRN)     RESPIRATORY  INTERDISCIPLINARY   Goal: Airway   Baseline Status Maintained   Interventions     1. Evaluate Bilateral Breath Sounds  Baseline, (PRN),      2. Weight & Height Noted ( PRN)     3. Assess Baseline SPo2 (PRN)      KNOWLEDGE DEFICIT, EDUCATION, DISCHARGE PLAN   INTERDISCIPLINARY    Patient / SO verbalize Understanding Of Procedural discharge Instructions  Interventions     1. Obtain Informed Consent (PRN)      2. Initiate IV Infusion / Injection Plan of Care, Answer Questions (PRN)       3. Assess Patients Ability to Understand Information (PRN)     3. Discharge Planning ;  Assure  Presence of   upon Patient Discharge when indicated 4.  Education / Communication  Ongoing As Appropriate      5. Keep Patients / Families Aware of Delays As Appropriate     6. Reinforce Discharge Teaching / Post  Procedure  Instructions (PRN)          PAIN MANAGEMENT  INTERDISCIPLINARY   Goal:Patient Return to Pre Procedure Comfort  Interventions     1. Assess Baseline  Pain Level  and (PRN)     2. Intra Procedure ;  Evaluation & Assessment Of  Pain  is Ongoing     3. Post procedure; Assess Pain Level Once Awake/ Prior  To Discharge     2. Administer Analgesics as Ordered (PRN)      3. Assess Effectiveness of Pain Management (PRN)       Re-Assess Patient   after all Interventions. Assess Pain Level 30 - 60 Minutes After Pain Management Intervention.      4. Provide Discharge Teaching

## 2021-01-05 NOTE — PROGRESS NOTES
Pt here for entyvio infusion   Pt reports that her stools are mostly soft now and at present time she is having a lot of constipation   Pt denies any cramping,or bleeding but does report some bloating  Entyvio infusion consent was obtained  Pt was informed that her lab work drawn last visit for her yearly TB was rejected and could not be resulted so it will need to be redrawn today  Pt receptive  Lab was here to draw the Quantiferon Gold test  Pt gayla well  IV # 22 started in [place] on 2nd attempt per myself  Blood for lab work was drawn without difficulty Pt gayla well  Entyvio 300 mg IVPB starting to infuse  IV site unremarkable Will monitor

## 2021-01-06 ENCOUNTER — HOSPITAL ENCOUNTER (OUTPATIENT)
Age: 66
Discharge: HOME | End: 2021-01-06
Payer: MEDICARE

## 2021-01-06 PROCEDURE — 83993 ASSAY FOR CALPROTECTIN FECAL: CPT

## 2021-01-08 LAB
QUANTI TB GOLD PLUS: NEGATIVE
QUANTI TB1 MINUS NIL: 0 IU/ML (ref 0–0.34)
QUANTI TB2 MINUS NIL: 0 IU/ML (ref 0–0.34)
QUANTIFERON MITOGEN: 8.64 IU/ML
QUANTIFERON NIL: 0.03 IU/ML

## 2021-01-10 LAB — CALPROTECTIN, FECAL: 81 UG/G

## 2021-01-13 DIAGNOSIS — E55.9 VITAMIN D DEFICIENCY: ICD-10-CM

## 2021-01-13 RX ORDER — LIDOCAINE HCL 4 %
CREAM (GRAM) TOPICAL
Qty: 4 CAPSULE | Refills: 5 | Status: SHIPPED | OUTPATIENT
Start: 2021-01-13 | End: 2021-07-12

## 2021-01-15 ENCOUNTER — PATIENT MESSAGE (OUTPATIENT)
Dept: FAMILY MEDICINE CLINIC | Age: 66
End: 2021-01-15

## 2021-01-15 DIAGNOSIS — E83.42 HYPOMAGNESEMIA: ICD-10-CM

## 2021-01-15 RX ORDER — LANOLIN ALCOHOL/MO/W.PET/CERES
CREAM (GRAM) TOPICAL
Qty: 540 TABLET | Refills: 2 | Status: SHIPPED | OUTPATIENT
Start: 2021-01-15 | End: 2021-05-20

## 2021-01-15 NOTE — TELEPHONE ENCOUNTER
From: Brett Bailey  To: Ellekiah Stern APRN - CNP  Sent: 1/15/2021 1:01 PM EST  Subject: Prescription Question    The RX for magnesium oxide that was sent to my [facility] was an old one with dosage of 1 bid. It is actually now 2 tid. So, it was filled @ 180 (supposedly a 3 mo. supply) but it should be 540 for a 3 mo supply. I had called to have the Rx updated but they did not get it. I suspect it went to [facility] per last year (I had previously furnished my new information). In any case, if you would fax and updated RX to them FAX # [phone] I would appreciate it. I will not have enough to carry me through.  Thank you, Ariela Eason

## 2021-02-02 ENCOUNTER — PATIENT MESSAGE (OUTPATIENT)
Dept: FAMILY MEDICINE CLINIC | Age: 66
End: 2021-02-02

## 2021-02-02 ENCOUNTER — HOSPITAL ENCOUNTER (OUTPATIENT)
Dept: NURSING | Age: 66
Setting detail: INFUSION SERIES
Discharge: HOME | End: 2021-02-02
Payer: MEDICARE

## 2021-02-02 VITALS
SYSTOLIC BLOOD PRESSURE: 145 MMHG | TEMPERATURE: 98.2 F | HEIGHT: 65 IN | BODY MASS INDEX: 38.32 KG/M2 | WEIGHT: 230 LBS | RESPIRATION RATE: 16 BRPM | HEART RATE: 83 BPM | DIASTOLIC BLOOD PRESSURE: 82 MMHG

## 2021-02-02 DIAGNOSIS — K52.9 COLITIS: ICD-10-CM

## 2021-02-02 DIAGNOSIS — K51.00 CHRONIC ULCERATIVE ENTEROCOLITIS WITHOUT COMPLICATION (HCC): Primary | ICD-10-CM

## 2021-02-02 DIAGNOSIS — K51.90 ULCERATIVE COLITIS WITHOUT COMPLICATIONS, UNSPECIFIED LOCATION (HCC): ICD-10-CM

## 2021-02-02 PROCEDURE — 6360000002 HC RX W HCPCS: Performed by: INTERNAL MEDICINE

## 2021-02-02 PROCEDURE — 99211 OFF/OP EST MAY X REQ PHY/QHP: CPT

## 2021-02-02 PROCEDURE — 96365 THER/PROPH/DIAG IV INF INIT: CPT

## 2021-02-02 PROCEDURE — 2580000003 HC RX 258: Performed by: INTERNAL MEDICINE

## 2021-02-02 RX ORDER — SODIUM CHLORIDE 0.9 % (FLUSH) 0.9 %
10 SYRINGE (ML) INJECTION PRN
Status: DISCONTINUED | OUTPATIENT
Start: 2021-02-02 | End: 2021-02-03 | Stop reason: HOSPADM

## 2021-02-02 RX ORDER — SODIUM CHLORIDE 9 MG/ML
INJECTION, SOLUTION INTRAVENOUS CONTINUOUS
Status: CANCELLED | OUTPATIENT
Start: 2021-03-02

## 2021-02-02 RX ORDER — SODIUM CHLORIDE 0.9 % (FLUSH) 0.9 %
10 SYRINGE (ML) INJECTION PRN
Status: CANCELLED | OUTPATIENT
Start: 2021-03-02

## 2021-02-02 RX ORDER — SODIUM CHLORIDE 9 MG/ML
INJECTION, SOLUTION INTRAVENOUS CONTINUOUS
Status: ACTIVE | OUTPATIENT
Start: 2021-02-02 | End: 2021-02-02

## 2021-02-02 RX ADMIN — SODIUM CHLORIDE: 9 INJECTION, SOLUTION INTRAVENOUS at 09:10

## 2021-02-02 RX ADMIN — Medication 10 ML: at 09:10

## 2021-02-02 RX ADMIN — VEDOLIZUMAB 300 MG: 300 INJECTION, POWDER, LYOPHILIZED, FOR SOLUTION INTRAVENOUS at 09:10

## 2021-02-02 ASSESSMENT — PAIN SCALES - GENERAL: PAINLEVEL_OUTOF10: 0

## 2021-02-02 NOTE — PROGRESS NOTES
Pt here for entyvio infusion   Pt reports that her stools are still mostly soft  with occasional constipation   Pt denies any cramping,or bleeding but does report some bloating  IV # 22 started in [facility] on 1st attempt per myself  No blood work needed for today Pt gayla well  Entyvio 300 mg IVPB starting to infuse  IV site unremarkable Will monitor

## 2021-02-03 ENCOUNTER — HOSPITAL ENCOUNTER (OUTPATIENT)
Age: 66
Discharge: HOME | End: 2021-02-03
Payer: MEDICARE

## 2021-02-03 PROCEDURE — 84165 PROTEIN E-PHORESIS SERUM: CPT

## 2021-02-03 PROCEDURE — 86334 IMMUNOFIX E-PHORESIS SERUM: CPT

## 2021-02-03 PROCEDURE — 36415 COLL VENOUS BLD VENIPUNCTURE: CPT

## 2021-02-03 PROCEDURE — 84155 ASSAY OF PROTEIN SERUM: CPT

## 2021-02-03 PROCEDURE — 83993 ASSAY FOR CALPROTECTIN FECAL: CPT

## 2021-02-03 PROCEDURE — 84156 ASSAY OF PROTEIN URINE: CPT

## 2021-02-03 NOTE — TELEPHONE ENCOUNTER
she can try nystatin cream to area bid #1 tube with no refills.   She may need a gyn exam in near future if no improvement

## 2021-02-03 NOTE — TELEPHONE ENCOUNTER
From: [facility]  To: Ashu Acosta APRN - CNP  Sent: 2/2/2021 10:46 PM EST  Subject: Non-Urgent Medical Question    I am reaching out again concern a feminine problem. I had contacted you once and tried to explain that the pain I was having was not in the urinary tract or vaginal tract. We did do a urinary test which was negative. This is a frequent and recurring problem with the labia area. It is a searing burn when I urinate almost like I had diaper rash or something. It does appear red. I also have frequent itching there and in the crease of my thighs. I try to maintain good hygeine & I am not sexually active but everything considered, I am wondering if I am in need of some kind of cream. I have actually made myself bleed from scratching. I have been using vaseline, or aquaphor and it tames it down but it is very short term. Any suggestions?

## 2021-02-04 LAB — PROTEIN PROTEIN: 8 MG/DL

## 2021-02-04 RX ORDER — NYSTATIN 100000 U/G
CREAM TOPICAL
Qty: 1 TUBE | Refills: 0 | Status: SHIPPED | OUTPATIENT
Start: 2021-02-04 | End: 2021-03-16 | Stop reason: SDUPTHER

## 2021-02-05 LAB
ALBUMIN SERPL-MCNC: 3.1 G/DL (ref 3.1–4.9)
ALPHA-1-GLOBULIN: 0.3 G/DL (ref 0.2–0.4)
ALPHA-2-GLOBULIN: 0.7 G/DL (ref 0.4–1.1)
BETA GLOBULIN: 1.5 G/DL (ref 0.9–1.6)
CALPROTECTIN, FECAL: 338 UG/G
GAMMA GLOBULIN: 1.5 G/DL (ref 0.6–1.8)
SPE/IFE INTERPRETATION: NORMAL
TOTAL PROTEIN: 7.2 G/DL (ref 6.4–8.2)

## 2021-03-02 ENCOUNTER — HOSPITAL ENCOUNTER (OUTPATIENT)
Dept: NURSING | Age: 66
Setting detail: INFUSION SERIES
Discharge: HOME | End: 2021-03-02
Payer: MEDICARE

## 2021-03-02 VITALS
RESPIRATION RATE: 20 BRPM | TEMPERATURE: 97.8 F | BODY MASS INDEX: 38.32 KG/M2 | HEART RATE: 75 BPM | SYSTOLIC BLOOD PRESSURE: 137 MMHG | WEIGHT: 230 LBS | DIASTOLIC BLOOD PRESSURE: 77 MMHG | HEIGHT: 65 IN

## 2021-03-02 DIAGNOSIS — K51.90 ULCERATIVE COLITIS WITHOUT COMPLICATIONS, UNSPECIFIED LOCATION (HCC): ICD-10-CM

## 2021-03-02 DIAGNOSIS — K51.00 CHRONIC ULCERATIVE ENTEROCOLITIS WITHOUT COMPLICATION (HCC): Primary | ICD-10-CM

## 2021-03-02 DIAGNOSIS — K52.9 COLITIS: ICD-10-CM

## 2021-03-02 LAB
A/G RATIO: 1.1 (ref 1.1–2.2)
ALBUMIN SERPL-MCNC: 4.3 G/DL (ref 3.4–5)
ALP BLD-CCNC: 116 U/L (ref 40–129)
ALT SERPL-CCNC: 40 U/L (ref 10–40)
ANION GAP SERPL CALCULATED.3IONS-SCNC: 9 MMOL/L (ref 3–16)
AST SERPL-CCNC: 57 U/L (ref 15–37)
BILIRUB SERPL-MCNC: 0.9 MG/DL (ref 0–1)
BUN BLDV-MCNC: 32 MG/DL (ref 7–20)
CALCIUM SERPL-MCNC: 10.3 MG/DL (ref 8.3–10.6)
CHLORIDE BLD-SCNC: 103 MMOL/L (ref 99–110)
CO2: 28 MMOL/L (ref 21–32)
CREAT SERPL-MCNC: 1.2 MG/DL (ref 0.6–1.2)
GFR AFRICAN AMERICAN: 54
GFR NON-AFRICAN AMERICAN: 45
GLOBULIN: 3.8 G/DL
GLUCOSE BLD-MCNC: 120 MG/DL (ref 70–99)
HCT VFR BLD CALC: 40.1 % (ref 36–48)
HEMOGLOBIN: 13.5 G/DL (ref 12–16)
MCH RBC QN AUTO: 31.4 PG (ref 26–34)
MCHC RBC AUTO-ENTMCNC: 33.8 G/DL (ref 31–36)
MCV RBC AUTO: 93.1 FL (ref 80–100)
PDW BLD-RTO: 13.8 % (ref 12.4–15.4)
PLATELET # BLD: 149 K/UL (ref 135–450)
PMV BLD AUTO: 10.1 FL (ref 5–10.5)
POTASSIUM SERPL-SCNC: 4.2 MMOL/L (ref 3.5–5.1)
RBC # BLD: 4.31 M/UL (ref 4–5.2)
SODIUM BLD-SCNC: 140 MMOL/L (ref 136–145)
TOTAL PROTEIN: 8.1 G/DL (ref 6.4–8.2)
WBC # BLD: 5.2 K/UL (ref 4–11)

## 2021-03-02 PROCEDURE — 80053 COMPREHEN METABOLIC PANEL: CPT

## 2021-03-02 PROCEDURE — 99211 OFF/OP EST MAY X REQ PHY/QHP: CPT

## 2021-03-02 PROCEDURE — 85027 COMPLETE CBC AUTOMATED: CPT

## 2021-03-02 PROCEDURE — 2580000003 HC RX 258: Performed by: INTERNAL MEDICINE

## 2021-03-02 PROCEDURE — 6360000002 HC RX W HCPCS: Performed by: INTERNAL MEDICINE

## 2021-03-02 PROCEDURE — 96365 THER/PROPH/DIAG IV INF INIT: CPT

## 2021-03-02 RX ORDER — SODIUM CHLORIDE 9 MG/ML
INJECTION, SOLUTION INTRAVENOUS CONTINUOUS
Status: CANCELLED | OUTPATIENT
Start: 2021-03-30

## 2021-03-02 RX ORDER — SODIUM CHLORIDE 9 MG/ML
INJECTION, SOLUTION INTRAVENOUS CONTINUOUS
Status: ACTIVE | OUTPATIENT
Start: 2021-03-02 | End: 2021-03-02

## 2021-03-02 RX ORDER — SODIUM CHLORIDE 0.9 % (FLUSH) 0.9 %
10 SYRINGE (ML) INJECTION PRN
Status: DISCONTINUED | OUTPATIENT
Start: 2021-03-02 | End: 2021-03-03 | Stop reason: HOSPADM

## 2021-03-02 RX ORDER — SODIUM CHLORIDE 0.9 % (FLUSH) 0.9 %
10 SYRINGE (ML) INJECTION PRN
Status: CANCELLED | OUTPATIENT
Start: 2021-03-30

## 2021-03-02 RX ADMIN — SODIUM CHLORIDE: 9 INJECTION, SOLUTION INTRAVENOUS at 09:07

## 2021-03-02 RX ADMIN — Medication 10 ML: at 08:45

## 2021-03-02 RX ADMIN — VEDOLIZUMAB 300 MG: 300 INJECTION, POWDER, LYOPHILIZED, FOR SOLUTION INTRAVENOUS at 09:07

## 2021-03-02 ASSESSMENT — PAIN SCALES - GENERAL: PAINLEVEL_OUTOF10: 0

## 2021-03-03 ENCOUNTER — HOSPITAL ENCOUNTER (OUTPATIENT)
Age: 66
Setting detail: SPECIMEN
Discharge: HOME | End: 2021-03-03
Payer: MEDICARE

## 2021-03-03 ENCOUNTER — OFFICE VISIT (OUTPATIENT)
Dept: FAMILY MEDICINE CLINIC | Age: 66
End: 2021-03-03
Payer: MEDICARE

## 2021-03-03 VITALS
HEART RATE: 73 BPM | BODY MASS INDEX: 35.99 KG/M2 | TEMPERATURE: 97 F | SYSTOLIC BLOOD PRESSURE: 110 MMHG | DIASTOLIC BLOOD PRESSURE: 74 MMHG | HEIGHT: 65 IN | WEIGHT: 216 LBS | OXYGEN SATURATION: 96 %

## 2021-03-03 DIAGNOSIS — Z12.31 ENCOUNTER FOR SCREENING MAMMOGRAM FOR BREAST CANCER: ICD-10-CM

## 2021-03-03 DIAGNOSIS — E78.2 MIXED HYPERLIPIDEMIA: ICD-10-CM

## 2021-03-03 DIAGNOSIS — Z00.00 ROUTINE GENERAL MEDICAL EXAMINATION AT A HEALTH CARE FACILITY: Primary | ICD-10-CM

## 2021-03-03 DIAGNOSIS — Z71.89 ACP (ADVANCE CARE PLANNING): ICD-10-CM

## 2021-03-03 DIAGNOSIS — Z12.31 ENCOUNTER FOR SCREENING MAMMOGRAM FOR MALIGNANT NEOPLASM OF BREAST: ICD-10-CM

## 2021-03-03 DIAGNOSIS — M48.062 LUMBAR STENOSIS WITH NEUROGENIC CLAUDICATION: ICD-10-CM

## 2021-03-03 DIAGNOSIS — Z13.6 SCREENING FOR CARDIOVASCULAR CONDITION: ICD-10-CM

## 2021-03-03 DIAGNOSIS — M54.16 LUMBAR RADICULITIS: ICD-10-CM

## 2021-03-03 DIAGNOSIS — Z12.11 SCREENING FOR COLORECTAL CANCER: ICD-10-CM

## 2021-03-03 DIAGNOSIS — E66.09 CLASS 2 OBESITY DUE TO EXCESS CALORIES WITHOUT SERIOUS COMORBIDITY WITH BODY MASS INDEX (BMI) OF 35.0 TO 35.9 IN ADULT: ICD-10-CM

## 2021-03-03 DIAGNOSIS — Z12.12 SCREENING FOR COLORECTAL CANCER: ICD-10-CM

## 2021-03-03 DIAGNOSIS — Z78.0 ASYMPTOMATIC MENOPAUSAL STATE: ICD-10-CM

## 2021-03-03 DIAGNOSIS — N95.1 POST MENOPAUSAL SYNDROME: ICD-10-CM

## 2021-03-03 LAB
CONTROL: NORMAL
HEMOCCULT STL QL: NEGATIVE

## 2021-03-03 PROCEDURE — 99497 ADVNCD CARE PLAN 30 MIN: CPT | Performed by: NURSE PRACTITIONER

## 2021-03-03 PROCEDURE — G8484 FLU IMMUNIZE NO ADMIN: HCPCS | Performed by: NURSE PRACTITIONER

## 2021-03-03 PROCEDURE — G0438 PPPS, INITIAL VISIT: HCPCS | Performed by: NURSE PRACTITIONER

## 2021-03-03 PROCEDURE — 3017F COLORECTAL CA SCREEN DOC REV: CPT | Performed by: NURSE PRACTITIONER

## 2021-03-03 PROCEDURE — G0447 BEHAVIOR COUNSEL OBESITY 15M: HCPCS | Performed by: NURSE PRACTITIONER

## 2021-03-03 PROCEDURE — 4040F PNEUMOC VAC/ADMIN/RCVD: CPT | Performed by: NURSE PRACTITIONER

## 2021-03-03 PROCEDURE — 83993 ASSAY FOR CALPROTECTIN FECAL: CPT

## 2021-03-03 PROCEDURE — G0446 INTENS BEHAVE THER CARDIO DX: HCPCS | Performed by: NURSE PRACTITIONER

## 2021-03-03 PROCEDURE — 1123F ACP DISCUSS/DSCN MKR DOCD: CPT | Performed by: NURSE PRACTITIONER

## 2021-03-03 PROCEDURE — 82274 ASSAY TEST FOR BLOOD FECAL: CPT | Performed by: NURSE PRACTITIONER

## 2021-03-03 RX ORDER — SIMVASTATIN 20 MG
20 TABLET ORAL NIGHTLY
Qty: 90 TABLET | Refills: 1 | Status: SHIPPED | OUTPATIENT
Start: 2021-03-03 | End: 2021-07-14

## 2021-03-03 ASSESSMENT — PATIENT HEALTH QUESTIONNAIRE - PHQ9
SUM OF ALL RESPONSES TO PHQ QUESTIONS 1-9: 6
8. MOVING OR SPEAKING SO SLOWLY THAT OTHER PEOPLE COULD HAVE NOTICED. OR THE OPPOSITE, BEING SO FIGETY OR RESTLESS THAT YOU HAVE BEEN MOVING AROUND A LOT MORE THAN USUAL: 0
7. TROUBLE CONCENTRATING ON THINGS, SUCH AS READING THE NEWSPAPER OR WATCHING TELEVISION: 0
6. FEELING BAD ABOUT YOURSELF - OR THAT YOU ARE A FAILURE OR HAVE LET YOURSELF OR YOUR FAMILY DOWN: 1
SUM OF ALL RESPONSES TO PHQ QUESTIONS 1-9: 6
4. FEELING TIRED OR HAVING LITTLE ENERGY: 1
5. POOR APPETITE OR OVEREATING: 0
3. TROUBLE FALLING OR STAYING ASLEEP: 0

## 2021-03-03 NOTE — PATIENT INSTRUCTIONS
Learning About Cutting Calories  How do calories affect your weight? Food gives your body energy. Energy from the food you eat is measured in calories. This energy keeps your heart beating, your brain active, and your muscles working. Your body needs a certain number of calories each day. After your body uses the calories it needs, it stores extra calories as fat. To lose weight safely, you have to eat fewer calories while eating in a healthy way. How many calories do you need each day? The more active you are, the more calories you need. When you are less active, you need fewer calories. How many calories you need each day also depends on several things, including your age and whether you are male or female. Here are some general guidelines for adults:  · Less active women and older adults need 1,600 to 2,000 calories each day. · Active women and less active men need 2,000 to 2,400 calories each day. · Active men need 2,400 to 3,000 calories each day. How can you cut calories and eat healthy meals? Whole grains, vegetables and fruits, and dried beans are good lower-calorie foods. They give you lots of nutrients and fiber. And they fill you up. Sweets, energy drinks, and soda pop are high in calories. They give you few nutrients and no fiber. Try to limit soda pop, fruit juice, and energy drinks. Drink water instead. Some fats can be part of a healthy diet. But cutting back on fats from highly processed foods like fast foods and many snack foods is a good way to lower the calories in your diet. Also, use smaller amounts of fats like butter, margarine, salad dressing, and mayonnaise. Add fresh garlic, lemon, or herbs to your meals to add flavor without adding fat. Meats and dairy products can be a big source of hidden fats. Try to choose lean or low-fat versions of these products. Fat-free cookies, candies, chips, and frozen treats can still be high in sugar and calories. Some fat-free foods have more calories than regular ones. Eat fat-free treats in moderation, as you would other foods. If your favorite foods are high in fat, salt, sugar, or calories, limit how often you eat them. Eat smaller servings, or look for healthy substitutes. Fill up on fruits, vegetables, and whole grains. Eating at home  · Use meat as a side dish instead of as the main part of your meal.  · Try main dishes that use whole wheat pasta, brown rice, dried beans, or vegetables. · Find ways to cook with little or no fat, such as broiling, steaming, or grilling. · Use cooking spray instead of oil. If you use oil, use a monounsaturated oil, such as canola or olive oil. · Trim fat from meats before you cook them. · Drain off fat after you brown the meat or while you roast it. · Chill soups and stews after you cook them. Then skim the fat off the top after it hardens. Eating out  · Order foods that are broiled or poached rather than fried or breaded. · Cut back on the amount of butter or margarine that you use on bread. · Order sauces, gravies, and salad dressings on the side, and use only a little. · When you order pasta, choose tomato sauce rather than cream sauce. · Ask for salsa with your baked potato instead of sour cream, butter, cheese, or archuleta. · Order meals in a small size instead of upgrading to a large. · Share an entree, or take part of your food home to eat as another meal.  · Share appetizers and desserts. Where can you learn more? Go to https://[employer]. org and sign in to your [employer] account. Enter V224 in the [employer] box to learn more about \"Learning About Cutting Calories. \"     If you do not have an account, please click on the \"Sign Up Now\" link.   Current as of: August 22, 2019               Content Version: 12.6 © 1045-1964 [employer], [employer]. Care instructions adapted under license by [facility] ([facility]). If you have questions about a medical condition or this instruction, always ask your healthcare professional. [address] any warranty or liability for your use of this information. Learning About Low-Carbohydrate Diets  What is a low-carbohydrate diet? A low-carbohydrate (or \"low-carb\") diet limits foods and drinks that have carbohydrates. This includes grains, fruits, milk and yogurt, and starchy vegetables like potatoes, beans, and corn. It also avoids foods and drinks that have added sugar. Instead, low-carb diets include foods that are high in protein and fat. Why might you follow a low-carb diet? Low-carb diets may be used for a variety of reasons, such as for weight loss. People who have diabetes may use a low-carb diet to help manage their blood sugar levels. What should you do before you start the diet? Talk to your doctor before you try any diet. This is even more important if you have health problems like kidney disease, heart disease, or diabetes. Your doctor may suggest that you meet with a registered dietitian. A dietitian can help you make an eating plan that works for you. What foods do you eat on a low-carb diet? On a low-carb diet, you choose foods that are high in protein and fat. Examples of these are:  · Meat, poultry, and fish. · Eggs. · Nuts, such as walnuts, pecans, almonds, and peanuts. · Peanut butter and other nut butters. · Tofu. · Avocado. · Freada Miguel. · Non-starchy vegetables like broccoli, cauliflower, green beans, mushrooms, peppers, lettuce, and spinach. · Unsweetened non-dairy milks like almond milk and coconut milk. · Cheese, cottage cheese, and cream cheese. Current as of: August 22, 2019               Content Version: 12.6  © 6103-7643 [employer], [employer]. Care instructions adapted under license by [facility] ([facility]). If you have questions about a medical condition or this instruction, always ask your healthcare professional. [address] any warranty or liability for your use of this information. Eating Healthy Foods: Care Instructions  Your Care Instructions     Eating healthy foods can help lower your risk for disease. Healthy food gives you energy and keeps your heart strong, your brain active, your muscles working, and your bones strong. A healthy diet includes a variety of foods from the basic food groups: grains, vegetables, fruits, milk and milk products, and meat and beans. Some people may eat more of their favorite foods from only one food group and, as a result, miss getting the nutrients they need. So, it is important to pay attention not only to what you eat but also to what you are missing from your diet. You can eat a healthy, balanced diet by making a few small changes. Follow-up care is a key part of your treatment and safety. Be sure to make and go to all appointments, and call your doctor if you are having problems. It's also a good idea to know your test results and keep a list of the medicines you take. How can you care for yourself at home? Look at what you eat  · Keep a food diary for a week or two and record everything you eat or drink. Track the number of servings you eat from each food group. · For a balanced diet every day, eat a variety of:  ? 6 or more ounce-equivalents of grains, such as cereals, breads, crackers, rice, or pasta, every day. An ounce-equivalent is 1 slice of bread, 1 cup of ready-to-eat cereal, or ½ cup of cooked rice, cooked pasta, or cooked cereal.  ? 2½ cups of vegetables, especially:  § Dark-green vegetables such as broccoli and spinach. § Orange vegetables such as carrots and sweet potatoes. § Dry beans (such as zuniga and kidney beans) and peas (such as lentils). ? 2 cups of fresh, frozen, or canned fruit. A small apple or 1 banana or orange equals 1 cup. ? 3 cups of nonfat or low-fat milk, yogurt, or other milk products. ? 5½ ounces of meat and beans, such as chicken, fish, lean meat, beans, nuts, and seeds. One egg, 1 tablespoon of peanut butter, ½ ounce nuts or seeds, or ¼ cup of cooked beans equals 1 ounce of meat. · Learn how to read food labels for serving sizes and ingredients. Fast-food and convenience-food meals often contain few or no fruits or vegetables. Make sure you eat some fruits and vegetables to make the meal more nutritious. · Look at your food diary. For each food group, add up what you have eaten and then divide the total by the number of days. This will give you an idea of how much you are eating from each food group. See if you can find some ways to change your diet to make it more healthy. Start small  · Do not try to make dramatic changes to your diet all at once. You might feel that you are missing out on your favorite foods and then be more likely to fail. · Start slowly, and gradually change your habits. Try some of the following:  ? Use whole wheat bread instead of white bread. ? Use nonfat or low-fat milk instead of whole milk. ? Eat brown rice instead of white rice, and eat whole wheat pasta instead of white-flour pasta. ? Try low-fat cheeses and low-fat yogurt. ? Add more fruits and vegetables to meals and have them for snacks. ? Add lettuce, tomato, cucumber, and onion to sandwiches. ? Add fruit to yogurt and cereal.  Enjoy food  · You can still eat your favorite foods. You just may need to eat less of them. If your favorite foods are high in fat, salt, and sugar, limit how often you eat them, but do not cut them out entirely. · Eat a wide variety of foods.   Make healthy choices when eating out Some of these benefits include a comprehensive review of your medical history including lifestyle, illnesses that may run in your family, and various assessments and screenings as appropriate. After reviewing your medical record and screening and assessments performed today your provider may have ordered immunizations, labs, imaging, and/or referrals for you. A list of these orders (if applicable) as well as your Preventive Care list are included within your After Visit Summary for your review. Other Preventive Recommendations:    · A preventive eye exam performed by an eye specialist is recommended every 1-2 years to screen for glaucoma; cataracts, macular degeneration, and other eye disorders. · A preventive dental visit is recommended every 6 months. · Try to get at least 150 minutes of exercise per week or 10,000 steps per day on a pedometer . · Order or download the FREE \"Exercise & Physical Activity: Your Everyday Guide\" from [employer]. Call 2-575.435.9883 or search [employer] online. · You need 0381-3171 mg of calcium and 2430-8989 IU of vitamin D per day. It is possible to meet your calcium requirement with diet alone, but a vitamin D supplement is usually necessary to meet this goal.  · When exposed to the sun, use a sunscreen that protects against both UVA and UVB radiation with an SPF of 30 or greater. Reapply every 2 to 3 hours or after sweating, drying off with a towel, or swimming. · Always wear a seat belt when traveling in a car. Always wear a helmet when riding a bicycle or motorcycle.

## 2021-03-03 NOTE — PROGRESS NOTES
Medicare Annual Wellness Visit  Name: Art Rahman Date: 3/3/2021   MRN: <[id number]> Sex: Female   Age: 76 y.o. Ethnicity: Non-/Non    : 1955 Race: Jossy Reyes is here for Medicare AWV    Screenings for behavioral, psychosocial and functional/safety risks, and cognitive dysfunction are all negative except as indicated below. These results, as well as other patient data from the [address] form, are documented in Flowsheets linked to this Encounter. Allergies   Allergen Reactions    Amoxicillin      \"colitis\"    Pravastatin Rash    Wellbutrin [Bupropion] Other (See Comments) and Rash     Caused sores in mouth       Prior to Visit Medications    Medication Sig Taking? Authorizing Provider   nystatin (MYCOSTATIN) 728715 UNIT/GM cream Apply topically 2 times daily. Yes Candance Solid, APRN - CNP   levothyroxine (SYNTHROID) 150 MCG tablet TAKE 1 TABLET BY MOUTH EVERY DAY Yes Candance Solid, APRN - CNP   magnesium oxide (MAG-OX) 400 (240 Mg) MG tablet Take 2 tablets 3 times day Yes Candance Solid, APRN - CNP   [employer] VITAMIN D3 250 MCG (92482 UT) CAPS capsule TAKE 1 CAPSULE BY MOUTH ONE TIME PER WEEK Yes Candance Solid, APRN - CNP   celecoxib (CELEBREX) 100 MG capsule TAKE 2 CAPSULES BY MOUTH TWICE A DAY  Patient taking differently: daily TAKE 2 CAPSULES BY MOUTH TWICE A DAY Yes Christina Bonilla APRN - CNP   folic acid (FOLVITE) 1 MG tablet Take 1 tablet by mouth daily Yes Candance Solid, APRN - CNP   gabapentin (NEURONTIN) 100 MG capsule Take 2 capsules by mouth nightly for 30 days. I PO TID  Patient taking differently: Take 200 mg by mouth nightly.  1 in the am and 2 in the pm Yes Candance Solid, APRN - CNP   triamcinolone (KENALOG) 0.1 % cream APPLY TO AFFECTED AREA TWICE A DAY Yes Candance Solid, APRN - CNP VENTOLIN  (90 Base) MCG/ACT inhaler TAKE 2 PUFFS BY MOUTH EVERY 6 HOURS AS NEEDED FOR WHEEZE Yes YAHAIRA Pate CNP   acetaminophen (TYLENOL) 500 MG tablet Take 500 mg by mouth daily as needed for Pain  Yes Historical Provider, MD   Vedolizumab (ENTYVIO IV) Infuse 300 mg intravenously See Admin Instructions LAST DOSE GIVEN ON 3-2-2021 Yes Historical Provider, MD   blood glucose test strips (ASCENSIA AUTODISC VI;ONE TOUCH ULTRA TEST VI) strip 1 each by In Vitro route daily As needed.  Yes YAHAIRA Klein CNP   ONE TOUCH LANCETS [address] [facility] 1 each by Does not apply route daily Yes YAHAIRA Streeter CNP       Past Medical History:   Diagnosis Date    Acute pancreatitis 10/21/2014    Acute renal failure with tubular necrosis (HCC) 7/10/2015    Acute stasis dermatitis of left lower extremity 7/8/2015    Angina pectoris ([address] 5/23/2011    Anxiety     Cellulitis of left leg     Chronic back pain     Chronic kidney disease     Clostridium difficile infection 7/9/15, 6/4/15    Colitis     Depression     Diabetes mellitus ([address]     diet controlled    Hearing loss     Heart murmur 10/1/2019    Hyperlipidemia     Hypertension     Hypothyroidism     Left THR 1/25/2016    Liver disease     Lumbar radiculitis 10/1/2019    Obesity     Palpitations 7/17/2013    Primary osteoarthritis of left hip 11/18/2015    Restless legs syndrome     Shock ([address] 10/10/2017    Superficial thrombophlebitis of left leg 7/8/2015    Thyroid disease     Type 2 diabetes mellitus without complication ([address]     Ulcerative (chronic) enterocolitis ([address] 39/35/3145    Uncomplicated asthma 4/4/9239    Venous ulcer of left leg [facility])        Past Surgical History:   Procedure Laterality Date    APPENDECTOMY      CARDIAC CATHETERIZATION  05/2014    COLONOSCOPY      EPIDURAL STEROID INJECTION Left 1/22/2019 LEFT LUMBAR FOUR FIVE EPIDURAL STEROID INJECTION SITE CONFIRMED BY FLUOROSCOPY performed by Sanjeev Ochoa MD at [address] [facility] [place] Left 01/19/2016    INTRACAPSULAR CATARACT EXTRACTION Left 6/26/2019    PHACOEMULSIFICATION OF LEFT EYE CATARACT WITH INTRAOCULAR LENS IMPLANT performed by Bharat Smith MD at [facility] EXTRACTION Right 7/24/2019    PHACOEMULSIFICATION OF CATARACT RIGHT EYE WITH INTRAOCULAR LENS IMPLANT performed by Bahrat Smith MD at [place] [address] ASPIR/BIOP ALTERED ANATOMY  10/15/2018    EGD ESOPHAGOGASTRODUODENOSCOPY ULTRASOUND 0403, 1583 performed by Dena Cooper DO at [address] EGD TRANSORAL BIOPSY SINGLE/MULTIPLE  10/15/2018    EGD BIOPSY performed by Dena Cooper DO at [address] [facility] ENDOSCOPY  10/15/2018    EGD [facility] EUS    VARICOSE VEIN SURGERY         Family History   Problem Relation Age of Onset    Diabetes Mother     Alzheimer's Disease Mother     Anemia Mother     Diabetes Father     Cancer Father 68        pancreatic    Cancer Maternal Grandmother [de-identified]        Bone Marrow [facility]       CareTeam (Including outside providers/suppliers regularly involved in providing care):   Patient Care Team:  Lorena Simmonds, APRN - CNP as PCP - General  Lorena Simmonds, APRN - CNP as PCP - [facility] Empaneled Provider  Bety Renteria MD as Consulting Physician (Hematology and Oncology)  Mehul Mares MD as Consulting Physician (Cardiology)    Wt Readings from Last 3 Encounters:   03/03/21 216 lb (98 kg)   03/02/21 230 lb (104.3 kg)   02/02/21 230 lb (104.3 kg)     Vitals:    03/03/21 0822   BP: 110/74   Site: Left Upper Arm   Position: Sitting   Cuff Size: Large Adult   Pulse: 73   Temp: 97 °F (36.1 °C)   TempSrc: Temporal   SpO2: 96%   Weight: 216 lb (98 kg)   Height: 5' 5\" (1.651 m)     Body mass index is 35.94 kg/m². Based upon direct observation of the patient, evaluation of cognition reveals recent and remote memory intact. General Appearance: alert and oriented to person, place and time, well developed and well- nourished, in no acute distress  Skin: warm and dry, no rash or erythema  Head: normocephalic and atraumatic  Eyes: pupils equal, round, and reactive to light, extraocular eye movements intact, conjunctivae normal  ENT: tympanic membrane, external ear and ear canal normal bilaterally, nose without deformity, nasal mucosa and turbinates normal without polyps  Neck: supple and non-tender without mass, no thyromegaly or thyroid nodules, no cervical lymphadenopathy  Pulmonary/Chest: clear to auscultation bilaterally- no wheezes, rales or rhonchi, normal air movement, no respiratory distress  Cardiovascular: normal rate, regular rhythm, normal S1 and S2, no murmurs, rubs, clicks, or gallops, distal pulses intact, no carotid bruits  Abdomen: soft, non-tender, non-distended, normal bowel sounds, no masses or organomegaly  Extremities: no cyanosis, clubbing or edema  Musculoskeletal: normal range of motion, no joint swelling, deformity or tenderness  Neurologic: reflexes normal and symmetric, no cranial nerve deficit, gait, coordination and speech normal    Patient's complete Health Risk Assessment and screening values have been reviewed and are found in Flowsheets. The following problems were reviewed today and where indicated follow up appointments were made and/or referrals ordered.     Positive Risk Factor Screenings with Interventions:       Depression:  PHQ-2 Score: 4  PHQ-9 Total Score: 6    Severity:1-4 = minimal depression, 5-9 = mild depression, 10-14 = moderate depression, 15-19 = moderately severe depression, 20-27 = severe depression  Depression Interventions:  · Patient declines any further evaluation/treatment for this issue      General Health and ACP:  General In general, how would you say your health is?: Fair  In the past 7 days, have you experienced any of the following?  New or Increased Pain, New or Increased Fatigue, Loneliness, Social Isolation, Stress or Anger?: (!) Social Isolation  Do you get the social and emotional support that you need?: (!) No  Do you have a Living Will?: Yes  Advance Directives     Power of  Living Will ACP-Advance Directive ACP-Power of     Not on File [place] on 05/02/18 Filed Not on File      General Health Risk Interventions:  · Social isolation: patient declines any further intervention for this issue  · No Living Will: Advance Care Planning addressed with patient today    Health Habits/Nutrition:  Health Habits/Nutrition  Do you exercise for at least 20 minutes 2-3 times per week?: (!) No  Have you lost any weight without trying in the past 3 months?: No  Do you eat only one meal per day?: No  Have you seen the dentist within the past year?: (!) No  Body mass index: (!) 35.94  Health Habits/Nutrition Interventions:  · Inadequate physical activity:  patient agrees to wear a pedometer and walk at least 10,000 steps/day, educational materials provided to promote increased physical activity  · Dental exam overdue:  patient encouraged to make appointment with his/her dentist       Personalized Preventive Plan   Current Health Maintenance Status  Immunization History   Administered Date(s) Administered    HPV 9-valent ([address]) 05/09/2019    Hepatitis A Adult (Vaqta) 04/16/2019    Hepatitis B 09/10/2010, 10/12/2010, 03/15/2011    Hepatitis B (Recombivax HB) 09/10/2010, 10/12/2010, 03/15/2011    Hepatitis B Ped/Adol (Engerix-B, Recombivax HB) 03/15/2014    Influenza Virus Vaccine 01/26/2016, 08/22/2017, 09/14/2019, 10/22/2019, 10/21/2020    Influenza Whole 11/08/2011, 10/28/2014    Influenza, High Dose (Fluzone 65 yrs and older) 11/07/2018  Influenza, Quadv, IM, PF (6 mo and older Fluzone, Flulaval, Fluarix, and 3 yrs and older Afluria) 08/22/2017, 11/07/2018, 09/14/2019    PPD Test 08/04/2014, 10/21/2014, 12/21/2015, 11/07/2018    Pneumococcal Conjugate 13-valent ([id number]) 04/16/2019    Pneumococcal Polysaccharide (Lprruespl42) 10/22/2014, 10/21/2020    Tdap (Boostrix, Adacel) 06/29/2017    Zoster Live (Zostavax) 06/06/2016        Health Maintenance   Topic Date Due    COVID-19 Vaccine (1 of 2) Never done    DEXA (modify frequency per FRAX score)  Never done    Colon Cancer Screen FIT/FOBT  09/24/2019    Annual Wellness Visit (AWV)  Never done    Diabetic retinal exam  12/21/2020    Breast cancer screen  02/28/2021    Shingles Vaccine (2 of 3) 11/09/2021 (Originally 8/1/2016)    Diabetic foot exam  11/09/2021    A1C test (Diabetic or Prediabetic)  11/09/2021    Diabetic microalbuminuria test  11/09/2021    Lipid screen  11/09/2021    TSH testing  11/09/2021    DTaP/Tdap/Td vaccine (2 - Td) 06/29/2027    Flu vaccine  Completed    Pneumococcal 65+ years Vaccine  Completed    Hepatitis C screen  Completed    Hepatitis A vaccine  Aged Out    Hib vaccine  Aged Out    Meningococcal (ACWY) vaccine  Aged Out     Recommendations for [employer] Due: see orders and patient instructions/AVS.  . Recommended screening schedule for the next 5-10 years is provided to the patient in written form: see Patient Allison Peraza was seen today for medicare awv.     Diagnoses and all orders for this visit:    Routine general medical examination at a health care facility    Class 2 obesity due to excess calories without serious comorbidity with body mass index (BMI) of 35.0 to 35.9 in adult  -     AR Behavior  obesity 15m [] Obesity Counseling: Assessed behavioral health risks and factors affecting choice of behavior. Suggested weight control approaches, including dietary changes behavioral modification and follow up plan. Provided educational and support documentation. Time spent (minutes): 15 minutes    ACP (advance care planning)  -     UT ADVANCED CARE PLAN FACE TO FACE, 1ST 30MIN [64178]  NEEDS UPDATED FORMS SINCE    Advanced Care Planning: Discussed the patients choices for care and treatment in case of a health event that adversely affects decision-making abilities. Also discussed the patients long-term treatment options. Reviewed with the patient the [address] [place] [id number] of [address] [place] [address] Declaration forms  Reviewed the process of designating a competent adult as an Agent (or -in-fact) that could take make health care decisions for the patient if incompetent. Patient was asked to complete the declaration forms, either acknowledge the forms by a public notary or an eligible witness and provide a signed copy to the practice office. Time spent (minutes): 30 minutes    Encounter for screening mammogram for malignant neoplasm of breast  -     JAMES DIGITAL SCREEN W OR WO CAD BILATERAL; Future    Screening for cardiovascular condition  -     UT Intens behave ther cardio dx, 15 minutes []  -     simvastatin (ZOCOR) 20 MG tablet; Take 1 tablet by mouth nightly  Cardiovascular Disease Risk Counseling: Assessed the patient's risk to develop cardiovascular disease and reviewed main risk factors.    Reviewed steps to reduce disease risk including:   · Quitting tobacco use, reducing amount smoked, or not starting the habit  · Making healthy food choices  · Being physically active and gradualy increasing activity levels   · Reduce weight and determine a healthy BMI goal  · Monitor blood pressure and treat if higher than 140/90 mmHg · Maintain blood total cholesterol levels under 5 mmol/l or 190 mg/dl  · Maintain LDL cholesterol levels under 3.0 mmol/l or 115 mg/dl   · Control blood glucose levels  · Consider taking aspirin (75 mg daily), once blood pressure is controlled   Provided a follow up plan. Time spent (minutes): 15 minutes    Post menopausal syndrome  -     DEXA BONE DENSITY AXIAL SKELETON; Future    Lumbar stenosis with neurogenic claudication    Lumbar radiculitis    Mixed hyperlipidemia    BMI 35.0-35.9,adult  -     WA Behavior  obesity 15m []    Asymptomatic menopausal state  -     DEXA BONE DENSITY AXIAL SKELETON; Future    Encounter for screening mammogram for breast cancer  -     JAMES DIGITAL SCREEN W OR WO CAD BILATERAL; Future    Screening for colorectal cancer  -     POCT Fecal Immunochemical Test (FIT)  Please notify patient of test results. Negative FIT/cologuard. Remind patient that the FIT/cologuard is a colon cancer screening tool and the colonoscopy remains the gold standard for colon cancer screening. The Fit/Cologuard test result should be interpreted with caution. False positives and false negative results can occur. A positive test result does not confirm the presence of cancer and a negative test result does not confirm the absence of cancer. FIT to be completed yearly and cologuard every 3 years unless patient will agree to colonoscopy in the future. Will readdress yearly.

## 2021-03-05 LAB — CALPROTECTIN, FECAL: 82 UG/G

## 2021-03-16 RX ORDER — NYSTATIN 100000 U/G
CREAM TOPICAL
Qty: 1 TUBE | Refills: 0 | Status: SHIPPED | OUTPATIENT
Start: 2021-03-16 | End: 2022-10-04 | Stop reason: SDUPTHER

## 2021-03-30 ENCOUNTER — HOSPITAL ENCOUNTER (OUTPATIENT)
Dept: NURSING | Age: 66
Setting detail: INFUSION SERIES
Discharge: HOME | End: 2021-03-30
Payer: MEDICARE

## 2021-03-30 ENCOUNTER — TELEPHONE (OUTPATIENT)
Dept: MAMMOGRAPHY | Age: 66
End: 2021-03-30

## 2021-03-30 ENCOUNTER — HOSPITAL ENCOUNTER (OUTPATIENT)
Dept: MAMMOGRAPHY | Age: 66
Discharge: HOME | End: 2021-03-30
Payer: MEDICARE

## 2021-03-30 ENCOUNTER — HOSPITAL ENCOUNTER (OUTPATIENT)
Dept: GENERAL RADIOLOGY | Age: 66
Discharge: HOME | End: 2021-03-30
Payer: MEDICARE

## 2021-03-30 VITALS
BODY MASS INDEX: 35.49 KG/M2 | TEMPERATURE: 97.6 F | SYSTOLIC BLOOD PRESSURE: 158 MMHG | DIASTOLIC BLOOD PRESSURE: 92 MMHG | HEART RATE: 70 BPM | OXYGEN SATURATION: 98 % | HEIGHT: 65 IN | RESPIRATION RATE: 16 BRPM | WEIGHT: 213 LBS

## 2021-03-30 DIAGNOSIS — Z78.0 ASYMPTOMATIC MENOPAUSAL STATE: ICD-10-CM

## 2021-03-30 DIAGNOSIS — Z12.31 ENCOUNTER FOR SCREENING MAMMOGRAM FOR BREAST CANCER: ICD-10-CM

## 2021-03-30 DIAGNOSIS — N95.1 POST MENOPAUSAL SYNDROME: ICD-10-CM

## 2021-03-30 DIAGNOSIS — K51.00 CHRONIC ULCERATIVE ENTEROCOLITIS WITHOUT COMPLICATION (HCC): Primary | ICD-10-CM

## 2021-03-30 DIAGNOSIS — Z12.31 ENCOUNTER FOR SCREENING MAMMOGRAM FOR MALIGNANT NEOPLASM OF BREAST: ICD-10-CM

## 2021-03-30 PROCEDURE — 96365 THER/PROPH/DIAG IV INF INIT: CPT

## 2021-03-30 PROCEDURE — 6360000002 HC RX W HCPCS: Performed by: INTERNAL MEDICINE

## 2021-03-30 PROCEDURE — 77067 SCR MAMMO BI INCL CAD: CPT

## 2021-03-30 PROCEDURE — 77080 DXA BONE DENSITY AXIAL: CPT

## 2021-03-30 PROCEDURE — 2580000003 HC RX 258: Performed by: INTERNAL MEDICINE

## 2021-03-30 PROCEDURE — 99211 OFF/OP EST MAY X REQ PHY/QHP: CPT

## 2021-03-30 RX ORDER — SODIUM CHLORIDE 0.9 % (FLUSH) 0.9 %
10 SYRINGE (ML) INJECTION PRN
Status: CANCELLED | OUTPATIENT
Start: 2021-04-27

## 2021-03-30 RX ORDER — SODIUM CHLORIDE 9 MG/ML
INJECTION, SOLUTION INTRAVENOUS CONTINUOUS
Status: CANCELLED | OUTPATIENT
Start: 2021-04-27

## 2021-03-30 RX ORDER — SODIUM CHLORIDE 9 MG/ML
INJECTION, SOLUTION INTRAVENOUS CONTINUOUS
Status: DISCONTINUED | OUTPATIENT
Start: 2021-03-30 | End: 2021-03-30 | Stop reason: HOSPADM

## 2021-03-30 RX ADMIN — SODIUM CHLORIDE: 9 INJECTION, SOLUTION INTRAVENOUS at 09:42

## 2021-03-30 RX ADMIN — VEDOLIZUMAB 300 MG: 300 INJECTION, POWDER, LYOPHILIZED, FOR SOLUTION INTRAVENOUS at 09:42

## 2021-03-30 ASSESSMENT — PAIN SCALES - GENERAL: PAINLEVEL_OUTOF10: 0

## 2021-03-30 NOTE — PROGRESS NOTES
Pt here for entyvio infusion   Pt reports that her stools are still mostly soft  with occasional constipation   Pt denies any cramping,or bleeding but does report some bloating  IV # 22 started in R hand on 2nd attempt per T Jordan RN  No blood work needed for today  Pt gayla well  Entyvio 300 mg IVPB starting to infuse  IV site unremarkable Will monitor

## 2021-03-31 RX ORDER — ALENDRONATE SODIUM 70 MG/1
70 TABLET ORAL
Qty: 12 TABLET | Refills: 3 | Status: SHIPPED | OUTPATIENT
Start: 2021-03-31 | End: 2022-02-03

## 2021-04-27 ENCOUNTER — HOSPITAL ENCOUNTER (OUTPATIENT)
Dept: NURSING | Age: 66
Setting detail: INFUSION SERIES
Discharge: HOME | End: 2021-04-27
Payer: MEDICARE

## 2021-04-27 VITALS
SYSTOLIC BLOOD PRESSURE: 155 MMHG | DIASTOLIC BLOOD PRESSURE: 84 MMHG | WEIGHT: 208 LBS | RESPIRATION RATE: 18 BRPM | HEART RATE: 78 BPM | TEMPERATURE: 98 F | HEIGHT: 65 IN | BODY MASS INDEX: 34.66 KG/M2

## 2021-04-27 DIAGNOSIS — K52.9 COLITIS: ICD-10-CM

## 2021-04-27 DIAGNOSIS — K51.90 ULCERATIVE COLITIS WITHOUT COMPLICATIONS, UNSPECIFIED LOCATION (HCC): ICD-10-CM

## 2021-04-27 DIAGNOSIS — K51.00 CHRONIC ULCERATIVE ENTEROCOLITIS WITHOUT COMPLICATION (HCC): Primary | ICD-10-CM

## 2021-04-27 LAB
A/G RATIO: 1 (ref 1.1–2.2)
ALBUMIN SERPL-MCNC: 3.9 G/DL (ref 3.4–5)
ALP BLD-CCNC: 110 U/L (ref 40–129)
ALT SERPL-CCNC: 27 U/L (ref 10–40)
ANION GAP SERPL CALCULATED.3IONS-SCNC: 9 MMOL/L (ref 3–16)
AST SERPL-CCNC: 40 U/L (ref 15–37)
BILIRUB SERPL-MCNC: 0.6 MG/DL (ref 0–1)
BUN BLDV-MCNC: 19 MG/DL (ref 7–20)
CALCIUM SERPL-MCNC: 9 MG/DL (ref 8.3–10.6)
CHLORIDE BLD-SCNC: 102 MMOL/L (ref 99–110)
CO2: 26 MMOL/L (ref 21–32)
CREAT SERPL-MCNC: 0.9 MG/DL (ref 0.6–1.2)
GFR AFRICAN AMERICAN: >60
GFR NON-AFRICAN AMERICAN: >60
GLOBULIN: 3.8 G/DL
GLUCOSE BLD-MCNC: 113 MG/DL (ref 70–99)
HCT VFR BLD CALC: 38.7 % (ref 36–48)
HEMOGLOBIN: 13 G/DL (ref 12–16)
MCH RBC QN AUTO: 31.5 PG (ref 26–34)
MCHC RBC AUTO-ENTMCNC: 33.6 G/DL (ref 31–36)
MCV RBC AUTO: 93.9 FL (ref 80–100)
PDW BLD-RTO: 14 % (ref 12.4–15.4)
PLATELET # BLD: 140 K/UL (ref 135–450)
PMV BLD AUTO: 9.6 FL (ref 5–10.5)
POTASSIUM SERPL-SCNC: 4.2 MMOL/L (ref 3.5–5.1)
RBC # BLD: 4.12 M/UL (ref 4–5.2)
SODIUM BLD-SCNC: 137 MMOL/L (ref 136–145)
TOTAL PROTEIN: 7.7 G/DL (ref 6.4–8.2)
WBC # BLD: 5.4 K/UL (ref 4–11)

## 2021-04-27 PROCEDURE — 6360000002 HC RX W HCPCS: Performed by: INTERNAL MEDICINE

## 2021-04-27 PROCEDURE — 96365 THER/PROPH/DIAG IV INF INIT: CPT

## 2021-04-27 PROCEDURE — 80053 COMPREHEN METABOLIC PANEL: CPT

## 2021-04-27 PROCEDURE — 99211 OFF/OP EST MAY X REQ PHY/QHP: CPT

## 2021-04-27 PROCEDURE — 85027 COMPLETE CBC AUTOMATED: CPT

## 2021-04-27 PROCEDURE — 2580000003 HC RX 258: Performed by: INTERNAL MEDICINE

## 2021-04-27 RX ORDER — SODIUM CHLORIDE 9 MG/ML
INJECTION, SOLUTION INTRAVENOUS CONTINUOUS
Status: ACTIVE | OUTPATIENT
Start: 2021-04-27 | End: 2021-04-27

## 2021-04-27 RX ORDER — SODIUM CHLORIDE 0.9 % (FLUSH) 0.9 %
10 SYRINGE (ML) INJECTION PRN
Status: DISCONTINUED | OUTPATIENT
Start: 2021-04-27 | End: 2021-04-28 | Stop reason: HOSPADM

## 2021-04-27 RX ORDER — SODIUM CHLORIDE 0.9 % (FLUSH) 0.9 %
10 SYRINGE (ML) INJECTION PRN
Status: CANCELLED | OUTPATIENT
Start: 2021-05-25

## 2021-04-27 RX ORDER — SODIUM CHLORIDE 9 MG/ML
INJECTION, SOLUTION INTRAVENOUS CONTINUOUS
Status: CANCELLED | OUTPATIENT
Start: 2021-05-25

## 2021-04-27 RX ADMIN — SODIUM CHLORIDE: 9 INJECTION, SOLUTION INTRAVENOUS at 09:05

## 2021-04-27 RX ADMIN — Medication 10 ML: at 09:05

## 2021-04-27 RX ADMIN — VEDOLIZUMAB 300 MG: 300 INJECTION, POWDER, LYOPHILIZED, FOR SOLUTION INTRAVENOUS at 09:05

## 2021-04-27 ASSESSMENT — PAIN SCALES - GENERAL: PAINLEVEL_OUTOF10: 0

## 2021-04-27 NOTE — PROGRESS NOTES
Pt here for entyvio infusion  pt without c/o's today  She reports that her stools are still mostly loose to soft  with occasional constipation   Pt denies any cramping, bloating or bleeding  IV # 22 was started in RAC on 1st attempt per myself Blood for lab work obtained without difficulty  Pt gayla well  Entyvio 300 mg IVPB starting to infuse  IV site unremarkable Will monitor

## 2021-05-10 ENCOUNTER — OFFICE VISIT (OUTPATIENT)
Dept: FAMILY MEDICINE CLINIC | Age: 66
End: 2021-05-10
Payer: MEDICARE

## 2021-05-10 VITALS
HEART RATE: 81 BPM | SYSTOLIC BLOOD PRESSURE: 110 MMHG | WEIGHT: 211 LBS | DIASTOLIC BLOOD PRESSURE: 70 MMHG | HEIGHT: 65 IN | BODY MASS INDEX: 35.16 KG/M2 | OXYGEN SATURATION: 97 %

## 2021-05-10 DIAGNOSIS — F32.A ANXIETY AND DEPRESSION: ICD-10-CM

## 2021-05-10 DIAGNOSIS — E11.9 DIET-CONTROLLED TYPE 2 DIABETES MELLITUS (HCC): Primary | ICD-10-CM

## 2021-05-10 DIAGNOSIS — F41.9 ANXIETY AND DEPRESSION: ICD-10-CM

## 2021-05-10 DIAGNOSIS — E55.9 VITAMIN D DEFICIENCY: ICD-10-CM

## 2021-05-10 DIAGNOSIS — K51.80 OTHER ULCERATIVE COLITIS WITHOUT COMPLICATION (HCC): ICD-10-CM

## 2021-05-10 DIAGNOSIS — E66.01 MORBIDLY OBESE (HCC): ICD-10-CM

## 2021-05-10 DIAGNOSIS — M07.60 ENTEROPATHIC ARTHRITIS: ICD-10-CM

## 2021-05-10 DIAGNOSIS — E53.8 B12 DEFICIENCY: ICD-10-CM

## 2021-05-10 DIAGNOSIS — E83.42 HYPOMAGNESEMIA: ICD-10-CM

## 2021-05-10 DIAGNOSIS — E03.9 HYPOTHYROIDISM, UNSPECIFIED TYPE: ICD-10-CM

## 2021-05-10 DIAGNOSIS — E78.2 MIXED HYPERLIPIDEMIA: ICD-10-CM

## 2021-05-10 DIAGNOSIS — I10 ESSENTIAL HYPERTENSION: ICD-10-CM

## 2021-05-10 PROCEDURE — G8427 DOCREV CUR MEDS BY ELIG CLIN: HCPCS | Performed by: NURSE PRACTITIONER

## 2021-05-10 PROCEDURE — 2022F DILAT RTA XM EVC RTNOPTHY: CPT | Performed by: NURSE PRACTITIONER

## 2021-05-10 PROCEDURE — 36415 COLL VENOUS BLD VENIPUNCTURE: CPT | Performed by: NURSE PRACTITIONER

## 2021-05-10 PROCEDURE — G8399 PT W/DXA RESULTS DOCUMENT: HCPCS | Performed by: NURSE PRACTITIONER

## 2021-05-10 PROCEDURE — 99214 OFFICE O/P EST MOD 30 MIN: CPT | Performed by: NURSE PRACTITIONER

## 2021-05-10 PROCEDURE — 4040F PNEUMOC VAC/ADMIN/RCVD: CPT | Performed by: NURSE PRACTITIONER

## 2021-05-10 PROCEDURE — 1036F TOBACCO NON-USER: CPT | Performed by: NURSE PRACTITIONER

## 2021-05-10 PROCEDURE — G8417 CALC BMI ABV UP PARAM F/U: HCPCS | Performed by: NURSE PRACTITIONER

## 2021-05-10 PROCEDURE — 1123F ACP DISCUSS/DSCN MKR DOCD: CPT | Performed by: NURSE PRACTITIONER

## 2021-05-10 PROCEDURE — 1090F PRES/ABSN URINE INCON ASSESS: CPT | Performed by: NURSE PRACTITIONER

## 2021-05-10 PROCEDURE — 3017F COLORECTAL CA SCREEN DOC REV: CPT | Performed by: NURSE PRACTITIONER

## 2021-05-10 PROCEDURE — 3046F HEMOGLOBIN A1C LEVEL >9.0%: CPT | Performed by: NURSE PRACTITIONER

## 2021-05-10 RX ORDER — CELECOXIB 100 MG/1
CAPSULE ORAL
Qty: 120 CAPSULE | Refills: 2 | Status: SHIPPED | OUTPATIENT
Start: 2021-05-10 | End: 2021-11-23 | Stop reason: SDUPTHER

## 2021-05-10 ASSESSMENT — ENCOUNTER SYMPTOMS
ABDOMINAL PAIN: 0
BACK PAIN: 1
GASTROINTESTINAL NEGATIVE: 1
NAUSEA: 0
ANAL BLEEDING: 0
RESPIRATORY NEGATIVE: 1
ALLERGIC/IMMUNOLOGIC NEGATIVE: 1
EYES NEGATIVE: 1
SHORTNESS OF BREATH: 0
BLOOD IN STOOL: 0

## 2021-05-10 ASSESSMENT — PATIENT HEALTH QUESTIONNAIRE - PHQ9
1. LITTLE INTEREST OR PLEASURE IN DOING THINGS: 3
4. FEELING TIRED OR HAVING LITTLE ENERGY: 1
SUM OF ALL RESPONSES TO PHQ QUESTIONS 1-9: 7
3. TROUBLE FALLING OR STAYING ASLEEP: 1
9. THOUGHTS THAT YOU WOULD BE BETTER OFF DEAD, OR OF HURTING YOURSELF: 0
SUM OF ALL RESPONSES TO PHQ QUESTIONS 1-9: 7
8. MOVING OR SPEAKING SO SLOWLY THAT OTHER PEOPLE COULD HAVE NOTICED. OR THE OPPOSITE, BEING SO FIGETY OR RESTLESS THAT YOU HAVE BEEN MOVING AROUND A LOT MORE THAN USUAL: 0

## 2021-05-10 NOTE — PROGRESS NOTES
Antihypertensive medication side effects: no medication side effects noted. Use of agents associated withhypertension: NSAIDS. BP Readings from Last 3 Encounters:   05/10/21 110/70   04/27/21 (!) 155/84   03/30/21 (!) 158/92       Hyperlipidemia:  No newmyalgias or GI upset on simvastatin (Zocor). The 10-year ASCVD risk score (Chetna Parada, et al., 2013) is: 9.8%    Values used to calculate the score:      Age: 76 years      Sex: Female      Is Non- : No      Diabetic: Yes      Tobacco smoker: No      Systolic Blood Pressure: 174 mmHg      Is BP treated: No      HDL Cholesterol: 41 mg/dL      Total Cholesterol: 201 mg/dL    Lab Results   Component Value Date    LABA1C 5.6 11/09/2020    LABA1C 5.0 10/02/2019    LABA1C 5.7 05/21/2019     Lab Results   Component Value Date    LABMICR 1.70 11/09/2020    LDLCALC 132 (H) 11/09/2020    CREATININE 0.9 04/27/2021       Vitamin D Deficiency  Patient with vitamin d deficiencyand currently on supplement without adverse reactions. Lab Results   Component Value Date    VITD25 59.0 09/23/2020     Anemia  Patient presents for evaluation of anemia. She has both folate and iron deficiency.  Anemia was found by routine CBC. It has been present for a few years. Associated signs & symptoms: none. No longer taking iron  Still on folic acid    Has history low magnesium and currently on magnesium supplement. Taking 800 mg bid    Hypothyroidism: Recent symptoms: none. She denies fatigue, weight gain, weight loss, cold intolerance, heat intolerance, hair loss, dry skin, constipation, diarrhea, edema, anxiety, tremor, palpitations and dysphagia. Patient is  taking her medication consistently on an empty stomach.     Lab Results   Component Value Date    TSHREFLEX 0.31 11/09/2020    TSHREFLEX 1.33 12/12/2019    TSHREFLEX 0.04 06/24/2019     Lab Results   Component Value Date    TSH 0.56 10/06/2019    TSH 0.39 08/11/2018    TSH 0.67 10/25/2017     Mood Disorder: Patient presents for follow-up of depression. Current complaints include: See PHQ9 below . She denies tearfulness, decreased libido, irritability, excessive worry, panic attacks, obsessive thoughts, compulsive behaviors, increased use of drugs or alcohol, suicidal thoughts or behavior, and impaired memory. Symptoms/signs of junior: none. External stressors: nothing new. Current treatment includes: none. PHQ-9  5/10/2021 3/3/2021 11/9/2020 5/21/2019 9/12/2018 6/29/2017   Little interest or pleasure in doing things 3 3 0 0 0 3   Feeling down, depressed, or hopeless 1 1 0 0 0 3   Trouble falling or staying asleep, or sleeping too much 1 0 - - - 3   Feeling tired or having little energy 1 1 - - - 3   Poor appetite or overeating 1 0 - - - 3   Feeling bad about yourself - or that you are a failure or have let yourself or your family down 0 1 - - - 3   Trouble concentrating on things, such as reading the newspaper or watching television 0 0 - - - 3   Moving or speaking so slowly that other people could have noticed. Or the opposite - being so fidgety or restless that you have been moving around a lot more than usual 0 0 - - - 3   Thoughts that you would be better off dead, or of hurting yourself in some way 0 0 - - - 0   PHQ-2 Score 4 4 0 0 0 6   PHQ-9 Total Score 7 6 0 0 0 24   If you checked off any problems, how difficult have these problems made it for you to do your work, take care of things at home, or get along with other people? 1 1 - - - 0     Interpretation of Total Score Total Score Depression Severity: 1-4 = Minimal depression, 5-9 = Mild depression, 10-14 = Moderate depression, 15-19 = Moderately severe depression, 20-27 = Severe depression      Review of Systems   Constitutional: Negative. Negative for appetite change, fatigue and unexpected weight change. HENT: Negative. Eyes: Negative. Respiratory: Negative. Negative for shortness of breath. Cardiovascular: Negative.   Negative for CATHETERIZATION  05/2014    normal    COLONOSCOPY      EPIDURAL STEROID INJECTION Left 01/22/2019    LEFT LUMBAR FOUR FIVE EPIDURAL STEROID INJECTION SITE CONFIRMED BY FLUOROSCOPY performed by Adryan Foreman MD at [id number] [facility] Left 01/19/2016    Dr Mcallister Persons EXTRACTION Left 06/26/2019    PHACOEMULSIFICATION OF LEFT EYE CATARACT WITH INTRAOCULAR LENS IMPLANT performed by Joseph Gresham MD at [facility] EXTRACTION Right 07/24/2019    PHACOEMULSIFICATION OF CATARACT RIGHT EYE WITH INTRAOCULAR LENS IMPLANT performed by Joseph Gresham MD at [facility]  [facility] ASPIR/BIOP ALTERED ANATOMY  10/15/2018    EGD ESOPHAGOGASTRODUODENOSCOPY ULTRASOUND 0403, 1583 performed by Pj Khan DO at [address] EGD TRANSORAL BIOPSY SINGLE/MULTIPLE  10/15/2018    EGD BIOPSY performed by Pj Khan DO at [address] ENDOSCOPY  10/15/2018    EGD [facility] EUS    VARICOSE VEIN SURGERY Bilateral 1988     Social History     Socioeconomic History    Marital status:       Spouse name: Lonzo Spatz Number of children: Not on file    Years of education: Not on file    Highest education level: Not on file   Occupational History    Not on file   Social Needs    Financial resource strain: Not on file    Food insecurity     Worry: Not on file     Inability: Not on file    Transportation needs     Medical: Not on file     Non-medical: Not on file   Tobacco Use    Smoking status: Never Smoker    Smokeless tobacco: Never Used    Tobacco comment: never even tried it   Substance and Sexual Activity    Alcohol use: No     Alcohol/week: 0.0 standard drinks     Comment: rarely    Drug use: No    Sexual activity: Not Currently   Lifestyle    Physical activity     Days per week: Not on file     Minutes per session: Not on file    Stress: Not on file Relationships    Social connections     Talks on phone: Not on file     Gets together: Not on file     Attends Oriental orthodox service: Not on file     Active member of club or organization: Not on file     Attends meetings of clubs or organizations: Not on file     Relationship status: Not on file    Intimate partner violence     Fear of current or ex partner: Not on file     Emotionally abused: Not on file     Physically abused: Not on file     Forced sexual activity: Not on file   Other Topics Concern    Not on file   Social History Narrative    Not on file     Current Outpatient Medications   Medication Sig Dispense Refill    alendronate (FOSAMAX) 70 MG tablet Take 1 tablet by mouth every 7 days 12 tablet 3    nystatin (MYCOSTATIN) 026342 UNIT/GM cream Apply topically 2 times daily. 1 Tube 0    simvastatin (ZOCOR) 20 MG tablet Take 1 tablet by mouth nightly 90 tablet 1    levothyroxine (SYNTHROID) 150 MCG tablet TAKE 1 TABLET BY MOUTH EVERY DAY 90 tablet 0    magnesium oxide (MAG-OX) 400 (240 Mg) MG tablet Take 2 tablets 3 times day 540 tablet 2    [employer] VITAMIN D3 250 MCG (90126 UT) CAPS capsule TAKE 1 CAPSULE BY MOUTH ONE TIME PER WEEK 4 capsule 5    celecoxib (CELEBREX) 100 MG capsule TAKE 2 CAPSULES BY MOUTH TWICE A DAY (Patient taking differently: daily TAKE 2 CAPSULES BY MOUTH TWICE A DAY) 120 capsule 2    folic acid (FOLVITE) 1 MG tablet Take 1 tablet by mouth daily 30 tablet 0    triamcinolone (KENALOG) 0.1 % cream APPLY TO AFFECTED AREA TWICE A DAY 45 g 1    VENTOLIN  (90 Base) MCG/ACT inhaler TAKE 2 PUFFS BY MOUTH EVERY 6 HOURS AS NEEDED FOR WHEEZE 1 Inhaler 5    acetaminophen (TYLENOL) 500 MG tablet Take 500 mg by mouth daily as needed for Pain       Vedolizumab (ENTYVIO IV) Infuse 300 mg intravenously See Admin Instructions LAST DOSE GIVEN ON 4-      blood glucose test strips (ASCENSIA AUTODISC VI;ONE TOUCH ULTRA TEST VI) strip 1 each by In Vitro route daily As needed.  100 each 3    ONE TOUCH LANCETS MISC 1 each by Does not apply route daily 100 each 3    gabapentin (NEURONTIN) 100 MG capsule Take 2 capsules by mouth nightly for 30 days. I PO TID (Patient taking differently: Take 200 mg by mouth nightly. 1 in the pm) 180 capsule 1     No current facility-administered medications for this visit. No changes in past medicalhistory, past surgical history, social history, or family history were noted during the patient encounter unless specifically listed above. All updates of past medical history, past surgical history, social history, orfamily history were reviewed personally by me during the office visit. All problems listed in the assessment are stable unless noted otherwise. Medication profile reviewed personally by me during the office visit. Medication side effects and possible impairments from medications were discussed as applicable. Objective:       Physical Exam  Vitals signs and nursing note reviewed. Constitutional:       General: She is not in acute distress. Appearance: Normal appearance. She is well-developed. HENT:      Head: Normocephalic and atraumatic. Right Ear: Tympanic membrane, ear canal and external ear normal.      Left Ear: Tympanic membrane, ear canal and external ear normal.      Nose: Nose normal.      Mouth/Throat:      Pharynx: Uvula midline. No oropharyngeal exudate. Eyes:      General: Lids are normal.      Conjunctiva/sclera: Conjunctivae normal.      Pupils: Pupils are equal, round, and reactive to light. Neck:      Musculoskeletal: Normal range of motion and neck supple. Thyroid: No thyromegaly. Vascular: No carotid bruit or JVD. Cardiovascular:      Rate and Rhythm: Normal rate and regular rhythm. Pulses: Normal pulses. Radial pulses are 2+ on the right side and 2+ on the left side. Dorsalis pedis pulses are 2+ on the right side and 2+ on the left side.         Posterior tibial pulses are 2+ on the right side and 2+ on the left side. Heart sounds: Normal heart sounds. No murmur. No friction rub. No gallop. Pulmonary:      Effort: Pulmonary effort is normal.      Breath sounds: Normal breath sounds. Abdominal:      General: Bowel sounds are normal.      Palpations: Abdomen is soft. There is no mass. Tenderness: There is no abdominal tenderness. Musculoskeletal: Normal range of motion. Lymphadenopathy:      Head:      Right side of head: No submandibular adenopathy. Left side of head: No submandibular adenopathy. Cervical: No cervical adenopathy. Skin:     General: Skin is warm and dry. Findings: No lesion or rash. Neurological:      Mental Status: She is alert and oriented to person, place, and time. Gait: Gait normal.   Psychiatric:         Speech: Speech normal.         Behavior: Behavior normal.         Thought Content: Thought content normal.         Judgment: Judgment normal.         /70 (Site: Right Upper Arm, Position: Sitting, Cuff Size: Large Adult)   Pulse 81   Ht 5' 5\" (1.651 m)   Wt 211 lb (95.7 kg)   SpO2 97%   BMI 35.11 kg/m²   Body mass index is 35.11 kg/m².     Lab Review   Hospital Outpatient Visit on 04/27/2021   Component Date Value    WBC 04/27/2021 5.4     RBC 04/27/2021 4.12     Hemoglobin 04/27/2021 13.0     Hematocrit 04/27/2021 38.7     MCV 04/27/2021 93.9     MCH 04/27/2021 31.5     MCHC 04/27/2021 33.6     RDW 04/27/2021 14.0     Platelets 31/69/2496 140     MPV 04/27/2021 9.6     Sodium 04/27/2021 137     Potassium 04/27/2021 4.2     Chloride 04/27/2021 102     CO2 04/27/2021 26     Anion Gap 04/27/2021 9     Glucose 04/27/2021 113*    BUN 04/27/2021 19     CREATININE 04/27/2021 0.9     GFR Non- 04/27/2021 >60     GFR  04/27/2021 >60     Calcium 04/27/2021 9.0     Total Protein 04/27/2021 7.7     Albumin 04/27/2021 3.9     Albumin/Globulin Ratio 04/27/2021 1.0*    Total Bilirubin 04/27/2021 0.6     Alkaline Phosphatase 04/27/2021 110     ALT 04/27/2021 27     AST 04/27/2021 40*    Globulin 04/27/2021 3.8               Assessment:       1. Diet-controlled type 2 diabetes mellitus ([address]    2. Other ulcerative colitis without complication [address]    3. Morbidly obese ([address]    4. Essential hypertension    5. Hypothyroidism, unspecified type    6. Mixed hyperlipidemia    7. Enteropathic arthritis    8. Vitamin D deficiency    9. B12 deficiency    10. Hypomagnesemia    11. Anxiety and depression        No results found for this visit on 05/10/21. Plan:       Saul Cruz was seen today for diabetes, hypertension, hyperlipidemia, anxiety and other. Diagnoses and all orders for this visit:    Diet-controlled type 2 diabetes mellitus ([facility] [address]  -     Hemoglobin A1C  Diabetes Mellitus type II, under good control   1. Rx changes: wait on a1c results  2. Education: Reviewed ABCs of diabetes management (respective goals in parentheses):  A1C (<7), blood pressure (<130/80), and cholesterol (LDL <100). 3.  Compliance at present is estimated to be good. Efforts to improve compliance (if necessary) will be directed at dietary modifications, increased exercise and regular blood sugar monitoring. Diabetes education provided today:  Metabolic syndrome: association of diabetes with dyslipidemia, HTN and obesity. Diabetic Neuropathy: signs and therapy. Foot care: advised to wash feet daily, pat dry and apply lotion at night, avoiding between toes. Need to look at feet daily and report to a physician any signs of inflammation or skin damage. Discussed diabetes shoes and socks. Diabetic retinopathy: the most frequent cause of blindness in [place] currently. Measures to prevent that. Diabetic nephropathy: Kidney function, microalbumin as a sign of diabetic nephropathy. Stages of kidney disease. Nutrition as a mainstream of diabetes therapy.   Carbs: good carbs and bad carbs, importance of carb counting, incorporation of protein with each meal to reduce Glycemic index, importance of portions,   Fats: Good fats and bad fats, meal planning and supplements. Physical activity: advised to exercise 5-7 days a week 30-60 mins at least. Discussed how it affects BS readings. Other ulcerative colitis without complication ([address]  Patient continues on Entyvio IV through her GI specialist and is doing well    Morbidly obese ([address]  The patient is back on weight watchers and has lost 20 pounds in the past few months   Discussed healthy lifestyle choices to attempt to incorporate into daily routine to attempt to obtain and maintain a healthy weight. General weight loss/lifestyle modification strategies discussed (elicit support from others; identify saboteurs; non-food rewards, etc). Discussed healthy nutritional options as well. Disscussed trying to incorporate routine physical activity into daily regimen. Informal exercise measures discussed, e.g. taking stairs instead of elevator. Regular aerobic exercise program discussed. Behavioral treatment: discussed commercial programs ([employer], Nutrisystem,etc), Overeaters Anonymous, Slim Fast, stress management and TOPS. Discussed consequences of obesity in terms of medical conditions that may develop in the future. Patient verbalized understanding. Essential hypertension  Hypertension, Blood pressure is  well controlled--not on medication regimen. Dietary sodium restriction. Regular aerobic exercise. Check blood pressures monthly and record. Hypothyroidism, unspecified type  -     TSH with Reflex  Condition appears stable with current medication regimen. Continue current medications. No reported or noted side effects of medication. Will continue to monitor at routine intervals as appropriate.      Mixed hyperlipidemia  -     Lipid Panel  -     Hepatic Function Panel  The patient is asked to make an attempt to improve diet and exercise patterns to aid in medical management of this problem. Enteropathic arthritis  Patient is now only using Celebrex 2 capsules daily and is doing wellCondition appears stable with current medication regimen. No reported or noted side effects of medication. Will continue to monitor at routine intervals as appropriate. Continue current medications as follows:  -     celecoxib (CELEBREX) 100 MG capsule; TAKE 2 CAPSULES BY MOUTH  DAILY  Discussed with the patient that clinical evidence has shown that there is a drugdisease state interaction between anti-inflammatory drugs and cardiovascular disease. NSAIDs and Haley 2 inhibitors are associated with adverse cardiovascular outcomes. In addition, NSAIDs can also attenuate the effects of some antihypertensive agents. NSAIDs/coccyx2 inhibitors should be used at the lowest effective dose and for the shortest time necessary in any patients with a history of, or at high risk for, cardiovascular disease (hypertension, myocardial infarction, CAD, heart failure and diabetes). Patient also educated on increased risks of bleeding associated with NSAID therapy. Patient feels benefits of NSAID use outweighs the risks and desires to continue. Vitamin D deficiency  -     Vitamin D 25 Hydroxy  Discussed with patient that we make vitamin D from the sun and get it from some food sources, but it is very common to be deficient. Discussed the need for vitamin D replacement because low vitamin D can cause fatigue, joint aches and has been implicated in heart disease, bone disease like osteoporosis, and some other chronic illnesses. Patient will start/continue vitamin D supplement as per order. Recommend vitamin D to be rechecked in 6 months. B12 deficiency  -     Vitamin B12 & Folate    Hypomagnesemia  -     MAGNESIUM    Anxiety and depression  Condition appears stable  Not on medication   Will continue to monitor at routine intervals as appropriate.        Patient has been instructed call the office immediately with new symptoms, change in symptoms or worseningof symptoms. If this is not feasible, patient is instructed to report to the emergency room. Medication profile reviewed. Medication side effects and possible impairments from medications were discussed as applicable. Allergies were reviewed. Health maintenance was reviewed and updated as appropriate. Return in about 6 months (around 11/10/2021) for [facility]. (Comment: Please note this report has been produced using a combination of typing and speech recognition software and may contain errors related to that system including errors in grammar, punctuation, and spelling, as well as words and phrases that may be inappropriate.  If there are any questions or concerns please feel free to contact the dictating provider for clarification.)

## 2021-05-11 LAB
ALBUMIN SERPL-MCNC: 4.1 G/DL (ref 3.4–5)
ALP BLD-CCNC: 98 U/L (ref 40–129)
ALT SERPL-CCNC: 22 U/L (ref 10–40)
AST SERPL-CCNC: 33 U/L (ref 15–37)
BILIRUB SERPL-MCNC: 0.5 MG/DL (ref 0–1)
BILIRUBIN DIRECT: <0.2 MG/DL (ref 0–0.3)
BILIRUBIN, INDIRECT: NORMAL MG/DL (ref 0–1)
CHOLESTEROL, TOTAL: 134 MG/DL (ref 0–199)
ESTIMATED AVERAGE GLUCOSE: 102.5 MG/DL
FOLATE: >20 NG/ML (ref 4.78–24.2)
HBA1C MFR BLD: 5.2 %
HDLC SERPL-MCNC: 48 MG/DL (ref 40–60)
LDL CHOLESTEROL CALCULATED: 69 MG/DL
MAGNESIUM: 1.5 MG/DL (ref 1.8–2.4)
T3 TOTAL: 1.13 NG/ML (ref 0.8–2)
T4 FREE: 1.7 NG/DL (ref 0.9–1.8)
TOTAL PROTEIN: 7 G/DL (ref 6.4–8.2)
TRIGL SERPL-MCNC: 86 MG/DL (ref 0–150)
TSH REFLEX: 0.18 UIU/ML (ref 0.27–4.2)
VITAMIN B-12: 276 PG/ML (ref 211–911)
VITAMIN D 25-HYDROXY: 66 NG/ML
VLDLC SERPL CALC-MCNC: 17 MG/DL

## 2021-05-13 ENCOUNTER — TELEPHONE (OUTPATIENT)
Dept: FAMILY MEDICINE CLINIC | Age: 66
End: 2021-05-13

## 2021-05-20 RX ORDER — MULTIVITAMIN/IRON/FOLIC ACID 18MG-0.4MG
500 TABLET ORAL 3 TIMES DAILY
Qty: 360 TABLET | Refills: 1 | Status: SHIPPED | OUTPATIENT
Start: 2021-05-20 | End: 2021-07-20

## 2021-05-25 ENCOUNTER — HOSPITAL ENCOUNTER (OUTPATIENT)
Dept: NURSING | Age: 66
Setting detail: INFUSION SERIES
Discharge: HOME | End: 2021-05-25
Payer: MEDICARE

## 2021-05-25 VITALS
SYSTOLIC BLOOD PRESSURE: 133 MMHG | RESPIRATION RATE: 16 BRPM | WEIGHT: 205 LBS | HEIGHT: 65 IN | DIASTOLIC BLOOD PRESSURE: 69 MMHG | TEMPERATURE: 98.3 F | HEART RATE: 63 BPM | BODY MASS INDEX: 34.16 KG/M2

## 2021-05-25 DIAGNOSIS — K51.90 ULCERATIVE COLITIS WITHOUT COMPLICATIONS, UNSPECIFIED LOCATION (HCC): ICD-10-CM

## 2021-05-25 DIAGNOSIS — K52.9 COLITIS: ICD-10-CM

## 2021-05-25 DIAGNOSIS — K51.00 CHRONIC ULCERATIVE ENTEROCOLITIS WITHOUT COMPLICATION (HCC): Primary | ICD-10-CM

## 2021-05-25 PROCEDURE — 2580000003 HC RX 258: Performed by: INTERNAL MEDICINE

## 2021-05-25 PROCEDURE — 96365 THER/PROPH/DIAG IV INF INIT: CPT

## 2021-05-25 PROCEDURE — 99211 OFF/OP EST MAY X REQ PHY/QHP: CPT

## 2021-05-25 PROCEDURE — 6360000002 HC RX W HCPCS: Performed by: INTERNAL MEDICINE

## 2021-05-25 RX ORDER — SODIUM CHLORIDE 0.9 % (FLUSH) 0.9 %
10 SYRINGE (ML) INJECTION PRN
Status: CANCELLED | OUTPATIENT
Start: 2021-06-22

## 2021-05-25 RX ORDER — SODIUM CHLORIDE 0.9 % (FLUSH) 0.9 %
10 SYRINGE (ML) INJECTION PRN
Status: DISCONTINUED | OUTPATIENT
Start: 2021-05-25 | End: 2021-05-26 | Stop reason: HOSPADM

## 2021-05-25 RX ORDER — SODIUM CHLORIDE 9 MG/ML
INJECTION, SOLUTION INTRAVENOUS CONTINUOUS
Status: CANCELLED | OUTPATIENT
Start: 2021-06-22

## 2021-05-25 RX ORDER — SODIUM CHLORIDE 9 MG/ML
INJECTION, SOLUTION INTRAVENOUS CONTINUOUS
Status: ACTIVE | OUTPATIENT
Start: 2021-05-25 | End: 2021-05-25

## 2021-05-25 RX ADMIN — Medication 10 ML: at 09:25

## 2021-05-25 RX ADMIN — SODIUM CHLORIDE: 9 INJECTION, SOLUTION INTRAVENOUS at 09:35

## 2021-05-25 RX ADMIN — VEDOLIZUMAB 300 MG: 300 INJECTION, POWDER, LYOPHILIZED, FOR SOLUTION INTRAVENOUS at 09:38

## 2021-05-25 ASSESSMENT — PAIN SCALES - GENERAL: PAINLEVEL_OUTOF10: 0

## 2021-05-25 NOTE — PROGRESS NOTES
Entyvio infused and tubing was flushed with 80 cc NS  IV removed without difficulty Cath tip intact  Pressure then pressure dressing was applied and secured with a coban dressing  IV site unremarkable  Discharge instructions reviewed with pt and copy was given  Understanding verbalized  Pt was then up to BR and then was discharged ambulatory in stable condition

## 2021-05-25 NOTE — PROGRESS NOTES
Pt here for entyvio infusion  pt without c/o's today  She reports that her stools are still mostly loose to soft  with occasional constipation   Pt denies any cramping, bloating or bleeding  IV # 22 was started in [place] on 1st attempt per myself Pt gayla well  No blood work needed for today   Entyvio 300 mg IVPB starting to infuse  IV site unremarkable Will monitor

## 2021-06-19 ENCOUNTER — HOSPITAL ENCOUNTER (EMERGENCY)
Age: 66
Discharge: HOME | End: 2021-06-19
Attending: EMERGENCY MEDICINE
Payer: MEDICARE

## 2021-06-19 ENCOUNTER — APPOINTMENT (OUTPATIENT)
Dept: GENERAL RADIOLOGY | Age: 66
End: 2021-06-19
Payer: MEDICARE

## 2021-06-19 VITALS
BODY MASS INDEX: 33.49 KG/M2 | OXYGEN SATURATION: 99 % | DIASTOLIC BLOOD PRESSURE: 108 MMHG | HEIGHT: 65 IN | HEART RATE: 71 BPM | SYSTOLIC BLOOD PRESSURE: 164 MMHG | TEMPERATURE: 98.5 F | WEIGHT: 201 LBS | RESPIRATION RATE: 18 BRPM

## 2021-06-19 DIAGNOSIS — R93.6 ABNORMAL X-RAY OF LOWER EXTREMITY: ICD-10-CM

## 2021-06-19 DIAGNOSIS — N18.9 CHRONIC KIDNEY DISEASE, UNSPECIFIED CKD STAGE: ICD-10-CM

## 2021-06-19 DIAGNOSIS — L03.116 LEFT LEG CELLULITIS: Primary | ICD-10-CM

## 2021-06-19 DIAGNOSIS — L98.9 SKIN LESION OF LEFT LEG: ICD-10-CM

## 2021-06-19 DIAGNOSIS — R03.0 ELEVATED BLOOD PRESSURE READING: ICD-10-CM

## 2021-06-19 LAB
A/G RATIO: 1.2 (ref 1.1–2.2)
ALBUMIN SERPL-MCNC: 4.1 G/DL (ref 3.4–5)
ALP BLD-CCNC: 90 U/L (ref 40–129)
ALT SERPL-CCNC: 24 U/L (ref 10–40)
ANION GAP SERPL CALCULATED.3IONS-SCNC: 9 MMOL/L (ref 3–16)
AST SERPL-CCNC: 34 U/L (ref 15–37)
BASOPHILS ABSOLUTE: 0.1 K/UL (ref 0–0.2)
BASOPHILS RELATIVE PERCENT: 0.9 %
BILIRUB SERPL-MCNC: 0.8 MG/DL (ref 0–1)
BUN BLDV-MCNC: 21 MG/DL (ref 7–20)
CALCIUM SERPL-MCNC: 9.8 MG/DL (ref 8.3–10.6)
CHLORIDE BLD-SCNC: 104 MMOL/L (ref 99–110)
CO2: 28 MMOL/L (ref 21–32)
CREAT SERPL-MCNC: 1.1 MG/DL (ref 0.6–1.2)
EOSINOPHILS ABSOLUTE: 0.2 K/UL (ref 0–0.6)
EOSINOPHILS RELATIVE PERCENT: 3.7 %
GFR AFRICAN AMERICAN: >60
GFR NON-AFRICAN AMERICAN: 50
GLOBULIN: 3.5 G/DL
GLUCOSE BLD-MCNC: 84 MG/DL (ref 70–99)
HCT VFR BLD CALC: 39.5 % (ref 36–48)
HEMOGLOBIN: 13.2 G/DL (ref 12–16)
LACTIC ACID: 0.9 MMOL/L (ref 0.4–2)
LYMPHOCYTES ABSOLUTE: 1.9 K/UL (ref 1–5.1)
LYMPHOCYTES RELATIVE PERCENT: 28.3 %
MCH RBC QN AUTO: 31.2 PG (ref 26–34)
MCHC RBC AUTO-ENTMCNC: 33.3 G/DL (ref 31–36)
MCV RBC AUTO: 93.7 FL (ref 80–100)
MONOCYTES ABSOLUTE: 0.5 K/UL (ref 0–1.3)
MONOCYTES RELATIVE PERCENT: 7.3 %
NEUTROPHILS ABSOLUTE: 3.9 K/UL (ref 1.7–7.7)
NEUTROPHILS RELATIVE PERCENT: 59.8 %
PDW BLD-RTO: 13.8 % (ref 12.4–15.4)
PLATELET # BLD: 165 K/UL (ref 135–450)
PMV BLD AUTO: 9.9 FL (ref 5–10.5)
POTASSIUM SERPL-SCNC: 4.5 MMOL/L (ref 3.5–5.1)
PRO-BNP: 166 PG/ML (ref 0–124)
RBC # BLD: 4.21 M/UL (ref 4–5.2)
SODIUM BLD-SCNC: 141 MMOL/L (ref 136–145)
TOTAL PROTEIN: 7.6 G/DL (ref 6.4–8.2)
WBC # BLD: 6.6 K/UL (ref 4–11)

## 2021-06-19 PROCEDURE — 36415 COLL VENOUS BLD VENIPUNCTURE: CPT

## 2021-06-19 PROCEDURE — 80053 COMPREHEN METABOLIC PANEL: CPT

## 2021-06-19 PROCEDURE — 73610 X-RAY EXAM OF ANKLE: CPT

## 2021-06-19 PROCEDURE — 83880 ASSAY OF NATRIURETIC PEPTIDE: CPT

## 2021-06-19 PROCEDURE — 99285 EMERGENCY DEPT VISIT HI MDM: CPT

## 2021-06-19 PROCEDURE — 6370000000 HC RX 637 (ALT 250 FOR IP): Performed by: EMERGENCY MEDICINE

## 2021-06-19 PROCEDURE — 73590 X-RAY EXAM OF LOWER LEG: CPT

## 2021-06-19 PROCEDURE — 85025 COMPLETE CBC W/AUTO DIFF WBC: CPT

## 2021-06-19 PROCEDURE — 83605 ASSAY OF LACTIC ACID: CPT

## 2021-06-19 RX ORDER — DOXYCYCLINE 100 MG/1
100 TABLET ORAL 2 TIMES DAILY
Qty: 20 TABLET | Refills: 0 | Status: SHIPPED | OUTPATIENT
Start: 2021-06-19 | End: 2021-06-29

## 2021-06-19 RX ORDER — DOXYCYCLINE HYCLATE 100 MG
100 TABLET ORAL ONCE
Status: COMPLETED | OUTPATIENT
Start: 2021-06-19 | End: 2021-06-19

## 2021-06-19 RX ADMIN — DOXYCYCLINE HYCLATE 100 MG: 100 TABLET, COATED ORAL at 19:44

## 2021-06-19 ASSESSMENT — PAIN DESCRIPTION - LOCATION: LOCATION: LEG

## 2021-06-19 ASSESSMENT — PAIN SCALES - GENERAL: PAINLEVEL_OUTOF10: 4

## 2021-06-19 ASSESSMENT — PAIN DESCRIPTION - ORIENTATION: ORIENTATION: LEFT

## 2021-06-19 NOTE — ED PROVIDER NOTES
[address] [facility]        Pt Name: John Pulido  MRN: [id number]  [place] 1955  Date of evaluation: 6/19/2021  Provider: Waverly Mcardle, MD  PCP: YAHAIRA Lopez [id number]       Chief Complaint   Patient presents with    Wound Check     pt has wound (looks like skin is sluffing), on left lower leg. Leg is discolored, has been for months. pt also states there is pain       HISTORY OFPRESENT ILLNESS   (Location/Symptom, Timing/Onset, Context/Setting, Quality, Duration, Modifying Factors,Severity)  Note limiting factors. John Pulido is a 76 y.o. female presenting today due to concern for having a skin lesion/rash to her left lower leg for at least the last half a year if not longer but over the last 1 week it has become more painful with a burning discomfort. She did prior have a venous ulcer that healed a few years ago. She does have a history of ulcerative colitis and is on immunosuppression. No recent medication adjustments. She was supposed to see a dermatologist in the past but based on the weight, was never able to get in and is never had this biopsied. She denies any fevers, chills, nausea, vomiting, chest pain, shortness of breath, weakness but there is some numbness overlying the wound but not more distal.  She is concerned that she may be developing cellulitis so she came to the ED for further evaluation. She has been trying Aquaphor, mupirocin, along with occasional triamcinolone but states none of these seem to be helping. It is starting to ooze a clear liquid over the last 2 days was also was concerning her. No purulence. She is still able to walk without any difficulty. No other concerns at this time. She denies any recent falls or trauma.         REVIEW OF SYSTEMS    (2-9 systems for level 4, 10 or more for level 5)     Review of Systems   Constitutional: Negative for chills, diaphoresis, fatigue and fever. HENT: Negative for congestion. Respiratory: Negative for shortness of breath. Cardiovascular: Positive for leg swelling. Negative for chest pain. Gastrointestinal: Negative for abdominal pain, nausea and vomiting. Genitourinary: Negative for flank pain. Musculoskeletal: Negative for back pain, gait problem and neck pain. Skin: Positive for color change, rash and wound. Negative for pallor. Neurological: Positive for numbness (only at site of lesion). Negative for weakness. Psychiatric/Behavioral: Negative for confusion. Positives and Pertinent negatives as per HPI.       PASTMEDICAL HISTORY     Past Medical History:   Diagnosis Date    Acute pancreatitis 10/21/2014    Acute renal failure with tubular necrosis (HCC) 7/10/2015    Acute stasis dermatitis of left lower extremity 7/8/2015    Angina pectoris ([facility]) 5/23/2011    Anxiety     Cellulitis of left leg     Chronic back pain     Chronic kidney disease     Clostridium difficile infection 7/9/15, 6/4/15    Colitis     Depression     Diabetes mellitus ([address]     diet controlled    Hearing loss     Heart murmur 10/1/2019    Hyperlipidemia     Hypertension     Hypothyroidism     Left THR 1/25/2016    Liver disease     Lumbar radiculitis 10/1/2019    Obesity     Palpitations 7/17/2013    Primary osteoarthritis of left hip 11/18/2015    Restless legs syndrome     Shock ([address] 10/10/2017    Superficial thrombophlebitis of left leg 7/8/2015    Thyroid disease     Type 2 diabetes mellitus without complication ([facility])     Ulcerative (chronic) enterocolitis ([address] 04/65/6539    Uncomplicated asthma 2/8/6919    Venous ulcer of left leg ([address]          SURGICAL HISTORY       Past Surgical History:   Procedure Laterality Date    APPENDECTOMY  1962    CARDIAC CATHETERIZATION  05/2014    normal    COLONOSCOPY      EPIDURAL STEROID INJECTION Left 01/22/2019    LEFT LUMBAR FOUR FIVE EPIDURAL STEROID INJECTION SITE CONFIRMED BY FLUOROSCOPY performed by Adryan Foreman MD at [address] [facility] Left 01/19/2016    Dr Mcallister Persons EXTRACTION Left 06/26/2019    PHACOEMULSIFICATION OF LEFT EYE CATARACT WITH INTRAOCULAR LENS IMPLANT performed by Joseph Gresham MD at [facility] Right 07/24/2019    PHACOEMULSIFICATION OF CATARACT RIGHT EYE WITH INTRAOCULAR LENS IMPLANT performed by Joseph Gresham MD at [place] [address]  [address] ASPIR/BIOP ALTERED ANATOMY  10/15/2018    EGD ESOPHAGOGASTRODUODENOSCOPY ULTRASOUND 0403, 1583 performed by Pj Khan DO at [address] EGD TRANSORAL BIOPSY SINGLE/MULTIPLE  10/15/2018    EGD BIOPSY performed by Pj Khan DO at [address] ENDOSCOPY  10/15/2018    EGD [facility] EUS   [facility] Bilateral [facility]       Discharge Medication List as of 6/19/2021 10:10 PM      CONTINUE these medications which have NOT CHANGED    Details   Magnesium Oxide (MAGNESIUM-OXIDE) 250 MG TABS tablet Take 2 tablets by mouth 3 times daily, Disp-360 tablet, R-1Normal      celecoxib (CELEBREX) 100 MG capsule TAKE 2 CAPSULES BY MOUTH  DAILY, Disp-120 capsule, R-2Normal      alendronate (FOSAMAX) 70 MG tablet Take 1 tablet by mouth every 7 days, Disp-12 tablet, R-3Normal      simvastatin (ZOCOR) 20 MG tablet Take 1 tablet by mouth nightly, Disp-90 tablet, R-1Normal      levothyroxine (SYNTHROID) 150 MCG tablet TAKE 1 TABLET BY MOUTH EVERY DAY, Disp-90 tablet, R-0Normal      [employer] VITAMIN D3 250 MCG (91278 UT) CAPS capsule TAKE 1 CAPSULE BY MOUTH ONE TIME PER WEEK, Disp-4 capsule, [id number]      folic acid (FOLVITE) 1 MG tablet Take 1 tablet by mouth daily, Disp-30 tablet, R-0Normal      gabapentin (NEURONTIN) 100 MG capsule Take 2 capsules by mouth nightly for 30 days.  I PO TID, Disp-180 capsule, R-1Normal the Last Year:    [address] in the Last Year:    Transportation Needs:     Lack of Transportation (Medical):  Lack of Transportation (Non-Medical):    Physical Activity:     Days of Exercise per Week:     Minutes of Exercise per Session:    Stress:     Feeling of Stress :    Social Connections:     Frequency of Communication with Friends and Family:     Frequency of Social Gatherings with Friends and Family:     Attends Islam Services:     Active Member of Clubs or Organizations:     Attends Club or Organization Meetings:     Marital Status:    Intimate Partner Violence:     Fear of Current or Ex-Partner:     Emotionally Abused:     Physically Abused:     Sexually Abused:        SCREENINGS    Miguel Coma Scale  Eye Opening: Spontaneous  Best Verbal Response: Oriented  Best Motor Response: Obeys commands  [place] Coma Scale Score: 15           PHYSICAL EXAM    (up to 7 for level 4, 8 or more for level 5)     ED Triage Vitals [21 1859]   BP Temp Temp Source Pulse Resp SpO2 Height Weight   (!) 132/99 98.5 °F (36.9 °C) Oral 69 18 97 % 5' 5\" (1.651 m) 201 lb (91.2 kg)       Physical Exam  Vitals and nursing note reviewed. Constitutional:       Appearance: She is obese. HENT:      Head: Normocephalic and atraumatic. Mouth/Throat:      Mouth: Mucous membranes are moist.   Cardiovascular:      Rate and Rhythm: Normal rate and regular rhythm. Pulses: Normal pulses. Dorsalis pedis pulses are 2+ on the right side and 2+ on the left side. Pulmonary:      Effort: Pulmonary effort is normal. No respiratory distress. Breath sounds: Normal breath sounds. No stridor. Musculoskeletal:      Cervical back: Normal range of motion and neck supple. Right lower le+ Pitting Edema present. Left lower le+ Pitting Edema present. Skin:     General: Skin is warm and moist.      Capillary Refill: Capillary refill takes less than 2 seconds.  Left foot Coloration: Skin is not ashen, cyanotic, jaundiced or pale. Findings: Erythema, lesion, rash and wound present. No abrasion, abscess, acne, bruising, burn, ecchymosis, signs of injury, laceration or petechiae. Rash is crusting, macular and papular. Rash is not nodular, purpuric, pustular, scaling, urticarial or vesicular. Comments: Rash to left lower leg between knee and ankle, warm to touch with yellow-appearing discharge noted     Smaller rash to right lower leg but no signs of infection to this area and nontender   Neurological:      General: No focal deficit present. Mental Status: She is alert and oriented to person, place, and time. Mental status is at baseline. Cranial Nerves: No dysarthria. Sensory: Sensation is intact. No sensory deficit. Motor: Motor function is intact. No weakness, tremor, atrophy, abnormal muscle tone or seizure activity. Gait: Gait is intact. Gait normal.   Psychiatric:         Attention and Perception: Attention normal.         Mood and Affect: Mood and affect normal.         Speech: Speech normal. Speech is not slurred. Behavior: Behavior normal.             DIAGNOSTIC RESULTS   :    Labs Reviewed   COMPREHENSIVE METABOLIC PANEL - Abnormal; Notable for the following components:       Result Value    BUN 21 (*)     GFR Non- 50 (*)     All other components within normal limits    Narrative:     Performed at:  [facility]. [facility]  [address] [place] [address]. [place] [address] [address]    Phone [phone] - Abnormal; Notable for the following components:    Pro- (*)     All other components within normal limits    Narrative:     Performed at:  [facility]. [facility]  [address] [place] [address]. [place] [address] [address]    Phone [phone]   CBC WITH AUTO DIFFERENTIAL    Narrative:     Performed at:  [facility].  [facility]  [address] [facility],  [place] [place], [address]    Phone [phone]   LACTIC ACID, PLASMA    Narrative:     Performed at:  [facility]. [facility]  [address] [facility] [address] [place] [place] [address]    Phone [phone]       All other labs were within normal range or not returned asof this dictation. EKG: All EKG's are interpreted by the Emergency Department Physician who either signs or Co-signs this chart in the absence of a cardiologist.        RADIOLOGY:   Non-plain film images such as CT, Ultrasound and MRI are read by the radiologist. Mery Barer images are visualized and preliminarily interpreted by the  ED Provider with the belowfindings:        Interpretation per the Radiologist below, if available at the time of this note:    XR ANKLE LEFT (MIN 3 VIEWS)   Final Result   Possible nondisplaced hairline fracture in the distal fibula. Circumferential soft tissue swelling, greater medially. XR TIBIA FIBULA LEFT (2 VIEWS)   Final Result   Contour irregularity of the distal fibular metaphysis is suboptimally   evaluated. Dedicated views of the ankle recommended. Patient denies any falls or trauma and no tenderness near the area where x-rays mentioning at this point I have low suspicion for fracture.       PROCEDURES   Unless otherwise noted below, none     Procedures    CRITICAL CARE TIME   N/A    CONSULTS:  None    EMERGENCY DEPARTMENT COURSE and DIFFERENTIAL DIAGNOSIS/MDM:   Vitals:    Vitals:    06/19/21 1859 06/19/21 2020 06/19/21 2100 06/19/21 2200   BP: (!) 132/99 134/71 120/69 (!) 164/108   Pulse: 69 60 62 71   Resp: 18 18 18 18   Temp: 98.5 °F (36.9 °C)      TempSrc: Oral      SpO2: 97% 99% 97% 99%   Weight: 201 lb (91.2 kg)      Height: 5' 5\" (1.651 m)          Patient was given the following medications:  Medications   doxycycline hyclate (VIBRA-TABS) tablet 100 mg (100 mg Oral Given 6/19/21 1944)     Patient was evaluated due to concern for worsening skin lesion over the last week with some discomfort and increasing erythema. She had normal range of motion of the left ankle and left knee and I have low suspicion for septic arthritis. Pain was not out of proportion I have low suspicion for necrotizing fasciitis or Rainey-Mahendra syndrome. No sloughing of skin. At this point, with reassuring lab work and vitals, I do feel that attempting doxycycline for possibility of cellulitis is reasonable. She also has a history of ulcerative colitis and therefore this may be autoimmune phenomenon causing her symptoms and she may want to get a biopsy from a dermatologist.  She also is aware this may be fungal in origin. She is aware if symptoms are worsening over the next 2 to 3 days, then return to the ED for further assessment, but otherwise follow-up with PCP in the next 2 to 3 days and try to get in with dermatology for further evaluation of her chronic wound. She was well-appearing and in no acute distress at time of discharge and felt comfortable with this plan. She is aware that her blood pressure was elevated to have this rechecked by her primary doctor in the next couple of weeks to ensure it improves and if not get treated for. Her main discomfort was on the anterior aspect of the tibia and at this point have low suspicion for DVT. X-ray did not show any sign of osteomyelitis. The patient tolerated their visit well. The patient and / or the family were informed of the results of any tests, a time was given to answer questions. FINAL IMPRESSION      1. Left leg cellulitis    2. Skin lesion of left leg    3. Chronic kidney disease, unspecified CKD stage    4. Abnormal x-ray of lower extremity    5.  Elevated blood pressure reading          DISPOSITION/PLAN   DISPOSITION Decision To Discharge 06/19/2021 10:05:33 PM      PATIENT REFERRED TO:  [facility]  [facility] [place] [address] [address]

## 2021-06-20 ASSESSMENT — ENCOUNTER SYMPTOMS
COLOR CHANGE: 1
BACK PAIN: 0
SHORTNESS OF BREATH: 0
NAUSEA: 0
VOMITING: 0
ABDOMINAL PAIN: 0

## 2021-06-21 ENCOUNTER — TELEPHONE (OUTPATIENT)
Dept: FAMILY MEDICINE CLINIC | Age: 66
End: 2021-06-21

## 2021-06-21 ENCOUNTER — CARE COORDINATION (OUTPATIENT)
Dept: CARE COORDINATION | Age: 66
End: 2021-06-21

## 2021-06-21 NOTE — TELEPHONE ENCOUNTER
----- Message from Trampoline sent at 6/21/2021  2:58 PM EDT -----  Subject: Appointment Request    Reason for Call: Routine ED Follow Up Visit    QUESTIONS  Type of Appointment? Established Patient  Reason for appointment request? Available appointments did not meet   patient need  Additional Information for Provider? URGENT? need ED/FU regarding Left leg   cellulitis will to do virtual visit using cell phone , please call patient   to schedule   ---------------------------------------------------------------------------  --------------  CALL BACK INFO  What is the best way for the office to contact you? OK to leave message on   voicemail  Preferred Call Back Phone Number? [phone]  ---------------------------------------------------------------------------  --------------  SCRIPT ANSWERS  Relationship to Patient? Self  Appointment reason? Well Care/Follow Ups  Select a Well Care/Follow Ups appointment reason? Adult ED Follow Up   [Emergency Room, Emergency Department]  (Patient requests to see provider urgently. )? No  Do you have any questions for your primary care provider that need to be   answered prior to your appointment? No  Have you been diagnosed with, awaiting test results for, or told that you   are suspected of having COVID-19 (Coronavirus)? (If patient has tested   negative or was tested as a requirement for work, school, or travel and   not based on symptoms, answer no)? No  Do you currently have flu-like symptoms including fever or chills, cough,   shortness of breath, difficulty breathing, or new loss of taste or smell? No  Have you had close contact with someone with COVID-19 in the last 14 days? No  (Service Expert  click yes below to proceed with [employer] As Usual   Scheduling)?  Yes

## 2021-06-22 ENCOUNTER — CARE COORDINATION (OUTPATIENT)
Dept: CASE MANAGEMENT | Age: 66
End: 2021-06-22

## 2021-06-22 ENCOUNTER — HOSPITAL ENCOUNTER (OUTPATIENT)
Dept: NURSING | Age: 66
Setting detail: INFUSION SERIES
Discharge: HOME | End: 2021-06-22
Payer: MEDICARE

## 2021-06-22 VITALS
WEIGHT: 201 LBS | TEMPERATURE: 98.7 F | HEIGHT: 65 IN | SYSTOLIC BLOOD PRESSURE: 144 MMHG | DIASTOLIC BLOOD PRESSURE: 75 MMHG | RESPIRATION RATE: 16 BRPM | HEART RATE: 62 BPM | BODY MASS INDEX: 33.49 KG/M2

## 2021-06-22 DIAGNOSIS — K51.00 CHRONIC ULCERATIVE ENTEROCOLITIS WITHOUT COMPLICATION (HCC): Primary | ICD-10-CM

## 2021-06-22 PROCEDURE — 2580000003 HC RX 258: Performed by: INTERNAL MEDICINE

## 2021-06-22 PROCEDURE — 99211 OFF/OP EST MAY X REQ PHY/QHP: CPT

## 2021-06-22 PROCEDURE — 96365 THER/PROPH/DIAG IV INF INIT: CPT

## 2021-06-22 PROCEDURE — 6360000002 HC RX W HCPCS: Performed by: INTERNAL MEDICINE

## 2021-06-22 RX ORDER — SODIUM CHLORIDE 9 MG/ML
INJECTION, SOLUTION INTRAVENOUS CONTINUOUS
Status: ACTIVE | OUTPATIENT
Start: 2021-06-22 | End: 2021-06-22

## 2021-06-22 RX ORDER — SODIUM CHLORIDE 0.9 % (FLUSH) 0.9 %
10 SYRINGE (ML) INJECTION PRN
Status: CANCELLED | OUTPATIENT
Start: 2021-07-20

## 2021-06-22 RX ORDER — SODIUM CHLORIDE 9 MG/ML
INJECTION, SOLUTION INTRAVENOUS CONTINUOUS
Status: CANCELLED | OUTPATIENT
Start: 2021-07-20

## 2021-06-22 RX ORDER — SODIUM CHLORIDE 0.9 % (FLUSH) 0.9 %
10 SYRINGE (ML) INJECTION PRN
Status: DISCONTINUED | OUTPATIENT
Start: 2021-06-22 | End: 2021-06-23 | Stop reason: HOSPADM

## 2021-06-22 RX ADMIN — VEDOLIZUMAB 300 MG: 300 INJECTION, POWDER, LYOPHILIZED, FOR SOLUTION INTRAVENOUS at 10:19

## 2021-06-22 RX ADMIN — Medication 10 ML: at 10:17

## 2021-06-22 RX ADMIN — SODIUM CHLORIDE: 9 INJECTION, SOLUTION INTRAVENOUS at 10:18

## 2021-06-22 ASSESSMENT — PAIN SCALES - GENERAL: PAINLEVEL_OUTOF10: 0

## 2021-06-28 ENCOUNTER — CARE COORDINATION (OUTPATIENT)
Dept: CARE COORDINATION | Age: 66
End: 2021-06-28

## 2021-07-01 ENCOUNTER — VIRTUAL VISIT (OUTPATIENT)
Dept: FAMILY MEDICINE CLINIC | Age: 66
End: 2021-07-01
Payer: MEDICARE

## 2021-07-01 DIAGNOSIS — L98.9 SKIN LESION OF LEFT LOWER EXTREMITY: ICD-10-CM

## 2021-07-01 DIAGNOSIS — S82.832D CLOSED FRACTURE OF DISTAL END OF LEFT FIBULA WITH ROUTINE HEALING, UNSPECIFIED FRACTURE MORPHOLOGY, SUBSEQUENT ENCOUNTER: Primary | ICD-10-CM

## 2021-07-01 DIAGNOSIS — M79.605 LEFT LEG PAIN: ICD-10-CM

## 2021-07-01 PROCEDURE — 1123F ACP DISCUSS/DSCN MKR DOCD: CPT | Performed by: NURSE PRACTITIONER

## 2021-07-01 PROCEDURE — 3017F COLORECTAL CA SCREEN DOC REV: CPT | Performed by: NURSE PRACTITIONER

## 2021-07-01 PROCEDURE — G8399 PT W/DXA RESULTS DOCUMENT: HCPCS | Performed by: NURSE PRACTITIONER

## 2021-07-01 PROCEDURE — G8427 DOCREV CUR MEDS BY ELIG CLIN: HCPCS | Performed by: NURSE PRACTITIONER

## 2021-07-01 PROCEDURE — 1036F TOBACCO NON-USER: CPT | Performed by: NURSE PRACTITIONER

## 2021-07-01 PROCEDURE — 1090F PRES/ABSN URINE INCON ASSESS: CPT | Performed by: NURSE PRACTITIONER

## 2021-07-01 PROCEDURE — 99213 OFFICE O/P EST LOW 20 MIN: CPT | Performed by: NURSE PRACTITIONER

## 2021-07-01 PROCEDURE — 4040F PNEUMOC VAC/ADMIN/RCVD: CPT | Performed by: NURSE PRACTITIONER

## 2021-07-01 PROCEDURE — G8417 CALC BMI ABV UP PARAM F/U: HCPCS | Performed by: NURSE PRACTITIONER

## 2021-07-01 ASSESSMENT — ENCOUNTER SYMPTOMS
EYES NEGATIVE: 1
GASTROINTESTINAL NEGATIVE: 1
NAUSEA: 0
ABDOMINAL PAIN: 0
ANAL BLEEDING: 0
SHORTNESS OF BREATH: 0
BLOOD IN STOOL: 0
RESPIRATORY NEGATIVE: 1
ALLERGIC/IMMUNOLOGIC NEGATIVE: 1

## 2021-07-01 NOTE — PROGRESS NOTES
Austin Vazquez is a 76 y.o. female evaluated via telephone on 7/1/2021.       Consent:  She and/or health care decision maker is aware that that she may receive a bill for this telephone service, depending on her insurance coverage, and has provided verbal consent to proceed: Yes      Cris Viera MA

## 2021-07-01 NOTE — PROGRESS NOTES
2021    TELEHEALTH EVALUATION -- Audio/Visual (During VWUHG-24 public health emergency)    Chief Complaint   Patient presents with    Cellulitis     left leg pt said it is better but still very painful pt finsihed the doxy       HPI:  Lovettivy Brown (:  1955) has requested an audio/video evaluation for the following concern(s):    Patient is here for f/u from er visit for possible LLE cellulitis  Started out as itchy spot and then opened up on left outer ankle for about 1 day prior to going to ER and was draining clear liquid. No culture done. Patient was placed on 10 days of atb therapy (doxy). Was told she might want to see derm for non healing lesion. xrays done  Impression   Possible nondisplaced hairline fracture in the distal fibula. Has a lot of pain at, end of day shoots from side of ankle to 1/2 way up shin. Worse at night. Intermittent during the day. Was not referred to ortho. Review of Systems   Constitutional: Negative. Negative for appetite change, fatigue and unexpected weight change. HENT: Negative. Eyes: Negative. Respiratory: Negative. Negative for shortness of breath. Cardiovascular: Negative. Negative for chest pain, palpitations and leg swelling. Gastrointestinal: Negative. Negative for abdominal pain, anal bleeding, blood in stool and nausea. Endocrine: Negative. Genitourinary: Negative. Negative for hematuria. Musculoskeletal: Positive for arthralgias. Skin: Positive for wound. Negative for rash. Allergic/Immunologic: Negative. Neurological: Negative. Negative for dizziness, syncope, light-headedness and numbness. Hematological: Negative. Does not bruise/bleed easily. Psychiatric/Behavioral: Negative. All other systems reviewed and are negative. Prior to Visit Medications    Medication Sig Taking?  Authorizing Provider   Magnesium Oxide (MAGNESIUM-OXIDE) 250 MG TABS tablet Take 2 tablets by mouth 3 times daily Yes YAHAIRA Ortega CNP   celecoxib (CELEBREX) 100 MG capsule TAKE 2 CAPSULES BY MOUTH  DAILY Yes YAHAIRA Ortega CNP   alendronate (FOSAMAX) 70 MG tablet Take 1 tablet by mouth every 7 days Yes YAHAIRA Ortega CNP   nystatin (MYCOSTATIN) 842477 UNIT/GM cream Apply topically 2 times daily. Yes YAHAIRA Ortega CNP   simvastatin (ZOCOR) 20 MG tablet Take 1 tablet by mouth nightly Yes YAHAIRA Ortega CNP   levothyroxine (SYNTHROID) 150 MCG tablet TAKE 1 TABLET BY MOUTH EVERY DAY Yes YAHAIRA Ortega CNP   [employer] VITAMIN D3 250 MCG (55636 UT) CAPS capsule TAKE 1 CAPSULE BY MOUTH ONE TIME PER WEEK Yes YAHAIRA Ortega CNP   folic acid (FOLVITE) 1 MG tablet Take 1 tablet by mouth daily Yes YAHAIRA Ortega CNP   triamcinolone (KENALOG) 0.1 % cream APPLY TO AFFECTED AREA TWICE A DAY Yes YAHAIRA Ortega CNP   VENTOLIN  (90 Base) MCG/ACT inhaler TAKE 2 PUFFS BY MOUTH EVERY 6 HOURS AS NEEDED FOR WHEEZE Yes Ritchie Aase Conn, APRN - CNP   acetaminophen (TYLENOL) 500 MG tablet Take 500 mg by mouth daily as needed for Pain  Yes Historical Provider, MD   Vedolizumab (ENTYVIO IV) Infuse 300 mg intravenously See Admin Instructions LAST DOSE GIVEN ON 6- Yes Historical Provider, MD   blood glucose test strips (ASCENSIA AUTODISC VI;ONE TOUCH ULTRA TEST VI) strip 1 each by In Vitro route daily As needed. Yes YAHAIRA Ortega CNP   ONE TOUCH LANCETS MISC 1 each by Does not apply route daily Yes YAHAIRA Arboleda CNP   gabapentin (NEURONTIN) 100 MG capsule Take 2 capsules by mouth nightly for 30 days. I PO TID  Patient taking differently: Take 200 mg by mouth nightly.  1 in the pm  YAHAIRA Ortega CNP       Social History     Tobacco Use    Smoking status: Never Smoker    Smokeless tobacco: Never Used    Tobacco comment: never even tried it   Vaping Use    Vaping Use: Never used Substance Use Topics    Alcohol use: No     Alcohol/week: 0.0 standard drinks     Comment: rarely    Drug use: No        Allergies   Allergen Reactions    Amoxicillin      \"colitis\"    Pravastatin Rash    Wellbutrin [Bupropion] Other (See Comments) and Rash     Caused sores in mouth   ,   Past Medical History:   Diagnosis Date    Acute pancreatitis 10/21/2014    Acute renal failure with tubular necrosis (HCC) 7/10/2015    Acute stasis dermatitis of left lower extremity 7/8/2015    Angina pectoris ([address] 5/23/2011    Anxiety     Cellulitis of left leg     Chronic back pain     Chronic kidney disease     Clostridium difficile infection 7/9/15, 6/4/15    Colitis     Depression     Diabetes mellitus (HCC)     diet controlled    Hearing loss     Heart murmur 10/1/2019    Hyperlipidemia     Hypertension     Hypothyroidism     Left THR 1/25/2016    Liver disease     Lumbar radiculitis 10/1/2019    Obesity     Palpitations 7/17/2013    Primary osteoarthritis of left hip 11/18/2015    Restless legs syndrome     Shock ([address] 10/10/2017    Superficial thrombophlebitis of left leg 7/8/2015    Thyroid disease     Type 2 diabetes mellitus without complication (HCC)     Ulcerative (chronic) enterocolitis ([address] 57/60/3318    Uncomplicated asthma 6/4/7818    Venous ulcer of left leg ([address]    ,   Past Surgical History:   Procedure Laterality Date    APPENDECTOMY  1962    CARDIAC CATHETERIZATION  05/2014    normal    COLONOSCOPY      EPIDURAL STEROID INJECTION Left 01/22/2019    LEFT LUMBAR FOUR FIVE EPIDURAL STEROID INJECTION SITE CONFIRMED BY FLUOROSCOPY performed by Fela Doyle MD at [address] [place] [address] Left 01/19/2016    Dr Nidia Felipe EXTRACTION Left 06/26/2019    PHACOEMULSIFICATION OF LEFT EYE CATARACT WITH INTRAOCULAR LENS IMPLANT performed by Med Castaneda MD at [facility] Right 07/24/2019 Pneumococcal Polysaccharide ([id number]) 10/22/2014, 10/21/2020    Tdap (Boostrix, Adacel) 06/29/2017    Zoster Live (Zostavax) 06/06/2016   ,   Health Maintenance   Topic Date Due    Shingles Vaccine (2 of 3) 11/09/2021 (Originally 8/1/2016)    Flu vaccine (1) 09/01/2021    Diabetic foot exam  11/09/2021    Diabetic microalbuminuria test  11/09/2021    Colon Cancer Screen FIT/FOBT  03/03/2022    Annual Wellness Visit (AWV)  03/04/2022    Diabetic retinal exam  03/05/2022    A1C test (Diabetic or Prediabetic)  05/10/2022    Lipid screen  05/10/2022    TSH testing  05/10/2022    Breast cancer screen  03/30/2023    DTaP/Tdap/Td vaccine (2 - Td or Tdap) 06/29/2027    DEXA (modify frequency per FRAX score)  Completed    Pneumococcal 65+ years Vaccine  Completed    COVID-19 Vaccine  Completed    Hepatitis C screen  Completed    Hepatitis A vaccine  Aged Out    Hib vaccine  Aged Out    Meningococcal (ACWY) vaccine  Aged Out       PHYSICAL EXAMINATION:  [ INSTRUCTIONS:  \"[x]\" Indicates a positive item  \"[]\" Indicates a negative item  -- DELETE ALL ITEMS NOT EXAMINED]  Vital Signs: (As obtained by patient/caregiver or practitioner observation)    Blood pressure-  Heart rate-    Respiratory rate-    Temperature-  Pulse oximetry-     Constitutional: [x] Appears well-developed and well-nourished [x] No apparent distress      [] Abnormal-   Mental status  [x] Alert and awake  [x] Oriented to person/place/time [x]Able to follow commands      Eyes:  EOM    [x]  Normal  [] Abnormal-  Sclera  [x]  Normal  [] Abnormal -         Discharge [x]  None visible  [] Abnormal -    HENT:   [x] Normocephalic, atraumatic.   [] Abnormal   [x] Mouth/Throat: Mucous membranes are moist.     External Ears [x] Normal  [] Abnormal-     Neck: [x] No visualized mass     Pulmonary/Chest: [x] Respiratory effort normal.  [x] No visualized signs of difficulty breathing or respiratory distress        [] Abnormal-      Musculoskeletal: Virtual Visit was conducted with patient's (and/or legal guardian's) consent, to reduce the patient's risk of exposure to COVID-19 and provide necessary medical care. The patient (and/or legal guardian) has also been advised to contact this office for worsening conditions or problems, and seek emergency medical treatment and/or call 911 if deemed necessary. Patient identification was verified at the start of the visit: Yes    Total time spent on this encounter: 20 minutes    Services were provided through a video synchronous discussion virtually to substitute for in-person clinic visit. Patient and provider were located at their individual homes. --YAHAIRA Braxton - CNP on 7/1/2021 at 2:45 PM    An electronic signature was used to authenticate this note.

## 2021-07-07 ENCOUNTER — OFFICE VISIT (OUTPATIENT)
Dept: ORTHOPEDIC SURGERY | Age: 66
End: 2021-07-07
Payer: MEDICARE

## 2021-07-07 VITALS — WEIGHT: 201 LBS | HEIGHT: 65 IN | BODY MASS INDEX: 33.49 KG/M2

## 2021-07-07 DIAGNOSIS — M25.572 ACUTE LEFT ANKLE PAIN: Primary | ICD-10-CM

## 2021-07-07 PROCEDURE — G8399 PT W/DXA RESULTS DOCUMENT: HCPCS | Performed by: ORTHOPAEDIC SURGERY

## 2021-07-07 PROCEDURE — G8427 DOCREV CUR MEDS BY ELIG CLIN: HCPCS | Performed by: ORTHOPAEDIC SURGERY

## 2021-07-07 PROCEDURE — 1036F TOBACCO NON-USER: CPT | Performed by: ORTHOPAEDIC SURGERY

## 2021-07-07 PROCEDURE — G8417 CALC BMI ABV UP PARAM F/U: HCPCS | Performed by: ORTHOPAEDIC SURGERY

## 2021-07-07 PROCEDURE — 4040F PNEUMOC VAC/ADMIN/RCVD: CPT | Performed by: ORTHOPAEDIC SURGERY

## 2021-07-07 PROCEDURE — 99213 OFFICE O/P EST LOW 20 MIN: CPT | Performed by: ORTHOPAEDIC SURGERY

## 2021-07-07 PROCEDURE — 1123F ACP DISCUSS/DSCN MKR DOCD: CPT | Performed by: ORTHOPAEDIC SURGERY

## 2021-07-07 PROCEDURE — 1090F PRES/ABSN URINE INCON ASSESS: CPT | Performed by: ORTHOPAEDIC SURGERY

## 2021-07-07 PROCEDURE — 3017F COLORECTAL CA SCREEN DOC REV: CPT | Performed by: ORTHOPAEDIC SURGERY

## 2021-07-07 NOTE — PROGRESS NOTES
ANKLE VISIT        HISTORY OF PRESENT ILLNESS    Alisa Waddell is a 76 y.o. female who presents for evaluation of left ankle pain she has had now for the last 3 weeks. She has had issues with infections in her skin with chronic venous stasis and cellulitis. She started having pain on the medial side of her left ankle around the medial malleolus and now has had shooting pains going up along the course of the infection. After 2 days of having severe pain on the medial side she went to the ER had x-rays taken and was told that she had a fracture of the lateral malleolus. She has been working using a cane and prefers not to miss work if possible. She grades her pain intermittent in 4-6 over 10. She has had gabapentin that she takes 1 to 200 mg at night only. ROS    Well-documented patient history form dated 7/7/2021  All other ROS negative except for above.     Past Surgical history    Past Surgical History:   Procedure Laterality Date    APPENDECTOMY  1962    CARDIAC CATHETERIZATION  05/2014    normal    COLONOSCOPY      EPIDURAL STEROID INJECTION Left 01/22/2019    LEFT LUMBAR FOUR FIVE EPIDURAL STEROID INJECTION SITE CONFIRMED BY FLUOROSCOPY performed by Jeannie Kumari MD at [address] [place] [address] Left 01/19/2016    Dr Axel Garcias EXTRACTION Left 06/26/2019    PHACOEMULSIFICATION OF LEFT EYE CATARACT WITH INTRAOCULAR LENS IMPLANT performed by Xiomy Engle MD at [facility] Right 07/24/2019    PHACOEMULSIFICATION OF CATARACT RIGHT EYE WITH INTRAOCULAR LENS IMPLANT performed by Xiomy Engle MD at [place] [address]  [facility] ASPIR/BIOP ALTERED ANATOMY  10/15/2018    EGD ESOPHAGOGASTRODUODENOSCOPY ULTRASOUND 0403, 1583 performed by Renny Gomez DO at [address] EGD TRANSORAL BIOPSY SINGLE/MULTIPLE  10/15/2018    EGD BIOPSY performed by Renny Gomez DO at [facility] TONSILLECTOMY  1957    UPPER GASTROINTESTINAL ENDOSCOPY  10/15/2018    EGD [facility] EUS    VARICOSE VEIN SURGERY Bilateral 1988       PAST MEDICAL    Past Medical History:   Diagnosis Date    Acute pancreatitis 10/21/2014    Acute renal failure with tubular necrosis (HCC) 7/10/2015    Acute stasis dermatitis of left lower extremity 7/8/2015    Angina pectoris ([address] 5/23/2011    Anxiety     Cellulitis of left leg     Chronic back pain     Chronic kidney disease     Clostridium difficile infection 7/9/15, 6/4/15    Colitis     Depression     Diabetes mellitus ([address]     diet controlled    Hearing loss     Heart murmur 10/1/2019    Hyperlipidemia     Hypertension     Hypothyroidism     Left THR 1/25/2016    Liver disease     Lumbar radiculitis 10/1/2019    Obesity     Palpitations 7/17/2013    Primary osteoarthritis of left hip 11/18/2015    Restless legs syndrome     Shock ([address] 10/10/2017    Superficial thrombophlebitis of left leg 7/8/2015    Thyroid disease     Type 2 diabetes mellitus without complication (HCC)     Ulcerative (chronic) enterocolitis ([facility] 08/29/2653    Uncomplicated asthma 9/2/0749    Venous ulcer of left leg (HCC)        Allergies    Allergies   Allergen Reactions    Amoxicillin      \"colitis\"    Pravastatin Rash    Wellbutrin [Bupropion] Other (See Comments) and Rash     Caused sores in mouth       Meds    Current Outpatient Medications   Medication Sig Dispense Refill    Magnesium Oxide (MAGNESIUM-OXIDE) 250 MG TABS tablet Take 2 tablets by mouth 3 times daily 360 tablet 1    celecoxib (CELEBREX) 100 MG capsule TAKE 2 CAPSULES BY MOUTH  DAILY 120 capsule 2    alendronate (FOSAMAX) 70 MG tablet Take 1 tablet by mouth every 7 days 12 tablet 3    nystatin (MYCOSTATIN) 598920 UNIT/GM cream Apply topically 2 times daily.  1 Tube 0    simvastatin (ZOCOR) 20 MG tablet Take 1 tablet by mouth nightly 90 tablet 1    levothyroxine (SYNTHROID) 150 MCG tablet TAKE 1 TABLET BY MOUTH EVERY DAY 90 tablet 0    [facility]S VITAMIN D3 250 MCG (61578 UT) CAPS capsule TAKE 1 CAPSULE BY MOUTH ONE TIME PER WEEK 4 capsule 5    folic acid (FOLVITE) 1 MG tablet Take 1 tablet by mouth daily 30 tablet 0    gabapentin (NEURONTIN) 100 MG capsule Take 2 capsules by mouth nightly for 30 days. I PO TID (Patient taking differently: Take 200 mg by mouth nightly. 1 in the pm) 180 capsule 1    triamcinolone (KENALOG) 0.1 % cream APPLY TO AFFECTED AREA TWICE A DAY 45 g 1    VENTOLIN  (90 Base) MCG/ACT inhaler TAKE 2 PUFFS BY MOUTH EVERY 6 HOURS AS NEEDED FOR WHEEZE 1 Inhaler 5    acetaminophen (TYLENOL) 500 MG tablet Take 500 mg by mouth daily as needed for Pain       Vedolizumab (ENTYVIO IV) Infuse 300 mg intravenously See Admin Instructions LAST DOSE GIVEN ON 6-      blood glucose test strips (ASCENSIA AUTODISC VI;ONE TOUCH ULTRA TEST VI) strip 1 each by In Vitro route daily As needed. 100 each 3    ONE TOUCH LANCETS MISC 1 each by Does not apply route daily 100 each 3     No current facility-administered medications for this visit. Social    Social History     Socioeconomic History    Marital status:       Spouse name: Ean Campbell Number of children: Not on file    Years of education: Not on file    Highest education level: Not on file   Occupational History    Not on file   Tobacco Use    Smoking status: Never Smoker    Smokeless tobacco: Never Used    Tobacco comment: never even tried it   Vaping Use    Vaping Use: Never used   Substance and Sexual Activity    Alcohol use: No     Alcohol/week: 0.0 standard drinks     Comment: rarely    Drug use: No    Sexual activity: Not Currently   Other Topics Concern    Not on file   Social History Narrative    Not on file     Social Determinants of Health     Financial Resource Strain:     Difficulty of Paying Living Expenses:    Food Insecurity:     Worried About Running Out of Food in the Last Year:     [address] in the Last Year:    Transportation Needs:     Lack of Transportation (Medical):  Lack of Transportation (Non-Medical):    Physical Activity:     Days of Exercise per Week:     Minutes of Exercise per Session:    Stress:     Feeling of Stress :    Social Connections:     Frequency of Communication with Friends and Family:     Frequency of Social Gatherings with Friends and Family:     Attends Hinduism Services:     Active Member of Clubs or Organizations:     Attends Club or Organization Meetings:     Marital Status:    Intimate Partner Violence:     Fear of Current or Ex-Partner:     Emotionally Abused:     Physically Abused:     Sexually Abused:        Family HISTORY    Family History   Problem Relation Age of Onset    Diabetes Mother     Alzheimer's Disease Mother     Anemia Mother     Diabetes Father     Cancer Father 68        pancreatic    Cancer Maternal Grandmother [de-identified]        Bone Marrow Ca       PHYSICAL EXAM    Vital Signs:  Ht 5' 5\" (1.651 m)   Wt 201 lb (91.2 kg)   BMI 33.45 kg/m²   General Appearance:  Normal body habitus. Alert and oriented to person, place, and time. Affect:  Normal.   Gait: Slightly antalgic using a cane for ambulation. Average balance and coordination. Skin: Her left lower leg from about mid shaft down over the anterior and anteromedial lower leg she has severe cellulitis with skin breakdown and evidence of scab and drainage. Sensation:  Intact. Strength:  Intact. Reflexes:  Intact. Pulses:  Intact.      Left ankle Exam:     Pain-minimal tenderness over the medial malleolus, but not over the lateral malleolus   Swelling-none   Range of motion is normal     Peroneal Tendon Exam  Ankle Swelling   Range of motion-normal   Pain  peroneal tendons negative      IMAGING STUDIES    X-rays 3 views left ankle taken 3 weeks ago and 2 views of the left ankle taken today do not reveal any evidence of any acute injury and no evidence of any ostial or periosteal reaction. IMPRESSION    Left lower leg pain secondary to sensory nerve irritation from cellulitis    PLAN    1. Conservative care options including physical therapy, NSAIDs, bracing, chiropractic care, and activity modification were discussed. 2.  The indications for therapeutic injections were discussed. 3.  The indications for additional imaging studies were discussed. 4.  After considering the various options discussed, the patient elected to pursue a course that includes recommending she increase her gabapentin to take 300 mg at night and 100-200 mg 3 times a day. She should return in 4 weeks if there is any other issues.

## 2021-07-08 ENCOUNTER — OFFICE VISIT (OUTPATIENT)
Dept: FAMILY MEDICINE CLINIC | Age: 66
End: 2021-07-08
Payer: MEDICARE

## 2021-07-08 VITALS
OXYGEN SATURATION: 97 % | HEART RATE: 69 BPM | DIASTOLIC BLOOD PRESSURE: 70 MMHG | SYSTOLIC BLOOD PRESSURE: 132 MMHG | BODY MASS INDEX: 32.78 KG/M2 | WEIGHT: 197 LBS

## 2021-07-08 DIAGNOSIS — S80.811A: ICD-10-CM

## 2021-07-08 DIAGNOSIS — G62.9 SENSORY NEUROPATHY: ICD-10-CM

## 2021-07-08 DIAGNOSIS — L03.116 LEFT LEG CELLULITIS: Primary | ICD-10-CM

## 2021-07-08 DIAGNOSIS — I87.2 VENOUS STASIS DERMATITIS OF BOTH LOWER EXTREMITIES: ICD-10-CM

## 2021-07-08 PROCEDURE — 1090F PRES/ABSN URINE INCON ASSESS: CPT | Performed by: NURSE PRACTITIONER

## 2021-07-08 PROCEDURE — G8427 DOCREV CUR MEDS BY ELIG CLIN: HCPCS | Performed by: NURSE PRACTITIONER

## 2021-07-08 PROCEDURE — G8399 PT W/DXA RESULTS DOCUMENT: HCPCS | Performed by: NURSE PRACTITIONER

## 2021-07-08 PROCEDURE — 99213 OFFICE O/P EST LOW 20 MIN: CPT | Performed by: NURSE PRACTITIONER

## 2021-07-08 PROCEDURE — 1123F ACP DISCUSS/DSCN MKR DOCD: CPT | Performed by: NURSE PRACTITIONER

## 2021-07-08 PROCEDURE — 1036F TOBACCO NON-USER: CPT | Performed by: NURSE PRACTITIONER

## 2021-07-08 PROCEDURE — G8417 CALC BMI ABV UP PARAM F/U: HCPCS | Performed by: NURSE PRACTITIONER

## 2021-07-08 PROCEDURE — 4040F PNEUMOC VAC/ADMIN/RCVD: CPT | Performed by: NURSE PRACTITIONER

## 2021-07-08 PROCEDURE — 3017F COLORECTAL CA SCREEN DOC REV: CPT | Performed by: NURSE PRACTITIONER

## 2021-07-08 RX ORDER — CEPHALEXIN 500 MG/1
500 CAPSULE ORAL 3 TIMES DAILY
Qty: 30 CAPSULE | Refills: 0 | Status: SHIPPED | OUTPATIENT
Start: 2021-07-08 | End: 2021-07-18

## 2021-07-09 PROBLEM — N18.30 STAGE 3 CHRONIC KIDNEY DISEASE (HCC): Status: ACTIVE | Noted: 2020-03-11

## 2021-07-09 ASSESSMENT — ENCOUNTER SYMPTOMS
GASTROINTESTINAL NEGATIVE: 1
NAUSEA: 0
ANAL BLEEDING: 0
SHORTNESS OF BREATH: 0
RESPIRATORY NEGATIVE: 1
BLOOD IN STOOL: 0
ABDOMINAL PAIN: 0
ALLERGIC/IMMUNOLOGIC NEGATIVE: 1
EYES NEGATIVE: 1

## 2021-07-10 ENCOUNTER — APPOINTMENT (OUTPATIENT)
Dept: GENERAL RADIOLOGY | Age: 66
End: 2021-07-10
Payer: MEDICARE

## 2021-07-10 ENCOUNTER — APPOINTMENT (OUTPATIENT)
Dept: CT IMAGING | Age: 66
End: 2021-07-10
Payer: MEDICARE

## 2021-07-10 ENCOUNTER — HOSPITAL ENCOUNTER (EMERGENCY)
Age: 66
Discharge: HOME | End: 2021-07-10
Payer: MEDICARE

## 2021-07-10 VITALS
WEIGHT: 195.38 LBS | RESPIRATION RATE: 16 BRPM | TEMPERATURE: 98.4 F | HEIGHT: 65 IN | SYSTOLIC BLOOD PRESSURE: 143 MMHG | BODY MASS INDEX: 32.55 KG/M2 | OXYGEN SATURATION: 99 % | DIASTOLIC BLOOD PRESSURE: 80 MMHG | HEART RATE: 80 BPM

## 2021-07-10 DIAGNOSIS — I77.6 VASCULITIS (HCC): Primary | ICD-10-CM

## 2021-07-10 LAB
A/G RATIO: 1.3 (ref 1.1–2.2)
ALBUMIN SERPL-MCNC: 4.4 G/DL (ref 3.4–5)
ALP BLD-CCNC: 87 U/L (ref 40–129)
ALT SERPL-CCNC: 22 U/L (ref 10–40)
ANION GAP SERPL CALCULATED.3IONS-SCNC: 10 MMOL/L (ref 3–16)
AST SERPL-CCNC: 37 U/L (ref 15–37)
BASOPHILS ABSOLUTE: 0 K/UL (ref 0–0.2)
BASOPHILS RELATIVE PERCENT: 0.6 %
BILIRUB SERPL-MCNC: 0.8 MG/DL (ref 0–1)
BUN BLDV-MCNC: 28 MG/DL (ref 7–20)
CALCIUM SERPL-MCNC: 9.6 MG/DL (ref 8.3–10.6)
CHLORIDE BLD-SCNC: 98 MMOL/L (ref 99–110)
CO2: 27 MMOL/L (ref 21–32)
CREAT SERPL-MCNC: 1.1 MG/DL (ref 0.6–1.2)
EOSINOPHILS ABSOLUTE: 0.3 K/UL (ref 0–0.6)
EOSINOPHILS RELATIVE PERCENT: 4.7 %
GFR AFRICAN AMERICAN: >60
GFR NON-AFRICAN AMERICAN: 50
GLOBULIN: 3.4 G/DL
GLUCOSE BLD-MCNC: 83 MG/DL (ref 70–99)
HCT VFR BLD CALC: 37.5 % (ref 36–48)
HEMOGLOBIN: 12.8 G/DL (ref 12–16)
LACTIC ACID: 1.7 MMOL/L (ref 0.4–2)
LYMPHOCYTES ABSOLUTE: 1.3 K/UL (ref 1–5.1)
LYMPHOCYTES RELATIVE PERCENT: 19 %
MCH RBC QN AUTO: 32.3 PG (ref 26–34)
MCHC RBC AUTO-ENTMCNC: 34.2 G/DL (ref 31–36)
MCV RBC AUTO: 94.5 FL (ref 80–100)
MONOCYTES ABSOLUTE: 0.5 K/UL (ref 0–1.3)
MONOCYTES RELATIVE PERCENT: 7.6 %
NEUTROPHILS ABSOLUTE: 4.8 K/UL (ref 1.7–7.7)
NEUTROPHILS RELATIVE PERCENT: 68.1 %
PDW BLD-RTO: 13.5 % (ref 12.4–15.4)
PLATELET # BLD: 160 K/UL (ref 135–450)
PMV BLD AUTO: 9.7 FL (ref 5–10.5)
POTASSIUM REFLEX MAGNESIUM: 4.3 MMOL/L (ref 3.5–5.1)
RBC # BLD: 3.97 M/UL (ref 4–5.2)
SEDIMENTATION RATE, ERYTHROCYTE: 79 MM/HR (ref 0–30)
SODIUM BLD-SCNC: 135 MMOL/L (ref 136–145)
TOTAL PROTEIN: 7.8 G/DL (ref 6.4–8.2)
WBC # BLD: 7.1 K/UL (ref 4–11)

## 2021-07-10 PROCEDURE — 83605 ASSAY OF LACTIC ACID: CPT

## 2021-07-10 PROCEDURE — 85652 RBC SED RATE AUTOMATED: CPT

## 2021-07-10 PROCEDURE — 73610 X-RAY EXAM OF ANKLE: CPT

## 2021-07-10 PROCEDURE — 99285 EMERGENCY DEPT VISIT HI MDM: CPT

## 2021-07-10 PROCEDURE — 85025 COMPLETE CBC W/AUTO DIFF WBC: CPT

## 2021-07-10 PROCEDURE — 73700 CT LOWER EXTREMITY W/O DYE: CPT

## 2021-07-10 PROCEDURE — 80053 COMPREHEN METABOLIC PANEL: CPT

## 2021-07-10 RX ORDER — PREDNISONE 20 MG/1
TABLET ORAL
Qty: 18 TABLET | Refills: 0 | Status: SHIPPED | OUTPATIENT
Start: 2021-07-10 | End: 2021-10-13

## 2021-07-10 ASSESSMENT — PAIN DESCRIPTION - PAIN TYPE
TYPE: ACUTE PAIN
TYPE: ACUTE PAIN

## 2021-07-10 ASSESSMENT — PAIN SCALES - GENERAL
PAINLEVEL_OUTOF10: 6
PAINLEVEL_OUTOF10: 1

## 2021-07-10 ASSESSMENT — ENCOUNTER SYMPTOMS
SHORTNESS OF BREATH: 0
COLOR CHANGE: 0
ABDOMINAL PAIN: 0
RHINORRHEA: 0
SORE THROAT: 0

## 2021-07-10 ASSESSMENT — PAIN DESCRIPTION - LOCATION: LOCATION: LEG

## 2021-07-10 ASSESSMENT — PAIN DESCRIPTION - ORIENTATION: ORIENTATION: RIGHT;LEFT

## 2021-07-10 NOTE — ED TRIAGE NOTES
Left leg chronic cellulitis, noted right leg cellulitis yesterday,. Was on doxy, switched atb 2 days ago.

## 2021-07-10 NOTE — ED PROVIDER NOTES
Evaluated by [id number] [facility] Provider          [address] ED  EMERGENCY DEPARTMENT ENCOUNTER        Pt Name: Nelly Fisher  MRN: [id number]  [place] 1955  Dateof evaluation: 7/10/2021  Provider: YAHAIRA Gonzalez CNP  PCP: YAHAIRA Cruz CNP  ED Attending: No att. providers found    [facility]       Chief Complaint   Patient presents with    Cellulitis     bilateral leg cellulitis was treated with atx x2 was at [facility] 2weeks ago and pcp gave atx thursday right leg cellulits started yesterday       HISTORY OF PRESENTILLNESS   (Location/Symptom, Timing/Onset, Context/Setting, Quality, Duration, Modifying Factors, Severity)  Note limiting factors. Nelly Fisher is a 76 y.o. female for concern for cellulitis. Onset was the past few weeks. Context includes pt reports she was on dicyclomine for left lower leg erythema. Patient states that she completed the doxycycline. Patient was seen by her primary care doctor 2 days ago and was started on Keflex for concerns for osteo-. Patient reportedly had x-rays done that were questionable for fractures. Patient has been taking the Keflex for 2 days however last night developed redness to the right leg. Patient denies calf pain chest pain shortness of breath. Alleviating factors include nothing. Aggravating factors include nothing. Pain is 6/10. Nothing has been used for pain today. Nursing Notes were all reviewed and agreed with or any disagreements were addressed  in the HPI. REVIEW OF SYSTEMS    (2-9 systems for level 4, 10 or more for level 5)     Review of Systems   Constitutional: Negative for fever. HENT: Negative for congestion, rhinorrhea and sore throat. Respiratory: Negative for shortness of breath. Cardiovascular: Negative for chest pain. Gastrointestinal: Negative for abdominal pain. Genitourinary: Negative for decreased urine volume and difficulty urinating.    Musculoskeletal: Negative for arthralgias and myalgias. Skin: Positive for wound. Negative for color change. Neurological: Negative for dizziness and light-headedness. Psychiatric/Behavioral: Negative for agitation. All other systems reviewed and are negative. Positives and Pertinent negatives as per HPI. Except as noted above in the ROS, all other systems were reviewed and negative.        PAST MEDICAL HISTORY     Past Medical History:   Diagnosis Date    Acute pancreatitis 10/21/2014    Acute renal failure with tubular necrosis (HCC) 7/10/2015    Acute stasis dermatitis of left lower extremity 7/8/2015    Angina pectoris ([address] 5/23/2011    Anxiety     Cellulitis of left leg     Chronic back pain     Chronic kidney disease     Clostridium difficile infection 7/9/15, 6/4/15    Colitis     Depression     Diabetes mellitus ([address]     diet controlled    Hearing loss     Heart murmur 10/1/2019    Hyperlipidemia     Hypertension     Hypothyroidism     Left THR 1/25/2016    Liver disease     Lumbar radiculitis 10/1/2019    Obesity     Palpitations 7/17/2013    Primary osteoarthritis of left hip 11/18/2015    Restless legs syndrome     Shock ([address] 10/10/2017    Superficial thrombophlebitis of left leg 7/8/2015    Thyroid disease     Type 2 diabetes mellitus without complication (HCC)     Ulcerative (chronic) enterocolitis ([address] 42/75/5925    Uncomplicated asthma 5/4/6761    Venous ulcer of left leg ([address]          SURGICAL HISTORY       Past Surgical History:   Procedure Laterality Date    APPENDECTOMY  1962    CARDIAC CATHETERIZATION  05/2014    normal    COLONOSCOPY      EPIDURAL STEROID INJECTION Left 01/22/2019    LEFT LUMBAR FOUR FIVE EPIDURAL STEROID INJECTION SITE CONFIRMED BY FLUOROSCOPY performed by Tracie Gallego MD at [id number] [place] [address] Left 01/19/2016    Dr Karin Forbes Left 06/26/2019    PHACOEMULSIFICATION OF LEFT EYE CATARACT WITH INTRAOCULAR LENS IMPLANT performed by Cuca Ferreira MD at [place] Right 07/24/2019    PHACOEMULSIFICATION OF CATARACT RIGHT EYE WITH INTRAOCULAR LENS IMPLANT performed by Cuca Ferreira MD at [place] [id number]  [facility] ASPIR/BIOP ALTERED ANATOMY  10/15/2018    EGD ESOPHAGOGASTRODUODENOSCOPY ULTRASOUND 0403, 1583 performed by Kuldip Rosas DO at [address] EGD TRANSORAL BIOPSY SINGLE/MULTIPLE  10/15/2018    EGD BIOPSY performed by Kuldip Rosas DO at [address] ENDOSCOPY  10/15/2018    EGD [facility] EUS   [facility] Bilateral [facility]       Discharge Medication List as of 7/10/2021  6:21 PM      CONTINUE these medications which have NOT CHANGED    Details   cephALEXin (KEFLEX) 500 MG capsule Take 1 capsule by mouth 3 times daily for 10 days, Disp-30 capsule, R-0Normal      Magnesium Oxide (MAGNESIUM-OXIDE) 250 MG TABS tablet Take 2 tablets by mouth 3 times daily, Disp-360 tablet, R-1Normal      celecoxib (CELEBREX) 100 MG capsule TAKE 2 CAPSULES BY MOUTH  DAILY, Disp-120 capsule, R-2Normal      alendronate (FOSAMAX) 70 MG tablet Take 1 tablet by mouth every 7 days, Disp-12 tablet, R-3Normal      nystatin (MYCOSTATIN) 140400 UNIT/GM cream Apply topically 2 times daily. , Disp-1 Tube, R-0, Normal      simvastatin (ZOCOR) 20 MG tablet Take 1 tablet by mouth nightly, Disp-90 tablet, R-1Normal      levothyroxine (SYNTHROID) 150 MCG tablet TAKE 1 TABLET BY MOUTH EVERY DAY, Disp-90 tablet, R-0Normal      CVS VITAMIN D3 250 MCG (08538 UT) CAPS capsule TAKE 1 CAPSULE BY MOUTH ONE TIME PER WEEK, Disp-4 capsule, [id number]      folic acid (FOLVITE) 1 MG tablet Take 1 tablet by mouth daily, Disp-30 tablet, R-0Normal      gabapentin (NEURONTIN) 100 MG capsule Take 2 capsules by mouth nightly for 30 days.  I PO TID, Disp-180 capsule, R-1Normal triamcinolone (KENALOG) 0.1 % cream APPLY TO AFFECTED AREA TWICE A DAY, Disp-45 g,R-1, Normal      VENTOLIN  (90 Base) MCG/ACT inhaler TAKE 2 PUFFS BY MOUTH EVERY 6 HOURS AS NEEDED FOR WHEEZE, Disp-1 Inhaler, R-5Normal      acetaminophen (TYLENOL) 500 MG tablet Take 500 mg by mouth daily as needed for Pain Historical Med      Vedolizumab (ENTYVIO IV) Infuse 300 mg intravenously See Admin Instructions LAST DOSE GIVEN ON [id number] Med      blood glucose test strips (ASCENSIA AUTODISC VI;ONE TOUCH ULTRA TEST VI) strip DAILY Starting Thu 11/29/2018, Disp-100 each, R-3, NormalAs needed. ONE TOUCH LANCETS MISC DAILY Starting Mon 8/13/2018, Disp-100 each, R-3, Normal               ALLERGIES     Amoxicillin, Pravastatin, and Wellbutrin [bupropion]    FAMILY HISTORY       Family History   Problem Relation Age of Onset    Diabetes Mother     Alzheimer's Disease Mother     Anemia Mother     Diabetes Father     Cancer Father 68        pancreatic    Cancer Maternal Grandmother [de-identified]        Bone Marrow Ca          SOCIAL HISTORY       Social History     Socioeconomic History    Marital status:       Spouse name: Bel Burger Number of children: None    Years of education: None    Highest education level: None   Occupational History    None   Tobacco Use    Smoking status: Never Smoker    Smokeless tobacco: Never Used    Tobacco comment: never even tried it   Vaping Use    Vaping Use: Never used   Substance and Sexual Activity    Alcohol use: No     Alcohol/week: 0.0 standard drinks     Comment: rarely    Drug use: No    Sexual activity: Not Currently   Other Topics Concern    None   Social History Narrative    None     Social Determinants of Health     Financial Resource Strain:     Difficulty of Paying Living Expenses:    Food Insecurity:     Worried About Running Out of Food in the Last Year:     Ran Out of Food in the Last Year:    Transportation Needs:     Lack of Transportation DIAGNOSTIC RESULTS   LABS:    Labs Reviewed   CBC WITH AUTO DIFFERENTIAL - Abnormal; Notable for the following components:       Result Value    RBC 3.97 (*)     All other components within normal limits    Narrative:     Performed at:  [facility],  [place], [facility]   Phone [phone]   COMPREHENSIVE METABOLIC PANEL W/ REFLEX TO MG FOR LOW K - Abnormal; Notable for the following components:    Sodium 135 (*)     Chloride 98 (*)     BUN 28 (*)     GFR Non- 50 (*)     All other components within normal limits    Narrative:     Performed at:  [facility],  [place], [facility]   Phone [phone]   SEDIMENTATION RATE - Abnormal; Notable for the following components:    Sed Rate 79 (*)     All other components within normal limits    Narrative:     Performed at:  [facility],  [place] [facility]   Phone [phone]   LACTIC ACID, PLASMA    Narrative:     Performed at:  [facility]) - [place] [address],  [place], [facility]   Phone [phone]       All other labs werewithin normal range or not returned as of this dictation. EKG: All EKG's are interpreted by the Emergency Department Physician who either signs or Co-signs this chart in the absence of a cardiologist.  Please see their note for interpretation of EKG. RADIOLOGY:     Left ankle x-ray interpreted by radiologist for  FINDINGS:   Circumferential soft tissue swelling of the ankle.  No evidence of soft   tissue gas.  Talar dome contour is intact.  Ankle mortise is preserved.  No   fracture or dislocation. CT of the left ankle without contrast interpreted by radiologist for  Impression:    1.  No CT evidence of osteomyelitis in the left ankle. 2.  No acute osseous abnormality.      3.  Mild-to-moderate scattered degenerative and started her on Keflex. Patient presents to the ED after taking Keflex for 24 hours with similar rash and erythema noted to the right anterior surface of the right lower leg. Patient is neurovascular intact to both legs. Patient has no tenderness swelling or pain to either calf. She is no chest pain or shortness of breath. Lab values were reviewed. Patient did have a slightly elevated sed rate however she does have some erythema noted. At this point I feel that this is more like a vasculitis. She will be given steroids. She was encouraged to continue taking her Keflex. A CT was obtained to her left ankle after talking to her primary care doctor to evaluate the next course of action. CT was negative for osteomyelitis. Patient will be discharged with instructions to continue taking her Keflex and was given a prescription for prednisone. She was encouraged to follow-up with her primary care doctor in the next few days and return to the ED for worsening symptoms. She was also encouraged to follow-up with her orthopedist if her pain continues. Patient was ultimately discharged home with all questions answered. The patient tolerated their visit well. I have evaluated this patient. My supervising physician was available for consultation. The patient and / or the family were informed of the results of any tests, a time was given to answer questions, a plan was proposed and they agreed with plan. FINAL IMPRESSION      1.  Vasculitis [facility])          DISPOSITION/PLAN   DISPOSITION Decision To Discharge 07/10/2021 06:13:12 PM      PATIENT REFERRED TO:  YAHAIRA Krishnan - CNP  [address] [facility],[address]  [phone]    Schedule an appointment as soon as possible for a visit in 2 days  for re-evaluation    [place] [facility] [place] [facility]  [address] [facility]  [phone]    If symptoms worsen      DISCHARGE MEDICATIONS:  Discharge Medication List as of 7/10/2021  6:21 PM START taking these medications    Details   predniSONE (DELTASONE) 20 MG tablet Take 3 tabs orally daily for 3 days, then take 2 tabs orally for 3 days then take 1 tab orally for 3 days. , Disp-18 tablet, R-0Print             DISCONTINUED MEDICATIONS:  Discharge Medication List as of 7/10/2021  6:21 PM                 (Please note that portions of this note were completed with a voice recognition program.  Efforts were made to edit the dictations but occasionally words are mis-transcribed.)    YAHAIRA De Los Santos CNP (electronically signed)         YAHAIRA De Los Santos CNP  07/10/21 1957

## 2021-07-11 DIAGNOSIS — E55.9 VITAMIN D DEFICIENCY: ICD-10-CM

## 2021-07-12 RX ORDER — LIDOCAINE HCL 4 %
CREAM (GRAM) TOPICAL
Qty: 4 CAPSULE | Refills: 5 | Status: SHIPPED | OUTPATIENT
Start: 2021-07-12 | End: 2022-01-03

## 2021-07-13 ENCOUNTER — TELEPHONE (OUTPATIENT)
Dept: FAMILY MEDICINE CLINIC | Age: 66
End: 2021-07-13

## 2021-07-13 ENCOUNTER — CARE COORDINATION (OUTPATIENT)
Dept: CARE COORDINATION | Age: 66
End: 2021-07-13

## 2021-07-13 DIAGNOSIS — Z13.6 SCREENING FOR CARDIOVASCULAR CONDITION: ICD-10-CM

## 2021-07-13 NOTE — TELEPHONE ENCOUNTER
Pt called to schedule ED f/u from going to [facility] on Saturday, no appts avail w/any providers. States that she's fine with waiting to see PCP until after she finishes antibiotics and steroids end of this week. Call back pt with recommendations [phone], okay to leave a message on phone or send message through [employer].

## 2021-07-13 NOTE — CARE COORDINATION
ACM attempted outreach. Left message. Contact information for call back provided.  Left message on home and cell number provided    ER visit 7/10 for vasculitis ( eligible for care coordination)

## 2021-07-14 RX ORDER — SIMVASTATIN 20 MG
TABLET ORAL
Qty: 90 TABLET | Refills: 1 | Status: SHIPPED | OUTPATIENT
Start: 2021-07-14 | End: 2022-05-09

## 2021-07-15 ENCOUNTER — CARE COORDINATION (OUTPATIENT)
Dept: CARE COORDINATION | Age: 66
End: 2021-07-15

## 2021-07-16 ENCOUNTER — VIRTUAL VISIT (OUTPATIENT)
Dept: FAMILY MEDICINE CLINIC | Age: 66
End: 2021-07-16
Payer: MEDICARE

## 2021-07-16 ENCOUNTER — CARE COORDINATION (OUTPATIENT)
Dept: CARE COORDINATION | Age: 66
End: 2021-07-16

## 2021-07-16 DIAGNOSIS — I77.6 VASCULITIS (HCC): Primary | ICD-10-CM

## 2021-07-16 DIAGNOSIS — M25.572 SINUS TARSI SYNDROME OF LEFT ANKLE: ICD-10-CM

## 2021-07-16 DIAGNOSIS — M67.979 TIBIALIS POSTERIOR TENDINOPATHY: ICD-10-CM

## 2021-07-16 PROCEDURE — G8399 PT W/DXA RESULTS DOCUMENT: HCPCS | Performed by: NURSE PRACTITIONER

## 2021-07-16 PROCEDURE — 4040F PNEUMOC VAC/ADMIN/RCVD: CPT | Performed by: NURSE PRACTITIONER

## 2021-07-16 PROCEDURE — G8417 CALC BMI ABV UP PARAM F/U: HCPCS | Performed by: NURSE PRACTITIONER

## 2021-07-16 PROCEDURE — 1123F ACP DISCUSS/DSCN MKR DOCD: CPT | Performed by: NURSE PRACTITIONER

## 2021-07-16 PROCEDURE — 99213 OFFICE O/P EST LOW 20 MIN: CPT | Performed by: NURSE PRACTITIONER

## 2021-07-16 PROCEDURE — 1090F PRES/ABSN URINE INCON ASSESS: CPT | Performed by: NURSE PRACTITIONER

## 2021-07-16 PROCEDURE — G8427 DOCREV CUR MEDS BY ELIG CLIN: HCPCS | Performed by: NURSE PRACTITIONER

## 2021-07-16 PROCEDURE — 1036F TOBACCO NON-USER: CPT | Performed by: NURSE PRACTITIONER

## 2021-07-16 PROCEDURE — 3017F COLORECTAL CA SCREEN DOC REV: CPT | Performed by: NURSE PRACTITIONER

## 2021-07-16 SDOH — ECONOMIC STABILITY: FOOD INSECURITY: WITHIN THE PAST 12 MONTHS, THE FOOD YOU BOUGHT JUST DIDN'T LAST AND YOU DIDN'T HAVE MONEY TO GET MORE.: NEVER TRUE

## 2021-07-16 SDOH — ECONOMIC STABILITY: FOOD INSECURITY: WITHIN THE PAST 12 MONTHS, YOU WORRIED THAT YOUR FOOD WOULD RUN OUT BEFORE YOU GOT MONEY TO BUY MORE.: NEVER TRUE

## 2021-07-16 ASSESSMENT — ENCOUNTER SYMPTOMS
ABDOMINAL PAIN: 0
RESPIRATORY NEGATIVE: 1
ALLERGIC/IMMUNOLOGIC NEGATIVE: 1
ANAL BLEEDING: 0
BLOOD IN STOOL: 0
NAUSEA: 0
EYES NEGATIVE: 1
SHORTNESS OF BREATH: 0
GASTROINTESTINAL NEGATIVE: 1

## 2021-07-16 ASSESSMENT — SOCIAL DETERMINANTS OF HEALTH (SDOH): HOW HARD IS IT FOR YOU TO PAY FOR THE VERY BASICS LIKE FOOD, HOUSING, MEDICAL CARE, AND HEATING?: NOT HARD AT ALL

## 2021-07-16 NOTE — PROGRESS NOTES
2021    TELEHEALTH EVALUATION -- Audio/Visual (During WTQXL-27 public health emergency)    Chief Complaint   Patient presents with    ED Follow-up     cellultis of legs       HPI:  Kartik Navarrete (:  1955) has requested an audio/video evaluation for the following concern(s):    Patient is here for follow up from ER visit from 7/10/21 for left leg cellulitis and vasculitis. Patient was told to continue with atbs and started on steroids and now is doing significantly better. Review of Systems   Constitutional: Negative. Negative for appetite change, fatigue and unexpected weight change. HENT: Negative. Eyes: Negative. Respiratory: Negative. Negative for shortness of breath. Cardiovascular: Negative. Negative for chest pain, palpitations and leg swelling. Gastrointestinal: Negative. Negative for abdominal pain, anal bleeding, blood in stool and nausea. Endocrine: Negative. Genitourinary: Negative. Negative for hematuria. Musculoskeletal: Negative. Skin: Negative. Negative for rash. Allergic/Immunologic: Negative. Neurological: Negative. Negative for dizziness, syncope, light-headedness and numbness. Hematological: Negative. Does not bruise/bleed easily. Psychiatric/Behavioral: Negative. All other systems reviewed and are negative. Prior to Visit Medications    Medication Sig Taking? Authorizing Provider   simvastatin (ZOCOR) 20 MG tablet TAKE 1 TABLET EVERY NIGHT Yes YAHAIRA Garcia CNP   CVS VITAMIN D3 250 MCG (94940 UT) CAPS capsule TAKE 1 CAPSULE BY MOUTH ONE TIME PER WEEK Yes YAHAIRA Garcia CNP   predniSONE (DELTASONE) 20 MG tablet Take 3 tabs orally daily for 3 days, then take 2 tabs orally for 3 days then take 1 tab orally for 3 days.  Yes YAHAIRA Carlson CNP   cephALEXin (KEFLEX) 500 MG capsule Take 1 capsule by mouth 3 times daily for 10 days Yes YAHAIRA Garcia CNP   Magnesium Oxide  Amoxicillin      \"colitis\"    Pravastatin Rash    Wellbutrin [Bupropion] Other (See Comments) and Rash     Caused sores in mouth   ,   Past Medical History:   Diagnosis Date    Acute pancreatitis 10/21/2014    Acute renal failure with tubular necrosis (HCC) 7/10/2015    Acute stasis dermatitis of left lower extremity 7/8/2015    Angina pectoris ([address] 5/23/2011    Anxiety     Cellulitis of left leg     Chronic back pain     Chronic kidney disease     Clostridium difficile infection 7/9/15, 6/4/15    Colitis     Depression     Diabetes mellitus ([address]     diet controlled    Hearing loss     Heart murmur 10/1/2019    Hyperlipidemia     Hypertension     Hypothyroidism     Left THR 1/25/2016    Liver disease     Lumbar radiculitis 10/1/2019    Obesity     Palpitations 7/17/2013    Primary osteoarthritis of left hip 11/18/2015    Restless legs syndrome     Shock ([address] 10/10/2017    Superficial thrombophlebitis of left leg 7/8/2015    Thyroid disease     Type 2 diabetes mellitus without complication (HCC)     Ulcerative (chronic) enterocolitis ([address] 32/37/1715    Uncomplicated asthma 9/9/8511    Venous ulcer of left leg ([address]    ,   Past Surgical History:   Procedure Laterality Date    APPENDECTOMY  1962    CARDIAC CATHETERIZATION  05/2014    normal    COLONOSCOPY      EPIDURAL STEROID INJECTION Left 01/22/2019    LEFT LUMBAR FOUR FIVE EPIDURAL STEROID INJECTION SITE CONFIRMED BY FLUOROSCOPY performed by Sandra Dakin, MD at [address] [place] [address] Left 01/19/2016    Dr Guerita Tsai EXTRACTION Left 06/26/2019    PHACOEMULSIFICATION OF LEFT EYE CATARACT WITH INTRAOCULAR LENS IMPLANT performed by Blanco Xavier MD at [facility] Right 07/24/2019    PHACOEMULSIFICATION OF CATARACT RIGHT EYE WITH INTRAOCULAR LENS IMPLANT performed by Blanco Xavier MD at [address]/BIOP ALTERED ANATOMY  10/15/2018    EGD ESOPHAGOGASTRODUODENOSCOPY ULTRASOUND 0403, 1583 performed by Ofelia Peraza DO at [address] EGD TRANSORAL BIOPSY SINGLE/MULTIPLE  10/15/2018    EGD BIOPSY performed by Ofelia Peraza DO at [address] ENDOSCOPY  10/15/2018    EGD [facility] EUS    VARICOSE VEIN SURGERY Bilateral 1988   ,   Social History     Tobacco Use    Smoking status: Never Smoker    Smokeless tobacco: Never Used    Tobacco comment: never even tried it   Vaping Use    Vaping Use: Never used   Substance Use Topics    Alcohol use: No     Alcohol/week: 0.0 standard drinks     Comment: rarely    Drug use: No   ,   Family History   Problem Relation Age of Onset    Diabetes Mother     Alzheimer's Disease Mother     Anemia Mother     Diabetes Father     Cancer Father 68        pancreatic    Cancer Maternal Grandmother [de-identified]        Bone Marrow Ca   ,   Immunization History   Administered Date(s) Administered    COVID-19, Moderna, PF, 100mcg/0.5mL 03/26/2021, 04/23/2021    HPV 9-valent Joyce Barn) 05/09/2019    Hepatitis A Adult (Vaqta) 04/16/2019    Hepatitis B 09/10/2010, 10/12/2010, 03/15/2011    Hepatitis B (Recombivax HB) 09/10/2010, 10/12/2010, 03/15/2011    Hepatitis B Ped/Adol (Engerix-B, Recombivax HB) 03/15/2014    Influenza Virus Vaccine 01/26/2016, 08/22/2017, 09/14/2019, 10/22/2019, 10/21/2020    Influenza Whole 11/08/2011, 10/28/2014    Influenza, High Dose (Fluzone 65 yrs and older) 11/07/2018    Influenza, Madeleine Herber, IM, PF (6 mo and older Fluzone, Flulaval, Fluarix, and 3 yrs and older Afluria) 08/22/2017, 11/07/2018, 09/14/2019    Influenza, Madeleine Herber, Recombinant, IM PF (Flublok 18 yrs and older) 10/21/2020    PPD Test 08/04/2014, 10/21/2014, 12/21/2015, 11/07/2018    Pneumococcal Conjugate 13-valent ([id number]) 04/16/2019    Pneumococcal Polysaccharide ([id number]) 10/22/2014, 10/21/2020    Tdap (Boostrix, Adacel) 06/29/2017    Zoster Live (Zostavax) 06/06/2016   ,   Health Maintenance   Topic Date Due    Shingles Vaccine (2 of 3) 11/09/2021 (Originally 8/1/2016)    Flu vaccine (1) 09/01/2021    Diabetic foot exam  11/09/2021    Diabetic microalbuminuria test  11/09/2021    Colon Cancer Screen FIT/FOBT  03/03/2022    Annual Wellness Visit (AWV)  03/04/2022    Diabetic retinal exam  03/05/2022    A1C test (Diabetic or Prediabetic)  05/10/2022    Lipid screen  05/10/2022    TSH testing  05/10/2022    Potassium monitoring  07/10/2022    Creatinine monitoring  07/10/2022    Breast cancer screen  03/30/2023    DTaP/Tdap/Td vaccine (2 - Td or Tdap) 06/29/2027    DEXA (modify frequency per FRAX score)  Completed    Pneumococcal 65+ years Vaccine  Completed    COVID-19 Vaccine  Completed    Hepatitis C screen  Completed    Hepatitis A vaccine  Aged Out    Hib vaccine  Aged Out    Meningococcal (ACWY) vaccine  Aged Out       PHYSICAL EXAMINATION:  [ INSTRUCTIONS:  \"[x]\" Indicates a positive item  \"[]\" Indicates a negative item  -- DELETE ALL ITEMS NOT EXAMINED]  Vital Signs: (As obtained by patient/caregiver or practitioner observation)    Blood pressure-  Heart rate-    Respiratory rate-    Temperature-  Pulse oximetry-     Constitutional: [x] Appears well-developed and well-nourished [x] No apparent distress      [] Abnormal-   Mental status  [x] Alert and awake  [x] Oriented to person/place/time [x]Able to follow commands      Eyes:  EOM    [x]  Normal  [] Abnormal-  Sclera  [x]  Normal  [] Abnormal -         Discharge [x]  None visible  [] Abnormal -    HENT:   [x] Normocephalic, atraumatic.   [] Abnormal   [x] Mouth/Throat: Mucous membranes are moist.     External Ears [x] Normal  [] Abnormal-     Neck: [x] No visualized mass     Pulmonary/Chest: [x] Respiratory effort normal.  [x] No visualized signs of difficulty breathing or respiratory distress        [] Abnormal-      Musculoskeletal: [x] Normal gait with no signs of ataxia         [x] Normal range of motion of neck        [] Abnormal-       Neurological:        [x] No Facial Asymmetry (Cranial nerve 7 motor function) (limited exam to video visit)          [x] No gaze palsy        [] Abnormal-         Skin:        [x] No significant exanthematous lesions or discoloration noted on facial skin         [] Abnormal-            Psychiatric:       [x] Normal Affect [x] No Hallucinations        [] Abnormal-     Other pertinent observable physical exam findings-     ASSESSMENT/PLAN:  1. Vasculitis ([address]  2. Sinus tarsi syndrome of left ankle  3. Tibialis posterior tendinopathy  Reviewed and discussed results of CT scan. Has follow up with ortho beginning of 8/2021  Completed atb for prior LLE cellulitis  Still on steroids for LLE vasculitis  Patient educated on vasculitis. Call for any reoccurrence   Extensively reviewed and discussed recent in patient hospital records, labs, imaging and consults with patient, discussed HPI and Plan, coordinated care and patient counseling provided at today's visit      Return if symptoms worsen or fail to improve. Pham Matos is a 76 y.o. female being evaluated by a Virtual Visit (video visit) encounter to address concerns as mentioned above. A caregiver was present when appropriate. Due to this being a TeleHealth encounter (During [facility]- public [facility] emergency), evaluation of the following organ systems was limited: Vitals/Constitutional/EENT/Resp/CV/GI//MS/Neuro/Skin/Heme-Lymph-Imm. Pursuant to the emergency declaration under the [address] [place] [address] authority and the [employer] Act, this Virtual Visit was conducted with patient's (and/or legal guardian's) consent, to reduce the patient's risk of exposure to COVID-19 and provide necessary medical care.   The patient (and/or legal guardian) has also been advised to contact this office for worsening conditions or problems, and seek emergency medical treatment and/or call 911 if deemed necessary. Patient identification was verified at the start of the visit: Yes    Total time spent on this encounter: 20 minutes    Services were provided through a video synchronous discussion virtually to substitute for in-person clinic visit. Patient and provider were located at their individual homes. --YAHAIRA Rao CNP on 7/16/2021 at 2:42 PM    An electronic signature was used to authenticate this note.

## 2021-07-16 NOTE — CARE COORDINATION
Received message from patient stating that she was doing fine and that she had completed her follow up appointment and scheduled her additional appointments. She stated she was receiving a lot of survey calls and that she had no issues at this time and did not feel she needed a call back.  Care transition completed per patient request.

## 2021-07-20 RX ORDER — DM/PE/ACETAMINOPHEN/CHLORPHENR 10-5-325-2
TABLET, SEQUENTIAL ORAL
Qty: 540 TABLET | Refills: 5 | Status: SHIPPED | OUTPATIENT
Start: 2021-07-20 | End: 2022-08-11

## 2021-07-20 NOTE — TELEPHONE ENCOUNTER
Last visit was 7/8/21  Future Appointments   Date Time Provider Ginna Ferguson   7/21/2021  8:30 AM [facility] OP NURSING ROOM 1 [facility] OP RN Zander Hernandez   8/4/2021  1:50 PM MD KARLEE Clements [facility]   8/18/2021  8:30 AM [facility] OP NURSING ROOM 1 [facility] OP RN Valdez BRODY   9/13/2021  9:00 AM [facility] OP NURSING ROOM 1 [facility] OP RN Valdez HOD   10/13/2021  9:00 AM [facility] OP NURSING ROOM 1 [facility] OP RN Valdez HOD   11/10/2021  9:20 AM YAHAIRA Crump - CNP [facility] [place] [facility]   11/10/2021 10:30 AM [facility] OP NURSING ROOM 1 [facility] OP RN Valdez BRODY   12/8/2021  9:00 AM [facility] OP NURSING ROOM 1 [facility] OP RN Valdez BRODY   1/5/2022  9:00 AM [facility] OP NURSING ROOM 1 [facility] OP RN Valdez BRODY

## 2021-07-21 ENCOUNTER — HOSPITAL ENCOUNTER (OUTPATIENT)
Dept: NURSING | Age: 66
Setting detail: INFUSION SERIES
Discharge: HOME | End: 2021-07-21
Payer: MEDICARE

## 2021-07-21 VITALS
DIASTOLIC BLOOD PRESSURE: 70 MMHG | HEIGHT: 65 IN | WEIGHT: 194 LBS | SYSTOLIC BLOOD PRESSURE: 123 MMHG | OXYGEN SATURATION: 96 % | BODY MASS INDEX: 32.32 KG/M2 | HEART RATE: 74 BPM | RESPIRATION RATE: 16 BRPM | TEMPERATURE: 98.1 F

## 2021-07-21 DIAGNOSIS — K51.00 CHRONIC ULCERATIVE ENTEROCOLITIS WITHOUT COMPLICATION (HCC): Primary | ICD-10-CM

## 2021-07-21 PROCEDURE — 6360000002 HC RX W HCPCS: Performed by: INTERNAL MEDICINE

## 2021-07-21 PROCEDURE — 2580000003 HC RX 258: Performed by: INTERNAL MEDICINE

## 2021-07-21 PROCEDURE — 99211 OFF/OP EST MAY X REQ PHY/QHP: CPT

## 2021-07-21 PROCEDURE — 96365 THER/PROPH/DIAG IV INF INIT: CPT

## 2021-07-21 RX ORDER — SODIUM CHLORIDE 9 MG/ML
100 INJECTION, SOLUTION INTRAVENOUS CONTINUOUS
Status: ACTIVE | OUTPATIENT
Start: 2021-07-21 | End: 2021-07-21

## 2021-07-21 RX ORDER — SODIUM CHLORIDE 9 MG/ML
INJECTION, SOLUTION INTRAVENOUS CONTINUOUS
Status: CANCELLED | OUTPATIENT
Start: 2021-08-18

## 2021-07-21 RX ORDER — SODIUM CHLORIDE 0.9 % (FLUSH) 0.9 %
10 SYRINGE (ML) INJECTION PRN
Status: CANCELLED | OUTPATIENT
Start: 2021-08-18

## 2021-07-21 RX ORDER — SODIUM CHLORIDE 0.9 % (FLUSH) 0.9 %
10 SYRINGE (ML) INJECTION PRN
Status: DISCONTINUED | OUTPATIENT
Start: 2021-07-21 | End: 2021-07-22 | Stop reason: HOSPADM

## 2021-07-21 RX ADMIN — SODIUM CHLORIDE 100 ML: 9 INJECTION, SOLUTION INTRAVENOUS at 08:39

## 2021-07-21 RX ADMIN — Medication 10 ML: at 08:38

## 2021-07-21 RX ADMIN — VEDOLIZUMAB 300 MG: 300 INJECTION, POWDER, LYOPHILIZED, FOR SOLUTION INTRAVENOUS at 08:40

## 2021-07-21 ASSESSMENT — PAIN SCALES - GENERAL: PAINLEVEL_OUTOF10: 0

## 2021-07-21 NOTE — PROGRESS NOTES
Pt here for entyvio infusion  pt without c/o's today  She reports that her stools are almost normal for her now  Better than they have been in a long time Pt denies any cramping, bloating bleeding or diarrhea  IV # 22 was started in [facility] on 1st attempt per ARDEN Ortega RN Pt gayla well  No blood work needed for today   Entyvio 300 mg IVPB starting to infuse  IV site unremarkable Will monitor

## 2021-07-21 NOTE — PROGRESS NOTES
Entyvio infused and tubing was flushed with 100 cc NS  IV removed without difficulty Cath tip intact  Pressure then pressure dressing was applied and secured with a coban dressing  IV site unremarkable  Discharge instructions reviewed with pt and copy was given  Understanding verbalized  Pt was then up to BR and then was discharged ambulatory in stable condition

## 2021-08-18 ENCOUNTER — HOSPITAL ENCOUNTER (OUTPATIENT)
Dept: NURSING | Age: 66
Setting detail: INFUSION SERIES
Discharge: HOME | End: 2021-08-18
Payer: MEDICARE

## 2021-08-18 VITALS
TEMPERATURE: 98.4 F | SYSTOLIC BLOOD PRESSURE: 129 MMHG | HEIGHT: 65 IN | HEART RATE: 90 BPM | BODY MASS INDEX: 31.49 KG/M2 | DIASTOLIC BLOOD PRESSURE: 82 MMHG | WEIGHT: 189 LBS | RESPIRATION RATE: 16 BRPM

## 2021-08-18 DIAGNOSIS — K51.00 CHRONIC ULCERATIVE ENTEROCOLITIS WITHOUT COMPLICATION (HCC): Primary | ICD-10-CM

## 2021-08-18 LAB
A/G RATIO: 1.4 (ref 1.1–2.2)
ALBUMIN SERPL-MCNC: 3.7 G/DL (ref 3.4–5)
ALP BLD-CCNC: 71 U/L (ref 40–129)
ALT SERPL-CCNC: 34 U/L (ref 10–40)
ANION GAP SERPL CALCULATED.3IONS-SCNC: 11 MMOL/L (ref 3–16)
AST SERPL-CCNC: 34 U/L (ref 15–37)
BILIRUB SERPL-MCNC: 1.1 MG/DL (ref 0–1)
BUN BLDV-MCNC: 15 MG/DL (ref 7–20)
CALCIUM SERPL-MCNC: 8.8 MG/DL (ref 8.3–10.6)
CHLORIDE BLD-SCNC: 105 MMOL/L (ref 99–110)
CO2: 24 MMOL/L (ref 21–32)
CREAT SERPL-MCNC: 1 MG/DL (ref 0.6–1.2)
GFR AFRICAN AMERICAN: >60
GFR NON-AFRICAN AMERICAN: 55
GLOBULIN: 2.6 G/DL
GLUCOSE BLD-MCNC: 147 MG/DL (ref 70–99)
HCT VFR BLD CALC: 37.3 % (ref 36–48)
HEMOGLOBIN: 12.8 G/DL (ref 12–16)
MCH RBC QN AUTO: 32.5 PG (ref 26–34)
MCHC RBC AUTO-ENTMCNC: 34.4 G/DL (ref 31–36)
MCV RBC AUTO: 94.5 FL (ref 80–100)
PDW BLD-RTO: 14.6 % (ref 12.4–15.4)
PLATELET # BLD: 123 K/UL (ref 135–450)
PMV BLD AUTO: 9.2 FL (ref 5–10.5)
POTASSIUM SERPL-SCNC: 4.1 MMOL/L (ref 3.5–5.1)
RBC # BLD: 3.95 M/UL (ref 4–5.2)
SODIUM BLD-SCNC: 140 MMOL/L (ref 136–145)
TOTAL PROTEIN: 6.3 G/DL (ref 6.4–8.2)
WBC # BLD: 6.2 K/UL (ref 4–11)

## 2021-08-18 PROCEDURE — 2580000003 HC RX 258: Performed by: INTERNAL MEDICINE

## 2021-08-18 PROCEDURE — 96366 THER/PROPH/DIAG IV INF ADDON: CPT

## 2021-08-18 PROCEDURE — 85027 COMPLETE CBC AUTOMATED: CPT

## 2021-08-18 PROCEDURE — 6360000002 HC RX W HCPCS: Performed by: INTERNAL MEDICINE

## 2021-08-18 PROCEDURE — 96365 THER/PROPH/DIAG IV INF INIT: CPT

## 2021-08-18 PROCEDURE — 99211 OFF/OP EST MAY X REQ PHY/QHP: CPT

## 2021-08-18 PROCEDURE — 80053 COMPREHEN METABOLIC PANEL: CPT

## 2021-08-18 RX ORDER — SODIUM CHLORIDE 0.9 % (FLUSH) 0.9 %
10 SYRINGE (ML) INJECTION PRN
Status: DISCONTINUED | OUTPATIENT
Start: 2021-08-18 | End: 2021-08-19 | Stop reason: HOSPADM

## 2021-08-18 RX ORDER — SODIUM CHLORIDE 0.9 % (FLUSH) 0.9 %
10 SYRINGE (ML) INJECTION PRN
Status: CANCELLED | OUTPATIENT
Start: 2021-09-15

## 2021-08-18 RX ORDER — SODIUM CHLORIDE 9 MG/ML
INJECTION, SOLUTION INTRAVENOUS CONTINUOUS
Status: CANCELLED | OUTPATIENT
Start: 2021-09-15

## 2021-08-18 RX ORDER — SODIUM CHLORIDE 9 MG/ML
INJECTION, SOLUTION INTRAVENOUS CONTINUOUS
Status: ACTIVE | OUTPATIENT
Start: 2021-08-18 | End: 2021-08-18

## 2021-08-18 RX ADMIN — Medication 10 ML: at 08:45

## 2021-08-18 RX ADMIN — VEDOLIZUMAB 300 MG: 300 INJECTION, POWDER, LYOPHILIZED, FOR SOLUTION INTRAVENOUS at 08:46

## 2021-08-18 RX ADMIN — SODIUM CHLORIDE: 9 INJECTION, SOLUTION INTRAVENOUS at 08:46

## 2021-08-18 ASSESSMENT — PAIN SCALES - GENERAL: PAINLEVEL_OUTOF10: 0

## 2021-08-18 NOTE — PROGRESS NOTES
Pt here for entyvio infusion  pt without c/o's today  She reports that her stools are almost normal  Pt denies any cramping, bloating bleeding or diarrhea  IV # 22 was started in [facility] on 1st attempt per myself without difficulty  Pt gayla well  Blood for lab work was drawn without difficulty then  Entyvio 300 mg IVPB starting to infuse  IV site unremarkable Will monitor

## 2021-09-13 ENCOUNTER — HOSPITAL ENCOUNTER (OUTPATIENT)
Dept: NURSING | Age: 66
Setting detail: INFUSION SERIES
Discharge: HOME | End: 2021-09-13
Payer: MEDICARE

## 2021-09-13 VITALS
WEIGHT: 192 LBS | SYSTOLIC BLOOD PRESSURE: 149 MMHG | HEART RATE: 85 BPM | RESPIRATION RATE: 18 BRPM | BODY MASS INDEX: 31.99 KG/M2 | HEIGHT: 65 IN | TEMPERATURE: 98.7 F | DIASTOLIC BLOOD PRESSURE: 79 MMHG

## 2021-09-13 DIAGNOSIS — K51.00 CHRONIC ULCERATIVE ENTEROCOLITIS WITHOUT COMPLICATION (HCC): Primary | ICD-10-CM

## 2021-09-13 LAB
A/G RATIO: 1.3 (ref 1.1–2.2)
ALBUMIN SERPL-MCNC: 3.9 G/DL (ref 3.4–5)
ALP BLD-CCNC: 77 U/L (ref 40–129)
ALT SERPL-CCNC: 18 U/L (ref 10–40)
ANION GAP SERPL CALCULATED.3IONS-SCNC: 10 MMOL/L (ref 3–16)
AST SERPL-CCNC: 30 U/L (ref 15–37)
BILIRUB SERPL-MCNC: 0.7 MG/DL (ref 0–1)
BUN BLDV-MCNC: 18 MG/DL (ref 7–20)
CALCIUM SERPL-MCNC: 9.5 MG/DL (ref 8.3–10.6)
CHLORIDE BLD-SCNC: 103 MMOL/L (ref 99–110)
CO2: 25 MMOL/L (ref 21–32)
CREAT SERPL-MCNC: 0.9 MG/DL (ref 0.6–1.2)
GFR AFRICAN AMERICAN: >60
GFR NON-AFRICAN AMERICAN: >60
GLOBULIN: 2.9 G/DL
GLUCOSE BLD-MCNC: 134 MG/DL (ref 70–99)
HCT VFR BLD CALC: 36.7 % (ref 36–48)
HEMOGLOBIN: 12.7 G/DL (ref 12–16)
MCH RBC QN AUTO: 32.3 PG (ref 26–34)
MCHC RBC AUTO-ENTMCNC: 34.6 G/DL (ref 31–36)
MCV RBC AUTO: 93.4 FL (ref 80–100)
PDW BLD-RTO: 14.2 % (ref 12.4–15.4)
PLATELET # BLD: 144 K/UL (ref 135–450)
PMV BLD AUTO: 10 FL (ref 5–10.5)
POTASSIUM SERPL-SCNC: 4.2 MMOL/L (ref 3.5–5.1)
RBC # BLD: 3.93 M/UL (ref 4–5.2)
SODIUM BLD-SCNC: 138 MMOL/L (ref 136–145)
TOTAL PROTEIN: 6.8 G/DL (ref 6.4–8.2)
WBC # BLD: 6 K/UL (ref 4–11)

## 2021-09-13 PROCEDURE — 96365 THER/PROPH/DIAG IV INF INIT: CPT

## 2021-09-13 PROCEDURE — 6360000002 HC RX W HCPCS: Performed by: INTERNAL MEDICINE

## 2021-09-13 PROCEDURE — 99211 OFF/OP EST MAY X REQ PHY/QHP: CPT

## 2021-09-13 PROCEDURE — 85027 COMPLETE CBC AUTOMATED: CPT

## 2021-09-13 PROCEDURE — 80053 COMPREHEN METABOLIC PANEL: CPT

## 2021-09-13 PROCEDURE — 2580000003 HC RX 258: Performed by: INTERNAL MEDICINE

## 2021-09-13 RX ORDER — SODIUM CHLORIDE 0.9 % (FLUSH) 0.9 %
10 SYRINGE (ML) INJECTION PRN
Status: CANCELLED | OUTPATIENT
Start: 2021-10-11

## 2021-09-13 RX ORDER — SODIUM CHLORIDE 9 MG/ML
INJECTION, SOLUTION INTRAVENOUS CONTINUOUS
Status: CANCELLED | OUTPATIENT
Start: 2021-10-11

## 2021-09-13 RX ORDER — SODIUM CHLORIDE 9 MG/ML
INJECTION, SOLUTION INTRAVENOUS CONTINUOUS
Status: ACTIVE | OUTPATIENT
Start: 2021-09-13 | End: 2021-09-13

## 2021-09-13 RX ADMIN — VEDOLIZUMAB 300 MG: 300 INJECTION, POWDER, LYOPHILIZED, FOR SOLUTION INTRAVENOUS at 09:29

## 2021-09-13 RX ADMIN — SODIUM CHLORIDE: 9 INJECTION, SOLUTION INTRAVENOUS at 09:28

## 2021-09-13 ASSESSMENT — PAIN - FUNCTIONAL ASSESSMENT: PAIN_FUNCTIONAL_ASSESSMENT: 0-10

## 2021-09-13 NOTE — PROGRESS NOTES
Pt here for infusion Denies any symptoms. IV access #22 in [facility] 1st attempt per ARDEN Johnson RN. Labs were drawn. Pt provided a blanket and made comfortable Awaiting IV Medication from Pharm.

## 2021-09-13 NOTE — PROGRESS NOTES
In fusion complete. Pt tolerated without issue. IV access removed and coban dressing applied. D/C instructions reviewed with Pt, she stated understanding and left the unit in stable condition.

## 2021-10-13 ENCOUNTER — HOSPITAL ENCOUNTER (OUTPATIENT)
Dept: NURSING | Age: 66
Setting detail: INFUSION SERIES
Discharge: HOME | End: 2021-10-13
Payer: MEDICARE

## 2021-10-13 ENCOUNTER — HOSPITAL ENCOUNTER (OUTPATIENT)
Dept: ULTRASOUND IMAGING | Age: 66
Discharge: HOME | End: 2021-10-13
Payer: MEDICARE

## 2021-10-13 VITALS
BODY MASS INDEX: 31.99 KG/M2 | RESPIRATION RATE: 16 BRPM | HEART RATE: 64 BPM | HEIGHT: 65 IN | WEIGHT: 192 LBS | DIASTOLIC BLOOD PRESSURE: 69 MMHG | SYSTOLIC BLOOD PRESSURE: 132 MMHG | TEMPERATURE: 98.4 F

## 2021-10-13 DIAGNOSIS — K51.00 CHRONIC ULCERATIVE ENTEROCOLITIS WITHOUT COMPLICATION (HCC): Primary | ICD-10-CM

## 2021-10-13 DIAGNOSIS — K51.00 ULCERATIVE PANCOLITIS (HCC): ICD-10-CM

## 2021-10-13 PROCEDURE — 76982 USE 1ST TARGET LESION: CPT

## 2021-10-13 PROCEDURE — 2580000003 HC RX 258: Performed by: INTERNAL MEDICINE

## 2021-10-13 PROCEDURE — 99211 OFF/OP EST MAY X REQ PHY/QHP: CPT

## 2021-10-13 PROCEDURE — 96365 THER/PROPH/DIAG IV INF INIT: CPT

## 2021-10-13 PROCEDURE — 6360000002 HC RX W HCPCS: Performed by: INTERNAL MEDICINE

## 2021-10-13 RX ORDER — SODIUM CHLORIDE 0.9 % (FLUSH) 0.9 %
10 SYRINGE (ML) INJECTION PRN
Status: DISCONTINUED | OUTPATIENT
Start: 2021-10-13 | End: 2021-10-14 | Stop reason: HOSPADM

## 2021-10-13 RX ORDER — SODIUM CHLORIDE 9 MG/ML
INJECTION, SOLUTION INTRAVENOUS CONTINUOUS
Status: CANCELLED | OUTPATIENT
Start: 2021-11-10

## 2021-10-13 RX ORDER — SODIUM CHLORIDE 0.9 % (FLUSH) 0.9 %
10 SYRINGE (ML) INJECTION PRN
Status: CANCELLED | OUTPATIENT
Start: 2021-11-10

## 2021-10-13 RX ORDER — SODIUM CHLORIDE 9 MG/ML
INJECTION, SOLUTION INTRAVENOUS CONTINUOUS
Status: DISCONTINUED | OUTPATIENT
Start: 2021-10-13 | End: 2021-10-13 | Stop reason: HOSPADM

## 2021-10-13 RX ADMIN — VEDOLIZUMAB 300 MG: 300 INJECTION, POWDER, LYOPHILIZED, FOR SOLUTION INTRAVENOUS at 09:15

## 2021-10-13 RX ADMIN — Medication 10 ML: at 09:15

## 2021-10-13 RX ADMIN — SODIUM CHLORIDE: 9 INJECTION, SOLUTION INTRAVENOUS at 09:14

## 2021-10-13 ASSESSMENT — PAIN SCALES - GENERAL: PAINLEVEL_OUTOF10: 0

## 2021-10-13 NOTE — PROGRESS NOTES
Pt here for entyvio infusion  pt without c/o's today  She reports that her stools are almost normal  Pt denies any cramping, bloating bleeding or diarrhea  IV # 22 was started in Rt [facility] on 1st attempt per myself  Pt gayla well  no blood work needed for today  Entyvio 300 mg IVPB starting to infuse  IV site unremarkable Will monitor

## 2021-11-08 DIAGNOSIS — E53.8 B12 DEFICIENCY: ICD-10-CM

## 2021-11-09 ENCOUNTER — HOSPITAL ENCOUNTER (OUTPATIENT)
Dept: NURSING | Age: 66
Setting detail: INFUSION SERIES
Discharge: HOME | End: 2021-11-09
Payer: MEDICARE

## 2021-11-09 VITALS
TEMPERATURE: 97.9 F | BODY MASS INDEX: 32.82 KG/M2 | WEIGHT: 197 LBS | RESPIRATION RATE: 16 BRPM | HEIGHT: 65 IN | HEART RATE: 76 BPM | DIASTOLIC BLOOD PRESSURE: 81 MMHG | SYSTOLIC BLOOD PRESSURE: 144 MMHG

## 2021-11-09 DIAGNOSIS — K51.00 CHRONIC ULCERATIVE ENTEROCOLITIS WITHOUT COMPLICATION (HCC): Primary | ICD-10-CM

## 2021-11-09 LAB
A/G RATIO: 1.2 (ref 1.1–2.2)
ALBUMIN SERPL-MCNC: 4 G/DL (ref 3.4–5)
ALP BLD-CCNC: 80 U/L (ref 40–129)
ALT SERPL-CCNC: 21 U/L (ref 10–40)
ANION GAP SERPL CALCULATED.3IONS-SCNC: 11 MMOL/L (ref 3–16)
AST SERPL-CCNC: 33 U/L (ref 15–37)
BILIRUB SERPL-MCNC: 0.6 MG/DL (ref 0–1)
BUN BLDV-MCNC: 21 MG/DL (ref 7–20)
CALCIUM SERPL-MCNC: 9.8 MG/DL (ref 8.3–10.6)
CHLORIDE BLD-SCNC: 104 MMOL/L (ref 99–110)
CO2: 26 MMOL/L (ref 21–32)
CREAT SERPL-MCNC: 1 MG/DL (ref 0.6–1.2)
GFR AFRICAN AMERICAN: >60
GFR NON-AFRICAN AMERICAN: 55
GLUCOSE BLD-MCNC: 81 MG/DL (ref 70–99)
HCT VFR BLD CALC: 37.2 % (ref 36–48)
HEMOGLOBIN: 12.5 G/DL (ref 12–16)
MCH RBC QN AUTO: 31.7 PG (ref 26–34)
MCHC RBC AUTO-ENTMCNC: 33.7 G/DL (ref 31–36)
MCV RBC AUTO: 94.2 FL (ref 80–100)
PDW BLD-RTO: 12.9 % (ref 12.4–15.4)
PLATELET # BLD: 151 K/UL (ref 135–450)
PMV BLD AUTO: 9.6 FL (ref 5–10.5)
POTASSIUM SERPL-SCNC: 4.5 MMOL/L (ref 3.5–5.1)
RBC # BLD: 3.95 M/UL (ref 4–5.2)
SODIUM BLD-SCNC: 141 MMOL/L (ref 136–145)
TOTAL PROTEIN: 7.3 G/DL (ref 6.4–8.2)
WBC # BLD: 6.1 K/UL (ref 4–11)

## 2021-11-09 PROCEDURE — 85027 COMPLETE CBC AUTOMATED: CPT

## 2021-11-09 PROCEDURE — 96365 THER/PROPH/DIAG IV INF INIT: CPT

## 2021-11-09 PROCEDURE — 2580000003 HC RX 258: Performed by: INTERNAL MEDICINE

## 2021-11-09 PROCEDURE — 6360000002 HC RX W HCPCS: Performed by: INTERNAL MEDICINE

## 2021-11-09 PROCEDURE — 99211 OFF/OP EST MAY X REQ PHY/QHP: CPT

## 2021-11-09 PROCEDURE — 80053 COMPREHEN METABOLIC PANEL: CPT

## 2021-11-09 RX ORDER — SODIUM CHLORIDE 9 MG/ML
INJECTION, SOLUTION INTRAVENOUS CONTINUOUS
Status: CANCELLED | OUTPATIENT
Start: 2021-12-07

## 2021-11-09 RX ORDER — SODIUM CHLORIDE 9 MG/ML
INJECTION, SOLUTION INTRAVENOUS CONTINUOUS
Status: ACTIVE | OUTPATIENT
Start: 2021-11-09 | End: 2021-11-09

## 2021-11-09 RX ORDER — SODIUM CHLORIDE 0.9 % (FLUSH) 0.9 %
10 SYRINGE (ML) INJECTION PRN
Status: CANCELLED | OUTPATIENT
Start: 2021-12-07

## 2021-11-09 RX ORDER — SODIUM CHLORIDE 0.9 % (FLUSH) 0.9 %
10 SYRINGE (ML) INJECTION PRN
Status: DISCONTINUED | OUTPATIENT
Start: 2021-11-09 | End: 2021-11-10 | Stop reason: HOSPADM

## 2021-11-09 RX ORDER — FOLIC ACID 1 MG/1
TABLET ORAL
Qty: 90 TABLET | Refills: 1 | Status: SHIPPED | OUTPATIENT
Start: 2021-11-09 | End: 2022-03-29

## 2021-11-09 RX ADMIN — SODIUM CHLORIDE: 9 INJECTION, SOLUTION INTRAVENOUS at 09:29

## 2021-11-09 RX ADMIN — Medication 10 ML: at 09:26

## 2021-11-09 RX ADMIN — VEDOLIZUMAB 300 MG: 300 INJECTION, POWDER, LYOPHILIZED, FOR SOLUTION INTRAVENOUS at 09:30

## 2021-11-09 ASSESSMENT — PAIN SCALES - GENERAL: PAINLEVEL_OUTOF10: 0

## 2021-11-09 NOTE — PROGRESS NOTES
Pt here for entyvio infusion  pt without c/o's today  She reports that her stools are almost normal  Pt denies any cramping, bloating bleeding or diarrhea  IV # 22 was started in Rt hand on 2nd attempt per M Thor RN  Pt gayla well  Blood for lab work was drawn without difficulty  Pt gayla well  Entyvio 300 mg IVPB starting to infuse  IV site unremarkable Will monitor

## 2021-11-23 ENCOUNTER — TELEPHONE (OUTPATIENT)
Dept: FAMILY MEDICINE CLINIC | Age: 66
End: 2021-11-23

## 2021-11-23 DIAGNOSIS — M07.60 ENTEROPATHIC ARTHRITIS: ICD-10-CM

## 2021-11-23 RX ORDER — CELECOXIB 100 MG/1
CAPSULE ORAL
Qty: 120 CAPSULE | Refills: 2 | Status: SHIPPED | OUTPATIENT
Start: 2021-11-23 | End: 2022-05-06

## 2021-11-23 NOTE — TELEPHONE ENCOUNTER
----- Message from Anitacarlos Fraser sent at 11/23/2021  4:26 PM EST -----  Subject: Message to Provider    QUESTIONS  Information for Provider? pt would like Juliana Shellie to know that she   scheduled a [facility] appt for a medication f/u on 12/10. Pt wants to know if the   medication Celebrex can be refilled now. Please call/advise.   ---------------------------------------------------------------------------  --------------  CALL BACK INFO  What is the best way for the office to contact you? OK to leave message on   voicemail  Preferred Call Back Phone Number? [phone]  ---------------------------------------------------------------------------  --------------  SCRIPT ANSWERS  Relationship to Patient?  Self

## 2021-12-08 ENCOUNTER — HOSPITAL ENCOUNTER (OUTPATIENT)
Dept: NURSING | Age: 66
Setting detail: INFUSION SERIES
Discharge: HOME | End: 2021-12-08
Payer: MEDICARE

## 2021-12-08 VITALS
TEMPERATURE: 98 F | SYSTOLIC BLOOD PRESSURE: 142 MMHG | RESPIRATION RATE: 16 BRPM | WEIGHT: 197 LBS | HEART RATE: 80 BPM | HEIGHT: 65 IN | DIASTOLIC BLOOD PRESSURE: 76 MMHG | BODY MASS INDEX: 32.82 KG/M2

## 2021-12-08 DIAGNOSIS — K51.00 CHRONIC ULCERATIVE ENTEROCOLITIS WITHOUT COMPLICATION (HCC): Primary | ICD-10-CM

## 2021-12-08 PROCEDURE — 99211 OFF/OP EST MAY X REQ PHY/QHP: CPT

## 2021-12-08 PROCEDURE — 6360000002 HC RX W HCPCS: Performed by: INTERNAL MEDICINE

## 2021-12-08 PROCEDURE — 2580000003 HC RX 258: Performed by: INTERNAL MEDICINE

## 2021-12-08 PROCEDURE — 96365 THER/PROPH/DIAG IV INF INIT: CPT

## 2021-12-08 RX ORDER — SODIUM CHLORIDE 9 MG/ML
INJECTION, SOLUTION INTRAVENOUS CONTINUOUS
Status: ACTIVE | OUTPATIENT
Start: 2021-12-08 | End: 2021-12-08

## 2021-12-08 RX ORDER — SODIUM CHLORIDE 0.9 % (FLUSH) 0.9 %
10 SYRINGE (ML) INJECTION PRN
Status: DISCONTINUED | OUTPATIENT
Start: 2021-12-08 | End: 2021-12-09 | Stop reason: HOSPADM

## 2021-12-08 RX ORDER — SODIUM CHLORIDE 0.9 % (FLUSH) 0.9 %
10 SYRINGE (ML) INJECTION PRN
Status: CANCELLED | OUTPATIENT
Start: 2022-01-05

## 2021-12-08 RX ORDER — SODIUM CHLORIDE 9 MG/ML
INJECTION, SOLUTION INTRAVENOUS CONTINUOUS
Status: CANCELLED | OUTPATIENT
Start: 2022-01-05

## 2021-12-08 RX ADMIN — SODIUM CHLORIDE: 9 INJECTION, SOLUTION INTRAVENOUS at 09:26

## 2021-12-08 RX ADMIN — Medication 10 ML: at 09:25

## 2021-12-08 RX ADMIN — VEDOLIZUMAB 300 MG: 300 INJECTION, POWDER, LYOPHILIZED, FOR SOLUTION INTRAVENOUS at 09:27

## 2021-12-08 ASSESSMENT — PAIN SCALES - GENERAL: PAINLEVEL_OUTOF10: 0

## 2021-12-08 NOTE — PROGRESS NOTES
Entyvio infused and tubing was flushed with 84 cc NS  IV removed without difficulty Cath tip intact  Pressure then pressure dressing was applied and secured with a coban dressing  IV site unremarkable  Discharge instructions reviewed with pt and copy was given  Understanding verbalized  Pt was then up to BR and then was discharged ambulatory in stable condition

## 2021-12-08 NOTE — PROGRESS NOTES
Pt here for entyvio infusion  pt without c/o's today  She reports that her stools are almost normal  Pt denies any cramping, bloating bleeding or diarrhea  IV # 22 was started in [facility] on 1st attempt per myself   Pt gayla well  no blood work needed for today  Entyvio 300 mg IVPB starting to infuse  IV site unremarkable Will monitor

## 2021-12-10 ENCOUNTER — VIRTUAL VISIT (OUTPATIENT)
Dept: FAMILY MEDICINE CLINIC | Age: 66
End: 2021-12-10
Payer: MEDICARE

## 2021-12-10 DIAGNOSIS — F33.0 MILD EPISODE OF RECURRENT MAJOR DEPRESSIVE DISORDER (HCC): Primary | ICD-10-CM

## 2021-12-10 DIAGNOSIS — N18.30 STAGE 3 CHRONIC KIDNEY DISEASE, UNSPECIFIED WHETHER STAGE 3A OR 3B CKD (HCC): ICD-10-CM

## 2021-12-10 PROCEDURE — G8427 DOCREV CUR MEDS BY ELIG CLIN: HCPCS | Performed by: NURSE PRACTITIONER

## 2021-12-10 PROCEDURE — 3017F COLORECTAL CA SCREEN DOC REV: CPT | Performed by: NURSE PRACTITIONER

## 2021-12-10 PROCEDURE — G8484 FLU IMMUNIZE NO ADMIN: HCPCS | Performed by: NURSE PRACTITIONER

## 2021-12-10 PROCEDURE — 1090F PRES/ABSN URINE INCON ASSESS: CPT | Performed by: NURSE PRACTITIONER

## 2021-12-10 PROCEDURE — 1036F TOBACCO NON-USER: CPT | Performed by: NURSE PRACTITIONER

## 2021-12-10 PROCEDURE — G8399 PT W/DXA RESULTS DOCUMENT: HCPCS | Performed by: NURSE PRACTITIONER

## 2021-12-10 PROCEDURE — 99213 OFFICE O/P EST LOW 20 MIN: CPT | Performed by: NURSE PRACTITIONER

## 2021-12-10 PROCEDURE — 1123F ACP DISCUSS/DSCN MKR DOCD: CPT | Performed by: NURSE PRACTITIONER

## 2021-12-10 PROCEDURE — 4040F PNEUMOC VAC/ADMIN/RCVD: CPT | Performed by: NURSE PRACTITIONER

## 2021-12-10 PROCEDURE — G8417 CALC BMI ABV UP PARAM F/U: HCPCS | Performed by: NURSE PRACTITIONER

## 2021-12-10 ASSESSMENT — ENCOUNTER SYMPTOMS
NAUSEA: 0
GASTROINTESTINAL NEGATIVE: 1
SHORTNESS OF BREATH: 0
ANAL BLEEDING: 0
EYES NEGATIVE: 1
BLOOD IN STOOL: 0
ALLERGIC/IMMUNOLOGIC NEGATIVE: 1
ABDOMINAL PAIN: 0
RESPIRATORY NEGATIVE: 1

## 2021-12-10 NOTE — PROGRESS NOTES
12/10/2021    TELEHEALTH EVALUATION -- Audio/Visual (During [id number] public health emergency)    Chief Complaint   Patient presents with    Ulcerative Colitis       HPI:  Rock Hartley (:  1955) has requested an audio/video evaluation for the following concern(s):    Mood Disorder:  Patient presents for follow-up of depression and anxiety disorder. Current complaints include: See PHQ9 below . She denies tearfulness, decreased libido, irritability, excessive worry, panic attacks, obsessive thoughts, compulsive behaviors, increased use of drugs or alcohol, suicidal thoughts or behavior, and impaired memory. Symptoms/signs of junior: none. External stressors: financial concern, employment concern and relationship issues. Current treatment includes: none. Has tried zoloft, paxil, cymbalta, abilify and buspar    PHQ-9  5/10/2021 3/3/2021 2020 2019 2018 2017   Little interest or pleasure in doing things 3 3 0 0 0 3   Feeling down, depressed, or hopeless 1 1 0 0 0 3   Trouble falling or staying asleep, or sleeping too much 1 0 - - - 3   Feeling tired or having little energy 1 1 - - - 3   Poor appetite or overeating 1 0 - - - 3   Feeling bad about yourself - or that you are a failure or have let yourself or your family down 0 1 - - - 3   Trouble concentrating on things, such as reading the newspaper or watching television 0 0 - - - 3   Moving or speaking so slowly that other people could have noticed. Or the opposite - being so fidgety or restless that you have been moving around a lot more than usual 0 0 - - - 3   Thoughts that you would be better off dead, or of hurting yourself in some way 0 0 - - - 0   PHQ-2 Score 4 4 0 0 0 6   PHQ-9 Total Score 7 6 0 0 0 24   If you checked off any problems, how difficult have these problems made it for you to do your work, take care of things at home, or get along with other people?  1 1 - - - 0     Interpretation of Total Score Total Score Depression Severity: 1-4 = Minimal depression, 5-9 = Mild depression, 10-14 = Moderate depression, 15-19 = Moderately severe depression, 20-27 = Severe depression      Review of Systems   Constitutional: Positive for fatigue. Negative for appetite change and unexpected weight change. HENT: Negative. Eyes: Negative. Respiratory: Negative. Negative for shortness of breath. Cardiovascular: Negative. Negative for chest pain, palpitations and leg swelling. Gastrointestinal: Negative. Negative for abdominal pain, anal bleeding, blood in stool and nausea. Endocrine: Negative. Genitourinary: Negative. Negative for hematuria. Musculoskeletal: Negative. Skin: Negative. Negative for rash. Allergic/Immunologic: Negative. Neurological: Negative. Negative for dizziness, syncope, light-headedness and numbness. Hematological: Negative. Does not bruise/bleed easily. Psychiatric/Behavioral: Positive for dysphoric mood and sleep disturbance. Negative for self-injury and suicidal ideas. All other systems reviewed and are negative. Prior to Visit Medications    Medication Sig Taking? Authorizing Provider   celecoxib (CELEBREX) 100 MG capsule TAKE 2 CAPSULES BY MOUTH  DAILY Yes Thelian Wade APRN - CNP   folic acid (FOLVITE) 1 MG tablet TAKE 1 TABLET EVERY DAY Yes Ade Bolaños APRN - CNP   GNP MAGNESIUM OXIDE 250 MG TABS tablet TAKE 2 TABLETS THREE TIMES DAILY Yes Thelian Wade APRN - CNP   simvastatin (ZOCOR) 20 MG tablet TAKE 1 TABLET EVERY NIGHT Yes Trevon Wade APRN - CNP   [facility] VITAMIN D3 250 MCG (50163 UT) CAPS capsule TAKE 1 CAPSULE BY MOUTH ONE TIME PER WEEK Yes Thelian Wade APRN - CNP   alendronate (FOSAMAX) 70 MG tablet Take 1 tablet by mouth every 7 days Yes Thedora Mauro, APRN - CNP   nystatin (MYCOSTATIN) 958937 UNIT/GM cream Apply topically 2 times daily. Patient taking differently: Apply topically 2 times daily.   Pt takes as needed Yes YAHAIRA Clemons CNP   levothyroxine (SYNTHROID) 150 MCG tablet TAKE 1 TABLET BY MOUTH EVERY DAY Yes YAHAIRA Clemons CNP   gabapentin (NEURONTIN) 100 MG capsule Take 2 capsules by mouth nightly for 30 days. I PO TID  Patient taking differently: Take 100 mg by mouth nightly. 2 po qhs Yes YAHAIRA Clemons CNP   triamcinolone (KENALOG) 0.1 % cream APPLY TO AFFECTED AREA TWICE A DAY  Patient taking differently: APPLY TO AFFECTED AREA TWICE A DAY  Pt takes as needed Yes YAHAIRA Clemons CNP   VENTOLIN  (90 Base) MCG/ACT inhaler TAKE 2 PUFFS BY MOUTH EVERY 6 HOURS AS NEEDED FOR WHEEZE Yes YAHAIRA Byrnes CNP   acetaminophen (TYLENOL) 500 MG tablet Take 500 mg by mouth daily as needed for Pain  Yes Historical Provider, MD   Vedolizumab (ENTYVIO IV) Infuse 300 mg intravenously See Admin Instructions LAST DOSE GIVEN ON 12-8-2021 Yes Historical Provider, MD   blood glucose test strips (ASCENSIA AUTODISC VI;ONE TOUCH ULTRA TEST VI) strip 1 each by In Vitro route daily As needed.  Yes YAHAIRA Clemons CNP   ONE TOUCH LANCETS MISC 1 each by Does not apply route daily Yes YAHAIRA Stoddard CNP       Social History     Tobacco Use    Smoking status: Never Smoker    Smokeless tobacco: Never Used    Tobacco comment: never even tried it   Vaping Use    Vaping Use: Never used   Substance Use Topics    Alcohol use: No     Alcohol/week: 0.0 standard drinks     Comment: rarely    Drug use: No        Allergies   Allergen Reactions    Amoxicillin      \"colitis\"    Pravastatin Rash    Wellbutrin [Bupropion] Other (See Comments) and Rash     Caused sores in mouth   ,   Past Medical History:   Diagnosis Date    Acute pancreatitis 10/21/2014    Acute renal failure with tubular necrosis (HCC) 7/10/2015    Acute stasis dermatitis of left lower extremity 7/8/2015    Angina pectoris ([facility] [address] 5/23/2011    Anxiety     Cellulitis of left leg     Chronic back pain     Chronic kidney disease     Clostridium difficile infection 7/9/15, 6/4/15    Colitis     Depression     Diabetes mellitus ([address]     diet controlled    Hearing loss     Heart murmur 10/1/2019    Hyperlipidemia     Hypertension     Hypothyroidism     Left THR 1/25/2016    Liver disease     Lumbar radiculitis 10/1/2019    Obesity     Palpitations 7/17/2013    Primary osteoarthritis of left hip 11/18/2015    Restless legs syndrome     Shock ([address] 10/10/2017    Superficial thrombophlebitis of left leg 7/8/2015    Thyroid disease     Type 2 diabetes mellitus without complication ([address]     Ulcerative (chronic) enterocolitis ([address] 73/85/1790    Uncomplicated asthma 3/6/8077    Venous ulcer of left leg ([address]    ,   Past Surgical History:   Procedure Laterality Date    APPENDECTOMY  1962    CARDIAC CATHETERIZATION  05/2014    normal    COLONOSCOPY      EPIDURAL STEROID INJECTION Left 01/22/2019    LEFT LUMBAR FOUR FIVE EPIDURAL STEROID INJECTION SITE CONFIRMED BY FLUOROSCOPY performed by Mike Gan MD at [address] [place] [address] Left 01/19/2016    Dr Domenico Gomez EXTRACTION Left 06/26/2019    PHACOEMULSIFICATION OF LEFT EYE CATARACT WITH INTRAOCULAR LENS IMPLANT performed by Linda Morley MD at [facility] Right 07/24/2019    PHACOEMULSIFICATION OF CATARACT RIGHT EYE WITH INTRAOCULAR LENS IMPLANT performed by Linda Morley MD at [place] [address] EGD [facility] ASPIR/BIOP ALTERED ANATOMY  10/15/2018    EGD ESOPHAGOGASTRODUODENOSCOPY ULTRASOUND 0403, 1583 performed by Ankit Tomlin DO at [address] EGD TRANSORAL BIOPSY SINGLE/MULTIPLE  10/15/2018    EGD BIOPSY performed by Ankit Tomlin DO at [address] ENDOSCOPY  10/15/2018    EGD [facility] EUS   [address] Bilateral 1988   ,   Social History Tobacco Use    Smoking status: Never Smoker    Smokeless tobacco: Never Used    Tobacco comment: never even tried it   Vaping Use    Vaping Use: Never used   Substance Use Topics    Alcohol use: No     Alcohol/week: 0.0 standard drinks     Comment: rarely    Drug use: No   ,   Family History   Problem Relation Age of Onset    Diabetes Mother     Alzheimer's Disease Mother     Anemia Mother     Diabetes Father     Cancer Father 68        pancreatic    Cancer Maternal Grandmother [de-identified]        Bone Marrow Ca   ,   Immunization History   Administered Date(s) Administered    COVID-19, Aldona Raid, Primary or Immunocompromised, PF, 100mcg/0.5mL 03/26/2021, 04/23/2021    HPV 9-valent Kandace Freud) 05/09/2019    Hepatitis A Adult (Vaqta) 04/16/2019    Hepatitis B 09/10/2010, 10/12/2010, 03/15/2011    Hepatitis B (Recombivax HB) 09/10/2010, 10/12/2010, 03/15/2011    Hepatitis B Ped/Adol (Engerix-B, Recombivax HB) 03/15/2014    Influenza Virus Vaccine 01/26/2016, 08/22/2017, 09/14/2019, 10/22/2019, 10/21/2020    Influenza Whole 11/08/2011, 10/28/2014    Influenza, High Dose (Fluzone 65 yrs and older) 11/07/2018    Influenza, Reyna Patee, IM, PF (6 mo and older Fluzone, Flulaval, Fluarix, and 3 yrs and older Afluria) 08/22/2017, 11/07/2018, 09/14/2019    Influenza, Reyna Patee, Recombinant, IM PF (Flublok 18 yrs and older) 10/21/2020    PPD Test 08/04/2014, 10/21/2014, 12/21/2015, 11/07/2018    Pneumococcal Conjugate 13-valent ([id number]) 04/16/2019    Pneumococcal Polysaccharide ([id number]) 10/22/2014, 10/21/2020    Tdap (Boostrix, Adacel) 06/29/2017    Zoster Live (Zostavax) 06/06/2016   ,   Health Maintenance   Topic Date Due    Shingles Vaccine (2 of 3) 08/01/2016    Flu vaccine (1) 09/01/2021    COVID-19 Vaccine (3 - Booster for Aldona Raid series) 10/23/2021    Diabetic foot exam  11/09/2021    Diabetic microalbuminuria test  11/09/2021    Colon Cancer Screen FIT/FOBT  03/03/2022    Annual Wellness Visit (AWV)  03/04/2022    Diabetic retinal exam  03/05/2022    A1C test (Diabetic or Prediabetic)  05/10/2022    Lipid screen  05/10/2022    TSH testing  05/10/2022    Potassium monitoring  11/09/2022    Creatinine monitoring  11/09/2022    Breast cancer screen  03/30/2023    DTaP/Tdap/Td vaccine (2 - Td or Tdap) 06/29/2027    DEXA (modify frequency per FRAX score)  Completed    Pneumococcal 65+ years Vaccine  Completed    Hepatitis C screen  Completed    Hepatitis A vaccine  Aged Out    Hib vaccine  Aged Out    Meningococcal (ACWY) vaccine  Aged Out       PHYSICAL EXAMINATION:  [ INSTRUCTIONS:  \"[x]\" Indicates a positive item  \"[]\" Indicates a negative item  -- DELETE ALL ITEMS NOT EXAMINED]  Vital Signs: (As obtained by patient/caregiver or practitioner observation)    Blood pressure-  Heart rate-    Respiratory rate-    Temperature-  Pulse oximetry-     Constitutional: [x] Appears well-developed and well-nourished [x] No apparent distress      [] Abnormal-   Mental status  [x] Alert and awake  [x] Oriented to person/place/time [x]Able to follow commands      Eyes:  EOM    [x]  Normal  [] Abnormal-  Sclera  [x]  Normal  [] Abnormal -         Discharge [x]  None visible  [] Abnormal -    HENT:   [x] Normocephalic, atraumatic.   [] Abnormal   [x] Mouth/Throat: Mucous membranes are moist.     External Ears [x] Normal  [] Abnormal-     Neck: [x] No visualized mass     Pulmonary/Chest: [x] Respiratory effort normal.  [x] No visualized signs of difficulty breathing or respiratory distress        [] Abnormal-      Musculoskeletal:   [x] Normal gait with no signs of ataxia         [x] Normal range of motion of neck        [] Abnormal-       Neurological:        [x] No Facial Asymmetry (Cranial nerve 7 motor function) (limited exam to video visit)          [x] No gaze palsy        [] Abnormal-         Skin:        [x] No significant exanthematous lesions or discoloration noted on facial skin         [] Abnormal- contact this office for worsening conditions or problems, and seek emergency medical treatment and/or call 911 if deemed necessary. Patient identification was verified at the start of the visit: Yes    Total time spent on this encounter: 35 minutes    Services were provided through a video synchronous discussion virtually to substitute for in-person clinic visit. Patient and provider were located at their individual homes. --YAHAIRA Odell CNP on 12/10/2021 at 3:46 PM    An electronic signature was used to authenticate this note.

## 2021-12-22 DIAGNOSIS — E03.9 HYPOTHYROIDISM, UNSPECIFIED TYPE: ICD-10-CM

## 2021-12-24 RX ORDER — LEVOTHYROXINE SODIUM 0.15 MG/1
TABLET ORAL
Qty: 90 TABLET | Refills: 0 | Status: SHIPPED | OUTPATIENT
Start: 2021-12-24 | End: 2022-03-02

## 2022-01-02 DIAGNOSIS — E55.9 VITAMIN D DEFICIENCY: ICD-10-CM

## 2022-01-05 ENCOUNTER — HOSPITAL ENCOUNTER (OUTPATIENT)
Dept: NURSING | Age: 67
Setting detail: INFUSION SERIES
Discharge: HOME | End: 2022-01-05
Payer: MEDICARE

## 2022-01-05 VITALS
WEIGHT: 197 LBS | BODY MASS INDEX: 32.82 KG/M2 | HEIGHT: 65 IN | RESPIRATION RATE: 16 BRPM | SYSTOLIC BLOOD PRESSURE: 126 MMHG | DIASTOLIC BLOOD PRESSURE: 75 MMHG | HEART RATE: 65 BPM | TEMPERATURE: 98.3 F

## 2022-01-05 DIAGNOSIS — K51.00 CHRONIC ULCERATIVE ENTEROCOLITIS WITHOUT COMPLICATION (HCC): Primary | ICD-10-CM

## 2022-01-05 PROCEDURE — 6360000002 HC RX W HCPCS: Performed by: INTERNAL MEDICINE

## 2022-01-05 PROCEDURE — 96365 THER/PROPH/DIAG IV INF INIT: CPT

## 2022-01-05 PROCEDURE — 99211 OFF/OP EST MAY X REQ PHY/QHP: CPT

## 2022-01-05 PROCEDURE — 2580000003 HC RX 258: Performed by: INTERNAL MEDICINE

## 2022-01-05 RX ORDER — SODIUM CHLORIDE 9 MG/ML
INJECTION, SOLUTION INTRAVENOUS CONTINUOUS
Status: CANCELLED | OUTPATIENT
Start: 2022-02-02

## 2022-01-05 RX ORDER — SODIUM CHLORIDE 9 MG/ML
INJECTION, SOLUTION INTRAVENOUS CONTINUOUS
Status: ACTIVE | OUTPATIENT
Start: 2022-01-05 | End: 2022-01-05

## 2022-01-05 RX ORDER — SODIUM CHLORIDE 0.9 % (FLUSH) 0.9 %
10 SYRINGE (ML) INJECTION PRN
Status: DISCONTINUED | OUTPATIENT
Start: 2022-01-05 | End: 2022-01-06 | Stop reason: HOSPADM

## 2022-01-05 RX ORDER — SODIUM CHLORIDE 0.9 % (FLUSH) 0.9 %
10 SYRINGE (ML) INJECTION PRN
Status: CANCELLED | OUTPATIENT
Start: 2022-02-02

## 2022-01-05 RX ADMIN — Medication 10 ML: at 09:38

## 2022-01-05 RX ADMIN — VEDOLIZUMAB 300 MG: 300 INJECTION, POWDER, LYOPHILIZED, FOR SOLUTION INTRAVENOUS at 09:39

## 2022-01-05 RX ADMIN — SODIUM CHLORIDE: 9 INJECTION, SOLUTION INTRAVENOUS at 09:39

## 2022-01-05 ASSESSMENT — PAIN SCALES - GENERAL: PAINLEVEL_OUTOF10: 0

## 2022-01-05 NOTE — PROGRESS NOTES
Entyvio infused and tubing was flushed with 91 cc NS  IV removed without difficulty Cath tip intact  Pressure then pressure dressing was applied and secured with a coban dressing  IV site unremarkable  Discharge instructions reviewed with pt and copy was given  Understanding verbalized  Pt was then discharged ambulatory in stable condition

## 2022-01-24 RX ORDER — METHYLPREDNISOLONE 4 MG/1
TABLET ORAL
Qty: 1 KIT | Refills: 0 | Status: SHIPPED | OUTPATIENT
Start: 2022-01-24 | End: 2022-01-30

## 2022-01-25 DIAGNOSIS — E55.9 VITAMIN D DEFICIENCY: ICD-10-CM

## 2022-01-25 NOTE — TELEPHONE ENCOUNTER
Refill request for VITAMIN D3 10,000 IU SOFTGELS medication.      Name of Pharmacy- [facility]      Last visit - 12-     Pending visit - 1-    Last refill - 12-4-2021      Medication Contract signed -   Last Fred Moss ran-         Additional Comments

## 2022-01-28 ENCOUNTER — TELEMEDICINE (OUTPATIENT)
Dept: FAMILY MEDICINE CLINIC | Age: 67
End: 2022-01-28
Payer: MEDICARE

## 2022-01-28 DIAGNOSIS — F33.0 MILD EPISODE OF RECURRENT MAJOR DEPRESSIVE DISORDER (HCC): ICD-10-CM

## 2022-01-28 DIAGNOSIS — I77.6 VASCULITIS (HCC): Primary | ICD-10-CM

## 2022-01-28 PROCEDURE — 99443 PR PHYS/QHP TELEPHONE EVALUATION 21-30 MIN: CPT | Performed by: NURSE PRACTITIONER

## 2022-01-28 RX ORDER — DULOXETIN HYDROCHLORIDE 30 MG/1
CAPSULE, DELAYED RELEASE ORAL
Qty: 90 CAPSULE | Refills: 1 | Status: SHIPPED | OUTPATIENT
Start: 2022-01-28 | End: 2022-02-14

## 2022-01-28 ASSESSMENT — PATIENT HEALTH QUESTIONNAIRE - PHQ9
8. MOVING OR SPEAKING SO SLOWLY THAT OTHER PEOPLE COULD HAVE NOTICED. OR THE OPPOSITE, BEING SO FIGETY OR RESTLESS THAT YOU HAVE BEEN MOVING AROUND A LOT MORE THAN USUAL: 0
2. FEELING DOWN, DEPRESSED OR HOPELESS: 1
SUM OF ALL RESPONSES TO PHQ QUESTIONS 1-9: 5
SUM OF ALL RESPONSES TO PHQ QUESTIONS 1-9: 5
1. LITTLE INTEREST OR PLEASURE IN DOING THINGS: 0
4. FEELING TIRED OR HAVING LITTLE ENERGY: 0
6. FEELING BAD ABOUT YOURSELF - OR THAT YOU ARE A FAILURE OR HAVE LET YOURSELF OR YOUR FAMILY DOWN: 1
SUM OF ALL RESPONSES TO PHQ9 QUESTIONS 1 & 2: 1
10. IF YOU CHECKED OFF ANY PROBLEMS, HOW DIFFICULT HAVE THESE PROBLEMS MADE IT FOR YOU TO DO YOUR WORK, TAKE CARE OF THINGS AT HOME, OR GET ALONG WITH OTHER PEOPLE: 0
3. TROUBLE FALLING OR STAYING ASLEEP: 1
SUM OF ALL RESPONSES TO PHQ QUESTIONS 1-9: 5
9. THOUGHTS THAT YOU WOULD BE BETTER OFF DEAD, OR OF HURTING YOURSELF: 0
SUM OF ALL RESPONSES TO PHQ QUESTIONS 1-9: 5
7. TROUBLE CONCENTRATING ON THINGS, SUCH AS READING THE NEWSPAPER OR WATCHING TELEVISION: 0
5. POOR APPETITE OR OVEREATING: 2

## 2022-01-28 ASSESSMENT — ENCOUNTER SYMPTOMS
NAUSEA: 0
ALLERGIC/IMMUNOLOGIC NEGATIVE: 1
ANAL BLEEDING: 0
EYES NEGATIVE: 1
GASTROINTESTINAL NEGATIVE: 1
RESPIRATORY NEGATIVE: 1
BLOOD IN STOOL: 0
SHORTNESS OF BREATH: 0
ABDOMINAL PAIN: 0

## 2022-01-28 NOTE — PROGRESS NOTES
2022    TELEHEALTH EVALUATION -- Telephone (During ROTVB-47 public health emergency)    HPI:    Trupti Guerrero (:  1955) has requested an audio/video evaluation for the following concern(s):  Chief Complaint   Patient presents with    Depression     22 patient called and was experiencing redness and pain in my left leg. No indication of cellulitis. No oozing or drainage. Just red and painful. Skin is very dry and thin. Tried lotions for extra dry but at the moment have switched to aquafor    She does have history of vasculitis so steroid started  Patient started steroids about 2 days ago and is improving. Mood Disorder:  Patient presents for follow-up of depression. Current complaints include: See PHQ9 below . She denies tearfulness, decreased libido, irritability, excessive worry, panic attacks, obsessive thoughts, compulsive behaviors, increased use of drugs or alcohol, suicidal thoughts or behavior, and impaired memory. Symptoms/signs of junior: none. External stressors: nothing new. Current treatment includes: Cymbalta- 30 mg daily started about 6 weeks ago. Medication side effects: none. PHQ-9  2022 5/10/2021 3/3/2021 2020 2019 2018 2017   Little interest or pleasure in doing things 0 3 3 0 0 0 3   Feeling down, depressed, or hopeless 1 1 1 0 0 0 3   Trouble falling or staying asleep, or sleeping too much 1 1 0 - - - 3   Feeling tired or having little energy 0 1 1 - - - 3   Poor appetite or overeating 2 1 0 - - - 3   Feeling bad about yourself - or that you are a failure or have let yourself or your family down 1 0 1 - - - 3   Trouble concentrating on things, such as reading the newspaper or watching television 0 0 0 - - - 3   Moving or speaking so slowly that other people could have noticed.  Or the opposite - being so fidgety or restless that you have been moving around a lot more than usual 0 0 0 - - - 3   Thoughts that you would be better off dead, or of hurting yourself in some way 0 0 0 - - - 0   PHQ-2 Score 1 4 4 0 0 0 6   PHQ-9 Total Score 5 7 6 0 0 0 24   If you checked off any problems, how difficult have these problems made it for you to do your work, take care of things at home, or get along with other people? 0 1 1 - - - 0     Interpretation of Total Score Total Score Depression Severity: 1-4 = Minimal depression, 5-9 = Mild depression, 10-14 = Moderate depression, 15-19 = Moderately severe depression, 20-27 = Severe depression      Review of Systems   Constitutional: Negative. Negative for appetite change, fatigue and unexpected weight change. HENT: Negative. Eyes: Negative. Respiratory: Negative. Negative for shortness of breath. Cardiovascular: Negative. Negative for chest pain, palpitations and leg swelling. Gastrointestinal: Negative. Negative for abdominal pain, anal bleeding, blood in stool and nausea. Endocrine: Negative. Genitourinary: Negative. Negative for hematuria. Musculoskeletal: Negative. Skin: Negative. Negative for rash. Allergic/Immunologic: Negative. Neurological: Negative. Negative for dizziness, syncope, light-headedness and numbness. Hematological: Negative. Does not bruise/bleed easily. Psychiatric/Behavioral: Negative. All other systems reviewed and are negative. Prior to Visit Medications    Medication Sig Taking? Authorizing Provider   Cholecalciferol (VITAMIN D3) 250 MCG (58290 UT) CAPS TAKE 1 CAPSULE BY MOUTH ONCE WEEKLY Yes YAHAIRA Crane CNP   methylPREDNISolone (MEDROL, WEST,) 4 MG tablet Take as directed for 6 days.  Yes YAHAIRA Yuen CNP   DULoxetine (CYMBALTA) 30 MG extended release capsule TAKE 1 CAPSULE BY MOUTH EVERY DAY Yes YAHAIRA Yuen CNP   levothyroxine (SYNTHROID) 150 MCG tablet TAKE 1 TABLET EVERY DAY Yes YAHAIRA Yuen CNP   celecoxib (CELEBREX) 100 MG capsule TAKE 2 CAPSULES BY MOUTH  DAILY Yes Audie Bermudez YAHAIRA Thompson CNP   folic acid (FOLVITE) 1 MG tablet TAKE 1 TABLET EVERY DAY Yes Ade YAHAIRA Thompson CNP   GNP MAGNESIUM OXIDE 250 MG TABS tablet TAKE 2 TABLETS THREE TIMES DAILY Yes YAHAIRA Mishra CNP   simvastatin (ZOCOR) 20 MG tablet TAKE 1 TABLET EVERY NIGHT Yes YAHAIRA Mishra CNP   alendronate (FOSAMAX) 70 MG tablet Take 1 tablet by mouth every 7 days Yes YAHAIRA Mishra CNP   nystatin (MYCOSTATIN) 819563 UNIT/GM cream Apply topically 2 times daily. Patient taking differently: Apply topically 2 times daily. Pt takes as needed Yes YAHAIRA Mishra CNP   gabapentin (NEURONTIN) 100 MG capsule Take 2 capsules by mouth nightly for 30 days. I PO TID  Patient taking differently: Take 100 mg by mouth nightly. 2 po qhs Yes YAHAIRA Mishra CNP   triamcinolone (KENALOG) 0.1 % cream APPLY TO AFFECTED AREA TWICE A DAY  Patient taking differently: APPLY TO AFFECTED AREA TWICE A DAY  Pt takes as needed Yes YAHAIRA Mishra CNP   VENTOLIN  (90 Base) MCG/ACT inhaler TAKE 2 PUFFS BY MOUTH EVERY 6 HOURS AS NEEDED FOR WHEEZE Yes YAHAIRA Conner CNP   acetaminophen (TYLENOL) 500 MG tablet Take 500 mg by mouth daily as needed for Pain  Yes Historical Provider, MD   Vedolizumab (ENTYVIO IV) Infuse 300 mg intravenously See Admin Instructions LAST DOSE GIVEN ON 1-5-2022 Yes Historical Provider, MD   blood glucose test strips (ASCENSIA AUTODISC VI;ONE TOUCH ULTRA TEST VI) strip 1 each by In Vitro route daily As needed.  Yes YAHAIRA Mishra CNP   ONE TOUCH LANCETS MISC 1 each by Does not apply route daily Yes YAHAIRA Torres CNP       Social History     Tobacco Use    Smoking status: Never Smoker    Smokeless tobacco: Never Used    Tobacco comment: never even tried it   Vaping Use    Vaping Use: Never used   Substance Use Topics    Alcohol use: No     Alcohol/week: 0.0 standard drinks     Comment: rarely    Drug use: No        Allergies   Allergen Reactions    Amoxicillin      \"colitis\"    Pravastatin Rash    Wellbutrin [Bupropion] Other (See Comments) and Rash     Caused sores in mouth   ,   Past Medical History:   Diagnosis Date    Acute pancreatitis 10/21/2014    Acute renal failure with tubular necrosis (HCC) 7/10/2015    Acute stasis dermatitis of left lower extremity 7/8/2015    Angina pectoris ([address] 5/23/2011    Anxiety     Cellulitis of left leg     Chronic back pain     Chronic kidney disease     Clostridium difficile infection 7/9/15, 6/4/15    Colitis     Depression     Diabetes mellitus ([address]     diet controlled    Hearing loss     Heart murmur 10/1/2019    Hyperlipidemia     Hypertension     Hypothyroidism     Left THR 1/25/2016    Liver disease     Lumbar radiculitis 10/1/2019    Obesity     Palpitations 7/17/2013    Primary osteoarthritis of left hip 11/18/2015    Restless legs syndrome     Shock ([address] 10/10/2017    Superficial thrombophlebitis of left leg 7/8/2015    Thyroid disease     Type 2 diabetes mellitus without complication (HCC)     Ulcerative (chronic) enterocolitis ([address] 36/75/0437    Uncomplicated asthma 8/2/9840    Venous ulcer of left leg ([address]    ,   Past Surgical History:   Procedure Laterality Date    APPENDECTOMY  1962    CARDIAC CATHETERIZATION  05/2014    normal    COLONOSCOPY      EPIDURAL STEROID INJECTION Left 01/22/2019    LEFT LUMBAR FOUR FIVE EPIDURAL STEROID INJECTION SITE CONFIRMED BY FLUOROSCOPY performed by Kathya Carson MD at [address] [place] [address] Left 01/19/2016    Dr Jam Rosas EXTRACTION Left 06/26/2019    PHACOEMULSIFICATION OF LEFT EYE CATARACT WITH INTRAOCULAR LENS IMPLANT performed by Ricky James MD at [facility] Right 07/24/2019    PHACOEMULSIFICATION OF CATARACT RIGHT EYE WITH INTRAOCULAR LENS IMPLANT performed by Douglas Kang Poonam Crowe MD at [facility] [address]  [facility] ASPIR/BIOP ALTERED ANATOMY  10/15/2018    EGD ESOPHAGOGASTRODUODENOSCOPY ULTRASOUND 0403, 1583 performed by Elina Golden DO at [address] EGD TRANSORAL BIOPSY SINGLE/MULTIPLE  10/15/2018    EGD BIOPSY performed by Elina Golden DO at [address] ENDOSCOPY  10/15/2018    EGD [facility] EUS    VARICOSE VEIN SURGERY Bilateral 1988   ,   Social History     Tobacco Use    Smoking status: Never Smoker    Smokeless tobacco: Never Used    Tobacco comment: never even tried it   Vaping Use    Vaping Use: Never used   Substance Use Topics    Alcohol use: No     Alcohol/week: 0.0 standard drinks     Comment: rarely    Drug use: No   ,   Family History   Problem Relation Age of Onset    Diabetes Mother     Alzheimer's Disease Mother     Anemia Mother     Diabetes Father     Cancer Father 68        pancreatic    Cancer Maternal Grandmother [de-identified]        Bone Marrow Ca   ,   Immunization History   Administered Date(s) Administered    COVID-19, Moderna, Booster, PF, 50mcg/0.25ml 01/11/2022    COVID-19, Moderna, Primary or Immunocompromised, PF, 100mcg/0.5mL 03/26/2021, 04/23/2021    HPV 9-valent Nohemi Carls) 05/09/2019    Hepatitis A Adult (Vaqta) 04/16/2019    Hepatitis B 09/10/2010, 10/12/2010, 03/15/2011    Hepatitis B (Recombivax HB) 09/10/2010, 10/12/2010, 03/15/2011    Hepatitis B Ped/Adol (Engerix-B, Recombivax HB) 03/15/2014    Influenza Virus Vaccine 01/26/2016, 08/22/2017, 09/14/2019, 10/22/2019, 10/21/2020, 01/11/2022    Influenza Whole 11/08/2011, 10/28/2014    Influenza, High Dose (Fluzone 65 yrs and older) 11/07/2018    Influenza, Tracey Neat, IM, PF (6 mo and older Fluzone, Flulaval, Fluarix, and 3 yrs and older Afluria) 08/22/2017, 11/07/2018, 09/14/2019    Influenza, Tracey Neat, Recombinant, IM PF (Flublok 18 yrs and older) 10/21/2020    PPD Test 08/04/2014, 10/21/2014, 12/21/2015, 11/07/2018    Pneumococcal Conjugate 13-valent ([id number]) 04/16/2019    Pneumococcal Polysaccharide ([id number]) 10/22/2014, 10/21/2020    Tdap (Boostrix, Adacel) 06/29/2017    Zoster Live (Zostavax) 06/06/2016       PHYSICAL EXAMINATION:  Not applicable due to telephone visit        ASSESSMENT/PLAN:  1. Vasculitis ([address]  Recurrent  Had recent flare up in LLE. Denies s/s infection  Started oral steroids and having improvement    2. Mild episode of recurrent major depressive disorder (HCC)  Doing well  refill  - DULoxetine (CYMBALTA) 30 MG extended release capsule; TAKE 1 CAPSULE BY MOUTH EVERY DAY  Dispense: 90 capsule; Refill: 1  Educated if patient develops SI/HI/junior to call 911 or go to ER. Discussed use, benefit, risks and side effects of prescribed medications. Barriers to compliance discussed. All patient questions answered. Pt voiced understanding. Return in about 3 months (around 4/28/2022). Ruth Christopher is a 78 y.o. female evaluated via telephone on 1/28/2022. Consent:  She and/or health care decision maker is aware that that she may receive a bill for this telephone service, which includes applicable co-pays, depending on her insurance coverage, and has provided verbal consent to proceed. Documentation:  I communicated with the patient and/or health care decision maker about above conditions. Details of this discussion including any medical advice provided: as noted above      I affirm this is a Patient Initiated Episode with a Patient who has not had a related appointment within my department in the past 7 days or scheduled within the next 24 hours. Patient identification was verified at the start of the visit: Yes    Total Time: minutes: 21-30 minutes    Ruth Christopher was evaluated through a synchronous (real-time) audio encounter. The patient was located at home in a state where the provider was licensed to provide care.     Note: not billable if this call serves to triage the patient into an appointment for the relevant concern      YAHAIRA Turner CNP       --YAHAIRA Turner CNP on 1/28/2022 at 3:04 PM    An electronic signature was used to authenticate this note.

## 2022-02-02 ENCOUNTER — HOSPITAL ENCOUNTER (OUTPATIENT)
Dept: NURSING | Age: 67
Setting detail: INFUSION SERIES
Discharge: HOME | End: 2022-02-02
Payer: MEDICARE

## 2022-02-02 VITALS
HEIGHT: 65 IN | DIASTOLIC BLOOD PRESSURE: 77 MMHG | SYSTOLIC BLOOD PRESSURE: 146 MMHG | HEART RATE: 64 BPM | RESPIRATION RATE: 16 BRPM | BODY MASS INDEX: 33.99 KG/M2 | TEMPERATURE: 97.9 F | WEIGHT: 204 LBS

## 2022-02-02 DIAGNOSIS — K51.00 CHRONIC ULCERATIVE ENTEROCOLITIS WITHOUT COMPLICATION (HCC): Primary | ICD-10-CM

## 2022-02-02 LAB
A/G RATIO: 1.3 (ref 1.1–2.2)
ALBUMIN SERPL-MCNC: 4 G/DL (ref 3.4–5)
ALP BLD-CCNC: 69 U/L (ref 40–129)
ALT SERPL-CCNC: 25 U/L (ref 10–40)
ANION GAP SERPL CALCULATED.3IONS-SCNC: 8 MMOL/L (ref 3–16)
AST SERPL-CCNC: 42 U/L (ref 15–37)
BILIRUB SERPL-MCNC: 1 MG/DL (ref 0–1)
BUN BLDV-MCNC: 34 MG/DL (ref 7–20)
CALCIUM SERPL-MCNC: 9.5 MG/DL (ref 8.3–10.6)
CHLORIDE BLD-SCNC: 99 MMOL/L (ref 99–110)
CO2: 27 MMOL/L (ref 21–32)
CREAT SERPL-MCNC: 0.8 MG/DL (ref 0.6–1.2)
GFR AFRICAN AMERICAN: >60
GFR NON-AFRICAN AMERICAN: >60
GLUCOSE BLD-MCNC: 110 MG/DL (ref 70–99)
HCT VFR BLD CALC: 37.9 % (ref 36–48)
HEMOGLOBIN: 13 G/DL (ref 12–16)
MCH RBC QN AUTO: 31.6 PG (ref 26–34)
MCHC RBC AUTO-ENTMCNC: 34.3 G/DL (ref 31–36)
MCV RBC AUTO: 91.9 FL (ref 80–100)
PDW BLD-RTO: 14.2 % (ref 12.4–15.4)
PLATELET # BLD: 189 K/UL (ref 135–450)
PMV BLD AUTO: 9.5 FL (ref 5–10.5)
POTASSIUM SERPL-SCNC: 5.5 MMOL/L (ref 3.5–5.1)
RBC # BLD: 4.12 M/UL (ref 4–5.2)
SODIUM BLD-SCNC: 134 MMOL/L (ref 136–145)
TOTAL PROTEIN: 7.1 G/DL (ref 6.4–8.2)
WBC # BLD: 10 K/UL (ref 4–11)

## 2022-02-02 PROCEDURE — 99211 OFF/OP EST MAY X REQ PHY/QHP: CPT

## 2022-02-02 PROCEDURE — 85027 COMPLETE CBC AUTOMATED: CPT

## 2022-02-02 PROCEDURE — 2580000003 HC RX 258: Performed by: INTERNAL MEDICINE

## 2022-02-02 PROCEDURE — 96365 THER/PROPH/DIAG IV INF INIT: CPT

## 2022-02-02 PROCEDURE — 80053 COMPREHEN METABOLIC PANEL: CPT

## 2022-02-02 PROCEDURE — 6360000002 HC RX W HCPCS: Performed by: INTERNAL MEDICINE

## 2022-02-02 RX ORDER — SODIUM CHLORIDE 9 MG/ML
250 INJECTION, SOLUTION INTRAVENOUS CONTINUOUS
Status: ACTIVE | OUTPATIENT
Start: 2022-02-02 | End: 2022-02-02

## 2022-02-02 RX ORDER — SODIUM CHLORIDE 9 MG/ML
INJECTION, SOLUTION INTRAVENOUS CONTINUOUS
Status: CANCELLED | OUTPATIENT
Start: 2022-03-02

## 2022-02-02 RX ORDER — SODIUM CHLORIDE 0.9 % (FLUSH) 0.9 %
10 SYRINGE (ML) INJECTION PRN
Status: CANCELLED | OUTPATIENT
Start: 2022-03-02

## 2022-02-02 RX ORDER — SODIUM CHLORIDE 0.9 % (FLUSH) 0.9 %
10 SYRINGE (ML) INJECTION PRN
Status: DISCONTINUED | OUTPATIENT
Start: 2022-02-02 | End: 2022-02-03 | Stop reason: HOSPADM

## 2022-02-02 RX ADMIN — Medication 10 ML: at 09:27

## 2022-02-02 RX ADMIN — SODIUM CHLORIDE 250 ML: 9 INJECTION, SOLUTION INTRAVENOUS at 09:29

## 2022-02-02 RX ADMIN — VEDOLIZUMAB 300 MG: 300 INJECTION, POWDER, LYOPHILIZED, FOR SOLUTION INTRAVENOUS at 09:30

## 2022-02-02 ASSESSMENT — PAIN SCALES - GENERAL: PAINLEVEL_OUTOF10: 0

## 2022-02-02 NOTE — PROGRESS NOTES
Pt here for entyvio infusion  pt without c/o's today  She reports that her stools are almost normal  Pt denies any cramping, bloating bleeding or diarrhea  IV # 22 was started in Rt hand on 2nd attempt per myself  Blood for lab work was drawn without difficulty  Pt gayla well  then Entyvio 300 mg IVPB starting to infuse  IV site unremarkable Will monitor

## 2022-02-02 NOTE — PROGRESS NOTES
Entyvio infused and tubing was flushed with 103 cc NS  IV removed without difficulty Cath tip intact  Pressure then pressure dressing was applied and secured with a coban dressing  IV site unremarkable  Discharge instructions reviewed with pt and copy was given  Understanding verbalized  Pt was then discharged ambulatory in stable condition

## 2022-02-03 RX ORDER — ALENDRONATE SODIUM 70 MG/1
TABLET ORAL
Qty: 12 TABLET | Refills: 3 | Status: SHIPPED | OUTPATIENT
Start: 2022-02-03

## 2022-03-01 DIAGNOSIS — E03.9 HYPOTHYROIDISM, UNSPECIFIED TYPE: ICD-10-CM

## 2022-03-02 ENCOUNTER — HOSPITAL ENCOUNTER (OUTPATIENT)
Dept: NURSING | Age: 67
Setting detail: INFUSION SERIES
Discharge: HOME | End: 2022-03-02
Payer: MEDICARE

## 2022-03-02 VITALS
HEIGHT: 65 IN | HEART RATE: 82 BPM | TEMPERATURE: 98.3 F | SYSTOLIC BLOOD PRESSURE: 168 MMHG | WEIGHT: 212 LBS | BODY MASS INDEX: 35.32 KG/M2 | DIASTOLIC BLOOD PRESSURE: 88 MMHG | RESPIRATION RATE: 20 BRPM

## 2022-03-02 DIAGNOSIS — K51.00 CHRONIC ULCERATIVE ENTEROCOLITIS WITHOUT COMPLICATION (HCC): Primary | ICD-10-CM

## 2022-03-02 PROCEDURE — 99211 OFF/OP EST MAY X REQ PHY/QHP: CPT

## 2022-03-02 PROCEDURE — 2580000003 HC RX 258: Performed by: INTERNAL MEDICINE

## 2022-03-02 PROCEDURE — 96365 THER/PROPH/DIAG IV INF INIT: CPT

## 2022-03-02 PROCEDURE — 6360000002 HC RX W HCPCS: Performed by: INTERNAL MEDICINE

## 2022-03-02 RX ORDER — SODIUM CHLORIDE 9 MG/ML
250 INJECTION, SOLUTION INTRAVENOUS CONTINUOUS
Status: ACTIVE | OUTPATIENT
Start: 2022-03-02 | End: 2022-03-02

## 2022-03-02 RX ORDER — SODIUM CHLORIDE 0.9 % (FLUSH) 0.9 %
10 SYRINGE (ML) INJECTION PRN
Status: DISCONTINUED | OUTPATIENT
Start: 2022-03-02 | End: 2022-03-03 | Stop reason: HOSPADM

## 2022-03-02 RX ORDER — SODIUM CHLORIDE 9 MG/ML
INJECTION, SOLUTION INTRAVENOUS CONTINUOUS
Status: CANCELLED | OUTPATIENT
Start: 2022-03-30

## 2022-03-02 RX ORDER — LEVOTHYROXINE SODIUM 0.15 MG/1
TABLET ORAL
Qty: 90 TABLET | Refills: 0 | Status: SHIPPED | OUTPATIENT
Start: 2022-03-02 | End: 2022-05-09

## 2022-03-02 RX ORDER — SODIUM CHLORIDE 0.9 % (FLUSH) 0.9 %
10 SYRINGE (ML) INJECTION PRN
Status: CANCELLED | OUTPATIENT
Start: 2022-03-30

## 2022-03-02 RX ADMIN — SODIUM CHLORIDE 250 ML: 9 INJECTION, SOLUTION INTRAVENOUS at 09:30

## 2022-03-02 RX ADMIN — Medication 10 ML: at 09:29

## 2022-03-02 RX ADMIN — VEDOLIZUMAB 300 MG: 300 INJECTION, POWDER, LYOPHILIZED, FOR SOLUTION INTRAVENOUS at 09:31

## 2022-03-02 ASSESSMENT — PAIN SCALES - GENERAL: PAINLEVEL_OUTOF10: 0

## 2022-03-02 NOTE — PROGRESS NOTES
Pt here for entyvio infusion  pt without c/o's today  She reports that her stools are still pretty normal  Pt denies any cramping, bloating bleeding or diarrhea  IV # 22 was started in RAC on 1st attempt per myself  No blood work needed for today  Pt gayla well Entyvio 300 mg IVPB starting to infuse  IV site unremarkable Will monitor

## 2022-03-02 NOTE — PROGRESS NOTES
Entyvio infused and tubing was flushed with 101 cc NS  IV removed without difficulty Cath tip intact  Pressure then pressure dressing was applied and secured with a coban dressing  IV site unremarkable  Discharge instructions reviewed with pt and copy was given  Understanding verbalized  Pt was then discharged ambulatory in stable condition

## 2022-03-22 DIAGNOSIS — E55.9 VITAMIN D DEFICIENCY: ICD-10-CM

## 2022-03-28 DIAGNOSIS — E53.8 B12 DEFICIENCY: ICD-10-CM

## 2022-03-29 RX ORDER — FOLIC ACID 1 MG/1
TABLET ORAL
Qty: 90 TABLET | Refills: 1 | Status: SHIPPED | OUTPATIENT
Start: 2022-03-29 | End: 2022-08-23

## 2022-03-30 ENCOUNTER — TELEMEDICINE (OUTPATIENT)
Dept: FAMILY MEDICINE CLINIC | Age: 67
End: 2022-03-30
Payer: MEDICARE

## 2022-03-30 ENCOUNTER — HOSPITAL ENCOUNTER (OUTPATIENT)
Dept: MAMMOGRAPHY | Age: 67
Discharge: HOME | End: 2022-03-30
Payer: MEDICARE

## 2022-03-30 ENCOUNTER — HOSPITAL ENCOUNTER (OUTPATIENT)
Dept: NURSING | Age: 67
Setting detail: INFUSION SERIES
Discharge: HOME | End: 2022-03-30
Payer: MEDICARE

## 2022-03-30 ENCOUNTER — TELEPHONE (OUTPATIENT)
Dept: MAMMOGRAPHY | Age: 67
End: 2022-03-30

## 2022-03-30 VITALS
WEIGHT: 210 LBS | SYSTOLIC BLOOD PRESSURE: 140 MMHG | HEIGHT: 65 IN | RESPIRATION RATE: 16 BRPM | BODY MASS INDEX: 34.99 KG/M2 | TEMPERATURE: 98.4 F | DIASTOLIC BLOOD PRESSURE: 81 MMHG | HEART RATE: 81 BPM

## 2022-03-30 DIAGNOSIS — L03.116 CELLULITIS OF LEFT LOWER EXTREMITY: ICD-10-CM

## 2022-03-30 DIAGNOSIS — Z12.31 SCREENING MAMMOGRAM, ENCOUNTER FOR: ICD-10-CM

## 2022-03-30 DIAGNOSIS — K51.00 CHRONIC ULCERATIVE ENTEROCOLITIS WITHOUT COMPLICATION (HCC): Primary | ICD-10-CM

## 2022-03-30 DIAGNOSIS — I77.6 VASCULITIS (HCC): Primary | ICD-10-CM

## 2022-03-30 LAB
A/G RATIO: 1.6 (ref 1.1–2.2)
ALBUMIN SERPL-MCNC: 4.2 G/DL (ref 3.4–5)
ALP BLD-CCNC: 97 U/L (ref 40–129)
ALT SERPL-CCNC: 19 U/L (ref 10–40)
ANION GAP SERPL CALCULATED.3IONS-SCNC: 10 MMOL/L (ref 3–16)
AST SERPL-CCNC: 28 U/L (ref 15–37)
BILIRUB SERPL-MCNC: 0.8 MG/DL (ref 0–1)
BUN BLDV-MCNC: 18 MG/DL (ref 7–20)
CALCIUM SERPL-MCNC: 9.1 MG/DL (ref 8.3–10.6)
CHLORIDE BLD-SCNC: 101 MMOL/L (ref 99–110)
CO2: 28 MMOL/L (ref 21–32)
CREAT SERPL-MCNC: 1 MG/DL (ref 0.6–1.2)
GFR AFRICAN AMERICAN: >60
GFR NON-AFRICAN AMERICAN: 55
GLUCOSE BLD-MCNC: 108 MG/DL (ref 70–99)
HCT VFR BLD CALC: 35.9 % (ref 36–48)
HEMOGLOBIN: 12.5 G/DL (ref 12–16)
MCH RBC QN AUTO: 32.1 PG (ref 26–34)
MCHC RBC AUTO-ENTMCNC: 34.8 G/DL (ref 31–36)
MCV RBC AUTO: 92.2 FL (ref 80–100)
PDW BLD-RTO: 13.7 % (ref 12.4–15.4)
PLATELET # BLD: 166 K/UL (ref 135–450)
PMV BLD AUTO: 9.3 FL (ref 5–10.5)
POTASSIUM SERPL-SCNC: 4.7 MMOL/L (ref 3.5–5.1)
RBC # BLD: 3.9 M/UL (ref 4–5.2)
SODIUM BLD-SCNC: 139 MMOL/L (ref 136–145)
TOTAL PROTEIN: 6.9 G/DL (ref 6.4–8.2)
WBC # BLD: 8.3 K/UL (ref 4–11)

## 2022-03-30 PROCEDURE — 3017F COLORECTAL CA SCREEN DOC REV: CPT | Performed by: NURSE PRACTITIONER

## 2022-03-30 PROCEDURE — 77067 SCR MAMMO BI INCL CAD: CPT

## 2022-03-30 PROCEDURE — G8484 FLU IMMUNIZE NO ADMIN: HCPCS | Performed by: NURSE PRACTITIONER

## 2022-03-30 PROCEDURE — 99213 OFFICE O/P EST LOW 20 MIN: CPT | Performed by: NURSE PRACTITIONER

## 2022-03-30 PROCEDURE — G8417 CALC BMI ABV UP PARAM F/U: HCPCS | Performed by: NURSE PRACTITIONER

## 2022-03-30 PROCEDURE — 2580000003 HC RX 258: Performed by: INTERNAL MEDICINE

## 2022-03-30 PROCEDURE — 4040F PNEUMOC VAC/ADMIN/RCVD: CPT | Performed by: NURSE PRACTITIONER

## 2022-03-30 PROCEDURE — 80053 COMPREHEN METABOLIC PANEL: CPT

## 2022-03-30 PROCEDURE — 86480 TB TEST CELL IMMUN MEASURE: CPT

## 2022-03-30 PROCEDURE — 99211 OFF/OP EST MAY X REQ PHY/QHP: CPT

## 2022-03-30 PROCEDURE — 1090F PRES/ABSN URINE INCON ASSESS: CPT | Performed by: NURSE PRACTITIONER

## 2022-03-30 PROCEDURE — 1123F ACP DISCUSS/DSCN MKR DOCD: CPT | Performed by: NURSE PRACTITIONER

## 2022-03-30 PROCEDURE — 6360000002 HC RX W HCPCS: Performed by: INTERNAL MEDICINE

## 2022-03-30 PROCEDURE — 1036F TOBACCO NON-USER: CPT | Performed by: NURSE PRACTITIONER

## 2022-03-30 PROCEDURE — G8427 DOCREV CUR MEDS BY ELIG CLIN: HCPCS | Performed by: NURSE PRACTITIONER

## 2022-03-30 PROCEDURE — 96365 THER/PROPH/DIAG IV INF INIT: CPT

## 2022-03-30 PROCEDURE — G8399 PT W/DXA RESULTS DOCUMENT: HCPCS | Performed by: NURSE PRACTITIONER

## 2022-03-30 PROCEDURE — 85027 COMPLETE CBC AUTOMATED: CPT

## 2022-03-30 RX ORDER — SODIUM CHLORIDE 9 MG/ML
INJECTION, SOLUTION INTRAVENOUS CONTINUOUS
Status: CANCELLED | OUTPATIENT
Start: 2022-04-27

## 2022-03-30 RX ORDER — METHYLPREDNISOLONE 4 MG/1
TABLET ORAL
Qty: 1 KIT | Refills: 0 | Status: SHIPPED | OUTPATIENT
Start: 2022-03-30 | End: 2022-04-05

## 2022-03-30 RX ORDER — SODIUM CHLORIDE 0.9 % (FLUSH) 0.9 %
10 SYRINGE (ML) INJECTION PRN
Status: DISCONTINUED | OUTPATIENT
Start: 2022-03-30 | End: 2022-03-31 | Stop reason: HOSPADM

## 2022-03-30 RX ORDER — SODIUM CHLORIDE 9 MG/ML
INJECTION, SOLUTION INTRAVENOUS CONTINUOUS
Status: DISCONTINUED | OUTPATIENT
Start: 2022-03-30 | End: 2022-03-30 | Stop reason: HOSPADM

## 2022-03-30 RX ORDER — SODIUM CHLORIDE 0.9 % (FLUSH) 0.9 %
10 SYRINGE (ML) INJECTION PRN
Status: CANCELLED | OUTPATIENT
Start: 2022-04-27

## 2022-03-30 RX ORDER — DOXYCYCLINE HYCLATE 100 MG/1
100 CAPSULE ORAL 2 TIMES DAILY
Qty: 28 CAPSULE | Refills: 0 | Status: SHIPPED | OUTPATIENT
Start: 2022-03-30 | End: 2022-04-09

## 2022-03-30 RX ADMIN — Medication 10 ML: at 09:15

## 2022-03-30 RX ADMIN — SODIUM CHLORIDE: 9 INJECTION, SOLUTION INTRAVENOUS at 09:16

## 2022-03-30 RX ADMIN — VEDOLIZUMAB 300 MG: 300 INJECTION, POWDER, LYOPHILIZED, FOR SOLUTION INTRAVENOUS at 09:16

## 2022-03-30 ASSESSMENT — ENCOUNTER SYMPTOMS
NAUSEA: 0
EYES NEGATIVE: 1
RESPIRATORY NEGATIVE: 1
ABDOMINAL PAIN: 0
SHORTNESS OF BREATH: 0
BLOOD IN STOOL: 0
ALLERGIC/IMMUNOLOGIC NEGATIVE: 1
ANAL BLEEDING: 0
GASTROINTESTINAL NEGATIVE: 1

## 2022-03-30 ASSESSMENT — PAIN SCALES - GENERAL: PAINLEVEL_OUTOF10: 0

## 2022-03-30 NOTE — PROGRESS NOTES
3/30/2022    TELEHEALTH EVALUATION -- Audio/Visual (During RGDAP-06 public health emergency)    HPI:    Sherri Nye (:  1955) has requested an audio/video evaluation for the following concern(s):  Chief Complaint   Patient presents with    Other     Vasculitis in her left leg     Patient does have a longstanding history of vasculitis in her left leg. She also has history of cellulitis  Did send in steroid on 3/8/22 once completed steroid the redness and soreness came right back  Patient states that recently every time she wears a Sock or knee high she is pulling off skin  Has burning in her legs  She states that her anterior Shin near ankle has numb spot. Has a lot of itching in her legs  A lot of soreness as well  Clear drainage and blood at times  Patient has used over-the-counter creams and most recently has used CeraVe and helped some. Patient is now concerned because she has open sores and a lot of erythema as well as clear drainage from the open area    Review of Systems   Constitutional: Negative. Negative for appetite change, fatigue and unexpected weight change. HENT: Negative. Eyes: Negative. Respiratory: Negative. Negative for shortness of breath. Cardiovascular: Negative. Negative for chest pain, palpitations and leg swelling. Gastrointestinal: Negative. Negative for abdominal pain, anal bleeding, blood in stool and nausea. Endocrine: Negative. Genitourinary: Negative. Negative for hematuria. Musculoskeletal: Negative. Skin: Negative. Negative for rash. Allergic/Immunologic: Negative. Neurological: Negative. Negative for dizziness, syncope, light-headedness and numbness. Hematological: Negative. Does not bruise/bleed easily. Psychiatric/Behavioral: Negative. All other systems reviewed and are negative. Prior to Visit Medications    Medication Sig Taking?  Authorizing Provider   folic acid (FOLVITE) 1 MG tablet TAKE 1 TABLET EVERY DAY Yes YAHAIRA Sue CNP   Cholecalciferol (VITAMIN D3) 250 MCG (62673 UT) CAPS TAKE 1 CAPSULE BY MOUTH ONCE WEEKLY Yes YAHAIRA Sue CNP   levothyroxine (SYNTHROID) 150 MCG tablet TAKE 1 TABLET EVERY DAY Yes YAHAIRA Sue CNP   DULoxetine (CYMBALTA) 30 MG extended release capsule TAKE 1 CAPSULE BY MOUTH EVERY DAY Yes YAHAIRA Sue CNP   alendronate (FOSAMAX) 70 MG tablet TAKE 1 TABLET EVERY 7 DAYS Yes YAHAIRA Sue CNP   celecoxib (CELEBREX) 100 MG capsule TAKE 2 CAPSULES BY MOUTH  DAILY Yes YAHAIRA Sue CNP   GNP MAGNESIUM OXIDE 250 MG TABS tablet TAKE 2 TABLETS THREE TIMES DAILY Yes YAHAIRA Sue CNP   simvastatin (ZOCOR) 20 MG tablet TAKE 1 TABLET EVERY NIGHT Yes YAHAIRA Sue CNP   nystatin (MYCOSTATIN) 346267 UNIT/GM cream Apply topically 2 times daily. Patient taking differently: Apply topically 2 times daily. Pt takes as needed Yes YAHAIRA Sue CNP   gabapentin (NEURONTIN) 100 MG capsule Take 2 capsules by mouth nightly for 30 days. I PO TID  Patient taking differently: Take 100 mg by mouth nightly. 2 po qhs Yes YAHAIRA Sue CNP   triamcinolone (KENALOG) 0.1 % cream APPLY TO AFFECTED AREA TWICE A DAY  Patient taking differently: APPLY TO AFFECTED AREA TWICE A DAY  Pt takes as needed Yes YAHAIRA Sue CNP   VENTOLIN  (90 Base) MCG/ACT inhaler TAKE 2 PUFFS BY MOUTH EVERY 6 HOURS AS NEEDED FOR WHEEZE Yes YAHAIRA Lomax CNP   acetaminophen (TYLENOL) 500 MG tablet Take 500 mg by mouth daily as needed for Pain  Yes Historical Provider, MD   Vedolizumab (ENTYVIO IV) Infuse 300 mg intravenously See Admin Instructions LAST DOSE GIVEN ON 3- Yes Historical Provider, MD   blood glucose test strips (ASCENSIA AUTODISC VI;ONE TOUCH ULTRA TEST VI) strip 1 each by In Vitro route daily As needed.  Yes YAHAIRA Sue CNP   [facility] LANCETS MISC 1 each by Does not apply route daily Yes Tracy Turner, APRN - CNP       Social History     Tobacco Use    Smoking status: Never Smoker    Smokeless tobacco: Never Used    Tobacco comment: never even tried it   Vaping Use    Vaping Use: Never used   Substance Use Topics    Alcohol use: No     Alcohol/week: 0.0 standard drinks     Comment: rarely    Drug use: No        Allergies   Allergen Reactions    Amoxicillin      \"colitis\"    Pravastatin Rash    Wellbutrin [Bupropion] Other (See Comments) and Rash     Caused sores in mouth   ,   Past Medical History:   Diagnosis Date    Acute pancreatitis 10/21/2014    Acute renal failure with tubular necrosis (HCC) 7/10/2015    Acute stasis dermatitis of left lower extremity 7/8/2015    Angina pectoris ([address] 5/23/2011    Anxiety     Cellulitis of left leg     Chronic back pain     Chronic kidney disease     Clostridium difficile infection 7/9/15, 6/4/15    Colitis     Depression     Diabetes mellitus (HCC)     diet controlled    Hearing loss     Heart murmur 10/1/2019    Hyperlipidemia     Hypertension     Hypothyroidism     Left THR 1/25/2016    Liver disease     Lumbar radiculitis 10/1/2019    Obesity     Palpitations 7/17/2013    Primary osteoarthritis of left hip 11/18/2015    Restless legs syndrome     Shock ([address] 10/10/2017    Superficial thrombophlebitis of left leg 7/8/2015    Thyroid disease     Type 2 diabetes mellitus without complication ([address]     Ulcerative (chronic) enterocolitis ([address] 08/82/8191    Uncomplicated asthma 1/2/3363    Venous ulcer of left leg ([address]    ,   Past Surgical History:   Procedure Laterality Date    APPENDECTOMY  1962    CARDIAC CATHETERIZATION  05/2014    normal    COLONOSCOPY      EPIDURAL STEROID INJECTION Left 01/22/2019    LEFT LUMBAR FOUR FIVE EPIDURAL STEROID INJECTION SITE CONFIRMED BY FLUOROSCOPY performed by Fiona Espinosa MD at [address] [place] [address] ARTHROPLASTY Left 01/19/2016    Dr Jase Aguilar EXTRACTION Left 06/26/2019    PHACOEMULSIFICATION OF LEFT EYE CATARACT WITH INTRAOCULAR LENS IMPLANT performed by Raffi Velasquez MD at [facility] Right 07/24/2019    PHACOEMULSIFICATION OF CATARACT RIGHT EYE WITH INTRAOCULAR LENS IMPLANT performed by Raffi Velasquez MD at [place] [address]  [facility] ASPIR/BIOP ALTERED ANATOMY  10/15/2018    EGD ESOPHAGOGASTRODUODENOSCOPY ULTRASOUND 0403, 1583 performed by Danny Agarwal DO at [address] EGD TRANSORAL BIOPSY SINGLE/MULTIPLE  10/15/2018    EGD BIOPSY performed by Danny Agarwal DO at [address] ENDOSCOPY  10/15/2018    EGD [facility] EUS    VARICOSE VEIN SURGERY Bilateral 1988   ,   Social History     Tobacco Use    Smoking status: Never Smoker    Smokeless tobacco: Never Used    Tobacco comment: never even tried it   Vaping Use    Vaping Use: Never used   Substance Use Topics    Alcohol use: No     Alcohol/week: 0.0 standard drinks     Comment: rarely    Drug use: No   ,   Family History   Problem Relation Age of Onset    Diabetes Mother     Alzheimer's Disease Mother     Anemia Mother     Diabetes Father     Cancer Father 68        pancreatic    Cancer Maternal Grandmother [de-identified]        Bone Marrow Ca   ,   Immunization History   Administered Date(s) Administered    COVID-19, Moderna, Booster, PF, 50mcg/0.25ml 01/11/2022    COVID-19, [employer]V, Primary or Immunocompromised, PF, 100mcg/0.5mL 03/26/2021, 04/23/2021    HPV 9-valent Luberta Dameon) 05/09/2019    Hepatitis A Adult (Vaqta) 04/16/2019    Hepatitis B 09/10/2010, 10/12/2010, 03/15/2011    Hepatitis B (Recombivax HB) 09/10/2010, 10/12/2010, 03/15/2011    Hepatitis B Ped/Adol (Engerix-B, Recombivax HB) 03/15/2014    Influenza Virus Vaccine 01/26/2016, 08/22/2017, 09/14/2019, 10/22/2019, 10/21/2020, 01/11/2022    Influenza Whole 11/08/2011, 10/28/2014    Influenza, High Dose (Fluzone 65 yrs and older) 11/07/2018    Influenza, Bridgette Patron, IM, PF (6 mo and older Fluzone, Flulaval, Fluarix, and 3 yrs and older Afluria) 08/22/2017, 11/07/2018, 09/14/2019    Influenza, Bridgette Patron, Recombinant, IM PF (Flublok 18 yrs and older) 10/21/2020    PPD Test 08/04/2014, 10/21/2014, 12/21/2015, 11/07/2018    Pneumococcal Conjugate 13-valent ([id number]) 04/16/2019    Pneumococcal Polysaccharide ([id number]) 10/22/2014, 10/21/2020    Tdap (Boostrix, Adacel) 06/29/2017    Zoster Live (Zostavax) 06/06/2016       PHYSICAL EXAMINATION:  [ INSTRUCTIONS:  \"[x]\" Indicates a positive item  \"[]\" Indicates a negative item  -- DELETE ALL ITEMS NOT EXAMINED]  Vital Signs: (As obtained by patient/caregiver or practitioner observation)    Blood pressure-  Heart rate-    Respiratory rate-    Temperature-  Pulse oximetry-     Constitutional: [x] Appears well-developed and well-nourished [x] No apparent distress      [] Abnormal-   Mental status  [x] Alert and awake  [x] Oriented to person/place/time [x]Able to follow commands      Eyes:  EOM    [x]  Normal  [] Abnormal-  Sclera  [x]  Normal  [] Abnormal -         Discharge [x]  None visible  [] Abnormal -    HENT:   [x] Normocephalic, atraumatic.   [] Abnormal   [x] Mouth/Throat: Mucous membranes are moist.     External Ears [x] Normal  [] Abnormal-     Neck: [x] No visualized mass     Pulmonary/Chest: [x] Respiratory effort normal.  [x] No visualized signs of difficulty breathing or respiratory distress        [] Abnormal-      Musculoskeletal:   [x] Normal gait with no signs of ataxia         [x] Normal range of motion of neck        [] Abnormal-       Neurological:        [x] No Facial Asymmetry (Cranial nerve 7 motor function) (limited exam to video visit)          [x] No gaze palsy        [] Abnormal-         Skin:        [x] No significant exanthematous lesions or discoloration noted on facial skin         [x] Abnormal-very bright erythema of the left lower extremity from the knee to the ankle. Patient had multiple open areas on her left lower extremity. Some were bleeding. She also was noted to have some clear drainage at different areas on her leg. It also appeared to have some swelling as well           Psychiatric:       [x] Normal Affect [x] No Hallucinations        [] Abnormal-     Other pertinent observable physical exam findings-     ASSESSMENT/PLAN:  1. Vasculitis ([address]  2. Cellulitis of left lower extremity  Patient does have a longstanding history of vasculitis. In the last month and 1/2 to 2 months she has been on prednisone twice and her symptoms recur. Recently her symptoms have worsened and no improvement with the last round of steroids. Patient does have history of cellulitis in that left lower extremity as well. She states that the erythema has worsened. She does have multiple open areas now on her lower extremity that do have clear fluid and some bleeding. We will have the patient start the following  - methylPREDNISolone (MEDROL, WEST,) 4 MG tablet; Take as directed for 6 days. Dispense: 1 kit; Refill: 0  - doxycycline hyclate (VIBRAMYCIN) 100 MG capsule; Take 1 capsule by mouth 2 times daily for 10 days  Dispense: 28 capsule; Refill: 0    I will see her in the office in approximately 7 to 14 days  Most likely patient will need referral to wound care specialty        Michael Weaver was evaluated through a synchronous (real-time) audio-video encounter. The patient (or guardian if applicable) is aware that this is a billable service, which includes applicable co-pays. This Virtual Visit was conducted with patient's (and/or legal guardian's) consent. The visit was conducted pursuant to the emergency declaration under the [facility], [address] [place] [address] authority and the [employer] General Act. Patient identification was verified, and a caregiver was present when appropriate. The patient was located at home in a state where the provider was licensed to provide care. Total time spent on this encounter: 19 minutes    --YAHAIRA Lo CNP on 3/30/2022 at 1:37 PM    An electronic signature was used to authenticate this note.

## 2022-03-30 NOTE — PROGRESS NOTES
Pt here for entyvio infusion  pt without c/o's today  She reports that her stools are still pretty normal  Pt denies any cramping, bleeding or diarrhea  pt reports occasional bloating depending on what she eats  IV # 22 was started in RAC on 1st attempt per myself  Blood and Qantiferon Gold was drawn per lab today  Pt gyala both well Entyvio 300 mg IVPB starting to infuse  IV site unremarkable Will monitor

## 2022-03-30 NOTE — PROGRESS NOTES
Entyvio infused and tubing was flushed with 102 cc NS  IV removed without difficulty Cath tip intact  Pressure then pressure dressing was applied and secured with a coban dressing  IV site unremarkable  Discharge instructions reviewed with pt and copy was given  Understanding verbalized  Pt was then discharged ambulatory in stable condition

## 2022-04-27 ENCOUNTER — HOSPITAL ENCOUNTER (OUTPATIENT)
Dept: NURSING | Age: 67
Setting detail: INFUSION SERIES
Discharge: HOME | End: 2022-04-27
Payer: MEDICARE

## 2022-04-27 VITALS
HEART RATE: 80 BPM | SYSTOLIC BLOOD PRESSURE: 133 MMHG | TEMPERATURE: 98.3 F | BODY MASS INDEX: 34.99 KG/M2 | HEIGHT: 65 IN | RESPIRATION RATE: 16 BRPM | WEIGHT: 210 LBS | DIASTOLIC BLOOD PRESSURE: 73 MMHG

## 2022-04-27 DIAGNOSIS — K51.00 CHRONIC ULCERATIVE ENTEROCOLITIS WITHOUT COMPLICATION (HCC): Primary | ICD-10-CM

## 2022-04-27 PROCEDURE — 99211 OFF/OP EST MAY X REQ PHY/QHP: CPT

## 2022-04-27 PROCEDURE — 2580000003 HC RX 258: Performed by: INTERNAL MEDICINE

## 2022-04-27 PROCEDURE — 96365 THER/PROPH/DIAG IV INF INIT: CPT

## 2022-04-27 PROCEDURE — 6360000002 HC RX W HCPCS: Performed by: INTERNAL MEDICINE

## 2022-04-27 RX ORDER — SODIUM CHLORIDE 9 MG/ML
INJECTION, SOLUTION INTRAVENOUS CONTINUOUS
Status: CANCELLED | OUTPATIENT
Start: 2022-05-25

## 2022-04-27 RX ORDER — SODIUM CHLORIDE 9 MG/ML
250 INJECTION, SOLUTION INTRAVENOUS CONTINUOUS
Status: ACTIVE | OUTPATIENT
Start: 2022-04-27 | End: 2022-04-27

## 2022-04-27 RX ORDER — SODIUM CHLORIDE 0.9 % (FLUSH) 0.9 %
10 SYRINGE (ML) INJECTION PRN
Status: DISCONTINUED | OUTPATIENT
Start: 2022-04-27 | End: 2022-04-28 | Stop reason: HOSPADM

## 2022-04-27 RX ORDER — SODIUM CHLORIDE 0.9 % (FLUSH) 0.9 %
10 SYRINGE (ML) INJECTION PRN
Status: CANCELLED | OUTPATIENT
Start: 2022-05-25

## 2022-04-27 RX ADMIN — VEDOLIZUMAB 300 MG: 300 INJECTION, POWDER, LYOPHILIZED, FOR SOLUTION INTRAVENOUS at 09:24

## 2022-04-27 RX ADMIN — Medication 10 ML: at 09:21

## 2022-04-27 RX ADMIN — SODIUM CHLORIDE 250 ML: 9 INJECTION, SOLUTION INTRAVENOUS at 09:23

## 2022-04-27 NOTE — PROGRESS NOTES
Pt here for entyvio infusion  pt without c/o's today  She reports that her stools are almost normal maybe once or twice a day  Pt denies any cramping, bloating bleeding or diarrhea  IV # 22 was started in RAC on 1st attempt per myself   Pt gayla well  no blood work needed for today  Entyvio 300 mg IVPB starting to infuse  IV site unremarkable Will monitor

## 2022-04-27 NOTE — PROGRESS NOTES
Entyvio infused and tubing was flushed 102 cc NS  IV removed without difficulty Cath tip intact  Pressure then pressure dressing was applied and secured with a coban dressing  IV site unremarkable  Discharge instructions reviewed with pt and copy was given  Understanding verbalized  Pt was then up to BR and then was discharged ambulatory in stable condition

## 2022-04-28 ENCOUNTER — OFFICE VISIT (OUTPATIENT)
Dept: FAMILY MEDICINE CLINIC | Age: 67
End: 2022-04-28
Payer: MEDICARE

## 2022-04-28 VITALS
HEIGHT: 65 IN | DIASTOLIC BLOOD PRESSURE: 76 MMHG | HEART RATE: 68 BPM | OXYGEN SATURATION: 98 % | SYSTOLIC BLOOD PRESSURE: 128 MMHG | BODY MASS INDEX: 35.65 KG/M2 | WEIGHT: 214 LBS

## 2022-04-28 DIAGNOSIS — K51.80 OTHER ULCERATIVE COLITIS WITHOUT COMPLICATION (HCC): ICD-10-CM

## 2022-04-28 DIAGNOSIS — E66.01 SEVERE OBESITY (BMI 35.0-39.9) WITH COMORBIDITY (HCC): ICD-10-CM

## 2022-04-28 DIAGNOSIS — I77.6 VASCULITIS (HCC): Primary | ICD-10-CM

## 2022-04-28 DIAGNOSIS — E11.9 DIET-CONTROLLED TYPE 2 DIABETES MELLITUS (HCC): ICD-10-CM

## 2022-04-28 DIAGNOSIS — N18.30 STAGE 3 CHRONIC KIDNEY DISEASE, UNSPECIFIED WHETHER STAGE 3A OR 3B CKD (HCC): ICD-10-CM

## 2022-04-28 PROCEDURE — 1036F TOBACCO NON-USER: CPT | Performed by: NURSE PRACTITIONER

## 2022-04-28 PROCEDURE — 99214 OFFICE O/P EST MOD 30 MIN: CPT | Performed by: NURSE PRACTITIONER

## 2022-04-28 PROCEDURE — 3017F COLORECTAL CA SCREEN DOC REV: CPT | Performed by: NURSE PRACTITIONER

## 2022-04-28 PROCEDURE — 1123F ACP DISCUSS/DSCN MKR DOCD: CPT | Performed by: NURSE PRACTITIONER

## 2022-04-28 PROCEDURE — 4040F PNEUMOC VAC/ADMIN/RCVD: CPT | Performed by: NURSE PRACTITIONER

## 2022-04-28 PROCEDURE — 2022F DILAT RTA XM EVC RTNOPTHY: CPT | Performed by: NURSE PRACTITIONER

## 2022-04-28 PROCEDURE — G8417 CALC BMI ABV UP PARAM F/U: HCPCS | Performed by: NURSE PRACTITIONER

## 2022-04-28 PROCEDURE — 1090F PRES/ABSN URINE INCON ASSESS: CPT | Performed by: NURSE PRACTITIONER

## 2022-04-28 PROCEDURE — 3046F HEMOGLOBIN A1C LEVEL >9.0%: CPT | Performed by: NURSE PRACTITIONER

## 2022-04-28 PROCEDURE — G8399 PT W/DXA RESULTS DOCUMENT: HCPCS | Performed by: NURSE PRACTITIONER

## 2022-04-28 PROCEDURE — G8427 DOCREV CUR MEDS BY ELIG CLIN: HCPCS | Performed by: NURSE PRACTITIONER

## 2022-04-28 PROCEDURE — 36415 COLL VENOUS BLD VENIPUNCTURE: CPT | Performed by: NURSE PRACTITIONER

## 2022-04-28 NOTE — PROGRESS NOTES
[address]  FAMILY MEDICINE  [address] [place] [id number]  Dept: [phone]  Dept Fax: [phone]  Loc: [phone]    Alisa Waddell is a 78 y.o. female who presents today for her medical conditions/complaints as noted below. Alisa Sender is c/o of Follow-up (Vasculitits in left leg)       Subjective:     Chief Complaint   Patient presents with    Follow-up     Vasculitits in left leg       HPI   The patient is here to follow-up regarding her left lower extremity vasculitis. The patient has never seen dermatology for this issue. She does have Crohn's disease but otherwise no other autoimmune issue. Patient states that it does improve with prednisone but then comes right back. She states it is worse with standing up all day at work. She continues to have recurrent open areas that drained clear fluid. Patient does not want to have to keep using steroids to control the issue because it is making her gain weight. She has previously been checked for hepatitis C and hepatitis B and has been negative. Her's prior CBC and CMP have also been stable however she has not had a recent inflammatory/rheumatology lab work-up  She denies any fever, chills, nausea or vomiting      Review of Systems   Constitutional: Negative. Negative for appetite change, fatigue and unexpected weight change. HENT: Negative. Eyes: Negative. Respiratory: Negative. Negative for shortness of breath. Cardiovascular: Negative. Negative for chest pain, palpitations and leg swelling. Gastrointestinal: Positive for diarrhea (chronic). Negative for abdominal pain, anal bleeding, blood in stool and nausea. Endocrine: Negative. Genitourinary: Negative. Negative for hematuria. Musculoskeletal: Negative. Skin: Positive for rash. Allergic/Immunologic: Negative. Neurological: Negative. Negative for dizziness, syncope, light-headedness and numbness.    Hematological: Negative. Does not bruise/bleed easily. Psychiatric/Behavioral: Negative. All other systems reviewed and are negative.       Past Medical History:   Diagnosis Date    Acute pancreatitis 10/21/2014    Acute renal failure with tubular necrosis (HCC) 7/10/2015    Acute stasis dermatitis of left lower extremity 7/8/2015    Angina pectoris ([address] 5/23/2011    Anxiety     Cellulitis of left leg     Chronic back pain     Chronic kidney disease     Clostridium difficile infection 7/9/15, 6/4/15    Colitis     Depression     Diabetes mellitus (HCC)     diet controlled    Hearing loss     Heart murmur 10/1/2019    Hyperlipidemia     Hypertension     Hypothyroidism     Left THR 1/25/2016    Liver disease     Lumbar radiculitis 10/1/2019    Obesity     Palpitations 7/17/2013    Primary osteoarthritis of left hip 11/18/2015    Restless legs syndrome     Shock ([address] 10/10/2017    Superficial thrombophlebitis of left leg 7/8/2015    Thyroid disease     Type 2 diabetes mellitus without complication (HCC)     Ulcerative (chronic) enterocolitis ([address] 31/82/5050    Uncomplicated asthma 8/2/1001    Venous ulcer of left leg (HCC)      Family History   Problem Relation Age of Onset    Diabetes Mother     Alzheimer's Disease Mother     Anemia Mother     Diabetes Father     Cancer Father 68        pancreatic    Cancer Maternal Grandmother [de-identified]        Bone Marrow Ca     Past Surgical History:   Procedure Laterality Date    APPENDECTOMY  1962    CARDIAC CATHETERIZATION  05/2014    normal    COLONOSCOPY      EPIDURAL STEROID INJECTION Left 01/22/2019    LEFT LUMBAR FOUR FIVE EPIDURAL STEROID INJECTION SITE CONFIRMED BY FLUOROSCOPY performed by Peter Ngo MD at [address] [place] [address] Left 01/19/2016    Dr Rocio Rocha EXTRACTION Left 06/26/2019    PHACOEMULSIFICATION OF LEFT EYE CATARACT WITH INTRAOCULAR LENS IMPLANT [facility] [address] [facility]    INTRACAPSULAR CATARACT EXTRACTION Right 07/24/2019    PHACOEMULSIFICATION OF CATARACT RIGHT EYE WITH INTRAOCULAR LENS IMPLANT performed by Pedro Martin MD at [facility] [address]  [facility] ASPIR/BIOP ALTERED ANATOMY  10/15/2018    EGD ESOPHAGOGASTRODUODENOSCOPY ULTRASOUND 0403, 1583 performed by Param Calderon DO at [address] EGD TRANSORAL BIOPSY SINGLE/MULTIPLE  10/15/2018    EGD BIOPSY performed by Param Calderon DO at [address] ENDOSCOPY  10/15/2018    EGD [facility] EUS    VARICOSE VEIN SURGERY Bilateral 1988     Social History     Socioeconomic History    Marital status:      Spouse name: Oriana Tiwair Number of children: Not on file    Years of education: Not on file    Highest education level: Not on file   Occupational History    Not on file   Tobacco Use    Smoking status: Never Smoker    Smokeless tobacco: Never Used    Tobacco comment: never even tried it   Vaping Use    Vaping Use: Never used   Substance and Sexual Activity    Alcohol use: No     Alcohol/week: 0.0 standard drinks     Comment: rarely    Drug use: No    Sexual activity: Not Currently   Other Topics Concern    Not on file   Social History Narrative    Not on file     Social Determinants of Health     Financial Resource Strain: Low Risk     Difficulty of Paying Living Expenses: Not hard at all   Food Insecurity: No Food Insecurity    Worried About Running Out of Food in the Last Year: Never true    Alejandro of Food in the Last Year: Never true   Transportation Needs:     Lack of Transportation (Medical): Not on file    Lack of Transportation (Non-Medical):  Not on file   Physical Activity:     Days of Exercise per Week: Not on file    Minutes of Exercise per Session: Not on file   Stress:     Feeling of Stress : Not on file   Social Connections:     Frequency of Communication with Friends and Family: Not on file  Frequency of Social Gatherings with Friends and Family: Not on file    Attends [employer] Services: Not on file    Active Member of Clubs or Organizations: Not on file    Attends Club or Organization Meetings: Not on file    Marital Status: Not on file   Intimate Partner Violence:     Fear of Current or Ex-Partner: Not on file    Emotionally Abused: Not on file    Physically Abused: Not on file    Sexually Abused: Not on file   Housing Stability:     Unable to Pay for Housing in the Last Year: Not on file    Number of Places Lived in the Last Year: Not on file    Unstable Housing in the Last Year: Not on file     Current Outpatient Medications   Medication Sig Dispense Refill    folic acid (FOLVITE) 1 MG tablet TAKE 1 TABLET EVERY DAY 90 tablet 1    Cholecalciferol (VITAMIN D3) 250 MCG (17826 UT) CAPS TAKE 1 CAPSULE BY MOUTH ONCE WEEKLY 4 capsule 5    levothyroxine (SYNTHROID) 150 MCG tablet TAKE 1 TABLET EVERY DAY 90 tablet 0    DULoxetine (CYMBALTA) 30 MG extended release capsule TAKE 1 CAPSULE BY MOUTH EVERY DAY 30 capsule 5    alendronate (FOSAMAX) 70 MG tablet TAKE 1 TABLET EVERY 7 DAYS 12 tablet 3    celecoxib (CELEBREX) 100 MG capsule TAKE 2 CAPSULES BY MOUTH  DAILY 120 capsule 2    GNP MAGNESIUM OXIDE 250 MG TABS tablet TAKE 2 TABLETS THREE TIMES DAILY 540 tablet 5    simvastatin (ZOCOR) 20 MG tablet TAKE 1 TABLET EVERY NIGHT 90 tablet 1    nystatin (MYCOSTATIN) 318125 UNIT/GM cream Apply topically 2 times daily. (Patient taking differently: Apply topically 2 times daily.   Pt takes as needed) 1 Tube 0    triamcinolone (KENALOG) 0.1 % cream APPLY TO AFFECTED AREA TWICE A DAY (Patient taking differently: APPLY TO AFFECTED AREA TWICE A DAY  Pt takes as needed) 45 g 1    VENTOLIN  (90 Base) MCG/ACT inhaler TAKE 2 PUFFS BY MOUTH EVERY 6 HOURS AS NEEDED FOR WHEEZE 1 Inhaler 5    acetaminophen (TYLENOL) 500 MG tablet Take 500 mg by mouth daily as needed for Pain       Vedolizumab (ENTYVIO IV) Infuse 300 mg intravenously See Admin Instructions LAST DOSE GIVEN ON 4-      blood glucose test strips (ASCENSIA AUTODISC VI;ONE TOUCH ULTRA TEST VI) strip 1 each by In Vitro route daily As needed. 100 each 3    ONE TOUCH LANCETS MISC 1 each by Does not apply route daily 100 each 3    gabapentin (NEURONTIN) 100 MG capsule Take 2 capsules by mouth nightly for 30 days. I PO TID (Patient taking differently: Take 100 mg by mouth nightly. 2 po qhs) 180 capsule 1     No current facility-administered medications for this visit. No changes in past medicalhistory, past surgical history, social history, or family history were noted during the patient encounter unless specifically listed above. All updates of past medical history, past surgical history, social history, orfamily history were reviewed personally by me during the office visit. All problems listed in the assessment are stable unless noted otherwise. Medication profile reviewed personally by me during the office visit. Medication side effects and possible impairments from medications were discussed as applicable. Objective:       Physical Exam  Vitals and nursing note reviewed. Constitutional:       General: She is not in acute distress. Appearance: Normal appearance. She is well-developed. HENT:      Head: Normocephalic and atraumatic. Right Ear: Tympanic membrane, ear canal and external ear normal.      Left Ear: Tympanic membrane, ear canal and external ear normal.      Nose: Nose normal.      Mouth/Throat:      Pharynx: Uvula midline. No oropharyngeal exudate. Eyes:      General: Lids are normal.      Conjunctiva/sclera: Conjunctivae normal.      Pupils: Pupils are equal, round, and reactive to light. Neck:      Thyroid: No thyromegaly. Vascular: No carotid bruit or JVD. Cardiovascular:      Rate and Rhythm: Normal rate and regular rhythm. Pulses: Normal pulses.            Radial pulses are 2+ on the right side and 2+ on the left side. Dorsalis pedis pulses are 2+ on the right side and 2+ on the left side. Posterior tibial pulses are 2+ on the right side and 2+ on the left side. Heart sounds: Normal heart sounds. No murmur heard. No friction rub. No gallop. Pulmonary:      Effort: Pulmonary effort is normal.      Breath sounds: Normal breath sounds. Abdominal:      General: Bowel sounds are normal.      Palpations: Abdomen is soft. There is no mass. Tenderness: There is no abdominal tenderness. Musculoskeletal:         General: Normal range of motion. Cervical back: Normal range of motion and neck supple. Lymphadenopathy:      Head:      Right side of head: No submandibular adenopathy. Left side of head: No submandibular adenopathy. Cervical: No cervical adenopathy. Skin:     General: Skin is warm and dry. Findings: Lesion and rash present. Neurological:      Mental Status: She is alert and oriented to person, place, and time. Gait: Gait normal.   Psychiatric:         Speech: Speech normal.         Behavior: Behavior normal.         Thought Content: Thought content normal.         Judgment: Judgment normal.         /76 (Site: Left Upper Arm, Position: Sitting, Cuff Size: Large Adult)   Pulse 68   Ht 5' 5\" (1.651 m)   Wt 214 lb (97.1 kg)   SpO2 98%   BMI 35.61 kg/m²   Body mass index is 35.61 kg/m².     Lab Review   Hospital Outpatient Visit on 03/30/2022   Component Date Value    WBC 03/30/2022 8.3     RBC 03/30/2022 3.90*    Hemoglobin 03/30/2022 12.5     Hematocrit 03/30/2022 35.9*    MCV 03/30/2022 92.2     MCH 03/30/2022 32.1     MCHC 03/30/2022 34.8     RDW 03/30/2022 13.7     Platelets 21/72/6109 166     MPV 03/30/2022 9.3     Sodium 03/30/2022 139     Potassium 03/30/2022 4.7     Chloride 03/30/2022 101     CO2 03/30/2022 28     Anion Gap 03/30/2022 10     Glucose 03/30/2022 108*    BUN 03/30/2022 18     CREATININE 03/30/2022 1.0     GFR Non- 03/30/2022 55*    GFR  03/30/2022 >60     Calcium 03/30/2022 9.1     Total Protein 03/30/2022 6.9     Albumin 03/30/2022 4.2     Albumin/Globulin Ratio 03/30/2022 1.6     Total Bilirubin 03/30/2022 0.8     Alkaline Phosphatase 03/30/2022 97     ALT 03/30/2022 19     AST 03/30/2022 28     Quantiferon TB Minus NIL 03/30/2022 Negative     Quantiferon TB1 Minus NIL 03/30/2022 0.00     Quantiferon TB2 Minus NIL 03/30/2022 0.00     QuantiFERON Mitogen 03/30/2022 >10.00     QuantiFERON Nil 03/30/2022 0.05               Assessment:       1. Vasculitis ([facility]    2. Stage 3 chronic kidney disease, unspecified whether stage 3a or 3b CKD ([facility]    3. Severe obesity (BMI 35.0-39. 9) with comorbidity ([facility]    4. Other ulcerative colitis without complication ([facility]    5. Diet-controlled type 2 diabetes mellitus ([facility]        No results found for this visit on 04/28/22. Plan:       Kathy Demarco was seen today for follow-up. Diagnoses and all orders for this visit:    Vasculitis ([facility]  -     C-Reactive Protein  -     JENS  -     Sedimentation Rate  -     Rheumatoid Factor  -     Anti-DNA Antibody, Double-Stranded  -     Cardiolipin Antibodies IgG & IgM  -     Electrophoresis Protein, Serum  -     [facility]needs [facility] bx for definite diagnosis    Stage 3 chronic kidney disease, unspecified whether stage 3a or 3b CKD (HCC)    Stable. I have discussed the importance of blood pressure control, blood sugar control, a low sodium diet and avoidance of nephrotoxic drugs as possible. Counseled on NSAID avoidance. Severe obesity (BMI 35.0-39. 9) with comorbidity [facility])  The patient is asked to make an attempt to improve diet and exercise patterns to aid in medical management of this problem.     Other ulcerative colitis without complication ([facility]  Continues with GI/Dr Greco every 3 months  Continues with IV infusion treatments monthly    Diet-controlled type 2 diabetes mellitus ([facility]  Condition appears stable with current medication regimen. Continue current medications. No reported or noted side effects of medication. Will continue to monitor at routine intervals as appropriate. Other orders  -     Pathology Interpretation        Patient has been instructed call the office immediately with new symptoms, change in symptoms or worseningof symptoms. If this is not feasible, patient is instructed to report to the emergency room. Medication profile reviewed. Medication side effects and possible impairments from medications were discussed as applicable. Allergies were reviewed. Health maintenance was reviewed and updated as appropriate. Return in about 3 months (around 7/28/2022) for [facility]. (Comment: Please note this report has been produced using a combination of typing and speech recognition software and may contain errors related to that system including errors in grammar, punctuation, and spelling, as well as words and phrases that may be inappropriate.  If there are any questions or concerns please feel free to contact the dictating provider for clarification.)

## 2022-04-29 LAB
ALBUMIN SERPL-MCNC: 3.3 G/DL (ref 3.1–4.9)
ALPHA-1-GLOBULIN: 0.4 G/DL (ref 0.2–0.4)
ALPHA-2-GLOBULIN: 0.8 G/DL (ref 0.4–1.1)
ANTI-DSDNA IGG: <1 IU/ML (ref 0–9)
ANTI-NUCLEAR ANTIBODY (ANA): NEGATIVE
BETA GLOBULIN: 1.3 G/DL (ref 0.9–1.6)
C-REACTIVE PROTEIN: 21.5 MG/L (ref 0–5.1)
GAMMA GLOBULIN: 1.4 G/DL (ref 0.6–1.8)
RHEUMATOID FACTOR: <10 IU/ML
SEDIMENTATION RATE, ERYTHROCYTE: 64 MM/HR (ref 0–30)
SPE/IFE INTERPRETATION: NORMAL
TOTAL PROTEIN: 7.1 G/DL (ref 6.4–8.2)

## 2022-04-29 ASSESSMENT — ENCOUNTER SYMPTOMS
BLOOD IN STOOL: 0
ABDOMINAL PAIN: 0
RESPIRATORY NEGATIVE: 1
SHORTNESS OF BREATH: 0
DIARRHEA: 1
ALLERGIC/IMMUNOLOGIC NEGATIVE: 1
EYES NEGATIVE: 1
ANAL BLEEDING: 0
NAUSEA: 0

## 2022-05-01 LAB
ANTICARDIOLIPIN IGG ANTIBODY: <10 GPL
CARDIOLIPIN AB IGM: 10 MPL

## 2022-05-05 DIAGNOSIS — M07.60 ENTEROPATHIC ARTHRITIS: ICD-10-CM

## 2022-05-06 DIAGNOSIS — E03.9 HYPOTHYROIDISM, UNSPECIFIED TYPE: ICD-10-CM

## 2022-05-06 RX ORDER — CELECOXIB 100 MG/1
CAPSULE ORAL
Qty: 180 CAPSULE | Refills: 1 | Status: SHIPPED | OUTPATIENT
Start: 2022-05-06 | End: 2022-10-20

## 2022-05-07 DIAGNOSIS — Z13.6 SCREENING FOR CARDIOVASCULAR CONDITION: ICD-10-CM

## 2022-05-09 RX ORDER — LEVOTHYROXINE SODIUM 0.15 MG/1
TABLET ORAL
Qty: 90 TABLET | Refills: 0 | Status: SHIPPED | OUTPATIENT
Start: 2022-05-09 | End: 2022-05-26

## 2022-05-09 RX ORDER — SIMVASTATIN 20 MG
TABLET ORAL
Qty: 90 TABLET | Refills: 1 | Status: SHIPPED | OUTPATIENT
Start: 2022-05-09 | End: 2022-10-20

## 2022-05-19 ENCOUNTER — OFFICE VISIT (OUTPATIENT)
Dept: FAMILY MEDICINE CLINIC | Age: 67
End: 2022-05-19
Payer: MEDICARE

## 2022-05-19 VITALS
SYSTOLIC BLOOD PRESSURE: 126 MMHG | BODY MASS INDEX: 36.15 KG/M2 | OXYGEN SATURATION: 95 % | HEART RATE: 88 BPM | DIASTOLIC BLOOD PRESSURE: 81 MMHG | WEIGHT: 217 LBS | HEIGHT: 65 IN

## 2022-05-19 DIAGNOSIS — Z23 NEED FOR PROPHYLACTIC VACCINATION AND INOCULATION AGAINST VARICELLA: ICD-10-CM

## 2022-05-19 DIAGNOSIS — E11.9 DIET-CONTROLLED TYPE 2 DIABETES MELLITUS (HCC): ICD-10-CM

## 2022-05-19 DIAGNOSIS — E03.9 HYPOTHYROIDISM, UNSPECIFIED TYPE: ICD-10-CM

## 2022-05-19 DIAGNOSIS — N18.30 STAGE 3 CHRONIC KIDNEY DISEASE, UNSPECIFIED WHETHER STAGE 3A OR 3B CKD (HCC): ICD-10-CM

## 2022-05-19 DIAGNOSIS — Z00.00 MEDICARE ANNUAL WELLNESS VISIT, SUBSEQUENT: Primary | ICD-10-CM

## 2022-05-19 DIAGNOSIS — Z13.6 SCREENING FOR CARDIOVASCULAR CONDITION: ICD-10-CM

## 2022-05-19 DIAGNOSIS — E53.8 B12 DEFICIENCY: ICD-10-CM

## 2022-05-19 DIAGNOSIS — E78.2 MIXED HYPERLIPIDEMIA: ICD-10-CM

## 2022-05-19 DIAGNOSIS — I10 ESSENTIAL HYPERTENSION: ICD-10-CM

## 2022-05-19 DIAGNOSIS — E55.9 VITAMIN D DEFICIENCY: ICD-10-CM

## 2022-05-19 PROCEDURE — 3017F COLORECTAL CA SCREEN DOC REV: CPT | Performed by: NURSE PRACTITIONER

## 2022-05-19 PROCEDURE — G0446 INTENS BEHAVE THER CARDIO DX: HCPCS | Performed by: NURSE PRACTITIONER

## 2022-05-19 PROCEDURE — 1123F ACP DISCUSS/DSCN MKR DOCD: CPT | Performed by: NURSE PRACTITIONER

## 2022-05-19 PROCEDURE — 3046F HEMOGLOBIN A1C LEVEL >9.0%: CPT | Performed by: NURSE PRACTITIONER

## 2022-05-19 PROCEDURE — G0447 BEHAVIOR COUNSEL OBESITY 15M: HCPCS | Performed by: NURSE PRACTITIONER

## 2022-05-19 PROCEDURE — G0439 PPPS, SUBSEQ VISIT: HCPCS | Performed by: NURSE PRACTITIONER

## 2022-05-19 ASSESSMENT — PATIENT HEALTH QUESTIONNAIRE - PHQ9
10. IF YOU CHECKED OFF ANY PROBLEMS, HOW DIFFICULT HAVE THESE PROBLEMS MADE IT FOR YOU TO DO YOUR WORK, TAKE CARE OF THINGS AT HOME, OR GET ALONG WITH OTHER PEOPLE: 1
7. TROUBLE CONCENTRATING ON THINGS, SUCH AS READING THE NEWSPAPER OR WATCHING TELEVISION: 0
SUM OF ALL RESPONSES TO PHQ QUESTIONS 1-9: 8
SUM OF ALL RESPONSES TO PHQ9 QUESTIONS 1 & 2: 2
SUM OF ALL RESPONSES TO PHQ QUESTIONS 1-9: 8
1. LITTLE INTEREST OR PLEASURE IN DOING THINGS: 1
9. THOUGHTS THAT YOU WOULD BE BETTER OFF DEAD, OR OF HURTING YOURSELF: 0
SUM OF ALL RESPONSES TO PHQ QUESTIONS 1-9: 8
8. MOVING OR SPEAKING SO SLOWLY THAT OTHER PEOPLE COULD HAVE NOTICED. OR THE OPPOSITE, BEING SO FIGETY OR RESTLESS THAT YOU HAVE BEEN MOVING AROUND A LOT MORE THAN USUAL: 0
6. FEELING BAD ABOUT YOURSELF - OR THAT YOU ARE A FAILURE OR HAVE LET YOURSELF OR YOUR FAMILY DOWN: 0
SUM OF ALL RESPONSES TO PHQ QUESTIONS 1-9: 8
3. TROUBLE FALLING OR STAYING ASLEEP: 1
2. FEELING DOWN, DEPRESSED OR HOPELESS: 1
4. FEELING TIRED OR HAVING LITTLE ENERGY: 3
5. POOR APPETITE OR OVEREATING: 2

## 2022-05-19 ASSESSMENT — LIFESTYLE VARIABLES: HOW OFTEN DO YOU HAVE A DRINK CONTAINING ALCOHOL: NEVER

## 2022-05-19 NOTE — PROGRESS NOTES
Medicare Annual Wellness Visit    Margi Blake is here for Medicare AWV    Assessment & Plan   Medicare annual wellness visit, subsequent  BMI 36.0-36.9,adult  -     [place] Behavior  obesity 15m []  Screening for cardiovascular condition  -     [place] Intens behave ther cardio dx, 15 minutes []  Need for prophylactic vaccination and inoculation against varicella  -     zoster recombinant adjuvanted vaccine (SHINGRIX) 50 MCG/0.5ML SUSR injection; Inject 0.5 mLs into the muscle once for 1 dose, Disp-0.5 mL, R-0Normal  Diet-controlled type 2 diabetes mellitus (HCC)  -     Microalbumin / Creatinine Urine Ratio; Future  -     POCT Urinalysis no Micro; Future  -     Hemoglobin A1C; Future  Hypothyroidism, unspecified type  -     TSH; Future  Vitamin D deficiency  -     Vitamin D 25 Hydroxy; Future  B12 deficiency  -     Vitamin B12 & Folate; Future  Essential hypertension  -     CBC with Auto Differential; Future  -     Comprehensive Metabolic Panel; Future  Mixed hyperlipidemia  -     Comprehensive Metabolic Panel; Future  -     Lipid Panel; Future  Stage 3 chronic kidney disease, unspecified whether stage 3a or 3b CKD ([facility] [address]      Recommendations for Preventive Services Due: see orders and patient instructions/AVS.  Recommended screening schedule for the next 5-10 years is provided to the patient in written form: see Patient Instructions/AVS.     Return for Medicare Annual Wellness Visit in 1 year. Subjective       Patient's complete Health Risk Assessment and screening values have been reviewed and are found in Flowsheets. The following problems were reviewed today and where indicated follow up appointments were made and/or referrals ordered.     Positive Risk Factor Screenings with Interventions:    Fall Risk:  Do you feel unsteady or are you worried about falling? : (!) yes  2 or more falls in past year?: (!) yes  Fall with injury in past year?: (!) yes (On one fall bruised left hip)     Fall Risk Interventions:    · Home safety tips provided  · Home exercises provided to promote strength and balance     Depression:  PHQ-2 Score: 2  PHQ-9 Total Score: 8    Severity:1-4 = minimal depression, 5-9 = mild depression, 10-14 = moderate depression, 15-19 = moderately severe depression, 20-27 = severe depression    Depression Interventions:  · continue cymbalta.   patient declines further intervention          General Health and ACP:  General  In general, how would you say your health is?: Good  In the past 7 days, have you experienced any of the following: New or Increased Pain, New or Increased Fatigue, Loneliness, Social Isolation, Stress or Anger?: (!) Yes  Select all that apply: (!) New or Increased Fatigue  Do you get the social and emotional support that you need?: (!) No  Do you have a Living Will?: Yes    Advance Directives     Power of  Living Will ACP-Advance Directive ACP-Power of     Not on File Filed on 05/02/18 Filed Not on File      General Health Risk Interventions:  · Fatigue: labs ordered- routine yearly labs  · Inadequate social/emotional support: patient's comments regarding inadequate social support: patient does have new boyfriend that has helped    Health Habits/Nutrition:     Physical Activity: Sufficiently Active    Days of Exercise per Week: 5 days    Minutes of Exercise per Session: 150+ min     Have you lost any weight without trying in the past 3 months?: No  Body mass index: (!) 36.11  Have you seen the dentist within the past year?: (!) No    Health Habits/Nutrition Interventions:  · Inadequate physical activity:  educational materials provided to promote increased physical activity  · Nutritional issues:  educational materials for healthy, well-balanced diet provided, educational materials to promote weight loss provided  · Dental exam overdue:  patient encouraged to make appointment with his/her dentist             Objective   Vitals:    05/19/22 1507   BP: 126/81 Site: Left Upper Arm   Position: Sitting   Cuff Size: Large Adult   Pulse: 88   SpO2: 95%   Weight: 217 lb (98.4 kg)   Height: 5' 5\" (1.651 m)      Body mass index is 36.11 kg/m². General Appearance: alert and oriented to person, place and time, well developed and well- nourished, in no acute distress  Skin: warm and dry, no rash or erythema  Head: normocephalic and atraumatic  Eyes: pupils equal, round, and reactive to light, extraocular eye movements intact, conjunctivae normal  ENT: tympanic membrane, external ear and ear canal normal bilaterally, nose without deformity, nasal mucosa and turbinates normal without polyps  Neck: supple and non-tender without mass, no thyromegaly or thyroid nodules, no cervical lymphadenopathy  Pulmonary/Chest: clear to auscultation bilaterally- no wheezes, rales or rhonchi, normal air movement, no respiratory distress  Cardiovascular: normal rate, regular rhythm, normal S1 and S2, no murmurs, rubs, clicks, or gallops, distal pulses intact, no carotid bruits  Abdomen: soft, non-tender, non-distended, normal bowel sounds, no masses or organomegaly  Extremities: no cyanosis, clubbing or edema, chronic discoloration BLE with left >right  Musculoskeletal: normal range of motion, no joint swelling, deformity or tenderness  Neurologic: reflexes normal and symmetric, no cranial nerve deficit, gait, coordination and speech normal       Allergies   Allergen Reactions    Amoxicillin      \"colitis\"    Pravastatin Rash    Wellbutrin [Bupropion] Other (See Comments) and Rash     Caused sores in mouth     Prior to Visit Medications    Medication Sig Taking?  Authorizing Provider   levothyroxine (SYNTHROID) 150 MCG tablet TAKE 1 TABLET EVERY DAY Yes Thong Figueredo, APRN - CNP   simvastatin (ZOCOR) 20 MG tablet TAKE 1 TABLET EVERY NIGHT Yes Thong Figueredo, APRN - CNP   celecoxib (CELEBREX) 100 MG capsule TAKE 2 CAPSULES EVERY DAY Yes Thong Figueredo, APRN - CNP   folic acid (FOLVITE) 1 MG tablet TAKE 1 TABLET EVERY DAY Yes YAHAIRA Garcia CNP   Cholecalciferol (VITAMIN D3) 250 MCG (82334 UT) CAPS TAKE 1 CAPSULE BY MOUTH ONCE WEEKLY Yes YAHAIRA Garcia CNP   DULoxetine (CYMBALTA) 30 MG extended release capsule TAKE 1 CAPSULE BY MOUTH EVERY DAY Yes YAHAIRA Garcia CNP   alendronate (FOSAMAX) 70 MG tablet TAKE 1 TABLET EVERY 7 DAYS Yes YAHAIRA Araiza CNP   GNP MAGNESIUM OXIDE 250 MG TABS tablet TAKE 2 TABLETS THREE TIMES DAILY Yes YAHAIRA Garcia CNP   nystatin (MYCOSTATIN) 493276 UNIT/GM cream Apply topically 2 times daily. Patient taking differently: Apply topically 2 times daily. Pt takes as needed Yes YAHAIRA Garcia CNP   triamcinolone (KENALOG) 0.1 % cream APPLY TO AFFECTED AREA TWICE A DAY  Patient taking differently: APPLY TO AFFECTED AREA TWICE A DAY  Pt takes as needed Yes YAHAIRA Garcia CNP   VENTOLIN  (90 Base) MCG/ACT inhaler TAKE 2 PUFFS BY MOUTH EVERY 6 HOURS AS NEEDED FOR WHEEZE Yes YAHAIRA Araiza CNP   acetaminophen (TYLENOL) 500 MG tablet Take 500 mg by mouth daily as needed for Pain  Yes Historical Provider, MD   Vedolizumab (ENTYVIO IV) Infuse 300 mg intravenously See Admin Instructions LAST DOSE GIVEN ON 4- Yes Historical Provider, MD   blood glucose test strips (ASCENSIA AUTODISC VI;ONE TOUCH ULTRA TEST VI) strip 1 each by In Vitro route daily As needed. Yes YAHAIRA Garcia CNP   ONE TOUCH LANCETS MISC 1 each by Does not apply route daily Yes Leela PoplarYAHAIRA CNP   gabapentin (NEURONTIN) 100 MG capsule Take 2 capsules by mouth nightly for 30 days. I PO TID  Patient taking differently: Take 100 mg by mouth nightly.  2 po qhs  YAHAIRA Garcia CNP       CareTeam (Including outside providers/suppliers regularly involved in providing care):   Patient Care Team:  YAHAIRA Garcia CNP as PCP - [facility] [address] YAHAIRA Godinez - CNP as PCP - [facility] Empaneled Provider  Cierra Morel MD as Consulting Physician (Hematology and Oncology)  Fabio Marshall MD as Consulting Physician (Cardiology)     Reviewed and updated this visit:  Tobacco  Allergies  Meds  Med Hx  Surg Hx  Soc Hx  Fam Hx                Cardiovascular Disease Risk Counseling: Assessed the patient's risk to develop cardiovascular disease and reviewed main risk factors. Reviewed steps to reduce disease risk including:   · Quitting tobacco use, reducing amount smoked, or not starting the habit  · Making healthy food choices  · Being physically active and gradualy increasing activity levels   · Reduce weight and determine a healthy BMI goal  · Monitor blood pressure and treat if higher than 140/90 mmHg  · Maintain blood total cholesterol levels under 5 mmol/l or 190 mg/dl  · Maintain LDL cholesterol levels under 3.0 mmol/l or 115 mg/dl   · Control blood glucose levels  · Consider taking aspirin (75 mg daily), once blood pressure is controlled   Provided a follow up plan. Time spent (minutes): 15 minutes  Obesity Counseling: Assessed behavioral health risks and factors affecting choice of behavior. Suggested weight control approaches, including dietary changes behavioral modification and follow up plan. Provided educational and support documentation.   Time spent (minutes): 15 minutes

## 2022-05-19 NOTE — PATIENT INSTRUCTIONS
Body Mass Index: Care Instructions  Your Care Instructions     Body mass index (BMI) can help you see if your weight is raising your risk for health problems. It uses a formula to compare how much you weigh with how tallyou are.  A BMI lower than 18.5 is considered underweight.  A BMI between 18.5 and 24.9 is considered healthy.  A BMI between 25 and 29.9 is considered overweight. A BMI of 30 or higher is considered obese. If your BMI is in the normal range, it means that you have a lower risk for weight-related health problems. If your BMI is in the overweight or obese range, you may be at increased risk for weight-related health problems, such as high blood pressure, heart disease, stroke, arthritis or joint pain, and diabetes. If your BMI is in the underweight range, you may be at increased risk for health problems such as fatigue, lower protection (immunity) againstillness, muscle loss, bone loss, hair loss, and hormone problems. BMI is just one measure of your risk for weight-related health problems. You may be at higher risk for health problems if you are not active, you eat anunhealthy diet, or you drink too much alcohol or use tobacco products. Follow-up care is a key part of your treatment and safety. Be sure to make and go to all appointments, and call your doctor if you are having problems. It's also a good idea to know your test results and keep alist of the medicines you take. How can you care for yourself at home?  Practice healthy eating habits. This includes eating plenty of fruits, vegetables, whole grains, lean protein, and low-fat dairy.  If your doctor recommends it, get more exercise. Walking is a good choice. Bit by bit, increase the amount you walk every day. Try for at least 30 minutes on most days of the week.  Do not smoke. Smoking can increase your risk for health problems. If you need help quitting, talk to your doctor about stop-smoking programs and medicines. These can increase your chances of quitting for good.  Limit alcohol to 2 drinks a day for men and 1 drink a day for women. Too much alcohol can cause health problems. If you have a BMI higher than 25   Your doctor may do other tests to check your risk for weight-related health problems. This may include measuring the distance around your waist. A waist measurement of more than 40 inches in men or 35 inches in women can increase the risk of weight-related health problems.  Talk with your doctor about steps you can take to stay healthy or improve your health. You may need to make lifestyle changes to lose weight and stay healthy, such as changing your diet and getting regular exercise. If you have a BMI lower than 18.5   Your doctor may do other tests to check your risk for health problems.  Talk with your doctor about steps you can take to stay healthy or improve your health. You may need to make lifestyle changes to gain or maintain weight and stay healthy, such as getting more healthy foods in your diet and doing exercises to build muscle. Where can you learn more? Go to https://Purswaymikki.Mentor Me. org and sign in to your Public Good Software account. Enter S176 in the Bsmark box to learn more about \"Body Mass Index: Care Instructions. \"     If you do not have an account, please click on the \"Sign Up Now\" link. Current as of: December 27, 2021               Content Version: 13.2  © 8233-7315 Healthwise, Incorporated. Care instructions adapted under license by [facility] [place] ([facility]). If you have questions about a medical condition or this instruction, always ask your healthcare professional. [place] [address] any warranty or liability for your use of this information. Learning About Low-Carbohydrate Diets  What is a low-carbohydrate diet? A low-carbohydrate (or \"low-carb\") diet limits foods and drinks that have carbohydrates.  This includes grains, fruits, milk and yogurt, and starchy vegetables like potatoes, beans, and corn. It also avoids foods and drinks that have added sugar. Instead, low-carb diets include foods that are high inprotein and fat. Why might you follow a low-carb diet? Low-carb diets may be used for a variety of reasons, such as for weight loss. People who have diabetes may use a low-carb diet to help manage their bloodsugar levels. What should you do before you start the diet? Talk to your doctor before you try any diet. This is even more important if you have health problems like kidney disease, heart disease, or diabetes. Your doctor may suggest that you meet with a registered dietitian. A dietitian canhelp you make an eating plan that works for you. What foods do you eat on a low-carb diet? On a low-carb diet, you choose foods that are high in protein and fat. Examplesof these are:  [employer], poultry, and fish.  Eggs.  Nuts, such as walnuts, pecans, almonds, and peanuts.  Peanut butter and other nut butters.  Tofu.  Avocado.  Olives.  Non-starchy vegetables like broccoli, cauliflower, green beans, mushrooms, peppers, lettuce, and spinach.  Unsweetened non-dairy milks like almond milk and coconut milk.  Cheese, cottage cheese, and cream cheese. Where can you learn more? Go to https://Akademos.Edkimo. org and sign in to your Mindjet account. Enter C335 in the [facility] box to learn more about \"Learning About Low-Carbohydrate Diets. \"     If you do not have an account, please click on the \"Sign Up Now\" link. Current as of: September 8, 2021               Content Version: 13.2  © 2006-2022 Healthwise, Incorporated. Care instructions adapted under license by [facility] ([facility]). If you have questions about a medical condition or this instruction, always ask your healthcare professional. Chinyere Turner any warranty or liability for your use of this information.            Eating Healthy Foods: Care Instructions  Your Care Instructions     Eating healthy foods can help lower your risk for disease. Healthy food gives you energy and keeps your heart strong, your brain active, your musclesworking, and your bones strong. A healthy diet includes a variety of foods from the basic food groups: grains, vegetables, fruits, milk and milk products, and meat and beans. Some people may eat more of their favorite foods from only one food group and, as a result, miss getting the nutrients they need. So, it is important to pay attention not only to what you eat but also to what you are missing from your diet. You caneat a healthy, balanced diet by making a few small changes. Follow-up care is a key part of your treatment and safety. Be sure to make and go to all appointments, and call your doctor if you are having problems. It's also a good idea to know your test results and keep alist of the medicines you take. How can you care for yourself at home? Look at what you eat   Keep a food diary for a week or two and record everything you eat or drink. Track the number of servings you eat from each food group.  For a balanced diet every day, eat a variety of:  ? 6 or more ounce-equivalents of grains, such as cereals, breads, crackers, rice, or pasta, every day. An ounce-equivalent is 1 slice of bread, 1 cup of ready-to-eat cereal, or ½ cup of cooked rice, cooked pasta, or cooked cereal.  ? 2½ cups of vegetables, especially:  - Dark-green vegetables such as broccoli and spinach.  - Orange vegetables such as carrots and sweet potatoes. - Dry beans (such as zuniga and kidney beans) and peas (such as lentils). ? 2 cups of fresh, frozen, or canned fruit. A small apple or 1 banana or orange equals 1 cup. ? 3 cups of nonfat or low-fat milk, yogurt, or other milk products. ? 5½ ounces of meat and beans, such as chicken, fish, lean meat, beans, nuts, and seeds.  One egg, 1 tablespoon of peanut butter, ½ ounce nuts or seeds, or ¼ cup of cooked beans equals 1 ounce of meat.  Learn how to read food labels for serving sizes and ingredients. Fast-food and convenience-food meals often contain few or no fruits or vegetables. Make sure you eat some fruits and vegetables to make the meal more nutritious.  Look at your food diary. For each food group, add up what you have eaten and then divide the total by the number of days. This will give you an idea of how much you are eating from each food group. See if you can find some ways to change your diet to make it more healthy. Start small   Do not try to make dramatic changes to your diet all at once. You might feel that you are missing out on your favorite foods and then be more likely to fail.  Start slowly, and gradually change your habits. Try some of the following:  ? Use whole wheat bread instead of white bread. ? Use nonfat or low-fat milk instead of whole milk. ? Eat brown rice instead of white rice, and eat whole wheat pasta instead of white-flour pasta. ? Try low-fat cheeses and low-fat yogurt. ? Add more fruits and vegetables to meals and have them for snacks. ? Add lettuce, tomato, cucumber, and onion to sandwiches. ? Add fruit to yogurt and cereal.  Enjoy food   You can still eat your favorite foods. You just may need to eat less of them. If your favorite foods are high in fat, salt, and sugar, limit how often you eat them, but do not cut them out entirely.  Eat a wide variety of foods. Make healthy choices when eating out   The type of restaurant you choose can help you make healthy choices. Even fast-food chains are now offering more low-fat or healthier choices on the menu.  Choose smaller portions, or take half of your meal home.  When eating out, try:  ? A veggie pizza with a whole wheat crust or grilled chicken (instead of sausage or pepperoni). ? Pasta with roasted vegetables, grilled chicken, or marinara sauce instead of cream sauce.   ? A vegetable wrap or grilled chicken wrap. ? Broiled or poached food instead of fried or breaded items. Make healthy choices easy   Buy packaged, prewashed, ready-to-eat fresh vegetables and fruits, such as baby carrots, salad mixes, and chopped or shredded broccoli and cauliflower.  Buy packaged, presliced fruits, such as melon or pineapple.  Choose 100% fruit or vegetable juice instead of soda. Limit juice intake to 4 to 6 oz (½ to ¾ cup) a day.  Blend low-fat yogurt, fruit juice, and canned or frozen fruit to make a smoothie for breakfast or a snack. Where can you learn more? Go to https://iSoftStonepepiceweb.GPNX. org and sign in to your Heap account. Enter A501 in the PrepClass box to learn more about \"Eating Healthy Foods: Care Instructions. \"     If you do not have an account, please click on the \"Sign Up Now\" link. Current as of: September 8, 2021               Content Version: 13.2  © 5561-9867 Healthwise, PromoJam. Care instructions adapted under license by [facility] ([facility]). If you have questions about a medical condition or this instruction, always ask your healthcare professional. [place] [address] any warranty or liability for your use of this information. Personalized Preventive Plan for Fidelina Bess - 5/19/2022  Medicare offers a range of preventive health benefits. Some of the tests and screenings are paid in full while other may be subject to a deductible, co-insurance, and/or copay. Some of these benefits include a comprehensive review of your medical history including lifestyle, illnesses that may run in your family, and various assessments and screenings as appropriate. After reviewing your medical record and screening and assessments performed today your provider may have ordered immunizations, labs, imaging, and/or referrals for you.   A list of these orders (if applicable) as well as your Preventive Care list are included within your After Visit Summary for your review. Other Preventive Recommendations:    · A preventive eye exam performed by an eye specialist is recommended every 1-2 years to screen for glaucoma; cataracts, macular degeneration, and other eye disorders. · A preventive dental visit is recommended every 6 months. · Try to get at least 150 minutes of exercise per week or 10,000 steps per day on a pedometer . · Order or download the FREE \"Exercise & Physical Activity: Your Everyday Guide\" from [employer]. Call 6-797.385.9501 or search [employer] online. · You need 1722-2763 mg of calcium and 9615-4355 IU of vitamin D per day. It is possible to meet your calcium requirement with diet alone, but a vitamin D supplement is usually necessary to meet this goal.  · When exposed to the sun, use a sunscreen that protects against both UVA and UVB radiation with an SPF of 30 or greater. Reapply every 2 to 3 hours or after sweating, drying off with a towel, or swimming. · Always wear a seat belt when traveling in a car. Always wear a helmet when riding a bicycle or motorcycle. Preventing Osteoporosis: After Your Visit  Your Care Instructions  Osteoporosis means the bones are weak and thin enough that they can break easily. The older you are, the more likely you are to get osteoporosis. But with plenty of calcium, vitamin D, and exercise, you can help prevent osteoporosis. The preteen and teen years are a key time for bone building. With the help of calcium, vitamin D, and exercise in those early years and beyond, the bones reach their peak density and strength by age 27. After age 27, your bones naturally start to thin and weaken. The stronger your bones are at around age 27, the lower your risk for osteoporosis. But no matter what your age and risk are, your bones still need calcium, vitamin D, and exercise to stay strong. Also avoid smoking, and limit alcohol.  Smoking and heavy alcohol use can make your bones thinner. Talk to your doctor about any special risks you might have, such as having a close relative with osteoporosis or taking a medicine that can weaken bones. Your doctor can tell you the best ways to protect your bones from thinning. Follow-up care is a key part of your treatment and safety. Be sure to make and go to all appointments, and call your doctor if you are having problems. It's also a good idea to know your test results and keep a list of the medicines you take. How can you care for yourself at home? Get enough calcium and vitamin D. The [place] of Medicine recommends adults younger than age 46 need 1,000 mg of calcium and 600 IU of vitamin D each day. Women ages 46 to 79 need 1,200 mg of calcium and 600 IU of vitamin D each day. Men ages 46 to 79 need 1,000 mg of calcium and 600 IU of vitamin D each day. Adults 71 and older need 1,200 mg of calcium and 800 IU of vitamin D each day. Eat foods rich in calcium, like yogurt, cheese, milk, and dark green vegetables. Eat foods rich in vitamin D, like eggs, fatty fish, cereal, and fortified milk. Get some sunshine. Your body uses sunshine to make its own vitamin D. The safest time to be out in the sun is before 10 a.m. or after 3 p.m. Avoid getting sunburned. Sunburn can increase your risk of skin cancer. Talk to your doctor about taking a calcium plus vitamin D supplement. Ask about what type of calcium is right for you, and how much to take at a time. Adults ages 23 to 48 should not get more than 2,500 mg of calcium and 4,000 IU of vitamin D each day, whether it is from supplements and/or food. Adults ages 46 and older should not get more than 2,000 mg of calcium and 4,000 IU of vitamin D each day from supplements and/or food. Get regular bone-building exercise. Weight-bearing and resistance exercises keep bones healthy by working the muscles and bones against gravity. Start out at an exercise level that feels right for you.  Add a little at a time until you can do the following:  Do 30 minutes of weight-bearing exercise on most days of the week. Walking, jogging, stair climbing, and dancing are good choices. Do resistance exercises with weights or elastic bands 2 to 3 days a week. Limit alcohol. Drink no more than 1 alcohol drink a day if you are a woman. Drink no more than 2 alcohol drinks a day if you are a man. Do not smoke. Smoking can make bones thin faster. If you need help quitting, talk to your doctor about stop-smoking programs and medicines. These can increase your chances of quitting for good. When should you call for help? Watch closely for changes in your health, and be sure to contact your doctor if:  You need help with a healthy eating plan. You need help with an exercise plan    © 2152-0051 SocialF5, Incorporated. Care instructions adapted under license by [facility]. This care instruction is for use with your licensed healthcare professional. If you have questions about a medical condition or this instruction, always ask your healthcare professional. [place] [address] any warranty or liability for your use of this information. Content Version: 9.4.86036; Last Revised: June 20, 2011              ·     Keep Your Memory Chirag Prince       Many factors can affect your ability to remembera hectic lifestyle, aging, stress, chronic disease, and certain medicines. But, there are steps you can take to sharpen your mind and help preserve your memory. Challenge Your Brain   Regularly challenging your mind may help keeps it in top shape. Good mental exercises include:   Crossword puzzlesUse a dictionary if you need it; you will learn more that way. Brainteasers Try some! Crafts, such as wood working and sewing   Hobbies, such as gardening and building model airplanes   SocializingVisit old friends or join groups to meet new ones.    Reading   Learning a new language   Taking a class, whether it be art history or tra chi   TravelingExperience the food, history, and culture of your destination   Learning to use a computer   Going to museums, the theater, or thought-provoking movies   Changing things in your daily life, such as reversing your pattern in the grocery store or brushing your teeth using your nondominant hand   Use Memory Aids   There is no need to remember every detail on your own. These memory aids can help:   Calendars and day planners   Electronic organizers to store all sorts of helpful informationThese devices can \"beep\" to remind you of appointments. A book of days to record birthdays, anniversaries, and other occasions that occur on the same date every year   Detailed \"to-do\" lists and strategically placed sticky notes   Quick \"study\" sessionsBefore a gathering, review who will be there so their names will be fresh in your mind. Establish routinesFor example, keep your keys, wallet, and umbrella in the same place all the time or take medicine with your 8:00 AM glass of juice   Live a Healthy Life   Many actions that will keep your body strong will do the same for your mind. For example:   Talk to Your Doctor About Herbs and Supplements    Malnutrition and vitamin deficiencies can impair your mental function. For example, vitamin B12 deficiency can cause a range of symptoms, including confusion. But, what if your nutritional needs are being met? Can herbs and supplements still offer a benefit? Researchers have investigated a range of natural remedies, such as ginkgo , ginseng , and the supplement phosphatidylserine (PS). So far, though, the evidence is inconsistent as to whether these products can improve memory or thinking. If you are interested in taking herbs and supplements, talk to your doctor first because they may interact with other medicines that you are taking.    Exercise Regularly    Among the many benefits of regular exercise are increased blood flow to the brain and decreased risk of certain diseases that can interfere with memory function. One study found that even moderate exercise has a beneficial effect. Examples of \"moderate\" exercise include:   Playing 18 holes of golf once a week, without a cart   Playing tennis twice a week   Walking one mile per day   Manage Stress    It can be tough to remember what is important when your mind is cluttered. Make time for relaxation. Choose activities that calm you down, and make it routine. Manage Chronic Conditions    Side effects of high blood pressure , diabetes, and heart disease can interfere with mental function. Many of the lifestyle steps discussed here can help manage these conditions. Strive to eat a healthy diet, exercise regularly, get stress under control, and follow your doctor's advice for your condition. Minimize Medications    Talk to your doctor about the medicines that you take. Some may be unnecessary. Also, healthy lifestyle habits may lower the need for certain drugs. Last Reviewed: April 2010 Vel Howe MD   Updated: 4/13/2010   ·     [address]       As we get older, changes in balance, gait, strength, vision, hearing, and cognition make even the most youthful senior more prone to accidents. Falls are one of the leading health risks for older people. This increased risk of falling is related to:   Aging process (eg, decreased muscle strength, slowed reflexes)   Higher incidence of chronic health problems (eg, arthritis, diabetes) that may limit mobility, agility or sensory awareness   Side effects of medicine (eg, dizziness, blurred vision)especially medicines like prescription pain medicines and drugs used to treat mental health conditions   Depending on the brittleness of your bones, the consequences of a fall can be serious and long lasting.    Home Life   Research by the Association of Aging [facility]) shows that some home accidents among older adults can be prevented by making simple lifestyle changes and basic modifications and repairs to the home environment. Here are some lifestyle changes that experts recommend:   Have your hearing and vision checked regularly. Be sure to wear prescription glasses that are right for you. Speak to your doctor or pharmacist about the possible side effects of your medicines. A number of medicines can cause dizziness. If you have problems with sleep, talk to your doctor. Limit your intake of alcohol. If necessary, use a cane or walker to help maintain your balance. Wear supportive, rubber-soled shoes, even at home. If you live in a region that gets wintry weather, you may want to put special cleats on your shoes to prevent you from slipping on the snow and ice. Exercise regularly to help maintain muscle tone, agility, and balance. Always hold the banister when going up or down stairs. Also, use  bars when getting in or out of the bath or shower, or using the toilet. To avoid dizziness, get up slowly from a lying down position. Sit up first, dangling your legs for a minute or two before rising to a standing position. Overall Home Safety Check   According to the Consumer Product Safety Commision's \"Older Consumer Home Safety Checklist,\" it is important to check for potential hazards in each room. And remember, proper lighting is an essential factor in home safety. If you cannot see clearly, you are more likely to fall. Important questions to ask yourself include:   Are lamp, electric, extension, and telephone cords placed out of the flow of traffic and maintained in good condition? Have frayed cords been replaced? Are all small rugs and runners slip resistant? If not, you can secure them to the floor with a special double-sided carpet tape. Are smoke detectors properly locatedone on every floor of your home and one outside of every sleeping area? Are they in good working order? Are batteries replaced at least once a year?    Do you have a well-maintained carbon monoxide detector outside every sleeping are in your home? Does your furniture layout leave plenty of space to maneuver between and around chairs, tables, beds, and sofas? Are hallways, stairs and passages between rooms well lit? Can you reach a lamp without getting out of bed? Are floor surfaces well maintained? Shag rugs, high-pile carpeting, tile floors, and polished wood floors can be particularly slippery. Stairs should always have handrails and be carpeted or fitted with a non-skid tread. Is your telephone easily reachable. Is the cord safely tucked away? Room by Room   According to the Association of Aging, bathrooms and monika are the two most potentially hazardous rooms in your home. In the Kitchen    Be sure your stove is in proper working order and always make sure burners and the oven are off before you go out or go to sleep. Keep pots on the back burners, turn handles away from the front of the stove, and keep stove clean and free of grease build-up. Kitchen ventilation systems and range exhausts should be working properly. Keep flammable objects such as towels and pot holders away from the cooking area except when in use. Make sure kitchen curtains are tied back. Move cords and appliances away from the sink and hot surfaces. If extension cords are needed, install wiring guides so they do not hang over the sink, range, or working areas. Look for coffee pots, kettles and toaster ovens with automatic shut-offs. Keep a mop handy in the kitchen so you can wipe up spills instantly. You should also have a small fire extinguisher. Arrange your kitchen with frequently used items on lower shelves to avoid the need to stand on a stepstool to reach them. Make sure countertops are well-lit to avoid injuries while cutting and preparing food.     In the Bathroom    Use a non-slip mat or decals in the tub and shower, since wet, soapy tile or porcelain surfaces are extremely slippery. Make sure bathroom rugs are non-skid or tape them firmly to the floor. Bathtubs should have at least one, preferably two, grab bars, firmly attached to structural supports in the wall. (Do not use built-in soap holders or glass shower doors as grab bars.)    Tub seats fitted with non-slip material on the legs allow you to wash sitting down. For people with limited mobility, bathtub transfer benches allow you to slide safely into the tub. Raised toilet seats and toilet safety rails are helpful for those with knee or hip problems. In the [facility]    Make sure you use a nightlight and that the area around your bed is clear of potential obstacles. Be careful with electric blankets and never go to sleep with a heating pad, which can cause serious burns even if on a low setting. Use fire-resistant mattress covers and pillows, and NEVER smoke in bed. Keep a phone next to the bed that is programmed to dial 911 at the push of a button. If you have a chronic condition, you may want to sign on with an automatic call-in service. Typically the system includes a small pendant that connects directly to an emergency medical voice-response system. You should also make arrangements to stay in contact with someonefriend, neighbor, family memberon a regular schedule. Fire Prevention   According to the [employer]. (Smoke Alarms for Every) [address] [place] [address], senior citizens are one of the two highest risk groups for death and serious injuries due to residential fires. When cooking, wear short-sleeved items, never a bulky long-sleeved robe. The [facility]'s Safety Checklist for Older Consumers emphasizes the importance of checking basements, garages, workshops and storage areas for fire hazards, such as volatile liquids, piles of old rags or clothing and overloaded circuits. Never smoke in bed or when lying down on a couch or recliner chair.     Small portable electric or kerosene heaters are responsible for many home fires and should be used cautiously if at all. If you do use one, be sure to keep them away from flammable materials. In case of fire, make sure you have a pre-established emergency exit plan. Have a professional check your fireplace and other fuel-burning appliances yearly. Helping [employer]   Baby boomers entering the rocha years will continue to see the development of new products to help older adults live safely and independently in spite of age-related changes. Making Life More Livable  , by Juan Chahal, lists over 1,000 products for \"living well in the mature years,\" such as bathing and mobility aids, household security devices, ergonomically designed knives and peelers, and faucet valves and knobs for temperature control. Medical supply stores and organizations are good sources of information about products that improve your quality of life and insure your safety.      Last Reviewed: November 2009 Dash Branch MD   Updated: 3/7/2011     ·

## 2022-05-25 ENCOUNTER — HOSPITAL ENCOUNTER (OUTPATIENT)
Dept: NURSING | Age: 67
Setting detail: INFUSION SERIES
Discharge: HOME | End: 2022-05-25
Payer: MEDICARE

## 2022-05-25 ENCOUNTER — HOSPITAL ENCOUNTER (OUTPATIENT)
Age: 67
Discharge: HOME | End: 2022-05-25
Payer: MEDICARE

## 2022-05-25 VITALS
SYSTOLIC BLOOD PRESSURE: 160 MMHG | DIASTOLIC BLOOD PRESSURE: 90 MMHG | WEIGHT: 217 LBS | TEMPERATURE: 98.6 F | BODY MASS INDEX: 36.15 KG/M2 | HEART RATE: 67 BPM | RESPIRATION RATE: 16 BRPM | HEIGHT: 65 IN

## 2022-05-25 DIAGNOSIS — E03.9 HYPOTHYROIDISM, UNSPECIFIED TYPE: ICD-10-CM

## 2022-05-25 DIAGNOSIS — I10 ESSENTIAL HYPERTENSION: ICD-10-CM

## 2022-05-25 DIAGNOSIS — E78.2 MIXED HYPERLIPIDEMIA: ICD-10-CM

## 2022-05-25 DIAGNOSIS — E55.9 VITAMIN D DEFICIENCY: ICD-10-CM

## 2022-05-25 DIAGNOSIS — K51.00 CHRONIC ULCERATIVE ENTEROCOLITIS WITHOUT COMPLICATION (HCC): Primary | ICD-10-CM

## 2022-05-25 DIAGNOSIS — E53.8 B12 DEFICIENCY: ICD-10-CM

## 2022-05-25 DIAGNOSIS — E11.9 DIET-CONTROLLED TYPE 2 DIABETES MELLITUS (HCC): ICD-10-CM

## 2022-05-25 LAB
A/G RATIO: 1.3 (ref 1.1–2.2)
ALBUMIN SERPL-MCNC: 4.3 G/DL (ref 3.4–5)
ALP BLD-CCNC: 85 U/L (ref 40–129)
ALT SERPL-CCNC: 26 U/L (ref 10–40)
ANION GAP SERPL CALCULATED.3IONS-SCNC: 12 MMOL/L (ref 3–16)
AST SERPL-CCNC: 37 U/L (ref 15–37)
BILIRUB SERPL-MCNC: 0.8 MG/DL (ref 0–1)
BUN BLDV-MCNC: 21 MG/DL (ref 7–20)
CALCIUM SERPL-MCNC: 9.6 MG/DL (ref 8.3–10.6)
CHLORIDE BLD-SCNC: 98 MMOL/L (ref 99–110)
CHOLESTEROL, TOTAL: 128 MG/DL (ref 0–199)
CO2: 30 MMOL/L (ref 21–32)
CREAT SERPL-MCNC: 0.9 MG/DL (ref 0.6–1.2)
CREATININE URINE: 94.8 MG/DL (ref 28–259)
FOLATE: >20 NG/ML (ref 4.78–24.2)
GFR AFRICAN AMERICAN: >60
GFR NON-AFRICAN AMERICAN: >60
GLUCOSE BLD-MCNC: 106 MG/DL (ref 70–99)
HCT VFR BLD CALC: 35.9 % (ref 36–48)
HDLC SERPL-MCNC: 45 MG/DL (ref 40–60)
HEMOGLOBIN: 12.1 G/DL (ref 12–16)
LDL CHOLESTEROL CALCULATED: 70 MG/DL
MCH RBC QN AUTO: 31.3 PG (ref 26–34)
MCHC RBC AUTO-ENTMCNC: 33.7 G/DL (ref 31–36)
MCV RBC AUTO: 92.7 FL (ref 80–100)
MICROALBUMIN UR-MCNC: <1.2 MG/DL
MICROALBUMIN/CREAT UR-RTO: NORMAL MG/G (ref 0–30)
PDW BLD-RTO: 14.2 % (ref 12.4–15.4)
PLATELET # BLD: 164 K/UL (ref 135–450)
PMV BLD AUTO: 9.1 FL (ref 5–10.5)
POTASSIUM SERPL-SCNC: 4.6 MMOL/L (ref 3.5–5.1)
RBC # BLD: 3.88 M/UL (ref 4–5.2)
SODIUM BLD-SCNC: 140 MMOL/L (ref 136–145)
TOTAL PROTEIN: 7.5 G/DL (ref 6.4–8.2)
TRIGL SERPL-MCNC: 67 MG/DL (ref 0–150)
TSH SERPL DL<=0.05 MIU/L-ACNC: 0.13 UIU/ML (ref 0.27–4.2)
VITAMIN B-12: 264 PG/ML (ref 211–911)
VITAMIN D 25-HYDROXY: 65.2 NG/ML
VLDLC SERPL CALC-MCNC: 13 MG/DL
WBC # BLD: 6.5 K/UL (ref 4–11)

## 2022-05-25 PROCEDURE — 80061 LIPID PANEL: CPT

## 2022-05-25 PROCEDURE — 6360000002 HC RX W HCPCS: Performed by: INTERNAL MEDICINE

## 2022-05-25 PROCEDURE — 85027 COMPLETE CBC AUTOMATED: CPT

## 2022-05-25 PROCEDURE — 82570 ASSAY OF URINE CREATININE: CPT

## 2022-05-25 PROCEDURE — 96365 THER/PROPH/DIAG IV INF INIT: CPT

## 2022-05-25 PROCEDURE — 82043 UR ALBUMIN QUANTITATIVE: CPT

## 2022-05-25 PROCEDURE — 82746 ASSAY OF FOLIC ACID SERUM: CPT

## 2022-05-25 PROCEDURE — 83036 HEMOGLOBIN GLYCOSYLATED A1C: CPT

## 2022-05-25 PROCEDURE — 82607 VITAMIN B-12: CPT

## 2022-05-25 PROCEDURE — 82306 VITAMIN D 25 HYDROXY: CPT

## 2022-05-25 PROCEDURE — 36415 COLL VENOUS BLD VENIPUNCTURE: CPT

## 2022-05-25 PROCEDURE — 80053 COMPREHEN METABOLIC PANEL: CPT

## 2022-05-25 PROCEDURE — 99211 OFF/OP EST MAY X REQ PHY/QHP: CPT

## 2022-05-25 PROCEDURE — 84443 ASSAY THYROID STIM HORMONE: CPT

## 2022-05-25 PROCEDURE — 2580000003 HC RX 258: Performed by: INTERNAL MEDICINE

## 2022-05-25 RX ORDER — SODIUM CHLORIDE 9 MG/ML
INJECTION, SOLUTION INTRAVENOUS CONTINUOUS
Status: CANCELLED | OUTPATIENT
Start: 2022-06-22

## 2022-05-25 RX ORDER — SODIUM CHLORIDE 9 MG/ML
INJECTION, SOLUTION INTRAVENOUS CONTINUOUS
Status: DISCONTINUED | OUTPATIENT
Start: 2022-05-25 | End: 2022-05-25 | Stop reason: HOSPADM

## 2022-05-25 RX ORDER — SODIUM CHLORIDE 0.9 % (FLUSH) 0.9 %
10 SYRINGE (ML) INJECTION PRN
Status: DISCONTINUED | OUTPATIENT
Start: 2022-05-25 | End: 2022-05-26 | Stop reason: HOSPADM

## 2022-05-25 RX ORDER — SODIUM CHLORIDE 0.9 % (FLUSH) 0.9 %
10 SYRINGE (ML) INJECTION PRN
Status: CANCELLED | OUTPATIENT
Start: 2022-06-22

## 2022-05-25 RX ADMIN — VEDOLIZUMAB 300 MG: 300 INJECTION, POWDER, LYOPHILIZED, FOR SOLUTION INTRAVENOUS at 09:43

## 2022-05-25 RX ADMIN — Medication 10 ML: at 09:42

## 2022-05-25 RX ADMIN — SODIUM CHLORIDE: 9 INJECTION, SOLUTION INTRAVENOUS at 09:42

## 2022-05-25 ASSESSMENT — PAIN SCALES - GENERAL: PAINLEVEL_OUTOF10: 0

## 2022-05-25 NOTE — PROGRESS NOTES
Pt here for entyvio infusion  Pt without c/o's today  She reports that her stools are almost normal  Pt denies any cramping, bloating bleeding or diarrhea  IV # 22 was started in RAC on 1st attempt per myself  Blood for lab work was drawn without difficulty  Pt gayla well  Entyvio 300 mg IVPB starting to infuse  IV site unremarkable Will monitor

## 2022-05-26 ENCOUNTER — TELEPHONE (OUTPATIENT)
Dept: FAMILY MEDICINE CLINIC | Age: 67
End: 2022-05-26

## 2022-05-26 LAB
ESTIMATED AVERAGE GLUCOSE: 108.3 MG/DL
HBA1C MFR BLD: 5.4 %

## 2022-05-26 RX ORDER — LEVOTHYROXINE SODIUM 0.12 MG/1
125 TABLET ORAL DAILY
Qty: 30 TABLET | Refills: 1 | Status: SHIPPED | OUTPATIENT
Start: 2022-05-26 | End: 2022-06-20

## 2022-05-26 NOTE — TELEPHONE ENCOUNTER
----- Message from YAHAIRA Lara CNP sent at 5/26/2022  3:36 PM EDT -----  Hemoglobin A1c is normal and at the nondiabetic level. M81 and folic acid levels are normalVitamin D level is normalThyroid level again is mildly diminished.,  Recommend decreasing Synthroid from 150 mcg to 125 mcg 1 p.o. daily #30 with 1 refill and recheck TSH with reflex in 6 to 8 weeksCholesterol is excellent  Urine microalbumin is normal

## 2022-05-30 SDOH — HEALTH STABILITY: PHYSICAL HEALTH: ON AVERAGE, HOW MANY MINUTES DO YOU ENGAGE IN EXERCISE AT THIS LEVEL?: 150+ MIN

## 2022-05-30 SDOH — HEALTH STABILITY: PHYSICAL HEALTH: ON AVERAGE, HOW MANY DAYS PER WEEK DO YOU ENGAGE IN MODERATE TO STRENUOUS EXERCISE (LIKE A BRISK WALK)?: 5 DAYS

## 2022-05-30 ASSESSMENT — SOCIAL DETERMINANTS OF HEALTH (SDOH)
WITHIN THE LAST YEAR, HAVE YOU BEEN KICKED, HIT, SLAPPED, OR OTHERWISE PHYSICALLY HURT BY YOUR PARTNER OR EX-PARTNER?: NO
WITHIN THE LAST YEAR, HAVE YOU BEEN AFRAID OF YOUR PARTNER OR EX-PARTNER?: NO
WITHIN THE LAST YEAR, HAVE TO BEEN RAPED OR FORCED TO HAVE ANY KIND OF SEXUAL ACTIVITY BY YOUR PARTNER OR EX-PARTNER?: NO
WITHIN THE LAST YEAR, HAVE YOU BEEN HUMILIATED OR EMOTIONALLY ABUSED IN OTHER WAYS BY YOUR PARTNER OR EX-PARTNER?: NO

## 2022-05-31 ENCOUNTER — OFFICE VISIT (OUTPATIENT)
Dept: ORTHOPEDIC SURGERY | Age: 67
End: 2022-05-31
Payer: MEDICARE

## 2022-05-31 VITALS — BODY MASS INDEX: 36.15 KG/M2 | WEIGHT: 217 LBS | HEIGHT: 65 IN

## 2022-05-31 DIAGNOSIS — M45.0 ANKYLOSING SPONDYLITIS OF MULTIPLE SITES IN SPINE (HCC): ICD-10-CM

## 2022-05-31 DIAGNOSIS — Z96.642 S/P TOTAL LEFT HIP ARTHROPLASTY: Primary | ICD-10-CM

## 2022-05-31 PROCEDURE — 1036F TOBACCO NON-USER: CPT | Performed by: ORTHOPAEDIC SURGERY

## 2022-05-31 PROCEDURE — 1090F PRES/ABSN URINE INCON ASSESS: CPT | Performed by: ORTHOPAEDIC SURGERY

## 2022-05-31 PROCEDURE — G8399 PT W/DXA RESULTS DOCUMENT: HCPCS | Performed by: ORTHOPAEDIC SURGERY

## 2022-05-31 PROCEDURE — 1123F ACP DISCUSS/DSCN MKR DOCD: CPT | Performed by: ORTHOPAEDIC SURGERY

## 2022-05-31 PROCEDURE — 99214 OFFICE O/P EST MOD 30 MIN: CPT | Performed by: ORTHOPAEDIC SURGERY

## 2022-05-31 PROCEDURE — G8427 DOCREV CUR MEDS BY ELIG CLIN: HCPCS | Performed by: ORTHOPAEDIC SURGERY

## 2022-05-31 PROCEDURE — G8417 CALC BMI ABV UP PARAM F/U: HCPCS | Performed by: ORTHOPAEDIC SURGERY

## 2022-05-31 PROCEDURE — 3017F COLORECTAL CA SCREEN DOC REV: CPT | Performed by: ORTHOPAEDIC SURGERY

## 2022-05-31 NOTE — PROGRESS NOTES
Dr Maribeth Matos      Date /Time 5/31/2022       3:17 PM EDT  Name Nelly Fisher             1955   Location  [facility]  MRN [id number]                Chief Complaint   Patient presents with    Hip Pain     HX OF LEFT JENNIFER 2/4/16        History of Present Illness    Nelly Fisher is a 78 y.o. female who presents with  left hip pain. Sent in consultation by YAHAIRA Cruz CNP. Occupation: Retired  Occupational activities: clerical work. Athletic/exercise activity: no sports. Injury Mechanism:  none. Worker's Comp. & legal issues:   none. Previous Treatments: Ice, Heat, NSAIDs, pain medication, Physical Therapy Extensively and Previous Surgery Previous hip replacement    She has had previous left hip replacement done by Dr. Jorden Sinclair 2016. She is done quite well. She has some recurrent onset of pain in her back when she stands for long periods of time. When she does stand for long, she does note some burning pain in her leg. Otherwise her hip moves well. She is here for 6-year follow-up. She has a history of ankylosing spondylitis.     Past History  Past Medical History:   Diagnosis Date    Acute pancreatitis 10/21/2014    Acute renal failure with tubular necrosis (HCC) 7/10/2015    Acute stasis dermatitis of left lower extremity 7/8/2015    Angina pectoris ([address] 5/23/2011    Anxiety     Cellulitis of left leg     Chronic back pain     Chronic kidney disease     Clostridium difficile infection 7/9/15, 6/4/15    Colitis     Depression     Diabetes mellitus ([address]     diet controlled    Hearing loss     Heart murmur 10/1/2019    Hyperlipidemia     Hypertension     Hypothyroidism     Left THR 1/25/2016    Liver disease     Lumbar radiculitis 10/1/2019    Obesity     Palpitations 7/17/2013    Primary osteoarthritis of left hip 11/18/2015    Restless legs syndrome     Shock ([address] 10/10/2017    Superficial thrombophlebitis of left leg 7/8/2015    Thyroid disease     Type 2 diabetes mellitus without complication [facility]     Ulcerative (chronic) enterocolitis ([facility] 58/28/4108    Uncomplicated asthma 7/2/5355    Venous ulcer of left leg [facility])      Past Surgical History:   Procedure Laterality Date    APPENDECTOMY  1962    CARDIAC CATHETERIZATION  05/2014    normal    COLONOSCOPY      EPIDURAL STEROID INJECTION Left 01/22/2019    LEFT LUMBAR FOUR FIVE EPIDURAL STEROID INJECTION SITE CONFIRMED BY FLUOROSCOPY performed by Emmanuel Holder MD at [address] [facility] Left 01/19/2016    Dr Anh Ulloa EXTRACTION Left 06/26/2019    PHACOEMULSIFICATION OF LEFT EYE CATARACT WITH INTRAOCULAR LENS IMPLANT performed by Holli Walker MD at [facility] Right 07/24/2019    PHACOEMULSIFICATION OF CATARACT RIGHT EYE WITH INTRAOCULAR LENS IMPLANT performed by Holli Walker MD at [place] [address]  [facility] ASPIR/BIOP ALTERED ANATOMY  10/15/2018    EGD ESOPHAGOGASTRODUODENOSCOPY ULTRASOUND 0403, 1583 performed by Vickie Hernandez DO at [address] EGD TRANSORAL BIOPSY SINGLE/MULTIPLE  10/15/2018    EGD BIOPSY performed by Vickie Hernandez DO at [address] ENDOSCOPY  10/15/2018    EGD [facility] EUS    VARICOSE VEIN SURGERY Bilateral 1988     Family History   Problem Relation Age of Onset    Diabetes Mother     Alzheimer's Disease Mother     Anemia Mother     Diabetes Father     Cancer Father 68        pancreatic    Cancer Maternal Grandmother [de-identified]        Bone Marrow Ca     Social History     Tobacco Use    Smoking status: Never Smoker    Smokeless tobacco: Never Used    Tobacco comment: never even tried it   Substance Use Topics    Alcohol use: No     Alcohol/week: 0.0 standard drinks     Comment: rarely      Current Outpatient Medications on File Prior to Visit   Medication Sig Dispense Refill  levothyroxine (SYNTHROID) 125 MCG tablet Take 1 tablet by mouth daily 30 tablet 1    simvastatin (ZOCOR) 20 MG tablet TAKE 1 TABLET EVERY NIGHT 90 tablet 1    celecoxib (CELEBREX) 100 MG capsule TAKE 2 CAPSULES EVERY  capsule 1    folic acid (FOLVITE) 1 MG tablet TAKE 1 TABLET EVERY DAY 90 tablet 1    Cholecalciferol (VITAMIN D3) 250 MCG (60045 UT) CAPS TAKE 1 CAPSULE BY MOUTH ONCE WEEKLY 4 capsule 5    DULoxetine (CYMBALTA) 30 MG extended release capsule TAKE 1 CAPSULE BY MOUTH EVERY DAY 30 capsule 5    alendronate (FOSAMAX) 70 MG tablet TAKE 1 TABLET EVERY 7 DAYS 12 tablet 3    GNP MAGNESIUM OXIDE 250 MG TABS tablet TAKE 2 TABLETS THREE TIMES DAILY 540 tablet 5    nystatin (MYCOSTATIN) 339641 UNIT/GM cream Apply topically 2 times daily. (Patient taking differently: Apply topically 2 times daily. Pt takes as needed) 1 Tube 0    gabapentin (NEURONTIN) 100 MG capsule Take 2 capsules by mouth nightly for 30 days. I PO TID (Patient taking differently: Take 100 mg by mouth nightly. 2 po qhs) 180 capsule 1    triamcinolone (KENALOG) 0.1 % cream APPLY TO AFFECTED AREA TWICE A DAY (Patient taking differently: APPLY TO AFFECTED AREA TWICE A DAY  Pt takes as needed) 45 g 1    VENTOLIN  (90 Base) MCG/ACT inhaler TAKE 2 PUFFS BY MOUTH EVERY 6 HOURS AS NEEDED FOR WHEEZE 1 Inhaler 5    acetaminophen (TYLENOL) 500 MG tablet Take 500 mg by mouth daily as needed for Pain       Vedolizumab (ENTYVIO IV) Infuse 300 mg intravenously See Admin Instructions LAST DOSE GIVEN ON 5-      blood glucose test strips (ASCENSIA AUTODISC VI;ONE TOUCH ULTRA TEST VI) strip 1 each by In Vitro route daily As needed. 100 each 3    ONE TOUCH LANCETS MISC 1 each by Does not apply route daily 100 each 3     No current facility-administered medications on file prior to visit. ASCVD 10-YEAR RISK SCORE  The ASCVD Risk score (Yani Victoria., et al., 2013) failed to calculate for the following reasons:     The valid total cholesterol range is 130 to 320 mg/dL   . Review of Systems  10-point ROS is negative other than HPI. Physical Exam  Based off 1997 Exam Criteria  Ht 5' 5\" (1.651 m)   Wt 217 lb (98.4 kg)   BMI 36.11 kg/m²      Constitutional:       General: He is not in acute distress. Appearance: Normal appearance. Cardiovascular:      Rate and Rhythm: Normal rate and regular rhythm. Pulses: Normal pulses. Pulmonary:      Effort: Pulmonary effort is normal. No respiratory distress. Neurological:      Mental Status: He is alert and oriented to person, place, and time. Mental status is at baseline.      Musculoskeletal:  Gait:  normal    Spine / Hip Exam:      RIGHT  LEFT    Lumbar Spine Exam  [] All Neg    [] All Neg     Straight leg raise  []  []Not tested   []  []Not tested    Clonus  []  []Not tested   []  []Not tested    Pain with motion  [x]  []Not tested   [x]  []Not tested    Radiculopathy  [x]  []Not tested   [x]  []Not tested    Paraspinal muscle tenderness  [x] Paraspinal  []Midline   [x] Paraspinal  []Midline   Sensation RIGHT  LEFT    L3  [x] Normal []Decreased    [x] Normal []Decreased   L4  [x] Normal  []Decreased   [x] Normal []Decreased   L5  [x] Normal []Decreased   [x] Normal []Decreased   S1  [x] Normal  []Decreased   [x] Normal []Decreased   Pelvis       Scoliosis  [x] Nml  [] Present     Leg-length discrepency  [x] Equal  [] Right longer   [] Left longer   Range of Motion Active Passive Active Passive   Hip Flexion 120  120    Abduction 50  50    External Rotation @ 90 flex 65  65    Internal Rotation @ 90 flex 20  20           Hip Impingement / Dysplasia  [] All Neg  [] Not tested   [] All Neg  [] Not tested    Hip impingement test  []  []Not tested   []  []Not tested    C-sign  []  []Not tested   []  []Not tested    Anterior instability apprehension  []  []Not tested   []  []Not tested    Posterior instability apprehension  []  []Not tested   []  []Not tested    Uncontained Internal rotation  []  []Not tested  []  []Not tested          Abductors  [] All Neg  [] Not tested   [] All Neg  [] Not tested    Medius strength  []  []Not tested   []  []Not tested    Minimum strength  []  []Not tested   []  []Not tested    IT band tendonitis  []  []Not tested   []  []Not tested    Trochanteric tenderness  []  []Not tested  []  []Not tested   Sciatic neuropathic pain  []  []Not tested   []  []Not tested           Post-arthroplasty  [] All Neg  [] Not tested   [] All Neg  [] Not tested    Rectus tendonitis  []  []Not tested   []  []Not tested    Iliopsoas tendonitis       Start-up pain  []  []Not tested   []  []Not tested      No pain with motion of her hips. Well-healed incision over the lateral aspect of the left hip. Imaging    Left Hip: [facility] [place]  Radiographs: Press-fit components in good position, well restored length and offset. No evidence of complication to her left hip. Severe degenerative disc disease of her lower lumbar spine, with visualized fused segments most likely consistent with ankylosing spondylitis. Procedure:  Orders Placed This Encounter   Procedures    XR HIP LEFT (2-3 VIEWS)     Standing Status:   Future     Number of Occurrences:   1     Standing Expiration Date:   6/30/2022     Order Specific Question:   Reason for exam:     Answer:   PAIN       Assessment and Sahara Barraza was seen today for hip pain. Diagnoses and all orders for this visit:    S/P total left hip arthroplasty  -     XR HIP LEFT (2-3 VIEWS);  Future    Ankylosing spondylitis of multiple sites in spine [facility])        I discussed with Vaishali George that her history, symptoms, signs and imaging are most consistent with Status post left hip replacement in good position, ankylosing spondylitis and end-stage degenerative disc disease in her lower lumbar spine    We reviewed the natural history of these conditions and treatment options ranging from conservative measures (rest, icing, activity

## 2022-06-15 DIAGNOSIS — F33.0 MILD EPISODE OF RECURRENT MAJOR DEPRESSIVE DISORDER (HCC): ICD-10-CM

## 2022-06-16 RX ORDER — DULOXETIN HYDROCHLORIDE 30 MG/1
CAPSULE, DELAYED RELEASE ORAL
Qty: 90 CAPSULE | Refills: 1 | Status: SHIPPED | OUTPATIENT
Start: 2022-06-16

## 2022-06-20 RX ORDER — LEVOTHYROXINE SODIUM 0.12 MG/1
TABLET ORAL
Qty: 30 TABLET | Refills: 1 | Status: SHIPPED | OUTPATIENT
Start: 2022-06-20 | End: 2022-07-14

## 2022-06-22 ENCOUNTER — HOSPITAL ENCOUNTER (OUTPATIENT)
Dept: NURSING | Age: 67
Setting detail: INFUSION SERIES
Discharge: HOME | End: 2022-06-22
Payer: MEDICARE

## 2022-06-22 VITALS
BODY MASS INDEX: 36.15 KG/M2 | HEART RATE: 72 BPM | TEMPERATURE: 98.3 F | WEIGHT: 217 LBS | RESPIRATION RATE: 16 BRPM | HEIGHT: 65 IN | DIASTOLIC BLOOD PRESSURE: 75 MMHG | SYSTOLIC BLOOD PRESSURE: 139 MMHG

## 2022-06-22 DIAGNOSIS — K51.00 CHRONIC ULCERATIVE ENTEROCOLITIS WITHOUT COMPLICATION (HCC): Primary | ICD-10-CM

## 2022-06-22 PROCEDURE — 96365 THER/PROPH/DIAG IV INF INIT: CPT

## 2022-06-22 PROCEDURE — 6360000002 HC RX W HCPCS: Performed by: INTERNAL MEDICINE

## 2022-06-22 PROCEDURE — 99211 OFF/OP EST MAY X REQ PHY/QHP: CPT

## 2022-06-22 PROCEDURE — 2580000003 HC RX 258: Performed by: INTERNAL MEDICINE

## 2022-06-22 RX ORDER — SODIUM CHLORIDE 0.9 % (FLUSH) 0.9 %
10 SYRINGE (ML) INJECTION PRN
Status: CANCELLED | OUTPATIENT
Start: 2022-07-20

## 2022-06-22 RX ORDER — SODIUM CHLORIDE 0.9 % (FLUSH) 0.9 %
10 SYRINGE (ML) INJECTION PRN
Status: DISCONTINUED | OUTPATIENT
Start: 2022-06-22 | End: 2022-06-23 | Stop reason: HOSPADM

## 2022-06-22 RX ORDER — SODIUM CHLORIDE 9 MG/ML
INJECTION, SOLUTION INTRAVENOUS CONTINUOUS
Status: CANCELLED | OUTPATIENT
Start: 2022-07-20

## 2022-06-22 RX ORDER — SODIUM CHLORIDE 9 MG/ML
INJECTION, SOLUTION INTRAVENOUS CONTINUOUS
Status: ACTIVE | OUTPATIENT
Start: 2022-06-22 | End: 2022-06-22

## 2022-06-22 RX ADMIN — VEDOLIZUMAB 300 MG: 300 INJECTION, POWDER, LYOPHILIZED, FOR SOLUTION INTRAVENOUS at 09:39

## 2022-06-22 RX ADMIN — Medication 10 ML: at 09:38

## 2022-06-22 RX ADMIN — SODIUM CHLORIDE: 9 INJECTION, SOLUTION INTRAVENOUS at 09:39

## 2022-06-22 ASSESSMENT — PAIN SCALES - GENERAL: PAINLEVEL_OUTOF10: 0

## 2022-06-22 NOTE — PROGRESS NOTES
Pt here for entyvio infusion  pt without c/o's today  She reports that her stools are almost normal  Pt denies any cramping, bloating bleeding or diarrhea  IV # 22 was started inRt hand on 1st attempt per myself   Pt gayla well  no blood work needed for today  Entyvio 300 mg IVPB starting to infuse  IV site unremarkable Will monitor

## 2022-07-11 ENCOUNTER — TELEPHONE (OUTPATIENT)
Dept: ORTHOPEDIC SURGERY | Age: 67
End: 2022-07-11

## 2022-07-11 NOTE — TELEPHONE ENCOUNTER
Appointment Request     Patient requesting earlier appointment: Yes  Appointment offered to patient: YES  Patient Contact Number: [phone]    PATIENT CALLING REGARDING A NEW PATIENT FOR HER RT KNEE. PATIENT REQUESTING AN APPOINTMENT ON July 20, 2022 BECAUSE SHE HAS AN INFUSION THAT MORNING, AND SHE WILL BE OFF WORK. PATIENT REQUESTING APPOINTMENT AFTER 2:00 IN THE [place] OFFICE. PLEASE CALL BACK PATIENT AT THE ABOVE NUMBER.

## 2022-07-14 RX ORDER — LEVOTHYROXINE SODIUM 0.12 MG/1
TABLET ORAL
Qty: 30 TABLET | Refills: 2 | Status: SHIPPED | OUTPATIENT
Start: 2022-07-14 | End: 2022-07-17 | Stop reason: SDUPTHER

## 2022-07-17 DIAGNOSIS — M54.16 LUMBAR RADICULITIS: ICD-10-CM

## 2022-07-17 DIAGNOSIS — M48.062 LUMBAR STENOSIS WITH NEUROGENIC CLAUDICATION: ICD-10-CM

## 2022-07-18 RX ORDER — GABAPENTIN 100 MG/1
200 CAPSULE ORAL NIGHTLY
Qty: 180 CAPSULE | Refills: 1 | Status: SHIPPED | OUTPATIENT
Start: 2022-07-18 | End: 2022-10-28

## 2022-07-18 RX ORDER — LEVOTHYROXINE SODIUM 0.12 MG/1
125 TABLET ORAL DAILY
Qty: 30 TABLET | Refills: 2 | Status: SHIPPED | OUTPATIENT
Start: 2022-07-18 | End: 2022-10-11

## 2022-07-18 SDOH — HEALTH STABILITY: PHYSICAL HEALTH: ON AVERAGE, HOW MANY DAYS PER WEEK DO YOU ENGAGE IN MODERATE TO STRENUOUS EXERCISE (LIKE A BRISK WALK)?: 5 DAYS

## 2022-07-18 SDOH — HEALTH STABILITY: PHYSICAL HEALTH: ON AVERAGE, HOW MANY MINUTES DO YOU ENGAGE IN EXERCISE AT THIS LEVEL?: 150+ MIN

## 2022-07-18 ASSESSMENT — SOCIAL DETERMINANTS OF HEALTH (SDOH)
WITHIN THE LAST YEAR, HAVE YOU BEEN KICKED, HIT, SLAPPED, OR OTHERWISE PHYSICALLY HURT BY YOUR PARTNER OR EX-PARTNER?: NO
WITHIN THE LAST YEAR, HAVE YOU BEEN AFRAID OF YOUR PARTNER OR EX-PARTNER?: NO
WITHIN THE LAST YEAR, HAVE YOU BEEN HUMILIATED OR EMOTIONALLY ABUSED IN OTHER WAYS BY YOUR PARTNER OR EX-PARTNER?: NO
WITHIN THE LAST YEAR, HAVE TO BEEN RAPED OR FORCED TO HAVE ANY KIND OF SEXUAL ACTIVITY BY YOUR PARTNER OR EX-PARTNER?: NO

## 2022-07-20 ENCOUNTER — HOSPITAL ENCOUNTER (OUTPATIENT)
Age: 67
Discharge: HOME | End: 2022-07-20
Payer: MEDICARE

## 2022-07-20 ENCOUNTER — OFFICE VISIT (OUTPATIENT)
Dept: ORTHOPEDIC SURGERY | Age: 67
End: 2022-07-20
Payer: MEDICARE

## 2022-07-20 ENCOUNTER — HOSPITAL ENCOUNTER (OUTPATIENT)
Dept: NURSING | Age: 67
Setting detail: INFUSION SERIES
Discharge: HOME | End: 2022-07-20
Payer: MEDICARE

## 2022-07-20 VITALS
TEMPERATURE: 98.2 F | BODY MASS INDEX: 36.15 KG/M2 | HEIGHT: 65 IN | SYSTOLIC BLOOD PRESSURE: 131 MMHG | DIASTOLIC BLOOD PRESSURE: 81 MMHG | HEART RATE: 84 BPM | WEIGHT: 217 LBS | RESPIRATION RATE: 16 BRPM

## 2022-07-20 VITALS — HEIGHT: 65 IN | WEIGHT: 217 LBS | BODY MASS INDEX: 36.15 KG/M2

## 2022-07-20 DIAGNOSIS — E11.9 DIET-CONTROLLED TYPE 2 DIABETES MELLITUS (HCC): ICD-10-CM

## 2022-07-20 DIAGNOSIS — M25.561 RIGHT KNEE PAIN, UNSPECIFIED CHRONICITY: ICD-10-CM

## 2022-07-20 DIAGNOSIS — K51.00 CHRONIC ULCERATIVE ENTEROCOLITIS WITHOUT COMPLICATION (HCC): Primary | ICD-10-CM

## 2022-07-20 DIAGNOSIS — M17.11 PRIMARY OSTEOARTHRITIS OF RIGHT KNEE: Primary | ICD-10-CM

## 2022-07-20 LAB
A/G RATIO: 1.5 (ref 1.1–2.2)
ALBUMIN SERPL-MCNC: 4.1 G/DL (ref 3.4–5)
ALP BLD-CCNC: 81 U/L (ref 40–129)
ALT SERPL-CCNC: 27 U/L (ref 10–40)
ANION GAP SERPL CALCULATED.3IONS-SCNC: 9 MMOL/L (ref 3–16)
AST SERPL-CCNC: 41 U/L (ref 15–37)
BILIRUB SERPL-MCNC: 0.6 MG/DL (ref 0–1)
BILIRUBIN URINE: NEGATIVE
BLOOD, URINE: NEGATIVE
BUN BLDV-MCNC: 23 MG/DL (ref 7–20)
CALCIUM SERPL-MCNC: 9.6 MG/DL (ref 8.3–10.6)
CHLORIDE BLD-SCNC: 102 MMOL/L (ref 99–110)
CLARITY: CLEAR
CO2: 29 MMOL/L (ref 21–32)
COLOR: YELLOW
CREAT SERPL-MCNC: 0.9 MG/DL (ref 0.6–1.2)
GFR AFRICAN AMERICAN: >60
GFR NON-AFRICAN AMERICAN: >60
GLUCOSE BLD-MCNC: 107 MG/DL (ref 70–99)
GLUCOSE URINE: NEGATIVE MG/DL
HCT VFR BLD CALC: 37.7 % (ref 36–48)
HEMOGLOBIN: 13 G/DL (ref 12–16)
KETONES, URINE: NEGATIVE MG/DL
LEUKOCYTE ESTERASE, URINE: NEGATIVE
MCH RBC QN AUTO: 31.3 PG (ref 26–34)
MCHC RBC AUTO-ENTMCNC: 34.4 G/DL (ref 31–36)
MCV RBC AUTO: 90.8 FL (ref 80–100)
MICROSCOPIC EXAMINATION: NORMAL
NITRITE, URINE: NEGATIVE
PDW BLD-RTO: 14.4 % (ref 12.4–15.4)
PH UA: 8 (ref 5–8)
PLATELET # BLD: 162 K/UL (ref 135–450)
PMV BLD AUTO: 9.1 FL (ref 5–10.5)
POTASSIUM SERPL-SCNC: 4.6 MMOL/L (ref 3.5–5.1)
PROTEIN UA: NEGATIVE MG/DL
RBC # BLD: 4.15 M/UL (ref 4–5.2)
SODIUM BLD-SCNC: 140 MMOL/L (ref 136–145)
SPECIFIC GRAVITY UA: 1.02 (ref 1–1.03)
TOTAL PROTEIN: 6.8 G/DL (ref 6.4–8.2)
URINE TYPE: NORMAL
UROBILINOGEN, URINE: 0.2 E.U./DL
WBC # BLD: 6.7 K/UL (ref 4–11)

## 2022-07-20 PROCEDURE — 99203 OFFICE O/P NEW LOW 30 MIN: CPT | Performed by: ORTHOPAEDIC SURGERY

## 2022-07-20 PROCEDURE — 96365 THER/PROPH/DIAG IV INF INIT: CPT

## 2022-07-20 PROCEDURE — 81003 URINALYSIS AUTO W/O SCOPE: CPT

## 2022-07-20 PROCEDURE — 1123F ACP DISCUSS/DSCN MKR DOCD: CPT | Performed by: ORTHOPAEDIC SURGERY

## 2022-07-20 PROCEDURE — 36415 COLL VENOUS BLD VENIPUNCTURE: CPT

## 2022-07-20 PROCEDURE — 99211 OFF/OP EST MAY X REQ PHY/QHP: CPT

## 2022-07-20 PROCEDURE — 6360000002 HC RX W HCPCS: Performed by: INTERNAL MEDICINE

## 2022-07-20 PROCEDURE — 85027 COMPLETE CBC AUTOMATED: CPT

## 2022-07-20 PROCEDURE — 2580000003 HC RX 258: Performed by: INTERNAL MEDICINE

## 2022-07-20 PROCEDURE — 80053 COMPREHEN METABOLIC PANEL: CPT

## 2022-07-20 RX ORDER — SODIUM CHLORIDE 9 MG/ML
INJECTION, SOLUTION INTRAVENOUS CONTINUOUS
Status: DISCONTINUED | OUTPATIENT
Start: 2022-07-20 | End: 2022-07-20 | Stop reason: HOSPADM

## 2022-07-20 RX ORDER — SODIUM CHLORIDE 0.9 % (FLUSH) 0.9 %
10 SYRINGE (ML) INJECTION PRN
Status: DISCONTINUED | OUTPATIENT
Start: 2022-07-20 | End: 2022-07-21 | Stop reason: HOSPADM

## 2022-07-20 RX ORDER — METHYLPREDNISOLONE 4 MG/1
TABLET ORAL
Qty: 1 KIT | Refills: 0 | Status: SHIPPED | OUTPATIENT
Start: 2022-07-20 | End: 2022-10-21 | Stop reason: ALTCHOICE

## 2022-07-20 RX ORDER — SODIUM CHLORIDE 9 MG/ML
INJECTION, SOLUTION INTRAVENOUS CONTINUOUS
Status: CANCELLED | OUTPATIENT
Start: 2022-08-17

## 2022-07-20 RX ORDER — SODIUM CHLORIDE 0.9 % (FLUSH) 0.9 %
10 SYRINGE (ML) INJECTION PRN
Status: CANCELLED | OUTPATIENT
Start: 2022-08-17

## 2022-07-20 RX ADMIN — SODIUM CHLORIDE: 9 INJECTION, SOLUTION INTRAVENOUS at 09:35

## 2022-07-20 RX ADMIN — Medication 10 ML: at 09:35

## 2022-07-20 RX ADMIN — VEDOLIZUMAB 300 MG: 300 INJECTION, POWDER, LYOPHILIZED, FOR SOLUTION INTRAVENOUS at 09:36

## 2022-07-20 ASSESSMENT — PAIN DESCRIPTION - PAIN TYPE: TYPE: CHRONIC PAIN

## 2022-07-20 ASSESSMENT — PAIN DESCRIPTION - FREQUENCY: FREQUENCY: CONTINUOUS

## 2022-07-20 ASSESSMENT — PAIN SCALES - GENERAL: PAINLEVEL_OUTOF10: 7

## 2022-07-20 ASSESSMENT — PAIN DESCRIPTION - DESCRIPTORS: DESCRIPTORS: ACHING;BURNING

## 2022-07-20 ASSESSMENT — PAIN DESCRIPTION - LOCATION: LOCATION: BACK;GENERALIZED

## 2022-07-20 NOTE — DISCHARGE INSTRUCTIONS
vedolizumab  Pronunciation:  GEN leary  Alley Comfort  What is vedolizumab? Vedolizumab reduces the effects of a substance in the body that can cause inflammation. Vedolizumab is used in adults with moderate to severe ulcerative colitis (UC), or moderate to severe Crohn's disease. Vedolizumab treats active disease and may help keep UC or Crohn's symptoms under control long term. Vedolizumab may also reduce the need for steroid medicines in helping to control symptoms long term. Vedolizumab is usually given after other medicines have been tried without success. Vedolizumab may also be used for purposes not listed in this medication guide. What should I discuss with my healthcare provider before receiving vedolizumab? You should not use vedolizumab if you are allergic to it. To make sure vedolizumab is safe for you, tell your doctor if you have:  an active or recent infection;  tuberculosis (or if you have close contact with someone who has tuberculosis);  signs of infection such as fever, cough, or flu symptoms;  open sores or skin wounds;  weak immune system (caused by disease or by using certain medicine); or  if you are scheduled to receive any vaccines. You should be up to date with all needed vaccinations before receiving vedolizumab. FDA pregnancy category Anton Corporal is not expected to harm an unborn baby. Tell your doctor if you are pregnant or plan to become pregnant during treatment. If you are pregnant, your name may be listed on a pregnancy registry. This is to track the outcome of the pregnancy and to evaluate any effects of vedolizumab on the baby. It is not known whether vedolizumab passes into breast milk or if it could harm a nursing baby. Tell your doctor if you are breast-feeding a baby. Do not give this medicine to anyone under 25years old without medical advice. How is vedolizumab given?   Before you start treatment with vedolizumab, your doctor may perform tests to make sure you do not have tuberculosis or other infections. Vedolizumab is injected into a vein through an IV. A healthcare provider will give you this injection. Vedolizumab must be given slowly, and the IV infusion can take about 30 minutes to complete. You will be watched closely for a short time after receiving vedolizumab, to make sure you do not have an allergic reaction to the medication. Vedolizumab is usually given on a schedule that starts the day of your first injection. Your next injections will be given at 2 weeks and 6 weeks after the first injection. Then you will receive an injection every 8 weeks (or every 2 months) thereafter. It may take up to several weeks before your symptoms improve. Keep using the medication as directed and tell your doctor if your symptoms do not improve after 14 weeks of treatment. What happens if I miss a dose? Call your doctor for instructions if you miss an appointment for your vedolizumab injection. What happens if I overdose? Since this medicine is given by a healthcare professional in a medical setting, an overdose is unlikely to occur. What should I avoid while receiving vedolizumab? Do not receive a \"live\" vaccine while using vedolizumab, and avoid coming into contact with anyone who has recently received a live vaccine. There is a chance that the virus could be passed on to you. Live vaccines include measles, mumps, rubella (MMR), polio, rotavirus, typhoid, yellow fever, varicella (chickenpox), zoster (shingles), and nasal flu (influenza) vaccine. You may receive \"killed-virus\" vaccines such as a flu shot, polio vaccine, rabies vaccine, or hepatitis A vaccine. Ask your doctor before receiving any vaccine while you are being treated with vedolizumab. Avoid being near people who are sick or have infections. Tell your doctor at once if you develop signs of infection. What are the possible side effects of vedolizumab?   Get emergency medical help if you have any of these signs of an allergic reaction: hives; difficult breathing; swelling of your face, lips, tongue, or throat. Some side effects may occur during the injection. Tell your caregiver right away if you feel warm or tingly, or if you have a severe headache, fast heart rate, pounding in your neck or ears, chest tightness, or trouble breathing. Some people using a medicine similar to vedolizumab have developed a serious viral infection of the brain that can lead to disability or death. Call your doctor right away if you have any change in your mental state, decreased vision, or problems with speech or walking. These symptoms may start gradually and get worse quickly. Stop using vedolizumab and call your doctor at once if you have:  fever, chills, body aches, cold or flu symptoms, mouth and throat ulcers, skin sores;  pain, warmth, swelling, or oozing around your anal area;  nausea, vomiting, severe diarrhea, diarrhea that is watery or bloody, stomach cramps, weight loss;  cough, pain when swallowing; or  liver problems --nausea, upper stomach pain, itching, tired feeling, loss of appetite, dark urine, shanita-colored stools, jaundice (yellowing of the skin or eyes). Common side effects may include:  fever, sore throat, flu symptoms;  cold symptoms such as stuffy nose, sinus pain, sneezing, cough;  pain in your arms or legs;  tired feeling;  headache, joint pain, back pain;  rash, itching; or  nausea. This is not a complete list of side effects and others may occur. Call your doctor for medical advice about side effects. You may report side effects to FDA at 2-928-FDA-7588. What other drugs will affect vedolizumab?   Tell your doctor about all medicines you use, and those you start or stop using during your treatment with vedolizumab, especially:  natalizumab;  medicines to treat psoriasis or rheumatoid arthritis, such as etanercept or golimumab;  other medicines to treat Crohn's disease or ulcerative colitis, such as adalimumab, certolizumab, infliximab; or  other drugs that weaken the immune system such as cancer medicine, steroids,   WHere can I get more information? Your doctor or pharmacist can provide more information about vedolizumab.     RETURN ON AUGUST 27,4441  AT 0900  FOR ENTYVIO INFUSION    FOLLOW UP WITH DR. BARTON AS NEEDED OR SCHEDULED    CALL US IF YOU NEED TO CANCEL OR RESCHEDULE YOUR APPOINTMENT  [phone]

## 2022-07-20 NOTE — PROGRESS NOTES
Pt here for entyvio infusion  Pt reporting general aches and pains today especially in her back  pt rates her discomfort a 7 out of 10 today   She reports that her stools are ok  close to being normal  Pt denies any cramping, bloating bleeding or diarrhea  IV # 22 was started in Rt hand on 1st attempt per myself  Blood for lab work was drawn without difficulty   Pt gayla well  Entyvio 300 mg IVPB starting to infuse  IV site unremarkable Will monitor

## 2022-07-20 NOTE — PROGRESS NOTES
KNEE VISIT      HISTORY OF PRESENT ILLNESS    Miladys Buchanan is a 78 y.o. female who presents for evaluation of right knee pain she has had for the last month. She cannot recall any specific history of injury but says it could be from the fall but she has noted popping and grinding in the knee. She is diabetic but diet controlled. She has had issues with pancreatitis renal failure and other medical issues. 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 At this time she is here for care because no improvement with other issues and grades her pain 5-6 over 10 today. ROS    Well-documented patient history form dated 7/20/2022  All other ROS negative except for above.     Past Surgical history    Past Surgical History:   Procedure Laterality Date    APPENDECTOMY  1962    CARDIAC CATHETERIZATION  05/2014    normal    COLONOSCOPY      EPIDURAL STEROID INJECTION Left 01/22/2019    LEFT LUMBAR FOUR FIVE EPIDURAL STEROID INJECTION SITE CONFIRMED BY FLUOROSCOPY performed by Lida Ayers MD at [address] [place] [address] Left 01/19/2016    Dr Givens Oh EXTRACTION Left 06/26/2019    PHACOEMULSIFICATION OF LEFT EYE CATARACT WITH INTRAOCULAR LENS IMPLANT performed by Andi Narvaez MD at [facility] [address] Right 07/24/2019    PHACOEMULSIFICATION OF CATARACT RIGHT EYE WITH INTRAOCULAR LENS IMPLANT performed by Andi Narvaez MD at [address]  [facility] ASPIR/BIOP ALTERED ANATOMY  10/15/2018    EGD ESOPHAGOGASTRODUODENOSCOPY ULTRASOUND 0403, 1583 performed by Leslie Alejandro DO at [address] [facility] EGD TRANSORAL BIOPSY SINGLE/MULTIPLE  10/15/2018    EGD BIOPSY performed by Leslie Alejandro DO at [address] [place] [address] ENDOSCOPY  10/15/2018    EGD [address] Bilateral 1989 PAST MEDICAL    Past Medical History:   Diagnosis Date    Acute pancreatitis 10/21/2014    Acute renal failure with tubular necrosis (HCC) 7/10/2015    Acute stasis dermatitis of left lower extremity 7/8/2015    Angina pectoris ([address] 5/23/2011    Anxiety     Cellulitis of left leg     Chronic back pain     Chronic kidney disease     Clostridium difficile infection 7/9/15, 6/4/15    Colitis     Depression     Diabetes mellitus ([address]     diet controlled    Hearing loss     Heart murmur 10/1/2019    Hyperlipidemia     Hypertension     Hypothyroidism     Left THR 1/25/2016    Liver disease     Lumbar radiculitis 10/1/2019    Obesity     Palpitations 7/17/2013    Primary osteoarthritis of left hip 11/18/2015    Restless legs syndrome     Shock ([address] 10/10/2017    Superficial thrombophlebitis of left leg 7/8/2015    Thyroid disease     Type 2 diabetes mellitus without complication (HCC)     Ulcerative (chronic) enterocolitis ([address] 35/13/8867    Uncomplicated asthma 3/8/3860    Venous ulcer of left leg (HCC)        Allergies    Allergies   Allergen Reactions    Amoxicillin      \"colitis\"    Pravastatin Rash    Wellbutrin [Bupropion] Other (See Comments) and Rash     Caused sores in mouth       Meds    Current Outpatient Medications   Medication Sig Dispense Refill    methylPREDNISolone (MEDROL, WEST,) 4 MG tablet Take by mouth. 1 kit 0    gabapentin (NEURONTIN) 100 MG capsule Take 2 capsules by mouth nightly for 30 days. I PO  capsule 1    levothyroxine (SYNTHROID) 125 MCG tablet Take 1 tablet by mouth in the morning. TAKE 1 TABLET BY MOUTH EVERY DAY.  30 tablet 2    DULoxetine (CYMBALTA) 30 MG extended release capsule TAKE 1 CAPSULE EVERY DAY 90 capsule 1    simvastatin (ZOCOR) 20 MG tablet TAKE 1 TABLET EVERY NIGHT 90 tablet 1    celecoxib (CELEBREX) 100 MG capsule TAKE 2 CAPSULES EVERY  capsule 1    folic acid (FOLVITE) 1 MG tablet TAKE 1 TABLET EVERY DAY 90 tablet 1    Cholecalciferol (VITAMIN D3) 250 MCG (14421 UT) CAPS TAKE 1 CAPSULE BY MOUTH ONCE WEEKLY 4 capsule 5    alendronate (FOSAMAX) 70 MG tablet TAKE 1 TABLET EVERY 7 DAYS 12 tablet 3    GNP MAGNESIUM OXIDE 250 MG TABS tablet TAKE 2 TABLETS THREE TIMES DAILY 540 tablet 5    nystatin (MYCOSTATIN) 931218 UNIT/GM cream Apply topically 2 times daily. (Patient taking differently: Apply topically 2 times daily. Pt takes as needed) 1 Tube 0    triamcinolone (KENALOG) 0.1 % cream APPLY TO AFFECTED AREA TWICE A DAY (Patient taking differently: APPLY TO AFFECTED AREA TWICE A DAY  Pt takes as needed) 45 g 1    VENTOLIN  (90 Base) MCG/ACT inhaler TAKE 2 PUFFS BY MOUTH EVERY 6 HOURS AS NEEDED FOR WHEEZE 1 Inhaler 5    acetaminophen (TYLENOL) 500 MG tablet Take 500 mg by mouth daily as needed for Pain       Vedolizumab (ENTYVIO IV) Infuse 300 mg intravenously See Admin Instructions LAST DOSE GIVEN ON 7-      blood glucose test strips (ASCENSIA AUTODISC VI;ONE TOUCH ULTRA TEST VI) strip 1 each by In Vitro route daily As needed. 100 each 3    ONE TOUCH LANCETS MISC 1 each by Does not apply route daily 100 each 3     No current facility-administered medications for this visit. Facility-Administered Medications Ordered in Other Visits   Medication Dose Route Frequency Provider Last Rate Last Admin    0.9 % sodium chloride infusion   IntraVENous Continuous Ferguson Matty Greco DO   Stopped at 07/20/22 1025    sodium chloride flush 0.9 % injection 10 mL  10 mL IntraVENous PRN Minus Radha DO   10 mL at 07/20/22 0935       Social    Social History     Socioeconomic History    Marital status:       Spouse name: Garry Sierra    Number of children: Not on file    Years of education: Not on file    Highest education level: Not on file   Occupational History    Not on file   Tobacco Use    Smoking status: Never    Smokeless tobacco: Never    Tobacco comments:     never even tried it   Vaping Use    Vaping Use: Never used   Substance and Sexual Activity Alcohol use: No     Alcohol/week: 0.0 standard drinks     Comment: rarely    Drug use: No    Sexual activity: Not Currently   Other Topics Concern    Not on file   Social History Narrative    Not on file     Social Determinants of Health     Financial Resource Strain: Not on file   Food Insecurity: Not on file   Transportation Needs: Not on file   Physical Activity: Sufficiently Active    Days of Exercise per Week: 5 days    Minutes of Exercise per Session: 150+ min   Stress: Not on file   Social Connections: Not on file   Intimate Partner Violence: Not At Risk    Fear of Current or Ex-Partner: No    Emotionally Abused: No    Physically Abused: No    Sexually Abused: No   Housing Stability: Not on file       Family HISTORY    Family History   Problem Relation Age of Onset    Diabetes Mother     Alzheimer's Disease Mother     Anemia Mother     Diabetes Father     Cancer Father 68        pancreatic    Cancer Maternal Grandmother [de-identified]        Bone Marrow Ca       PHYSICAL EXAM    Vital Signs:  Ht 5' 5\" (1.651 m)   Wt 217 lb (98.4 kg)   BMI 36.11 kg/m²   General Appearance:  Normal body habitus. Alert and oriented to person, place, and time. Affect:  Normal.   Gait: Antalgic. Good balance and coordination. Skin:  Intact. Sensation:  Intact. Strength:  Intact. Reflexes:  Intact. Pulses:  Intact. Knee Exam:    Effusion: Trace    Range of Motion Right Left   Extension -4 0   Flexion 105 115     Provocative Test Right Left    Positive Negative Positive Negative   Anterior drawer [] [] [] []   Lachman [] [] [] []   Posterior drawer [] [] [] []   Varus testing [x] [] [] [x]   Valgus testing [x] [] [] [x]   Joint line tenderness [x] [] [] [x]     Additional Exam Comments: Her neurocirculatory lymphatic exam is normal symmetric both lower extremities. She has generalized pain and synovial thickening with trace effusion.   She has no gross instability but does have some crepitus in patellofemoral joint medially. IMAGING STUDIES    X-rays 3 views right knee demonstrate grade 3+ nearly grade 4 osteoarthritis of the right knee in the medial compartment and in the patellofemoral compartment    IMPRESSION    Right knee pain secondary to osteoarthritis with possible stress reaction    PLAN      1. Conservative care options including physical therapy, NSAIDs, bracing, and activity modification were discussed. 2.  The indications for therapeutic injections were discussed. 3.  The indications for additional imaging studies were discussed. 4.  After considering the various options discussed, the patient elected to pursue a course that includes obtaining MRI of her right knee to see if she in fact would have anything consistent with stress reaction or fracture that may benefit from a subchondral plasty. If not she may benefit from viscosupplementation and/or cortisone. Addendum of 9/7/2022    I had the opportunity to evaluate the MRI of her knee. She has no stress reaction in the knee but does have a complex tear of the lateral meniscus and degenerative tears medially more related to arthritis. At this time because of the nature of her pain's location and the findings of the MRI with her mechanical symptoms, I would recommend right knee video arthroscopy with partial lateral meniscectomy with debridement but would then proceed postoperatively with viscosupplementation to help prolong the life of her natural knee. The degree of arthritis she has had, she has had for an extended period of time. In relationship to the symptoms that she has had for 4 weeks, my hope would be that we can get her to return to her preinjury status without the need for knee replacement surgery at this time.

## 2022-08-10 DIAGNOSIS — E03.9 HYPOTHYROIDISM, UNSPECIFIED TYPE: ICD-10-CM

## 2022-08-11 RX ORDER — LEVOTHYROXINE SODIUM 0.15 MG/1
TABLET ORAL
Qty: 30 TABLET | Refills: 0 | OUTPATIENT
Start: 2022-08-11

## 2022-08-11 RX ORDER — DM/PE/ACETAMINOPHEN/CHLORPHENR 10-5-325-2
TABLET, SEQUENTIAL ORAL
Qty: 180 TABLET | Refills: 0 | Status: SHIPPED | OUTPATIENT
Start: 2022-08-11 | End: 2022-09-29

## 2022-08-16 ENCOUNTER — TELEPHONE (OUTPATIENT)
Dept: ADMINISTRATIVE | Age: 67
End: 2022-08-16

## 2022-08-16 NOTE — TELEPHONE ENCOUNTER
PA submitted VIA [facility] for  GNP Magnesium Oxide 250MG tablets  Key: [id number] - PA Case ID: [id number] - Rx #: [id number]  The Medicare rule in the Prescription Drug Manual (Chapter 6, Section 10.10) says over-the-counter (OTC) drugs are excluded from Medicare Part D coverage. Your drug is an over-the-counter drug. Per Medicare rules, it is not covered.

## 2022-08-17 ENCOUNTER — HOSPITAL ENCOUNTER (OUTPATIENT)
Dept: NURSING | Age: 67
Setting detail: INFUSION SERIES
Discharge: HOME | End: 2022-08-17
Payer: MEDICARE

## 2022-08-17 VITALS
HEIGHT: 65 IN | TEMPERATURE: 97.8 F | RESPIRATION RATE: 16 BRPM | BODY MASS INDEX: 36.15 KG/M2 | DIASTOLIC BLOOD PRESSURE: 87 MMHG | WEIGHT: 217 LBS | SYSTOLIC BLOOD PRESSURE: 163 MMHG | HEART RATE: 78 BPM

## 2022-08-17 DIAGNOSIS — K51.00 CHRONIC ULCERATIVE ENTEROCOLITIS WITHOUT COMPLICATION (HCC): Primary | ICD-10-CM

## 2022-08-17 PROCEDURE — 96365 THER/PROPH/DIAG IV INF INIT: CPT

## 2022-08-17 PROCEDURE — 2580000003 HC RX 258: Performed by: INTERNAL MEDICINE

## 2022-08-17 PROCEDURE — 6360000002 HC RX W HCPCS: Performed by: INTERNAL MEDICINE

## 2022-08-17 PROCEDURE — 99211 OFF/OP EST MAY X REQ PHY/QHP: CPT

## 2022-08-17 RX ORDER — SODIUM CHLORIDE 0.9 % (FLUSH) 0.9 %
10 SYRINGE (ML) INJECTION PRN
Status: CANCELLED | OUTPATIENT
Start: 2022-09-14

## 2022-08-17 RX ORDER — SODIUM CHLORIDE 9 MG/ML
INJECTION, SOLUTION INTRAVENOUS CONTINUOUS
Status: ACTIVE | OUTPATIENT
Start: 2022-08-17 | End: 2022-08-17

## 2022-08-17 RX ORDER — SODIUM CHLORIDE 9 MG/ML
INJECTION, SOLUTION INTRAVENOUS CONTINUOUS
Status: CANCELLED | OUTPATIENT
Start: 2022-09-14

## 2022-08-17 RX ORDER — SODIUM CHLORIDE 0.9 % (FLUSH) 0.9 %
10 SYRINGE (ML) INJECTION PRN
Status: DISCONTINUED | OUTPATIENT
Start: 2022-08-17 | End: 2022-08-18 | Stop reason: HOSPADM

## 2022-08-17 RX ADMIN — Medication 10 ML: at 09:27

## 2022-08-17 RX ADMIN — VEDOLIZUMAB 300 MG: 300 INJECTION, POWDER, LYOPHILIZED, FOR SOLUTION INTRAVENOUS at 09:43

## 2022-08-17 RX ADMIN — SODIUM CHLORIDE: 9 INJECTION, SOLUTION INTRAVENOUS at 09:42

## 2022-08-17 ASSESSMENT — PAIN SCALES - GENERAL: PAINLEVEL_OUTOF10: 0

## 2022-08-17 NOTE — DISCHARGE INSTRUCTIONS
vedolizumab  Pronunciation:  GEN leary  Cristal Hodgson  What is vedolizumab? Vedolizumab reduces the effects of a substance in the body that can cause inflammation. Vedolizumab is used in adults with moderate to severe ulcerative colitis (UC), or moderate to severe Crohn's disease. Vedolizumab treats active disease and may help keep UC or Crohn's symptoms under control long term. Vedolizumab may also reduce the need for steroid medicines in helping to control symptoms long term. Vedolizumab is usually given after other medicines have been tried without success. Vedolizumab may also be used for purposes not listed in this medication guide. What should I discuss with my healthcare provider before receiving vedolizumab? You should not use vedolizumab if you are allergic to it. To make sure vedolizumab is safe for you, tell your doctor if you have:  an active or recent infection;  tuberculosis (or if you have close contact with someone who has tuberculosis);  signs of infection such as fever, cough, or flu symptoms;  open sores or skin wounds;  weak immune system (caused by disease or by using certain medicine); or  if you are scheduled to receive any vaccines. You should be up to date with all needed vaccinations before receiving vedolizumab. FDA pregnancy category [place] is not expected to harm an unborn baby. Tell your doctor if you are pregnant or plan to become pregnant during treatment. If you are pregnant, your name may be listed on a pregnancy registry. This is to track the outcome of the pregnancy and to evaluate any effects of vedolizumab on the baby. It is not known whether vedolizumab passes into breast milk or if it could harm a nursing baby. Tell your doctor if you are breast-feeding a baby. Do not give this medicine to anyone under 25years old without medical advice. How is vedolizumab given?   Before you start treatment with vedolizumab, your doctor may perform tests to make sure you do not have tuberculosis or other infections. Vedolizumab is injected into a vein through an IV. A healthcare provider will give you this injection. Vedolizumab must be given slowly, and the IV infusion can take about 30 minutes to complete. You will be watched closely for a short time after receiving vedolizumab, to make sure you do not have an allergic reaction to the medication. Vedolizumab is usually given on a schedule that starts the day of your first injection. Your next injections will be given at 2 weeks and 6 weeks after the first injection. Then you will receive an injection every 8 weeks (or every 2 months) thereafter. It may take up to several weeks before your symptoms improve. Keep using the medication as directed and tell your doctor if your symptoms do not improve after 14 weeks of treatment. What happens if I miss a dose? Call your doctor for instructions if you miss an appointment for your vedolizumab injection. What happens if I overdose? Since this medicine is given by a healthcare professional in a medical setting, an overdose is unlikely to occur. What should I avoid while receiving vedolizumab? Do not receive a \"live\" vaccine while using vedolizumab, and avoid coming into contact with anyone who has recently received a live vaccine. There is a chance that the virus could be passed on to you. Live vaccines include measles, mumps, rubella (MMR), polio, rotavirus, typhoid, yellow fever, varicella (chickenpox), zoster (shingles), and nasal flu (influenza) vaccine. You may receive \"killed-virus\" vaccines such as a flu shot, polio vaccine, rabies vaccine, or hepatitis A vaccine. Ask your doctor before receiving any vaccine while you are being treated with vedolizumab. Avoid being near people who are sick or have infections. Tell your doctor at once if you develop signs of infection. What are the possible side effects of vedolizumab?   Get emergency medical help if you have any of these signs of an allergic reaction: hives; difficult breathing; swelling of your face, lips, tongue, or throat. Some side effects may occur during the injection. Tell your caregiver right away if you feel warm or tingly, or if you have a severe headache, fast heart rate, pounding in your neck or ears, chest tightness, or trouble breathing. Some people using a medicine similar to vedolizumab have developed a serious viral infection of the brain that can lead to disability or death. Call your doctor right away if you have any change in your mental state, decreased vision, or problems with speech or walking. These symptoms may start gradually and get worse quickly. Stop using vedolizumab and call your doctor at once if you have:  fever, chills, body aches, cold or flu symptoms, mouth and throat ulcers, skin sores;  pain, warmth, swelling, or oozing around your anal area;  nausea, vomiting, severe diarrhea, diarrhea that is watery or bloody, stomach cramps, weight loss;  cough, pain when swallowing; or  liver problems --nausea, upper stomach pain, itching, tired feeling, loss of appetite, dark urine, shanita-colored stools, jaundice (yellowing of the skin or eyes). Common side effects may include:  fever, sore throat, flu symptoms;  cold symptoms such as stuffy nose, sinus pain, sneezing, cough;  pain in your arms or legs;  tired feeling;  headache, joint pain, back pain;  rash, itching; or  nausea. This is not a complete list of side effects and others may occur. Call your doctor for medical advice about side effects. You may report side effects to FDA at 5-836-FDA-6356. What other drugs will affect vedolizumab?   Tell your doctor about all medicines you use, and those you start or stop using during your treatment with vedolizumab, especially:  natalizumab;  medicines to treat psoriasis or rheumatoid arthritis, such as etanercept or golimumab;  other medicines to treat Crohn's disease or ulcerative colitis, such as adalimumab, certolizumab, infliximab; or  other drugs that weaken the immune system such as cancer medicine, steroids,   WHere can I get more information? Your doctor or pharmacist can provide more information about vedolizumab.     RETURN ON SEPT 90,6133 AT 0900  FOR ENTYVIO INFUSION    FOLLOW UP WITH DR. BARTON AS NEEDED OR SCHEDULED    CALL US IF YOU NEED TO CANCEL OR RESCHEDULE YOUR APPOINTMENT  [phone]

## 2022-08-17 NOTE — PROGRESS NOTES
Pt here for entyvio infusion  pt without c/o's today  She reports that her stools are almost normal  Pt denies any cramping, bloating bleeding or diarrhea  IV # 22 was started in Rt hand on 1st  attempt per myself  Pt gayla well No blood work needed for today  Pt gayla well  Entyvio 300 mg IVPB starting to infuse  IV site unremarkable Will monitor

## 2022-08-22 DIAGNOSIS — E53.8 B12 DEFICIENCY: ICD-10-CM

## 2022-08-23 RX ORDER — FOLIC ACID 1 MG/1
TABLET ORAL
Qty: 90 TABLET | Refills: 1 | Status: SHIPPED | OUTPATIENT
Start: 2022-08-23

## 2022-09-02 ENCOUNTER — HOSPITAL ENCOUNTER (OUTPATIENT)
Dept: MRI IMAGING | Age: 67
Discharge: HOME | End: 2022-09-02
Payer: MEDICARE

## 2022-09-02 DIAGNOSIS — M25.561 RIGHT KNEE PAIN, UNSPECIFIED CHRONICITY: ICD-10-CM

## 2022-09-02 PROCEDURE — 73721 MRI JNT OF LWR EXTRE W/O DYE: CPT

## 2022-09-04 DIAGNOSIS — E55.9 VITAMIN D DEFICIENCY: ICD-10-CM

## 2022-09-06 RX ORDER — LIDOCAINE HCL 4 %
CREAM (GRAM) TOPICAL
Qty: 4 CAPSULE | Refills: 2 | Status: SHIPPED | OUTPATIENT
Start: 2022-09-06

## 2022-09-14 ENCOUNTER — HOSPITAL ENCOUNTER (OUTPATIENT)
Dept: NURSING | Age: 67
Setting detail: INFUSION SERIES
Discharge: HOME | End: 2022-09-14
Payer: MEDICARE

## 2022-09-14 VITALS
TEMPERATURE: 97.1 F | HEIGHT: 65 IN | HEART RATE: 71 BPM | WEIGHT: 217 LBS | DIASTOLIC BLOOD PRESSURE: 71 MMHG | SYSTOLIC BLOOD PRESSURE: 112 MMHG | RESPIRATION RATE: 16 BRPM | BODY MASS INDEX: 36.15 KG/M2

## 2022-09-14 DIAGNOSIS — K51.00 CHRONIC ULCERATIVE ENTEROCOLITIS WITHOUT COMPLICATION (HCC): Primary | ICD-10-CM

## 2022-09-14 LAB
A/G RATIO: 1.3 (ref 1.1–2.2)
ALBUMIN SERPL-MCNC: 4 G/DL (ref 3.4–5)
ALP BLD-CCNC: 79 U/L (ref 40–129)
ALT SERPL-CCNC: 22 U/L (ref 10–40)
ANION GAP SERPL CALCULATED.3IONS-SCNC: 9 MMOL/L (ref 3–16)
AST SERPL-CCNC: 38 U/L (ref 15–37)
BILIRUB SERPL-MCNC: 0.6 MG/DL (ref 0–1)
BUN BLDV-MCNC: 23 MG/DL (ref 7–20)
CALCIUM SERPL-MCNC: 9.3 MG/DL (ref 8.3–10.6)
CHLORIDE BLD-SCNC: 103 MMOL/L (ref 99–110)
CO2: 26 MMOL/L (ref 21–32)
CREAT SERPL-MCNC: 1 MG/DL (ref 0.6–1.2)
GFR AFRICAN AMERICAN: >60
GFR NON-AFRICAN AMERICAN: 55
GLUCOSE BLD-MCNC: 161 MG/DL (ref 70–99)
HCT VFR BLD CALC: 37.6 % (ref 36–48)
HEMOGLOBIN: 12.7 G/DL (ref 12–16)
MCH RBC QN AUTO: 31.2 PG (ref 26–34)
MCHC RBC AUTO-ENTMCNC: 33.8 G/DL (ref 31–36)
MCV RBC AUTO: 92.4 FL (ref 80–100)
PDW BLD-RTO: 14.5 % (ref 12.4–15.4)
PLATELET # BLD: 155 K/UL (ref 135–450)
PMV BLD AUTO: 9.7 FL (ref 5–10.5)
POTASSIUM SERPL-SCNC: 4.7 MMOL/L (ref 3.5–5.1)
RBC # BLD: 4.07 M/UL (ref 4–5.2)
SODIUM BLD-SCNC: 138 MMOL/L (ref 136–145)
TOTAL PROTEIN: 7 G/DL (ref 6.4–8.2)
WBC # BLD: 6.2 K/UL (ref 4–11)

## 2022-09-14 PROCEDURE — 96365 THER/PROPH/DIAG IV INF INIT: CPT

## 2022-09-14 PROCEDURE — 2580000003 HC RX 258: Performed by: INTERNAL MEDICINE

## 2022-09-14 PROCEDURE — 99211 OFF/OP EST MAY X REQ PHY/QHP: CPT

## 2022-09-14 PROCEDURE — 6360000002 HC RX W HCPCS: Performed by: INTERNAL MEDICINE

## 2022-09-14 PROCEDURE — 85027 COMPLETE CBC AUTOMATED: CPT

## 2022-09-14 PROCEDURE — 80053 COMPREHEN METABOLIC PANEL: CPT

## 2022-09-14 RX ORDER — SODIUM CHLORIDE 9 MG/ML
100 INJECTION, SOLUTION INTRAVENOUS CONTINUOUS
Status: ACTIVE | OUTPATIENT
Start: 2022-09-14 | End: 2022-09-14

## 2022-09-14 RX ORDER — SODIUM CHLORIDE 0.9 % (FLUSH) 0.9 %
10 SYRINGE (ML) INJECTION PRN
Status: CANCELLED | OUTPATIENT
Start: 2022-10-12

## 2022-09-14 RX ORDER — SODIUM CHLORIDE 0.9 % (FLUSH) 0.9 %
10 SYRINGE (ML) INJECTION PRN
Status: DISCONTINUED | OUTPATIENT
Start: 2022-09-14 | End: 2022-09-15 | Stop reason: HOSPADM

## 2022-09-14 RX ORDER — SODIUM CHLORIDE 9 MG/ML
INJECTION, SOLUTION INTRAVENOUS CONTINUOUS
Status: CANCELLED | OUTPATIENT
Start: 2022-10-12

## 2022-09-14 RX ADMIN — VEDOLIZUMAB 300 MG: 300 INJECTION, POWDER, LYOPHILIZED, FOR SOLUTION INTRAVENOUS at 09:44

## 2022-09-14 RX ADMIN — SODIUM CHLORIDE 100 ML: 9 INJECTION, SOLUTION INTRAVENOUS at 09:43

## 2022-09-14 RX ADMIN — Medication 10 ML: at 09:21

## 2022-09-14 ASSESSMENT — PAIN SCALES - GENERAL: PAINLEVEL_OUTOF10: 0

## 2022-09-14 NOTE — PROGRESS NOTES
Pt here for entyvio infusion  Pt without c/o's today  She reports that her stools are almost normal  Pt denies any cramping, bloating bleeding or diarrhea  IV # 22 was started in Rt hand on 1st  attempt per myself  Blood for lab work was drawn without difficulty Pt gayla well  Entyvio 300 mg IVPB starting to infuse  IV site unremarkable Will monitor

## 2022-09-14 NOTE — DISCHARGE INSTRUCTIONS
vedolizumab  Pronunciation:  GEN Loja  What is vedolizumab? Vedolizumab reduces the effects of a substance in the body that can cause inflammation. Vedolizumab is used in adults with moderate to severe ulcerative colitis (UC), or moderate to severe Crohn's disease. Vedolizumab treats active disease and may help keep UC or Crohn's symptoms under control long term. Vedolizumab may also reduce the need for steroid medicines in helping to control symptoms long term. Vedolizumab is usually given after other medicines have been tried without success. Vedolizumab may also be used for purposes not listed in this medication guide. What should I discuss with my healthcare provider before receiving vedolizumab? You should not use vedolizumab if you are allergic to it. To make sure vedolizumab is safe for you, tell your doctor if you have:  an active or recent infection;  tuberculosis (or if you have close contact with someone who has tuberculosis);  signs of infection such as fever, cough, or flu symptoms;  open sores or skin wounds;  weak immune system (caused by disease or by using certain medicine); or  if you are scheduled to receive any vaccines. You should be up to date with all needed vaccinations before receiving vedolizumab. FDA pregnancy category Beryl [place] is not expected to harm an unborn baby. Tell your doctor if you are pregnant or plan to become pregnant during treatment. If you are pregnant, your name may be listed on a pregnancy registry. This is to track the outcome of the pregnancy and to evaluate any effects of vedolizumab on the baby. It is not known whether vedolizumab passes into breast milk or if it could harm a nursing baby. Tell your doctor if you are breast-feeding a baby. Do not give this medicine to anyone under 25years old without medical advice. How is vedolizumab given?   Before you start treatment with vedolizumab, your doctor may perform tests to make sure you do not have tuberculosis or other infections. Vedolizumab is injected into a vein through an IV. A healthcare provider will give you this injection. Vedolizumab must be given slowly, and the IV infusion can take about 30 minutes to complete. You will be watched closely for a short time after receiving vedolizumab, to make sure you do not have an allergic reaction to the medication. Vedolizumab is usually given on a schedule that starts the day of your first injection. Your next injections will be given at 2 weeks and 6 weeks after the first injection. Then you will receive an injection every 8 weeks (or every 2 months) thereafter. It may take up to several weeks before your symptoms improve. Keep using the medication as directed and tell your doctor if your symptoms do not improve after 14 weeks of treatment. What happens if I miss a dose? Call your doctor for instructions if you miss an appointment for your vedolizumab injection. What happens if I overdose? Since this medicine is given by a healthcare professional in a medical setting, an overdose is unlikely to occur. What should I avoid while receiving vedolizumab? Do not receive a \"live\" vaccine while using vedolizumab, and avoid coming into contact with anyone who has recently received a live vaccine. There is a chance that the virus could be passed on to you. Live vaccines include measles, mumps, rubella (MMR), polio, rotavirus, typhoid, yellow fever, varicella (chickenpox), zoster (shingles), and nasal flu (influenza) vaccine. You may receive \"killed-virus\" vaccines such as a flu shot, polio vaccine, rabies vaccine, or hepatitis A vaccine. Ask your doctor before receiving any vaccine while you are being treated with vedolizumab. Avoid being near people who are sick or have infections. Tell your doctor at once if you develop signs of infection. What are the possible side effects of vedolizumab?   Get emergency medical help if you have any of these signs of an allergic reaction: hives; difficult breathing; swelling of your face, lips, tongue, or throat. Some side effects may occur during the injection. Tell your caregiver right away if you feel warm or tingly, or if you have a severe headache, fast heart rate, pounding in your neck or ears, chest tightness, or trouble breathing. Some people using a medicine similar to vedolizumab have developed a serious viral infection of the brain that can lead to disability or death. Call your doctor right away if you have any change in your mental state, decreased vision, or problems with speech or walking. These symptoms may start gradually and get worse quickly. Stop using vedolizumab and call your doctor at once if you have:  fever, chills, body aches, cold or flu symptoms, mouth and throat ulcers, skin sores;  pain, warmth, swelling, or oozing around your anal area;  nausea, vomiting, severe diarrhea, diarrhea that is watery or bloody, stomach cramps, weight loss;  cough, pain when swallowing; or  liver problems --nausea, upper stomach pain, itching, tired feeling, loss of appetite, dark urine, shanita-colored stools, jaundice (yellowing of the skin or eyes). Common side effects may include:  fever, sore throat, flu symptoms;  cold symptoms such as stuffy nose, sinus pain, sneezing, cough;  pain in your arms or legs;  tired feeling;  headache, joint pain, back pain;  rash, itching; or  nausea. This is not a complete list of side effects and others may occur. Call your doctor for medical advice about side effects. You may report side effects to FDA at 7-812-FDA-2197. What other drugs will affect vedolizumab?   Tell your doctor about all medicines you use, and those you start or stop using during your treatment with vedolizumab, especially:  natalizumab;  medicines to treat psoriasis or rheumatoid arthritis, such as etanercept or golimumab;  other medicines to treat Crohn's disease or ulcerative colitis, such as adalimumab, certolizumab, infliximab; or  other drugs that weaken the immune system such as cancer medicine, steroids,   WHere can I get more information? Your doctor or pharmacist can provide more information about vedolizumab.     RETURN ON OCT 99,8099 AT 0900  FOR ENTYVIO INFUSION    FOLLOW UP WITH DR. BARTON AS NEEDED OR SCHEDULED    CALL US IF YOU NEED TO CANCEL OR RESCHEDULE YOUR APPOINTMENT  [phone]

## 2022-09-22 ENCOUNTER — OFFICE VISIT (OUTPATIENT)
Dept: ORTHOPEDIC SURGERY | Age: 67
End: 2022-09-22
Payer: MEDICARE

## 2022-09-22 VITALS — HEIGHT: 65 IN | WEIGHT: 217 LBS | BODY MASS INDEX: 36.15 KG/M2

## 2022-09-22 DIAGNOSIS — M17.11 PRIMARY OSTEOARTHRITIS OF RIGHT KNEE: Primary | ICD-10-CM

## 2022-09-22 DIAGNOSIS — M23.300 DEGENERATIVE TEAR OF LATERAL MENISCUS OF RIGHT KNEE: ICD-10-CM

## 2022-09-22 PROCEDURE — 1090F PRES/ABSN URINE INCON ASSESS: CPT | Performed by: ORTHOPAEDIC SURGERY

## 2022-09-22 PROCEDURE — 1036F TOBACCO NON-USER: CPT | Performed by: ORTHOPAEDIC SURGERY

## 2022-09-22 PROCEDURE — 1123F ACP DISCUSS/DSCN MKR DOCD: CPT | Performed by: ORTHOPAEDIC SURGERY

## 2022-09-22 PROCEDURE — G8417 CALC BMI ABV UP PARAM F/U: HCPCS | Performed by: ORTHOPAEDIC SURGERY

## 2022-09-22 PROCEDURE — 99212 OFFICE O/P EST SF 10 MIN: CPT | Performed by: ORTHOPAEDIC SURGERY

## 2022-09-22 PROCEDURE — 3017F COLORECTAL CA SCREEN DOC REV: CPT | Performed by: ORTHOPAEDIC SURGERY

## 2022-09-22 PROCEDURE — G8427 DOCREV CUR MEDS BY ELIG CLIN: HCPCS | Performed by: ORTHOPAEDIC SURGERY

## 2022-09-22 PROCEDURE — G8399 PT W/DXA RESULTS DOCUMENT: HCPCS | Performed by: ORTHOPAEDIC SURGERY

## 2022-09-22 NOTE — PROGRESS NOTES
She returns today for evaluation. She still has some pain but she says she grades it 1/10 today but it comes and goes. I did review the MRI of her right knee which demonstrated a large lateral meniscus tear consistent with her pain including meniscal cyst.  At this time I do recommend she undergo a right knee video arthroscopy because of the recurrent pain she has in the right knee to include a lateral meniscectomy. She does not have any stress reaction and would like to avoid knee replacement surgery at this time. INFORMED CONSENT NOTE        We discussed the risks, benefits, and alternatives to the proposed procedure, as well as the necessity of other members of the healthcare team participating in the procedure. All questions were answered and the patient elected to proceed with the proposed procedure and signed the informed consent form.

## 2022-09-26 ENCOUNTER — TELEPHONE (OUTPATIENT)
Dept: FAMILY MEDICINE CLINIC | Age: 67
End: 2022-09-26

## 2022-09-26 NOTE — TELEPHONE ENCOUNTER
----- Message from Catalino Lara sent at 9/26/2022  2:50 PM EDT -----  Subject: Appointment Request    Reason for Call: Established Patient Appointment needed: Routine Pre-Op    QUESTIONS    Reason for appointment request? No appointments available during search     Additional Information for Provider? Pt is requesting appt for Pre-op   10/28 Knee surgery with Dr. Bandar Kwong. Screened green 9/26. Unsure of   additional testing. Will bring forms to visit. She prefers Wednesday 10/12   with Ade only. Please advise.   ---------------------------------------------------------------------------  --------------  Melissa PRAKASH  [phone];  Do not leave any message, patient will call back for answer  ---------------------------------------------------------------------------  --------------  SCRIPT ANSWERS  NEFTALIID Screen: Roma Gilmore

## 2022-09-29 RX ORDER — DM/PE/ACETAMINOPHEN/CHLORPHENR 10-5-325-2
TABLET, SEQUENTIAL ORAL
Qty: 360 TABLET | Refills: 1 | Status: SHIPPED | OUTPATIENT
Start: 2022-09-29

## 2022-10-05 RX ORDER — NYSTATIN 100000 U/G
CREAM TOPICAL
Qty: 30 G | Refills: 1 | Status: SHIPPED | OUTPATIENT
Start: 2022-10-05

## 2022-10-07 ENCOUNTER — TELEPHONE (OUTPATIENT)
Dept: ORTHOPEDIC SURGERY | Age: 67
End: 2022-10-07

## 2022-10-07 NOTE — TELEPHONE ENCOUNTER
Auth: NPR  Date: 10/28/22  Reference # None  Spoke with: None  [place]: [place] [facility] [id number]   DX: M23.300  SX area: Rt knee  Insurance: Medicare A&B

## 2022-10-10 ENCOUNTER — TELEPHONE (OUTPATIENT)
Dept: ORTHOPEDIC SURGERY | Age: 67
End: 2022-10-10

## 2022-10-10 NOTE — TELEPHONE ENCOUNTER
Auth: NPR  Date: 10/28/22  Reference # None  Spoke with: None  Type of SX: Outpatient  Location: [facility] [place]  CPT: [id number]   DX: M23.300  SX area: Rt knee  Insurance: Medicare A&B

## 2022-10-11 RX ORDER — LEVOTHYROXINE SODIUM 0.12 MG/1
TABLET ORAL
Qty: 90 TABLET | Refills: 0 | Status: SHIPPED | OUTPATIENT
Start: 2022-10-11

## 2022-10-12 ENCOUNTER — HOSPITAL ENCOUNTER (OUTPATIENT)
Dept: NURSING | Age: 67
Setting detail: INFUSION SERIES
Discharge: HOME | End: 2022-10-12
Payer: MEDICARE

## 2022-10-12 VITALS
TEMPERATURE: 97.4 F | BODY MASS INDEX: 36.15 KG/M2 | HEIGHT: 65 IN | DIASTOLIC BLOOD PRESSURE: 77 MMHG | SYSTOLIC BLOOD PRESSURE: 164 MMHG | RESPIRATION RATE: 16 BRPM | HEART RATE: 68 BPM | WEIGHT: 217 LBS

## 2022-10-12 DIAGNOSIS — K51.00 CHRONIC ULCERATIVE ENTEROCOLITIS WITHOUT COMPLICATION (HCC): Primary | ICD-10-CM

## 2022-10-12 PROCEDURE — 6360000002 HC RX W HCPCS: Performed by: INTERNAL MEDICINE

## 2022-10-12 PROCEDURE — 2580000003 HC RX 258: Performed by: INTERNAL MEDICINE

## 2022-10-12 PROCEDURE — 99211 OFF/OP EST MAY X REQ PHY/QHP: CPT

## 2022-10-12 PROCEDURE — 96365 THER/PROPH/DIAG IV INF INIT: CPT

## 2022-10-12 RX ORDER — SODIUM CHLORIDE 9 MG/ML
INJECTION, SOLUTION INTRAVENOUS CONTINUOUS
Status: ACTIVE | OUTPATIENT
Start: 2022-10-12 | End: 2022-10-12

## 2022-10-12 RX ORDER — SODIUM CHLORIDE 0.9 % (FLUSH) 0.9 %
10 SYRINGE (ML) INJECTION PRN
Status: DISCONTINUED | OUTPATIENT
Start: 2022-10-12 | End: 2022-10-13 | Stop reason: HOSPADM

## 2022-10-12 RX ORDER — SODIUM CHLORIDE 0.9 % (FLUSH) 0.9 %
10 SYRINGE (ML) INJECTION PRN
OUTPATIENT
Start: 2022-11-09

## 2022-10-12 RX ORDER — SODIUM CHLORIDE 9 MG/ML
INJECTION, SOLUTION INTRAVENOUS CONTINUOUS
OUTPATIENT
Start: 2022-11-09

## 2022-10-12 RX ADMIN — Medication 10 ML: at 09:25

## 2022-10-12 RX ADMIN — SODIUM CHLORIDE: 9 INJECTION, SOLUTION INTRAVENOUS at 09:26

## 2022-10-12 RX ADMIN — VEDOLIZUMAB 300 MG: 300 INJECTION, POWDER, LYOPHILIZED, FOR SOLUTION INTRAVENOUS at 09:26

## 2022-10-12 ASSESSMENT — PAIN SCALES - GENERAL: PAINLEVEL_OUTOF10: 0

## 2022-10-12 NOTE — DISCHARGE INSTRUCTIONS
vedolizumab  Pronunciation:  GEN leary  Rosalind Christopher  What is vedolizumab? Vedolizumab reduces the effects of a substance in the body that can cause inflammation. Vedolizumab is used in adults with moderate to severe ulcerative colitis (UC), or moderate to severe Crohn's disease. Vedolizumab treats active disease and may help keep UC or Crohn's symptoms under control long term. Vedolizumab may also reduce the need for steroid medicines in helping to control symptoms long term. Vedolizumab is usually given after other medicines have been tried without success. Vedolizumab may also be used for purposes not listed in this medication guide. What should I discuss with my healthcare provider before receiving vedolizumab? You should not use vedolizumab if you are allergic to it. To make sure vedolizumab is safe for you, tell your doctor if you have:  an active or recent infection;  tuberculosis (or if you have close contact with someone who has tuberculosis);  signs of infection such as fever, cough, or flu symptoms;  open sores or skin wounds;  weak immune system (caused by disease or by using certain medicine); or  if you are scheduled to receive any vaccines. You should be up to date with all needed vaccinations before receiving vedolizumab. FDA pregnancy category Davison Cho is not expected to harm an unborn baby. Tell your doctor if you are pregnant or plan to become pregnant during treatment. If you are pregnant, your name may be listed on a pregnancy registry. This is to track the outcome of the pregnancy and to evaluate any effects of vedolizumab on the baby. It is not known whether vedolizumab passes into breast milk or if it could harm a nursing baby. Tell your doctor if you are breast-feeding a baby. Do not give this medicine to anyone under 25years old without medical advice. How is vedolizumab given?   Before you start treatment with vedolizumab, your doctor may perform tests to make sure you do not have tuberculosis or other infections. Vedolizumab is injected into a vein through an IV. A healthcare provider will give you this injection. Vedolizumab must be given slowly, and the IV infusion can take about 30 minutes to complete. You will be watched closely for a short time after receiving vedolizumab, to make sure you do not have an allergic reaction to the medication. Vedolizumab is usually given on a schedule that starts the day of your first injection. Your next injections will be given at 2 weeks and 6 weeks after the first injection. Then you will receive an injection every 8 weeks (or every 2 months) thereafter. It may take up to several weeks before your symptoms improve. Keep using the medication as directed and tell your doctor if your symptoms do not improve after 14 weeks of treatment. What happens if I miss a dose? Call your doctor for instructions if you miss an appointment for your vedolizumab injection. What happens if I overdose? Since this medicine is given by a healthcare professional in a medical setting, an overdose is unlikely to occur. What should I avoid while receiving vedolizumab? Do not receive a \"live\" vaccine while using vedolizumab, and avoid coming into contact with anyone who has recently received a live vaccine. There is a chance that the virus could be passed on to you. Live vaccines include measles, mumps, rubella (MMR), polio, rotavirus, typhoid, yellow fever, varicella (chickenpox), zoster (shingles), and nasal flu (influenza) vaccine. You may receive \"killed-virus\" vaccines such as a flu shot, polio vaccine, rabies vaccine, or hepatitis A vaccine. Ask your doctor before receiving any vaccine while you are being treated with vedolizumab. Avoid being near people who are sick or have infections. Tell your doctor at once if you develop signs of infection. What are the possible side effects of vedolizumab?   Get emergency medical help if you have any of these signs of an allergic reaction: hives; difficult breathing; swelling of your face, lips, tongue, or throat. Some side effects may occur during the injection. Tell your caregiver right away if you feel warm or tingly, or if you have a severe headache, fast heart rate, pounding in your neck or ears, chest tightness, or trouble breathing. Some people using a medicine similar to vedolizumab have developed a serious viral infection of the brain that can lead to disability or death. Call your doctor right away if you have any change in your mental state, decreased vision, or problems with speech or walking. These symptoms may start gradually and get worse quickly. Stop using vedolizumab and call your doctor at once if you have:  fever, chills, body aches, cold or flu symptoms, mouth and throat ulcers, skin sores;  pain, warmth, swelling, or oozing around your anal area;  nausea, vomiting, severe diarrhea, diarrhea that is watery or bloody, stomach cramps, weight loss;  cough, pain when swallowing; or  liver problems --nausea, upper stomach pain, itching, tired feeling, loss of appetite, dark urine, shanita-colored stools, jaundice (yellowing of the skin or eyes). Common side effects may include:  fever, sore throat, flu symptoms;  cold symptoms such as stuffy nose, sinus pain, sneezing, cough;  pain in your arms or legs;  tired feeling;  headache, joint pain, back pain;  rash, itching; or  nausea. This is not a complete list of side effects and others may occur. Call your doctor for medical advice about side effects. You may report side effects to FDA at 8-694-FDA-1487. What other drugs will affect vedolizumab?   Tell your doctor about all medicines you use, and those you start or stop using during your treatment with vedolizumab, especially:  natalizumab;  medicines to treat psoriasis or rheumatoid arthritis, such as etanercept or golimumab;  other medicines to treat Crohn's disease or ulcerative colitis, such as adalimumab, certolizumab, infliximab; or  other drugs that weaken the immune system such as cancer medicine, steroids,   WHere can I get more information? Your doctor or pharmacist can provide more information about vedolizumab.     RETURN ON NOV 9,2022 AT 0900  FOR ENTYVIO INFUSION    FOLLOW UP WITH DR. BARTON AS NEEDED OR SCHEDULED    CALL US IF YOU NEED TO CANCEL OR RESCHEDULE YOUR APPOINTMENT  [phone]

## 2022-10-19 NOTE — PROGRESS NOTES
1.  Do not eat or drink anything after 12 midnight prior to surgery. This includes no water, chewing gum or mints. 2.  Take the following pills with a small sip of water on the morning of surgery Synthyroid. 3.  Aspirin, Ibuprofen, Advil, Naproxen, Vitamin E and other Anti-inflammatory products should be stopped for 5 days before surgery or as directed by your physician. 4.  Check with your doctor regarding stopping Plavix, Coumadin, Lovenox, Fragmin or other blood thinners. 5.  Do not smoke and do not drink alcoholic beverages 24 hours prior to surgery. This includes NA Beer. 6.  You may brush your teeth and gargle the morning of surgery. DO NOT SWALLOW WATER.  7.  You MUST make arrangements for a responsible adult to take you home after your surgery. You will not be allowed to leave alone or drive yourself home. It is strongly suggested someone stay with you the first 24 hours. Your surgery will be cancelled if you do not have a ride home. 8.  A parent/legal guardian must accompany a child scheduled for surgery and plan to stay at the hospital until the child is discharged. Please do not bring other children with you. 9.  Please wear simple, loose fitting clothing to the hospital.  Irene Perales not bring valuables ( money, credit cards, checkbooks, etc.)  Do not wear any makeup (including no eye makeup) or nail polish on your fingers or toes. 10.  Do not wear any jewelry or piercing on the day of surgery. All body piercing jewelry must be removed. 11.  If you have dentures, they will be removed before going to the OR; we will provide you a container. If you wear contact lenses or glasses, they will be removed; please bring a case for them. 12.  Please see your family doctor/pediatrician for a history & physical and/or concerning medications. Bring any test results/reports from your physician's office the day of surgery. PCP   Phone     H&P appt date   15.   Remember to bring Blood Bank Bracelet to the hospital on the day of surgery. 14.  If you have a Living Will and Durable Power of  for Healthcare, please bring in a copy. 13.  Notify your Surgeon if you develop any illness between now and surgery time; cough, cold, fever, sore throat, nausea, vomiting, etc.  Please notify your surgeon if you experience dizziness, shortness of breath or blurred vision between now and the time of your surgery. 16.  DO NOT shave your operative site 96 hours (4 days) prior to surgery. For face and neck surgery, men may use an electric razor 48 hours (2 days) prior to surgery. 17. Shower the night before surgery and the morning of surgery with   Antibacterial soap   Hibiclens or  Chlorhexidine gluconate. To provide excellent care, visitors will be limited to two in a room at any given time. Please no children under the age of 15 in the surgical department.

## 2022-10-20 ENCOUNTER — OFFICE VISIT (OUTPATIENT)
Dept: FAMILY MEDICINE CLINIC | Age: 67
End: 2022-10-20
Payer: MEDICARE

## 2022-10-20 VITALS
HEART RATE: 74 BPM | OXYGEN SATURATION: 98 % | DIASTOLIC BLOOD PRESSURE: 88 MMHG | SYSTOLIC BLOOD PRESSURE: 136 MMHG | WEIGHT: 223 LBS | BODY MASS INDEX: 37.15 KG/M2 | HEIGHT: 65 IN

## 2022-10-20 DIAGNOSIS — Z13.6 SCREENING FOR CARDIOVASCULAR CONDITION: ICD-10-CM

## 2022-10-20 DIAGNOSIS — E53.8 B12 DEFICIENCY: ICD-10-CM

## 2022-10-20 DIAGNOSIS — J45.909 UNCOMPLICATED ASTHMA, UNSPECIFIED ASTHMA SEVERITY, UNSPECIFIED WHETHER PERSISTENT: ICD-10-CM

## 2022-10-20 DIAGNOSIS — I10 PRIMARY HYPERTENSION: ICD-10-CM

## 2022-10-20 DIAGNOSIS — E53.8 FOLATE DEFICIENCY: ICD-10-CM

## 2022-10-20 DIAGNOSIS — E66.01 MORBIDLY OBESE (HCC): ICD-10-CM

## 2022-10-20 DIAGNOSIS — N18.30 STAGE 3 CHRONIC KIDNEY DISEASE, UNSPECIFIED WHETHER STAGE 3A OR 3B CKD (HCC): ICD-10-CM

## 2022-10-20 DIAGNOSIS — F33.0 MILD EPISODE OF RECURRENT MAJOR DEPRESSIVE DISORDER (HCC): ICD-10-CM

## 2022-10-20 DIAGNOSIS — E55.9 VITAMIN D DEFICIENCY: ICD-10-CM

## 2022-10-20 DIAGNOSIS — Z01.818 PREOP EXAMINATION: Primary | ICD-10-CM

## 2022-10-20 DIAGNOSIS — E78.2 MIXED HYPERLIPIDEMIA: ICD-10-CM

## 2022-10-20 DIAGNOSIS — K51.80 OTHER ULCERATIVE COLITIS WITHOUT COMPLICATION (HCC): ICD-10-CM

## 2022-10-20 DIAGNOSIS — M07.60 ENTEROPATHIC ARTHRITIS: ICD-10-CM

## 2022-10-20 LAB
A/G RATIO: 1.5 (ref 1.1–2.2)
ALBUMIN SERPL-MCNC: 4.3 G/DL (ref 3.4–5)
ALP BLD-CCNC: 91 U/L (ref 40–129)
ALT SERPL-CCNC: 21 U/L (ref 10–40)
ANION GAP SERPL CALCULATED.3IONS-SCNC: 11 MMOL/L (ref 3–16)
AST SERPL-CCNC: 36 U/L (ref 15–37)
BASOPHILS ABSOLUTE: 0 K/UL (ref 0–0.2)
BASOPHILS RELATIVE PERCENT: 0.3 %
BILIRUB SERPL-MCNC: 0.6 MG/DL (ref 0–1)
BUN BLDV-MCNC: 26 MG/DL (ref 7–20)
CALCIUM SERPL-MCNC: 9.7 MG/DL (ref 8.3–10.6)
CHLORIDE BLD-SCNC: 102 MMOL/L (ref 99–110)
CO2: 28 MMOL/L (ref 21–32)
CREAT SERPL-MCNC: 1.1 MG/DL (ref 0.6–1.2)
EOSINOPHILS ABSOLUTE: 0.3 K/UL (ref 0–0.6)
EOSINOPHILS RELATIVE PERCENT: 4.5 %
GFR SERPL CREATININE-BSD FRML MDRD: 55 ML/MIN/{1.73_M2}
GLUCOSE BLD-MCNC: 90 MG/DL (ref 70–99)
HCT VFR BLD CALC: 36.6 % (ref 36–48)
HEMOGLOBIN: 12.6 G/DL (ref 12–16)
LYMPHOCYTES ABSOLUTE: 2 K/UL (ref 1–5.1)
LYMPHOCYTES RELATIVE PERCENT: 25.6 %
MCH RBC QN AUTO: 31.9 PG (ref 26–34)
MCHC RBC AUTO-ENTMCNC: 34.5 G/DL (ref 31–36)
MCV RBC AUTO: 92.3 FL (ref 80–100)
MONOCYTES ABSOLUTE: 0.6 K/UL (ref 0–1.3)
MONOCYTES RELATIVE PERCENT: 7.4 %
NEUTROPHILS ABSOLUTE: 4.7 K/UL (ref 1.7–7.7)
NEUTROPHILS RELATIVE PERCENT: 62.2 %
PDW BLD-RTO: 14 % (ref 12.4–15.4)
PLATELET # BLD: 158 K/UL (ref 135–450)
PMV BLD AUTO: 10.2 FL (ref 5–10.5)
POTASSIUM SERPL-SCNC: 5.6 MMOL/L (ref 3.5–5.1)
RBC # BLD: 3.96 M/UL (ref 4–5.2)
SODIUM BLD-SCNC: 141 MMOL/L (ref 136–145)
TOTAL PROTEIN: 7.1 G/DL (ref 6.4–8.2)
WBC # BLD: 7.6 K/UL (ref 4–11)

## 2022-10-20 PROCEDURE — 99214 OFFICE O/P EST MOD 30 MIN: CPT | Performed by: NURSE PRACTITIONER

## 2022-10-20 PROCEDURE — 3017F COLORECTAL CA SCREEN DOC REV: CPT | Performed by: NURSE PRACTITIONER

## 2022-10-20 PROCEDURE — 1090F PRES/ABSN URINE INCON ASSESS: CPT | Performed by: NURSE PRACTITIONER

## 2022-10-20 PROCEDURE — 1036F TOBACCO NON-USER: CPT | Performed by: NURSE PRACTITIONER

## 2022-10-20 PROCEDURE — G8484 FLU IMMUNIZE NO ADMIN: HCPCS | Performed by: NURSE PRACTITIONER

## 2022-10-20 PROCEDURE — G8399 PT W/DXA RESULTS DOCUMENT: HCPCS | Performed by: NURSE PRACTITIONER

## 2022-10-20 PROCEDURE — 36415 COLL VENOUS BLD VENIPUNCTURE: CPT | Performed by: NURSE PRACTITIONER

## 2022-10-20 PROCEDURE — G8427 DOCREV CUR MEDS BY ELIG CLIN: HCPCS | Performed by: NURSE PRACTITIONER

## 2022-10-20 PROCEDURE — G8417 CALC BMI ABV UP PARAM F/U: HCPCS | Performed by: NURSE PRACTITIONER

## 2022-10-20 PROCEDURE — 1123F ACP DISCUSS/DSCN MKR DOCD: CPT | Performed by: NURSE PRACTITIONER

## 2022-10-20 PROCEDURE — 93000 ELECTROCARDIOGRAM COMPLETE: CPT | Performed by: NURSE PRACTITIONER

## 2022-10-20 RX ORDER — SIMVASTATIN 20 MG
TABLET ORAL
Qty: 90 TABLET | Refills: 1 | Status: SHIPPED | OUTPATIENT
Start: 2022-10-20

## 2022-10-20 RX ORDER — CELECOXIB 100 MG/1
CAPSULE ORAL
Qty: 180 CAPSULE | Refills: 1 | Status: SHIPPED | OUTPATIENT
Start: 2022-10-20

## 2022-10-20 SDOH — ECONOMIC STABILITY: FOOD INSECURITY: WITHIN THE PAST 12 MONTHS, THE FOOD YOU BOUGHT JUST DIDN'T LAST AND YOU DIDN'T HAVE MONEY TO GET MORE.: NEVER TRUE

## 2022-10-20 SDOH — ECONOMIC STABILITY: FOOD INSECURITY: WITHIN THE PAST 12 MONTHS, YOU WORRIED THAT YOUR FOOD WOULD RUN OUT BEFORE YOU GOT MONEY TO BUY MORE.: NEVER TRUE

## 2022-10-20 ASSESSMENT — SOCIAL DETERMINANTS OF HEALTH (SDOH): HOW HARD IS IT FOR YOU TO PAY FOR THE VERY BASICS LIKE FOOD, HOUSING, MEDICAL CARE, AND HEATING?: NOT HARD AT ALL

## 2022-10-20 NOTE — PROGRESS NOTES
[address] M. Yasmin. Gifty Tavera CNP                                                                     Preoperative Evaluation        Destiny Parrish  YOB: 1955    Date of Service:  10/20/2022    Vitals:    10/20/22 1045   BP: 136/88   Site: Left Upper Arm   Position: Sitting   Cuff Size: Large Adult   Pulse: 74   SpO2: 98%   Weight: 223 lb (101.2 kg)   Height: 5' 5\" (1.651 m)      Wt Readings from Last 2 Encounters:   10/20/22 223 lb (101.2 kg)   10/12/22 217 lb (98.4 kg)     BP Readings from Last 3 Encounters:   10/20/22 136/88   10/12/22 (!) 164/77   09/14/22 112/71        Chief Complaint   Patient presents with    Pre-op Exam     Right knee Arthroscopy Lateral meniscectomy scheduled 10/28/2022 with Dr. Daylin Wood   Allergen Reactions    Amoxicillin      \"colitis\"    Pravastatin Rash    Wellbutrin [Bupropion] Other (See Comments) and Rash     Caused sores in mouth     Outpatient Medications Marked as Taking for the 10/20/22 encounter (Office Visit) with YAHAIRA Bedolla CNP   Medication Sig Dispense Refill    levothyroxine (SYNTHROID) 125 MCG tablet TAKE 1 TABLET BY MOUTH EVERY DAY 90 tablet 0    nystatin (MYCOSTATIN) 566833 UNIT/GM cream Apply topically 2 times daily.  30 g 1    GNP MAGNESIUM OXIDE 250 MG TABS tablet TAKE 2 TABLETS THREE TIMES DAILY 360 tablet 1    vitamin D ([employer] VITAMIN D3) 250 MCG (93694 UT) CAPS capsule TAKE 1 CAPSULE BY MOUTH ONCE WEEKLY 4 capsule 2    folic acid (FOLVITE) 1 MG tablet TAKE 1 TABLET EVERY DAY 90 tablet 1    DULoxetine (CYMBALTA) 30 MG extended release capsule TAKE 1 CAPSULE EVERY DAY 90 capsule 1    simvastatin (ZOCOR) 20 MG tablet TAKE 1 TABLET EVERY NIGHT 90 tablet 1    celecoxib (CELEBREX) 100 MG capsule TAKE 2 CAPSULES EVERY  capsule 1 alendronate (FOSAMAX) 70 MG tablet TAKE 1 TABLET EVERY 7 DAYS 12 tablet 3    acetaminophen (TYLENOL) 500 MG tablet Take 500 mg by mouth daily as needed for Pain       Vedolizumab (ENTYVIO IV) Infuse 300 mg intravenously See Admin Instructions LAST DOSE GIVEN ON 10-      blood glucose test strips (ASCENSIA AUTODISC VI;ONE TOUCH ULTRA TEST VI) strip 1 each by In Vitro route daily As needed. 100 each 3    ONE TOUCH LANCETS MISC 1 each by Does not apply route daily 100 each 3       This patient presents to the office today for a preoperative consultation at the request of surgeon, Dr. Elayne Burgos MD, who plans on performing RIGHT KNEE VIDEO ARTHROSCOPY LATERAL MENISCECTOMY on October 28 at [facility].  The current problem began several months ago, and symptoms have been worsening with time. Conservative therapy: N/A.     Planned anesthesia: General   Known anesthesia problems: None   Bleeding risk: No recent or remote history of abnormal bleeding  Personal or FH of DVT/PE: No    Patient objection to receiving blood products: No    Patient Active Problem List   Diagnosis    Diet-controlled type 2 diabetes mellitus ([address]    Hyperlipidemia    HTN (hypertension)    Ulcerative colitis ([address]    Venous insufficiency of both lower extremities    Hypothyroidism    Morbidly obese (HCC)    Reactive airway disease    Anxiety and depression    Normocytic anemia    Vitamin D deficiency    Baker's cyst in left popliteal fossa    Anemia    B12 deficiency    Enteropathic arthritis    Varicose vein of leg    Bilateral lower extremity edema    Chronic fatigue    Urinary tract infection without hematuria    Colitis    Primary osteoarthritis of left hip    Lumbar radiculitis    Lumbar stenosis with neurogenic claudication    Combined forms of age-related cataract of both eyes    Hypomagnesemia    Heart murmur    Folate deficiency    Uncomplicated asthma    Ulcerative (chronic) enterocolitis ([address]    Acute left ankle pain    Stage 3 chronic kidney disease (HCC)    Vasculitis (HCC)    Mild episode of recurrent major depressive disorder (HCC)    Chronic renal disease, stage III ([address] [012599]    Primary osteoarthritis of right knee    Right knee pain       Past Medical History:   Diagnosis Date    Acute pancreatitis 10/21/2014    Acute renal failure with tubular necrosis (HCC) 7/10/2015    Acute stasis dermatitis of left lower extremity 7/8/2015    Angina pectoris ([address] 5/23/2011    Anxiety     Cellulitis of left leg     Chronic back pain     Chronic kidney disease     Clostridium difficile infection 7/9/15, 6/4/15    Colitis     Depression     Diabetes mellitus ([address]     diet controlled    Hearing loss     Heart murmur 10/1/2019    Hyperlipidemia     Hypertension     Hypothyroidism     Left THR 1/25/2016    Liver disease     Lumbar radiculitis 10/1/2019    Obesity     Palpitations 7/17/2013    Primary osteoarthritis of left hip 11/18/2015    Restless legs syndrome     Shock ([address] 10/10/2017    Superficial thrombophlebitis of left leg 7/8/2015    Thyroid disease     Type 2 diabetes mellitus without complication ([address]     Ulcerative (chronic) enterocolitis ([address] 22/37/8539    Uncomplicated asthma 6/4/5870    Venous ulcer of left leg [facility])      Past Surgical History:   Procedure Laterality Date    APPENDECTOMY  1962    CARDIAC CATHETERIZATION  05/2014    normal    COLONOSCOPY      EPIDURAL STEROID INJECTION Left 01/22/2019    LEFT LUMBAR FOUR FIVE EPIDURAL STEROID INJECTION SITE CONFIRMED BY FLUOROSCOPY performed by José Elkins MD at [address] [place] [address] Left 01/19/2016    Dr Naima Edgar EXTRACTION Left 06/26/2019    PHACOEMULSIFICATION OF LEFT EYE CATARACT WITH INTRAOCULAR LENS IMPLANT performed by Jumana Soto MD at [place] [address] Right 07/24/2019    PHACOEMULSIFICATION OF CATARACT RIGHT EYE WITH INTRAOCULAR LENS IMPLANT performed by Arya Baron MD at [address]  [facility] ASPIR/BIOP ALTERED ANATOMY  10/15/2018    EGD ESOPHAGOGASTRODUODENOSCOPY ULTRASOUND 0403, 1583 performed by Davie Solorzano DO [facility] [address] [facility] EGD TRANSORAL BIOPSY SINGLE/MULTIPLE  10/15/2018    EGD BIOPSY performed by Davie Solorzano DO at [address] ENDOSCOPY  10/15/2018    EGD [facility] EUS    VARICOSE VEIN SURGERY Bilateral 1988     Family History   Problem Relation Age of Onset    Diabetes Mother     Alzheimer's Disease Mother     Anemia Mother     Diabetes Father     Cancer Father 68        pancreatic    Cancer Maternal Grandmother [de-identified]        Bone Marrow Ca     Social History     Socioeconomic History    Marital status:      Spouse name: Bib Lisa    Number of children: Not on file    Years of education: Not on file    Highest education level: Not on file   Occupational History    Not on file   Tobacco Use    Smoking status: Never    Smokeless tobacco: Never    Tobacco comments:     never even tried it   Vaping Use    Vaping Use: Never used   Substance and Sexual Activity    Alcohol use: No     Alcohol/week: 0.0 standard drinks     Comment: rarely    Drug use: No    Sexual activity: Not Currently   Other Topics Concern    Not on file   Social History Narrative    Not on file     Social Determinants of Health     Financial Resource Strain: Low Risk     Difficulty of Paying Living Expenses: Not hard at all   Food Insecurity: No Food Insecurity    Worried About Running Out of Food in the Last Year: Never true    Ran Out of Food in the Last Year: Never true   Transportation Needs: Not on file   Physical Activity: Sufficiently Active    Days of Exercise per Week: 5 days    Minutes of Exercise per Session: 150+ min   Stress: Not on file   Social Connections: Not on file   Intimate Partner Violence: Not At Risk    Fear of Current or Ex-Partner: No    Emotionally Abused:  No Physically Abused: No    Sexually Abused: No   Housing Stability: Not on file       Review of Systems  A comprehensive review of systems was negative except for what was noted in the HPI. Physical Exam   Constitutional: She is oriented to person, place, and time. She appears well-developed and well-nourished. No distress. HENT:   Head: Normocephalic and atraumatic. Mouth/Throat: Uvula is midline, oropharynx is clear and moist and mucous membranes are normal.   Eyes: Conjunctivae and EOM are normal. Pupils are equal, round, and reactive to light. Neck: Trachea normal and normal range of motion. Neck supple. No JVD present. Carotid bruit is not present. No mass and no thyromegaly present. Cardiovascular: Normal rate, regular rhythm, normal heart sounds and intact distal pulses. Exam reveals no gallop and no friction rub. No murmur heard. Pulmonary/Chest: Effort normal and breath sounds normal. No respiratory distress. She has no wheezes. She has no rales. Abdominal: Soft. Normal aorta and bowel sounds are normal. She exhibits no distension and no mass. There is no hepatosplenomegaly. No tenderness. Musculoskeletal: She exhibits no edema and no tenderness. Neurological: She is alert and oriented to person, place, and time. She has normal strength. No cranial nerve deficit or sensory deficit. Coordination and gait normal.   Skin: Skin is warm and dry. No rash noted. No erythema. Psychiatric: She has a normal mood and affect. Her behavior is normal.     EKG Interpretation:  normal sinus rhythm, unchanged from previous tracings.     Lab Review   Hospital Outpatient Visit on 09/14/2022   Component Date Value    WBC 09/14/2022 6.2     RBC 09/14/2022 4.07     Hemoglobin 09/14/2022 12.7     Hematocrit 09/14/2022 37.6     MCV 09/14/2022 92.4     MCH 09/14/2022 31.2     MCHC 09/14/2022 33.8     RDW 09/14/2022 14.5     Platelets 28/58/9017 155     MPV 09/14/2022 9.7     Sodium 09/14/2022 138     Potassium 09/14/2022 4.7     Chloride 09/14/2022 103     CO2 09/14/2022 26     Anion Gap 09/14/2022 9     Glucose 09/14/2022 161 (A)     BUN 09/14/2022 23 (A)     Creatinine 09/14/2022 1.0     GFR Non- 09/14/2022 55 (A)     GFR  09/14/2022 >60     Calcium 09/14/2022 9.3     Total Protein 09/14/2022 7.0     Albumin 09/14/2022 4.0     Albumin/Globulin Ratio 09/14/2022 1.3     Total Bilirubin 09/14/2022 0.6     Alkaline Phosphatase 09/14/2022 79     ALT 09/14/2022 22     AST 09/14/2022 38 (A)    Hospital Outpatient Visit on 07/20/2022   Component Date Value    Color, UA 07/20/2022 Yellow     Clarity, UA 07/20/2022 Clear     Glucose, Ur 07/20/2022 Negative     Bilirubin Urine 07/20/2022 Negative     Ketones, Urine 07/20/2022 Negative     Specific Gravity, UA 07/20/2022 1.020     Blood, Urine 07/20/2022 Negative     pH, UA 07/20/2022 8.0     Protein, UA 07/20/2022 Negative     Urobilinogen, Urine 07/20/2022 0.2     Nitrite, Urine 07/20/2022 Negative     Leukocyte Esterase, Urine 07/20/2022 Negative     Microscopic Examination 07/20/2022 Not Indicated     Urine Type 07/20/2022 [facility] Outpatient Visit on 07/20/2022   Component Date Value    WBC 07/20/2022 6.7     RBC 07/20/2022 4.15     Hemoglobin 07/20/2022 13.0     Hematocrit 07/20/2022 37.7     MCV 07/20/2022 90.8     MCH 07/20/2022 31.3     MCHC 07/20/2022 34.4     RDW 07/20/2022 14.4     Platelets 68/87/1675 162     MPV 07/20/2022 9.1     Sodium 07/20/2022 140     Potassium 07/20/2022 4.6     Chloride 07/20/2022 102     CO2 07/20/2022 29     Anion Gap 07/20/2022 9     Glucose 07/20/2022 107 (A)     BUN 07/20/2022 23 (A)     Creatinine 07/20/2022 0.9     GFR Non- 07/20/2022 >60     GFR  07/20/2022 >60     Calcium 07/20/2022 9.6     Total Protein 07/20/2022 6.8     Albumin 07/20/2022 4.1     Albumin/Globulin Ratio 07/20/2022 1.5     Total Bilirubin 07/20/2022 0.6     Alkaline Phosphatase 07/20/2022 81 ALT 07/20/2022 27     AST 07/20/2022 41 (A)    Hospital Outpatient Visit on 05/25/2022   Component Date Value    Vitamin B-12 05/25/2022 264     Folate 05/25/2022 >20.00     Hemoglobin A1C 05/25/2022 5.4     eAG 05/25/2022 108.3     Microalbumin, Random Uri* 05/25/2022 <1.20     Creatinine, Ur 05/25/2022 94.8     Microalbumin Creatinine * 05/25/2022 see below     Cholesterol, Total 05/25/2022 128     Triglycerides 05/25/2022 67     HDL 05/25/2022 45     LDL Calculated 05/25/2022 70     VLDL Cholesterol Calcula* 05/25/2022 13     Vit D, 25-Hydroxy 05/25/2022 65.2     TSH 05/25/2022 0.13 (A)    Hospital Outpatient Visit on 05/25/2022   Component Date Value    WBC 05/25/2022 6.5     RBC 05/25/2022 3.88 (A)     Hemoglobin 05/25/2022 12.1     Hematocrit 05/25/2022 35.9 (A)     MCV 05/25/2022 92.7     MCH 05/25/2022 31.3     MCHC 05/25/2022 33.7     RDW 05/25/2022 14.2     Platelets 05/56/9672 164     MPV 05/25/2022 9.1     Sodium 05/25/2022 140     Potassium 05/25/2022 4.6     Chloride 05/25/2022 98 (A)     CO2 05/25/2022 30     Anion Gap 05/25/2022 12     Glucose 05/25/2022 106 (A)     BUN 05/25/2022 21 (A)     Creatinine 05/25/2022 0.9     GFR Non- 05/25/2022 >60     GFR  05/25/2022 >60     Calcium 05/25/2022 9.6     Total Protein 05/25/2022 7.5     Albumin 05/25/2022 4.3     Albumin/Globulin Ratio 05/25/2022 1.3     Total Bilirubin 05/25/2022 0.8     Alkaline Phosphatase 05/25/2022 85     ALT 05/25/2022 26     AST 05/25/2022 37    Office Visit on 04/28/2022   Component Date Value    CRP 04/28/2022 21.5 (A)     JENS 04/28/2022 Negative     Sed Rate 04/28/2022 64 (A)     Rheumatoid Factor 04/28/2022 <10.0     Anti-dsDNA IgG 04/28/2022 <1     Anticardiolipin IgG 04/28/2022 <10     Cardiolipin Ab IgM 04/28/2022 10     Total Protein 04/28/2022 7.1     Albumin 04/28/2022 3.3     Alpha-1-Globulin 04/28/2022 0.4     Alpha-2-Globulin 04/28/2022 0.8     Beta Globulin 04/28/2022 1.3     Gamma Globulin 04/28/2022 1.4     SPE/MARY ELLEN Interpretation 04/28/2022 REVIEWED            Assessment:       78 y.o. patient with planned surgery as above. Known risk factors for perioperative complications: Asthma, Hypertension  Current medications which may produce withdrawal symptoms if withheld perioperatively: none     1. Preop examination    2. Mild episode of recurrent major depressive disorder (HCC)    3. Other ulcerative colitis without complication (HCC)    4. Stage 3 chronic kidney disease, unspecified whether stage 3a or 3b CKD ([facility]    5. Morbidly obese ([facility]    6. Primary hypertension    7. Uncomplicated asthma, unspecified asthma severity, unspecified whether persistent    8. B12 deficiency    9. Mixed hyperlipidemia    10. Folate deficiency    11. Vitamin D deficiency         Plan:     1. Preoperative workup as follows: ECG, CBC, CMP  2. Change in medication regimen before surgery: Take synthroid on morning of surgery with sip of water, and hold all other medications until after surgery, Discontinue celebrex 7 days before surgery  3. Prophylaxis for cardiac events with perioperative beta-blockers: Not indicated  ACC/AHA indications for pre-operative beta-blocker use:    Vascular surgery with history of postitive stress test  Intermediate or high risk surgery with history of CAD   Intermediate or high risk surgery with multiple clinical predictors of CAD- 2 of the following: history of compensated or prior heart failure, history of cerebrovascular disease, DM, or renal insufficiency    Routine administration of higher-dose, long-acting metoprolol in beta-blocker-naïve patients on the day of surgery, and in the absence of dose titration is associated with an overall increase in mortality. Beta-blockers should be started days to weeks prior to surgery and titrated to pulse < 70.  4. Deep vein thrombosis prophylaxis: regimen to be chosen by surgical team  5.  No contraindications to planned surgery      If you have questions, please do not hesitate to call me ([phone]). Sincerely,    Lacey Jimenez.  Velia Harper, CNP

## 2022-10-24 DIAGNOSIS — E87.5 SERUM POTASSIUM ELEVATED: Primary | ICD-10-CM

## 2022-10-27 ENCOUNTER — ANESTHESIA EVENT (OUTPATIENT)
Dept: OPERATING ROOM | Age: 67
End: 2022-10-27
Payer: MEDICARE

## 2022-10-28 ENCOUNTER — HOSPITAL ENCOUNTER (OUTPATIENT)
Age: 67
Setting detail: OUTPATIENT SURGERY
Discharge: HOME | End: 2022-10-28
Attending: ORTHOPAEDIC SURGERY | Admitting: ORTHOPAEDIC SURGERY
Payer: MEDICARE

## 2022-10-28 ENCOUNTER — ANESTHESIA (OUTPATIENT)
Dept: OPERATING ROOM | Age: 67
End: 2022-10-28
Payer: MEDICARE

## 2022-10-28 VITALS
BODY MASS INDEX: 36.65 KG/M2 | HEART RATE: 56 BPM | HEIGHT: 65 IN | TEMPERATURE: 98 F | WEIGHT: 220 LBS | SYSTOLIC BLOOD PRESSURE: 128 MMHG | RESPIRATION RATE: 14 BRPM | DIASTOLIC BLOOD PRESSURE: 59 MMHG | OXYGEN SATURATION: 95 %

## 2022-10-28 DIAGNOSIS — M23.300 DEGENERATIVE TEAR OF LATERAL MENISCUS OF RIGHT KNEE: ICD-10-CM

## 2022-10-28 DIAGNOSIS — M25.561 CHRONIC PAIN OF RIGHT KNEE: Primary | ICD-10-CM

## 2022-10-28 DIAGNOSIS — G89.29 CHRONIC PAIN OF RIGHT KNEE: Primary | ICD-10-CM

## 2022-10-28 PROBLEM — M23.203 DEGENERATIVE TEAR OF MEDIAL MENISCUS OF RIGHT KNEE: Status: ACTIVE | Noted: 2022-10-28

## 2022-10-28 LAB
GLUCOSE BLD-MCNC: 117 MG/DL (ref 70–99)
GLUCOSE BLD-MCNC: 99 MG/DL (ref 70–99)
PERFORMED ON: ABNORMAL
PERFORMED ON: NORMAL

## 2022-10-28 PROCEDURE — 6360000002 HC RX W HCPCS: Performed by: ORTHOPAEDIC SURGERY

## 2022-10-28 PROCEDURE — 2500000003 HC RX 250 WO HCPCS: Performed by: ORTHOPAEDIC SURGERY

## 2022-10-28 PROCEDURE — 2500000003 HC RX 250 WO HCPCS: Performed by: NURSE ANESTHETIST, CERTIFIED REGISTERED

## 2022-10-28 PROCEDURE — 2709999900 HC NON-CHARGEABLE SUPPLY: Performed by: ORTHOPAEDIC SURGERY

## 2022-10-28 PROCEDURE — 7100000000 HC PACU RECOVERY - FIRST 15 MIN: Performed by: ORTHOPAEDIC SURGERY

## 2022-10-28 PROCEDURE — 2580000003 HC RX 258: Performed by: ORTHOPAEDIC SURGERY

## 2022-10-28 PROCEDURE — 3700000000 HC ANESTHESIA ATTENDED CARE: Performed by: ORTHOPAEDIC SURGERY

## 2022-10-28 PROCEDURE — 7100000010 HC PHASE II RECOVERY - FIRST 15 MIN: Performed by: ORTHOPAEDIC SURGERY

## 2022-10-28 PROCEDURE — 2580000003 HC RX 258: Performed by: NURSE ANESTHETIST, CERTIFIED REGISTERED

## 2022-10-28 PROCEDURE — 3600000003 HC SURGERY LEVEL 3 BASE: Performed by: ORTHOPAEDIC SURGERY

## 2022-10-28 PROCEDURE — 7100000011 HC PHASE II RECOVERY - ADDTL 15 MIN: Performed by: ORTHOPAEDIC SURGERY

## 2022-10-28 PROCEDURE — 7100000001 HC PACU RECOVERY - ADDTL 15 MIN: Performed by: ORTHOPAEDIC SURGERY

## 2022-10-28 PROCEDURE — 3600000013 HC SURGERY LEVEL 3 ADDTL 15MIN: Performed by: ORTHOPAEDIC SURGERY

## 2022-10-28 PROCEDURE — 3700000001 HC ADD 15 MINUTES (ANESTHESIA): Performed by: ORTHOPAEDIC SURGERY

## 2022-10-28 PROCEDURE — 2500000003 HC RX 250 WO HCPCS: Performed by: ANESTHESIOLOGY

## 2022-10-28 PROCEDURE — 29880 ARTHRS KNE SRG MNISECTMY M&L: CPT | Performed by: ORTHOPAEDIC SURGERY

## 2022-10-28 PROCEDURE — 6360000002 HC RX W HCPCS: Performed by: NURSE ANESTHETIST, CERTIFIED REGISTERED

## 2022-10-28 PROCEDURE — 2580000003 HC RX 258: Performed by: ANESTHESIOLOGY

## 2022-10-28 RX ORDER — DIPHENHYDRAMINE HYDROCHLORIDE 50 MG/ML
12.5 INJECTION INTRAMUSCULAR; INTRAVENOUS
Status: DISCONTINUED | OUTPATIENT
Start: 2022-10-28 | End: 2022-10-28 | Stop reason: HOSPADM

## 2022-10-28 RX ORDER — LABETALOL HYDROCHLORIDE 5 MG/ML
INJECTION, SOLUTION INTRAVENOUS PRN
Status: DISCONTINUED | OUTPATIENT
Start: 2022-10-28 | End: 2022-10-28 | Stop reason: SDUPTHER

## 2022-10-28 RX ORDER — ONDANSETRON 2 MG/ML
4 INJECTION INTRAMUSCULAR; INTRAVENOUS
Status: DISCONTINUED | OUTPATIENT
Start: 2022-10-28 | End: 2022-10-28 | Stop reason: HOSPADM

## 2022-10-28 RX ORDER — HYDRALAZINE HYDROCHLORIDE 20 MG/ML
10 INJECTION INTRAMUSCULAR; INTRAVENOUS
Status: DISCONTINUED | OUTPATIENT
Start: 2022-10-28 | End: 2022-10-28 | Stop reason: HOSPADM

## 2022-10-28 RX ORDER — SODIUM CHLORIDE, SODIUM LACTATE, POTASSIUM CHLORIDE, CALCIUM CHLORIDE 600; 310; 30; 20 MG/100ML; MG/100ML; MG/100ML; MG/100ML
INJECTION, SOLUTION INTRAVENOUS CONTINUOUS
Status: DISCONTINUED | OUTPATIENT
Start: 2022-10-28 | End: 2022-10-28 | Stop reason: HOSPADM

## 2022-10-28 RX ORDER — SODIUM CHLORIDE 0.9 % (FLUSH) 0.9 %
5-40 SYRINGE (ML) INJECTION EVERY 12 HOURS SCHEDULED
Status: DISCONTINUED | OUTPATIENT
Start: 2022-10-28 | End: 2022-10-28 | Stop reason: HOSPADM

## 2022-10-28 RX ORDER — DEXAMETHASONE SODIUM PHOSPHATE 4 MG/ML
4 INJECTION, SOLUTION INTRA-ARTICULAR; INTRALESIONAL; INTRAMUSCULAR; INTRAVENOUS; SOFT TISSUE
Status: DISCONTINUED | OUTPATIENT
Start: 2022-10-28 | End: 2022-10-28 | Stop reason: HOSPADM

## 2022-10-28 RX ORDER — MIDAZOLAM HYDROCHLORIDE 1 MG/ML
2 INJECTION INTRAMUSCULAR; INTRAVENOUS
Status: DISCONTINUED | OUTPATIENT
Start: 2022-10-28 | End: 2022-10-28 | Stop reason: HOSPADM

## 2022-10-28 RX ORDER — ACETAMINOPHEN 325 MG/1
650 TABLET ORAL
Status: DISCONTINUED | OUTPATIENT
Start: 2022-10-28 | End: 2022-10-28 | Stop reason: HOSPADM

## 2022-10-28 RX ORDER — OXYCODONE HYDROCHLORIDE 5 MG/1
5 TABLET ORAL
Status: DISCONTINUED | OUTPATIENT
Start: 2022-10-28 | End: 2022-10-28 | Stop reason: HOSPADM

## 2022-10-28 RX ORDER — MEPERIDINE HYDROCHLORIDE 25 MG/ML
12.5 INJECTION INTRAMUSCULAR; INTRAVENOUS; SUBCUTANEOUS EVERY 5 MIN PRN
Status: DISCONTINUED | OUTPATIENT
Start: 2022-10-28 | End: 2022-10-28 | Stop reason: HOSPADM

## 2022-10-28 RX ORDER — FENTANYL CITRATE 50 UG/ML
INJECTION, SOLUTION INTRAMUSCULAR; INTRAVENOUS PRN
Status: DISCONTINUED | OUTPATIENT
Start: 2022-10-28 | End: 2022-10-28 | Stop reason: SDUPTHER

## 2022-10-28 RX ORDER — PROPOFOL 10 MG/ML
INJECTION, EMULSION INTRAVENOUS PRN
Status: DISCONTINUED | OUTPATIENT
Start: 2022-10-28 | End: 2022-10-28 | Stop reason: SDUPTHER

## 2022-10-28 RX ORDER — DEXAMETHASONE SODIUM PHOSPHATE 4 MG/ML
INJECTION, SOLUTION INTRA-ARTICULAR; INTRALESIONAL; INTRAMUSCULAR; INTRAVENOUS; SOFT TISSUE PRN
Status: DISCONTINUED | OUTPATIENT
Start: 2022-10-28 | End: 2022-10-28 | Stop reason: SDUPTHER

## 2022-10-28 RX ORDER — LIDOCAINE HYDROCHLORIDE 10 MG/ML
1 INJECTION, SOLUTION EPIDURAL; INFILTRATION; INTRACAUDAL; PERINEURAL
Status: COMPLETED | OUTPATIENT
Start: 2022-10-28 | End: 2022-10-28

## 2022-10-28 RX ORDER — SODIUM CHLORIDE 9 MG/ML
INJECTION, SOLUTION INTRAVENOUS PRN
Status: DISCONTINUED | OUTPATIENT
Start: 2022-10-28 | End: 2022-10-28 | Stop reason: HOSPADM

## 2022-10-28 RX ORDER — BUPIVACAINE HYDROCHLORIDE 2.5 MG/ML
INJECTION, SOLUTION EPIDURAL; INFILTRATION; INTRACAUDAL
Status: DISCONTINUED
Start: 2022-10-28 | End: 2022-10-28 | Stop reason: HOSPADM

## 2022-10-28 RX ORDER — BUPIVACAINE HYDROCHLORIDE 2.5 MG/ML
INJECTION, SOLUTION INFILTRATION; PERINEURAL PRN
Status: DISCONTINUED | OUTPATIENT
Start: 2022-10-28 | End: 2022-10-28 | Stop reason: ALTCHOICE

## 2022-10-28 RX ORDER — HYDROCODONE BITARTRATE AND ACETAMINOPHEN 5; 325 MG/1; MG/1
1 TABLET ORAL EVERY 6 HOURS PRN
Qty: 28 TABLET | Refills: 0 | Status: SHIPPED | OUTPATIENT
Start: 2022-10-28 | End: 2022-11-04

## 2022-10-28 RX ORDER — LIDOCAINE HYDROCHLORIDE 20 MG/ML
INJECTION, SOLUTION EPIDURAL; INFILTRATION; INTRACAUDAL; PERINEURAL PRN
Status: DISCONTINUED | OUTPATIENT
Start: 2022-10-28 | End: 2022-10-28 | Stop reason: SDUPTHER

## 2022-10-28 RX ORDER — SODIUM CHLORIDE, SODIUM LACTATE, POTASSIUM CHLORIDE, CALCIUM CHLORIDE 600; 310; 30; 20 MG/100ML; MG/100ML; MG/100ML; MG/100ML
INJECTION, SOLUTION INTRAVENOUS CONTINUOUS PRN
Status: DISCONTINUED | OUTPATIENT
Start: 2022-10-28 | End: 2022-10-28 | Stop reason: SDUPTHER

## 2022-10-28 RX ORDER — ONDANSETRON 2 MG/ML
INJECTION INTRAMUSCULAR; INTRAVENOUS PRN
Status: DISCONTINUED | OUTPATIENT
Start: 2022-10-28 | End: 2022-10-28 | Stop reason: SDUPTHER

## 2022-10-28 RX ORDER — IPRATROPIUM BROMIDE AND ALBUTEROL SULFATE 2.5; .5 MG/3ML; MG/3ML
1 SOLUTION RESPIRATORY (INHALATION)
Status: DISCONTINUED | OUTPATIENT
Start: 2022-10-28 | End: 2022-10-28 | Stop reason: HOSPADM

## 2022-10-28 RX ORDER — SODIUM CHLORIDE 0.9 % (FLUSH) 0.9 %
5-40 SYRINGE (ML) INJECTION PRN
Status: DISCONTINUED | OUTPATIENT
Start: 2022-10-28 | End: 2022-10-28 | Stop reason: HOSPADM

## 2022-10-28 RX ADMIN — FENTANYL CITRATE 25 MCG: 50 INJECTION INTRAMUSCULAR; INTRAVENOUS at 09:00

## 2022-10-28 RX ADMIN — FENTANYL CITRATE 50 MCG: 50 INJECTION INTRAMUSCULAR; INTRAVENOUS at 08:54

## 2022-10-28 RX ADMIN — ONDANSETRON HYDROCHLORIDE 4 MG: 2 INJECTION, SOLUTION INTRAMUSCULAR; INTRAVENOUS at 09:00

## 2022-10-28 RX ADMIN — SODIUM CHLORIDE, POTASSIUM CHLORIDE, SODIUM LACTATE AND CALCIUM CHLORIDE: 600; 310; 30; 20 INJECTION, SOLUTION INTRAVENOUS at 07:44

## 2022-10-28 RX ADMIN — PROPOFOL 180 MG: 10 INJECTION, EMULSION INTRAVENOUS at 08:55

## 2022-10-28 RX ADMIN — DEXAMETHASONE SODIUM PHOSPHATE 4 MG: 4 INJECTION, SOLUTION INTRAMUSCULAR; INTRAVENOUS at 09:00

## 2022-10-28 RX ADMIN — LIDOCAINE HYDROCHLORIDE 80 MG: 20 INJECTION, SOLUTION EPIDURAL; INFILTRATION; INTRACAUDAL; PERINEURAL at 08:55

## 2022-10-28 RX ADMIN — SODIUM CHLORIDE, POTASSIUM CHLORIDE, SODIUM LACTATE AND CALCIUM CHLORIDE: 600; 310; 30; 20 INJECTION, SOLUTION INTRAVENOUS at 08:49

## 2022-10-28 RX ADMIN — LABETALOL HYDROCHLORIDE 5 MG: 5 INJECTION, SOLUTION INTRAVENOUS at 09:07

## 2022-10-28 RX ADMIN — LIDOCAINE HYDROCHLORIDE 1 ML: 10 INJECTION, SOLUTION EPIDURAL; INFILTRATION; INTRACAUDAL; PERINEURAL at 07:43

## 2022-10-28 RX ADMIN — FENTANYL CITRATE 25 MCG: 50 INJECTION INTRAMUSCULAR; INTRAVENOUS at 09:04

## 2022-10-28 RX ADMIN — CEFAZOLIN 2000 MG: 2 INJECTION, POWDER, FOR SOLUTION INTRAMUSCULAR; INTRAVENOUS at 08:58

## 2022-10-28 ASSESSMENT — PAIN SCALES - GENERAL
PAINLEVEL_OUTOF10: 1
PAINLEVEL_OUTOF10: 1

## 2022-10-28 ASSESSMENT — PAIN - FUNCTIONAL ASSESSMENT: PAIN_FUNCTIONAL_ASSESSMENT: 0-10

## 2022-10-28 ASSESSMENT — PAIN DESCRIPTION - ORIENTATION: ORIENTATION: RIGHT

## 2022-10-28 ASSESSMENT — PAIN DESCRIPTION - LOCATION: LOCATION: KNEE

## 2022-10-28 NOTE — H&P
Update History & Physical    The patient's History and Physical of  was reviewed with the patient and I examined the patient. There was no change. The surgical site was confirmed by the patient and me. Plan: The risks, benefits, expected outcome, and alternative to the recommended procedure have been discussed with the patient. Patient understands and wants to proceed with the procedure.      Electronically signed by Candice Francois MD on 10/28/2022 at 7:08 AM

## 2022-10-28 NOTE — PROGRESS NOTES
Dressed and ready for discharge, to bathroom per wheelchair, then to car, transfers to car without difficulty.

## 2022-10-28 NOTE — ANESTHESIA POSTPROCEDURE EVALUATION
Department of Anesthesiology  Postprocedure Note    Patient: Dakota Quiroga  MRN: [id number]  [place]: 1955  Date of evaluation: 10/28/2022      Procedure Summary     Date: 10/28/22 Room / Location: [facility] EG OR 04 / [place]    Anesthesia Start: 2364 Anesthesia Stop: 0926    Procedure: RIGHT KNEE VIDEO ARTHROSCOPY LATERAL MENISCECTOMY (Right: Knee) Diagnosis:       Degenerative tear of lateral meniscus of right knee      (Degenerative tear of lateral meniscus of right knee [M23.300])    Surgeons: Romulo Purcell MD Responsible Provider: Hardik Maldonado MD    Anesthesia Type: general ASA Status: 3          Anesthesia Type: No value filed.     Rohit Phase I: Rohit Score: 10    Rohit Phase II: Rohit Score: 10      Anesthesia Post Evaluation    Patient location during evaluation: PACU  Patient participation: complete - patient participated  Level of consciousness: awake  Pain score: 0  Airway patency: patent  Nausea & Vomiting: no nausea  Complications: no  Cardiovascular status: blood pressure returned to baseline  Respiratory status: acceptable  Hydration status: euvolemic

## 2022-10-28 NOTE — ANESTHESIA PRE PROCEDURE
Department of Anesthesiology  Preprocedure Note       Name:  David Hightower   Age:  78 y.o.  :  1955                                          MRN:  [id number]         Date:  10/28/2022      Surgeon: Millie He):  Marta Dowd MD    Procedure: Procedure(s):  RIGHT KNEE VIDEO ARTHROSCOPY LATERAL MENISCECTOMY    Medications prior to admission:   Prior to Admission medications    Medication Sig Start Date End Date Taking? Authorizing Provider   HYDROcodone-acetaminophen (NORCO) 5-325 MG per tablet Take 1 tablet by mouth every 6 hours as needed for Pain for up to 7 days. Intended supply: 7 days. Take lowest dose possible to manage pain 10/28/22 11/4/22 Yes Marta Dowd MD   celecoxib (CELEBREX) 100 MG capsule TAKE 2 CAPSULES EVERY DAY 10/20/22   YAHAIRA Mcconnell CNP   simvastatin (ZOCOR) 20 MG tablet TAKE 1 TABLET EVERY NIGHT 10/20/22   YAHAIRA Mcconnell CNP   levothyroxine (SYNTHROID) 125 MCG tablet TAKE 1 TABLET BY MOUTH EVERY DAY 10/11/22   [facility], APRN - CNP   nystatin (MYCOSTATIN) 107667 UNIT/GM cream Apply topically 2 times daily. 10/5/22   YAHAIRA Mcconnell CNP   GNP MAGNESIUM OXIDE 250 MG TABS tablet TAKE 2 TABLETS THREE TIMES DAILY 22   YAHAIRA Mcconnell CNP   vitamin D ([employer] VITAMIN D3) 250 MCG (14528 UT) CAPS capsule TAKE 1 CAPSULE BY MOUTH ONCE WEEKLY 22   Radha Fong MD   folic acid (FOLVITE) 1 MG tablet TAKE 1 TABLET EVERY DAY 22   Radha Fong MD   gabapentin (NEURONTIN) 100 MG capsule Take 2 capsules by mouth nightly for 30 days.  I PO TID 22  Radha Fong MD   DULoxetine (CYMBALTA) 30 MG extended release capsule TAKE 1 CAPSULE EVERY DAY 22   YAHAIRA Mcconnell CNP   alendronate (FOSAMAX) 70 MG tablet TAKE 1 TABLET EVERY 7 DAYS 2/3/22   YAHAIRA Mcconnell CNP   triamcinolone (KENALOG) 0.1 % cream APPLY TO AFFECTED AREA TWICE A DAY  Patient not taking: Reported on 10/20/2022 11/5/20   YAHAIRA Arenas CNP   acetaminophen (TYLENOL) 500 MG tablet Take 500 mg by mouth daily as needed for Pain     Historical Provider, MD   Vedolizumab (ENTYVIO IV) Infuse 300 mg intravenously See Admin Instructions LAST DOSE GIVEN ON 10-    Historical Provider, MD   blood glucose test strips (ASCENSIA AUTODISC VI;ONE TOUCH ULTRA TEST VI) strip 1 each by In Vitro route daily As needed. 11/29/18   YAHAIRA Arenas CNP   ONE TOUCH LANCETS MISC 1 each by Does not apply route daily 8/13/18   YAHAIRA Broussard CNP       Current medications:    Current Facility-Administered Medications   Medication Dose Route Frequency Provider Last Rate Last Admin    ceFAZolin (ANCEF) 2,000 mg in sodium chloride 0.9 % 50 mL IVPB (mini-bag)  2,000 mg IntraVENous Once Sneha Vasquez MD        lidocaine PF 1 % injection 1 mL  1 mL IntraDERmal Once PRN Jeanette Varghese MD        lactated ringers infusion   IntraVENous Continuous Jeanette Varghese MD        sodium chloride flush 0.9 % injection 5-40 mL  5-40 mL IntraVENous 2 times per day Jeanette Varghese MD        sodium chloride flush 0.9 % injection 5-40 mL  5-40 mL IntraVENous PRN Jeanette Varghese MD        0.9 % sodium chloride infusion   IntraVENous PRN Jeanette Varghese MD           Allergies: Allergies   Allergen Reactions    Amoxicillin      \"colitis\"    Pravastatin Rash    Wellbutrin [Bupropion] Other (See Comments) and Rash     Caused sores in mouth       Problem List:    Patient Active Problem List   Diagnosis Code    Diet-controlled type 2 diabetes mellitus ([facility] E11.9    Hyperlipidemia E78.5    HTN (hypertension) I10    Ulcerative colitis ([facility] K51.90    Venous insufficiency of both lower extremities I87.2    Hypothyroidism E03.9    Morbidly obese ([facility] E66.01    Reactive airway disease J45.909    Anxiety and depression F41.9, F32. A    Normocytic anemia D64.9    Vitamin D deficiency E55.9    Baker's cyst in left popliteal fossa M71.20    Anemia D64.9    B12 deficiency E53.8    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    Varicose vein of leg I83.90    Bilateral lower extremity edema R60.0    Chronic fatigue R53.82    Urinary tract infection without hematuria N39.0    Colitis K52.9    Primary osteoarthritis of left hip M16.12    Lumbar radiculitis M54.16    Lumbar stenosis with neurogenic claudication M48.062    Combined forms of age-related cataract of both eyes H25.813    Hypomagnesemia E83.42    Heart murmur R01.1    Folate deficiency N89.0    Uncomplicated asthma J66.697    Ulcerative (chronic) enterocolitis (HCC) K51.00    Acute left ankle pain M25.572    Stage 3 chronic kidney disease (HCC) N18.30    Vasculitis (HCC) I77.6    Mild episode of recurrent major depressive disorder (HCC) F33.0    Chronic renal disease, stage III (HCC) [377370] N18.30    Degenerative tear of lateral meniscus of right knee M23.300    Right knee pain M25.561       Past Medical History:        Diagnosis Date    Acute pancreatitis 10/21/2014    Acute renal failure with tubular necrosis (HCC) 7/10/2015    Acute stasis dermatitis of left lower extremity 7/8/2015    Angina pectoris ([address] 5/23/2011    Anxiety     Cellulitis of left leg     Chronic back pain     Chronic kidney disease     Clostridium difficile infection 7/9/15, 6/4/15    Colitis     Depression     Diabetes mellitus (HCC)     diet controlled    Hearing loss     Heart murmur 10/1/2019    Hyperlipidemia     Hypertension     Hypothyroidism     Left THR 1/25/2016    Liver disease     Lumbar radiculitis 10/1/2019    Obesity     Palpitations 7/17/2013    Primary osteoarthritis of left hip 11/18/2015    Restless legs syndrome     Shock ([address] 10/10/2017    Superficial thrombophlebitis of left leg 7/8/2015    Thyroid disease     Type 2 diabetes mellitus without complication (HCC)     Ulcerative (chronic) enterocolitis ([facility] 36/92/1610    Uncomplicated asthma 4/9/4044    Venous ulcer of left leg [facility])        Past Surgical History:        Procedure Laterality Date    APPENDECTOMY  1962    CARDIAC CATHETERIZATION  05/2014    normal    COLONOSCOPY      EPIDURAL STEROID INJECTION Left 01/22/2019    LEFT LUMBAR FOUR FIVE EPIDURAL STEROID INJECTION SITE CONFIRMED BY FLUOROSCOPY performed by Gigi Carcamo MD at [address] [facility] [place] Left 01/19/2016    Dr Fernandes Pap EXTRACTION Left 06/26/2019    PHACOEMULSIFICATION OF LEFT EYE CATARACT WITH INTRAOCULAR LENS IMPLANT performed by Lizbet Castellon MD at [facility] Right 07/24/2019    PHACOEMULSIFICATION OF CATARACT RIGHT EYE WITH INTRAOCULAR LENS IMPLANT performed by Lizbet Castellon MD at [place] [address] EGD [address] [facility] ASPIR/BIOP ALTERED ANATOMY  10/15/2018    EGD ESOPHAGOGASTRODUODENOSCOPY ULTRASOUND 0403, 1583 performed by Zena Perez DO at [address] EGD TRANSORAL BIOPSY SINGLE/MULTIPLE  10/15/2018    EGD BIOPSY performed by Zena Perez DO at [address] ENDOSCOPY  10/15/2018    EGD [facility] EUS    VARICOSE VEIN SURGERY Bilateral 1988       Social History:    Social History     Tobacco Use    Smoking status: Never    Smokeless tobacco: Never    Tobacco comments:     never even tried it   Substance Use Topics    Alcohol use: No     Alcohol/week: 0.0 standard drinks     Comment: rarely                                Counseling given: Not Answered  Tobacco comments: never even tried it      Vital Signs (Current):   Vitals:    10/28/22 0712   BP: (!) 143/81   Pulse: 74   Resp: 18   Temp: 98.2 °F (36.8 °C)   TempSrc: Temporal   SpO2: 96%   Weight: 220 lb (99.8 kg)   Height: 5' 5\" (1.651 m)                                              BP Readings from Last 3 Encounters:   10/28/22 (!) 143/81   10/20/22 136/88   10/12/22 (!) 164/77       NPO Status:                                                                                 BMI:   Wt Readings from Last 3 Encounters:   10/28/22 220 lb (99.8 kg)   10/20/22 223 lb (101.2 kg)   10/12/22 217 lb (98.4 kg)     Body mass index is 36.61 kg/m². CBC:   Lab Results   Component Value Date/Time    WBC 7.6 10/20/2022 11:28 AM    RBC 3.96 10/20/2022 11:28 AM    RBC 4.15 06/17/2016 03:36 PM    HGB 12.6 10/20/2022 11:28 AM    HCT 36.6 10/20/2022 11:28 AM    MCV 92.3 10/20/2022 11:28 AM    RDW 14.0 10/20/2022 11:28 AM     10/20/2022 11:28 AM       CMP:   Lab Results   Component Value Date/Time     10/20/2022 11:28 AM    K 5.6 10/20/2022 11:28 AM    K 4.3 07/10/2021 01:28 PM     10/20/2022 11:28 AM    CO2 28 10/20/2022 11:28 AM    BUN 26 10/20/2022 11:28 AM    CREATININE 1.1 10/20/2022 11:28 AM    GFRAA >60 09/14/2022 09:10 AM    AGRATIO 1.5 10/20/2022 11:28 AM    LABGLOM 55 10/20/2022 11:28 AM    GLUCOSE 90 10/20/2022 11:28 AM    PROT 7.1 10/20/2022 11:28 AM    CALCIUM 9.7 10/20/2022 11:28 AM    BILITOT 0.6 10/20/2022 11:28 AM    ALKPHOS 91 10/20/2022 11:28 AM    AST 36 10/20/2022 11:28 AM    ALT 21 10/20/2022 11:28 AM       POC Tests: No results for input(s): POCGLU, POCNA, POCK, POCCL, POCBUN, POCHEMO, POCHCT in the last 72 hours.     Coags:   Lab Results   Component Value Date/Time    PROTIME 14.0 10/01/2018 06:24 PM    INR 1.23 10/01/2018 06:24 PM    APTT 27.5 10/09/2017 07:44 AM       HCG (If Applicable): No results found for: PREGTESTUR, PREGSERUM, HCG, HCGQUANT     ABGs: No results found for: PHART, PO2ART, YJG4PIC, LKM6XHX, BEART, C8CNIJZP     Type & Screen (If Applicable):  No results found for: LABABO, LABRH    Drug/Infectious Status (If Applicable):  No results found for: HIV, HEPCAB    COVID-19 Screening (If Applicable): No results found for: COVID19        Anesthesia Evaluation  Patient summary reviewed and Nursing notes reviewed  Airway: Mallampati: II          Dental: normal exam         Pulmonary:normal exam    (+) asthma:                            Cardiovascular:    (+) hypertension:, angina:,       ECG reviewed      Echocardiogram reviewed                  Neuro/Psych:   (+) neuromuscular disease:, psychiatric history:            GI/Hepatic/Renal:   (+) PUD, liver disease:,           Endo/Other:    (+) DiabetesType II DM, , hypothyroidism::., .                 Abdominal:             Vascular: Other Findings:           Anesthesia Plan      general     ASA 3       Induction: intravenous. MIPS: Postoperative opioids intended. Plan discussed with CRNA.     Attending anesthesiologist reviewed and agrees with Preprocedure content                MADYSON Spear MD   10/28/2022

## 2022-10-28 NOTE — BRIEF OP NOTE
Brief Postoperative Note      Patient: Jen Avalos  YOB: 1955  MRN: [id number]    Date of Procedure: 10/28/2022    Pre-Op Diagnosis: Degenerative tear of lateral meniscus of right knee [M23.300]    Post-Op Diagnosis: Same with degenerative medial meniscus tear       Procedure(s):  RIGHT KNEE VIDEO ARTHROSCOPY LATERAL MENISCECTOMY and lateral menisectomy    Surgeon(s):  Victorina Chandler MD    Assistant:  * No surgical staff found *    Anesthesia: General    Estimated Blood Loss (mL): Minimal    Complications: None    Specimens:   * No specimens in log *    Implants:  * No implants in log *      Drains: * No LDAs found *    Findings: karel    Electronically signed by Victorina Chandler MD on 10/28/2022 at 9:11 AM

## 2022-10-28 NOTE — PROGRESS NOTES
Patient is able to demonstrate the ability to move from a reclining position to an upright position within the recliner. FBS 99.

## 2022-10-28 NOTE — DISCHARGE INSTRUCTIONS
Follow up with Dr. Angelica Smalls per arrangements. Weight bearing 50% x 2 days with crutches, then as tolerated. Remove dressing in 2-3 days, then may shower. Ice and elevate knee as tolerated. ANESTHESIA DISCHARGE INSTRUCTIONS    You are under the influence of drugs- do not drink alcohol, drive a car, operate machinery(such as power tools, kitchen appliances, etc), sign legal documents, or make any important decisions for 24 hours (or while on pain medications). Children should not ride bikes or T[place] or play on gym sets  for 24 hours after surgery. A responsible adult should be with you for 24 hours. Rest at home today- increase activity as tolerated. Progress slowly to a regular diet unless your physician has instructed you otherwise. Drink plenty of water. CALL YOUR DOCTOR IF YOU:  Have moderate to severe nausea or vomiting AND are unable to hold down fluids or prescribed medications. Have bright red bloody drainage from your dressing that won't stop oozing. Do not get relief with your pain medication    NORMAL (POSSIBLE) SIDE EFFECTS FROM ANESTHESIA:     Confusion, temporary memory loss, delayed reaction times in the first 24 hours  Lightheadedness, dizziness, difficulty focusing, blurred vision  Nausea/vomiting can happen  Shivering, feeling cold, sore throat, cough and muscle aches should stop within 24-48 hours  Trouble urinating - call your surgeon if it has been more than 8 hrs  Bruising or soreness at the IV site - call if it remains red, firm or there is drainage             FEMALES OF CHILDBEARING AGE WHO ARE TAKING BIRTH CONTROL PILLS:  You may have received a medication during your procedure that interferes with the   actions of birth control pills (Bridion or Emend). Use some other kind of birth control in addition to your pills, like a condom, for 1 month after your procedure to prevent unwanted pregnancy.     The following instructions are to be followed if you have a known history or diagnosis of sleep apnea: For all sleep apnea patients:  ? Sleep on your side or sitting up in a chair whenever possible, especially the first 24 hours after surgery. ? Use only medicines prescribed by your doctor. ? Do not drink alcohol. ? If you have a dental device to assist you while at rest, use it at all times for the first 24 hours. For patients using CPAP machines:  ? Use your CPAP machine during all periods of sleep as usual.  ? Use your CPAP machine during all periods of daytime rest while on pain medicines. ** Follow up with your primary care doctor for continued care. IF YOU DO NOT TAKE ALL OF YOUR NARCOTIC PAIN MEDICATION, please dispose of them responsibly. There are drop off boxes in the Emergency Departments 24/7 at both [facility] and [place]. If these locations are not convenient, other options for discarding them can be found at:  [email or web address]    Hospital or office staff may NOT accept any medications to drop off in the cabinet for you.

## 2022-10-28 NOTE — OP NOTE
[address]                 [address] [facility], [place], operated by [facility], [address]                                OPERATIVE REPORT    PATIENT NAME: Yvonne Lynne                   :        1955  MED REC NO:   [id number]                          ROOM:  ACCOUNT NO:   [de-identified]                           ADMIT DATE: 10/28/2022  PROVIDER:     Kyle Elias MD    DATE OF PROCEDURE:  10/28/2022    PREOPERATIVE DIAGNOSIS:  Right knee lateral meniscus tear. POSTOPERATIVE DIAGNOSIS:  Right knee medial and lateral meniscus tear,  degenerative. OPERATION PERFORMED:  Right knee video arthroscopy with partial medial  and lateral meniscectomies. SURGEON:  Kyle Elias MD    ANESTHESIA:  General.    BLOOD LOSS:  Less than 5 mL. COMPLICATIONS:  None noted. INDICATIONS FOR OPERATION:  This 70-year-old female who presented with a  history and exam consistent with a meniscal pathology. An MRI showed a  lateral meniscus tear and questionable medial meniscus tear. After  failure of all other modalities, she elected for further recommendations  of the surgical intervention. DESCRIPTION OF OPERATION:  The patient was taken to the operating room  where a general anesthetic was obtained and antibiotics were given in IV  piggyback preoperatively. Leg was placed in a leg ramsey, sterilely  prepped and draped. A two-port arthroscopy of the right knee was  carried out in the usual fashion using a 30-degree arthroscope. Upon  entry into the knee, she was noted to have complex tear through the  posterior half of the medial meniscus and degenerative tear through the  anterior third of the lateral meniscus. Partial medial and lateral  meniscectomies were performed to satisfaction with both rigid and power  instrumentation. The ACL and PCL were attenuated, but present and  intact. Patellofemoral joint tracked well with minimal degenerative  change.   She did have some full-thickness arthritic change in medial  compartment and medial tibial plateau and femoral condyle just at the  midbody of the plateau on the peripheral rim of about 1 x 1.2 cm. The  knee was then thoroughly irrigated and evacuated of all loose debris and  instilled with 20 mL of 0.25% Marcaine plain. The port was repaired  with 4-0 nylon in a figure-of-eight fashion. The knee was dressed with  Steri-Strips, dressings, sponge, ABDs, Webril, and Ace wrap. She  appeared to tolerate the procedure well and was taken to the recovery  room when stable. Less than 5 mL of blood loss, and no noted  complications.         Jenniffer Baron MD    D: 10/28/2022 9:27:46       T: 10/28/2022 10:47:25     [email or web address]  Job#: [id number]     Doc#: [id number]    CC:

## 2022-10-28 NOTE — PROGRESS NOTES
Report received from [address] [place] [address]. Pt awake and oriented, states pain at tolerable level of 1/10. VSS. Drinking water.

## 2022-11-02 ENCOUNTER — NURSE ONLY (OUTPATIENT)
Dept: FAMILY MEDICINE CLINIC | Age: 67
End: 2022-11-02

## 2022-11-02 DIAGNOSIS — E87.5 SERUM POTASSIUM ELEVATED: ICD-10-CM

## 2022-11-02 LAB — POTASSIUM SERPL-SCNC: 4.4 MMOL/L (ref 3.5–5.1)

## 2022-11-03 ENCOUNTER — OFFICE VISIT (OUTPATIENT)
Dept: ORTHOPEDIC SURGERY | Age: 67
End: 2022-11-03

## 2022-11-03 VITALS — HEIGHT: 65 IN | WEIGHT: 220 LBS | BODY MASS INDEX: 36.65 KG/M2

## 2022-11-03 DIAGNOSIS — M23.300 DEGENERATIVE TEAR OF LATERAL MENISCUS OF RIGHT KNEE: Primary | ICD-10-CM

## 2022-11-03 DIAGNOSIS — M25.561 RIGHT KNEE PAIN, UNSPECIFIED CHRONICITY: ICD-10-CM

## 2022-11-03 DIAGNOSIS — M17.11 PRIMARY OSTEOARTHRITIS OF RIGHT KNEE: ICD-10-CM

## 2022-11-03 PROCEDURE — 99024 POSTOP FOLLOW-UP VISIT: CPT | Performed by: ORTHOPAEDIC SURGERY

## 2022-11-03 NOTE — PROGRESS NOTES
She returns today for evaluation status post first postop check right knee but arthroscopy with partial meniscectomy. At this time she doing fairly well walk without amatory aids but I do not feel she is ready to return to work. At this time the recommendation take another week and a half follow-up and return to work on 11/14/2022. Wound is healing well no signs of infection or DVT. I will remove sutures give her home rehab program and have her return here in 4 weeks.

## 2022-11-03 NOTE — LETTER
[address] [address]  Phone: [phone]  Fax: [phone]    Gerldine Nageotte, MD        November 3, 2022     Patient: Len Cisneros   YOB: 1955   Date of Visit: 11/3/2022       To Whom It May Concern: It is my medical opinion that Michael Pablo may return to work on 11/14/2022. If you have any questions or concerns, please don't hesitate to call.     Sincerely,        Gerldine Nageotte, MD

## 2022-11-09 ENCOUNTER — OFFICE VISIT (OUTPATIENT)
Dept: FAMILY MEDICINE CLINIC | Age: 67
End: 2022-11-09
Payer: MEDICARE

## 2022-11-09 ENCOUNTER — HOSPITAL ENCOUNTER (OUTPATIENT)
Dept: NURSING | Age: 67
Setting detail: INFUSION SERIES
Discharge: HOME | End: 2022-11-09
Payer: MEDICARE

## 2022-11-09 VITALS
WEIGHT: 220 LBS | HEIGHT: 65 IN | TEMPERATURE: 97.6 F | SYSTOLIC BLOOD PRESSURE: 133 MMHG | DIASTOLIC BLOOD PRESSURE: 73 MMHG | HEART RATE: 86 BPM | RESPIRATION RATE: 16 BRPM | BODY MASS INDEX: 36.65 KG/M2

## 2022-11-09 VITALS
HEIGHT: 65 IN | BODY MASS INDEX: 37.49 KG/M2 | OXYGEN SATURATION: 97 % | DIASTOLIC BLOOD PRESSURE: 74 MMHG | HEART RATE: 103 BPM | SYSTOLIC BLOOD PRESSURE: 130 MMHG | WEIGHT: 225 LBS

## 2022-11-09 DIAGNOSIS — N90.4 LEUKOPLAKIA, VULVA: ICD-10-CM

## 2022-11-09 DIAGNOSIS — N76.6: Primary | ICD-10-CM

## 2022-11-09 DIAGNOSIS — R39.9 UTI SYMPTOMS: ICD-10-CM

## 2022-11-09 DIAGNOSIS — K51.00 CHRONIC ULCERATIVE ENTEROCOLITIS WITHOUT COMPLICATION (HCC): Primary | ICD-10-CM

## 2022-11-09 DIAGNOSIS — N90.89 VULVAL LESION: ICD-10-CM

## 2022-11-09 LAB
A/G RATIO: 1.3 (ref 1.1–2.2)
ALBUMIN SERPL-MCNC: 4.1 G/DL (ref 3.4–5)
ALP BLD-CCNC: 86 U/L (ref 40–129)
ALT SERPL-CCNC: 24 U/L (ref 10–40)
ANION GAP SERPL CALCULATED.3IONS-SCNC: 6 MMOL/L (ref 3–16)
AST SERPL-CCNC: 40 U/L (ref 15–37)
BILIRUB SERPL-MCNC: 0.6 MG/DL (ref 0–1)
BILIRUBIN, POC: NORMAL
BLOOD URINE, POC: NORMAL
BUN BLDV-MCNC: 18 MG/DL (ref 7–20)
CALCIUM SERPL-MCNC: 9.9 MG/DL (ref 8.3–10.6)
CHLORIDE BLD-SCNC: 102 MMOL/L (ref 99–110)
CLARITY, POC: CLEAR
CO2: 30 MMOL/L (ref 21–32)
COLOR, POC: YELLOW
CREAT SERPL-MCNC: 0.9 MG/DL (ref 0.6–1.2)
GFR SERPL CREATININE-BSD FRML MDRD: >60 ML/MIN/{1.73_M2}
GLUCOSE BLD-MCNC: 133 MG/DL (ref 70–99)
GLUCOSE URINE, POC: NORMAL
HCT VFR BLD CALC: 37.2 % (ref 36–48)
HEMOGLOBIN: 12.7 G/DL (ref 12–16)
KETONES, POC: NORMAL
LEUKOCYTE EST, POC: NORMAL
MCH RBC QN AUTO: 31.7 PG (ref 26–34)
MCHC RBC AUTO-ENTMCNC: 34.1 G/DL (ref 31–36)
MCV RBC AUTO: 93 FL (ref 80–100)
NITRITE, POC: NORMAL
PDW BLD-RTO: 13.6 % (ref 12.4–15.4)
PH, POC: 7
PLATELET # BLD: 163 K/UL (ref 135–450)
PMV BLD AUTO: 8.7 FL (ref 5–10.5)
POTASSIUM SERPL-SCNC: 4.5 MMOL/L (ref 3.5–5.1)
PROTEIN, POC: NORMAL
RBC # BLD: 4 M/UL (ref 4–5.2)
SODIUM BLD-SCNC: 138 MMOL/L (ref 136–145)
SPECIFIC GRAVITY, POC: 1.02
TOTAL PROTEIN: 7.2 G/DL (ref 6.4–8.2)
UROBILINOGEN, POC: 0.2
WBC # BLD: 6.1 K/UL (ref 4–11)

## 2022-11-09 PROCEDURE — G8399 PT W/DXA RESULTS DOCUMENT: HCPCS | Performed by: NURSE PRACTITIONER

## 2022-11-09 PROCEDURE — 81002 URINALYSIS NONAUTO W/O SCOPE: CPT | Performed by: NURSE PRACTITIONER

## 2022-11-09 PROCEDURE — 2580000003 HC RX 258: Performed by: INTERNAL MEDICINE

## 2022-11-09 PROCEDURE — 99213 OFFICE O/P EST LOW 20 MIN: CPT | Performed by: NURSE PRACTITIONER

## 2022-11-09 PROCEDURE — 1036F TOBACCO NON-USER: CPT | Performed by: NURSE PRACTITIONER

## 2022-11-09 PROCEDURE — 1123F ACP DISCUSS/DSCN MKR DOCD: CPT | Performed by: NURSE PRACTITIONER

## 2022-11-09 PROCEDURE — 6360000002 HC RX W HCPCS: Performed by: INTERNAL MEDICINE

## 2022-11-09 PROCEDURE — 3078F DIAST BP <80 MM HG: CPT | Performed by: NURSE PRACTITIONER

## 2022-11-09 PROCEDURE — 3017F COLORECTAL CA SCREEN DOC REV: CPT | Performed by: NURSE PRACTITIONER

## 2022-11-09 PROCEDURE — 99211 OFF/OP EST MAY X REQ PHY/QHP: CPT

## 2022-11-09 PROCEDURE — 3074F SYST BP LT 130 MM HG: CPT | Performed by: NURSE PRACTITIONER

## 2022-11-09 PROCEDURE — G8427 DOCREV CUR MEDS BY ELIG CLIN: HCPCS | Performed by: NURSE PRACTITIONER

## 2022-11-09 PROCEDURE — 85027 COMPLETE CBC AUTOMATED: CPT

## 2022-11-09 PROCEDURE — G8417 CALC BMI ABV UP PARAM F/U: HCPCS | Performed by: NURSE PRACTITIONER

## 2022-11-09 PROCEDURE — G8484 FLU IMMUNIZE NO ADMIN: HCPCS | Performed by: NURSE PRACTITIONER

## 2022-11-09 PROCEDURE — 80053 COMPREHEN METABOLIC PANEL: CPT

## 2022-11-09 PROCEDURE — 96365 THER/PROPH/DIAG IV INF INIT: CPT

## 2022-11-09 PROCEDURE — 1090F PRES/ABSN URINE INCON ASSESS: CPT | Performed by: NURSE PRACTITIONER

## 2022-11-09 RX ORDER — SODIUM CHLORIDE 9 MG/ML
INJECTION, SOLUTION INTRAVENOUS CONTINUOUS
Status: ACTIVE | OUTPATIENT
Start: 2022-11-09 | End: 2022-11-09

## 2022-11-09 RX ORDER — SODIUM CHLORIDE 9 MG/ML
INJECTION, SOLUTION INTRAVENOUS CONTINUOUS
OUTPATIENT
Start: 2022-12-07

## 2022-11-09 RX ORDER — SODIUM CHLORIDE 0.9 % (FLUSH) 0.9 %
10 SYRINGE (ML) INJECTION PRN
Status: DISCONTINUED | OUTPATIENT
Start: 2022-11-09 | End: 2022-11-10 | Stop reason: HOSPADM

## 2022-11-09 RX ORDER — SODIUM CHLORIDE 0.9 % (FLUSH) 0.9 %
10 SYRINGE (ML) INJECTION PRN
OUTPATIENT
Start: 2022-12-07

## 2022-11-09 RX ADMIN — VEDOLIZUMAB 300 MG: 300 INJECTION, POWDER, LYOPHILIZED, FOR SOLUTION INTRAVENOUS at 09:21

## 2022-11-09 RX ADMIN — SODIUM CHLORIDE: 9 INJECTION, SOLUTION INTRAVENOUS at 09:19

## 2022-11-09 RX ADMIN — Medication 10 ML: at 09:18

## 2022-11-09 ASSESSMENT — PAIN SCALES - GENERAL: PAINLEVEL_OUTOF10: 0

## 2022-11-09 NOTE — PROGRESS NOTES
[address]  FAMILY MEDICINE  [address] [place] [address] [address] [address]  Dept: [phone]  Dept Fax: [phone]  Loc: [phone]    Guadalupe Hernández is a 78 y.o. female who presents today for her medical conditions/complaints as noted below. Guadalupe Hernández is c/o of Other (Some frequent urination. Some lesions in the creases of her legs that itch and burn )       Subjective:     Chief Complaint   Patient presents with    Other     Some frequent urination. Some lesions in the creases of her legs that itch and burn        HPI  The patient comes in today for \"lesions\" in then increase of her vaginal folds as well as at the very top of the vagina. The patient states that she has discomfort when voiding however she feels this is related to the itching, irritation and lesions present in the vaginal area  The patient states that these lesions have been present for several years. She states that she has not been sexually active for quite some time however she is going to be getting  soon and states that she needs to have these lesions \"taken care of\" prior to her being able to have intercourse. The patient states that her first  who is  was treated on a few occasions for \"penile lesions\" it is unclear if these penile lesions were related to HPV however the patient states that they were \"burned off\"  The patient admits that she has had symptoms for quite some time however she felt that most of the problem was related to \"yeast infections\" and that she would use nystatin which would improve the symptoms for a while but then would return.     Past Medical History:   Diagnosis Date    Acute pancreatitis 10/21/2014    Acute renal failure with tubular necrosis (HCC) 7/10/2015    Acute stasis dermatitis of left lower extremity 2015    Angina pectoris ([address] 2011    Anxiety     Cellulitis of left leg     Chronic back pain     Chronic kidney disease     Clostridium difficile infection 7/9/15, 6/4/15    Colitis     Depression     Diabetes mellitus ([facility]     diet controlled    Hearing loss     Heart murmur 10/1/2019    Hyperlipidemia     Hypertension     Hypothyroidism     Left THR 1/25/2016    Liver disease     Lumbar radiculitis 10/1/2019    Obesity     Palpitations 7/17/2013    Primary osteoarthritis of left hip 11/18/2015    Restless legs syndrome     Shock ([facility] 10/10/2017    Superficial thrombophlebitis of left leg 7/8/2015    Thyroid disease     Type 2 diabetes mellitus without complication ([facility]     Ulcerative (chronic) enterocolitis ([facility] 10/39/6713    Uncomplicated asthma 5/6/0093    Venous ulcer of left leg (HCC)          Review of Systems   Constitutional: Negative. Negative for appetite change, fatigue and unexpected weight change. HENT: Negative. Eyes: Negative. Respiratory: Negative. Negative for shortness of breath. Cardiovascular: Negative. Negative for chest pain, palpitations and leg swelling. Gastrointestinal: Negative. Negative for abdominal pain, anal bleeding, blood in stool and nausea. Endocrine: Negative. Genitourinary: Negative. Negative for hematuria. Musculoskeletal: Negative. Skin:  Positive for rash. Allergic/Immunologic: Negative. Neurological: Negative. Negative for dizziness, syncope, light-headedness and numbness. Hematological: Negative. Does not bruise/bleed easily. Psychiatric/Behavioral: Negative. All other systems reviewed and are negative. Current Outpatient Medications   Medication Sig Dispense Refill    celecoxib (CELEBREX) 100 MG capsule TAKE 2 CAPSULES EVERY  capsule 1    simvastatin (ZOCOR) 20 MG tablet TAKE 1 TABLET EVERY NIGHT 90 tablet 1    levothyroxine (SYNTHROID) 125 MCG tablet TAKE 1 TABLET BY MOUTH EVERY DAY 90 tablet 0    nystatin (MYCOSTATIN) 373175 UNIT/GM cream Apply topically 2 times daily.  30 g 1    GNP MAGNESIUM OXIDE 250 MG TABS tablet TAKE 2 TABLETS THREE TIMES DAILY 360 tablet 1    vitamin D ([facility] VITAMIN D3) 250 MCG (19361 UT) CAPS capsule TAKE 1 CAPSULE BY MOUTH ONCE WEEKLY 4 capsule 2    folic acid (FOLVITE) 1 MG tablet TAKE 1 TABLET EVERY DAY 90 tablet 1    gabapentin (NEURONTIN) 100 MG capsule Take 2 capsules by mouth nightly for 30 days. I PO  capsule 1    DULoxetine (CYMBALTA) 30 MG extended release capsule TAKE 1 CAPSULE EVERY DAY 90 capsule 1    alendronate (FOSAMAX) 70 MG tablet TAKE 1 TABLET EVERY 7 DAYS 12 tablet 3    triamcinolone (KENALOG) 0.1 % cream APPLY TO AFFECTED AREA TWICE A DAY 45 g 1    acetaminophen (TYLENOL) 500 MG tablet Take 500 mg by mouth daily as needed for Pain       Vedolizumab (ENTYVIO IV) Infuse 300 mg intravenously See Admin Instructions LAST DOSE GIVEN ON 11-9-2022      blood glucose test strips (ASCENSIA AUTODISC VI;ONE TOUCH ULTRA TEST VI) strip 1 each by In Vitro route daily As needed. 100 each 3    ONE TOUCH LANCETS MISC 1 each by Does not apply route daily 100 each 3     No current facility-administered medications for this visit. Facility-Administered Medications Ordered in Other Visits   Medication Dose Route Frequency Provider Last Rate Last Admin    0.9 % sodium chloride infusion   IntraVENous Continuous Sidney Sendgloria Greco DO   Stopped at 11/09/22 1005    sodium chloride flush 0.9 % injection 10 mL  10 mL IntraVENous PRN Sidney Sendgloria Greco DO   10 mL at 11/09/22 0918        No changes in past medical history, past surgical history, social history, orfamily history were noted during the patient encounter unless specifically listed above. All updates of past medical history, past surgical history, social history, or family history were reviewed personally by me duringthe office visit. All problems listed in the assessment are stable unless noted otherwise. Medication profile reviewed personally by me during the office visit.   Medication side effects and possible impairments frommedications were discussed as applicable. Objective:     Physical Exam  Constitutional:       General: She is not in acute distress. Appearance: Normal appearance. She is well-developed. She is not toxic-appearing. HENT:      Head: Normocephalic and atraumatic. Right Ear: Hearing, tympanic membrane and ear canal normal.      Left Ear: Hearing, tympanic membrane and ear canal normal.      Nose: Nose normal.      Mouth/Throat:      Pharynx: Uvula midline. Eyes:      General: Lids are normal.      Conjunctiva/sclera: Conjunctivae normal.   Cardiovascular:      Rate and Rhythm: Normal rate and regular rhythm. Pulses: Normal pulses. Heart sounds: Normal heart sounds. Pulmonary:      Effort: Pulmonary effort is normal. No accessory muscle usage or respiratory distress. Breath sounds: Normal breath sounds. Abdominal:      Palpations: Abdomen is soft. Tenderness: There is no abdominal tenderness. Genitourinary:      Musculoskeletal:      Cervical back: Neck supple. Lymphadenopathy:      Head:      Right side of head: No submental or submandibular adenopathy. Left side of head: No submental or submandibular adenopathy. Cervical: No cervical adenopathy. Skin:     General: Skin is warm and dry. Findings: No lesion or rash. Neurological:      Mental Status: She is alert and oriented to person, place, and time. Psychiatric:         Speech: Speech normal.         Behavior: Behavior normal. Behavior is cooperative. /74 (Site: Left Upper Arm, Position: Sitting, Cuff Size: Large Adult)   Pulse (!) 103   Ht 5' 5\" (1.651 m)   Wt 225 lb (102.1 kg)   SpO2 97%   BMI 37.44 kg/m²   Body mass index is 37.44 kg/m².     BP Readings from Last 2 Encounters:   11/09/22 130/74   11/09/22 133/73       Wt Readings from Last 3 Encounters:   11/09/22 225 lb (102.1 kg)   11/09/22 220 lb (99.8 kg)   11/03/22 220 lb (99.8 kg)       Lab Review   Hospital Outpatient Visit on 11/09/2022   Component Date Value    WBC 11/09/2022 6.1     RBC 11/09/2022 4.00     Hemoglobin 11/09/2022 12.7     Hematocrit 11/09/2022 37.2     MCV 11/09/2022 93.0     MCH 11/09/2022 31.7     MCHC 11/09/2022 34.1     RDW 11/09/2022 13.6     Platelets 24/80/8875 163     MPV 11/09/2022 8.7     Sodium 11/09/2022 138     Potassium 11/09/2022 4.5     Chloride 11/09/2022 102     CO2 11/09/2022 30     Anion Gap 11/09/2022 6     Glucose 11/09/2022 133 (A)     BUN 11/09/2022 18     Creatinine 11/09/2022 0.9     Est, Glom Filt Rate 11/09/2022 >60     Calcium 11/09/2022 9.9     Total Protein 11/09/2022 7.2     Albumin 11/09/2022 4.1     Albumin/Globulin Ratio 11/09/2022 1.3     Total Bilirubin 11/09/2022 0.6     Alkaline Phosphatase 11/09/2022 86     ALT 11/09/2022 24     AST 11/09/2022 40 (A)    Nurse Only on 11/02/2022   Component Date Value    Potassium 11/02/2022 4.4    Admission on 10/28/2022, Discharged on 10/28/2022   Component Date Value    POC Glucose 10/28/2022 99     Performed on 10/28/2022 ACCU-CHEK     POC Glucose 10/28/2022 117 (A)     Performed on 10/28/2022 ACCU-CHEK    Office Visit on 10/20/2022   Component Date Value    Sodium 10/20/2022 141     Potassium 10/20/2022 5.6 (A)     Chloride 10/20/2022 102     CO2 10/20/2022 28     Anion Gap 10/20/2022 11     Glucose 10/20/2022 90     BUN 10/20/2022 26 (A)     Creatinine 10/20/2022 1.1     Est, Glom Filt Rate 10/20/2022 55 (A)     Calcium 10/20/2022 9.7     Total Protein 10/20/2022 7.1     Albumin 10/20/2022 4.3     Albumin/Globulin Ratio 10/20/2022 1.5     Total Bilirubin 10/20/2022 0.6     Alkaline Phosphatase 10/20/2022 91     ALT 10/20/2022 21     AST 10/20/2022 36     WBC 10/20/2022 7.6     RBC 10/20/2022 3.96 (A)     Hemoglobin 10/20/2022 12.6     Hematocrit 10/20/2022 36.6     MCV 10/20/2022 92.3     MCH 10/20/2022 31.9     MCHC 10/20/2022 34.5     RDW 10/20/2022 14.0     Platelets 20/69/6242 158     MPV 10/20/2022 10.2     Neutrophils % 10/20/2022 62.2     Lymphocytes % 10/20/2022 25.6     Monocytes % 10/20/2022 7.4     Eosinophils % 10/20/2022 4.5     Basophils % 10/20/2022 0.3     Neutrophils Absolute 10/20/2022 4.7     Lymphocytes Absolute 10/20/2022 2.0     Monocytes Absolute 10/20/2022 0.6     Eosinophils Absolute 10/20/2022 0.3     Basophils Absolute 10/20/2022 0.0    Hospital Outpatient Visit on 09/14/2022   Component Date Value    WBC 09/14/2022 6.2     RBC 09/14/2022 4.07     Hemoglobin 09/14/2022 12.7     Hematocrit 09/14/2022 37.6     MCV 09/14/2022 92.4     MCH 09/14/2022 31.2     MCHC 09/14/2022 33.8     RDW 09/14/2022 14.5     Platelets 85/65/1374 155     MPV 09/14/2022 9.7     Sodium 09/14/2022 138     Potassium 09/14/2022 4.7     Chloride 09/14/2022 103     CO2 09/14/2022 26     Anion Gap 09/14/2022 9     Glucose 09/14/2022 161 (A)     BUN 09/14/2022 23 (A)     Creatinine 09/14/2022 1.0     GFR Non- 09/14/2022 55 (A)     GFR  09/14/2022 >60     Calcium 09/14/2022 9.3     Total Protein 09/14/2022 7.0     Albumin 09/14/2022 4.0     Albumin/Globulin Ratio 09/14/2022 1.3     Total Bilirubin 09/14/2022 0.6     Alkaline Phosphatase 09/14/2022 79     ALT 09/14/2022 22     AST 09/14/2022 38 (A)        Results for orders placed or performed in visit on 11/09/22   POCT Urinalysis no Micro   Result Value Ref Range    Color, UA yellow     Clarity, UA clear     Glucose, UA POC neg     Bilirubin, UA neg     Ketones, UA neg     Spec Grav, UA 1.020     Blood, UA POC neg     pH, UA 7.0     Protein, UA POC neg     Urobilinogen, UA 0.2     Leukocytes, UA small     Nitrite, UA neg           Assessment:       1. Skin ulcer of multiple sites of vulva    2. Vulval lesion    3. Leukoplakia, vulva    4.  UTI symptoms        Results for POC orders placed in visit on 11/09/22   POCT Urinalysis no Micro   Result Value Ref Range    Color, UA yellow     Clarity, UA clear     Glucose, UA POC neg     Bilirubin, UA neg     Ketones, UA neg     Spec Grav, UA 1.020     Blood, UA POC neg     pH, UA 7.0     Protein, UA POC neg     Urobilinogen, UA 0.2     Leukocytes, UA small     Nitrite, UA neg             Plan:       Janene was seen today for other. Diagnoses and all orders for this visit:    Skin ulcer of multiple sites of vulva    Vulval lesion    Leukoplakia, vulva    UTI symptoms  -     POCT Urinalysis no Micro    Unfortunately the patient states she has had these symptoms for quite some time but never have them evaluated. The patient states that she was just using nystatin in the area that would improve the symptoms for a while. She has not been sexually active for some time however she is going to be getting  and will be having intercourse and therefore came in today to have these \"lesions\" taken care of. There is extensive areas of leukoplakia as well as several areas of white lesions, tenderness, erythema and ulcerations throughout the vaginal region. Educated the patient that this may just be lichen sclerosis however since her first  has a history of \"penile lesions\" there is concern for potential vulva dysplasia/cancer  I recommend that the patient see OB/GYN for further evaluation and/or possible biopsy of the lesions  Patient will follow-up with OB/GYN for further management--attempted to call GYN today for appointment however their office was already closed. Will call first thing in the morning to try to expedite an appointment    Patient has been instructed call the office immediately with new symptoms, change in symptoms or worseningof symptoms. If this is not feasible, patient is instructed to report to the emergency room. Medication profile reviewed. Medication side effects and possible impairments from medications were discussed as applicable. Allergies were reviewed. Health maintenance was reviewed and updated as appropriate.          (Comment: Please note this report has been produced using a combination of typing and speech recognition software and may contain errors related to that system including errors in grammar, punctuation, and spelling, as well as words and phrases that may be inappropriate.  If there are any questions or concerns please feel free to contact the dictating provider for clarification.)

## 2022-11-09 NOTE — DISCHARGE INSTRUCTIONS
vedolizumab  Pronunciation:  GEN leary  Kendra Luciano  What is vedolizumab? Vedolizumab reduces the effects of a substance in the body that can cause inflammation. Vedolizumab is used in adults with moderate to severe ulcerative colitis (UC), or moderate to severe Crohn's disease. Vedolizumab treats active disease and may help keep UC or Crohn's symptoms under control long term. Vedolizumab may also reduce the need for steroid medicines in helping to control symptoms long term. Vedolizumab is usually given after other medicines have been tried without success. Vedolizumab may also be used for purposes not listed in this medication guide. What should I discuss with my healthcare provider before receiving vedolizumab? You should not use vedolizumab if you are allergic to it. To make sure vedolizumab is safe for you, tell your doctor if you have:  an active or recent infection;  tuberculosis (or if you have close contact with someone who has tuberculosis);  signs of infection such as fever, cough, or flu symptoms;  open sores or skin wounds;  weak immune system (caused by disease or by using certain medicine); or  if you are scheduled to receive any vaccines. You should be up to date with all needed vaccinations before receiving vedolizumab. FDA pregnancy category Jordon Massing is not expected to harm an unborn baby. Tell your doctor if you are pregnant or plan to become pregnant during treatment. If you are pregnant, your name may be listed on a pregnancy registry. This is to track the outcome of the pregnancy and to evaluate any effects of vedolizumab on the baby. It is not known whether vedolizumab passes into breast milk or if it could harm a nursing baby. Tell your doctor if you are breast-feeding a baby. Do not give this medicine to anyone under 25years old without medical advice. How is vedolizumab given?   Before you start treatment with vedolizumab, your doctor may perform tests to make sure you do not have tuberculosis or other infections. Vedolizumab is injected into a vein through an IV. A healthcare provider will give you this injection. Vedolizumab must be given slowly, and the IV infusion can take about 30 minutes to complete. You will be watched closely for a short time after receiving vedolizumab, to make sure you do not have an allergic reaction to the medication. Vedolizumab is usually given on a schedule that starts the day of your first injection. Your next injections will be given at 2 weeks and 6 weeks after the first injection. Then you will receive an injection every 8 weeks (or every 2 months) thereafter. It may take up to several weeks before your symptoms improve. Keep using the medication as directed and tell your doctor if your symptoms do not improve after 14 weeks of treatment. What happens if I miss a dose? Call your doctor for instructions if you miss an appointment for your vedolizumab injection. What happens if I overdose? Since this medicine is given by a healthcare professional in a medical setting, an overdose is unlikely to occur. What should I avoid while receiving vedolizumab? Do not receive a \"live\" vaccine while using vedolizumab, and avoid coming into contact with anyone who has recently received a live vaccine. There is a chance that the virus could be passed on to you. Live vaccines include measles, mumps, rubella (MMR), polio, rotavirus, typhoid, yellow fever, varicella (chickenpox), zoster (shingles), and nasal flu (influenza) vaccine. You may receive \"killed-virus\" vaccines such as a flu shot, polio vaccine, rabies vaccine, or hepatitis A vaccine. Ask your doctor before receiving any vaccine while you are being treated with vedolizumab. Avoid being near people who are sick or have infections. Tell your doctor at once if you develop signs of infection. What are the possible side effects of vedolizumab?   Get emergency medical help if you have any of these signs of an allergic reaction: hives; difficult breathing; swelling of your face, lips, tongue, or throat. Some side effects may occur during the injection. Tell your caregiver right away if you feel warm or tingly, or if you have a severe headache, fast heart rate, pounding in your neck or ears, chest tightness, or trouble breathing. Some people using a medicine similar to vedolizumab have developed a serious viral infection of the brain that can lead to disability or death. Call your doctor right away if you have any change in your mental state, decreased vision, or problems with speech or walking. These symptoms may start gradually and get worse quickly. Stop using vedolizumab and call your doctor at once if you have:  fever, chills, body aches, cold or flu symptoms, mouth and throat ulcers, skin sores;  pain, warmth, swelling, or oozing around your anal area;  nausea, vomiting, severe diarrhea, diarrhea that is watery or bloody, stomach cramps, weight loss;  cough, pain when swallowing; or  liver problems --nausea, upper stomach pain, itching, tired feeling, loss of appetite, dark urine, shanita-colored stools, jaundice (yellowing of the skin or eyes). Common side effects may include:  fever, sore throat, flu symptoms;  cold symptoms such as stuffy nose, sinus pain, sneezing, cough;  pain in your arms or legs;  tired feeling;  headache, joint pain, back pain;  rash, itching; or  nausea. This is not a complete list of side effects and others may occur. Call your doctor for medical advice about side effects. You may report side effects to FDA at 9-324-FDA-3507. What other drugs will affect vedolizumab?   Tell your doctor about all medicines you use, and those you start or stop using during your treatment with vedolizumab, especially:  natalizumab;  medicines to treat psoriasis or rheumatoid arthritis, such as etanercept or golimumab;  other medicines to treat Crohn's disease or ulcerative colitis, such as adalimumab, certolizumab, infliximab; or  other drugs that weaken the immune system such as cancer medicine, steroids,   WHere can I get more information? Your doctor or pharmacist can provide more information about vedolizumab.     RETURN ON DEC 0,0750 AT 0900  FOR ENTYVIO INFUSION    FOLLOW UP WITH DR. BARTON AS NEEDED OR SCHEDULED    CALL US IF YOU NEED TO CANCEL OR RESCHEDULE YOUR APPOINTMENT  [phone]

## 2022-11-10 ENCOUNTER — TELEPHONE (OUTPATIENT)
Dept: FAMILY MEDICINE CLINIC | Age: 67
End: 2022-11-10

## 2022-11-10 ASSESSMENT — ENCOUNTER SYMPTOMS
BLOOD IN STOOL: 0
GASTROINTESTINAL NEGATIVE: 1
ANAL BLEEDING: 0
EYES NEGATIVE: 1
SHORTNESS OF BREATH: 0
NAUSEA: 0
ALLERGIC/IMMUNOLOGIC NEGATIVE: 1
ABDOMINAL PAIN: 0
RESPIRATORY NEGATIVE: 1

## 2022-11-10 NOTE — TELEPHONE ENCOUNTER
Patient called to to let Gus know that she will be seeing Dr. Yaw Easley at [facility] today at 1:15. They will need a referral, last office note, labs,most recent pap result and colonoscopy if she has had one faxed to [phone]. The note from yesterday is not signed so we will need to wait until that is complete before faxing.

## 2022-11-20 DIAGNOSIS — E55.9 VITAMIN D DEFICIENCY: ICD-10-CM

## 2022-11-21 RX ORDER — LIDOCAINE HCL 4 %
CREAM (GRAM) TOPICAL
Qty: 4 CAPSULE | Refills: 5 | Status: SHIPPED | OUTPATIENT
Start: 2022-11-21

## 2022-11-22 LAB — DIABETIC RETINOPATHY: NORMAL

## 2022-11-23 RX ORDER — LEVOTHYROXINE SODIUM 0.12 MG/1
TABLET ORAL
Qty: 90 TABLET | Refills: 1 | Status: SHIPPED | OUTPATIENT
Start: 2022-11-23

## 2022-12-05 RX ORDER — DM/PE/ACETAMINOPHEN/CHLORPHENR 10-5-325-2
TABLET, SEQUENTIAL ORAL
Qty: 540 TABLET | Refills: 0 | Status: SHIPPED | OUTPATIENT
Start: 2022-12-05

## 2022-12-07 ENCOUNTER — HOSPITAL ENCOUNTER (OUTPATIENT)
Dept: NURSING | Age: 67
Setting detail: INFUSION SERIES
Discharge: HOME | End: 2022-12-07
Payer: MEDICARE

## 2022-12-07 VITALS
HEART RATE: 76 BPM | RESPIRATION RATE: 16 BRPM | DIASTOLIC BLOOD PRESSURE: 73 MMHG | WEIGHT: 225 LBS | TEMPERATURE: 97.2 F | BODY MASS INDEX: 37.49 KG/M2 | SYSTOLIC BLOOD PRESSURE: 135 MMHG | HEIGHT: 65 IN

## 2022-12-07 DIAGNOSIS — K51.00 CHRONIC ULCERATIVE ENTEROCOLITIS WITHOUT COMPLICATION (HCC): Primary | ICD-10-CM

## 2022-12-07 PROCEDURE — 99211 OFF/OP EST MAY X REQ PHY/QHP: CPT

## 2022-12-07 PROCEDURE — 96365 THER/PROPH/DIAG IV INF INIT: CPT

## 2022-12-07 PROCEDURE — 6360000002 HC RX W HCPCS: Performed by: INTERNAL MEDICINE

## 2022-12-07 PROCEDURE — 2580000003 HC RX 258: Performed by: INTERNAL MEDICINE

## 2022-12-07 RX ORDER — SODIUM CHLORIDE 0.9 % (FLUSH) 0.9 %
10 SYRINGE (ML) INJECTION PRN
OUTPATIENT
Start: 2023-01-04

## 2022-12-07 RX ORDER — SODIUM CHLORIDE 9 MG/ML
INJECTION, SOLUTION INTRAVENOUS CONTINUOUS
OUTPATIENT
Start: 2023-01-04

## 2022-12-07 RX ORDER — SODIUM CHLORIDE 9 MG/ML
INJECTION, SOLUTION INTRAVENOUS CONTINUOUS
Status: ACTIVE | OUTPATIENT
Start: 2022-12-07 | End: 2022-12-07

## 2022-12-07 RX ORDER — SODIUM CHLORIDE 0.9 % (FLUSH) 0.9 %
10 SYRINGE (ML) INJECTION PRN
Status: DISCONTINUED | OUTPATIENT
Start: 2022-12-07 | End: 2022-12-08 | Stop reason: HOSPADM

## 2022-12-07 RX ADMIN — VEDOLIZUMAB 300 MG: 300 INJECTION, POWDER, LYOPHILIZED, FOR SOLUTION INTRAVENOUS at 09:09

## 2022-12-07 RX ADMIN — Medication 10 ML: at 09:05

## 2022-12-07 RX ADMIN — SODIUM CHLORIDE: 9 INJECTION, SOLUTION INTRAVENOUS at 09:06

## 2022-12-07 ASSESSMENT — PAIN SCALES - GENERAL: PAINLEVEL_OUTOF10: 0

## 2022-12-07 NOTE — DISCHARGE INSTRUCTIONS
vedolizumab  Pronunciation:  GEN leary  Scout Castellanos  What is vedolizumab? Vedolizumab reduces the effects of a substance in the body that can cause inflammation. Vedolizumab is used in adults with moderate to severe ulcerative colitis (UC), or moderate to severe Crohn's disease. Vedolizumab treats active disease and may help keep UC or Crohn's symptoms under control long term. Vedolizumab may also reduce the need for steroid medicines in helping to control symptoms long term. Vedolizumab is usually given after other medicines have been tried without success. Vedolizumab may also be used for purposes not listed in this medication guide. What should I discuss with my healthcare provider before receiving vedolizumab? You should not use vedolizumab if you are allergic to it. To make sure vedolizumab is safe for you, tell your doctor if you have:  an active or recent infection;  tuberculosis (or if you have close contact with someone who has tuberculosis);  signs of infection such as fever, cough, or flu symptoms;  open sores or skin wounds;  weak immune system (caused by disease or by using certain medicine); or  if you are scheduled to receive any vaccines. You should be up to date with all needed vaccinations before receiving vedolizumab. FDA pregnancy category Wyperri Harada is not expected to harm an unborn baby. Tell your doctor if you are pregnant or plan to become pregnant during treatment. If you are pregnant, your name may be listed on a pregnancy registry. This is to track the outcome of the pregnancy and to evaluate any effects of vedolizumab on the baby. It is not known whether vedolizumab passes into breast milk or if it could harm a nursing baby. Tell your doctor if you are breast-feeding a baby. Do not give this medicine to anyone under 25years old without medical advice. How is vedolizumab given?   Before you start treatment with vedolizumab, your doctor may perform tests to make sure you do not have tuberculosis or other infections. Vedolizumab is injected into a vein through an IV. A healthcare provider will give you this injection. Vedolizumab must be given slowly, and the IV infusion can take about 30 minutes to complete. You will be watched closely for a short time after receiving vedolizumab, to make sure you do not have an allergic reaction to the medication. Vedolizumab is usually given on a schedule that starts the day of your first injection. Your next injections will be given at 2 weeks and 6 weeks after the first injection. Then you will receive an injection every 8 weeks (or every 2 months) thereafter. It may take up to several weeks before your symptoms improve. Keep using the medication as directed and tell your doctor if your symptoms do not improve after 14 weeks of treatment. What happens if I miss a dose? Call your doctor for instructions if you miss an appointment for your vedolizumab injection. What happens if I overdose? Since this medicine is given by a healthcare professional in a medical setting, an overdose is unlikely to occur. What should I avoid while receiving vedolizumab? Do not receive a \"live\" vaccine while using vedolizumab, and avoid coming into contact with anyone who has recently received a live vaccine. There is a chance that the virus could be passed on to you. Live vaccines include measles, mumps, rubella (MMR), polio, rotavirus, typhoid, yellow fever, varicella (chickenpox), zoster (shingles), and nasal flu (influenza) vaccine. You may receive \"killed-virus\" vaccines such as a flu shot, polio vaccine, rabies vaccine, or hepatitis A vaccine. Ask your doctor before receiving any vaccine while you are being treated with vedolizumab. Avoid being near people who are sick or have infections. Tell your doctor at once if you develop signs of infection. What are the possible side effects of vedolizumab?   Get emergency medical help if you have any of these signs of an allergic reaction: hives; difficult breathing; swelling of your face, lips, tongue, or throat. Some side effects may occur during the injection. Tell your caregiver right away if you feel warm or tingly, or if you have a severe headache, fast heart rate, pounding in your neck or ears, chest tightness, or trouble breathing. Some people using a medicine similar to vedolizumab have developed a serious viral infection of the brain that can lead to disability or death. Call your doctor right away if you have any change in your mental state, decreased vision, or problems with speech or walking. These symptoms may start gradually and get worse quickly. Stop using vedolizumab and call your doctor at once if you have:  fever, chills, body aches, cold or flu symptoms, mouth and throat ulcers, skin sores;  pain, warmth, swelling, or oozing around your anal area;  nausea, vomiting, severe diarrhea, diarrhea that is watery or bloody, stomach cramps, weight loss;  cough, pain when swallowing; or  liver problems --nausea, upper stomach pain, itching, tired feeling, loss of appetite, dark urine, shanita-colored stools, jaundice (yellowing of the skin or eyes). Common side effects may include:  fever, sore throat, flu symptoms;  cold symptoms such as stuffy nose, sinus pain, sneezing, cough;  pain in your arms or legs;  tired feeling;  headache, joint pain, back pain;  rash, itching; or  nausea. This is not a complete list of side effects and others may occur. Call your doctor for medical advice about side effects. You may report side effects to FDA at 4-528-FDA-5288. What other drugs will affect vedolizumab?   Tell your doctor about all medicines you use, and those you start or stop using during your treatment with vedolizumab, especially:  natalizumab;  medicines to treat psoriasis or rheumatoid arthritis, such as etanercept or golimumab;  other medicines to treat Crohn's disease or ulcerative colitis, such as adalimumab, certolizumab, infliximab; or  other drugs that weaken the immune system such as cancer medicine, steroids,   WHere can I get more information? Your doctor or pharmacist can provide more information about vedolizumab.     RETURN ON JAN 4,2023 AT 0900  FOR ENTYVIO INFUSION    FOLLOW UP WITH DR. BARTON AS NEEDED OR SCHEDULED    CALL US IF YOU NEED TO CANCEL OR RESCHEDULE YOUR APPOINTMENT  [phone]

## 2022-12-07 NOTE — PROGRESS NOTES
Pt here for entyvio infusion  Pt without c/o's today  She reports that her stools are almost normal  Pt denies any cramping, bloating bleeding or diarrhea  IV # 24 was started in [facility] on 2nd  attempt per myself  without difficulty Pt gayla well  no blood work needed for today  Entyvio 300 mg IVPB starting to infuse  IV site unremarkable Will monitor

## 2022-12-07 NOTE — PROGRESS NOTES
Entyvio infused and tubing was flushed with 110 cc NS  IV removed without difficulty Cath tip intact  Pressure then pressure dressing was applied and secured with a coban dressing  IV site unremarkable  Discharge instructions reviewed with pt and copy was given  Understanding verbalized  Pt was then up to BR and then was discharged ambulatory in stable condition

## 2023-01-04 ENCOUNTER — HOSPITAL ENCOUNTER (OUTPATIENT)
Dept: NURSING | Age: 68
Setting detail: INFUSION SERIES
Discharge: HOME | End: 2023-01-04
Payer: MEDICARE

## 2023-01-04 VITALS
TEMPERATURE: 97.1 F | HEIGHT: 65 IN | WEIGHT: 231 LBS | BODY MASS INDEX: 38.49 KG/M2 | SYSTOLIC BLOOD PRESSURE: 147 MMHG | DIASTOLIC BLOOD PRESSURE: 85 MMHG | RESPIRATION RATE: 16 BRPM | HEART RATE: 90 BPM

## 2023-01-04 DIAGNOSIS — K51.00 CHRONIC ULCERATIVE ENTEROCOLITIS WITHOUT COMPLICATION (HCC): Primary | ICD-10-CM

## 2023-01-04 LAB
A/G RATIO: 1.2 (ref 1.1–2.2)
ALBUMIN SERPL-MCNC: 3.8 G/DL (ref 3.4–5)
ALP BLD-CCNC: 76 U/L (ref 40–129)
ALT SERPL-CCNC: 24 U/L (ref 10–40)
ANION GAP SERPL CALCULATED.3IONS-SCNC: 8 MMOL/L (ref 3–16)
AST SERPL-CCNC: 35 U/L (ref 15–37)
BILIRUB SERPL-MCNC: 0.5 MG/DL (ref 0–1)
BUN BLDV-MCNC: 27 MG/DL (ref 7–20)
CALCIUM SERPL-MCNC: 9.8 MG/DL (ref 8.3–10.6)
CHLORIDE BLD-SCNC: 103 MMOL/L (ref 99–110)
CO2: 29 MMOL/L (ref 21–32)
CREAT SERPL-MCNC: 1.1 MG/DL (ref 0.6–1.2)
GFR SERPL CREATININE-BSD FRML MDRD: 55 ML/MIN/{1.73_M2}
GLUCOSE BLD-MCNC: 121 MG/DL (ref 70–99)
HCT VFR BLD CALC: 35.5 % (ref 36–48)
HEMOGLOBIN: 12.1 G/DL (ref 12–16)
MCH RBC QN AUTO: 31.5 PG (ref 26–34)
MCHC RBC AUTO-ENTMCNC: 34.1 G/DL (ref 31–36)
MCV RBC AUTO: 92.4 FL (ref 80–100)
PDW BLD-RTO: 13.9 % (ref 12.4–15.4)
PLATELET # BLD: 131 K/UL (ref 135–450)
PMV BLD AUTO: 9.2 FL (ref 5–10.5)
POTASSIUM SERPL-SCNC: 4.4 MMOL/L (ref 3.5–5.1)
RBC # BLD: 3.84 M/UL (ref 4–5.2)
SODIUM BLD-SCNC: 140 MMOL/L (ref 136–145)
TOTAL PROTEIN: 7 G/DL (ref 6.4–8.2)
WBC # BLD: 5.8 K/UL (ref 4–11)

## 2023-01-04 PROCEDURE — 96365 THER/PROPH/DIAG IV INF INIT: CPT

## 2023-01-04 PROCEDURE — 6360000002 HC RX W HCPCS: Performed by: INTERNAL MEDICINE

## 2023-01-04 PROCEDURE — 85027 COMPLETE CBC AUTOMATED: CPT

## 2023-01-04 PROCEDURE — 80053 COMPREHEN METABOLIC PANEL: CPT

## 2023-01-04 PROCEDURE — 2580000003 HC RX 258: Performed by: INTERNAL MEDICINE

## 2023-01-04 PROCEDURE — 99211 OFF/OP EST MAY X REQ PHY/QHP: CPT

## 2023-01-04 RX ORDER — SODIUM CHLORIDE 0.9 % (FLUSH) 0.9 %
10 SYRINGE (ML) INJECTION PRN
Status: DISCONTINUED | OUTPATIENT
Start: 2023-01-04 | End: 2023-01-05 | Stop reason: HOSPADM

## 2023-01-04 RX ORDER — SODIUM CHLORIDE 0.9 % (FLUSH) 0.9 %
10 SYRINGE (ML) INJECTION PRN
OUTPATIENT
Start: 2023-02-01

## 2023-01-04 RX ORDER — SODIUM CHLORIDE 9 MG/ML
INJECTION, SOLUTION INTRAVENOUS CONTINUOUS
OUTPATIENT
Start: 2023-02-01

## 2023-01-04 RX ORDER — SODIUM CHLORIDE 9 MG/ML
INJECTION, SOLUTION INTRAVENOUS CONTINUOUS
Status: ACTIVE | OUTPATIENT
Start: 2023-01-04 | End: 2023-01-04

## 2023-01-04 RX ADMIN — VEDOLIZUMAB 300 MG: 300 INJECTION, POWDER, LYOPHILIZED, FOR SOLUTION INTRAVENOUS at 09:37

## 2023-01-04 RX ADMIN — SODIUM CHLORIDE: 9 INJECTION, SOLUTION INTRAVENOUS at 09:36

## 2023-01-04 RX ADMIN — Medication 10 ML: at 09:34

## 2023-01-04 ASSESSMENT — PAIN SCALES - GENERAL: PAINLEVEL_OUTOF10: 0

## 2023-01-04 NOTE — PROGRESS NOTES
24 g IV started in [facility] with blood drawn. Line flushed, Entyvio infusing at this time. Pt tolerated well, resting comfortably with no concerns or complaints.

## 2023-01-04 NOTE — PROGRESS NOTES
Entyvio infused and tubing was flushed with 105cc NS  IV removed without difficulty Cath tip intact  Pressure then pressure dressing was applied and secured with a coban dressing  IV site unremarkable  Discharge instructions reviewed with pt and copy was given  Understanding verbalized  Pt was then  discharged ambulatory in stable condition

## 2023-01-04 NOTE — PROGRESS NOTES
Pt here for entyvio infusion  Pt without c/o's today  She reports that her stools are soft  Pt denies any cramping, bloating bleeding or diarrhea  New Entyvio consent obtained for 2023 IV # 22 was started in [address] on my 1st  attempt  after 3 failed prior attempts  Blood for lab work was drawn without difficulty  Pt gayla well  Entyvio 300 mg IVPB starting to infuse  IV site unremarkable Will monitor

## 2023-01-04 NOTE — DISCHARGE INSTRUCTIONS
vedolizumab  Pronunciation:  GEN Olmedo  What is vedolizumab? Vedolizumab reduces the effects of a substance in the body that can cause inflammation. Vedolizumab is used in adults with moderate to severe ulcerative colitis (UC), or moderate to severe Crohn's disease. Vedolizumab treats active disease and may help keep UC or Crohn's symptoms under control long term. Vedolizumab may also reduce the need for steroid medicines in helping to control symptoms long term. Vedolizumab is usually given after other medicines have been tried without success. Vedolizumab may also be used for purposes not listed in this medication guide. What should I discuss with my healthcare provider before receiving vedolizumab? You should not use vedolizumab if you are allergic to it. To make sure vedolizumab is safe for you, tell your doctor if you have:  an active or recent infection;  tuberculosis (or if you have close contact with someone who has tuberculosis);  signs of infection such as fever, cough, or flu symptoms;  open sores or skin wounds;  weak immune system (caused by disease or by using certain medicine); or  if you are scheduled to receive any vaccines. You should be up to date with all needed vaccinations before receiving vedolizumab. FDA pregnancy category Colan Broody is not expected to harm an unborn baby. Tell your doctor if you are pregnant or plan to become pregnant during treatment. If you are pregnant, your name may be listed on a pregnancy registry. This is to track the outcome of the pregnancy and to evaluate any effects of vedolizumab on the baby. It is not known whether vedolizumab passes into breast milk or if it could harm a nursing baby. Tell your doctor if you are breast-feeding a baby. Do not give this medicine to anyone under 25years old without medical advice. How is vedolizumab given?   Before you start treatment with vedolizumab, your doctor may perform tests to make sure you do not have tuberculosis or other infections. Vedolizumab is injected into a vein through an IV. A healthcare provider will give you this injection. Vedolizumab must be given slowly, and the IV infusion can take about 30 minutes to complete. You will be watched closely for a short time after receiving vedolizumab, to make sure you do not have an allergic reaction to the medication. Vedolizumab is usually given on a schedule that starts the day of your first injection. Your next injections will be given at 2 weeks and 6 weeks after the first injection. Then you will receive an injection every 8 weeks (or every 2 months) thereafter. It may take up to several weeks before your symptoms improve. Keep using the medication as directed and tell your doctor if your symptoms do not improve after 14 weeks of treatment. What happens if I miss a dose? Call your doctor for instructions if you miss an appointment for your vedolizumab injection. What happens if I overdose? Since this medicine is given by a healthcare professional in a medical setting, an overdose is unlikely to occur. What should I avoid while receiving vedolizumab? Do not receive a \"live\" vaccine while using vedolizumab, and avoid coming into contact with anyone who has recently received a live vaccine. There is a chance that the virus could be passed on to you. Live vaccines include measles, mumps, rubella (MMR), polio, rotavirus, typhoid, yellow fever, varicella (chickenpox), zoster (shingles), and nasal flu (influenza) vaccine. You may receive \"killed-virus\" vaccines such as a flu shot, polio vaccine, rabies vaccine, or hepatitis A vaccine. Ask your doctor before receiving any vaccine while you are being treated with vedolizumab. Avoid being near people who are sick or have infections. Tell your doctor at once if you develop signs of infection. What are the possible side effects of vedolizumab?   Get emergency medical help if you have any of these signs of an allergic reaction: hives; difficult breathing; swelling of your face, lips, tongue, or throat. Some side effects may occur during the injection. Tell your caregiver right away if you feel warm or tingly, or if you have a severe headache, fast heart rate, pounding in your neck or ears, chest tightness, or trouble breathing. Some people using a medicine similar to vedolizumab have developed a serious viral infection of the brain that can lead to disability or death. Call your doctor right away if you have any change in your mental state, decreased vision, or problems with speech or walking. These symptoms may start gradually and get worse quickly. Stop using vedolizumab and call your doctor at once if you have:  fever, chills, body aches, cold or flu symptoms, mouth and throat ulcers, skin sores;  pain, warmth, swelling, or oozing around your anal area;  nausea, vomiting, severe diarrhea, diarrhea that is watery or bloody, stomach cramps, weight loss;  cough, pain when swallowing; or  liver problems --nausea, upper stomach pain, itching, tired feeling, loss of appetite, dark urine, shanita-colored stools, jaundice (yellowing of the skin or eyes). Common side effects may include:  fever, sore throat, flu symptoms;  cold symptoms such as stuffy nose, sinus pain, sneezing, cough;  pain in your arms or legs;  tired feeling;  headache, joint pain, back pain;  rash, itching; or  nausea. This is not a complete list of side effects and others may occur. Call your doctor for medical advice about side effects. You may report side effects to FDA at 2-048-FDA-0648. What other drugs will affect vedolizumab?   Tell your doctor about all medicines you use, and those you start or stop using during your treatment with vedolizumab, especially:  natalizumab;  medicines to treat psoriasis or rheumatoid arthritis, such as etanercept or golimumab;  other medicines to treat Crohn's disease or ulcerative colitis, such as adalimumab, certolizumab, infliximab; or  other drugs that weaken the immune system such as cancer medicine, steroids,   WHere can I get more information? Your doctor or pharmacist can provide more information about vedolizumab.     RETURN ON FEB 1,2023 AT 0900  FOR ENTYVIO INFUSION    FOLLOW UP WITH DR. BARTON AS NEEDED OR SCHEDULED    CALL US IF YOU NEED TO CANCEL OR RESCHEDULE YOUR APPOINTMENT  [phone]

## 2023-01-28 DIAGNOSIS — M54.16 LUMBAR RADICULITIS: ICD-10-CM

## 2023-01-28 DIAGNOSIS — M48.062 LUMBAR STENOSIS WITH NEUROGENIC CLAUDICATION: ICD-10-CM

## 2023-01-30 RX ORDER — GABAPENTIN 100 MG/1
CAPSULE ORAL
Qty: 180 CAPSULE | Refills: 0 | Status: SHIPPED | OUTPATIENT
Start: 2023-01-30 | End: 2023-04-30

## 2023-01-30 NOTE — TELEPHONE ENCOUNTER
Dre Le is requesting refill(s)   Last OV 5/19/22 (pertaining to medication)  LR 7/18/22 (per medication requested)  Next office visit scheduled or attempted Yes   If no, reason:

## 2023-02-01 ENCOUNTER — HOSPITAL ENCOUNTER (OUTPATIENT)
Dept: NURSING | Age: 68
Setting detail: INFUSION SERIES
Discharge: HOME | End: 2023-02-01
Payer: MEDICARE

## 2023-02-01 VITALS
WEIGHT: 231 LBS | BODY MASS INDEX: 38.49 KG/M2 | TEMPERATURE: 97.8 F | HEART RATE: 79 BPM | HEIGHT: 65 IN | DIASTOLIC BLOOD PRESSURE: 69 MMHG | SYSTOLIC BLOOD PRESSURE: 131 MMHG | RESPIRATION RATE: 16 BRPM

## 2023-02-01 DIAGNOSIS — K51.00 CHRONIC ULCERATIVE ENTEROCOLITIS WITHOUT COMPLICATION (HCC): Primary | ICD-10-CM

## 2023-02-01 PROCEDURE — 6360000002 HC RX W HCPCS: Performed by: INTERNAL MEDICINE

## 2023-02-01 PROCEDURE — 99211 OFF/OP EST MAY X REQ PHY/QHP: CPT

## 2023-02-01 PROCEDURE — 96365 THER/PROPH/DIAG IV INF INIT: CPT

## 2023-02-01 PROCEDURE — 2580000003 HC RX 258: Performed by: INTERNAL MEDICINE

## 2023-02-01 RX ORDER — SODIUM CHLORIDE 0.9 % (FLUSH) 0.9 %
10 SYRINGE (ML) INJECTION PRN
OUTPATIENT
Start: 2023-03-01

## 2023-02-01 RX ORDER — SODIUM CHLORIDE 0.9 % (FLUSH) 0.9 %
10 SYRINGE (ML) INJECTION PRN
Status: DISCONTINUED | OUTPATIENT
Start: 2023-02-01 | End: 2023-02-02 | Stop reason: HOSPADM

## 2023-02-01 RX ORDER — SODIUM CHLORIDE 9 MG/ML
INJECTION, SOLUTION INTRAVENOUS CONTINUOUS
Status: ACTIVE | OUTPATIENT
Start: 2023-02-01 | End: 2023-02-01

## 2023-02-01 RX ORDER — SODIUM CHLORIDE 9 MG/ML
INJECTION, SOLUTION INTRAVENOUS CONTINUOUS
OUTPATIENT
Start: 2023-03-01

## 2023-02-01 RX ADMIN — Medication 10 ML: at 10:33

## 2023-02-01 RX ADMIN — SODIUM CHLORIDE: 9 INJECTION, SOLUTION INTRAVENOUS at 10:33

## 2023-02-01 RX ADMIN — VEDOLIZUMAB 300 MG: 300 INJECTION, POWDER, LYOPHILIZED, FOR SOLUTION INTRAVENOUS at 10:34

## 2023-02-01 ASSESSMENT — PAIN SCALES - GENERAL: PAINLEVEL_OUTOF10: 0

## 2023-02-01 NOTE — PROGRESS NOTES
Pt here for entyvio infusion  Pt without c/o's today  She reports that her stools are almost normal  Pt denies any cramping, bloating bleeding or diarrhea  IV # 24 was started in [facility]t [facility] on 1st attempt per myself  without difficulty Pt gayla well  No blood work needed for today  Entyvio 300 mg IVPB starting to infuse  IV site unremarkable Will monitor

## 2023-02-01 NOTE — PLAN OF CARE
IV INFUSION / INJECTION CARE PLAN    HEMODYNAMIC STATUS  INTERDISCIPLINARY   Goal: Hemodynamic Baseline Staus Maintained / Procedure Completed  Interventions     1. Obtain Patient  Medical /  Surgical History     1 Assess & Review Allergies Prior to IV Infusion or Injection & All  Meds (PRN)     2. Assess Patient  & Obtain  Vital Signs / LOC upon   admission and (PRN)     3.    Start IV as appropriate for Procedure & check Patency (PRN)   NURSING   SAFETY & PSYCHO SOCIAL  INTERDISCIPLINARY   Goal: Patient Returns to Baseline Activity  Interventions     1.  Greet Patient with ID Badge/ Picture in View (PRN)     2. Be Available & Sensitive to Patient’s Needs (PRN)     3.  Communicate referral to Pastoral Care as Appropriate (PRN)     4.  Provide Age Specific Measures (PRN)     4.  Admission Data Base Reviewed     5.  Administer Meds Per Orders (PRN)     5.  Maintain Baseline Activity  Or Activity as Ordered Per Physician     NUTRITION   INTERDISCIPLINARY   Goal: Patient to baseline/ Improved Nutrition  Interventions     1.  Assess Nutritional Status (PRN)       LAB & DIAGNOSTICS   Goal: Additional Tests per Physicians   Orders  Interventions     1.  Lab & Diagnostics per Physician Orders (PRN)     2.  Assess Lab Time Out  for  Patient Safety as Needed (PRN)     RESPIRATORY  INTERDISCIPLINARY   Goal: Airway   Baseline Status Maintained   Interventions     1. Evaluate Bilateral Breath Sounds  Baseline, (PRN),      2. Weight & Height Noted ( PRN)     3.  Assess Baseline SPo2 (PRN)      KNOWLEDGE DEFICIT, EDUCATION, DISCHARGE PLAN   INTERDISCIPLINARY    Patient / SO verbalize Understanding Of Procedural discharge Instructions  Interventions     1.   Obtain Informed Consent (PRN)      2.  Initiate IV Infusion / Injection Plan of Care, Answer Questions (PRN)       3. Assess Patient’s Ability to Understand Information (PRN)     3.   Discharge Planning ; Assure  Presence of   upon Patient Discharge when indicated     4.  Education / Communication  Ongoing As Appropriate      5. Keep Patients / Families Aware of Delays As Appropriate     6. Reinforce Discharge Teaching / Post  Procedure  Instructions (PRN)          PAIN MANAGEMENT  INTERDISCIPLINARY   Goal:Patient Return to Pre Procedure Comfort  Interventions     1. Assess Baseline  Pain Level  and (PRN)     2. Intra Procedure ;  Evaluation & Assessment Of  Pain  is Ongoing     3. Post procedure; Assess Pain Level Once Awake/ Prior  To Discharge     2. Administer Analgesics as Ordered (PRN)      3. Assess Effectiveness of Pain Management (PRN)       Re-Assess Patient   after all Interventions. Assess Pain Level 30 - 60 Minutes After Pain Management Intervention.      4. Provide Discharge Teaching

## 2023-02-01 NOTE — DISCHARGE INSTRUCTIONS
vedolizumab  Pronunciation:  GEN parise mab  Rob Santos  What is vedolizumab? Vedolizumab reduces the effects of a substance in the body that can cause inflammation. Vedolizumab is used in adults with moderate to severe ulcerative colitis (UC), or moderate to severe Crohn's disease. Vedolizumab treats active disease and may help keep UC or Crohn's symptoms under control long term. Vedolizumab may also reduce the need for steroid medicines in helping to control symptoms long term. Vedolizumab is usually given after other medicines have been tried without success. Vedolizumab may also be used for purposes not listed in this medication guide. What should I discuss with my healthcare provider before receiving vedolizumab? You should not use vedolizumab if you are allergic to it. To make sure vedolizumab is safe for you, tell your doctor if you have:  an active or recent infection;  tuberculosis (or if you have close contact with someone who has tuberculosis);  signs of infection such as fever, cough, or flu symptoms;  open sores or skin wounds;  weak immune system (caused by disease or by using certain medicine); or  if you are scheduled to receive any vaccines. You should be up to date with all needed vaccinations before receiving vedolizumab. FDA pregnancy category Daylin Fiddler is not expected to harm an unborn baby. Tell your doctor if you are pregnant or plan to become pregnant during treatment. If you are pregnant, your name may be listed on a pregnancy registry. This is to track the outcome of the pregnancy and to evaluate any effects of vedolizumab on the baby. It is not known whether vedolizumab passes into breast milk or if it could harm a nursing baby. Tell your doctor if you are breast-feeding a baby. Do not give this medicine to anyone under 25years old without medical advice. How is vedolizumab given?   Before you start treatment with vedolizumab, your doctor may perform tests to make sure you do not have tuberculosis or other infections. Vedolizumab is injected into a vein through an IV. A healthcare provider will give you this injection. Vedolizumab must be given slowly, and the IV infusion can take about 30 minutes to complete. You will be watched closely for a short time after receiving vedolizumab, to make sure you do not have an allergic reaction to the medication. Vedolizumab is usually given on a schedule that starts the day of your first injection. Your next injections will be given at 2 weeks and 6 weeks after the first injection. Then you will receive an injection every 8 weeks (or every 2 months) thereafter. It may take up to several weeks before your symptoms improve. Keep using the medication as directed and tell your doctor if your symptoms do not improve after 14 weeks of treatment. What happens if I miss a dose? Call your doctor for instructions if you miss an appointment for your vedolizumab injection. What happens if I overdose? Since this medicine is given by a healthcare professional in a medical setting, an overdose is unlikely to occur. What should I avoid while receiving vedolizumab? Do not receive a \"live\" vaccine while using vedolizumab, and avoid coming into contact with anyone who has recently received a live vaccine. There is a chance that the virus could be passed on to you. Live vaccines include measles, mumps, rubella (MMR), polio, rotavirus, typhoid, yellow fever, varicella (chickenpox), zoster (shingles), and nasal flu (influenza) vaccine. You may receive \"killed-virus\" vaccines such as a flu shot, polio vaccine, rabies vaccine, or hepatitis A vaccine. Ask your doctor before receiving any vaccine while you are being treated with vedolizumab. Avoid being near people who are sick or have infections. Tell your doctor at once if you develop signs of infection. What are the possible side effects of vedolizumab?   Get emergency medical help if you have any of these signs of an allergic reaction: hives; difficult breathing; swelling of your face, lips, tongue, or throat. Some side effects may occur during the injection. Tell your caregiver right away if you feel warm or tingly, or if you have a severe headache, fast heart rate, pounding in your neck or ears, chest tightness, or trouble breathing. Some people using a medicine similar to vedolizumab have developed a serious viral infection of the brain that can lead to disability or death. Call your doctor right away if you have any change in your mental state, decreased vision, or problems with speech or walking. These symptoms may start gradually and get worse quickly. Stop using vedolizumab and call your doctor at once if you have:  fever, chills, body aches, cold or flu symptoms, mouth and throat ulcers, skin sores;  pain, warmth, swelling, or oozing around your anal area;  nausea, vomiting, severe diarrhea, diarrhea that is watery or bloody, stomach cramps, weight loss;  cough, pain when swallowing; or  liver problems --nausea, upper stomach pain, itching, tired feeling, loss of appetite, dark urine, shanita-colored stools, jaundice (yellowing of the skin or eyes). Common side effects may include:  fever, sore throat, flu symptoms;  cold symptoms such as stuffy nose, sinus pain, sneezing, cough;  pain in your arms or legs;  tired feeling;  headache, joint pain, back pain;  rash, itching; or  nausea. This is not a complete list of side effects and others may occur. Call your doctor for medical advice about side effects. You may report side effects to FDA at 2-334-FDA-3996. What other drugs will affect vedolizumab?   Tell your doctor about all medicines you use, and those you start or stop using during your treatment with vedolizumab, especially:  natalizumab;  medicines to treat psoriasis or rheumatoid arthritis, such as etanercept or golimumab;  other medicines to treat Crohn's disease or ulcerative colitis, such as adalimumab, certolizumab, infliximab; or  other drugs that weaken the immune system such as cancer medicine, steroids,   WHere can I get more information? Your doctor or pharmacist can provide more information about vedolizumab.     RETURN ON MARCH 1,2023 AT 0900  FOR ENTYVIO INFUSION    FOLLOW UP WITH DR. BARTON AS NEEDED OR SCHEDULED    CALL US IF YOU NEED TO CANCEL OR RESCHEDULE YOUR APPOINTMENT  [phone]     March 1

## 2023-02-27 RX ORDER — ALENDRONATE SODIUM 70 MG/1
TABLET ORAL
Qty: 12 TABLET | Refills: 0 | Status: SHIPPED | OUTPATIENT
Start: 2023-02-27

## 2023-02-28 DIAGNOSIS — F33.0 MILD EPISODE OF RECURRENT MAJOR DEPRESSIVE DISORDER (HCC): ICD-10-CM

## 2023-02-28 RX ORDER — DULOXETIN HYDROCHLORIDE 30 MG/1
CAPSULE, DELAYED RELEASE ORAL
Qty: 90 CAPSULE | Refills: 0 | Status: SHIPPED | OUTPATIENT
Start: 2023-02-28 | End: 2023-03-02

## 2023-02-28 RX ORDER — NYSTATIN 100000 U/G
CREAM TOPICAL
Qty: 30 G | Refills: 0 | Status: SHIPPED | OUTPATIENT
Start: 2023-02-28

## 2023-02-28 RX ORDER — LEVOTHYROXINE SODIUM 0.12 MG/1
125 TABLET ORAL DAILY
Qty: 90 TABLET | Refills: 0 | Status: SHIPPED | OUTPATIENT
Start: 2023-02-28

## 2023-03-01 ENCOUNTER — HOSPITAL ENCOUNTER (OUTPATIENT)
Dept: NURSING | Age: 68
Setting detail: INFUSION SERIES
Discharge: HOME | End: 2023-03-01
Payer: MEDICARE

## 2023-03-01 VITALS
RESPIRATION RATE: 16 BRPM | BODY MASS INDEX: 38.49 KG/M2 | WEIGHT: 231 LBS | TEMPERATURE: 97.2 F | SYSTOLIC BLOOD PRESSURE: 152 MMHG | HEIGHT: 65 IN | HEART RATE: 80 BPM | DIASTOLIC BLOOD PRESSURE: 69 MMHG

## 2023-03-01 DIAGNOSIS — K51.00 CHRONIC ULCERATIVE ENTEROCOLITIS WITHOUT COMPLICATION (HCC): Primary | ICD-10-CM

## 2023-03-01 LAB
A/G RATIO: 1.3 (ref 1.1–2.2)
ALBUMIN SERPL-MCNC: 3.9 G/DL (ref 3.4–5)
ALP BLD-CCNC: 84 U/L (ref 40–129)
ALT SERPL-CCNC: 29 U/L (ref 10–40)
ANION GAP SERPL CALCULATED.3IONS-SCNC: 10 MMOL/L (ref 3–16)
AST SERPL-CCNC: 48 U/L (ref 15–37)
BILIRUB SERPL-MCNC: 0.6 MG/DL (ref 0–1)
BUN BLDV-MCNC: 18 MG/DL (ref 7–20)
CALCIUM SERPL-MCNC: 9.1 MG/DL (ref 8.3–10.6)
CHLORIDE BLD-SCNC: 103 MMOL/L (ref 99–110)
CO2: 25 MMOL/L (ref 21–32)
CREAT SERPL-MCNC: 0.9 MG/DL (ref 0.6–1.2)
GFR SERPL CREATININE-BSD FRML MDRD: >60 ML/MIN/{1.73_M2}
GLUCOSE BLD-MCNC: 108 MG/DL (ref 70–99)
HCT VFR BLD CALC: 36.4 % (ref 36–48)
HEMOGLOBIN: 12.5 G/DL (ref 12–16)
MCH RBC QN AUTO: 31.6 PG (ref 26–34)
MCHC RBC AUTO-ENTMCNC: 34.3 G/DL (ref 31–36)
MCV RBC AUTO: 92.2 FL (ref 80–100)
PDW BLD-RTO: 14.3 % (ref 12.4–15.4)
PLATELET # BLD: 151 K/UL (ref 135–450)
PMV BLD AUTO: 9.8 FL (ref 5–10.5)
POTASSIUM SERPL-SCNC: 4.2 MMOL/L (ref 3.5–5.1)
RBC # BLD: 3.95 M/UL (ref 4–5.2)
SODIUM BLD-SCNC: 138 MMOL/L (ref 136–145)
TOTAL PROTEIN: 6.8 G/DL (ref 6.4–8.2)
WBC # BLD: 6.1 K/UL (ref 4–11)

## 2023-03-01 PROCEDURE — 99211 OFF/OP EST MAY X REQ PHY/QHP: CPT

## 2023-03-01 PROCEDURE — 85027 COMPLETE CBC AUTOMATED: CPT

## 2023-03-01 PROCEDURE — 2580000003 HC RX 258: Performed by: INTERNAL MEDICINE

## 2023-03-01 PROCEDURE — 80053 COMPREHEN METABOLIC PANEL: CPT

## 2023-03-01 PROCEDURE — 6360000002 HC RX W HCPCS: Performed by: INTERNAL MEDICINE

## 2023-03-01 PROCEDURE — 96365 THER/PROPH/DIAG IV INF INIT: CPT

## 2023-03-01 RX ORDER — SODIUM CHLORIDE 0.9 % (FLUSH) 0.9 %
10 SYRINGE (ML) INJECTION PRN
Status: DISCONTINUED | OUTPATIENT
Start: 2023-03-01 | End: 2023-03-02 | Stop reason: HOSPADM

## 2023-03-01 RX ORDER — SODIUM CHLORIDE 0.9 % (FLUSH) 0.9 %
10 SYRINGE (ML) INJECTION PRN
OUTPATIENT
Start: 2023-03-29

## 2023-03-01 RX ORDER — SODIUM CHLORIDE 9 MG/ML
INJECTION, SOLUTION INTRAVENOUS CONTINUOUS
Status: ACTIVE | OUTPATIENT
Start: 2023-03-01 | End: 2023-03-01

## 2023-03-01 RX ORDER — SODIUM CHLORIDE 9 MG/ML
INJECTION, SOLUTION INTRAVENOUS CONTINUOUS
OUTPATIENT
Start: 2023-03-29

## 2023-03-01 RX ADMIN — Medication 10 ML: at 09:18

## 2023-03-01 RX ADMIN — VEDOLIZUMAB 300 MG: 300 INJECTION, POWDER, LYOPHILIZED, FOR SOLUTION INTRAVENOUS at 09:22

## 2023-03-01 RX ADMIN — SODIUM CHLORIDE: 9 INJECTION, SOLUTION INTRAVENOUS at 09:21

## 2023-03-01 ASSESSMENT — PAIN SCALES - GENERAL: PAINLEVEL_OUTOF10: 0

## 2023-03-01 NOTE — DISCHARGE INSTRUCTIONS
vedolizumab  Pronunciation:  GEN leary  KimaniNathalie Fuentes  What is vedolizumab? Vedolizumab reduces the effects of a substance in the body that can cause inflammation. Vedolizumab is used in adults with moderate to severe ulcerative colitis (UC), or moderate to severe Crohn's disease. Vedolizumab treats active disease and may help keep UC or Crohn's symptoms under control long term. Vedolizumab may also reduce the need for steroid medicines in helping to control symptoms long term. Vedolizumab is usually given after other medicines have been tried without success. Vedolizumab may also be used for purposes not listed in this medication guide. What should I discuss with my healthcare provider before receiving vedolizumab? You should not use vedolizumab if you are allergic to it. To make sure vedolizumab is safe for you, tell your doctor if you have:  an active or recent infection;  tuberculosis (or if you have close contact with someone who has tuberculosis);  signs of infection such as fever, cough, or flu symptoms;  open sores or skin wounds;  weak immune system (caused by disease or by using certain medicine); or  if you are scheduled to receive any vaccines. You should be up to date with all needed vaccinations before receiving vedolizumab. FDA pregnancy category Anisa Duty is not expected to harm an unborn baby. Tell your doctor if you are pregnant or plan to become pregnant during treatment. If you are pregnant, your name may be listed on a pregnancy registry. This is to track the outcome of the pregnancy and to evaluate any effects of vedolizumab on the baby. It is not known whether vedolizumab passes into breast milk or if it could harm a nursing baby. Tell your doctor if you are breast-feeding a baby. Do not give this medicine to anyone under 25years old without medical advice. How is vedolizumab given?   Before you start treatment with vedolizumab, your doctor may perform tests to make sure you do not have tuberculosis or other infections. Vedolizumab is injected into a vein through an IV. A healthcare provider will give you this injection. Vedolizumab must be given slowly, and the IV infusion can take about 30 minutes to complete. You will be watched closely for a short time after receiving vedolizumab, to make sure you do not have an allergic reaction to the medication. Vedolizumab is usually given on a schedule that starts the day of your first injection. Your next injections will be given at 2 weeks and 6 weeks after the first injection. Then you will receive an injection every 8 weeks (or every 2 months) thereafter. It may take up to several weeks before your symptoms improve. Keep using the medication as directed and tell your doctor if your symptoms do not improve after 14 weeks of treatment. What happens if I miss a dose? Call your doctor for instructions if you miss an appointment for your vedolizumab injection. What happens if I overdose? Since this medicine is given by a healthcare professional in a medical setting, an overdose is unlikely to occur. What should I avoid while receiving vedolizumab? Do not receive a \"live\" vaccine while using vedolizumab, and avoid coming into contact with anyone who has recently received a live vaccine. There is a chance that the virus could be passed on to you. Live vaccines include measles, mumps, rubella (MMR), polio, rotavirus, typhoid, yellow fever, varicella (chickenpox), zoster (shingles), and nasal flu (influenza) vaccine. You may receive \"killed-virus\" vaccines such as a flu shot, polio vaccine, rabies vaccine, or hepatitis A vaccine. Ask your doctor before receiving any vaccine while you are being treated with vedolizumab. Avoid being near people who are sick or have infections. Tell your doctor at once if you develop signs of infection. What are the possible side effects of vedolizumab?   Get emergency medical help if you have any of these signs of an allergic reaction: hives; difficult breathing; swelling of your face, lips, tongue, or throat. Some side effects may occur during the injection. Tell your caregiver right away if you feel warm or tingly, or if you have a severe headache, fast heart rate, pounding in your neck or ears, chest tightness, or trouble breathing. Some people using a medicine similar to vedolizumab have developed a serious viral infection of the brain that can lead to disability or death. Call your doctor right away if you have any change in your mental state, decreased vision, or problems with speech or walking. These symptoms may start gradually and get worse quickly. Stop using vedolizumab and call your doctor at once if you have:  fever, chills, body aches, cold or flu symptoms, mouth and throat ulcers, skin sores;  pain, warmth, swelling, or oozing around your anal area;  nausea, vomiting, severe diarrhea, diarrhea that is watery or bloody, stomach cramps, weight loss;  cough, pain when swallowing; or  liver problems --nausea, upper stomach pain, itching, tired feeling, loss of appetite, dark urine, shanita-colored stools, jaundice (yellowing of the skin or eyes). Common side effects may include:  fever, sore throat, flu symptoms;  cold symptoms such as stuffy nose, sinus pain, sneezing, cough;  pain in your arms or legs;  tired feeling;  headache, joint pain, back pain;  rash, itching; or  nausea. This is not a complete list of side effects and others may occur. Call your doctor for medical advice about side effects. You may report side effects to FDA at 7-219-FDA-2091. What other drugs will affect vedolizumab?   Tell your doctor about all medicines you use, and those you start or stop using during your treatment with vedolizumab, especially:  natalizumab;  medicines to treat psoriasis or rheumatoid arthritis, such as etanercept or golimumab;  other medicines to treat Crohn's disease or ulcerative colitis, such as adalimumab, certolizumab, infliximab; or  other drugs that weaken the immune system such as cancer medicine, steroids,   WHere can I get more information?  Your doctor or pharmacist can provide more information about vedolizumab.    RETURN ON MARCH 29,2023  AT 0900  FOR ENTYVIO INFUSION    FOLLOW UP WITH DR. BARTON AS NEEDED OR SCHEDULED    CALL US IF YOU NEED TO CANCEL OR RESCHEDULE YOUR APPOINTMENT  [phone]

## 2023-03-01 NOTE — PROGRESS NOTES
Pt here for entyvio infusion  Pt without c/o's today  She reports that her stools are almost normal  Pt denies any cramping, bloating bleeding or diarrhea  IV # 22 was started in Left hand on 1st attempt per S Tiffany RN after 2 failed attempts per myself  Blood for lab work was drawn without difficulty Pt gayla well  Entyvio 300 mg IVPB starting to infuse  IV site unremarkable Will monitor

## 2023-03-02 DIAGNOSIS — F33.0 MILD EPISODE OF RECURRENT MAJOR DEPRESSIVE DISORDER (HCC): ICD-10-CM

## 2023-03-02 RX ORDER — DULOXETIN HYDROCHLORIDE 30 MG/1
CAPSULE, DELAYED RELEASE ORAL
Qty: 90 CAPSULE | Refills: 1 | Status: SHIPPED | OUTPATIENT
Start: 2023-03-02

## 2023-03-29 ENCOUNTER — HOSPITAL ENCOUNTER (OUTPATIENT)
Dept: NURSING | Age: 68
Setting detail: INFUSION SERIES
Discharge: HOME | End: 2023-03-29
Payer: MEDICARE

## 2023-03-29 VITALS
WEIGHT: 231 LBS | HEIGHT: 65 IN | RESPIRATION RATE: 16 BRPM | BODY MASS INDEX: 38.49 KG/M2 | SYSTOLIC BLOOD PRESSURE: 153 MMHG | TEMPERATURE: 97.8 F | DIASTOLIC BLOOD PRESSURE: 87 MMHG | HEART RATE: 78 BPM

## 2023-03-29 DIAGNOSIS — K51.00 CHRONIC ULCERATIVE ENTEROCOLITIS WITHOUT COMPLICATION (HCC): Primary | ICD-10-CM

## 2023-03-29 PROCEDURE — 2580000003 HC RX 258: Performed by: INTERNAL MEDICINE

## 2023-03-29 PROCEDURE — 99211 OFF/OP EST MAY X REQ PHY/QHP: CPT

## 2023-03-29 PROCEDURE — 86480 TB TEST CELL IMMUN MEASURE: CPT

## 2023-03-29 PROCEDURE — 6360000002 HC RX W HCPCS: Performed by: INTERNAL MEDICINE

## 2023-03-29 PROCEDURE — 96365 THER/PROPH/DIAG IV INF INIT: CPT

## 2023-03-29 RX ORDER — SODIUM CHLORIDE 9 MG/ML
INJECTION, SOLUTION INTRAVENOUS CONTINUOUS
Status: ACTIVE | OUTPATIENT
Start: 2023-03-29 | End: 2023-03-29

## 2023-03-29 RX ORDER — SODIUM CHLORIDE 0.9 % (FLUSH) 0.9 %
10 SYRINGE (ML) INJECTION PRN
OUTPATIENT
Start: 2023-04-26

## 2023-03-29 RX ORDER — SODIUM CHLORIDE 9 MG/ML
INJECTION, SOLUTION INTRAVENOUS CONTINUOUS
OUTPATIENT
Start: 2023-04-26

## 2023-03-29 RX ORDER — SODIUM CHLORIDE 0.9 % (FLUSH) 0.9 %
10 SYRINGE (ML) INJECTION PRN
Status: DISCONTINUED | OUTPATIENT
Start: 2023-03-29 | End: 2023-03-30 | Stop reason: HOSPADM

## 2023-03-29 RX ADMIN — SODIUM CHLORIDE: 9 INJECTION, SOLUTION INTRAVENOUS at 09:27

## 2023-03-29 RX ADMIN — Medication 10 ML: at 09:25

## 2023-03-29 RX ADMIN — VEDOLIZUMAB 300 MG: 300 INJECTION, POWDER, LYOPHILIZED, FOR SOLUTION INTRAVENOUS at 09:28

## 2023-03-29 ASSESSMENT — PAIN SCALES - GENERAL: PAINLEVEL_OUTOF10: 0

## 2023-03-29 NOTE — DISCHARGE INSTRUCTIONS
vedolizumab  Pronunciation:  GEN Dooley  What is vedolizumab? Vedolizumab reduces the effects of a substance in the body that can cause inflammation. Vedolizumab is used in adults with moderate to severe ulcerative colitis (UC), or moderate to severe Crohn's disease. Vedolizumab treats active disease and may help keep UC or Crohn's symptoms under control long term. Vedolizumab may also reduce the need for steroid medicines in helping to control symptoms long term. Vedolizumab is usually given after other medicines have been tried without success. Vedolizumab may also be used for purposes not listed in this medication guide. What should I discuss with my healthcare provider before receiving vedolizumab? You should not use vedolizumab if you are allergic to it. To make sure vedolizumab is safe for you, tell your doctor if you have:  an active or recent infection;  tuberculosis (or if you have close contact with someone who has tuberculosis);  signs of infection such as fever, cough, or flu symptoms;  open sores or skin wounds;  weak immune system (caused by disease or by using certain medicine); or  if you are scheduled to receive any vaccines. You should be up to date with all needed vaccinations before receiving vedolizumab. FDA pregnancy category Miguel Smiley is not expected to harm an unborn baby. Tell your doctor if you are pregnant or plan to become pregnant during treatment. If you are pregnant, your name may be listed on a pregnancy registry. This is to track the outcome of the pregnancy and to evaluate any effects of vedolizumab on the baby. It is not known whether vedolizumab passes into breast milk or if it could harm a nursing baby. Tell your doctor if you are breast-feeding a baby. Do not give this medicine to anyone under 25years old without medical advice. How is vedolizumab given?   Before you start treatment with vedolizumab, your doctor may perform tests to

## 2023-03-29 NOTE — PROGRESS NOTES
Pt here for entyvio infusion  pt without c/o's today  She reports that her stools are almost normal  Pt denies any cramping, bloating bleeding or diarrhea  Pt reports that she borders on constipation at time   IV # 22 was started in Rt hand on 3rd attempt per myself   Pt gayla well  no blood work needed for today  Pt was informed that lab would be here to draw a Quantiferon Gold test today   Pt stated ok   Entyvio 300 mg IVPB starting to infuse  IV site unremarkable Will monitor

## 2023-04-01 LAB
GAMMA INTERFERON BACKGROUND BLD IA-ACNC: 0.02 IU/ML
MITOGEN IGNF BCKGRD COR BLD-ACNC: 8.81 IU/ML
QUANTI TB GOLD PLUS: NEGATIVE
QUANTI TB1 MINUS NIL: 0 IU/ML (ref 0–0.34)
QUANTI TB2 MINUS NIL: 0 IU/ML (ref 0–0.34)

## 2023-04-18 DIAGNOSIS — E55.9 VITAMIN D DEFICIENCY: ICD-10-CM

## 2023-04-20 ENCOUNTER — PATIENT MESSAGE (OUTPATIENT)
Dept: FAMILY MEDICINE CLINIC | Age: 68
End: 2023-04-20

## 2023-04-20 NOTE — TELEPHONE ENCOUNTER
From: Marquis Bowles  To: Edvin Leon  Sent: 4/20/2023 2:39 PM EDT  Subject: Shingles shot    [facility]  Planned follow up in July 2023

## 2023-04-21 DIAGNOSIS — M48.062 LUMBAR STENOSIS WITH NEUROGENIC CLAUDICATION: ICD-10-CM

## 2023-04-21 DIAGNOSIS — M54.16 LUMBAR RADICULITIS: ICD-10-CM

## 2023-04-21 RX ORDER — GABAPENTIN 100 MG/1
CAPSULE ORAL
Qty: 180 CAPSULE | Refills: 0 | Status: SHIPPED | OUTPATIENT
Start: 2023-04-21 | End: 2023-07-20

## 2023-04-26 ENCOUNTER — HOSPITAL ENCOUNTER (OUTPATIENT)
Dept: MAMMOGRAPHY | Age: 68
Discharge: HOME | End: 2023-04-26
Payer: MEDICARE

## 2023-04-26 ENCOUNTER — HOSPITAL ENCOUNTER (OUTPATIENT)
Dept: NURSING | Age: 68
Setting detail: INFUSION SERIES
Discharge: HOME | End: 2023-04-26
Payer: MEDICARE

## 2023-04-26 VITALS
SYSTOLIC BLOOD PRESSURE: 149 MMHG | TEMPERATURE: 97.9 F | RESPIRATION RATE: 16 BRPM | WEIGHT: 236 LBS | DIASTOLIC BLOOD PRESSURE: 77 MMHG | HEART RATE: 91 BPM | HEIGHT: 65 IN | BODY MASS INDEX: 39.32 KG/M2

## 2023-04-26 DIAGNOSIS — Z12.31 SCREENING MAMMOGRAM, ENCOUNTER FOR: ICD-10-CM

## 2023-04-26 DIAGNOSIS — K51.00 CHRONIC ULCERATIVE ENTEROCOLITIS WITHOUT COMPLICATION (HCC): Primary | ICD-10-CM

## 2023-04-26 LAB
ALBUMIN SERPL-MCNC: 3.6 G/DL (ref 3.4–5)
ALBUMIN/GLOB SERPL: 0.9 {RATIO} (ref 1.1–2.2)
ALP SERPL-CCNC: 87 U/L (ref 40–129)
ALT SERPL-CCNC: 41 U/L (ref 10–40)
ANION GAP SERPL CALCULATED.3IONS-SCNC: 11 MMOL/L (ref 3–16)
AST SERPL-CCNC: 96 U/L (ref 15–37)
BILIRUB SERPL-MCNC: 0.5 MG/DL (ref 0–1)
BUN SERPL-MCNC: 19 MG/DL (ref 7–20)
CALCIUM SERPL-MCNC: 9.6 MG/DL (ref 8.3–10.6)
CHLORIDE SERPL-SCNC: 102 MMOL/L (ref 99–110)
CO2 SERPL-SCNC: 23 MMOL/L (ref 21–32)
CREAT SERPL-MCNC: 1.1 MG/DL (ref 0.6–1.2)
DEPRECATED RDW RBC AUTO: 14.5 % (ref 12.4–15.4)
GFR SERPLBLD CREATININE-BSD FMLA CKD-EPI: 55 ML/MIN/{1.73_M2}
GLUCOSE SERPL-MCNC: 137 MG/DL (ref 70–99)
HCT VFR BLD AUTO: 36.7 % (ref 36–48)
HGB BLD-MCNC: 12.3 G/DL (ref 12–16)
MCH RBC QN AUTO: 31.5 PG (ref 26–34)
MCHC RBC AUTO-ENTMCNC: 33.5 G/DL (ref 31–36)
MCV RBC AUTO: 94.1 FL (ref 80–100)
PLATELET # BLD AUTO: 143 K/UL (ref 135–450)
PMV BLD AUTO: 9.5 FL (ref 5–10.5)
POTASSIUM SERPL-SCNC: 5.3 MMOL/L (ref 3.5–5.1)
PROT SERPL-MCNC: 7.5 G/DL (ref 6.4–8.2)
RBC # BLD AUTO: 3.91 M/UL (ref 4–5.2)
SODIUM SERPL-SCNC: 136 MMOL/L (ref 136–145)
WBC # BLD AUTO: 5.4 K/UL (ref 4–11)

## 2023-04-26 PROCEDURE — 2580000003 HC RX 258: Performed by: INTERNAL MEDICINE

## 2023-04-26 PROCEDURE — 99211 OFF/OP EST MAY X REQ PHY/QHP: CPT

## 2023-04-26 PROCEDURE — 77063 BREAST TOMOSYNTHESIS BI: CPT

## 2023-04-26 PROCEDURE — 85027 COMPLETE CBC AUTOMATED: CPT

## 2023-04-26 PROCEDURE — 96365 THER/PROPH/DIAG IV INF INIT: CPT

## 2023-04-26 PROCEDURE — 6360000002 HC RX W HCPCS: Performed by: INTERNAL MEDICINE

## 2023-04-26 PROCEDURE — 80053 COMPREHEN METABOLIC PANEL: CPT

## 2023-04-26 RX ORDER — SODIUM CHLORIDE 9 MG/ML
INJECTION, SOLUTION INTRAVENOUS CONTINUOUS
Status: DISCONTINUED | OUTPATIENT
Start: 2023-04-26 | End: 2023-04-26 | Stop reason: HOSPADM

## 2023-04-26 RX ORDER — SODIUM CHLORIDE 0.9 % (FLUSH) 0.9 %
10 SYRINGE (ML) INJECTION PRN
Status: DISCONTINUED | OUTPATIENT
Start: 2023-04-26 | End: 2023-04-27 | Stop reason: HOSPADM

## 2023-04-26 RX ORDER — SODIUM CHLORIDE 9 MG/ML
INJECTION, SOLUTION INTRAVENOUS CONTINUOUS
OUTPATIENT
Start: 2023-05-24

## 2023-04-26 RX ORDER — SODIUM CHLORIDE 0.9 % (FLUSH) 0.9 %
10 SYRINGE (ML) INJECTION PRN
OUTPATIENT
Start: 2023-05-24

## 2023-04-26 RX ADMIN — Medication 10 ML: at 09:23

## 2023-04-26 RX ADMIN — VEDOLIZUMAB 300 MG: 300 INJECTION, POWDER, LYOPHILIZED, FOR SOLUTION INTRAVENOUS at 09:28

## 2023-04-26 RX ADMIN — SODIUM CHLORIDE: 9 INJECTION, SOLUTION INTRAVENOUS at 09:28

## 2023-04-26 ASSESSMENT — PAIN SCALES - GENERAL: PAINLEVEL_OUTOF10: 0

## 2023-04-26 NOTE — DISCHARGE INSTRUCTIONS
have any of these signs of an allergic reaction: hives; difficult breathing; swelling of your face, lips, tongue, or throat. Some side effects may occur during the injection. Tell your caregiver right away if you feel warm or tingly, or if you have a severe headache, fast heart rate, pounding in your neck or ears, chest tightness, or trouble breathing. Some people using a medicine similar to vedolizumab have developed a serious viral infection of the brain that can lead to disability or death. Call your doctor right away if you have any change in your mental state, decreased vision, or problems with speech or walking. These symptoms may start gradually and get worse quickly. Stop using vedolizumab and call your doctor at once if you have:  fever, chills, body aches, cold or flu symptoms, mouth and throat ulcers, skin sores;  pain, warmth, swelling, or oozing around your anal area;  nausea, vomiting, severe diarrhea, diarrhea that is watery or bloody, stomach cramps, weight loss;  cough, pain when swallowing; or  liver problems --nausea, upper stomach pain, itching, tired feeling, loss of appetite, dark urine, shanita-colored stools, jaundice (yellowing of the skin or eyes). Common side effects may include:  fever, sore throat, flu symptoms;  cold symptoms such as stuffy nose, sinus pain, sneezing, cough;  pain in your arms or legs;  tired feeling;  headache, joint pain, back pain;  rash, itching; or  nausea. This is not a complete list of side effects and others may occur. Call your doctor for medical advice about side effects. You may report side effects to FDA at 8-870-FDA-0505. What other drugs will affect vedolizumab?   Tell your doctor about all medicines you use, and those you start or stop using during your treatment with vedolizumab, especially:  natalizumab;  medicines to treat psoriasis or rheumatoid arthritis, such as etanercept or golimumab;  other medicines to treat Crohn's disease or ulcerative

## 2023-04-26 NOTE — PROGRESS NOTES
Pt here for entyvio infusion  Pt without c/o's today  She reports that her stools are almost normal  Pt denies any cramping,bloating  bleeding or diarrhea  Pt reports that she had a recent colonoscopy that was ok  IV # 24 was started in Left hand on 1st attempt per myself Blood for lab work was drawn without difficulty  Pt gayla well  Entyvio 300 mg IVPB starting to infuse  IV site unremarkable Will monitor

## 2023-05-01 NOTE — TELEPHONE ENCOUNTER
Left message  regarding negative screening mammogram results. [age] yr F- patient was seen at  off the - yesterday feeling worsening SOB and tightness in chest  ----- Message from Meme Munguia sent at 2023  8:57 AM CDT -----  Patient Name: Debby Vicente    Specialist or PCP Name: Dr. Chase García    Symptoms: 75 yr F- patient was seen at  off the - yesterday feeling worsening SOB and tightness in chest-says she is feeling better today but still worse then on the .     Pregnant (females aged 13-60. If Yes, how long?) : na    Call Back # : [phone]    Which State are you currently located in?: [place]    Name of Clinic Site / Acct# : [facility]     Use following scripting for patients waiting for a callback:   \"Nurse callback times vary based on call volumes; please be aware the return phone call may come from an unidentified phone number. If your symptoms worsen or become life threatening while waiting, you should seek immediate assistance by calling 911 or going to the ER for evaluation.\"

## 2023-05-05 RX ORDER — LEVOTHYROXINE SODIUM 0.12 MG/1
TABLET ORAL
Qty: 90 TABLET | Refills: 0 | Status: SHIPPED | OUTPATIENT
Start: 2023-05-05

## 2023-05-05 NOTE — TELEPHONE ENCOUNTER
Future Appointments   Date Time Provider Ginna Ferguson   5/15/2023 10:00 AM Yessy Acosta APRN - CNP [facility]   5/24/2023  9:00 AM [facility] OP NURSING ROOM 1 [facility] OP RN [place] [facility]   6/21/2023 10:00 AM [facility] OP NURSING ROOM 1 [facility] OP RN [place] [facility]   7/19/2023  9:00 AM [facility] OP NURSING ROOM 1 [facility] OP RN [place] [facility]   8/16/2023  9:00 AM [facility] OP NURSING ROOM 1 [facility] OP RN [place] [facility]   9/13/2023  9:00 AM [facility] OP NURSING ROOM 1 [facility] OP RN [place] [facility]     Archana Rolon is requesting refill(s)   Last OV 4/28/22 (pertaining to medication)  LR 2/28/23 (per medication requested)  Next office visit scheduled or attempted Yes

## 2023-05-08 DIAGNOSIS — F33.0 MILD EPISODE OF RECURRENT MAJOR DEPRESSIVE DISORDER (HCC): ICD-10-CM

## 2023-05-08 RX ORDER — DULOXETIN HYDROCHLORIDE 30 MG/1
CAPSULE, DELAYED RELEASE ORAL
Qty: 90 CAPSULE | Refills: 0 | Status: SHIPPED | OUTPATIENT
Start: 2023-05-08

## 2023-05-08 NOTE — TELEPHONE ENCOUNTER
Candy Borrego is requesting refill(s)   Last OV 5/19/22 (pertaining to medication)  LR 3/2/23 (per medication requested)  Next office visit scheduled or attempted Yes   If no, reason:  5/15/23

## 2023-05-12 SDOH — ECONOMIC STABILITY: FOOD INSECURITY: WITHIN THE PAST 12 MONTHS, THE FOOD YOU BOUGHT JUST DIDN'T LAST AND YOU DIDN'T HAVE MONEY TO GET MORE.: SOMETIMES TRUE

## 2023-05-12 SDOH — ECONOMIC STABILITY: INCOME INSECURITY: HOW HARD IS IT FOR YOU TO PAY FOR THE VERY BASICS LIKE FOOD, HOUSING, MEDICAL CARE, AND HEATING?: NOT VERY HARD

## 2023-05-12 SDOH — ECONOMIC STABILITY: TRANSPORTATION INSECURITY
IN THE PAST 12 MONTHS, HAS LACK OF TRANSPORTATION KEPT YOU FROM MEETINGS, WORK, OR FROM GETTING THINGS NEEDED FOR DAILY LIVING?: PATIENT DECLINED

## 2023-05-12 SDOH — ECONOMIC STABILITY: FOOD INSECURITY: WITHIN THE PAST 12 MONTHS, YOU WORRIED THAT YOUR FOOD WOULD RUN OUT BEFORE YOU GOT MONEY TO BUY MORE.: SOMETIMES TRUE

## 2023-05-12 SDOH — ECONOMIC STABILITY: HOUSING INSECURITY
IN THE LAST 12 MONTHS, WAS THERE A TIME WHEN YOU DID NOT HAVE A STEADY PLACE TO SLEEP OR SLEPT IN A SHELTER (INCLUDING NOW)?: NO

## 2023-05-15 ENCOUNTER — OFFICE VISIT (OUTPATIENT)
Dept: FAMILY MEDICINE CLINIC | Age: 68
End: 2023-05-15
Payer: MEDICARE

## 2023-05-15 VITALS
SYSTOLIC BLOOD PRESSURE: 128 MMHG | OXYGEN SATURATION: 97 % | HEIGHT: 65 IN | WEIGHT: 239 LBS | HEART RATE: 94 BPM | DIASTOLIC BLOOD PRESSURE: 80 MMHG | BODY MASS INDEX: 39.82 KG/M2

## 2023-05-15 DIAGNOSIS — R94.5 ABNORMAL RESULTS OF LIVER FUNCTION STUDIES: ICD-10-CM

## 2023-05-15 DIAGNOSIS — K51.80 OTHER ULCERATIVE COLITIS WITHOUT COMPLICATION (HCC): ICD-10-CM

## 2023-05-15 DIAGNOSIS — I10 PRIMARY HYPERTENSION: ICD-10-CM

## 2023-05-15 DIAGNOSIS — E53.8 B12 DEFICIENCY: ICD-10-CM

## 2023-05-15 DIAGNOSIS — D69.6 THROMBOCYTOPENIA, UNSPECIFIED (HCC): ICD-10-CM

## 2023-05-15 DIAGNOSIS — E53.8 FOLATE DEFICIENCY: ICD-10-CM

## 2023-05-15 DIAGNOSIS — I77.6 VASCULITIS (HCC): ICD-10-CM

## 2023-05-15 DIAGNOSIS — E03.9 HYPOTHYROIDISM, UNSPECIFIED TYPE: ICD-10-CM

## 2023-05-15 DIAGNOSIS — E83.42 HYPOMAGNESEMIA: ICD-10-CM

## 2023-05-15 DIAGNOSIS — E78.2 MIXED HYPERLIPIDEMIA: ICD-10-CM

## 2023-05-15 DIAGNOSIS — N18.30 STAGE 3 CHRONIC KIDNEY DISEASE, UNSPECIFIED WHETHER STAGE 3A OR 3B CKD (HCC): ICD-10-CM

## 2023-05-15 DIAGNOSIS — E55.9 VITAMIN D DEFICIENCY: ICD-10-CM

## 2023-05-15 DIAGNOSIS — F33.0 MILD EPISODE OF RECURRENT MAJOR DEPRESSIVE DISORDER (HCC): ICD-10-CM

## 2023-05-15 DIAGNOSIS — E66.01 SEVERE OBESITY (BMI 35.0-39.9) WITH COMORBIDITY (HCC): ICD-10-CM

## 2023-05-15 DIAGNOSIS — E11.9 DIET-CONTROLLED TYPE 2 DIABETES MELLITUS (HCC): Primary | ICD-10-CM

## 2023-05-15 DIAGNOSIS — M48.062 LUMBAR STENOSIS WITH NEUROGENIC CLAUDICATION: ICD-10-CM

## 2023-05-15 DIAGNOSIS — M45.0 ANKYLOSING SPONDYLITIS OF MULTIPLE SITES IN SPINE (HCC): ICD-10-CM

## 2023-05-15 LAB
BILIRUBIN, POC: NEGATIVE
BLOOD URINE, POC: NEGATIVE
CLARITY, POC: NORMAL
COLOR, POC: YELLOW
GLUCOSE URINE, POC: NEGATIVE
KETONES, POC: NEGATIVE
LEUKOCYTE EST, POC: NORMAL
NITRITE, POC: NEGATIVE
PH, POC: 7
PROTEIN, POC: NEGATIVE
SPECIFIC GRAVITY, POC: 1.03
UROBILINOGEN, POC: NORMAL

## 2023-05-15 PROCEDURE — 1123F ACP DISCUSS/DSCN MKR DOCD: CPT | Performed by: NURSE PRACTITIONER

## 2023-05-15 PROCEDURE — G8399 PT W/DXA RESULTS DOCUMENT: HCPCS | Performed by: NURSE PRACTITIONER

## 2023-05-15 PROCEDURE — 36415 COLL VENOUS BLD VENIPUNCTURE: CPT | Performed by: NURSE PRACTITIONER

## 2023-05-15 PROCEDURE — 2022F DILAT RTA XM EVC RTNOPTHY: CPT | Performed by: NURSE PRACTITIONER

## 2023-05-15 PROCEDURE — G8427 DOCREV CUR MEDS BY ELIG CLIN: HCPCS | Performed by: NURSE PRACTITIONER

## 2023-05-15 PROCEDURE — 3046F HEMOGLOBIN A1C LEVEL >9.0%: CPT | Performed by: NURSE PRACTITIONER

## 2023-05-15 PROCEDURE — 81002 URINALYSIS NONAUTO W/O SCOPE: CPT | Performed by: NURSE PRACTITIONER

## 2023-05-15 PROCEDURE — G8417 CALC BMI ABV UP PARAM F/U: HCPCS | Performed by: NURSE PRACTITIONER

## 2023-05-15 PROCEDURE — 99214 OFFICE O/P EST MOD 30 MIN: CPT | Performed by: NURSE PRACTITIONER

## 2023-05-15 PROCEDURE — 3078F DIAST BP <80 MM HG: CPT | Performed by: NURSE PRACTITIONER

## 2023-05-15 PROCEDURE — 3017F COLORECTAL CA SCREEN DOC REV: CPT | Performed by: NURSE PRACTITIONER

## 2023-05-15 PROCEDURE — 1036F TOBACCO NON-USER: CPT | Performed by: NURSE PRACTITIONER

## 2023-05-15 PROCEDURE — 3074F SYST BP LT 130 MM HG: CPT | Performed by: NURSE PRACTITIONER

## 2023-05-15 PROCEDURE — 1090F PRES/ABSN URINE INCON ASSESS: CPT | Performed by: NURSE PRACTITIONER

## 2023-05-15 RX ORDER — CLOBETASOL PROPIONATE 0.5 MG/G
OINTMENT TOPICAL
COMMUNITY

## 2023-05-15 ASSESSMENT — ANXIETY QUESTIONNAIRES
3. WORRYING TOO MUCH ABOUT DIFFERENT THINGS: 0
7. FEELING AFRAID AS IF SOMETHING AWFUL MIGHT HAPPEN: 0
GAD7 TOTAL SCORE: 0
4. TROUBLE RELAXING: 0
IF YOU CHECKED OFF ANY PROBLEMS ON THIS QUESTIONNAIRE, HOW DIFFICULT HAVE THESE PROBLEMS MADE IT FOR YOU TO DO YOUR WORK, TAKE CARE OF THINGS AT HOME, OR GET ALONG WITH OTHER PEOPLE: NOT DIFFICULT AT ALL
5. BEING SO RESTLESS THAT IT IS HARD TO SIT STILL: 0
1. FEELING NERVOUS, ANXIOUS, OR ON EDGE: 0
2. NOT BEING ABLE TO STOP OR CONTROL WORRYING: 0
6. BECOMING EASILY ANNOYED OR IRRITABLE: 0

## 2023-05-15 ASSESSMENT — PATIENT HEALTH QUESTIONNAIRE - PHQ9
2. FEELING DOWN, DEPRESSED OR HOPELESS: 0
10. IF YOU CHECKED OFF ANY PROBLEMS, HOW DIFFICULT HAVE THESE PROBLEMS MADE IT FOR YOU TO DO YOUR WORK, TAKE CARE OF THINGS AT HOME, OR GET ALONG WITH OTHER PEOPLE: 0
SUM OF ALL RESPONSES TO PHQ9 QUESTIONS 1 & 2: 1
5. POOR APPETITE OR OVEREATING: 1
SUM OF ALL RESPONSES TO PHQ QUESTIONS 1-9: 6
6. FEELING BAD ABOUT YOURSELF - OR THAT YOU ARE A FAILURE OR HAVE LET YOURSELF OR YOUR FAMILY DOWN: 3
1. LITTLE INTEREST OR PLEASURE IN DOING THINGS: 1
9. THOUGHTS THAT YOU WOULD BE BETTER OFF DEAD, OR OF HURTING YOURSELF: 0
4. FEELING TIRED OR HAVING LITTLE ENERGY: 0
SUM OF ALL RESPONSES TO PHQ QUESTIONS 1-9: 6
3. TROUBLE FALLING OR STAYING ASLEEP: 1
SUM OF ALL RESPONSES TO PHQ QUESTIONS 1-9: 6
8. MOVING OR SPEAKING SO SLOWLY THAT OTHER PEOPLE COULD HAVE NOTICED. OR THE OPPOSITE, BEING SO FIGETY OR RESTLESS THAT YOU HAVE BEEN MOVING AROUND A LOT MORE THAN USUAL: 0
SUM OF ALL RESPONSES TO PHQ QUESTIONS 1-9: 6
7. TROUBLE CONCENTRATING ON THINGS, SUCH AS READING THE NEWSPAPER OR WATCHING TELEVISION: 0

## 2023-05-16 LAB
25(OH)D3 SERPL-MCNC: 67.6 NG/ML
ALBUMIN SERPL-MCNC: 4.1 G/DL (ref 3.4–5)
ALBUMIN/GLOB SERPL: 1.1 {RATIO} (ref 1.1–2.2)
ALP SERPL-CCNC: 101 U/L (ref 40–129)
ALT SERPL-CCNC: 39 U/L (ref 10–40)
ANION GAP SERPL CALCULATED.3IONS-SCNC: 14 MMOL/L (ref 3–16)
AST SERPL-CCNC: 71 U/L (ref 15–37)
BASOPHILS # BLD: 0.1 K/UL (ref 0–0.2)
BASOPHILS NFR BLD: 0.8 %
BILIRUB SERPL-MCNC: 0.6 MG/DL (ref 0–1)
BUN SERPL-MCNC: 19 MG/DL (ref 7–20)
CALCIUM SERPL-MCNC: 9.8 MG/DL (ref 8.3–10.6)
CHLORIDE SERPL-SCNC: 99 MMOL/L (ref 99–110)
CHOLEST SERPL-MCNC: 141 MG/DL (ref 0–199)
CO2 SERPL-SCNC: 26 MMOL/L (ref 21–32)
CREAT SERPL-MCNC: 1.1 MG/DL (ref 0.6–1.2)
CREAT UR-MCNC: 98.9 MG/DL (ref 28–259)
DEPRECATED RDW RBC AUTO: 14.6 % (ref 12.4–15.4)
EOSINOPHIL # BLD: 0.3 K/UL (ref 0–0.6)
EOSINOPHIL NFR BLD: 3.8 %
EST. AVERAGE GLUCOSE BLD GHB EST-MCNC: 139.9 MG/DL
FOLATE SERPL-MCNC: >20 NG/ML (ref 4.78–24.2)
GFR SERPLBLD CREATININE-BSD FMLA CKD-EPI: 55 ML/MIN/{1.73_M2}
GLUCOSE SERPL-MCNC: 126 MG/DL (ref 70–99)
HAV IGM SERPL QL IA: NORMAL
HBA1C MFR BLD: 6.5 %
HBV CORE IGM SERPL QL IA: NORMAL
HBV SURFACE AG SERPL QL IA: NORMAL
HCT VFR BLD AUTO: 38.8 % (ref 36–48)
HCV AB SERPL QL IA: NORMAL
HDLC SERPL-MCNC: 43 MG/DL (ref 40–60)
HGB BLD-MCNC: 12.9 G/DL (ref 12–16)
LDLC SERPL CALC-MCNC: 73 MG/DL
LYMPHOCYTES # BLD: 1.7 K/UL (ref 1–5.1)
LYMPHOCYTES NFR BLD: 19.7 %
MAGNESIUM SERPL-MCNC: 1.6 MG/DL (ref 1.8–2.4)
MCH RBC QN AUTO: 31.8 PG (ref 26–34)
MCHC RBC AUTO-ENTMCNC: 33.3 G/DL (ref 31–36)
MCV RBC AUTO: 95.4 FL (ref 80–100)
MICROALBUMIN UR DL<=1MG/L-MCNC: 2 MG/DL
MICROALBUMIN/CREAT UR: 20.2 MG/G (ref 0–30)
MONOCYTES # BLD: 0.5 K/UL (ref 0–1.3)
MONOCYTES NFR BLD: 5.9 %
NEUTROPHILS # BLD: 6.2 K/UL (ref 1.7–7.7)
NEUTROPHILS NFR BLD: 69.8 %
PLATELET # BLD AUTO: 191 K/UL (ref 135–450)
PMV BLD AUTO: 9.9 FL (ref 5–10.5)
POTASSIUM SERPL-SCNC: 5 MMOL/L (ref 3.5–5.1)
PROT SERPL-MCNC: 7.8 G/DL (ref 6.4–8.2)
RBC # BLD AUTO: 4.06 M/UL (ref 4–5.2)
SODIUM SERPL-SCNC: 139 MMOL/L (ref 136–145)
TRIGL SERPL-MCNC: 125 MG/DL (ref 0–150)
TSH SERPL DL<=0.005 MIU/L-ACNC: 4.92 UIU/ML (ref 0.27–4.2)
VIT B12 SERPL-MCNC: 347 PG/ML (ref 211–911)
VLDLC SERPL CALC-MCNC: 25 MG/DL
WBC # BLD AUTO: 8.9 K/UL (ref 4–11)

## 2023-05-16 ASSESSMENT — ENCOUNTER SYMPTOMS
ABDOMINAL PAIN: 0
NAUSEA: 0
EYES NEGATIVE: 1
SHORTNESS OF BREATH: 0
BLOOD IN STOOL: 0
GASTROINTESTINAL NEGATIVE: 1
ALLERGIC/IMMUNOLOGIC NEGATIVE: 1
ANAL BLEEDING: 0
RESPIRATORY NEGATIVE: 1

## 2023-05-17 RX ORDER — ALBUTEROL SULFATE 90 UG/1
2 AEROSOL, METERED RESPIRATORY (INHALATION) 4 TIMES DAILY PRN
Qty: 18 EACH | Refills: 5 | Status: SHIPPED | OUTPATIENT
Start: 2023-05-17

## 2023-05-23 DIAGNOSIS — E11.9 DIET-CONTROLLED TYPE 2 DIABETES MELLITUS (HCC): ICD-10-CM

## 2023-05-23 DIAGNOSIS — E03.9 HYPOTHYROIDISM, UNSPECIFIED TYPE: Primary | ICD-10-CM

## 2023-05-24 ENCOUNTER — HOSPITAL ENCOUNTER (OUTPATIENT)
Dept: ULTRASOUND IMAGING | Age: 68
Discharge: HOME | End: 2023-05-24
Payer: MEDICARE

## 2023-05-24 ENCOUNTER — HOSPITAL ENCOUNTER (OUTPATIENT)
Dept: NURSING | Age: 68
Setting detail: INFUSION SERIES
Discharge: HOME | End: 2023-05-24
Payer: MEDICARE

## 2023-05-24 VITALS
HEART RATE: 89 BPM | BODY MASS INDEX: 39.82 KG/M2 | RESPIRATION RATE: 16 BRPM | HEIGHT: 65 IN | SYSTOLIC BLOOD PRESSURE: 151 MMHG | DIASTOLIC BLOOD PRESSURE: 73 MMHG | TEMPERATURE: 98.3 F | WEIGHT: 239 LBS

## 2023-05-24 DIAGNOSIS — R74.8 ACID PHOSPHATASE ELEVATED: ICD-10-CM

## 2023-05-24 DIAGNOSIS — K51.00 CHRONIC ULCERATIVE ENTEROCOLITIS WITHOUT COMPLICATION (HCC): Primary | ICD-10-CM

## 2023-05-24 PROCEDURE — 76705 ECHO EXAM OF ABDOMEN: CPT

## 2023-05-24 PROCEDURE — 96365 THER/PROPH/DIAG IV INF INIT: CPT

## 2023-05-24 PROCEDURE — 99211 OFF/OP EST MAY X REQ PHY/QHP: CPT

## 2023-05-24 PROCEDURE — 2580000003 HC RX 258: Performed by: INTERNAL MEDICINE

## 2023-05-24 PROCEDURE — 6360000002 HC RX W HCPCS: Performed by: INTERNAL MEDICINE

## 2023-05-24 RX ORDER — SODIUM CHLORIDE 9 MG/ML
INJECTION, SOLUTION INTRAVENOUS CONTINUOUS
Status: ACTIVE | OUTPATIENT
Start: 2023-05-24 | End: 2023-05-24

## 2023-05-24 RX ORDER — SODIUM CHLORIDE 9 MG/ML
INJECTION, SOLUTION INTRAVENOUS CONTINUOUS
OUTPATIENT
Start: 2023-06-21

## 2023-05-24 RX ORDER — SODIUM CHLORIDE 0.9 % (FLUSH) 0.9 %
10 SYRINGE (ML) INJECTION PRN
Status: DISCONTINUED | OUTPATIENT
Start: 2023-05-24 | End: 2023-05-25 | Stop reason: HOSPADM

## 2023-05-24 RX ORDER — SODIUM CHLORIDE 0.9 % (FLUSH) 0.9 %
10 SYRINGE (ML) INJECTION PRN
OUTPATIENT
Start: 2023-06-21

## 2023-05-24 RX ADMIN — SODIUM CHLORIDE: 9 INJECTION, SOLUTION INTRAVENOUS at 08:15

## 2023-05-24 RX ADMIN — Medication 10 ML: at 08:14

## 2023-05-24 RX ADMIN — VEDOLIZUMAB 300 MG: 300 INJECTION, POWDER, LYOPHILIZED, FOR SOLUTION INTRAVENOUS at 08:16

## 2023-05-24 ASSESSMENT — PAIN SCALES - GENERAL: PAINLEVEL_OUTOF10: 2

## 2023-05-24 ASSESSMENT — PAIN DESCRIPTION - DESCRIPTORS: DESCRIPTORS: ACHING

## 2023-05-24 ASSESSMENT — PAIN DESCRIPTION - FREQUENCY: FREQUENCY: CONTINUOUS

## 2023-05-24 ASSESSMENT — PAIN DESCRIPTION - LOCATION: LOCATION: BACK

## 2023-05-24 ASSESSMENT — PAIN DESCRIPTION - ORIENTATION: ORIENTATION: LOWER

## 2023-05-24 NOTE — PROGRESS NOTES
Pt here for entyvio infusion  Pt reporting lower back discomfort today rated a 2 out of 10  She reports that her stools are almost normal  Pt reports some bloating but denies any  cramping,bleeding or diarrhea  IV # 22 was started in RAC on 1st attempt per myself No blood work needed for today  Pt gayla well  Entyvio 300 mg IVPB starting to infuse  IV site unremarkable Will monitor

## 2023-05-24 NOTE — DISCHARGE INSTRUCTIONS
vedolizumab  Pronunciation:  GEN Rios  What is vedolizumab? Vedolizumab reduces the effects of a substance in the body that can cause inflammation. Vedolizumab is used in adults with moderate to severe ulcerative colitis (UC), or moderate to severe Crohn's disease. Vedolizumab treats active disease and may help keep UC or Crohn's symptoms under control long term. Vedolizumab may also reduce the need for steroid medicines in helping to control symptoms long term. Vedolizumab is usually given after other medicines have been tried without success. Vedolizumab may also be used for purposes not listed in this medication guide. What should I discuss with my healthcare provider before receiving vedolizumab? You should not use vedolizumab if you are allergic to it. To make sure vedolizumab is safe for you, tell your doctor if you have:  an active or recent infection;  tuberculosis (or if you have close contact with someone who has tuberculosis);  signs of infection such as fever, cough, or flu symptoms;  open sores or skin wounds;  weak immune system (caused by disease or by using certain medicine); or  if you are scheduled to receive any vaccines. You should be up to date with all needed vaccinations before receiving vedolizumab. FDA pregnancy category Jimmie Chambers is not expected to harm an unborn baby. Tell your doctor if you are pregnant or plan to become pregnant during treatment. If you are pregnant, your name may be listed on a pregnancy registry. This is to track the outcome of the pregnancy and to evaluate any effects of vedolizumab on the baby. It is not known whether vedolizumab passes into breast milk or if it could harm a nursing baby. Tell your doctor if you are breast-feeding a baby. Do not give this medicine to anyone under 25years old without medical advice. How is vedolizumab given?   Before you start treatment with vedolizumab, your doctor may perform tests to

## 2023-06-19 DIAGNOSIS — E53.8 B12 DEFICIENCY: ICD-10-CM

## 2023-06-19 RX ORDER — FOLIC ACID 1 MG/1
TABLET ORAL
Qty: 90 TABLET | Refills: 3 | Status: SHIPPED | OUTPATIENT
Start: 2023-06-19

## 2023-06-19 NOTE — TELEPHONE ENCOUNTER
Oliva Moody is requesting refill(s)   Last OV 6- (pertaining to medication)  LR 8- (per medication requested)  Next office visit scheduled or attempted Yes   If no, reason:  11-

## 2023-06-21 ENCOUNTER — HOSPITAL ENCOUNTER (OUTPATIENT)
Dept: NURSING | Age: 68
Setting detail: INFUSION SERIES
Discharge: HOME | End: 2023-06-21
Payer: MEDICARE

## 2023-06-21 VITALS
DIASTOLIC BLOOD PRESSURE: 75 MMHG | WEIGHT: 239 LBS | RESPIRATION RATE: 16 BRPM | TEMPERATURE: 97.2 F | SYSTOLIC BLOOD PRESSURE: 167 MMHG | HEIGHT: 65 IN | HEART RATE: 95 BPM | BODY MASS INDEX: 39.82 KG/M2

## 2023-06-21 DIAGNOSIS — K51.00 CHRONIC ULCERATIVE ENTEROCOLITIS WITHOUT COMPLICATION (HCC): Primary | ICD-10-CM

## 2023-06-21 LAB
ALBUMIN SERPL-MCNC: 4 G/DL (ref 3.4–5)
ALBUMIN/GLOB SERPL: 1.1 {RATIO} (ref 1.1–2.2)
ALP SERPL-CCNC: 88 U/L (ref 40–129)
ALT SERPL-CCNC: 40 U/L (ref 10–40)
ANION GAP SERPL CALCULATED.3IONS-SCNC: 11 MMOL/L (ref 3–16)
AST SERPL-CCNC: 77 U/L (ref 15–37)
BILIRUB SERPL-MCNC: 0.8 MG/DL (ref 0–1)
BUN SERPL-MCNC: 18 MG/DL (ref 7–20)
CALCIUM SERPL-MCNC: 9.8 MG/DL (ref 8.3–10.6)
CHLORIDE SERPL-SCNC: 100 MMOL/L (ref 99–110)
CO2 SERPL-SCNC: 25 MMOL/L (ref 21–32)
CREAT SERPL-MCNC: 0.9 MG/DL (ref 0.6–1.2)
DEPRECATED RDW RBC AUTO: 14.9 % (ref 12.4–15.4)
GFR SERPLBLD CREATININE-BSD FMLA CKD-EPI: >60 ML/MIN/{1.73_M2}
GLUCOSE SERPL-MCNC: 162 MG/DL (ref 70–99)
HCT VFR BLD AUTO: 36.3 % (ref 36–48)
HGB BLD-MCNC: 12.2 G/DL (ref 12–16)
MCH RBC QN AUTO: 31.8 PG (ref 26–34)
MCHC RBC AUTO-ENTMCNC: 33.7 G/DL (ref 31–36)
MCV RBC AUTO: 94.4 FL (ref 80–100)
PLATELET # BLD AUTO: 130 K/UL (ref 135–450)
PMV BLD AUTO: 10.1 FL (ref 5–10.5)
POTASSIUM SERPL-SCNC: 5.1 MMOL/L (ref 3.5–5.1)
PROT SERPL-MCNC: 7.8 G/DL (ref 6.4–8.2)
RBC # BLD AUTO: 3.84 M/UL (ref 4–5.2)
SODIUM SERPL-SCNC: 136 MMOL/L (ref 136–145)
WBC # BLD AUTO: 6.3 K/UL (ref 4–11)

## 2023-06-21 PROCEDURE — 6360000002 HC RX W HCPCS: Performed by: INTERNAL MEDICINE

## 2023-06-21 PROCEDURE — 99211 OFF/OP EST MAY X REQ PHY/QHP: CPT

## 2023-06-21 PROCEDURE — 80053 COMPREHEN METABOLIC PANEL: CPT

## 2023-06-21 PROCEDURE — 85027 COMPLETE CBC AUTOMATED: CPT

## 2023-06-21 PROCEDURE — 96365 THER/PROPH/DIAG IV INF INIT: CPT

## 2023-06-21 PROCEDURE — 2580000003 HC RX 258: Performed by: INTERNAL MEDICINE

## 2023-06-21 RX ORDER — SODIUM CHLORIDE 9 MG/ML
INJECTION, SOLUTION INTRAVENOUS CONTINUOUS
Status: ACTIVE | OUTPATIENT
Start: 2023-06-21 | End: 2023-06-21

## 2023-06-21 RX ORDER — SODIUM CHLORIDE 0.9 % (FLUSH) 0.9 %
10 SYRINGE (ML) INJECTION PRN
OUTPATIENT
Start: 2023-07-19

## 2023-06-21 RX ORDER — SODIUM CHLORIDE 9 MG/ML
INJECTION, SOLUTION INTRAVENOUS CONTINUOUS
OUTPATIENT
Start: 2023-07-19

## 2023-06-21 RX ADMIN — SODIUM CHLORIDE: 9 INJECTION, SOLUTION INTRAVENOUS at 10:47

## 2023-06-21 RX ADMIN — VEDOLIZUMAB 300 MG: 300 INJECTION, POWDER, LYOPHILIZED, FOR SOLUTION INTRAVENOUS at 10:48

## 2023-06-21 ASSESSMENT — PAIN SCALES - GENERAL: PAINLEVEL_OUTOF10: 0

## 2023-06-21 NOTE — PROGRESS NOTES
Pt here for entyvio infusion  Pt without c/o's today  She reports that her stools are almost normal  Pt reports some bloating but denies any  cramping,bleeding or diarrhea  IV # 22 was started in Rt hand on my 2nd  attempt Blood for lab work was drawn without difficulty  Pt gayla well  Entyvio 300 mg IVPB starting to infuse  IV site unremarkable Will monitor

## 2023-06-21 NOTE — DISCHARGE INSTRUCTIONS
have any of these signs of an allergic reaction: hives; difficult breathing; swelling of your face, lips, tongue, or throat. Some side effects may occur during the injection. Tell your caregiver right away if you feel warm or tingly, or if you have a severe headache, fast heart rate, pounding in your neck or ears, chest tightness, or trouble breathing. Some people using a medicine similar to vedolizumab have developed a serious viral infection of the brain that can lead to disability or death. Call your doctor right away if you have any change in your mental state, decreased vision, or problems with speech or walking. These symptoms may start gradually and get worse quickly. Stop using vedolizumab and call your doctor at once if you have:  fever, chills, body aches, cold or flu symptoms, mouth and throat ulcers, skin sores;  pain, warmth, swelling, or oozing around your anal area;  nausea, vomiting, severe diarrhea, diarrhea that is watery or bloody, stomach cramps, weight loss;  cough, pain when swallowing; or  liver problems --nausea, upper stomach pain, itching, tired feeling, loss of appetite, dark urine, shanita-colored stools, jaundice (yellowing of the skin or eyes). Common side effects may include:  fever, sore throat, flu symptoms;  cold symptoms such as stuffy nose, sinus pain, sneezing, cough;  pain in your arms or legs;  tired feeling;  headache, joint pain, back pain;  rash, itching; or  nausea. This is not a complete list of side effects and others may occur. Call your doctor for medical advice about side effects. You may report side effects to FDA at 8-837-FDA-1867. What other drugs will affect vedolizumab?   Tell your doctor about all medicines you use, and those you start or stop using during your treatment with vedolizumab, especially:  natalizumab;  medicines to treat psoriasis or rheumatoid arthritis, such as etanercept or golimumab;  other medicines to treat Crohn's disease or ulcerative

## 2023-07-12 RX ORDER — LEVOTHYROXINE SODIUM 0.12 MG/1
TABLET ORAL
Qty: 90 TABLET | Refills: 0 | Status: SHIPPED | OUTPATIENT
Start: 2023-07-12

## 2023-07-12 NOTE — TELEPHONE ENCOUNTER
Refill Request     CONFIRM preferrred pharmacy with the patient. If Mail Order Rx - Pend for 90 day refill. Last Seen: Last Seen Department: 6/16/2023  Last Seen by PCP: Visit date not found    Last Written: 05/05/2023    If no future appointment scheduled, route STAFF MESSAGE with patient name to the [facility] for scheduling. Next Appointment:   Future Appointments   Date Time Provider Department Center   7/19/2023  9:00 AM [facility] OP NURSING ROOM 1 [facility] OP RN [place] [facility]   8/16/2023  9:00 AM [facility] OP NURSING ROOM 1 [facility] OP RN [place] [facility]   9/13/2023  9:00 AM [facility] OP NURSING ROOM 1 [facility] OP RN [place] [facility]   10/11/2023  9:30 AM [facility] OP NURSING ROOM 1 [facility] OP RN [place]   11/8/2023  9:00 AM [facility] OP NURSING ROOM 1 [facility] OP RN [place] [facility]   11/16/2023  9:00 AM YAHAIRA Samuels - CNP [facility] [place] [facility]   12/6/2023  9:00 AM [facility] OP NURSING ROOM 1 [facility] OP RN [place] [facility]   1/3/2024  9:00 AM [facility] OP NURSING ROOM 1 [facility] OP RN [place]       Message sent to [employer] to schedule appt with patient?   N/A      Requested Prescriptions     Pending Prescriptions Disp Refills    levothyroxine (SYNTHROID) 125 MCG tablet [Pharmacy Med Name: LEVOTHYROXINE SODIUM 125 MCG Tablet] 90 tablet 0     Sig: TAKE 1 TABLET EVERY DAY

## 2023-07-18 DIAGNOSIS — E11.9 DIET-CONTROLLED TYPE 2 DIABETES MELLITUS (HCC): Primary | ICD-10-CM

## 2023-07-18 NOTE — TELEPHONE ENCOUNTER
Refill Request     CONFIRM preferrred pharmacy with the patient. If Mail Order Rx - Pend for 90 day refill. Last Seen: Last Seen Department: 2023  Last Seen by PCP: 2023    Last Written: 18    If no future appointment scheduled, route STAFF MESSAGE with patient name to the [facility] for scheduling. Next Appointment:   Future Appointments   Date Time Provider [address]   2023  9:00 AM [facility] OP NURSING ROOM 1 [facility] OP [place] [facility]   2023 10:00 AM [facility] US RM 1 [facility] ULTRA [facility]   2023  9:00 AM [facility] OP NURSING ROOM 1 [facility] OP RN [place] [facility]   2023  9:00 AM [facility] OP NURSING ROOM 1 [facility] OP RN [place] [facility]   10/11/2023  9:30 AM [facility] OP NURSING ROOM 1 [facility] OP [facility] [place] [facility]   2023  9:00 AM [facility] OP NURSING ROOM 1 [facility] OP RN [place] [facility]   2023  9:00 AM Wes Garcias APRN - CNP [facility] [place] [facility]   2023  9:00 AM [facility] OP NURSING ROOM 1 [facility] OP RN [place] [facility]   1/3/2024  9:00 AM [facility] OP NURSING ROOM 1 [facility] OP RN [place] [facility]       Message sent to Opal Monahan to schedule appt with patient? YES      Requested Prescriptions     Pending Prescriptions Disp Refills    blood glucose test strips (ASCENSIA AUTODISC VI;ONE TOUCH ULTRA TEST VI) strip 100 each 1     Si each by In Vitro route daily As needed.

## 2023-07-19 ENCOUNTER — HOSPITAL ENCOUNTER (OUTPATIENT)
Dept: NURSING | Age: 68
Setting detail: INFUSION SERIES
Discharge: HOME | End: 2023-07-19
Payer: MEDICARE

## 2023-07-19 VITALS
RESPIRATION RATE: 16 BRPM | BODY MASS INDEX: 39.82 KG/M2 | DIASTOLIC BLOOD PRESSURE: 80 MMHG | SYSTOLIC BLOOD PRESSURE: 150 MMHG | WEIGHT: 239 LBS | HEIGHT: 65 IN | HEART RATE: 93 BPM | TEMPERATURE: 97.3 F

## 2023-07-19 DIAGNOSIS — K51.00 CHRONIC ULCERATIVE ENTEROCOLITIS WITHOUT COMPLICATION (HCC): Primary | ICD-10-CM

## 2023-07-19 PROCEDURE — 2580000003 HC RX 258: Performed by: INTERNAL MEDICINE

## 2023-07-19 PROCEDURE — 96365 THER/PROPH/DIAG IV INF INIT: CPT

## 2023-07-19 PROCEDURE — 99211 OFF/OP EST MAY X REQ PHY/QHP: CPT

## 2023-07-19 PROCEDURE — 6360000002 HC RX W HCPCS: Performed by: INTERNAL MEDICINE

## 2023-07-19 RX ORDER — SODIUM CHLORIDE 0.9 % (FLUSH) 0.9 %
10 SYRINGE (ML) INJECTION PRN
OUTPATIENT
Start: 2023-08-16

## 2023-07-19 RX ORDER — SODIUM CHLORIDE 9 MG/ML
INJECTION, SOLUTION INTRAVENOUS CONTINUOUS
Status: ACTIVE | OUTPATIENT
Start: 2023-07-19 | End: 2023-07-19

## 2023-07-19 RX ORDER — SODIUM CHLORIDE 0.9 % (FLUSH) 0.9 %
10 SYRINGE (ML) INJECTION PRN
Status: DISCONTINUED | OUTPATIENT
Start: 2023-07-19 | End: 2023-07-20 | Stop reason: HOSPADM

## 2023-07-19 RX ORDER — SODIUM CHLORIDE 9 MG/ML
INJECTION, SOLUTION INTRAVENOUS CONTINUOUS
OUTPATIENT
Start: 2023-08-16

## 2023-07-19 RX ADMIN — SODIUM CHLORIDE: 9 INJECTION, SOLUTION INTRAVENOUS at 09:27

## 2023-07-19 RX ADMIN — VEDOLIZUMAB 300 MG: 300 INJECTION, POWDER, LYOPHILIZED, FOR SOLUTION INTRAVENOUS at 09:28

## 2023-07-19 RX ADMIN — Medication 10 ML: at 09:24

## 2023-07-19 ASSESSMENT — PAIN SCALES - GENERAL: PAINLEVEL_OUTOF10: 0

## 2023-07-19 NOTE — DISCHARGE INSTRUCTIONS
vedolizumab  Pronunciation:  GEN Kirk  What is vedolizumab? Vedolizumab reduces the effects of a substance in the body that can cause inflammation. Vedolizumab is used in adults with moderate to severe ulcerative colitis (UC), or moderate to severe Crohn's disease. Vedolizumab treats active disease and may help keep UC or Crohn's symptoms under control long term. Vedolizumab may also reduce the need for steroid medicines in helping to control symptoms long term. Vedolizumab is usually given after other medicines have been tried without success. Vedolizumab may also be used for purposes not listed in this medication guide. What should I discuss with my healthcare provider before receiving vedolizumab? You should not use vedolizumab if you are allergic to it. To make sure vedolizumab is safe for you, tell your doctor if you have:  an active or recent infection;  tuberculosis (or if you have close contact with someone who has tuberculosis);  signs of infection such as fever, cough, or flu symptoms;  open sores or skin wounds;  weak immune system (caused by disease or by using certain medicine); or  if you are scheduled to receive any vaccines. You should be up to date with all needed vaccinations before receiving vedolizumab. FDA pregnancy category Radha Coil is not expected to harm an unborn baby. Tell your doctor if you are pregnant or plan to become pregnant during treatment. If you are pregnant, your name may be listed on a pregnancy registry. This is to track the outcome of the pregnancy and to evaluate any effects of vedolizumab on the baby. It is not known whether vedolizumab passes into breast milk or if it could harm a nursing baby. Tell your doctor if you are breast-feeding a baby. Do not give this medicine to anyone under 25years old without medical advice. How is vedolizumab given?   Before you start treatment with vedolizumab, your doctor may perform tests to make sure you do not have tuberculosis or other infections. Vedolizumab is injected into a vein through an IV. A healthcare provider will give you this injection. Vedolizumab must be given slowly, and the IV infusion can take about 30 minutes to complete. You will be watched closely for a short time after receiving vedolizumab, to make sure you do not have an allergic reaction to the medication. Vedolizumab is usually given on a schedule that starts the day of your first injection. Your next injections will be given at 2 weeks and 6 weeks after the first injection. Then you will receive an injection every 8 weeks (or every 2 months) thereafter. It may take up to several weeks before your symptoms improve. Keep using the medication as directed and tell your doctor if your symptoms do not improve after 14 weeks of treatment. What happens if I miss a dose? Call your doctor for instructions if you miss an appointment for your vedolizumab injection. What happens if I overdose? Since this medicine is given by a healthcare professional in a medical setting, an overdose is unlikely to occur. What should I avoid while receiving vedolizumab? Do not receive a \"live\" vaccine while using vedolizumab, and avoid coming into contact with anyone who has recently received a live vaccine. There is a chance that the virus could be passed on to you. Live vaccines include measles, mumps, rubella (MMR), polio, rotavirus, typhoid, yellow fever, varicella (chickenpox), zoster (shingles), and nasal flu (influenza) vaccine. You may receive \"killed-virus\" vaccines such as a flu shot, polio vaccine, rabies vaccine, or hepatitis A vaccine. Ask your doctor before receiving any vaccine while you are being treated with vedolizumab. Avoid being near people who are sick or have infections. Tell your doctor at once if you develop signs of infection. What are the possible side effects of vedolizumab?   Get emergency medical help if you

## 2023-07-19 NOTE — PROGRESS NOTES
Pt here for entyvio infusion  Pt without c/o's today  She reports that her stools are pretty  normal now  Pt reports some occasional bloating but denies any cramping, bleeding or  Diarrhea IV # 24 was started in [facility] on my 3rd attempt No blood work needed for today   Pt gayla well  Entyvio 300 mg IVPB starting to infuse  IV site unremarkable Will monitor

## 2023-07-19 NOTE — PROGRESS NOTES
Entyvio infused and tubing was flushed with 120 cc NS  IV removed without difficulty Cath tip intact  Pressure then pressure dressing was applied and secured with a coban dressing  IV site unremarkable  Discharge instructions reviewed with pt and copy was given  Understanding verbalized  Pt was then discharged ambulatory in stable condition

## 2023-07-26 ENCOUNTER — HOSPITAL ENCOUNTER (OUTPATIENT)
Dept: CT IMAGING | Age: 68
Discharge: HOME | End: 2023-07-26
Payer: MEDICARE

## 2023-07-26 ENCOUNTER — HOSPITAL ENCOUNTER (OUTPATIENT)
Age: 68
Discharge: HOME | End: 2023-07-26
Payer: MEDICARE

## 2023-07-26 VITALS
OXYGEN SATURATION: 99 % | DIASTOLIC BLOOD PRESSURE: 58 MMHG | HEIGHT: 65 IN | RESPIRATION RATE: 13 BRPM | WEIGHT: 238 LBS | SYSTOLIC BLOOD PRESSURE: 124 MMHG | HEART RATE: 66 BPM | BODY MASS INDEX: 39.65 KG/M2

## 2023-07-26 DIAGNOSIS — R93.2 ABNORMAL CT OF LIVER: ICD-10-CM

## 2023-07-26 LAB
ALBUMIN SERPL-MCNC: 3.7 G/DL (ref 3.4–5)
ALBUMIN/GLOB SERPL: 1 {RATIO} (ref 1.1–2.2)
ALP SERPL-CCNC: 85 U/L (ref 40–129)
ALT SERPL-CCNC: 30 U/L (ref 10–40)
ANION GAP SERPL CALCULATED.3IONS-SCNC: 11 MMOL/L (ref 3–16)
AST SERPL-CCNC: 64 U/L (ref 15–37)
BILIRUB SERPL-MCNC: 0.8 MG/DL (ref 0–1)
BUN SERPL-MCNC: 15 MG/DL (ref 7–20)
CALCIUM SERPL-MCNC: 9.6 MG/DL (ref 8.3–10.6)
CHLORIDE SERPL-SCNC: 102 MMOL/L (ref 99–110)
CO2 SERPL-SCNC: 27 MMOL/L (ref 21–32)
CREAT SERPL-MCNC: 1.1 MG/DL (ref 0.6–1.2)
DEPRECATED RDW RBC AUTO: 14.7 % (ref 12.4–15.4)
GFR SERPLBLD CREATININE-BSD FMLA CKD-EPI: 55 ML/MIN/{1.73_M2}
GLUCOSE BLD-MCNC: 117 MG/DL (ref 70–99)
GLUCOSE SERPL-MCNC: 129 MG/DL (ref 70–99)
HCT VFR BLD AUTO: 35.9 % (ref 36–48)
HGB BLD-MCNC: 12.2 G/DL (ref 12–16)
INR PPP: 1.17 (ref 0.84–1.16)
MCH RBC QN AUTO: 31.9 PG (ref 26–34)
MCHC RBC AUTO-ENTMCNC: 34.1 G/DL (ref 31–36)
MCV RBC AUTO: 93.7 FL (ref 80–100)
PERFORMED ON: ABNORMAL
PLATELET # BLD AUTO: 145 K/UL (ref 135–450)
PMV BLD AUTO: 9.8 FL (ref 5–10.5)
POTASSIUM SERPL-SCNC: 4 MMOL/L (ref 3.5–5.1)
PROT SERPL-MCNC: 7.4 G/DL (ref 6.4–8.2)
PROTHROMBIN TIME: 14.9 SEC (ref 11.5–14.8)
RBC # BLD AUTO: 3.84 M/UL (ref 4–5.2)
SODIUM SERPL-SCNC: 140 MMOL/L (ref 136–145)
WBC # BLD AUTO: 6.8 K/UL (ref 4–11)

## 2023-07-26 PROCEDURE — 77012 CT SCAN FOR NEEDLE BIOPSY: CPT

## 2023-07-26 PROCEDURE — 2709999900 CT NEEDLE BIOPSY LIVER PERCUTANEOUS

## 2023-07-26 PROCEDURE — 85610 PROTHROMBIN TIME: CPT

## 2023-07-26 PROCEDURE — 80053 COMPREHEN METABOLIC PANEL: CPT

## 2023-07-26 PROCEDURE — 85027 COMPLETE CBC AUTOMATED: CPT

## 2023-07-26 PROCEDURE — 6360000002 HC RX W HCPCS: Performed by: RADIOLOGY

## 2023-07-26 PROCEDURE — 88305 TISSUE EXAM BY PATHOLOGIST: CPT

## 2023-07-26 PROCEDURE — 88313 SPECIAL STAINS GROUP 2: CPT

## 2023-07-26 PROCEDURE — 36415 COLL VENOUS BLD VENIPUNCTURE: CPT

## 2023-07-26 RX ORDER — OXYCODONE HYDROCHLORIDE AND ACETAMINOPHEN 5; 325 MG/1; MG/1
1 TABLET ORAL EVERY 4 HOURS PRN
Status: DISCONTINUED | OUTPATIENT
Start: 2023-07-26 | End: 2023-07-27 | Stop reason: HOSPADM

## 2023-07-26 RX ORDER — FENTANYL CITRATE 50 UG/ML
INJECTION, SOLUTION INTRAMUSCULAR; INTRAVENOUS PRN
Status: COMPLETED | OUTPATIENT
Start: 2023-07-26 | End: 2023-07-26

## 2023-07-26 RX ORDER — OXYCODONE HYDROCHLORIDE AND ACETAMINOPHEN 5; 325 MG/1; MG/1
2 TABLET ORAL EVERY 4 HOURS PRN
Status: DISCONTINUED | OUTPATIENT
Start: 2023-07-26 | End: 2023-07-27 | Stop reason: HOSPADM

## 2023-07-26 RX ORDER — ACETAMINOPHEN 325 MG/1
650 TABLET ORAL EVERY 4 HOURS PRN
Status: DISCONTINUED | OUTPATIENT
Start: 2023-07-26 | End: 2023-07-27 | Stop reason: HOSPADM

## 2023-07-26 RX ORDER — ONDANSETRON 2 MG/ML
4 INJECTION INTRAMUSCULAR; INTRAVENOUS EVERY 8 HOURS PRN
Status: DISCONTINUED | OUTPATIENT
Start: 2023-07-26 | End: 2023-07-27 | Stop reason: HOSPADM

## 2023-07-26 RX ORDER — MIDAZOLAM HYDROCHLORIDE 1 MG/ML
INJECTION INTRAMUSCULAR; INTRAVENOUS PRN
Status: COMPLETED | OUTPATIENT
Start: 2023-07-26 | End: 2023-07-26

## 2023-07-26 RX ADMIN — MIDAZOLAM 1 MG: 1 INJECTION INTRAMUSCULAR; INTRAVENOUS at 10:45

## 2023-07-26 RX ADMIN — FENTANYL CITRATE 50 MCG: 50 INJECTION INTRAMUSCULAR; INTRAVENOUS at 10:45

## 2023-07-26 RX ADMIN — MIDAZOLAM 1 MG: 1 INJECTION INTRAMUSCULAR; INTRAVENOUS at 10:50

## 2023-07-26 RX ADMIN — FENTANYL CITRATE 50 MCG: 50 INJECTION INTRAMUSCULAR; INTRAVENOUS at 10:50

## 2023-07-26 ASSESSMENT — PAIN - FUNCTIONAL ASSESSMENT: PAIN_FUNCTIONAL_ASSESSMENT: NONE - DENIES PAIN

## 2023-07-26 NOTE — OR NURSING
Image guided liver biopsy biopsy completed by Dr. Gabby Trotter. Pt tolerated procedure without any signs or symptoms of distress. Vital signs stable. Pt transported back to IR holding in stable condition via stretcher. Total Biopsy: 2  Received: Versed: 2 mg       Fentanyl: 100 mcg  Bandage to rt side that is clean dry and intact. Patient to lay on right side for 1 hour.      Vital Signs  Vitals:    07/26/23 1049   BP: 133/63   Pulse: 89   Resp: 13   SpO2: 93%    (vital signs in table format)    Post Rohit  2 - Able to move 4 extremities voluntarily on command  2 - BP+/- 20mmHg of normal  1 - Arousable on calling  2 - Able to maintain oxygen saturation >92% on room air  2 - Able to breathe deeply and cough freely

## 2023-07-26 NOTE — FLOWSHEET NOTE
IV removed with cathlon intact. 2x2 dsg applied. Discharge instructions given verbal and written. Voiced understanding of same and that ordering MD would get results of biopsy in 3-5 days. From unit with cane assisted steady gait with friend.

## 2023-07-26 NOTE — OR NURSING
Pt arrived for image guided random liver biopsy. Procedure explained including the risk and benefits of the procedure. All questions answered. Pt verbalizes understanding of the of procedure and states no more questions. Consent signed. Vital signs stable, labs, allergies, medications, and code status reviewed. No contraindications noted.      Vitals:    07/26/23 0947   BP: (!) 140/73   Pulse: 79   Resp: 14   SpO2: 98%    (vital signs in table format)    Rohit Score  2 - Able to move 4 extremities voluntarily on command  2 - BP+/- 20mmHg of normal  2 - Fully awake  2 - Able to maintain oxygen saturation >92% on room air  2 - Able to breathe deeply and cough freely    Allergies  Amoxicillin, Pravastatin, and Wellbutrin [bupropion]    Labs  Lab Results   Component Value Date    INR 1.17 (H) 07/26/2023    PROTIME 14.9 (H) 07/26/2023       Lab Results   Component Value Date    CREATININE 1.1 07/26/2023    BUN 15 07/26/2023     07/26/2023    K 4.0 07/26/2023     07/26/2023    CO2 27 07/26/2023       Lab Results   Component Value Date    WBC 6.8 07/26/2023    HGB 12.2 07/26/2023    HCT 35.9 (L) 07/26/2023    MCV 93.7 07/26/2023     07/26/2023

## 2023-07-26 NOTE — PRE SEDATION
Sedation Pre-Procedure Note    Patient Name: Shannon Malik   YOB: 1955  Room/Bed: Room/bed info not found  Medical Record Number: [id number]  Date: 7/26/2023   Time: 10:32 AM       Indication:  fatty liver    Consent: I have discussed with the patient and/or the patient representative the indication, alternatives, and the possible risks and/or complications of the planned procedure and the anesthesia methods. The patient and/or patient representative appear to understand and agree to proceed. Vital Signs:   Vitals:    07/26/23 0947   BP: (!) 140/73   Pulse: 79   Resp: 14   SpO2: 98%       Past Medical History:   has a past medical history of Acute pancreatitis, Acute renal failure with tubular necrosis (HCC), Acute stasis dermatitis of left lower extremity, Angina pectoris ([address]), Ankylosing spondylitis of multiple sites in spine ([address]), Anxiety, Cellulitis of left leg, Chronic back pain, Chronic kidney disease, Clostridium difficile infection, Colitis, Depression, Diabetes mellitus ([address]), Hearing loss, Heart murmur, Hyperlipidemia, Hypertension, Hypothyroidism, Left THR, Liver disease, Lumbar radiculitis, Obesity, Palpitations, Primary osteoarthritis of left hip, Restless legs syndrome, Shock ([address]), Superficial thrombophlebitis of left leg, Thyroid disease, Type 2 diabetes mellitus without complication ([address]), Ulcerative (chronic) enterocolitis ([address]), Uncomplicated asthma, and Venous ulcer of left leg ([address]). Past Surgical History:   has a past surgical history that includes Appendectomy (1962); Tonsillectomy (1957); Varicose vein surgery (Bilateral, 1988); Colonoscopy; Cardiac catheterization (05/2014); Hip Arthroplasty (Left, 01/19/2016); Upper gastrointestinal endoscopy (10/15/2018); pr egd transoral biopsy single/multiple (10/15/2018); pr egd intrmural needle aspir/biop altered anatomy (10/15/2018); epidural steroid injection (Left, 01/22/2019);  Intracapsular cataract extraction (Left,

## 2023-08-01 RX ORDER — ALENDRONATE SODIUM 70 MG/1
TABLET ORAL
Qty: 12 TABLET | Refills: 3 | Status: SHIPPED | OUTPATIENT
Start: 2023-08-01

## 2023-08-01 NOTE — TELEPHONE ENCOUNTER
Refill Request     CONFIRM preferrred pharmacy with the patient. If Mail Order Rx - Pend for 90 day refill. Last Seen: Last Seen Department: 6/16/2023  Last Seen by PCP: Visit date not found    Last Written: [id number]    If no future appointment scheduled, route STAFF MESSAGE with patient name to the [facility] for scheduling. Next Appointment:   Future Appointments   Date Time Provider [address]   8/16/2023  9:00 AM [facility] OP NURSING ROOM 1 [facility] OP RN [facility]   9/13/2023  9:00 AM [facility] OP NURSING ROOM 1 [facility] OP RN [facility]   10/11/2023  9:30 AM [facility] OP NURSING ROOM 1 [facility] RN [facility]   11/8/2023  9:00 AM [facility] OP NURSING ROOM 1 [facility] OP RN [facility]   11/16/2023  9:00 AM Keren Leal APRN - CNP [facility] [place] [facility]   12/6/2023  9:00 AM [facility] OP NURSING ROOM 1 [facility] OP RN [facility]   1/3/2024  9:00 AM [facility] OP NURSING ROOM 1 [facility] OP RN [facility]       Message sent to Opal Rivasall Hero to schedule appt with patient?   N/A      Requested Prescriptions     Pending Prescriptions Disp Refills    alendronate (FOSAMAX) 70 MG tablet [Pharmacy Med Name: ALENDRONATE SODIUM 70 MG Tablet] 12 tablet 0     Sig: TAKE 1 TABLET EVERY 7 DAYS

## 2023-08-14 DIAGNOSIS — M54.16 LUMBAR RADICULITIS: ICD-10-CM

## 2023-08-14 DIAGNOSIS — M48.062 LUMBAR STENOSIS WITH NEUROGENIC CLAUDICATION: ICD-10-CM

## 2023-08-15 ENCOUNTER — TELEPHONE (OUTPATIENT)
Dept: FAMILY MEDICINE CLINIC | Age: 68
End: 2023-08-15

## 2023-08-15 ENCOUNTER — HOSPITAL ENCOUNTER (EMERGENCY)
Age: 68
Discharge: HOME | End: 2023-08-15
Attending: EMERGENCY MEDICINE
Payer: MEDICARE

## 2023-08-15 VITALS
OXYGEN SATURATION: 100 % | WEIGHT: 238 LBS | HEIGHT: 65 IN | HEART RATE: 97 BPM | TEMPERATURE: 98.7 F | RESPIRATION RATE: 16 BRPM | SYSTOLIC BLOOD PRESSURE: 176 MMHG | BODY MASS INDEX: 39.65 KG/M2 | DIASTOLIC BLOOD PRESSURE: 88 MMHG

## 2023-08-15 DIAGNOSIS — M79.89 LEG SWELLING: ICD-10-CM

## 2023-08-15 DIAGNOSIS — R21 RASH AND OTHER NONSPECIFIC SKIN ERUPTION: Primary | ICD-10-CM

## 2023-08-15 PROCEDURE — 6370000000 HC RX 637 (ALT 250 FOR IP): Performed by: EMERGENCY MEDICINE

## 2023-08-15 PROCEDURE — 99283 EMERGENCY DEPT VISIT LOW MDM: CPT

## 2023-08-15 RX ORDER — GABAPENTIN 100 MG/1
CAPSULE ORAL
Qty: 180 CAPSULE | Refills: 0 | Status: SHIPPED | OUTPATIENT
Start: 2023-08-15 | End: 2023-11-13

## 2023-08-15 RX ORDER — CEPHALEXIN 500 MG/1
500 CAPSULE ORAL ONCE
Status: COMPLETED | OUTPATIENT
Start: 2023-08-15 | End: 2023-08-15

## 2023-08-15 RX ORDER — METHYLPREDNISOLONE 4 MG/1
TABLET ORAL
Qty: 21 TABLET | Refills: 0 | Status: SHIPPED | OUTPATIENT
Start: 2023-08-15 | End: 2023-08-21

## 2023-08-15 RX ORDER — PREDNISONE 20 MG/1
60 TABLET ORAL ONCE
Status: COMPLETED | OUTPATIENT
Start: 2023-08-15 | End: 2023-08-15

## 2023-08-15 RX ORDER — CEPHALEXIN 500 MG/1
500 CAPSULE ORAL 3 TIMES DAILY
Qty: 21 CAPSULE | Refills: 0 | Status: SHIPPED | OUTPATIENT
Start: 2023-08-15 | End: 2023-08-22

## 2023-08-15 RX ADMIN — CEPHALEXIN 500 MG: 500 CAPSULE ORAL at 15:20

## 2023-08-15 RX ADMIN — PREDNISONE 60 MG: 20 TABLET ORAL at 15:20

## 2023-08-15 ASSESSMENT — PAIN - FUNCTIONAL ASSESSMENT
PAIN_FUNCTIONAL_ASSESSMENT: NONE - DENIES PAIN
PAIN_FUNCTIONAL_ASSESSMENT: NONE - DENIES PAIN

## 2023-08-15 ASSESSMENT — ENCOUNTER SYMPTOMS
SHORTNESS OF BREATH: 0
DIARRHEA: 0
VOICE CHANGE: 0
NAUSEA: 0
TROUBLE SWALLOWING: 0
VOMITING: 0

## 2023-08-15 NOTE — TELEPHONE ENCOUNTER
Refill Request     CONFIRM preferrred pharmacy with the patient. If Mail Order Rx - Pend for 90 day refill. Last Seen: Last Seen Department: 6/16/2023  Last Seen by PCP: 6/16/2023    Last Written: 4-    If no future appointment scheduled, route STAFF MESSAGE with patient name to the [facility] for scheduling. Next Appointment:   Future Appointments   Date Time Provider [address]   8/16/2023  9:00 AM [facility] OP NURSING ROOM 1 [facility]   9/13/2023  9:00 AM [facility]SING RO[facility] 1 [facility]   10/11/2023  9:30 AM [facility]SING RO[facility] 1 [facility]   11/8/2023  9:00 AM [facility]URSING ROOM 1 [facility]   11/16/2023  9:00 AM YAHAIRA Kwok - CNP [facility] [place] - [facility]   12/6/2023  9:00 AM [facility]URSING ROOM 1 [facility]   1/3/2024  9:00 AM [facility] OP NURSING ROOM 1 [facility]       Message sent to [employer] to schedule appt with patient?   N/A      Requested Prescriptions     Pending Prescriptions Disp Refills    gabapentin (NEURONTIN) 100 MG capsule [Pharmacy Med Name: GABAPENTIN 100 MG Capsule] 180 capsule 0     Sig: TAKE 2 CAPSULES EVERY NIGHT

## 2023-08-15 NOTE — ED PROVIDER NOTES
these medications    Details   cephALEXin (KEFLEX) 500 MG capsule Take 1 capsule by mouth 3 times daily for 7 days, Disp-21 capsule, R-0Normal      methylPREDNISolone (MEDROL DOSEPACK) 4 MG tablet Take by mouth as directed by package. , Disp-21 tablet, R-0Normal             (Please note that portions of this note were completed with a voice recognition program.  Efforts were made to edit the dictations but occasionally words are mis-transcribed. )    Elijah Vazquez MD (electronically signed)  Attending Emergency Physician           Elijah Vazquez MD  08/15/23 [id number]

## 2023-08-15 NOTE — TELEPHONE ENCOUNTER
Pt was over 20 min late to appt to see Kimberly Charles today due to road closure. Unable to see pt due to missing appt time. Offered to schedule an appt with PCP for tomorrow. Pt refused. Pt complaining of leg pain, swelling, and seeping for several days.

## 2023-08-16 ENCOUNTER — HOSPITAL ENCOUNTER (OUTPATIENT)
Dept: NURSING | Age: 68
Setting detail: INFUSION SERIES
Discharge: HOME | End: 2023-08-16
Payer: MEDICARE

## 2023-08-16 VITALS
SYSTOLIC BLOOD PRESSURE: 130 MMHG | TEMPERATURE: 98.6 F | HEART RATE: 70 BPM | DIASTOLIC BLOOD PRESSURE: 74 MMHG | HEIGHT: 65 IN | RESPIRATION RATE: 16 BRPM | BODY MASS INDEX: 39.65 KG/M2 | WEIGHT: 238 LBS

## 2023-08-16 DIAGNOSIS — K51.00 CHRONIC ULCERATIVE ENTEROCOLITIS WITHOUT COMPLICATION (HCC): Primary | ICD-10-CM

## 2023-08-16 LAB
ALBUMIN SERPL-MCNC: 3.8 G/DL (ref 3.4–5)
ALBUMIN/GLOB SERPL: 1 {RATIO} (ref 1.1–2.2)
ALP SERPL-CCNC: 98 U/L (ref 40–129)
ALT SERPL-CCNC: 27 U/L (ref 10–40)
ANION GAP SERPL CALCULATED.3IONS-SCNC: 11 MMOL/L (ref 3–16)
AST SERPL-CCNC: 47 U/L (ref 15–37)
BILIRUB SERPL-MCNC: 0.8 MG/DL (ref 0–1)
BUN SERPL-MCNC: 19 MG/DL (ref 7–20)
CALCIUM SERPL-MCNC: 9.8 MG/DL (ref 8.3–10.6)
CHLORIDE SERPL-SCNC: 102 MMOL/L (ref 99–110)
CO2 SERPL-SCNC: 26 MMOL/L (ref 21–32)
CREAT SERPL-MCNC: 1.1 MG/DL (ref 0.6–1.2)
DEPRECATED RDW RBC AUTO: 14.8 % (ref 12.4–15.4)
GFR SERPLBLD CREATININE-BSD FMLA CKD-EPI: 55 ML/MIN/{1.73_M2}
GLUCOSE SERPL-MCNC: 116 MG/DL (ref 70–99)
HCT VFR BLD AUTO: 37.6 % (ref 36–48)
HGB BLD-MCNC: 12.5 G/DL (ref 12–16)
MCH RBC QN AUTO: 31.6 PG (ref 26–34)
MCHC RBC AUTO-ENTMCNC: 33.3 G/DL (ref 31–36)
MCV RBC AUTO: 94.7 FL (ref 80–100)
PLATELET # BLD AUTO: 181 K/UL (ref 135–450)
PMV BLD AUTO: 9.8 FL (ref 5–10.5)
POTASSIUM SERPL-SCNC: 4.5 MMOL/L (ref 3.5–5.1)
PROT SERPL-MCNC: 7.6 G/DL (ref 6.4–8.2)
RBC # BLD AUTO: 3.97 M/UL (ref 4–5.2)
SODIUM SERPL-SCNC: 139 MMOL/L (ref 136–145)
WBC # BLD AUTO: 9.7 K/UL (ref 4–11)

## 2023-08-16 PROCEDURE — 99211 OFF/OP EST MAY X REQ PHY/QHP: CPT

## 2023-08-16 PROCEDURE — 96365 THER/PROPH/DIAG IV INF INIT: CPT

## 2023-08-16 PROCEDURE — 2580000003 HC RX 258: Performed by: INTERNAL MEDICINE

## 2023-08-16 PROCEDURE — 85027 COMPLETE CBC AUTOMATED: CPT

## 2023-08-16 PROCEDURE — 6360000002 HC RX W HCPCS: Performed by: INTERNAL MEDICINE

## 2023-08-16 PROCEDURE — 80053 COMPREHEN METABOLIC PANEL: CPT

## 2023-08-16 RX ORDER — SODIUM CHLORIDE 0.9 % (FLUSH) 0.9 %
10 SYRINGE (ML) INJECTION PRN
OUTPATIENT
Start: 2023-09-13

## 2023-08-16 RX ORDER — SODIUM CHLORIDE 0.9 % (FLUSH) 0.9 %
10 SYRINGE (ML) INJECTION PRN
Status: DISCONTINUED | OUTPATIENT
Start: 2023-08-16 | End: 2023-08-17 | Stop reason: HOSPADM

## 2023-08-16 RX ORDER — SODIUM CHLORIDE 9 MG/ML
INJECTION, SOLUTION INTRAVENOUS CONTINUOUS
OUTPATIENT
Start: 2023-09-13

## 2023-08-16 RX ORDER — SODIUM CHLORIDE 9 MG/ML
INJECTION, SOLUTION INTRAVENOUS CONTINUOUS
Status: ACTIVE | OUTPATIENT
Start: 2023-08-16 | End: 2023-08-16

## 2023-08-16 RX ADMIN — SODIUM CHLORIDE: 9 INJECTION, SOLUTION INTRAVENOUS at 09:17

## 2023-08-16 RX ADMIN — VEDOLIZUMAB 300 MG: 300 INJECTION, POWDER, LYOPHILIZED, FOR SOLUTION INTRAVENOUS at 09:18

## 2023-08-16 ASSESSMENT — PAIN SCALES - GENERAL: PAINLEVEL_OUTOF10: 0

## 2023-08-16 NOTE — DISCHARGE INSTRUCTIONS
make sure you do not have tuberculosis or other infections. Vedolizumab is injected into a vein through an IV. A healthcare provider will give you this injection. Vedolizumab must be given slowly, and the IV infusion can take about 30 minutes to complete. You will be watched closely for a short time after receiving vedolizumab, to make sure you do not have an allergic reaction to the medication. Vedolizumab is usually given on a schedule that starts the day of your first injection. Your next injections will be given at 2 weeks and 6 weeks after the first injection. Then you will receive an injection every 8 weeks (or every 2 months) thereafter. It may take up to several weeks before your symptoms improve. Keep using the medication as directed and tell your doctor if your symptoms do not improve after 14 weeks of treatment. What happens if I miss a dose? Call your doctor for instructions if you miss an appointment for your vedolizumab injection. What happens if I overdose? Since this medicine is given by a healthcare professional in a medical setting, an overdose is unlikely to occur. What should I avoid while receiving vedolizumab? Do not receive a \"live\" vaccine while using vedolizumab, and avoid coming into contact with anyone who has recently received a live vaccine. There is a chance that the virus could be passed on to you. Live vaccines include measles, mumps, rubella (MMR), polio, rotavirus, typhoid, yellow fever, varicella (chickenpox), zoster (shingles), and nasal flu (influenza) vaccine. You may receive \"killed-virus\" vaccines such as a flu shot, polio vaccine, rabies vaccine, or hepatitis A vaccine. Ask your doctor before receiving any vaccine while you are being treated with vedolizumab. Avoid being near people who are sick or have infections. Tell your doctor at once if you develop signs of infection. What are the possible side effects of vedolizumab?   Get emergency medical help if you colitis, such as adalimumab, certolizumab, infliximab; or  other drugs that weaken the immune system such as cancer medicine, steroids,   WHere can I get more information? Your doctor or pharmacist can provide more information about vedolizumab.     RETURN ON SEPT 13,2023 AT 0900  FOR ENTYVIO INFUSION    FOLLOW UP WITH DR. BARTON AS NEEDED OR SCHEDULED    CALL US IF YOU NEED TO CANCEL OR RESCHEDULE YOUR APPOINTMENT  [phone]

## 2023-08-16 NOTE — PROGRESS NOTES
Pt here for entyvio infusion  Pt without c/o's today  She reports that her stools are almost normal  Pt reports some bloating but denies any  cramping,bleeding or diarrhea  IV # 22 [facility] on my 1st attempt Blood for lab work was drawn without difficulty  Pt gayla well  Entyvio 300 mg IVPB starting to infuse  IV site unremarkable Will monitor

## 2023-08-30 ENCOUNTER — OFFICE VISIT (OUTPATIENT)
Dept: FAMILY MEDICINE CLINIC | Age: 68
End: 2023-08-30
Payer: MEDICARE

## 2023-08-30 ENCOUNTER — TELEPHONE (OUTPATIENT)
Dept: CARDIOLOGY CLINIC | Age: 68
End: 2023-08-30

## 2023-08-30 VITALS
BODY MASS INDEX: 42.35 KG/M2 | DIASTOLIC BLOOD PRESSURE: 72 MMHG | HEART RATE: 84 BPM | SYSTOLIC BLOOD PRESSURE: 134 MMHG | OXYGEN SATURATION: 97 % | WEIGHT: 239 LBS | HEIGHT: 63 IN

## 2023-08-30 DIAGNOSIS — I77.6 VASCULITIS (HCC): Primary | ICD-10-CM

## 2023-08-30 DIAGNOSIS — L97.809 VENOUS STASIS ULCER OF OTHER PART OF LOWER LEG WITH VARICOSE VEINS, UNSPECIFIED LATERALITY, UNSPECIFIED ULCER STAGE (HCC): ICD-10-CM

## 2023-08-30 DIAGNOSIS — I83.008 VENOUS STASIS ULCER OF OTHER PART OF LOWER LEG WITH VARICOSE VEINS, UNSPECIFIED LATERALITY, UNSPECIFIED ULCER STAGE (HCC): ICD-10-CM

## 2023-08-30 PROCEDURE — 1036F TOBACCO NON-USER: CPT | Performed by: NURSE PRACTITIONER

## 2023-08-30 PROCEDURE — 3075F SYST BP GE 130 - 139MM HG: CPT | Performed by: NURSE PRACTITIONER

## 2023-08-30 PROCEDURE — 99213 OFFICE O/P EST LOW 20 MIN: CPT | Performed by: NURSE PRACTITIONER

## 2023-08-30 PROCEDURE — G8417 CALC BMI ABV UP PARAM F/U: HCPCS | Performed by: NURSE PRACTITIONER

## 2023-08-30 PROCEDURE — 3078F DIAST BP <80 MM HG: CPT | Performed by: NURSE PRACTITIONER

## 2023-08-30 PROCEDURE — G8428 CUR MEDS NOT DOCUMENT: HCPCS | Performed by: NURSE PRACTITIONER

## 2023-08-30 PROCEDURE — 1090F PRES/ABSN URINE INCON ASSESS: CPT | Performed by: NURSE PRACTITIONER

## 2023-08-30 PROCEDURE — 3017F COLORECTAL CA SCREEN DOC REV: CPT | Performed by: NURSE PRACTITIONER

## 2023-08-30 PROCEDURE — 1123F ACP DISCUSS/DSCN MKR DOCD: CPT | Performed by: NURSE PRACTITIONER

## 2023-08-30 PROCEDURE — G8399 PT W/DXA RESULTS DOCUMENT: HCPCS | Performed by: NURSE PRACTITIONER

## 2023-08-30 RX ORDER — CEPHALEXIN 500 MG/1
500 CAPSULE ORAL 3 TIMES DAILY
Qty: 30 CAPSULE | Refills: 0 | Status: SHIPPED | OUTPATIENT
Start: 2023-08-30 | End: 2023-09-09

## 2023-08-30 RX ORDER — METHYLPREDNISOLONE 4 MG/1
TABLET ORAL
Qty: 1 KIT | Refills: 0 | Status: SHIPPED | OUTPATIENT
Start: 2023-08-30 | End: 2023-09-05

## 2023-08-30 ASSESSMENT — ENCOUNTER SYMPTOMS
EYES NEGATIVE: 1
BLOOD IN STOOL: 0
ANAL BLEEDING: 0
GASTROINTESTINAL NEGATIVE: 1
SHORTNESS OF BREATH: 0
ALLERGIC/IMMUNOLOGIC NEGATIVE: 1
RESPIRATORY NEGATIVE: 1
ABDOMINAL PAIN: 0
NAUSEA: 0

## 2023-08-30 NOTE — TELEPHONE ENCOUNTER
I have discussed and recommended the following surgical procedure(s) and consent to read: left total knee replacement: [id number]     Surgery scheduling requirements include:  Date of Surgery: Nov 6th 2023  Facility: [facility]  Admission Type: Outpatient in a bed  Time Needed:90 min   Anesthesia: ORT BFT Anesthesia Types: General, Exparel local, single shot adductor canal block  and iPACk  Surgical Assist: yes, PA  Implants: Attune   Special Equipment: None  Consent: needed- pre-op scheduled   [employer]: yes  Schedule Prehab Physical Therapy: yes  Schedule postoperative physical therapy: to start 2-3 weeks post op.  Special preoperative instructions: none.  PREOPERATIVE ASSESSMENT ORDERS:  Preoperative risk assessment/History and Physical: Yes, with Dr. Richardson  Other Preoperative date of surgery Orders (if not done with PCP): BMP, CBC with diff, EKG, CXR, UA w/culture if indicated, Albumin  Preoperative Antibiotics: Ancef 2 gm IV in OR, less than 30 minutes before surgery. If patient has mild allergy to PCN, give Ancef. If allergy is severe, including anaphylaxis, then give Vancomycin 1 gm IV over 60 minutes, less than 90 minutes prior to surgery  Other Preoperative date of surgery Medications: none needed  COVID NEGATIVE in the last 3 months. Ok to proceed with scheduling elective surgery.  STAAR Patient - ONLY clear liquids until 3 hours prior to surgery time, including finishing the pre surgery drink that has been provided.   New Referral from PCP to Dr. Jocelyn Garcia    Vasculitis,    Venous stasis ulcer of other part of lower leg with varicose veins, unspecified laterality, unspecified ulcer stage ([address])    Need time/ date to schedule

## 2023-08-30 NOTE — PROGRESS NOTES
Ambulatory Patient Note Following Recent ED Visit   Subjective:     Chief Complaint   Patient presents with    Other     Rash and edema bi-lateral legs        Other  Pertinent negatives include no abdominal pain, chest pain, fatigue, nausea, numbness or rash. Recent ED visit for BLE swelling and redness     Nicole Rubin  is a 75 y.o. female who presents today for evaluation following a recent ED visit related to the above mentioned issue. The patient is being seen for ER follow-up from 8/15/2023. She had had a rash present on her bilateral lower extremities for several months. She has been seen here in this office as well as by dermatology for the same issue. She went to the ER because she had increased swelling for approximately 1 week after moving. She does have a history of ankylosing spondylitis, vasculitis and venous ulceration of her bilateral lower extremities. She has previously used topical steroids. She felt like the rash was worsening in her right lower extremity and had increased mild erythema to the front of her right lower extremity upon reporting to the ER. The patient did have mild diffuse venous stasis ulcers to her bilateral anterior lower extremities and mild erythema to her right lower extremity as well. She was placed on a Medrol Dosepak as well as Keflex x7 days. Review of Systems   Constitutional: Negative. Negative for appetite change, fatigue and unexpected weight change. HENT: Negative. Eyes: Negative. Respiratory: Negative. Negative for shortness of breath. Cardiovascular:  Positive for leg swelling. Negative for chest pain and palpitations. Gastrointestinal: Negative. Negative for abdominal pain, anal bleeding, blood in stool and nausea. Endocrine: Negative. Genitourinary: Negative. Negative for hematuria. Musculoskeletal: Negative. Skin:  Positive for wound. Negative for rash.

## 2023-08-31 NOTE — TELEPHONE ENCOUNTER
Left voicemail to return call. It appears she lives about 1 hour away from all the [facility] locations - when she returns call, please ask her if she is willing to drive this far/has a preference about location. Dr. Bao Forbes sees patient's at [facility] and [place]. Thanks.

## 2023-08-31 NOTE — DISCHARGE INSTRUCTIONS
[facility] Physician Orders and Discharge [address], [address] [place] [address]  [place], [address]  Telephone: [phone]      Fax: [phone]      Your home care company:  N/a . Your wound-care supplies will be provided by: Wound Care . NAME:  Keely Barreto   YOB: 1955  PRIMARY DIAGNOSIS FOR WOUND CARE CENTER:  Venous leg ulcer . Wound cleansing:   Do not scrub or use excessive force. Wash hands with soap and water before and after dressing changes. Prior to applying a clean dressing, cleanse wound with normal saline, wound cleanser, or mild soap and water. Ask your physician or nurse before getting the wound(s) wet in the shower. Wound care for home:    Bilateral lower legs:  Triamcinolone to right shin redness & bilateral medial ankles  Nexodyn spray to wounds  PSO then Xeroform to any open wounds  Stacked 2x2 gauze bolster to right medial ankle  Foam to anterior ankles  Coflex TLC Calamine compression wraps  Leave in place for the week. Keep clean, dry & intact. Your physician has ordered Compression for treatment of venous ulcers, venous insufficiency,and/or Lymphedema. * Do not remove until your next scheduled visit in [facility]- do not get wet.     * If your wrap falls down, becomes painful or you have decreased sensation, and/or excessive drainage- please call the [facility] for RN visit to get your compression wrap changed   * You need to exercice as tolerated- walking is good   * Elevate your legs preferrably above level of your heart when sitting as much as possible   * The Goal of this therapy is to reduce Edema and get into long term compression garments to control venous insufficiency, lymphedema and reduce occurrence of venous ulcers       Please note, all wounds (unless stated otherwise here) were mechanically debrided at the time of cleansing here in the wound-care center today, so a small amount of pain, drainage or

## 2023-09-05 ENCOUNTER — TELEPHONE (OUTPATIENT)
Dept: CARDIOLOGY CLINIC | Age: 68
End: 2023-09-05

## 2023-09-05 NOTE — TELEPHONE ENCOUNTER
Nilsonfabi Johns called in this afternoon, she states she is needing to reschedule her appt on 9/25- np/ashley/Beverley/PCP ref for Vasculitis, Venous stasis ulcer of other part of lower leg with varicose veins, unspecified laterality, unspecified ulcer stage [facility])    She is requesting an appt at either [facility] or [facility]

## 2023-09-06 ENCOUNTER — HOSPITAL ENCOUNTER (OUTPATIENT)
Dept: WOUND CARE | Age: 68
Discharge: HOME | End: 2023-09-06
Payer: MEDICARE

## 2023-09-06 VITALS
WEIGHT: 233.4 LBS | HEART RATE: 90 BPM | BODY MASS INDEX: 41.36 KG/M2 | DIASTOLIC BLOOD PRESSURE: 96 MMHG | SYSTOLIC BLOOD PRESSURE: 162 MMHG | RESPIRATION RATE: 18 BRPM | TEMPERATURE: 98.1 F | HEIGHT: 63 IN

## 2023-09-06 DIAGNOSIS — I83.022 VARICOSE VEINS OF LEFT LOWER EXTREMITY WITH ULCER OF CALF WITH FAT LAYER EXPOSED (HCC): Primary | ICD-10-CM

## 2023-09-06 DIAGNOSIS — L97.211 VARICOSE VEINS OF RIGHT LOWER EXTREMITY WITH ULCER OF CALF LIMITED TO BREAKDOWN OF SKIN (HCC): ICD-10-CM

## 2023-09-06 DIAGNOSIS — I83.012 VARICOSE VEINS OF RIGHT LOWER EXTREMITY WITH ULCER OF CALF LIMITED TO BREAKDOWN OF SKIN (HCC): ICD-10-CM

## 2023-09-06 DIAGNOSIS — L97.222 VARICOSE VEINS OF LEFT LOWER EXTREMITY WITH ULCER OF CALF WITH FAT LAYER EXPOSED (HCC): Primary | ICD-10-CM

## 2023-09-06 PROCEDURE — 29581 APPL MULTLAYER CMPRN SYS LEG: CPT

## 2023-09-06 PROCEDURE — 99213 OFFICE O/P EST LOW 20 MIN: CPT

## 2023-09-06 PROCEDURE — 97597 DBRDMT OPN WND 1ST 20 CM/<: CPT

## 2023-09-06 RX ORDER — LIDOCAINE HYDROCHLORIDE 40 MG/ML
SOLUTION TOPICAL ONCE
Status: DISCONTINUED | OUTPATIENT
Start: 2023-09-06 | End: 2023-09-07 | Stop reason: HOSPADM

## 2023-09-06 RX ORDER — BACITRACIN ZINC AND POLYMYXIN B SULFATE 500; 1000 [USP'U]/G; [USP'U]/G
OINTMENT TOPICAL ONCE
Status: DISCONTINUED | OUTPATIENT
Start: 2023-09-06 | End: 2023-09-07 | Stop reason: HOSPADM

## 2023-09-06 RX ORDER — LIDOCAINE 50 MG/G
OINTMENT TOPICAL ONCE
OUTPATIENT
Start: 2023-09-06 | End: 2023-09-06

## 2023-09-06 RX ORDER — LIDOCAINE 40 MG/G
CREAM TOPICAL ONCE
OUTPATIENT
Start: 2023-09-06 | End: 2023-09-06

## 2023-09-06 RX ORDER — LIDOCAINE 40 MG/G
CREAM TOPICAL ONCE
Status: DISCONTINUED | OUTPATIENT
Start: 2023-09-06 | End: 2023-09-07 | Stop reason: HOSPADM

## 2023-09-06 RX ORDER — BACITRACIN ZINC AND POLYMYXIN B SULFATE 500; 1000 [USP'U]/G; [USP'U]/G
OINTMENT TOPICAL ONCE
OUTPATIENT
Start: 2023-09-06 | End: 2023-09-06

## 2023-09-06 RX ORDER — LIDOCAINE 50 MG/G
OINTMENT TOPICAL ONCE
Status: DISCONTINUED | OUTPATIENT
Start: 2023-09-06 | End: 2023-09-07 | Stop reason: HOSPADM

## 2023-09-06 RX ORDER — LIDOCAINE HYDROCHLORIDE 40 MG/ML
SOLUTION TOPICAL ONCE
OUTPATIENT
Start: 2023-09-06 | End: 2023-09-06

## 2023-09-06 ASSESSMENT — PAIN SCALES - GENERAL: PAINLEVEL_OUTOF10: 0

## 2023-09-06 NOTE — PLAN OF CARE
Patient new to [facility] today for wounds on bilateral lower legs. REANNA noted. Dr Ashly Wilkes advised patient that she could cancel her appt. With Dr Lesly Niño at this time, no concern for arterial insufficiency at this time. Will apply dressings per MD orders with coflex TLC Calamine compression wraps for edema control. Reinforced importance of exercise, elevation & compression to help with circulation, edema control & wound healing. No antibiotics needed at this time. F/u in [facility] in 1 week as ordered, pt. Aware to call sooner with any changes or questions/concerns. Discharge instructions reviewed with patient, all questions answered, copy given to patient. Dressings were applied to all wounds per M.D. Instructions at this visit.

## 2023-09-07 NOTE — DISCHARGE INSTRUCTIONS
[facility] Physician Orders and Discharge [address], [address] [place] [address]  [place], [address]  Telephone: [phone]      Fax: [phone]        Your home care company:   N/a . Your wound-care supplies will be provided by: Wound [facility] . NAME:  Kendrick Fisher   YOB: 1955  PRIMARY DIAGNOSIS FOR WOUND CARE CENTER:  Venous leg ulcer . Wound cleansing:   Do not scrub or use excessive force. Wash hands with soap and water before and after dressing changes. Prior to applying a clean dressing, cleanse wound with normal saline, wound cleanser, or mild soap and water. Ask your physician or nurse before getting the wound(s) wet in the shower. Wound care for home:     Bilateral lower legs:  Triamcinolone to right shin redness   Nexodyn spray to wounds  PSO then Xeroform to any open wounds  Stacked 2x2 gauze bolster to right medial ankle  Foam to anterior ankles  Coflex TLC Calamine compression wraps (slightly less tension at top of wrap)  Leave in place for the week. Keep clean, dry & intact. Your physician has ordered Compression for treatment of venous ulcers, venous insufficiency,and/or Lymphedema. * Do not remove until your next scheduled visit in [facility]- do not get wet.     * If your wrap falls down, becomes painful or you have decreased sensation, and/or excessive drainage- please call the [facility] for RN visit to get your compression wrap changed   * You need to exercice as tolerated- walking is good   * Elevate your legs preferrably above level of your heart when sitting as much as possible   * The Goal of this therapy is to reduce Edema and get into long term compression garments to control venous insufficiency, lymphedema and reduce occurrence of venous ulcers         Please note, all wounds (unless stated otherwise here) were mechanically debrided at the time of cleansing here in the wound-care center today, so

## 2023-09-08 DIAGNOSIS — Z13.6 SCREENING FOR CARDIOVASCULAR CONDITION: ICD-10-CM

## 2023-09-08 DIAGNOSIS — M07.60 ENTEROPATHIC ARTHRITIS: ICD-10-CM

## 2023-09-08 RX ORDER — CELECOXIB 100 MG/1
CAPSULE ORAL
Qty: 180 CAPSULE | Refills: 0 | Status: SHIPPED | OUTPATIENT
Start: 2023-09-08

## 2023-09-08 RX ORDER — SIMVASTATIN 20 MG
TABLET ORAL
Qty: 90 TABLET | Refills: 0 | Status: SHIPPED | OUTPATIENT
Start: 2023-09-08

## 2023-09-08 NOTE — TELEPHONE ENCOUNTER
Refill Request     CONFIRM preferrred pharmacy with the patient. If Mail Order Rx - Pend for 90 day refill. Last Seen: Last Seen Department: 8/30/2023  Last Seen by PCP: Visit date not found    Last Written: 4-    If no future appointment scheduled, route STAFF MESSAGE with patient name to the [facility] for scheduling. Next Appointment:   Future Appointments   Date Time Provider Department Center   9/13/2023  9:00 AM [facility] OP NURSING ROOM 1 [facility] OP RN Jaz Noriega   9/13/2023  3:45 PM MD Byron Mcgill [facility]   10/11/2023  9:30 AM [facility] OP NURSING ROOM 1 [facility] OP RN Valdez [facility]   11/8/2023  9:00 AM [facility] NURSING ROOM 1 [facility] OP RN Valdez [facility]   11/16/2023  9:00 AM YAHAIRA Velasquez CNP, [place] [facility]   12/6/2023  9:00 AM [facility] NURSING ROOM 1 [facility] OP RN Valdez [facility]   1/3/2024  9:00 AM [facility] NURSING ROOM 1 [facility] OP RN Valdez [facility]       Message sent to [address] [place] [address] to schedule appt with patient?   N/A      Requested Prescriptions     Pending Prescriptions Disp Refills    celecoxib (CELEBREX) 100 MG capsule [Pharmacy Med Name: CELECOXIB 100 MG Capsule] 180 capsule 0     Sig: TAKE 2 CAPSULES EVERY DAY    simvastatin (ZOCOR) 20 MG tablet [Pharmacy Med Name: SIMVASTATIN 20 MG Tablet] 90 tablet 0     Sig: TAKE 1 TABLET EVERY NIGHT

## 2023-09-11 ENCOUNTER — PATIENT MESSAGE (OUTPATIENT)
Dept: FAMILY MEDICINE CLINIC | Age: 68
End: 2023-09-11

## 2023-09-11 DIAGNOSIS — F33.0 MILD EPISODE OF RECURRENT MAJOR DEPRESSIVE DISORDER (HCC): ICD-10-CM

## 2023-09-11 DIAGNOSIS — E11.9 DIET-CONTROLLED TYPE 2 DIABETES MELLITUS (HCC): ICD-10-CM

## 2023-09-11 RX ORDER — DULOXETIN HYDROCHLORIDE 30 MG/1
30 CAPSULE, DELAYED RELEASE ORAL DAILY
Qty: 90 CAPSULE | Refills: 0 | Status: SHIPPED | OUTPATIENT
Start: 2023-09-11

## 2023-09-11 NOTE — TELEPHONE ENCOUNTER
Refill Request     CONFIRM preferrred pharmacy with the patient. If Mail Order Rx - Pend for 90 day refill. Last Seen: Last Seen Department: 8/30/2023  Last Seen by PCP: 8/30/2023    Last Written: 05/08/2023    If no future appointment scheduled, route STAFF MESSAGE with patient name to the [facility] for scheduling. Next Appointment:   Future Appointments   Date Time Provider Department Center   9/13/2023  9:00 AM [facility] OP NURSING ROOM 1 [facility] OP CAYLA Rm   9/13/2023  3:45 PM Marilynn Hashimoto, MD Benjamine Nancy HOD   10/11/2023  9:30 AM [facility] OP NURSING ROOM 1 [facility] OP RN Valdez BRODY   11/8/2023  9:00 AM [facility] NURSING ROOM 1 [facility] OP RN Valdez BRODY   11/16/2023  9:00 AM YAHAIRA Cramer - CNP [place] [facility] [place] - [facility]   12/6/2023  9:00 AM [facility] OP NURSING ROOM 1 [facility] OP RN Valdez HOD   1/3/2024  9:00 AM [facility] OP NURSING ROOM 1 [facility] OP RN Valdez [facility]       Message sent to [address] [place] [address] to schedule appt with patient?   N/A      Requested Prescriptions     Pending Prescriptions Disp Refills    DULoxetine (CYMBALTA) 30 MG extended release capsule 90 capsule 0     Sig: Take 1 capsule by mouth daily

## 2023-09-11 NOTE — TELEPHONE ENCOUNTER
From: Carlos Gardner  To: Gregoria First  Sent: 9/11/2023 3:01 PM EDT  Subject: Prescription    Recd a msg from [facility] saying they are having problems with a prescription request. I suspect it is test strips. It had been filled then cancelled. They indicated they had reached out with no response and asked me to contact you to get the info they requested. Can you follow up?  Thanks

## 2023-09-12 PROBLEM — K75.81 STEATOHEPATITIS: Status: ACTIVE | Noted: 2023-07-01

## 2023-09-12 PROBLEM — E66.813 CLASS 3 SEVERE OBESITY DUE TO EXCESS CALORIES WITH SERIOUS COMORBIDITY AND BODY MASS INDEX (BMI) OF 40.0 TO 44.9 IN ADULT: Status: ACTIVE | Noted: 2023-05-15

## 2023-09-12 PROBLEM — N18.30 CHRONIC RENAL DISEASE, STAGE III (HCC): Status: RESOLVED | Noted: 2022-04-28 | Resolved: 2023-09-12

## 2023-09-12 PROBLEM — M54.9 CHRONIC BACK PAIN: Status: ACTIVE | Noted: 2023-09-12

## 2023-09-12 PROBLEM — H91.90 HEARING LOSS: Status: ACTIVE | Noted: 2023-09-12

## 2023-09-12 PROBLEM — M25.561 RIGHT KNEE PAIN: Status: RESOLVED | Noted: 2022-07-20 | Resolved: 2023-09-12

## 2023-09-12 PROBLEM — E83.42 HYPOMAGNESEMIA: Status: RESOLVED | Noted: 2019-10-01 | Resolved: 2023-09-12

## 2023-09-12 PROBLEM — M25.572 ACUTE LEFT ANKLE PAIN: Status: RESOLVED | Noted: 2021-07-07 | Resolved: 2023-09-12

## 2023-09-12 PROBLEM — G89.29 CHRONIC BACK PAIN: Status: ACTIVE | Noted: 2023-09-12

## 2023-09-12 PROBLEM — G25.81 RESTLESS LEGS SYNDROME: Status: ACTIVE | Noted: 2023-09-12

## 2023-09-13 ENCOUNTER — HOSPITAL ENCOUNTER (OUTPATIENT)
Dept: NURSING | Age: 68
Setting detail: INFUSION SERIES
Discharge: HOME | End: 2023-09-13
Payer: MEDICARE

## 2023-09-13 ENCOUNTER — HOSPITAL ENCOUNTER (OUTPATIENT)
Dept: WOUND CARE | Age: 68
Discharge: HOME | End: 2023-09-13
Payer: MEDICARE

## 2023-09-13 VITALS
BODY MASS INDEX: 41.29 KG/M2 | DIASTOLIC BLOOD PRESSURE: 70 MMHG | TEMPERATURE: 97.9 F | RESPIRATION RATE: 18 BRPM | HEART RATE: 82 BPM | HEIGHT: 63 IN | WEIGHT: 233 LBS | SYSTOLIC BLOOD PRESSURE: 144 MMHG

## 2023-09-13 VITALS
HEART RATE: 89 BPM | HEIGHT: 63 IN | DIASTOLIC BLOOD PRESSURE: 92 MMHG | SYSTOLIC BLOOD PRESSURE: 151 MMHG | BODY MASS INDEX: 42.24 KG/M2 | RESPIRATION RATE: 18 BRPM | TEMPERATURE: 97.2 F | WEIGHT: 238.4 LBS

## 2023-09-13 DIAGNOSIS — I83.012 VARICOSE VEINS OF RIGHT LOWER EXTREMITY WITH ULCER OF CALF LIMITED TO BREAKDOWN OF SKIN (HCC): ICD-10-CM

## 2023-09-13 DIAGNOSIS — K51.00 CHRONIC ULCERATIVE ENTEROCOLITIS WITHOUT COMPLICATION (HCC): Primary | ICD-10-CM

## 2023-09-13 DIAGNOSIS — I83.022 VARICOSE VEINS OF LEFT LOWER EXTREMITY WITH ULCER OF CALF WITH FAT LAYER EXPOSED (HCC): Primary | ICD-10-CM

## 2023-09-13 DIAGNOSIS — L97.211 VARICOSE VEINS OF RIGHT LOWER EXTREMITY WITH ULCER OF CALF LIMITED TO BREAKDOWN OF SKIN (HCC): ICD-10-CM

## 2023-09-13 DIAGNOSIS — L97.222 VARICOSE VEINS OF LEFT LOWER EXTREMITY WITH ULCER OF CALF WITH FAT LAYER EXPOSED (HCC): Primary | ICD-10-CM

## 2023-09-13 PROCEDURE — 96365 THER/PROPH/DIAG IV INF INIT: CPT

## 2023-09-13 PROCEDURE — 96366 THER/PROPH/DIAG IV INF ADDON: CPT

## 2023-09-13 PROCEDURE — 29581 APPL MULTLAYER CMPRN SYS LEG: CPT

## 2023-09-13 PROCEDURE — 97597 DBRDMT OPN WND 1ST 20 CM/<: CPT

## 2023-09-13 PROCEDURE — 6360000002 HC RX W HCPCS: Performed by: INTERNAL MEDICINE

## 2023-09-13 PROCEDURE — 99211 OFF/OP EST MAY X REQ PHY/QHP: CPT

## 2023-09-13 PROCEDURE — 2580000003 HC RX 258: Performed by: INTERNAL MEDICINE

## 2023-09-13 RX ORDER — SODIUM CHLORIDE 9 MG/ML
INJECTION, SOLUTION INTRAVENOUS CONTINUOUS
OUTPATIENT
Start: 2023-10-11

## 2023-09-13 RX ORDER — BACITRACIN ZINC AND POLYMYXIN B SULFATE 500; 1000 [USP'U]/G; [USP'U]/G
OINTMENT TOPICAL ONCE
Status: DISCONTINUED | OUTPATIENT
Start: 2023-09-13 | End: 2023-09-14 | Stop reason: HOSPADM

## 2023-09-13 RX ORDER — BACITRACIN ZINC AND POLYMYXIN B SULFATE 500; 1000 [USP'U]/G; [USP'U]/G
OINTMENT TOPICAL ONCE
OUTPATIENT
Start: 2023-09-13 | End: 2023-09-13

## 2023-09-13 RX ORDER — SODIUM CHLORIDE 0.9 % (FLUSH) 0.9 %
10 SYRINGE (ML) INJECTION PRN
OUTPATIENT
Start: 2023-10-11

## 2023-09-13 RX ORDER — LIDOCAINE 50 MG/G
OINTMENT TOPICAL ONCE
OUTPATIENT
Start: 2023-09-13 | End: 2023-09-13

## 2023-09-13 RX ORDER — LIDOCAINE HYDROCHLORIDE 40 MG/ML
SOLUTION TOPICAL ONCE
Status: DISCONTINUED | OUTPATIENT
Start: 2023-09-13 | End: 2023-09-14 | Stop reason: HOSPADM

## 2023-09-13 RX ORDER — LIDOCAINE 50 MG/G
OINTMENT TOPICAL ONCE
Status: DISCONTINUED | OUTPATIENT
Start: 2023-09-13 | End: 2023-09-14 | Stop reason: HOSPADM

## 2023-09-13 RX ORDER — LIDOCAINE HYDROCHLORIDE 40 MG/ML
SOLUTION TOPICAL ONCE
OUTPATIENT
Start: 2023-09-13 | End: 2023-09-13

## 2023-09-13 RX ORDER — SODIUM CHLORIDE 9 MG/ML
INJECTION, SOLUTION INTRAVENOUS CONTINUOUS
Status: DISCONTINUED | OUTPATIENT
Start: 2023-09-13 | End: 2023-09-13 | Stop reason: HOSPADM

## 2023-09-13 RX ORDER — LIDOCAINE 40 MG/G
CREAM TOPICAL ONCE
Status: DISCONTINUED | OUTPATIENT
Start: 2023-09-13 | End: 2023-09-14 | Stop reason: HOSPADM

## 2023-09-13 RX ORDER — LIDOCAINE 40 MG/G
CREAM TOPICAL ONCE
OUTPATIENT
Start: 2023-09-13 | End: 2023-09-13

## 2023-09-13 RX ORDER — SODIUM CHLORIDE 0.9 % (FLUSH) 0.9 %
10 SYRINGE (ML) INJECTION PRN
Status: DISCONTINUED | OUTPATIENT
Start: 2023-09-13 | End: 2023-09-14 | Stop reason: HOSPADM

## 2023-09-13 RX ADMIN — VEDOLIZUMAB 300 MG: 300 INJECTION, POWDER, LYOPHILIZED, FOR SOLUTION INTRAVENOUS at 09:20

## 2023-09-13 RX ADMIN — Medication 10 ML: at 09:19

## 2023-09-13 RX ADMIN — SODIUM CHLORIDE: 9 INJECTION, SOLUTION INTRAVENOUS at 09:20

## 2023-09-13 ASSESSMENT — PAIN SCALES - GENERAL: PAINLEVEL_OUTOF10: 0

## 2023-09-13 NOTE — PROGRESS NOTES
Pt here for entyvio infusion  Pt without c/o's today  She reports that her stools are almost normal  Pt reports some bloating but denies any  cramping,bleeding or diarrhea  IV # 22 was started in Rt upper forearm on my 1st attempt  No lab work needed for today Pt gayla well  Entyvio 300 mg IVPB starting to infuse  IV site unremarkable Will monitor

## 2023-09-13 NOTE — PLAN OF CARE
1 wound healed. Remaining wounds stable with some improvement noted, debridement per MD & pt. Tolerated well. Will cont. With current wound care regime with dressings & compression wrap for edema control. Reinforced importance of exercise, elevation & compression to help with circulation, edema control & wound healing, pt. Verbalizes understanding. Will plan to discuss velcro compression garments at next visit for long-term edema control when healed. F/u in [facility] in 1 week as ordered, pt. Aware to call sooner with any changes or questions/concerns. Discharge instructions reviewed with patient, all questions answered, copy given to patient. Dressings were applied to all wounds per M.D. Instructions at this visit.

## 2023-09-13 NOTE — DISCHARGE INSTRUCTIONS
vedolizumab  Pronunciation:  GEN Fong  What is vedolizumab? Vedolizumab reduces the effects of a substance in the body that can cause inflammation. Vedolizumab is used in adults with moderate to severe ulcerative colitis (UC), or moderate to severe Crohn's disease. Vedolizumab treats active disease and may help keep UC or Crohn's symptoms under control long term. Vedolizumab may also reduce the need for steroid medicines in helping to control symptoms long term. Vedolizumab is usually given after other medicines have been tried without success. Vedolizumab may also be used for purposes not listed in this medication guide. What should I discuss with my healthcare provider before receiving vedolizumab? You should not use vedolizumab if you are allergic to it. To make sure vedolizumab is safe for you, tell your doctor if you have:  an active or recent infection;  tuberculosis (or if you have close contact with someone who has tuberculosis);  signs of infection such as fever, cough, or flu symptoms;  open sores or skin wounds;  weak immune system (caused by disease or by using certain medicine); or  if you are scheduled to receive any vaccines. You should be up to date with all needed vaccinations before receiving vedolizumab. FDA pregnancy category Kalee Civil is not expected to harm an unborn baby. Tell your doctor if you are pregnant or plan to become pregnant during treatment. If you are pregnant, your name may be listed on a pregnancy registry. This is to track the outcome of the pregnancy and to evaluate any effects of vedolizumab on the baby. It is not known whether vedolizumab passes into breast milk or if it could harm a nursing baby. Tell your doctor if you are breast-feeding a baby. Do not give this medicine to anyone under 25years old without medical advice. How is vedolizumab given?   Before you start treatment with vedolizumab, your doctor may perform tests to

## 2023-09-14 NOTE — DISCHARGE INSTRUCTIONS
not get wet. * If your wrap falls down, becomes painful or you have decreased sensation, and/or excessive drainage- please call the [address] [place] [address] for RN visit to get your compression wrap changed   * You need to exercice as tolerated- walking is good   * Elevate your legs preferrably above level of your heart when sitting as much as possible   * The Goal of this therapy is to reduce Edema and get into long term compression garments to control venous insufficiency, lymphedema and reduce occurrence of venous ulcers         Please note, all wounds (unless stated otherwise here) were mechanically debrided at the time of cleansing here in the wound-care center today, so a small amount of pain, drainage or bleeding from that process might be expected, and is normal.      All products for home use, including multiple products for a single wound if applicable, are medically necessary in order to achieve the best chance at timely wound healing. See provider documentation for details if needed. Substituted dressings applied in the [address] [place] [address] today, if applicable:     N/a     New orders for this week (labs, imaging, medications, etc.):     Dr Mone Somers will work on ordering ReadyWrap Fusion Velcro compression garments though [employer].      Additional instructions for specific diagnoses:     General comments for venous / lymphedema ulcers: *  Elevate your legs to the level of your heart for at least 30 minutes, several times daily. *  Walk as much as you can tolerate. Avoid standing for long periods of time, but if you must stand, regularly do heel-raises and calf-pump exercises. *  If you have compression wraps designed to remain in place all week, be sure to keep them from getting wet. *  If you have compression garments that can be changed regularly, apply them first thing in the morning, and remove them when you go to bed.  Moisturize your skin at bedtime, with Vaseline, Aquaphor, Aveeno, CeraVe, Cetaphil, Eucerin, Lubriderm,

## 2023-09-19 DIAGNOSIS — E55.9 VITAMIN D DEFICIENCY: ICD-10-CM

## 2023-09-19 RX ORDER — LIDOCAINE HCL 4 %
CREAM (GRAM) TOPICAL
Qty: 4 CAPSULE | Refills: 3 | Status: SHIPPED | OUTPATIENT
Start: 2023-09-19

## 2023-09-19 NOTE — TELEPHONE ENCOUNTER
Refill Request     CONFIRM preferrred pharmacy with the patient. If Mail Order Rx - Pend for 90 day refill. Last Seen: Last Seen Department: 8/30/2023  Last Seen by PCP: Visit date not found    Last Written: 4-    If no future appointment scheduled, route STAFF MESSAGE with patient name to the [facility] for scheduling. Next Appointment:   Future Appointments   Date Time Provider 4600  [facility]   9/20/2023  1:45 PM MD [place] [facility]   10/11/2023  9:30 AM [facility] OP NURSING ROOM 1 [facility] OP RN [place] [facility]   11/8/2023  9:00 AM [facility] OP RN [place] [facility]   11/16/2023  9:00 AM YAHAIRA Alejandra - CNP [place] [facility] [place] - [facility]   12/6/2023  9:00 AM [facility] RN [facility]   1/3/2024  9:00 AM [facility] 1 [facility] OP RN [place] [facility]       Message sent to [employer] to schedule appt with patient?   N/A      Requested Prescriptions     Pending Prescriptions Disp Refills    vitamin D (CVS VITAMIN D3) 250 MCG (03146 UT) CAPS capsule [Pharmacy Med Name: CVS VITAMIN D3 10,000 UNIT SFT] 4 capsule 3     Sig: TAKE 1 CAPSULE BY MOUTH ONCE WEEKLY

## 2023-09-20 ENCOUNTER — HOSPITAL ENCOUNTER (OUTPATIENT)
Dept: WOUND CARE | Age: 68
Discharge: HOME | End: 2023-09-20
Payer: MEDICARE

## 2023-09-20 VITALS
RESPIRATION RATE: 18 BRPM | TEMPERATURE: 97.7 F | SYSTOLIC BLOOD PRESSURE: 137 MMHG | DIASTOLIC BLOOD PRESSURE: 76 MMHG | BODY MASS INDEX: 42.31 KG/M2 | HEART RATE: 88 BPM | HEIGHT: 63 IN | WEIGHT: 238.8 LBS

## 2023-09-20 DIAGNOSIS — I83.012 VARICOSE VEINS OF RIGHT LOWER EXTREMITY WITH ULCER OF CALF LIMITED TO BREAKDOWN OF SKIN (HCC): ICD-10-CM

## 2023-09-20 DIAGNOSIS — I83.022 VARICOSE VEINS OF LEFT LOWER EXTREMITY WITH ULCER OF CALF WITH FAT LAYER EXPOSED (HCC): Primary | ICD-10-CM

## 2023-09-20 DIAGNOSIS — L97.211 VARICOSE VEINS OF RIGHT LOWER EXTREMITY WITH ULCER OF CALF LIMITED TO BREAKDOWN OF SKIN (HCC): ICD-10-CM

## 2023-09-20 DIAGNOSIS — L97.222 VARICOSE VEINS OF LEFT LOWER EXTREMITY WITH ULCER OF CALF WITH FAT LAYER EXPOSED (HCC): Primary | ICD-10-CM

## 2023-09-20 PROCEDURE — 29581 APPL MULTLAYER CMPRN SYS LEG: CPT

## 2023-09-20 PROCEDURE — 97597 DBRDMT OPN WND 1ST 20 CM/<: CPT

## 2023-09-20 RX ORDER — BACITRACIN ZINC AND POLYMYXIN B SULFATE 500; 1000 [USP'U]/G; [USP'U]/G
OINTMENT TOPICAL ONCE
OUTPATIENT
Start: 2023-09-20 | End: 2023-09-20

## 2023-09-20 RX ORDER — LIDOCAINE HYDROCHLORIDE 40 MG/ML
SOLUTION TOPICAL ONCE
OUTPATIENT
Start: 2023-09-20 | End: 2023-09-20

## 2023-09-20 RX ORDER — TRIAMCINOLONE ACETONIDE 1 MG/G
OINTMENT TOPICAL ONCE
OUTPATIENT
Start: 2023-09-20 | End: 2023-09-20

## 2023-09-20 RX ORDER — LIDOCAINE 40 MG/G
CREAM TOPICAL ONCE
OUTPATIENT
Start: 2023-09-20 | End: 2023-09-20

## 2023-09-20 RX ORDER — LIDOCAINE 50 MG/G
OINTMENT TOPICAL ONCE
Status: DISCONTINUED | OUTPATIENT
Start: 2023-09-20 | End: 2023-09-21 | Stop reason: HOSPADM

## 2023-09-20 RX ORDER — LIDOCAINE 50 MG/G
OINTMENT TOPICAL ONCE
OUTPATIENT
Start: 2023-09-20 | End: 2023-09-20

## 2023-09-20 RX ORDER — TRIAMCINOLONE ACETONIDE 1 MG/G
OINTMENT TOPICAL ONCE
Status: DISCONTINUED | OUTPATIENT
Start: 2023-09-20 | End: 2023-09-21 | Stop reason: HOSPADM

## 2023-09-20 RX ORDER — LIDOCAINE 40 MG/G
CREAM TOPICAL ONCE
Status: DISCONTINUED | OUTPATIENT
Start: 2023-09-20 | End: 2023-09-21 | Stop reason: HOSPADM

## 2023-09-20 RX ORDER — BACITRACIN ZINC AND POLYMYXIN B SULFATE 500; 1000 [USP'U]/G; [USP'U]/G
OINTMENT TOPICAL ONCE
Status: DISCONTINUED | OUTPATIENT
Start: 2023-09-20 | End: 2023-09-21 | Stop reason: HOSPADM

## 2023-09-20 RX ORDER — LIDOCAINE HYDROCHLORIDE 40 MG/ML
SOLUTION TOPICAL ONCE
Status: DISCONTINUED | OUTPATIENT
Start: 2023-09-20 | End: 2023-09-21 | Stop reason: HOSPADM

## 2023-09-20 NOTE — PLAN OF CARE
Wounds stable to improved, debridement per MD. Wound care dressing orders updated, will change to using Santyl this week. Will cont. Compression wrap for edema control as ordered. Reinforced importance of exercise, elevation & compression to help with circulation, edema control & wound healing. Dr Carmen Hudson also discussed velcro compression garments to use when healed for long-term compression. Patient agreeable for Dr Carmen Hudson to order ReadyWrap Fusion velcro compression garments through [employer]. F/u in [address] [place] [address] in 1 week as ordered, pt. Aware to call sooner with any changes or questions/concerns. Discharge instructions reviewed with patient, all questions answered, copy given to patient. Dressings were applied to all wounds per M.D. Instructions at this visit.

## 2023-09-21 NOTE — DISCHARGE INSTRUCTIONS
[facility] Physician Orders and Discharge [facility]  [address] [place] [address], [facility]  [place], [address]  Telephone: [phone]      Fax: [phone]        Your home care company:   N/a . Your wound-care supplies will be provided by: W[facility] & We are ordering your wound-care supplies from [employer]. Please note, depending on your insurance coverage, you may have out-of-pocket expenses for these supplies. Someone from [facility] should call you to confirm your order and discuss those potential costs before they ship your products -- please anticipate that call. If your out-of-pocket cost could be substantial, [facility] has a financial hardship program for patients who qualify, so please ask about that if you might need a hand. If you have any questions about your supplies or your potential out-of-pocket costs, or if you need to place an order for a refill of supplies (typically monthly), please call [phone]. Lily Arndt NAME:  Juan Manuel Callaway   YOB: 1955  PRIMARY DIAGNOSIS FOR WOUND CARE CENTER:  Venous leg ulcer . Wound cleansing:   Do not scrub or use excessive force. Wash hands with soap and water before and after dressing changes. Prior to applying a clean dressing, cleanse wound with normal saline, wound cleanser, or mild soap and water. Ask your physician or nurse before getting the wound(s) wet in the shower. Wound care for home:     Bilateral lower legs:  Nexodyn spray to wounds  Santyl then saline moistened 2x2  Xeroform to any open wounds  Foam to anterior ankles  Coflex TLC Calamine compression wraps (slightly less tension at top of wrap)  Leave in place for the week. Keep clean, dry & intact. Your physician has ordered Compression for treatment of venous ulcers, venous insufficiency,and/or Lymphedema. * Do not remove until your next scheduled visit in [facility]- do not get wet.     * If your wrap falls

## 2023-09-23 NOTE — PROGRESS NOTES
sliding down more than an inch or so, bunching-up and abrading her skin. I reminded the patient of the importance of weight management and smoking cessation, if applicable; also encouraged ambulation as tolerated, additional lower extremity exercises as instructed in our education sheet, leg elevation when at rest, and compliance with any recommended dietary, diuretic and compression therapies. Discussed options for long-term compression, tanna once healed, and she thinks the [employer] garments will work well for her. Obtained measurements today, will order a pair from a local DME company. Will give her a paper next week to help her order replacements if/when needed, and some extra Fusion Liners. Written discharge instructions given to patient. Follow up in 1 week.     Electronically signed by Charo Dominguez MD on 9/23/2023 at 11:01 AM.

## 2023-09-27 ENCOUNTER — HOSPITAL ENCOUNTER (OUTPATIENT)
Dept: WOUND CARE | Age: 68
Discharge: HOME | End: 2023-09-27
Payer: MEDICARE

## 2023-09-27 VITALS
WEIGHT: 239.8 LBS | HEART RATE: 84 BPM | RESPIRATION RATE: 20 BRPM | BODY MASS INDEX: 42.49 KG/M2 | DIASTOLIC BLOOD PRESSURE: 82 MMHG | TEMPERATURE: 97.6 F | HEIGHT: 63 IN | SYSTOLIC BLOOD PRESSURE: 149 MMHG

## 2023-09-27 DIAGNOSIS — I83.012 VARICOSE VEINS OF RIGHT LOWER EXTREMITY WITH ULCER OF CALF LIMITED TO BREAKDOWN OF SKIN (HCC): ICD-10-CM

## 2023-09-27 DIAGNOSIS — L97.211 VARICOSE VEINS OF RIGHT LOWER EXTREMITY WITH ULCER OF CALF LIMITED TO BREAKDOWN OF SKIN (HCC): ICD-10-CM

## 2023-09-27 DIAGNOSIS — I83.022 VARICOSE VEINS OF LEFT LOWER EXTREMITY WITH ULCER OF CALF WITH FAT LAYER EXPOSED (HCC): Primary | ICD-10-CM

## 2023-09-27 DIAGNOSIS — L97.222 VARICOSE VEINS OF LEFT LOWER EXTREMITY WITH ULCER OF CALF WITH FAT LAYER EXPOSED (HCC): Primary | ICD-10-CM

## 2023-09-27 PROCEDURE — 29581 APPL MULTLAYER CMPRN SYS LEG: CPT

## 2023-09-27 PROCEDURE — 97597 DBRDMT OPN WND 1ST 20 CM/<: CPT

## 2023-09-27 RX ORDER — BACITRACIN ZINC AND POLYMYXIN B SULFATE 500; 1000 [USP'U]/G; [USP'U]/G
OINTMENT TOPICAL ONCE
OUTPATIENT
Start: 2023-09-27 | End: 2023-09-27

## 2023-09-27 RX ORDER — LIDOCAINE 40 MG/G
CREAM TOPICAL ONCE
OUTPATIENT
Start: 2023-09-27 | End: 2023-09-27

## 2023-09-27 RX ORDER — TRIAMCINOLONE ACETONIDE 1 MG/G
OINTMENT TOPICAL ONCE
OUTPATIENT
Start: 2023-09-27 | End: 2023-09-27

## 2023-09-27 RX ORDER — LIDOCAINE 50 MG/G
OINTMENT TOPICAL ONCE
OUTPATIENT
Start: 2023-09-27 | End: 2023-09-27

## 2023-09-27 RX ORDER — LIDOCAINE HYDROCHLORIDE 40 MG/ML
SOLUTION TOPICAL ONCE
OUTPATIENT
Start: 2023-09-27 | End: 2023-09-27

## 2023-09-27 RX ORDER — LIDOCAINE 40 MG/G
CREAM TOPICAL ONCE
Status: DISCONTINUED | OUTPATIENT
Start: 2023-09-27 | End: 2023-09-28 | Stop reason: HOSPADM

## 2023-09-27 NOTE — PLAN OF CARE
Patient seen in [facility] today for follow up. Patient reports feeling well overall. Wounds showing signs of improvement. Wound debrided per Dr. Anita Munoz. Patient tolerated well. Patient reports her pain has been well controlled. Denies itching. Patient brought her compression sock in to [facility] for Dr. Benedict Soulier approval to wear under her velcro compression wraps. Dr. Anita Munoz gave approval for this. Patient provided with pamphlet for compression guru so she may be able to purchase additional socks or garments if needed in the future. No change in plan of care today. F/u in [facility] in 1 week as ordered, pt. Aware to call sooner with any changes or questions/concerns. Discharge instructions reviewed with patient, all questions answered, copy given to patient. Dressings were applied to all wounds per M.D. Instructions at this visit.

## 2023-09-28 NOTE — DISCHARGE INSTRUCTIONS
[facility] Physician [facility]charge [facility]  [address] [facility], [facility]  [place], [address]  Telephone: [phone]      Fax: [phone]        Your home care company:   N/a . Your wound-care supplies will be provided by: Wound Care & We are ordering your wound-care supplies from [facility]. Please note, depending on your insurance coverage, you may have out-of-pocket expenses for these supplies. Someone from [facility] should call you to confirm your order and discuss those potential costs before they ship your products -- please anticipate that call. If your out-of-pocket cost could be substantial, [facility] has a financial hardship program for patients who qualify, so please ask about that if you might need a hand. If you have any questions about your supplies or your potential out-of-pocket costs, or if you need to place an order for a refill of supplies (typically monthly), please call [phone]. Jimmie Closs NAME:  Keely Barreto   YOB: 1955  PRIMARY DIAGNOSIS FOR WOUND CARE CENTER:  Venous leg ulcer . Wound cleansing:   Do not scrub or use excessive force. Wash hands with soap and water before and after dressing changes. Prior to applying a clean dressing, cleanse wound with normal saline, wound cleanser, or mild soap and water. Ask your physician or nurse before getting the wound(s) wet in the shower. Wound care for home:     Bilateral lower legs:  Lotrisone to circular right medial shin area  Nexodyn spray to wounds  Puracol Ag then Hydrogel to wounds  Xeroform to any open wounds  Foam to anterior ankles  Coflex TLC Calamine compression wraps (slightly less tension at top of wrap)  Leave in place for the week. Keep clean, dry & intact. Your physician has ordered Compression for treatment of venous ulcers, venous insufficiency,and/or Lymphedema.    * Do not remove until your next scheduled visit

## 2023-09-29 NOTE — PROGRESS NOTES
and biofilm. Total Surface Area Debrided: 2 sq cm. The ulcers were then irrigated with normal saline solution. The procedure was completed with a small amount of bleeding, and hemostasis was with pressure. The patient tolerated the procedure well, with no significant complications. The patient's level of pain during and after the procedure was monitored. Post-debridement measurements, if different from pre-debridement, are in the flowsheet as well. Discharge plan:     Treatment in the wound care center today, per RN documentation: Wound 09/13/23 #3, left posterior lower leg, venous, full thickness (onset 09/2023)-Dressing/Treatment: Other (comment) (Santyl then saline moistened 2x2  Xeroform to any open wounds  Foam to anterior ankles  Coflex TLC Calamine compression wraps)  Wound 09/13/23 #4, right lateral lower leg, venous, partial thickness (onset 09/2023)-Dressing/Treatment: Other (comment) (Santyl then saline moistened 2x2  Xeroform to any open wounds  Foam to anterior ankles  Coflex TLC Calamine compression wraps)  Wound 09/06/23 #2 left pretib cluster, venous, full thickness (onset 5/22)-Dressing/Treatment: Other (comment) (Santyl then saline moistened 2x2  Xeroform to any open wounds  Foam to anterior ankles  Coflex TLC Calamine compression wraps). Per physician order, bilateral application of multilayer compression wrap was performed in the wound care center today, to help manage edema, stasis dermatitis, and/or venous ulcers. Leave primary dressing and multi-layer wrap(s) in place until the next appointment. Also discussed ways to keep the wrap dry for a shower, including a plastic cast-guard, available in retail pharmacies. She should call before her next visit if there is any significant pain, significant strike-through of drainage to the surface of the wrap, or any significant sense of the wrap sliding down more than an inch or so, bunching-up and abrading her skin.      I reminded the patient

## 2023-10-03 NOTE — TELEPHONE ENCOUNTER
Refill Request     CONFIRM preferrred pharmacy with the patient. If Mail Order Rx - Pend for 90 day refill. Last Seen: Last Seen Department: 8/30/2023  Last Seen by PCP: Visit date not found    Last Written: 12/5/22- medication was only refilled for 90 days so patient has been out for a while. Unsure if patient is needing this script will wait for pcp     If no future appointment scheduled, route STAFF MESSAGE with patient name to the [facility] for scheduling. Next Appointment:   Future Appointments   Date Time Provider 4600  46[facility]   10/4/2023 12:30 PM MD Isidro Navarrete   10/11/2023  9:30 AM [facility] OP NURSING ROOM 1 [facility] OP CAYLA [place]   10/11/2023  3:30 PM MD Isidro Navarrete [facility]   11/8/2023  9:00 AM [facility] OP NURSING ROOM 1 [facility] OP CAYLA [facility]   11/16/2023  9:00 AM YAHAIRA Pride - CNP [place]  [place] [facility]   12/6/2023  9:00 AM [facility] OP NURSING ROOM 1 [facility] OP CAYLA [facility]   1/3/2024  9:00 AM [facility] OP NURSING ROOM 1 [facility] OP [facility]       Message sent to Opal Rivasall Hero to schedule appt with patient?   N/A      Requested Prescriptions     Pending Prescriptions Disp Refills    GNP MAGNESIUM OXIDE 250 MG TABS tablet [Pharmacy Med Name: GNP MAGNESIUM 250 MG Tablet] 540 tablet 0     Sig: TAKE 2 TABLETS THREE TIMES DAILY

## 2023-10-04 ENCOUNTER — HOSPITAL ENCOUNTER (OUTPATIENT)
Dept: WOUND CARE | Age: 68
Discharge: HOME | End: 2023-10-04
Payer: MEDICARE

## 2023-10-04 VITALS
HEART RATE: 89 BPM | RESPIRATION RATE: 18 BRPM | WEIGHT: 238.4 LBS | SYSTOLIC BLOOD PRESSURE: 140 MMHG | BODY MASS INDEX: 42.24 KG/M2 | DIASTOLIC BLOOD PRESSURE: 91 MMHG | TEMPERATURE: 98.1 F | HEIGHT: 63 IN

## 2023-10-04 DIAGNOSIS — L97.211 VARICOSE VEINS OF RIGHT LOWER EXTREMITY WITH ULCER OF CALF LIMITED TO BREAKDOWN OF SKIN (HCC): ICD-10-CM

## 2023-10-04 DIAGNOSIS — L97.222 VARICOSE VEINS OF LEFT LOWER EXTREMITY WITH ULCER OF CALF WITH FAT LAYER EXPOSED (HCC): Primary | ICD-10-CM

## 2023-10-04 DIAGNOSIS — I83.012 VARICOSE VEINS OF RIGHT LOWER EXTREMITY WITH ULCER OF CALF LIMITED TO BREAKDOWN OF SKIN (HCC): ICD-10-CM

## 2023-10-04 DIAGNOSIS — I83.022 VARICOSE VEINS OF LEFT LOWER EXTREMITY WITH ULCER OF CALF WITH FAT LAYER EXPOSED (HCC): Primary | ICD-10-CM

## 2023-10-04 PROCEDURE — 97597 DBRDMT OPN WND 1ST 20 CM/<: CPT

## 2023-10-04 PROCEDURE — 29581 APPL MULTLAYER CMPRN SYS LEG: CPT

## 2023-10-04 RX ORDER — LIDOCAINE 40 MG/G
CREAM TOPICAL ONCE
OUTPATIENT
Start: 2023-10-04 | End: 2023-10-04

## 2023-10-04 RX ORDER — LIDOCAINE 50 MG/G
OINTMENT TOPICAL ONCE
OUTPATIENT
Start: 2023-10-04 | End: 2023-10-04

## 2023-10-04 RX ORDER — BACITRACIN ZINC AND POLYMYXIN B SULFATE 500; 1000 [USP'U]/G; [USP'U]/G
OINTMENT TOPICAL ONCE
OUTPATIENT
Start: 2023-10-04 | End: 2023-10-04

## 2023-10-04 RX ORDER — LIDOCAINE 50 MG/G
OINTMENT TOPICAL ONCE
Status: DISCONTINUED | OUTPATIENT
Start: 2023-10-04 | End: 2023-10-05 | Stop reason: HOSPADM

## 2023-10-04 RX ORDER — LIDOCAINE 40 MG/G
CREAM TOPICAL ONCE
Status: DISCONTINUED | OUTPATIENT
Start: 2023-10-04 | End: 2023-10-05 | Stop reason: HOSPADM

## 2023-10-04 RX ORDER — LIDOCAINE HYDROCHLORIDE 40 MG/ML
SOLUTION TOPICAL ONCE
OUTPATIENT
Start: 2023-10-04 | End: 2023-10-04

## 2023-10-04 RX ORDER — LIDOCAINE HYDROCHLORIDE 40 MG/ML
SOLUTION TOPICAL ONCE
Status: DISCONTINUED | OUTPATIENT
Start: 2023-10-04 | End: 2023-10-05 | Stop reason: HOSPADM

## 2023-10-04 RX ORDER — TRIAMCINOLONE ACETONIDE 1 MG/G
OINTMENT TOPICAL ONCE
OUTPATIENT
Start: 2023-10-04 | End: 2023-10-04

## 2023-10-04 RX ORDER — TRIAMCINOLONE ACETONIDE 1 MG/G
OINTMENT TOPICAL ONCE
Status: DISCONTINUED | OUTPATIENT
Start: 2023-10-04 | End: 2023-10-05 | Stop reason: HOSPADM

## 2023-10-04 RX ORDER — DM/PE/ACETAMINOPHEN/CHLORPHENR 10-5-325-2
TABLET, SEQUENTIAL ORAL
Qty: 540 TABLET | Refills: 0 | Status: SHIPPED | OUTPATIENT
Start: 2023-10-04

## 2023-10-04 RX ORDER — BACITRACIN ZINC AND POLYMYXIN B SULFATE 500; 1000 [USP'U]/G; [USP'U]/G
OINTMENT TOPICAL ONCE
Status: DISCONTINUED | OUTPATIENT
Start: 2023-10-04 | End: 2023-10-05 | Stop reason: HOSPADM

## 2023-10-04 NOTE — PLAN OF CARE
Wounds stable, debridement per MD & pt. Tolerated well. Will change from Santyl to using collagen & Hydrogel on wounds. Will also apply Lotrisone to fungal area noted. Otherwise, cont. With compression wraps for edema control as ordered. Reinforced importance of exercise, elevation & compression to help with circulation, edema control & wound healing. F/u in [address] [place] [address] in 1 week as ordered, pt. Aware to call sooner with any changes or questions/concerns. Discharge instructions reviewed with patient, all questions answered, copy given to patient. Dressings were applied to all wounds per M.D. Instructions at this visit.

## 2023-10-05 NOTE — DISCHARGE INSTRUCTIONS
[facility] Physician Orders and Discharge [facility]  [address] [place] [address], [facility]  Arian, Pawan[address] Rosy Hinson  Telephone: [phone]      Fax: [phone]        Your home care company:   N/a . Your wound-care supplies will be provided by: [facility] & We are ordering your wound-care supplies from [facility]. Please note, depending on your insurance coverage, you may have out-of-pocket expenses for these supplies. Someone from [facility] should call you to confirm your order and discuss those potential costs before they ship your products -- please anticipate that call. If your out-of-pocket cost could be substantial, [facility] has a financial hardship program for patients who qualify, so please ask about that if you might need a hand. If you have any questions about your supplies or your potential out-of-pocket costs, or if you need to place an order for a refill of supplies (typically monthly), please call [phone]. Darlene Shah NAME:  Kayy Nobles   YOB: 1955  PRIMARY DIAGNOSIS FOR WOUND CARE CENTER:  Venous leg ulcer . Wound cleansing:   Do not scrub or use excessive force. Wash hands with soap and water before and after dressing changes. Prior to applying a clean dressing, cleanse wound with normal saline, wound cleanser, or mild soap and water. Ask your physician or nurse before getting the wound(s) wet in the shower. Wound care for home:    Right lower leg:  Apply    Velcro compression garment  Reapply everyday or couple of days as it works for your schedule  Plan to wear compression for at least the next 2-3 months    Left lower leg:  Aquaphor to leg  Foam to anterior ankle  Coflex TLC Calamine Lite compression wraps (slightly less tension at top of wrap)  Leave in place for the week. Keep clean, dry & intact.         Your physician has ordered Compression for treatment of venous ulcers, venous

## 2023-10-11 ENCOUNTER — HOSPITAL ENCOUNTER (OUTPATIENT)
Dept: WOUND CARE | Age: 68
Discharge: HOME | End: 2023-10-11
Attending: INTERNAL MEDICINE
Payer: MEDICARE

## 2023-10-11 ENCOUNTER — HOSPITAL ENCOUNTER (OUTPATIENT)
Dept: NURSING | Age: 68
Setting detail: INFUSION SERIES
Discharge: HOME | End: 2023-10-11
Payer: MEDICARE

## 2023-10-11 VITALS
TEMPERATURE: 97.6 F | WEIGHT: 237.6 LBS | HEART RATE: 82 BPM | BODY MASS INDEX: 42.1 KG/M2 | SYSTOLIC BLOOD PRESSURE: 138 MMHG | RESPIRATION RATE: 16 BRPM | DIASTOLIC BLOOD PRESSURE: 89 MMHG | HEIGHT: 63 IN

## 2023-10-11 VITALS
SYSTOLIC BLOOD PRESSURE: 127 MMHG | WEIGHT: 238 LBS | DIASTOLIC BLOOD PRESSURE: 59 MMHG | BODY MASS INDEX: 42.17 KG/M2 | HEIGHT: 63 IN | HEART RATE: 84 BPM | RESPIRATION RATE: 16 BRPM | TEMPERATURE: 97.8 F

## 2023-10-11 DIAGNOSIS — I83.022 VARICOSE VEINS OF LEFT LOWER EXTREMITY WITH ULCER OF CALF WITH FAT LAYER EXPOSED (HCC): Primary | ICD-10-CM

## 2023-10-11 DIAGNOSIS — L97.222 VARICOSE VEINS OF LEFT LOWER EXTREMITY WITH ULCER OF CALF WITH FAT LAYER EXPOSED (HCC): Primary | ICD-10-CM

## 2023-10-11 DIAGNOSIS — L97.211 VARICOSE VEINS OF RIGHT LOWER EXTREMITY WITH ULCER OF CALF LIMITED TO BREAKDOWN OF SKIN (HCC): ICD-10-CM

## 2023-10-11 DIAGNOSIS — I83.012 VARICOSE VEINS OF RIGHT LOWER EXTREMITY WITH ULCER OF CALF LIMITED TO BREAKDOWN OF SKIN (HCC): ICD-10-CM

## 2023-10-11 DIAGNOSIS — K51.00 CHRONIC ULCERATIVE ENTEROCOLITIS WITHOUT COMPLICATION (HCC): Primary | ICD-10-CM

## 2023-10-11 LAB
ALBUMIN SERPL-MCNC: 4 G/DL (ref 3.4–5)
ALBUMIN/GLOB SERPL: 1.1 {RATIO} (ref 1.1–2.2)
ALP SERPL-CCNC: 107 U/L (ref 40–129)
ALT SERPL-CCNC: 26 U/L (ref 10–40)
ANION GAP SERPL CALCULATED.3IONS-SCNC: 13 MMOL/L (ref 3–16)
AST SERPL-CCNC: 55 U/L (ref 15–37)
BILIRUB SERPL-MCNC: 0.9 MG/DL (ref 0–1)
BUN SERPL-MCNC: 22 MG/DL (ref 7–20)
CALCIUM SERPL-MCNC: 9.8 MG/DL (ref 8.3–10.6)
CHLORIDE SERPL-SCNC: 102 MMOL/L (ref 99–110)
CO2 SERPL-SCNC: 27 MMOL/L (ref 21–32)
CREAT SERPL-MCNC: 1.3 MG/DL (ref 0.6–1.2)
DEPRECATED RDW RBC AUTO: 14.9 % (ref 12.4–15.4)
GFR SERPLBLD CREATININE-BSD FMLA CKD-EPI: 45 ML/MIN/{1.73_M2}
GLUCOSE SERPL-MCNC: 165 MG/DL (ref 70–99)
HCT VFR BLD AUTO: 39.2 % (ref 36–48)
HGB BLD-MCNC: 13 G/DL (ref 12–16)
MCH RBC QN AUTO: 31.5 PG (ref 26–34)
MCHC RBC AUTO-ENTMCNC: 33.1 G/DL (ref 31–36)
MCV RBC AUTO: 95.1 FL (ref 80–100)
PLATELET # BLD AUTO: 143 K/UL (ref 135–450)
PMV BLD AUTO: 9.9 FL (ref 5–10.5)
POTASSIUM SERPL-SCNC: 4.9 MMOL/L (ref 3.5–5.1)
PROT SERPL-MCNC: 7.5 G/DL (ref 6.4–8.2)
RBC # BLD AUTO: 4.12 M/UL (ref 4–5.2)
SODIUM SERPL-SCNC: 142 MMOL/L (ref 136–145)
WBC # BLD AUTO: 6.7 K/UL (ref 4–11)

## 2023-10-11 PROCEDURE — 96365 THER/PROPH/DIAG IV INF INIT: CPT

## 2023-10-11 PROCEDURE — 85027 COMPLETE CBC AUTOMATED: CPT

## 2023-10-11 PROCEDURE — 80053 COMPREHEN METABOLIC PANEL: CPT

## 2023-10-11 PROCEDURE — 29581 APPL MULTLAYER CMPRN SYS LEG: CPT

## 2023-10-11 PROCEDURE — 6360000002 HC RX W HCPCS: Performed by: INTERNAL MEDICINE

## 2023-10-11 PROCEDURE — 99211 OFF/OP EST MAY X REQ PHY/QHP: CPT

## 2023-10-11 PROCEDURE — 2580000003 HC RX 258: Performed by: INTERNAL MEDICINE

## 2023-10-11 RX ORDER — LIDOCAINE 50 MG/G
OINTMENT TOPICAL ONCE
Status: DISCONTINUED | OUTPATIENT
Start: 2023-10-11 | End: 2023-10-12 | Stop reason: HOSPADM

## 2023-10-11 RX ORDER — SODIUM CHLORIDE 0.9 % (FLUSH) 0.9 %
10 SYRINGE (ML) INJECTION PRN
OUTPATIENT
Start: 2023-11-08

## 2023-10-11 RX ORDER — SODIUM CHLORIDE 9 MG/ML
INJECTION, SOLUTION INTRAVENOUS CONTINUOUS
OUTPATIENT
Start: 2023-11-08

## 2023-10-11 RX ORDER — LIDOCAINE 40 MG/G
CREAM TOPICAL ONCE
Status: DISCONTINUED | OUTPATIENT
Start: 2023-10-11 | End: 2023-10-12 | Stop reason: HOSPADM

## 2023-10-11 RX ORDER — BACITRACIN ZINC AND POLYMYXIN B SULFATE 500; 1000 [USP'U]/G; [USP'U]/G
OINTMENT TOPICAL ONCE
OUTPATIENT
Start: 2023-10-11 | End: 2023-10-11

## 2023-10-11 RX ORDER — LIDOCAINE 40 MG/G
CREAM TOPICAL ONCE
OUTPATIENT
Start: 2023-10-11 | End: 2023-10-11

## 2023-10-11 RX ORDER — TRIAMCINOLONE ACETONIDE 1 MG/G
OINTMENT TOPICAL ONCE
Status: DISCONTINUED | OUTPATIENT
Start: 2023-10-11 | End: 2023-10-12 | Stop reason: HOSPADM

## 2023-10-11 RX ORDER — BACITRACIN ZINC AND POLYMYXIN B SULFATE 500; 1000 [USP'U]/G; [USP'U]/G
OINTMENT TOPICAL ONCE
Status: DISCONTINUED | OUTPATIENT
Start: 2023-10-11 | End: 2023-10-12 | Stop reason: HOSPADM

## 2023-10-11 RX ORDER — SODIUM CHLORIDE 0.9 % (FLUSH) 0.9 %
10 SYRINGE (ML) INJECTION PRN
Status: DISCONTINUED | OUTPATIENT
Start: 2023-10-11 | End: 2023-10-12 | Stop reason: HOSPADM

## 2023-10-11 RX ORDER — SODIUM CHLORIDE 9 MG/ML
INJECTION, SOLUTION INTRAVENOUS CONTINUOUS
Status: DISCONTINUED | OUTPATIENT
Start: 2023-10-11 | End: 2023-10-11 | Stop reason: HOSPADM

## 2023-10-11 RX ORDER — LIDOCAINE HYDROCHLORIDE 40 MG/ML
SOLUTION TOPICAL ONCE
Status: DISCONTINUED | OUTPATIENT
Start: 2023-10-11 | End: 2023-10-12 | Stop reason: HOSPADM

## 2023-10-11 RX ORDER — LIDOCAINE HYDROCHLORIDE 40 MG/ML
SOLUTION TOPICAL ONCE
OUTPATIENT
Start: 2023-10-11 | End: 2023-10-11

## 2023-10-11 RX ORDER — TRIAMCINOLONE ACETONIDE 1 MG/G
OINTMENT TOPICAL ONCE
OUTPATIENT
Start: 2023-10-11 | End: 2023-10-11

## 2023-10-11 RX ORDER — LIDOCAINE 50 MG/G
OINTMENT TOPICAL ONCE
OUTPATIENT
Start: 2023-10-11 | End: 2023-10-11

## 2023-10-11 RX ADMIN — Medication 10 ML: at 09:58

## 2023-10-11 RX ADMIN — SODIUM CHLORIDE: 9 INJECTION, SOLUTION INTRAVENOUS at 09:58

## 2023-10-11 RX ADMIN — VEDOLIZUMAB 300 MG: 300 INJECTION, POWDER, LYOPHILIZED, FOR SOLUTION INTRAVENOUS at 09:59

## 2023-10-11 ASSESSMENT — PAIN SCALES - GENERAL: PAINLEVEL_OUTOF10: 0

## 2023-10-11 NOTE — PROGRESS NOTES
Pt here for entyvio infusion  Pt without c/o's today  She reports that her stools are almost normal  Pt reports some bloating but denies any  cramping,bleeding or diarrhea  IV # 22 was started in [facility] on my 1st attempt  Blood for lab work was drawn without difficulty  Pt gayla well  Entyvio 300 mg IVPB starting to infuse  IV site unremarkable Will monitor

## 2023-10-11 NOTE — DISCHARGE INSTRUCTIONS
vedolizumab  Pronunciation:  GEN Cheng  What is vedolizumab? Vedolizumab reduces the effects of a substance in the body that can cause inflammation. Vedolizumab is used in adults with moderate to severe ulcerative colitis (UC), or moderate to severe Crohn's disease. Vedolizumab treats active disease and may help keep UC or Crohn's symptoms under control long term. Vedolizumab may also reduce the need for steroid medicines in helping to control symptoms long term. Vedolizumab is usually given after other medicines have been tried without success. Vedolizumab may also be used for purposes not listed in this medication guide. What should I discuss with my healthcare provider before receiving vedolizumab? You should not use vedolizumab if you are allergic to it. To make sure vedolizumab is safe for you, tell your doctor if you have:  an active or recent infection;  tuberculosis (or if you have close contact with someone who has tuberculosis);  signs of infection such as fever, cough, or flu symptoms;  open sores or skin wounds;  weak immune system (caused by disease or by using certain medicine); or  if you are scheduled to receive any vaccines. You should be up to date with all needed vaccinations before receiving vedolizumab. FDA pregnancy category Karen Noble is not expected to harm an unborn baby. Tell your doctor if you are pregnant or plan to become pregnant during treatment. If you are pregnant, your name may be listed on a pregnancy registry. This is to track the outcome of the pregnancy and to evaluate any effects of vedolizumab on the baby. It is not known whether vedolizumab passes into breast milk or if it could harm a nursing baby. Tell your doctor if you are breast-feeding a baby. Do not give this medicine to anyone under 25years old without medical advice. How is vedolizumab given?   Before you start treatment with vedolizumab, your doctor may perform tests to have any of these signs of an allergic reaction: hives; difficult breathing; swelling of your face, lips, tongue, or throat. Some side effects may occur during the injection. Tell your caregiver right away if you feel warm or tingly, or if you have a severe headache, fast heart rate, pounding in your neck or ears, chest tightness, or trouble breathing. Some people using a medicine similar to vedolizumab have developed a serious viral infection of the brain that can lead to disability or death. Call your doctor right away if you have any change in your mental state, decreased vision, or problems with speech or walking. These symptoms may start gradually and get worse quickly. Stop using vedolizumab and call your doctor at once if you have:  fever, chills, body aches, cold or flu symptoms, mouth and throat ulcers, skin sores;  pain, warmth, swelling, or oozing around your anal area;  nausea, vomiting, severe diarrhea, diarrhea that is watery or bloody, stomach cramps, weight loss;  cough, pain when swallowing; or  liver problems --nausea, upper stomach pain, itching, tired feeling, loss of appetite, dark urine, shanita-colored stools, jaundice (yellowing of the skin or eyes). Common side effects may include:  fever, sore throat, flu symptoms;  cold symptoms such as stuffy nose, sinus pain, sneezing, cough;  pain in your arms or legs;  tired feeling;  headache, joint pain, back pain;  rash, itching; or  nausea. This is not a complete list of side effects and others may occur. Call your doctor for medical advice about side effects. You may report side effects to FDA at 8-749-FDA-5538. What other drugs will affect vedolizumab?   Tell your doctor about all medicines you use, and those you start or stop using during your treatment with vedolizumab, especially:  natalizumab;  medicines to treat psoriasis or rheumatoid arthritis, such as etanercept or golimumab;  other medicines to treat Crohn's disease or ulcerative

## 2023-10-11 NOTE — PLAN OF CARE
Wounds healed. Will apply Aquaphor & compression wrap on left leg for another week. Will apply velcro compression garment on right leg this week for edema control. Reinforced importance of exercise, elevation & compression to help with circulation, edema control & wound healing, pt. Verbalizes understanding. Dr Scott Presley also plans to refer patient to Dr Anaya aCmpbell to consult, assess veins & decide if intervention needed, pt. Agreeable to this plan. F/u in [facility] in 1 week as ordered, pt. Aware to call sooner with any changes or questions/concerns. Discharge instructions reviewed with patient, all questions answered, copy given to patient. Dressings were applied to all wounds per M.D. Instructions at this visit.

## 2023-10-12 ENCOUNTER — TELEPHONE (OUTPATIENT)
Dept: CARDIOLOGY CLINIC | Age: 68
End: 2023-10-12

## 2023-10-12 NOTE — TELEPHONE ENCOUNTER
Attempted to call patient to schedule new referral appt for venous ulcers, phone line rang and disconnected, unable to leave message.      Called patient back, per Dr. Franz Hatchet patient for appt at [facility] location 10/17 @ 330pm.

## 2023-10-12 NOTE — DISCHARGE INSTRUCTIONS
Care Center\". We appreciate your comments. Your nurse  is CAYLA Agarwal. If we applied slip-resistant hospital socks today, be sure to remove them at least once a day to inspect your toes or feet, even if you're not changing the wraps or dressings underneath. If you see anything concerning (redness, excess moisture, etc), please call and let us know right away. Should you experience any significant changes in your wound(s) (including redness, increased warmth, increased pain, increased drainage, odor, or fever) or have questions about your wound care, please contact the [place] at [phone] Monday-Thursday from 8:00 am - 4:30 pm, or Friday from 8:00 am - 2:30 pm.  If you need help with your wound outside these hours and cannot wait until we are again available, contact your home-care company (if applicable), your PCP, or go to the nearest emergency room.

## 2023-10-14 NOTE — PROGRESS NOTES
fragile on the left side. Photos also saved in electronic chart. Today's Wound Measurements, per RN documentation:  [REMOVED] Wound 09/13/23 #3, left posterior lower leg, venous, full thickness (onset 09/2023)-Wound Length (cm): 0 cm  [REMOVED] Wound 09/13/23 #4, right lateral lower leg, venous, partial thickness (onset 09/2023)-Wound Length (cm): 0 cm  [REMOVED] Wound 09/06/23 #2 left pretib cluster, venous, full thickness (onset 5/22)-Wound Length (cm): 0 cm    [REMOVED] Wound 09/13/23 #3, left posterior lower leg, venous, full thickness (onset 09/2023)-Wound Width (cm): 0 cm  [REMOVED] Wound 09/13/23 #4, right lateral lower leg, venous, partial thickness (onset 09/2023)-Wound Width (cm): 0 cm  [REMOVED] Wound 09/06/23 #2 left pretib cluster, venous, full thickness (onset 5/22)-Wound Width (cm): 0 cm    [REMOVED] Wound 09/13/23 #3, left posterior lower leg, venous, full thickness (onset 09/2023)-Wound Depth (cm): 0 cm  [REMOVED] Wound 09/13/23 #4, right lateral lower leg, venous, partial thickness (onset 09/2023)-Wound Depth (cm): 0 cm  [REMOVED] Wound 09/06/23 #2 left pretib cluster, venous, full thickness (onset 5/22)-Wound Depth (cm): 0 cm    Assessment:     Patient Active Problem List   Diagnosis Code    Diet-controlled type 2 diabetes mellitus (HCC) E11.9    Hyperlipidemia E78.5    HTN (hypertension) I10    Ulcerative colitis ([address]) K51.90    Stasis dermatitis of both legs I87.2    Hypothyroidism E03.9    Reactive airway disease J45.909    Anxiety and depression F41.9, F32. 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

## 2023-10-17 ENCOUNTER — OFFICE VISIT (OUTPATIENT)
Dept: CARDIOLOGY CLINIC | Age: 68
End: 2023-10-17
Payer: MEDICARE

## 2023-10-17 VITALS
SYSTOLIC BLOOD PRESSURE: 122 MMHG | HEART RATE: 96 BPM | HEIGHT: 63 IN | WEIGHT: 236 LBS | DIASTOLIC BLOOD PRESSURE: 70 MMHG | BODY MASS INDEX: 41.82 KG/M2 | OXYGEN SATURATION: 96 %

## 2023-10-17 DIAGNOSIS — L97.929 VENOUS ULCER OF BOTH LOWER EXTREMITIES WITH VARICOSE VEINS (HCC): Primary | ICD-10-CM

## 2023-10-17 DIAGNOSIS — I83.813 VARICOSE VEINS OF BILATERAL LOWER EXTREMITIES WITH PAIN: ICD-10-CM

## 2023-10-17 DIAGNOSIS — I83.019 VENOUS ULCER OF BOTH LOWER EXTREMITIES WITH VARICOSE VEINS (HCC): Primary | ICD-10-CM

## 2023-10-17 DIAGNOSIS — I83.029 VENOUS ULCER OF BOTH LOWER EXTREMITIES WITH VARICOSE VEINS (HCC): Primary | ICD-10-CM

## 2023-10-17 DIAGNOSIS — L97.919 VENOUS ULCER OF BOTH LOWER EXTREMITIES WITH VARICOSE VEINS (HCC): Primary | ICD-10-CM

## 2023-10-17 PROCEDURE — 3017F COLORECTAL CA SCREEN DOC REV: CPT | Performed by: INTERNAL MEDICINE

## 2023-10-17 PROCEDURE — G8417 CALC BMI ABV UP PARAM F/U: HCPCS | Performed by: INTERNAL MEDICINE

## 2023-10-17 PROCEDURE — 1090F PRES/ABSN URINE INCON ASSESS: CPT | Performed by: INTERNAL MEDICINE

## 2023-10-17 PROCEDURE — G8399 PT W/DXA RESULTS DOCUMENT: HCPCS | Performed by: INTERNAL MEDICINE

## 2023-10-17 PROCEDURE — 3078F DIAST BP <80 MM HG: CPT | Performed by: INTERNAL MEDICINE

## 2023-10-17 PROCEDURE — G8484 FLU IMMUNIZE NO ADMIN: HCPCS | Performed by: INTERNAL MEDICINE

## 2023-10-17 PROCEDURE — 1036F TOBACCO NON-USER: CPT | Performed by: INTERNAL MEDICINE

## 2023-10-17 PROCEDURE — 1123F ACP DISCUSS/DSCN MKR DOCD: CPT | Performed by: INTERNAL MEDICINE

## 2023-10-17 PROCEDURE — 3074F SYST BP LT 130 MM HG: CPT | Performed by: INTERNAL MEDICINE

## 2023-10-17 PROCEDURE — G8427 DOCREV CUR MEDS BY ELIG CLIN: HCPCS | Performed by: INTERNAL MEDICINE

## 2023-10-17 PROCEDURE — 99204 OFFICE O/P NEW MOD 45 MIN: CPT | Performed by: INTERNAL MEDICINE

## 2023-10-18 ENCOUNTER — HOSPITAL ENCOUNTER (OUTPATIENT)
Dept: WOUND CARE | Age: 68
Discharge: HOME | End: 2023-10-18
Attending: INTERNAL MEDICINE
Payer: MEDICARE

## 2023-10-18 VITALS
WEIGHT: 239.4 LBS | HEIGHT: 63 IN | DIASTOLIC BLOOD PRESSURE: 81 MMHG | SYSTOLIC BLOOD PRESSURE: 141 MMHG | TEMPERATURE: 97.5 F | RESPIRATION RATE: 16 BRPM | HEART RATE: 77 BPM | BODY MASS INDEX: 42.42 KG/M2

## 2023-10-18 DIAGNOSIS — I83.12 VARICOSE VEINS OF BOTH LOWER EXTREMITIES WITH INFLAMMATION: ICD-10-CM

## 2023-10-18 DIAGNOSIS — I83.022 VARICOSE VEINS OF LEFT LOWER EXTREMITY WITH ULCER OF CALF WITH FAT LAYER EXPOSED (HCC): Primary | ICD-10-CM

## 2023-10-18 DIAGNOSIS — I83.11 VARICOSE VEINS OF BOTH LOWER EXTREMITIES WITH INFLAMMATION: ICD-10-CM

## 2023-10-18 DIAGNOSIS — I83.012 VARICOSE VEINS OF RIGHT LOWER EXTREMITY WITH ULCER OF CALF LIMITED TO BREAKDOWN OF SKIN (HCC): ICD-10-CM

## 2023-10-18 DIAGNOSIS — L97.222 VARICOSE VEINS OF LEFT LOWER EXTREMITY WITH ULCER OF CALF WITH FAT LAYER EXPOSED (HCC): Primary | ICD-10-CM

## 2023-10-18 DIAGNOSIS — L97.211 VARICOSE VEINS OF RIGHT LOWER EXTREMITY WITH ULCER OF CALF LIMITED TO BREAKDOWN OF SKIN (HCC): ICD-10-CM

## 2023-10-18 PROCEDURE — 99212 OFFICE O/P EST SF 10 MIN: CPT

## 2023-10-18 PROCEDURE — 99212 OFFICE O/P EST SF 10 MIN: CPT | Performed by: INTERNAL MEDICINE

## 2023-10-18 RX ORDER — LIDOCAINE 50 MG/G
OINTMENT TOPICAL ONCE
Status: CANCELLED | OUTPATIENT
Start: 2023-10-18 | End: 2023-10-18

## 2023-10-18 RX ORDER — LIDOCAINE 50 MG/G
OINTMENT TOPICAL ONCE
Status: DISCONTINUED | OUTPATIENT
Start: 2023-10-18 | End: 2023-10-18

## 2023-10-18 RX ORDER — LIDOCAINE HYDROCHLORIDE 40 MG/ML
SOLUTION TOPICAL ONCE
Status: DISCONTINUED | OUTPATIENT
Start: 2023-10-18 | End: 2023-10-18

## 2023-10-18 RX ORDER — BACITRACIN ZINC AND POLYMYXIN B SULFATE 500; 1000 [USP'U]/G; [USP'U]/G
OINTMENT TOPICAL ONCE
Status: CANCELLED | OUTPATIENT
Start: 2023-10-18 | End: 2023-10-18

## 2023-10-18 RX ORDER — TRIAMCINOLONE ACETONIDE 1 MG/G
OINTMENT TOPICAL ONCE
Status: DISCONTINUED | OUTPATIENT
Start: 2023-10-18 | End: 2023-10-18

## 2023-10-18 RX ORDER — LIDOCAINE 40 MG/G
CREAM TOPICAL ONCE
Status: CANCELLED | OUTPATIENT
Start: 2023-10-18 | End: 2023-10-18

## 2023-10-18 RX ORDER — LIDOCAINE 40 MG/G
CREAM TOPICAL ONCE
Status: DISCONTINUED | OUTPATIENT
Start: 2023-10-18 | End: 2023-10-18

## 2023-10-18 RX ORDER — LIDOCAINE HYDROCHLORIDE 40 MG/ML
SOLUTION TOPICAL ONCE
Status: CANCELLED | OUTPATIENT
Start: 2023-10-18 | End: 2023-10-18

## 2023-10-18 RX ORDER — TRIAMCINOLONE ACETONIDE 1 MG/G
OINTMENT TOPICAL ONCE
Status: CANCELLED | OUTPATIENT
Start: 2023-10-18 | End: 2023-10-18

## 2023-10-18 RX ORDER — BACITRACIN ZINC AND POLYMYXIN B SULFATE 500; 1000 [USP'U]/G; [USP'U]/G
OINTMENT TOPICAL ONCE
Status: DISCONTINUED | OUTPATIENT
Start: 2023-10-18 | End: 2023-10-18

## 2023-10-18 NOTE — PLAN OF CARE
Wounds remain healed. Pt. To wear her velcro compression garments for edema control. Pt. Advised to wear the garments daily until she has her procedure with Dr Feliciano Hobson. Pt. States she saw Dr Feliciano Hobson on 10/17/23 & he is planning intervention for her veins, but not for 6 months. Pt. To follow up as directed per his office. Reinforced importance of exercise, elevation & compression to help with circulation, edema control & wound healing, pt. Verbalizes understanding. No further follow up in [address] [place] [address] at this time. Patient aware to call if a wound reopens or with any further questions/concerns. Discharge instructions reviewed with patient, all questions answered, copy given to patient. Dressings were applied to all wounds per M.D. Instructions at this visit.

## 2023-10-21 PROBLEM — I83.12 VARICOSE VEINS OF BOTH LOWER EXTREMITIES WITH INFLAMMATION: Status: ACTIVE | Noted: 2023-09-06

## 2023-10-21 PROBLEM — I83.11 VARICOSE VEINS OF BOTH LOWER EXTREMITIES WITH INFLAMMATION: Status: ACTIVE | Noted: 2023-09-06

## 2023-10-21 NOTE — PROGRESS NOTES
exposed ([address]) I83.022, L97.222    Chronic back pain M54.9, G89.29    Hearing loss H91.90    Restless legs syndrome G25.81    Steatohepatitis K75.81       Assessment of today's active condition(s): varicose veins, venous insufficiency, significant skin changes, LE edema, healed venous leg ulcers, but high risk for recurrence. Factors contributing to occurrence and/or persistence of the chronic ulcer include edema, venous stasis, obesity, and immunosuppression. Medical necessity of today's visit is shown by the above documentation. Sharp debridement is not indicated today, based upon the exam findings in the wound(s) above. Discharge plan:     Treatment in the wound care center today, per RN documentation: just her compression garments. I reminded the patient of the importance of weight management and smoking cessation, if applicable; also encouraged ambulation as tolerated, additional lower extremity exercises as instructed in our education sheet, leg elevation when at rest, and compliance with any recommended dietary, diuretic and compression therapies. She has BL Solaris ReadyWrap Fusion garments to wear now, either with the Fusion Liners as a first layer, or her own low-compression stockings as a first layer. She has paperwork to order replacements if/when needed. Be sure to follow up with Dr. Antonia Salguero for that venous imaging, ablation plans. Written discharge instructions given to patient. Follow up here PRN.     Electronically signed by Jo Ann Cavanaugh MD on 10/21/2023 at 10:18 AM.

## 2023-11-01 ENCOUNTER — HOSPITAL ENCOUNTER (EMERGENCY)
Age: 68
Discharge: HOME | End: 2023-11-01
Attending: EMERGENCY MEDICINE
Payer: MEDICARE

## 2023-11-01 ENCOUNTER — APPOINTMENT (OUTPATIENT)
Dept: CT IMAGING | Age: 68
End: 2023-11-01
Payer: MEDICARE

## 2023-11-01 VITALS
BODY MASS INDEX: 37.21 KG/M2 | SYSTOLIC BLOOD PRESSURE: 158 MMHG | HEIGHT: 63 IN | OXYGEN SATURATION: 96 % | HEART RATE: 84 BPM | WEIGHT: 210 LBS | DIASTOLIC BLOOD PRESSURE: 83 MMHG | TEMPERATURE: 98 F | RESPIRATION RATE: 18 BRPM

## 2023-11-01 DIAGNOSIS — S09.90XA CLOSED HEAD INJURY, INITIAL ENCOUNTER: ICD-10-CM

## 2023-11-01 DIAGNOSIS — S00.03XA HEMATOMA OF FRONTAL SCALP, INITIAL ENCOUNTER: Primary | ICD-10-CM

## 2023-11-01 PROCEDURE — 70450 CT HEAD/BRAIN W/O DYE: CPT

## 2023-11-01 PROCEDURE — 99284 EMERGENCY DEPT VISIT MOD MDM: CPT

## 2023-11-01 ASSESSMENT — ENCOUNTER SYMPTOMS
VOMITING: 0
SHORTNESS OF BREATH: 0
VOICE CHANGE: 0
TROUBLE SWALLOWING: 0
NAUSEA: 0
DIARRHEA: 0

## 2023-11-01 ASSESSMENT — PAIN DESCRIPTION - PAIN TYPE: TYPE: ACUTE PAIN

## 2023-11-01 ASSESSMENT — PAIN SCALES - GENERAL: PAINLEVEL_OUTOF10: 1

## 2023-11-01 ASSESSMENT — PAIN DESCRIPTION - FREQUENCY: FREQUENCY: CONTINUOUS

## 2023-11-01 ASSESSMENT — PAIN DESCRIPTION - LOCATION: LOCATION: HEAD

## 2023-11-01 ASSESSMENT — LIFESTYLE VARIABLES
HOW MANY STANDARD DRINKS CONTAINING ALCOHOL DO YOU HAVE ON A TYPICAL DAY: PATIENT DOES NOT DRINK
HOW OFTEN DO YOU HAVE A DRINK CONTAINING ALCOHOL: NEVER

## 2023-11-01 ASSESSMENT — PAIN DESCRIPTION - ORIENTATION: ORIENTATION: RIGHT

## 2023-11-01 ASSESSMENT — PAIN DESCRIPTION - DESCRIPTORS: DESCRIPTORS: ACHING

## 2023-11-01 ASSESSMENT — PAIN - FUNCTIONAL ASSESSMENT: PAIN_FUNCTIONAL_ASSESSMENT: 0-10

## 2023-11-01 NOTE — ED NOTES
Discharge instructions and medications reviewed with patient. Patient verbalized understanding of medications and follow up. All questions answered at this time. Skin warm, pink, and dry. Patient alert and oriented x4. Pt ambulated to lobby with a stable gait. Patient discharged home with 0 prescriptions.        Shivam Cam RN  11/01/23 [id number]

## 2023-11-08 ENCOUNTER — HOSPITAL ENCOUNTER (OUTPATIENT)
Dept: NURSING | Age: 68
Setting detail: INFUSION SERIES
Discharge: HOME | End: 2023-11-08
Payer: MEDICARE

## 2023-11-08 VITALS
RESPIRATION RATE: 16 BRPM | DIASTOLIC BLOOD PRESSURE: 65 MMHG | HEIGHT: 63 IN | BODY MASS INDEX: 41.8 KG/M2 | HEART RATE: 97 BPM | WEIGHT: 235.89 LBS | SYSTOLIC BLOOD PRESSURE: 125 MMHG | TEMPERATURE: 97.8 F

## 2023-11-08 DIAGNOSIS — K51.00 CHRONIC ULCERATIVE ENTEROCOLITIS WITHOUT COMPLICATION (HCC): Primary | ICD-10-CM

## 2023-11-08 PROCEDURE — 99211 OFF/OP EST MAY X REQ PHY/QHP: CPT

## 2023-11-08 PROCEDURE — 96365 THER/PROPH/DIAG IV INF INIT: CPT

## 2023-11-08 PROCEDURE — 6360000002 HC RX W HCPCS: Performed by: INTERNAL MEDICINE

## 2023-11-08 PROCEDURE — 2580000003 HC RX 258: Performed by: INTERNAL MEDICINE

## 2023-11-08 RX ORDER — SODIUM CHLORIDE 9 MG/ML
INJECTION, SOLUTION INTRAVENOUS CONTINUOUS
Status: ACTIVE | OUTPATIENT
Start: 2023-11-08 | End: 2023-11-08

## 2023-11-08 RX ORDER — SODIUM CHLORIDE 0.9 % (FLUSH) 0.9 %
10 SYRINGE (ML) INJECTION PRN
OUTPATIENT
Start: 2023-12-06

## 2023-11-08 RX ORDER — SODIUM CHLORIDE 0.9 % (FLUSH) 0.9 %
10 SYRINGE (ML) INJECTION PRN
Status: DISCONTINUED | OUTPATIENT
Start: 2023-11-08 | End: 2023-11-09 | Stop reason: HOSPADM

## 2023-11-08 RX ORDER — SODIUM CHLORIDE 9 MG/ML
INJECTION, SOLUTION INTRAVENOUS CONTINUOUS
OUTPATIENT
Start: 2023-12-06

## 2023-11-08 RX ADMIN — VEDOLIZUMAB 300 MG: 300 INJECTION, POWDER, LYOPHILIZED, FOR SOLUTION INTRAVENOUS at 09:39

## 2023-11-08 RX ADMIN — SODIUM CHLORIDE: 9 INJECTION, SOLUTION INTRAVENOUS at 09:38

## 2023-11-08 RX ADMIN — Medication 10 ML: at 09:38

## 2023-11-08 ASSESSMENT — PAIN SCALES - GENERAL: PAINLEVEL_OUTOF10: 0

## 2023-11-08 NOTE — PROGRESS NOTES
Pt here for entyvio infusion  Pt without c/o's today Pt has a hematoma noted on her Rt forehead and she has a black eye from a recent fall  Pt reports she tripped on concrete but was seen in the ER and released  She reports that her stools are almost normal  Pt reports some bloating  and occasional diarrhea but she denies any cramping or bleeding  IV # 22 was   started in [place] on my 2nd attempt  No lab work needed for today Pt gayla well  Entyvio 300 mg IVPB starting to infuse  IV site unremarkable Will monitor

## 2023-11-08 NOTE — DISCHARGE INSTRUCTIONS
make sure you do not have tuberculosis or other infections. Vedolizumab is injected into a vein through an IV. A healthcare provider will give you this injection. Vedolizumab must be given slowly, and the IV infusion can take about 30 minutes to complete. You will be watched closely for a short time after receiving vedolizumab, to make sure you do not have an allergic reaction to the medication. Vedolizumab is usually given on a schedule that starts the day of your first injection. Your next injections will be given at 2 weeks and 6 weeks after the first injection. Then you will receive an injection every 8 weeks (or every 2 months) thereafter. It may take up to several weeks before your symptoms improve. Keep using the medication as directed and tell your doctor if your symptoms do not improve after 14 weeks of treatment. What happens if I miss a dose? Call your doctor for instructions if you miss an appointment for your vedolizumab injection. What happens if I overdose? Since this medicine is given by a healthcare professional in a medical setting, an overdose is unlikely to occur. What should I avoid while receiving vedolizumab? Do not receive a \"live\" vaccine while using vedolizumab, and avoid coming into contact with anyone who has recently received a live vaccine. There is a chance that the virus could be passed on to you. Live vaccines include measles, mumps, rubella (MMR), polio, rotavirus, typhoid, yellow fever, varicella (chickenpox), zoster (shingles), and nasal flu (influenza) vaccine. You may receive \"killed-virus\" vaccines such as a flu shot, polio vaccine, rabies vaccine, or hepatitis A vaccine. Ask your doctor before receiving any vaccine while you are being treated with vedolizumab. Avoid being near people who are sick or have infections. Tell your doctor at once if you develop signs of infection. What are the possible side effects of vedolizumab?   Get emergency medical help if you colitis, such as adalimumab, certolizumab, infliximab; or  other drugs that weaken the immune system such as cancer medicine, steroids,   WHere can I get more information? Your doctor or pharmacist can provide more information about vedolizumab.     RETURN ON DEC 6,2023 AT 0900  FOR ENTYVIO INFUSION    FOLLOW UP WITH DR. BARTON AS NEEDED OR SCHEDULED    CALL US IF YOU NEED TO CANCEL OR RESCHEDULE YOUR APPOINTMENT  [phone]

## 2023-11-09 ENCOUNTER — HOSPITAL ENCOUNTER (OUTPATIENT)
Dept: VASCULAR LAB | Age: 68
Discharge: HOME | End: 2023-11-09
Payer: MEDICARE

## 2023-11-09 DIAGNOSIS — L97.919 VENOUS ULCER OF BOTH LOWER EXTREMITIES WITH VARICOSE VEINS (HCC): ICD-10-CM

## 2023-11-09 DIAGNOSIS — I83.029 VENOUS ULCER OF BOTH LOWER EXTREMITIES WITH VARICOSE VEINS (HCC): ICD-10-CM

## 2023-11-09 DIAGNOSIS — I83.813 VARICOSE VEINS OF BILATERAL LOWER EXTREMITIES WITH PAIN: ICD-10-CM

## 2023-11-09 DIAGNOSIS — I83.019 VENOUS ULCER OF BOTH LOWER EXTREMITIES WITH VARICOSE VEINS (HCC): ICD-10-CM

## 2023-11-09 DIAGNOSIS — L97.929 VENOUS ULCER OF BOTH LOWER EXTREMITIES WITH VARICOSE VEINS (HCC): ICD-10-CM

## 2023-11-09 PROCEDURE — 93970 EXTREMITY STUDY: CPT

## 2023-11-15 ENCOUNTER — TELEPHONE (OUTPATIENT)
Dept: CARDIOLOGY CLINIC | Age: 68
End: 2023-11-15

## 2023-11-15 NOTE — TELEPHONE ENCOUNTER
Barbie Fagan was returning [employer] Could not attach to her call.     Ruthie # [phone]

## 2023-11-15 NOTE — TELEPHONE ENCOUNTER
Date of Procedure: Wednesday 1/10/24 @ [address] [place] [address]     Time of arrival: 10:45 am     Procedure time: 11:00 am     Spoke to Venkata and she is agreeable to date and time. I emailed her all the pre and post instructions. Encouraged to call with any questions or concerns.

## 2023-11-15 NOTE — TELEPHONE ENCOUNTER
Spoke with Dillon Robison regarding results, noted in results management tab. Michell, can we schedule this patient for RF of her left leg with Dr. Patrick Rowland. Thanks!

## 2023-11-20 DIAGNOSIS — M48.062 LUMBAR STENOSIS WITH NEUROGENIC CLAUDICATION: ICD-10-CM

## 2023-11-20 DIAGNOSIS — M54.16 LUMBAR RADICULITIS: ICD-10-CM

## 2023-11-20 RX ORDER — GABAPENTIN 100 MG/1
CAPSULE ORAL
Qty: 180 CAPSULE | Refills: 10 | Status: SHIPPED | OUTPATIENT
Start: 2023-11-20 | End: 2024-02-18

## 2023-11-20 NOTE — TELEPHONE ENCOUNTER
Refill Request     CONFIRM preferrred pharmacy with the patient. If Mail Order Rx - Pend for 90 day refill. Last Seen: Last Seen Department: 8/30/2023  Last Seen by PCP: 8/30/2023    Last Written: 08/15/23    If no future appointment scheduled, route STAFF MESSAGE with patient name to the [facility] for scheduling. Next Appointment:   Future Appointments   Date Time Provider [address]   12/6/2023  9:00 AM [facility] OP NURSING ROOM 1 [facility] RN [place] HOD   1/3/2024  9:00 AM [facility]URSING ROOM 1 [facility] RN [place] HOD   1/4/2024  1:00 PM YAHAIRA White - CNP [facility] DYD   1/10/2024 11:00 AM SCHEDULE, VASCULAR PROCEDURE [facility] ROSS [facility]   1/31/2024  9:00 AM [facility] NURSING ROOM 1 [facility] RN [place] HOD   2/28/2024  9:00 AM [facility] NURSING ROOM 1 [facility] RN [facility]       Message sent to [address] [place] [address] to schedule appt with patient? NO      Requested Prescriptions     Pending Prescriptions Disp Refills    gabapentin (NEURONTIN) 100 MG capsule [Pharmacy Med Name: GABAPENTIN 100 MG Capsule] 180 capsule 10     Sig: TAKE 2 CAPSULES EVERY NIGHT      Plan:         [facility] was seen today for other. Diagnoses and all orders for this visit:     Vasculitis ([address] [place] [address])  -     [address] and Chasity García MD, [facility], [facility]     Venous stasis ulcer of other part of lower leg with varicose veins, unspecified laterality, unspecified ulcer stage ([address] [place] [address])  -     [address], MD, [facility], [facility]     start  -     cephALEXin (KEFLEX) 500 MG capsule; Take 1 capsule by mouth 3 times daily for 10 days  -     methylPREDNISolone (MEDROL, WEST,) 4 MG tablet; Take as directed for 6 days. Patient has been instructed call the office immediately with new symptoms, change in symptoms or worseningof symptoms.  If this is not feasible, patient is instructed to report to

## 2023-11-22 DIAGNOSIS — Z13.6 SCREENING FOR CARDIOVASCULAR CONDITION: ICD-10-CM

## 2023-11-22 DIAGNOSIS — M07.60 ENTEROPATHIC ARTHRITIS: ICD-10-CM

## 2023-11-22 RX ORDER — CELECOXIB 100 MG/1
CAPSULE ORAL
Qty: 180 CAPSULE | Refills: 0 | Status: SHIPPED | OUTPATIENT
Start: 2023-11-22

## 2023-11-22 RX ORDER — SIMVASTATIN 20 MG
TABLET ORAL
Qty: 90 TABLET | Refills: 0 | Status: SHIPPED | OUTPATIENT
Start: 2023-11-22

## 2023-11-22 RX ORDER — LEVOTHYROXINE SODIUM 0.12 MG/1
TABLET ORAL
Qty: 90 TABLET | Refills: 0 | Status: SHIPPED | OUTPATIENT
Start: 2023-11-22

## 2023-11-22 NOTE — TELEPHONE ENCOUNTER
Refill Request     CONFIRM preferrred pharmacy with the patient. If Mail Order Rx - Pend for 90 day refill. Last Seen: Last Seen Department: 8/30/2023  Last Seen by PCP: 8/30/2023    Last Written: celebrex- 9/8/23, synthroid- 7/12/23, simvastatin 9/8/23    If no future appointment scheduled, route STAFF MESSAGE with patient name to the [facility] for scheduling. Next Appointment:   Future Appointments   Date Time Provider [address]   12/6/2023  9:00 AM [facility] OP NURSING ROOM 1 [place] OP RN [place] HOD   1/3/2024  9:00 AM [facility] NURSING ROOM 1 [place] [facility] RN [place] HOD   1/4/2024  1:00 PM Fabricio Shaw APRN - CNP [facility] [facility]   1/10/2024 11:00 AM SCHEDULE, VASCULAR PROCEDURE [facility] ROSS [facility]   1/31/2024  9:00 AM [facility] NURSING ROOM 1 [place] OP RN [place] HOD   2/28/2024  9:00 AM [facility] NURSING ROOM 1 [place] OP RN [place] HOD       Message sent to [address] [place] [address] to schedule appt with patient?   N/A      Requested Prescriptions     Pending Prescriptions Disp Refills    levothyroxine (SYNTHROID) 125 MCG tablet [Pharmacy Med Name: LEVOTHYROXINE SODIUM 125 MCG Tablet] 90 tablet 3     Sig: TAKE 1 TABLET EVERY DAY    simvastatin (ZOCOR) 20 MG tablet [Pharmacy Med Name: SIMVASTATIN 20 MG Tablet] 90 tablet 3     Sig: TAKE 1 TABLET EVERY NIGHT    celecoxib (CELEBREX) 100 MG capsule [Pharmacy Med Name: CELECOXIB 100 MG Capsule] 180 capsule 3     Sig: TAKE 2 CAPSULES EVERY DAY

## 2023-11-29 ENCOUNTER — HOSPITAL ENCOUNTER (OUTPATIENT)
Age: 68
Discharge: HOME | End: 2023-11-29

## 2023-11-30 ENCOUNTER — HOSPITAL ENCOUNTER (OUTPATIENT)
Age: 68
Setting detail: SPECIMEN
Discharge: HOME | End: 2023-11-30
Payer: MEDICARE

## 2023-11-30 PROCEDURE — 83993 ASSAY FOR CALPROTECTIN FECAL: CPT

## 2023-12-04 ENCOUNTER — TELEMEDICINE (OUTPATIENT)
Dept: FAMILY MEDICINE CLINIC | Age: 68
End: 2023-12-04
Payer: MEDICARE

## 2023-12-04 DIAGNOSIS — E55.9 VITAMIN D DEFICIENCY: ICD-10-CM

## 2023-12-04 DIAGNOSIS — E11.9 DIET-CONTROLLED TYPE 2 DIABETES MELLITUS (HCC): Primary | ICD-10-CM

## 2023-12-04 DIAGNOSIS — F33.0 MILD EPISODE OF RECURRENT MAJOR DEPRESSIVE DISORDER (HCC): ICD-10-CM

## 2023-12-04 LAB — CALPROTECTIN STL-MCNT: 592 UG/G

## 2023-12-04 PROCEDURE — 99443 PR PHYS/QHP TELEPHONE EVALUATION 21-30 MIN: CPT | Performed by: NURSE PRACTITIONER

## 2023-12-04 RX ORDER — DULOXETIN HYDROCHLORIDE 60 MG/1
60 CAPSULE, DELAYED RELEASE ORAL DAILY
Qty: 90 CAPSULE | Refills: 0 | Status: SHIPPED | OUTPATIENT
Start: 2023-12-04

## 2023-12-04 RX ORDER — LIDOCAINE HCL 4 %
CREAM (GRAM) TOPICAL
Qty: 4 CAPSULE | Refills: 3 | Status: SHIPPED | OUTPATIENT
Start: 2023-12-04 | End: 2023-12-04 | Stop reason: SDUPTHER

## 2023-12-04 RX ORDER — LIDOCAINE HCL 4 %
CREAM (GRAM) TOPICAL
Qty: 12 CAPSULE | Refills: 2 | Status: SHIPPED | OUTPATIENT
Start: 2023-12-04

## 2023-12-05 NOTE — DISCHARGE INSTRUCTIONS
vedolizumab  Pronunciation:  GEN Shah  What is vedolizumab? Vedolizumab reduces the effects of a substance in the body that can cause inflammation. Vedolizumab is used in adults with moderate to severe ulcerative colitis (UC), or moderate to severe Crohn's disease. Vedolizumab treats active disease and may help keep UC or Crohn's symptoms under control long term. Vedolizumab may also reduce the need for steroid medicines in helping to control symptoms long term. Vedolizumab is usually given after other medicines have been tried without success. Vedolizumab may also be used for purposes not listed in this medication guide. What should I discuss with my healthcare provider before receiving vedolizumab? You should not use vedolizumab if you are allergic to it. To make sure vedolizumab is safe for you, tell your doctor if you have:  an active or recent infection;  tuberculosis (or if you have close contact with someone who has tuberculosis);  signs of infection such as fever, cough, or flu symptoms;  open sores or skin wounds;  weak immune system (caused by disease or by using certain medicine); or  if you are scheduled to receive any vaccines. You should be up to date with all needed vaccinations before receiving vedolizumab. FDA pregnancy category Cuco Mercado is not expected to harm an unborn baby. Tell your doctor if you are pregnant or plan to become pregnant during treatment. If you are pregnant, your name may be listed on a pregnancy registry. This is to track the outcome of the pregnancy and to evaluate any effects of vedolizumab on the baby. It is not known whether vedolizumab passes into breast milk or if it could harm a nursing baby. Tell your doctor if you are breast-feeding a baby. Do not give this medicine to anyone under 25years old without medical advice. How is vedolizumab given?   Before you start treatment with vedolizumab, your doctor may perform tests to colitis, such as adalimumab, certolizumab, infliximab; or  other drugs that weaken the immune system such as cancer medicine, steroids,   WHere can I get more information? Your doctor or pharmacist can provide more information about vedolizumab.     RETURN ON NARCISA 3,2024 AT 0900  FOR ENTYVIO INFUSION    FOLLOW UP WITH DR. BARTON AS NEEDED OR SCHEDULED    CALL US IF YOU NEED TO CANCEL OR RESCHEDULE YOUR APPOINTMENT  [phone]

## 2023-12-06 ENCOUNTER — HOSPITAL ENCOUNTER (OUTPATIENT)
Age: 68
Discharge: HOME | End: 2023-12-06

## 2023-12-06 ENCOUNTER — HOSPITAL ENCOUNTER (OUTPATIENT)
Dept: NURSING | Age: 68
Setting detail: INFUSION SERIES
Discharge: HOME | End: 2023-12-06
Payer: MEDICARE

## 2023-12-06 VITALS
DIASTOLIC BLOOD PRESSURE: 67 MMHG | SYSTOLIC BLOOD PRESSURE: 135 MMHG | TEMPERATURE: 97 F | WEIGHT: 235.89 LBS | RESPIRATION RATE: 16 BRPM | HEIGHT: 63 IN | BODY MASS INDEX: 41.8 KG/M2 | HEART RATE: 93 BPM

## 2023-12-06 DIAGNOSIS — K51.00 CHRONIC ULCERATIVE ENTEROCOLITIS WITHOUT COMPLICATION (HCC): Primary | ICD-10-CM

## 2023-12-06 LAB
ALBUMIN SERPL-MCNC: 3.8 G/DL (ref 3.4–5)
ALBUMIN/GLOB SERPL: 1.1 {RATIO} (ref 1.1–2.2)
ALP SERPL-CCNC: 102 U/L (ref 40–129)
ALT SERPL-CCNC: 20 U/L (ref 10–40)
ANION GAP SERPL CALCULATED.3IONS-SCNC: 11 MMOL/L (ref 3–16)
AST SERPL-CCNC: 52 U/L (ref 15–37)
BILIRUB SERPL-MCNC: 0.6 MG/DL (ref 0–1)
BUN SERPL-MCNC: 18 MG/DL (ref 7–20)
CALCIUM SERPL-MCNC: 9.4 MG/DL (ref 8.3–10.6)
CHLORIDE SERPL-SCNC: 101 MMOL/L (ref 99–110)
CO2 SERPL-SCNC: 27 MMOL/L (ref 21–32)
CREAT SERPL-MCNC: 1.2 MG/DL (ref 0.6–1.2)
DEPRECATED RDW RBC AUTO: 14.3 % (ref 12.4–15.4)
GFR SERPLBLD CREATININE-BSD FMLA CKD-EPI: 49 ML/MIN/{1.73_M2}
GLUCOSE SERPL-MCNC: 161 MG/DL (ref 70–99)
HCT VFR BLD AUTO: 35.3 % (ref 36–48)
HGB BLD-MCNC: 12 G/DL (ref 12–16)
MCH RBC QN AUTO: 31.9 PG (ref 26–34)
MCHC RBC AUTO-ENTMCNC: 33.9 G/DL (ref 31–36)
MCV RBC AUTO: 94.1 FL (ref 80–100)
PLATELET # BLD AUTO: 131 K/UL (ref 135–450)
PMV BLD AUTO: 10.4 FL (ref 5–10.5)
POTASSIUM SERPL-SCNC: 4.7 MMOL/L (ref 3.5–5.1)
PROT SERPL-MCNC: 7.3 G/DL (ref 6.4–8.2)
RBC # BLD AUTO: 3.75 M/UL (ref 4–5.2)
SODIUM SERPL-SCNC: 139 MMOL/L (ref 136–145)
WBC # BLD AUTO: 6.1 K/UL (ref 4–11)

## 2023-12-06 PROCEDURE — 80053 COMPREHEN METABOLIC PANEL: CPT

## 2023-12-06 PROCEDURE — 99211 OFF/OP EST MAY X REQ PHY/QHP: CPT

## 2023-12-06 PROCEDURE — 96365 THER/PROPH/DIAG IV INF INIT: CPT

## 2023-12-06 PROCEDURE — 2580000003 HC RX 258: Performed by: INTERNAL MEDICINE

## 2023-12-06 PROCEDURE — 85027 COMPLETE CBC AUTOMATED: CPT

## 2023-12-06 PROCEDURE — 6360000002 HC RX W HCPCS: Performed by: INTERNAL MEDICINE

## 2023-12-06 RX ORDER — SODIUM CHLORIDE 9 MG/ML
INJECTION, SOLUTION INTRAVENOUS CONTINUOUS
Status: DISCONTINUED | OUTPATIENT
Start: 2023-12-06 | End: 2023-12-06 | Stop reason: HOSPADM

## 2023-12-06 RX ORDER — SODIUM CHLORIDE 0.9 % (FLUSH) 0.9 %
10 SYRINGE (ML) INJECTION PRN
Status: DISCONTINUED | OUTPATIENT
Start: 2023-12-06 | End: 2023-12-07 | Stop reason: HOSPADM

## 2023-12-06 RX ORDER — SODIUM CHLORIDE 0.9 % (FLUSH) 0.9 %
10 SYRINGE (ML) INJECTION PRN
OUTPATIENT
Start: 2024-01-03

## 2023-12-06 RX ORDER — SODIUM CHLORIDE 9 MG/ML
INJECTION, SOLUTION INTRAVENOUS CONTINUOUS
OUTPATIENT
Start: 2024-01-03

## 2023-12-06 RX ADMIN — VEDOLIZUMAB 300 MG: 300 INJECTION, POWDER, LYOPHILIZED, FOR SOLUTION INTRAVENOUS at 09:50

## 2023-12-06 RX ADMIN — SODIUM CHLORIDE: 9 INJECTION, SOLUTION INTRAVENOUS at 09:49

## 2023-12-06 ASSESSMENT — PAIN SCALES - GENERAL: PAINLEVEL_OUTOF10: 0

## 2023-12-06 NOTE — PROGRESS NOTES
Pt here for entyvio infusion  Pt without c/o's today  She reports that her stools are sometimes normal but she is having more episode of diarrhea and gas with bloating but denies any  Cramping or bleeding IV # 22 was [facility] in [facility] on my 1st attempt  Blood for lab work was drawn  without difficulty  Pt gayla well  Entyvio 300 mg IVPB starting to infuse  IV site unremarkable Will monitor

## 2023-12-11 LAB
Lab: NORMAL
REPORT: NORMAL
THIS TEST SENT TO: NORMAL

## 2023-12-29 ENCOUNTER — HOSPITAL ENCOUNTER (OUTPATIENT)
Age: 68
Setting detail: SPECIMEN
Discharge: HOME | End: 2023-12-29
Payer: MEDICARE

## 2023-12-29 LAB — C DIFF TOX A+B STL QL IA: NORMAL

## 2023-12-29 PROCEDURE — 87449 NOS EACH ORGANISM AG IA: CPT

## 2023-12-29 PROCEDURE — 87324 CLOSTRIDIUM AG IA: CPT

## 2024-01-02 NOTE — DISCHARGE INSTRUCTIONS
make sure you do not have tuberculosis or other infections.  Vedolizumab is injected into a vein through an IV. A healthcare provider will give you this injection.  Vedolizumab must be given slowly, and the IV infusion can take about 30 minutes to complete.  You will be watched closely for a short time after receiving vedolizumab, to make sure you do not have an allergic reaction to the medication.  Vedolizumab is usually given on a schedule that starts the day of your first injection. Your next injections will be given at 2 weeks and 6 weeks after the first injection. Then you will receive an injection every 8 weeks (or every 2 months) thereafter.  It may take up to several weeks before your symptoms improve. Keep using the medication as directed and tell your doctor if your symptoms do not improve after 14 weeks of treatment.  What happens if I miss a dose?  Call your doctor for instructions if you miss an appointment for your vedolizumab injection.  What happens if I overdose?  Since this medicine is given by a healthcare professional in a medical setting, an overdose is unlikely to occur.  What should I avoid while receiving vedolizumab?  Do not receive a \"live\" vaccine while using vedolizumab, and avoid coming into contact with anyone who has recently received a live vaccine. There is a chance that the virus could be passed on to you. Live vaccines include measles, mumps, rubella (MMR), polio, rotavirus, typhoid, yellow fever, varicella (chickenpox), zoster (shingles), and nasal flu (influenza) vaccine.  You may receive \"killed-virus\" vaccines such as a flu shot, polio vaccine, rabies vaccine, or hepatitis A vaccine. Ask your doctor before receiving any vaccine while you are being treated with vedolizumab.  Avoid being near people who are sick or have infections. Tell your doctor at once if you develop signs of infection.  What are the possible side effects of vedolizumab?  Get emergency medical help if you  colitis, such as adalimumab, certolizumab, infliximab; or  other drugs that weaken the immune system such as cancer medicine, steroids,   WHere can I get more information?  Your doctor or pharmacist can provide more information about vedolizumab.    RETURN ON JAN 31,2024 AT 0900  FOR ENTYVIO INFUSION    FOLLOW UP WITH DR. BARTON AS NEEDED OR SCHEDULED    CALL US IF YOU NEED TO CANCEL OR RESCHEDULE YOUR APPOINTMENT  [phone]

## 2024-01-03 ENCOUNTER — HOSPITAL ENCOUNTER (OUTPATIENT)
Dept: NURSING | Age: 69
Setting detail: INFUSION SERIES
Discharge: HOME | End: 2024-01-03
Payer: MEDICARE

## 2024-01-03 VITALS
BODY MASS INDEX: 41.8 KG/M2 | HEART RATE: 89 BPM | TEMPERATURE: 97.2 F | SYSTOLIC BLOOD PRESSURE: 156 MMHG | WEIGHT: 235.89 LBS | DIASTOLIC BLOOD PRESSURE: 94 MMHG | RESPIRATION RATE: 16 BRPM | HEIGHT: 63 IN

## 2024-01-03 DIAGNOSIS — K51.00 CHRONIC ULCERATIVE ENTEROCOLITIS WITHOUT COMPLICATION (HCC): Primary | ICD-10-CM

## 2024-01-03 PROCEDURE — 6360000002 HC RX W HCPCS: Performed by: INTERNAL MEDICINE

## 2024-01-03 PROCEDURE — 99211 OFF/OP EST MAY X REQ PHY/QHP: CPT

## 2024-01-03 PROCEDURE — 2580000003 HC RX 258: Performed by: INTERNAL MEDICINE

## 2024-01-03 PROCEDURE — 96365 THER/PROPH/DIAG IV INF INIT: CPT

## 2024-01-03 RX ORDER — SODIUM CHLORIDE 9 MG/ML
INJECTION, SOLUTION INTRAVENOUS CONTINUOUS
Status: ACTIVE | OUTPATIENT
Start: 2024-01-03 | End: 2024-01-03

## 2024-01-03 RX ORDER — SODIUM CHLORIDE 0.9 % (FLUSH) 0.9 %
10 SYRINGE (ML) INJECTION PRN
OUTPATIENT
Start: 2024-01-31

## 2024-01-03 RX ORDER — SODIUM CHLORIDE 0.9 % (FLUSH) 0.9 %
10 SYRINGE (ML) INJECTION PRN
Status: DISCONTINUED | OUTPATIENT
Start: 2024-01-03 | End: 2024-01-04 | Stop reason: HOSPADM

## 2024-01-03 RX ORDER — SODIUM CHLORIDE 9 MG/ML
INJECTION, SOLUTION INTRAVENOUS CONTINUOUS
OUTPATIENT
Start: 2024-01-31

## 2024-01-03 RX ADMIN — SODIUM CHLORIDE: 9 INJECTION, SOLUTION INTRAVENOUS at 09:26

## 2024-01-03 RX ADMIN — VEDOLIZUMAB 300 MG: 300 INJECTION, POWDER, LYOPHILIZED, FOR SOLUTION INTRAVENOUS at 09:28

## 2024-01-03 RX ADMIN — Medication 10 ML: at 09:25

## 2024-01-03 ASSESSMENT — PAIN SCALES - GENERAL: PAINLEVEL_OUTOF10: 0

## 2024-01-03 NOTE — PROGRESS NOTES
Pt here for entyvio infusion  Pt without c/o's today  She reports that her stools are almost normal and she has occasional bloating depending on what she eats   Pt denies any cramping or bleeding  New Entyvio consent obtained for 2024  IV # 22 was started in [facility] on 1st attempt per COLBY Strickland RN  Pt gayla well  No blood work needed for today  Entyvio 300 mg IVPB starting to infuse  IV site unremarkable Will monitor

## 2024-01-03 NOTE — PLAN OF CARE
IV INFUSION / INJECTION CARE PLAN    HEMODYNAMIC STATUS  INTERDISCIPLINARY   Goal: Hemodynamic Baseline Staus Maintained / Procedure Completed  Interventions     1. Obtain Patient  Medical /  Surgical History     1 Assess & Review Allergies Prior to IV Infusion or Injection & All  Meds (PRN)     2. Assess Patient  & Obtain  Vital Signs / LOC upon   admission and (PRN)     3.    Start IV as appropriate for Procedure & check Patency (PRN)   NURSING   SAFETY & PSYCHO SOCIAL  INTERDISCIPLINARY   Goal: Patient Returns to Baseline Activity  Interventions     1.  Greet Patient with ID Badge/ Picture in View (PRN)     2. Be Available & Sensitive to Patient’s Needs (PRN)     3.  Communicate referral to Pastoral Care as Appropriate (PRN)     4.  Provide Age Specific Measures (PRN)     4.  Admission Data Base Reviewed     5.  Administer Meds Per Orders (PRN)     5.  Maintain Baseline Activity  Or Activity as Ordered Per Physician     NUTRITION   INTERDISCIPLINARY   Goal: Patient to baseline/ Improved Nutrition  Interventions     1.  Assess Nutritional Status (PRN)       LAB & DIAGNOSTICS   Goal: Additional Tests per Physicians   Orders  Interventions     1.  Lab & Diagnostics per Physician Orders (PRN)     2.  Assess Lab Time Out  for  Patient Safety as Needed (PRN)     RESPIRATORY  INTERDISCIPLINARY   Goal: Airway   Baseline Status Maintained   Interventions     1. Evaluate Bilateral Breath Sounds  Baseline, (PRN),      2. Weight & Height Noted ( PRN)     3.  Assess Baseline SPo2 (PRN)      KNOWLEDGE DEFICIT, EDUCATION, DISCHARGE PLAN   INTERDISCIPLINARY    Patient / SO verbalize Understanding Of Procedural discharge Instructions  Interventions     1.   Obtain Informed Consent (PRN)      2.  Initiate IV Infusion / Injection Plan of Care, Answer Questions (PRN)       3. Assess Patient’s Ability to Understand Information (PRN)     3.   Discharge Planning ; Assure  Presence of   upon Patient Discharge when indicated     4.

## 2024-01-04 ENCOUNTER — OFFICE VISIT (OUTPATIENT)
Dept: FAMILY MEDICINE CLINIC | Age: 69
End: 2024-01-04
Payer: MEDICARE

## 2024-01-04 VITALS
HEIGHT: 65 IN | OXYGEN SATURATION: 95 % | BODY MASS INDEX: 37.99 KG/M2 | HEART RATE: 94 BPM | DIASTOLIC BLOOD PRESSURE: 80 MMHG | SYSTOLIC BLOOD PRESSURE: 126 MMHG | WEIGHT: 228 LBS

## 2024-01-04 DIAGNOSIS — N18.30 STAGE 3 CHRONIC KIDNEY DISEASE, UNSPECIFIED WHETHER STAGE 3A OR 3B CKD (HCC): ICD-10-CM

## 2024-01-04 DIAGNOSIS — M45.0 ANKYLOSING SPONDYLITIS OF MULTIPLE SITES IN SPINE (HCC): ICD-10-CM

## 2024-01-04 DIAGNOSIS — E11.9 DIET-CONTROLLED TYPE 2 DIABETES MELLITUS (HCC): ICD-10-CM

## 2024-01-04 DIAGNOSIS — E11.22 TYPE 2 DIABETES MELLITUS WITH STAGE 3A CHRONIC KIDNEY DISEASE, WITHOUT LONG-TERM CURRENT USE OF INSULIN (HCC): Primary | ICD-10-CM

## 2024-01-04 DIAGNOSIS — F33.0 MILD EPISODE OF RECURRENT MAJOR DEPRESSIVE DISORDER (HCC): ICD-10-CM

## 2024-01-04 DIAGNOSIS — E66.01 SEVERE OBESITY (BMI 35.0-39.9) WITH COMORBIDITY (HCC): ICD-10-CM

## 2024-01-04 DIAGNOSIS — I87.2 VENOUS STASIS DERMATITIS OF BOTH LOWER EXTREMITIES: ICD-10-CM

## 2024-01-04 DIAGNOSIS — I10 PRIMARY HYPERTENSION: ICD-10-CM

## 2024-01-04 DIAGNOSIS — N18.31 TYPE 2 DIABETES MELLITUS WITH STAGE 3A CHRONIC KIDNEY DISEASE, WITHOUT LONG-TERM CURRENT USE OF INSULIN (HCC): Primary | ICD-10-CM

## 2024-01-04 DIAGNOSIS — K51.80 OTHER ULCERATIVE COLITIS WITHOUT COMPLICATION (HCC): ICD-10-CM

## 2024-01-04 DIAGNOSIS — L03.115 CELLULITIS OF RIGHT LOWER EXTREMITY: ICD-10-CM

## 2024-01-04 DIAGNOSIS — D69.6 THROMBOCYTOPENIA, UNSPECIFIED (HCC): ICD-10-CM

## 2024-01-04 LAB — HBA1C MFR BLD: 6.8 %

## 2024-01-04 PROCEDURE — 1036F TOBACCO NON-USER: CPT | Performed by: NURSE PRACTITIONER

## 2024-01-04 PROCEDURE — 2022F DILAT RTA XM EVC RTNOPTHY: CPT | Performed by: NURSE PRACTITIONER

## 2024-01-04 PROCEDURE — 1090F PRES/ABSN URINE INCON ASSESS: CPT | Performed by: NURSE PRACTITIONER

## 2024-01-04 PROCEDURE — 99214 OFFICE O/P EST MOD 30 MIN: CPT | Performed by: NURSE PRACTITIONER

## 2024-01-04 PROCEDURE — G8427 DOCREV CUR MEDS BY ELIG CLIN: HCPCS | Performed by: NURSE PRACTITIONER

## 2024-01-04 PROCEDURE — G8484 FLU IMMUNIZE NO ADMIN: HCPCS | Performed by: NURSE PRACTITIONER

## 2024-01-04 PROCEDURE — G8399 PT W/DXA RESULTS DOCUMENT: HCPCS | Performed by: NURSE PRACTITIONER

## 2024-01-04 PROCEDURE — 3079F DIAST BP 80-89 MM HG: CPT | Performed by: NURSE PRACTITIONER

## 2024-01-04 PROCEDURE — 3074F SYST BP LT 130 MM HG: CPT | Performed by: NURSE PRACTITIONER

## 2024-01-04 PROCEDURE — 3044F HG A1C LEVEL LT 7.0%: CPT | Performed by: NURSE PRACTITIONER

## 2024-01-04 PROCEDURE — 1123F ACP DISCUSS/DSCN MKR DOCD: CPT | Performed by: NURSE PRACTITIONER

## 2024-01-04 PROCEDURE — 83036 HEMOGLOBIN GLYCOSYLATED A1C: CPT | Performed by: NURSE PRACTITIONER

## 2024-01-04 PROCEDURE — G8417 CALC BMI ABV UP PARAM F/U: HCPCS | Performed by: NURSE PRACTITIONER

## 2024-01-04 PROCEDURE — 3017F COLORECTAL CA SCREEN DOC REV: CPT | Performed by: NURSE PRACTITIONER

## 2024-01-04 RX ORDER — TRIAMCINOLONE ACETONIDE 1 MG/G
CREAM TOPICAL
Qty: 45 G | Refills: 1 | Status: SHIPPED | OUTPATIENT
Start: 2024-01-04

## 2024-01-04 RX ORDER — CEPHALEXIN 500 MG/1
500 CAPSULE ORAL 3 TIMES DAILY
Qty: 30 CAPSULE | Refills: 0 | Status: SHIPPED | OUTPATIENT
Start: 2024-01-04 | End: 2024-01-14

## 2024-01-04 ASSESSMENT — PATIENT HEALTH QUESTIONNAIRE - PHQ9
3. TROUBLE FALLING OR STAYING ASLEEP: 1
4. FEELING TIRED OR HAVING LITTLE ENERGY: 1
SUM OF ALL RESPONSES TO PHQ QUESTIONS 1-9: 5
2. FEELING DOWN, DEPRESSED OR HOPELESS: 0
6. FEELING BAD ABOUT YOURSELF - OR THAT YOU ARE A FAILURE OR HAVE LET YOURSELF OR YOUR FAMILY DOWN: 0
8. MOVING OR SPEAKING SO SLOWLY THAT OTHER PEOPLE COULD HAVE NOTICED. OR THE OPPOSITE, BEING SO FIGETY OR RESTLESS THAT YOU HAVE BEEN MOVING AROUND A LOT MORE THAN USUAL: 0
1. LITTLE INTEREST OR PLEASURE IN DOING THINGS: 1
SUM OF ALL RESPONSES TO PHQ QUESTIONS 1-9: 5
SUM OF ALL RESPONSES TO PHQ9 QUESTIONS 1 & 2: 1
9. THOUGHTS THAT YOU WOULD BE BETTER OFF DEAD, OR OF HURTING YOURSELF: 0
10. IF YOU CHECKED OFF ANY PROBLEMS, HOW DIFFICULT HAVE THESE PROBLEMS MADE IT FOR YOU TO DO YOUR WORK, TAKE CARE OF THINGS AT HOME, OR GET ALONG WITH OTHER PEOPLE: 0
SUM OF ALL RESPONSES TO PHQ QUESTIONS 1-9: 5
7. TROUBLE CONCENTRATING ON THINGS, SUCH AS READING THE NEWSPAPER OR WATCHING TELEVISION: 0
5. POOR APPETITE OR OVEREATING: 2
SUM OF ALL RESPONSES TO PHQ QUESTIONS 1-9: 5

## 2024-01-04 NOTE — PROGRESS NOTES
than usual 0 0  0 0 0 0   Thoughts that you would be better off dead, or of hurting yourself in some way 0 0  0 0 0 0   PHQ-2 Score 1 2 2 1 2 1 4   PHQ-9 Total Score 5 5 2 6 8 5 7   If you checked off any problems, how difficult have these problems made it for you to do your work, take care of things at home, or get along with other people? 0 0  0 1 0 1     Interpretation of Total Score Total Score Depression Severity: 1-4 = Minimal depression, 5-9 = Mild depression, 10-14 = Moderate depression, 15-19 = Moderately severe depression, 20-27 = Severe depression    Review of Systems   Constitutional:  Positive for appetite change and fatigue. Negative for unexpected weight change.   HENT: Negative.     Eyes: Negative.    Respiratory: Negative.  Negative for shortness of breath.    Cardiovascular:  Positive for leg swelling. Negative for chest pain and palpitations.   Gastrointestinal: Negative.  Negative for abdominal pain, anal bleeding, blood in stool and nausea.   Endocrine: Negative.    Genitourinary: Negative.  Negative for hematuria.   Musculoskeletal: Negative.    Skin:  Positive for rash.   Allergic/Immunologic: Negative.    Neurological: Negative.  Negative for dizziness, syncope, light-headedness and numbness.   Hematological: Negative.  Does not bruise/bleed easily.   Psychiatric/Behavioral:  Positive for dysphoric mood and sleep disturbance.    All other systems reviewed and are negative.      Past Medical History:   Diagnosis Date    Acute pancreatitis 10/21/2014    Acute renal failure with tubular necrosis ([facility]) 07/10/2015    Angina pectoris ([facility]) 05/23/2011    Cellulitis of left leg     Clostridium difficile infection 2015    Lumbar radiculitis 10/01/2019    Palpitations 07/17/2013    Primary osteoarthritis of left hip 11/18/2015    Shock ([facility]) 10/10/2017    Superficial thrombophlebitis of left leg 07/08/2015    Venous leg ulcers, bilateral ([facility]) 05/2023    came to wound care Sep 2023, healed Oct 2023

## 2024-01-08 ASSESSMENT — ENCOUNTER SYMPTOMS
RESPIRATORY NEGATIVE: 1
EYES NEGATIVE: 1
NAUSEA: 0
GASTROINTESTINAL NEGATIVE: 1
SHORTNESS OF BREATH: 0
BLOOD IN STOOL: 0
ALLERGIC/IMMUNOLOGIC NEGATIVE: 1
ABDOMINAL PAIN: 0
ANAL BLEEDING: 0

## 2024-01-11 ENCOUNTER — TELEPHONE (OUTPATIENT)
Dept: NURSING | Age: 69
End: 2024-01-11

## 2024-01-11 NOTE — TELEPHONE ENCOUNTER
Received call from Ruthie who reports that Dr. Greco is stopping her Entyvio infusions and that he is going to start her on Rinvoq for her crohn's disease   Ruthie requests that all future appts be cancelled  I informed her that I will cancel everything after I confirm with Dr. Greco's office  Ruthie is receptive and stated ok I placed a call to Dr. Greco's office and a message was left for them to return my call  Ruthie is aware

## 2024-01-16 DIAGNOSIS — F33.0 MILD EPISODE OF RECURRENT MAJOR DEPRESSIVE DISORDER (HCC): ICD-10-CM

## 2024-01-16 RX ORDER — DULOXETIN HYDROCHLORIDE 60 MG/1
60 CAPSULE, DELAYED RELEASE ORAL DAILY
Qty: 90 CAPSULE | Refills: 0 | Status: SHIPPED | OUTPATIENT
Start: 2024-01-16

## 2024-01-16 RX ORDER — DM/PE/ACETAMINOPHEN/CHLORPHENR 10-5-325-2
TABLET, SEQUENTIAL ORAL
Qty: 540 TABLET | Refills: 0 | Status: SHIPPED | OUTPATIENT
Start: 2024-01-16

## 2024-01-16 NOTE — TELEPHONE ENCOUNTER
Refill Request     CONFIRM preferrred pharmacy with the patient.    If Mail Order Rx - Pend for 90 day refill.      Last Seen: Last Seen Department: 1/4/2024  Last Seen by PCP: 1/4/2024    Last Written: 12-4-2023    If no future appointment scheduled, route STAFF MESSAGE with patient name to the  Pool for scheduling.      Next Appointment:   Future Appointments   Date Time Provider Department Center   1/31/2024  9:00 AM [facility]   2/28/2024  9:00 AM [facility]   3/6/2024  2:00 PM SCHEDULE, VASCULAR PROCEDURE [facility]   3/27/2024  9:00 AM [facility] NURSING ROOM [facility]   4/24/2024  9:00 AM [facility] NURSING ROOM 1 [facility]   6/19/2024 11:00 AM dAe Bonilla, APRN - CNP [facility]  [place] [facility]       Message sent to  to schedule appt with patient?  N/A      Requested Prescriptions     Pending Prescriptions Disp Refills    DULoxetine (CYMBALTA) 60 MG extended release capsule [Pharmacy Med Name: DULOXETINE HYDROCHLORIDE 60 MG Capsule Delayed Release Particles] 90 capsule 3     Sig: TAKE 1 CAPSULE EVERY DAY    GNP MAGNESIUM OXIDE 250 MG TABS tablet [Pharmacy Med Name: GNP MAGNESIUM 250 MG Tablet] 540 tablet 3     Sig: TAKE 2 TABLETS THREE TIMES DAILY

## 2024-01-17 ENCOUNTER — TELEPHONE (OUTPATIENT)
Dept: FAMILY MEDICINE CLINIC | Age: 69
End: 2024-01-17

## 2024-01-17 NOTE — TELEPHONE ENCOUNTER
Called pt and regarding shingle vaccine we are unable to give here due the insurance. Left message.    Also its scanned into media

## 2024-01-17 NOTE — TELEPHONE ENCOUNTER
Patient informed of GI wanting her to get shingles.  Patient stated she already got her shingles vaccines at [facility].  Informed to call GI and let them know.

## 2024-01-29 NOTE — DISCHARGE INSTRUCTIONS
make sure you do not have tuberculosis or other infections.  Vedolizumab is injected into a vein through an IV. A healthcare provider will give you this injection.  Vedolizumab must be given slowly, and the IV infusion can take about 30 minutes to complete.  You will be watched closely for a short time after receiving vedolizumab, to make sure you do not have an allergic reaction to the medication.  Vedolizumab is usually given on a schedule that starts the day of your first injection. Your next injections will be given at 2 weeks and 6 weeks after the first injection. Then you will receive an injection every 8 weeks (or every 2 months) thereafter.  It may take up to several weeks before your symptoms improve. Keep using the medication as directed and tell your doctor if your symptoms do not improve after 14 weeks of treatment.  What happens if I miss a dose?  Call your doctor for instructions if you miss an appointment for your vedolizumab injection.  What happens if I overdose?  Since this medicine is given by a healthcare professional in a medical setting, an overdose is unlikely to occur.  What should I avoid while receiving vedolizumab?  Do not receive a \"live\" vaccine while using vedolizumab, and avoid coming into contact with anyone who has recently received a live vaccine. There is a chance that the virus could be passed on to you. Live vaccines include measles, mumps, rubella (MMR), polio, rotavirus, typhoid, yellow fever, varicella (chickenpox), zoster (shingles), and nasal flu (influenza) vaccine.  You may receive \"killed-virus\" vaccines such as a flu shot, polio vaccine, rabies vaccine, or hepatitis A vaccine. Ask your doctor before receiving any vaccine while you are being treated with vedolizumab.  Avoid being near people who are sick or have infections. Tell your doctor at once if you develop signs of infection.  What are the possible side effects of vedolizumab?  Get emergency medical help if you  have any of these signs of an allergic reaction: hives; difficult breathing; swelling of your face, lips, tongue, or throat.  Some side effects may occur during the injection. Tell your caregiver right away if you feel warm or tingly, or if you have a severe headache, fast heart rate, pounding in your neck or ears, chest tightness, or trouble breathing.  Some people using a medicine similar to vedolizumab have developed a serious viral infection of the brain that can lead to disability or death. Call your doctor right away if you have any change in your mental state, decreased vision, or problems with speech or walking. These symptoms may start gradually and get worse quickly.  Stop using vedolizumab and call your doctor at once if you have:  fever, chills, body aches, cold or flu symptoms, mouth and throat ulcers, skin sores;  pain, warmth, swelling, or oozing around your anal area;  nausea, vomiting, severe diarrhea, diarrhea that is watery or bloody, stomach cramps, weight loss;  cough, pain when swallowing; or  liver problems --nausea, upper stomach pain, itching, tired feeling, loss of appetite, dark urine, shanita-colored stools, jaundice (yellowing of the skin or eyes).  Common side effects may include:  fever, sore throat, flu symptoms;  cold symptoms such as stuffy nose, sinus pain, sneezing, cough;  pain in your arms or legs;  tired feeling;  headache, joint pain, back pain;  rash, itching; or  nausea.  This is not a complete list of side effects and others may occur. Call your doctor for medical advice about side effects. You may report side effects to FDA at 9-644-FDA-9357.  What other drugs will affect vedolizumab?  Tell your doctor about all medicines you use, and those you start or stop using during your treatment with vedolizumab, especially:  natalizumab;  medicines to treat psoriasis or rheumatoid arthritis, such as etanercept or golimumab;  other medicines to treat Crohn's disease or ulcerative

## 2024-01-31 ENCOUNTER — HOSPITAL ENCOUNTER (OUTPATIENT)
Dept: NURSING | Age: 69
Setting detail: INFUSION SERIES
Discharge: HOME | End: 2024-01-31
Payer: MEDICARE

## 2024-01-31 VITALS
TEMPERATURE: 98.4 F | SYSTOLIC BLOOD PRESSURE: 151 MMHG | DIASTOLIC BLOOD PRESSURE: 90 MMHG | RESPIRATION RATE: 16 BRPM | WEIGHT: 224 LBS | BODY MASS INDEX: 37.32 KG/M2 | HEART RATE: 101 BPM | HEIGHT: 65 IN

## 2024-01-31 DIAGNOSIS — K51.00 CHRONIC ULCERATIVE ENTEROCOLITIS WITHOUT COMPLICATION (HCC): Primary | ICD-10-CM

## 2024-01-31 LAB
ALBUMIN SERPL-MCNC: 4.1 G/DL (ref 3.4–5)
ALBUMIN/GLOB SERPL: 1.1 {RATIO} (ref 1.1–2.2)
ALP SERPL-CCNC: 105 U/L (ref 40–129)
ALT SERPL-CCNC: 19 U/L (ref 10–40)
ANION GAP SERPL CALCULATED.3IONS-SCNC: 10 MMOL/L (ref 3–16)
AST SERPL-CCNC: 42 U/L (ref 15–37)
BILIRUB SERPL-MCNC: 0.7 MG/DL (ref 0–1)
BUN SERPL-MCNC: 16 MG/DL (ref 7–20)
CALCIUM SERPL-MCNC: 9 MG/DL (ref 8.3–10.6)
CHLORIDE SERPL-SCNC: 99 MMOL/L (ref 99–110)
CO2 SERPL-SCNC: 29 MMOL/L (ref 21–32)
CREAT SERPL-MCNC: 1.1 MG/DL (ref 0.6–1.2)
DEPRECATED RDW RBC AUTO: 14.7 % (ref 12.4–15.4)
GFR SERPLBLD CREATININE-BSD FMLA CKD-EPI: 54 ML/MIN/{1.73_M2}
GLUCOSE SERPL-MCNC: 124 MG/DL (ref 70–99)
HCT VFR BLD AUTO: 37.8 % (ref 36–48)
HGB BLD-MCNC: 12.7 G/DL (ref 12–16)
MCH RBC QN AUTO: 31.9 PG (ref 26–34)
MCHC RBC AUTO-ENTMCNC: 33.6 G/DL (ref 31–36)
MCV RBC AUTO: 94.9 FL (ref 80–100)
PLATELET # BLD AUTO: 162 K/UL (ref 135–450)
PMV BLD AUTO: 10 FL (ref 5–10.5)
POTASSIUM SERPL-SCNC: 4.3 MMOL/L (ref 3.5–5.1)
PROT SERPL-MCNC: 7.9 G/DL (ref 6.4–8.2)
RBC # BLD AUTO: 3.99 M/UL (ref 4–5.2)
SODIUM SERPL-SCNC: 138 MMOL/L (ref 136–145)
WBC # BLD AUTO: 6.8 K/UL (ref 4–11)

## 2024-01-31 PROCEDURE — 80053 COMPREHEN METABOLIC PANEL: CPT

## 2024-01-31 PROCEDURE — 99211 OFF/OP EST MAY X REQ PHY/QHP: CPT

## 2024-01-31 PROCEDURE — 85027 COMPLETE CBC AUTOMATED: CPT

## 2024-01-31 PROCEDURE — 6360000002 HC RX W HCPCS: Performed by: INTERNAL MEDICINE

## 2024-01-31 PROCEDURE — 96365 THER/PROPH/DIAG IV INF INIT: CPT

## 2024-01-31 PROCEDURE — 2580000003 HC RX 258: Performed by: INTERNAL MEDICINE

## 2024-01-31 RX ORDER — SODIUM CHLORIDE 9 MG/ML
INJECTION, SOLUTION INTRAVENOUS CONTINUOUS
OUTPATIENT
Start: 2024-02-28

## 2024-01-31 RX ORDER — SODIUM CHLORIDE 0.9 % (FLUSH) 0.9 %
10 SYRINGE (ML) INJECTION PRN
Status: DISCONTINUED | OUTPATIENT
Start: 2024-01-31 | End: 2024-02-01 | Stop reason: HOSPADM

## 2024-01-31 RX ORDER — SODIUM CHLORIDE 0.9 % (FLUSH) 0.9 %
10 SYRINGE (ML) INJECTION PRN
OUTPATIENT
Start: 2024-02-28

## 2024-01-31 RX ORDER — SODIUM CHLORIDE 9 MG/ML
INJECTION, SOLUTION INTRAVENOUS CONTINUOUS
Status: ACTIVE | OUTPATIENT
Start: 2024-01-31 | End: 2024-01-31

## 2024-01-31 RX ADMIN — Medication 10 ML: at 09:12

## 2024-01-31 RX ADMIN — VEDOLIZUMAB 300 MG: 300 INJECTION, POWDER, LYOPHILIZED, FOR SOLUTION INTRAVENOUS at 09:28

## 2024-01-31 RX ADMIN — SODIUM CHLORIDE: 9 INJECTION, SOLUTION INTRAVENOUS at 09:28

## 2024-01-31 ASSESSMENT — PAIN SCALES - GENERAL: PAINLEVEL_OUTOF10: 0

## 2024-01-31 NOTE — PROGRESS NOTES
Pt here for entyvio infusion  pt without c/o's today  She reports that her stools are almost normal  Pt denies any cramping, bloating bleeding or diarrhea  Pt also reports they are looking in to changing her medication but she hasn't heard anything concrete yet so she is continuing with the Entyvio until further notice  IV # 22 was started in [facility] on 1st attempt per myself  Blood for lab work was drawn without difficulty Pt gayla well  Entyvio 300 mg IVPB starting to infuse  IV site unremarkable Will monitor

## 2024-02-01 DIAGNOSIS — E55.9 VITAMIN D DEFICIENCY: ICD-10-CM

## 2024-02-01 DIAGNOSIS — E11.9 DIET-CONTROLLED TYPE 2 DIABETES MELLITUS (HCC): ICD-10-CM

## 2024-02-01 RX ORDER — LIDOCAINE HCL 4 %
CREAM (GRAM) TOPICAL
Qty: 12 CAPSULE | Refills: 2 | Status: SHIPPED | OUTPATIENT
Start: 2024-02-01

## 2024-02-01 NOTE — TELEPHONE ENCOUNTER
Refill Request     CONFIRM preferrred pharmacy with the patient.    If Mail Order Rx - Pend for 90 day refill.      Last Seen: Last Seen Department: 2024  Last Seen by PCP: 2024    Last Written: 2023    If no future appointment scheduled, route STAFF MESSAGE with patient name to the  Pool for scheduling.      Next Appointment:   Future Appointments   Date Time Provider Department Center   2024  9:00 AM [facility] N[facility] 1 [facility] RN [facility]   3/6/2024  2:00 PM SCHEDULE, VASCULAR PROCEDURE [facility]   3/27/2024  9:00 AM [facility] N[facility] 1 [facility] RN [facility]   2024  9:00 AM [facility] NURSING ROOM 1 [facility] RN [facility]   2024 11:00 AM Ade Bonilla, APRN - CNP [facility]  [place] [facility]       Message sent to  to schedule appt with patient?  NO      Requested Prescriptions     Pending Prescriptions Disp Refills    vitamin D ([employer] VITAMIN D3) 250 MCG (58776 UT) CAPS capsule 12 capsule 2     Sig: TAKE 1 CAPSULE BY MOUTH ONCE WEEKLY  Patient usually takes on     blood glucose test strips (ASCENSIA AUTODISC VI;ONE TOUCH ULTRA TEST VI) strip 300 each 3     Si each by In Vitro route daily As needed.

## 2024-02-03 DIAGNOSIS — M07.60 ENTEROPATHIC ARTHRITIS: ICD-10-CM

## 2024-02-03 DIAGNOSIS — Z13.6 SCREENING FOR CARDIOVASCULAR CONDITION: ICD-10-CM

## 2024-02-05 RX ORDER — SIMVASTATIN 20 MG
TABLET ORAL
Qty: 90 TABLET | Refills: 3 | Status: SHIPPED | OUTPATIENT
Start: 2024-02-05

## 2024-02-05 RX ORDER — LEVOTHYROXINE SODIUM 0.12 MG/1
TABLET ORAL
Qty: 90 TABLET | Refills: 3 | Status: SHIPPED | OUTPATIENT
Start: 2024-02-05

## 2024-02-05 RX ORDER — CELECOXIB 100 MG/1
CAPSULE ORAL
Qty: 180 CAPSULE | Refills: 3 | Status: SHIPPED | OUTPATIENT
Start: 2024-02-05

## 2024-02-05 NOTE — TELEPHONE ENCOUNTER
Refill Request     CONFIRM preferrred pharmacy with the patient.    If Mail Order Rx - Pend for 90 day refill.      Last Seen: Last Seen Department: 1/4/2024  Last Seen by PCP: 1/4/2024    Last Written: 11/22/23    If no future appointment scheduled, route STAFF MESSAGE with patient name to the  Pool for scheduling.      Next Appointment:   Future Appointments   Date Time Provider Department Center   2/9/2024  8:20 AM SCHEDULE, [facility] [place]  [place] [facility]   2/28/2024  9:00 AM [facility] NURSING [facility] 1 [facility] RN [facility]   3/6/2024  2:00 PM SCHEDULE, VASCULAR PROCEDURE [facility]   3/27/2024  9:00 AM [facility] NURSING ROOM 1 [facility] RN [facility]   4/24/2024  9:00 AM [facility] NURSING ROOM 1 [facility] RN [facility]   6/19/2024 11:00 AM [facility] Bonilla[facility] APR[facility] - [facility] [place] [facility]       Message sent to  to schedule appt with patient?  N/A      Requested Prescriptions     Pending Prescriptions Disp Refills    simvastatin (ZOCOR) 20 MG tablet [Pharmacy Med Name: SIMVASTATIN 20 MG Tablet] 90 tablet 3     Sig: TAKE 1 TABLET EVERY NIGHT    levothyroxine (SYNTHROID) 125 MCG tablet [Pharmacy Med Name: LEVOTHYROXINE SODIUM 125 MCG Tablet] 90 tablet 3     Sig: TAKE 1 TABLET EVERY DAY    celecoxib (CELEBREX) 100 MG capsule [Pharmacy Med Name: CELECOXIB 100 MG Capsule] 180 capsule 3     Sig: TAKE 2 CAPSULES EVERY DAY

## 2024-02-09 ENCOUNTER — NURSE ONLY (OUTPATIENT)
Dept: FAMILY MEDICINE CLINIC | Age: 69
End: 2024-02-09
Payer: MEDICARE

## 2024-02-09 DIAGNOSIS — L03.115 CELLULITIS OF RIGHT LOWER EXTREMITY: ICD-10-CM

## 2024-02-09 LAB
ANION GAP SERPL CALCULATED.3IONS-SCNC: 11 MMOL/L (ref 3–16)
BUN SERPL-MCNC: 16 MG/DL (ref 7–20)
CALCIUM SERPL-MCNC: 8.9 MG/DL (ref 8.3–10.6)
CHLORIDE SERPL-SCNC: 101 MMOL/L (ref 99–110)
CO2 SERPL-SCNC: 28 MMOL/L (ref 21–32)
CREAT SERPL-MCNC: 1.1 MG/DL (ref 0.6–1.2)
GFR SERPLBLD CREATININE-BSD FMLA CKD-EPI: 54 ML/MIN/{1.73_M2}
GLUCOSE SERPL-MCNC: 145 MG/DL (ref 70–99)
POTASSIUM SERPL-SCNC: 4.2 MMOL/L (ref 3.5–5.1)
SODIUM SERPL-SCNC: 140 MMOL/L (ref 136–145)

## 2024-02-09 PROCEDURE — 36415 COLL VENOUS BLD VENIPUNCTURE: CPT | Performed by: NURSE PRACTITIONER

## 2024-02-12 ENCOUNTER — TELEPHONE (OUTPATIENT)
Dept: FAMILY MEDICINE CLINIC | Age: 69
End: 2024-02-12

## 2024-02-12 NOTE — TELEPHONE ENCOUNTER
Pt states that she has been getting light headed when she leans forward or moves her head forward. And when she sleeps she stated that she has a really deep sleep. Was wanting to see if it could be some of her meds causing this.

## 2024-02-13 ENCOUNTER — TELEPHONE (OUTPATIENT)
Dept: FAMILY MEDICINE CLINIC | Age: 69
End: 2024-02-13

## 2024-02-13 NOTE — TELEPHONE ENCOUNTER
No CP and Palpitations at this dizziness. Pt wants to take it at home before coming into the office. But she is aware of the recommendations of the Providers.

## 2024-02-13 NOTE — TELEPHONE ENCOUNTER
Patient came into office for B/P check.    She can not locate the charging cord to her B/P machine.   Orthostatic B/P 's  Laying      148/87  HR 90   SaO2 96%  Sitting      128/42  HR 92   SaO2 96 %  Standing   115/77  HR 91   SaO2 92%    Patient denies any lightlessness today.  She states she is \"only lightheaded / dizzy when she bends over.\"  She has occasionally has Right side pressure headache.  Denies headache at present.

## 2024-02-13 NOTE — TELEPHONE ENCOUNTER
Patient came into office for B/P check.    She can not locate the charging cord to her B/P machine.   Orthostatic B/P 's  Laying      148/87  HR 90   SaO2 69%  Sitting      128/42  HR 92   SaO2 96  Standing   115/77  HR 91   SaO2 92%    Patient denies any lightlessness today.  She states she is \"only lightheaded / dizzy when she bends over.\"  She has occasionally has Right side pressure headache.  Denies headache at present.

## 2024-02-13 NOTE — TELEPHONE ENCOUNTER
Pt has not been checking her blood pressure. Pt states when she had infusion on 1/31/24 she had her bp checked there. Pt states she is feeling better today. Pt feels she may not have a balanced diet and sleep pattern. Pt doesn't feel it is an emergency but wasnt sure what to do or what to look for. Pt is cautious and gets op slow, uses a walker, she takes her time and tries not to go to fast.

## 2024-02-15 ENCOUNTER — OFFICE VISIT (OUTPATIENT)
Dept: FAMILY MEDICINE CLINIC | Age: 69
End: 2024-02-15
Payer: MEDICARE

## 2024-02-15 VITALS
SYSTOLIC BLOOD PRESSURE: 122 MMHG | OXYGEN SATURATION: 98 % | DIASTOLIC BLOOD PRESSURE: 74 MMHG | HEIGHT: 62 IN | BODY MASS INDEX: 41.77 KG/M2 | WEIGHT: 227 LBS | HEART RATE: 94 BPM

## 2024-02-15 DIAGNOSIS — R51.9 NEW ONSET HEADACHE: ICD-10-CM

## 2024-02-15 DIAGNOSIS — E03.9 HYPOTHYROIDISM, UNSPECIFIED TYPE: ICD-10-CM

## 2024-02-15 DIAGNOSIS — R51.9 DAILY HEADACHE: ICD-10-CM

## 2024-02-15 DIAGNOSIS — I95.1 ORTHOSTASIS: Primary | ICD-10-CM

## 2024-02-15 LAB
FOLATE SERPL-MCNC: >20 NG/ML (ref 4.78–24.2)
VIT B12 SERPL-MCNC: 301 PG/ML (ref 211–911)

## 2024-02-15 PROCEDURE — 99214 OFFICE O/P EST MOD 30 MIN: CPT | Performed by: NURSE PRACTITIONER

## 2024-02-15 PROCEDURE — 36415 COLL VENOUS BLD VENIPUNCTURE: CPT | Performed by: NURSE PRACTITIONER

## 2024-02-15 PROCEDURE — 1123F ACP DISCUSS/DSCN MKR DOCD: CPT | Performed by: NURSE PRACTITIONER

## 2024-02-15 PROCEDURE — G8484 FLU IMMUNIZE NO ADMIN: HCPCS | Performed by: NURSE PRACTITIONER

## 2024-02-15 PROCEDURE — G8427 DOCREV CUR MEDS BY ELIG CLIN: HCPCS | Performed by: NURSE PRACTITIONER

## 2024-02-15 PROCEDURE — 1036F TOBACCO NON-USER: CPT | Performed by: NURSE PRACTITIONER

## 2024-02-15 PROCEDURE — 3078F DIAST BP <80 MM HG: CPT | Performed by: NURSE PRACTITIONER

## 2024-02-15 PROCEDURE — 1090F PRES/ABSN URINE INCON ASSESS: CPT | Performed by: NURSE PRACTITIONER

## 2024-02-15 PROCEDURE — G8417 CALC BMI ABV UP PARAM F/U: HCPCS | Performed by: NURSE PRACTITIONER

## 2024-02-15 PROCEDURE — 3074F SYST BP LT 130 MM HG: CPT | Performed by: NURSE PRACTITIONER

## 2024-02-15 PROCEDURE — 3017F COLORECTAL CA SCREEN DOC REV: CPT | Performed by: NURSE PRACTITIONER

## 2024-02-15 PROCEDURE — G8399 PT W/DXA RESULTS DOCUMENT: HCPCS | Performed by: NURSE PRACTITIONER

## 2024-02-15 NOTE — PROGRESS NOTES
[facility]  [facility]  [address] [place] [id number]  Dept: [phone]  Dept Fax: [phone]  Loc: [phone]    Ruthie Conte is a 68 y.o. female who presents today for her medical conditions/complaints as noted below.  Ruthie Conte is c/o of Hypertension (Patient has not been monitor her BP at home.  Mild edema in bi-lateral lower legs.  Denies SOB or chest pain )       Subjective:     Chief Complaint   Patient presents with    Hypertension     Patient has not been monitor her BP at home.  Mild edema in bi-lateral lower legs.  Denies SOB or chest pain        HPI  Originally the patient called 9-12 24 stating she had been lightheaded when bending forward or moving her head forward.  She was wanting to see if any of her medications could be causing this issue.    This has been going on since  The patient had not been checking her blood pressure at home  She denied any chest pain or palpitations  Was recommended that the patient come in for orthostatic blood pressure readings.  Which showed the following  Orthostatic B/P 's  Laying       148/87  HR 90   SaO2 69%  Sitting       128/42  HR 92   SaO2 96  Standing   115/77  HR 91   SaO2 92%     She is currently on Lasix 20 mg daily  She is not on any blood pressure medications    Having headaches and pressure in her right side of her head almost daily.  The pain in her head will last 1-2 hours and then return.  The headache is waking her up at night.   This has been ongoing for about 1 month to 1.5 months.  She has tried Tylenol and ibuprofen as well as some pain medication leftover from surgery which does not really help very much with her headache but makes it tolerable    Also has venous stasis and has been wearing knee-high stockings however they have lost there compression and patient cannot afford any new ones at this time      Past Medical History:   Diagnosis Date    Acute pancreatitis 10/21/2014    Acute

## 2024-02-16 LAB
BASOPHILS # BLD: 0.1 K/UL (ref 0–0.2)
BASOPHILS NFR BLD: 1.2 %
DEPRECATED RDW RBC AUTO: 15.4 % (ref 12.4–15.4)
EOSINOPHIL # BLD: 0.2 K/UL (ref 0–0.6)
EOSINOPHIL NFR BLD: 2.8 %
HCT VFR BLD AUTO: 36.3 % (ref 36–48)
HGB BLD-MCNC: 12.1 G/DL (ref 12–16)
LYMPHOCYTES # BLD: 1.7 K/UL (ref 1–5.1)
LYMPHOCYTES NFR BLD: 26.8 %
MCH RBC QN AUTO: 31.8 PG (ref 26–34)
MCHC RBC AUTO-ENTMCNC: 33.5 G/DL (ref 31–36)
MCV RBC AUTO: 95 FL (ref 80–100)
MONOCYTES # BLD: 0.5 K/UL (ref 0–1.3)
MONOCYTES NFR BLD: 7.2 %
NEUTROPHILS # BLD: 4 K/UL (ref 1.7–7.7)
NEUTROPHILS NFR BLD: 62 %
PLATELET # BLD AUTO: 145 K/UL (ref 135–450)
PMV BLD AUTO: 11.5 FL (ref 5–10.5)
RBC # BLD AUTO: 3.82 M/UL (ref 4–5.2)
T4 FREE SERPL-MCNC: 1 NG/DL (ref 0.9–1.8)
TSH SERPL DL<=0.005 MIU/L-ACNC: 19.08 UIU/ML (ref 0.27–4.2)
WBC # BLD AUTO: 6.5 K/UL (ref 4–11)

## 2024-02-19 ENCOUNTER — TELEPHONE (OUTPATIENT)
Dept: FAMILY MEDICINE CLINIC | Age: 69
End: 2024-02-19

## 2024-02-19 DIAGNOSIS — E03.9 HYPOTHYROIDISM, UNSPECIFIED TYPE: Primary | ICD-10-CM

## 2024-02-19 RX ORDER — LEVOTHYROXINE SODIUM 137 UG/1
137 TABLET ORAL DAILY
Qty: 90 TABLET | Refills: 0 | Status: SHIPPED | OUTPATIENT
Start: 2024-02-19

## 2024-02-19 NOTE — TELEPHONE ENCOUNTER
----- Message from YAHAIRA Victor - CNP sent at 2/16/2024 12:48 PM EST -----  Thyroid level very uncontrolled,  has she been taking her thyroid medication??  This could be contributing to her lightheadedness  Your blood counts are all normal.  B12 and folate levels are normal

## 2024-02-20 NOTE — TELEPHONE ENCOUNTER
Increase Levothyroxine to 137 mg 1 po qd # 30 with 1 refill (pend rx to pharmacy of choice) and schedule a TSH w reflex in 6 to 8 weeks

## 2024-02-24 ENCOUNTER — HOSPITAL ENCOUNTER (OUTPATIENT)
Dept: MRI IMAGING | Age: 69
Discharge: HOME | End: 2024-02-24
Payer: MEDICARE

## 2024-02-24 DIAGNOSIS — R51.9 NEW ONSET HEADACHE: ICD-10-CM

## 2024-02-24 DIAGNOSIS — I95.1 ORTHOSTASIS: ICD-10-CM

## 2024-02-24 DIAGNOSIS — R51.9 DAILY HEADACHE: ICD-10-CM

## 2024-02-24 PROCEDURE — 70551 MRI BRAIN STEM W/O DYE: CPT

## 2024-02-27 DIAGNOSIS — R93.89 ABNORMAL MRI: Primary | ICD-10-CM

## 2024-02-27 DIAGNOSIS — L03.115 CELLULITIS OF RIGHT LOWER EXTREMITY: ICD-10-CM

## 2024-02-27 RX ORDER — FUROSEMIDE 20 MG/1
20 TABLET ORAL DAILY
Qty: 90 TABLET | Refills: 0 | Status: SHIPPED | OUTPATIENT
Start: 2024-02-27

## 2024-02-27 NOTE — TELEPHONE ENCOUNTER
Refill Request     CONFIRM preferrred pharmacy with the patient.    If Mail Order Rx - Pend for 90 day refill.      Last Seen: Last Seen Department: 2/15/2024  Last Seen by PCP: 2/15/2024    Last Written: 2.1.24    If no future appointment scheduled, route STAFF MESSAGE with patient name to the  Pool for scheduling.      Next Appointment:   Future Appointments   Date Time Provider Department Center   2/28/2024  9:00 AM [facility] NURSING ROOM 1 [facility] RN [facility]   3/6/2024  2:00 PM SCHEDULE, VASCULAR PROCEDURE [facility]   3/27/2024  9:00 AM [facility] NURSING ROOM 1 [facility] RN [facility]   4/10/2024  1:00 PM SCHEDULE, [facility] [place] [facility]   4/24/2024  9:00 AM [facility] NURSING ROOM 1 [facility] OP RN [facility]   6/19/2024 11:00 AM Ade Bonilla, APRN - CNP [facility]  [place] [facility] [place]       Message sent to  to schedule appt with patient?  NO        furosemide (LASIX) 20 MG tablet [[id number]]

## 2024-02-28 ENCOUNTER — HOSPITAL ENCOUNTER (OUTPATIENT)
Dept: NURSING | Age: 69
Setting detail: INFUSION SERIES
Discharge: HOME | End: 2024-02-28
Payer: MEDICARE

## 2024-02-28 ENCOUNTER — TELEPHONE (OUTPATIENT)
Age: 69
End: 2024-02-28

## 2024-02-28 VITALS
DIASTOLIC BLOOD PRESSURE: 89 MMHG | HEART RATE: 65 BPM | WEIGHT: 227 LBS | BODY MASS INDEX: 41.77 KG/M2 | RESPIRATION RATE: 18 BRPM | HEIGHT: 62 IN | SYSTOLIC BLOOD PRESSURE: 152 MMHG | TEMPERATURE: 97.9 F

## 2024-02-28 DIAGNOSIS — K51.00 CHRONIC ULCERATIVE ENTEROCOLITIS WITHOUT COMPLICATION (HCC): Primary | ICD-10-CM

## 2024-02-28 PROCEDURE — 99211 OFF/OP EST MAY X REQ PHY/QHP: CPT

## 2024-02-28 PROCEDURE — 6360000002 HC RX W HCPCS: Performed by: INTERNAL MEDICINE

## 2024-02-28 PROCEDURE — 96365 THER/PROPH/DIAG IV INF INIT: CPT

## 2024-02-28 PROCEDURE — 2580000003 HC RX 258: Performed by: INTERNAL MEDICINE

## 2024-02-28 RX ORDER — SODIUM CHLORIDE 9 MG/ML
INJECTION, SOLUTION INTRAVENOUS CONTINUOUS
OUTPATIENT
Start: 2024-03-27

## 2024-02-28 RX ORDER — SODIUM CHLORIDE 9 MG/ML
INJECTION, SOLUTION INTRAVENOUS CONTINUOUS
Status: ACTIVE | OUTPATIENT
Start: 2024-02-28 | End: 2024-02-28

## 2024-02-28 RX ORDER — SODIUM CHLORIDE 0.9 % (FLUSH) 0.9 %
10 SYRINGE (ML) INJECTION PRN
OUTPATIENT
Start: 2024-03-27

## 2024-02-28 RX ORDER — SODIUM CHLORIDE 0.9 % (FLUSH) 0.9 %
10 SYRINGE (ML) INJECTION PRN
Status: DISCONTINUED | OUTPATIENT
Start: 2024-02-28 | End: 2024-02-29 | Stop reason: HOSPADM

## 2024-02-28 RX ADMIN — VEDOLIZUMAB 300 MG: 300 INJECTION, POWDER, LYOPHILIZED, FOR SOLUTION INTRAVENOUS at 09:52

## 2024-02-28 RX ADMIN — Medication 10 ML: at 09:51

## 2024-02-28 RX ADMIN — SODIUM CHLORIDE: 9 INJECTION, SOLUTION INTRAVENOUS at 09:52

## 2024-02-28 ASSESSMENT — PAIN SCALES - GENERAL: PAINLEVEL_OUTOF10: 0

## 2024-02-28 NOTE — PROGRESS NOTES
Pt here for entyvio infusion  Pt without c/o's today  She reports that her stools are almost normal and she has occasional bloating depending on what she eats   Pt denies any cramping or bleeding  IV # 22 was started in [facility] on my 1st attempt  Pt gayla well  No blood work needed for today  Entyvio 300 mg IVPB starting to infuse  IV site unremarkable Will monitor

## 2024-02-28 NOTE — DISCHARGE INSTRUCTIONS
vedolizumab  Pronunciation:  GEN REED ue mab  Brand:  Entyvio  What is vedolizumab?  Vedolizumab reduces the effects of a substance in the body that can cause inflammation.  Vedolizumab is used in adults with moderate to severe ulcerative colitis (UC), or moderate to severe Crohn's disease.  Vedolizumab treats active disease and may help keep UC or Crohn's symptoms under control long term. Vedolizumab may also reduce the need for steroid medicines in helping to control symptoms long term.  Vedolizumab is usually given after other medicines have been tried without success.  Vedolizumab may also be used for purposes not listed in this medication guide.  What should I discuss with my healthcare provider before receiving vedolizumab?  You should not use vedolizumab if you are allergic to it.  To make sure vedolizumab is safe for you, tell your doctor if you have:  an active or recent infection;  tuberculosis (or if you have close contact with someone who has tuberculosis);  signs of infection such as fever, cough, or flu symptoms;  open sores or skin wounds;  weak immune system (caused by disease or by using certain medicine); or  if you are scheduled to receive any vaccines.  You should be up to date with all needed vaccinations before receiving vedolizumab.   FDA pregnancy category B. Vedolizumab is not expected to harm an unborn baby. Tell your doctor if you are pregnant or plan to become pregnant during treatment.  If you are pregnant, your name may be listed on a pregnancy registry. This is to track the outcome of the pregnancy and to evaluate any effects of vedolizumab on the baby.  It is not known whether vedolizumab passes into breast milk or if it could harm a nursing baby. Tell your doctor if you are breast-feeding a baby.  Do not give this medicine to anyone under 18 years old without medical advice.  How is vedolizumab given?  Before you start treatment with vedolizumab, your doctor may perform tests to

## 2024-03-06 ENCOUNTER — PROCEDURE VISIT (OUTPATIENT)
Dept: CARDIOLOGY CLINIC | Age: 69
End: 2024-03-06

## 2024-03-06 DIAGNOSIS — I87.2 VENOUS INSUFFICIENCY: Primary | ICD-10-CM

## 2024-03-06 NOTE — PROGRESS NOTES
venous access confirmed by aspiration of dark low pressure blood. Upon gaining all access sites  (both above and below the knee), the leg was positioned at 45 degrees of elevation in relationship to the torso.    The Varithena canister has been obtained through the office for the patient. The Varithena canister was activated and the canister was primed and purged as required in the instructions for use. A 15 mL aliquot of Varithena was drawn into a sterile syringe which was then attached to a saline filled extension tube for administration of Varithena through the extension tube into the vascular catheter. Varithena was slowly administered at 0.5-1.0 cc/second with close observation by ultrasound of its course in the Great Saphenous Vein. When Varithena arrived to approximately 3-5 cm from the Saphenofemoral Junction, the Great Saphenous Vein was then compressed to prevent flow into the common femoral vein. Further administration of Varithena was stopped at this point and the treated Great Saphenous Vein was observed with ultrasound over a period of 3-5 minutes observing for spasm.  During administration of Varithena the patient was asked to dorsiflex the ankle to limit flow of foam into perforating veins. Once appropriate spasm had been confirmed in the treated veins, the vascular catheter was removed from the leg and light pressure was applied over the puncture site for hemostasis.     The common femoral and deep superficial veins were then evaluated for flow and compressibility prior to dressing placement. The lower extremity was kept elevated at 45 degrees above the horizontal and cording material was applied over the saphenous  (segments and tributaries) to allow for eccentric compression over the target vessel(s) including the targeted saphenous vein(s). A multilayer dressing was applied using 4\" short stretch bandaging, as an under layer, understocking as an over layer, and thigh high 20-30 mmHg compression

## 2024-03-14 DIAGNOSIS — E11.9 DIET-CONTROLLED TYPE 2 DIABETES MELLITUS (HCC): ICD-10-CM

## 2024-03-14 NOTE — TELEPHONE ENCOUNTER
Refill Request     CONFIRM preferrred pharmacy with the patient.    If Mail Order Rx - Pend for 90 day refill.      Last Seen: Last Seen Department: 2/15/2024  Last Seen by PCP: 2/15/2024    Last Written: 2.1.24    If no future appointment scheduled, route STAFF MESSAGE with patient name to the  Pool for scheduling.      Next Appointment:   Future Appointments   Date Time Provider Department Center   3/27/2024  9:00 AM [facility]   4/10/2024  1:00 PM SCHEDULE, [facility] [place] [facility]   4/24/2024  9:00 AM [facility] OP NURSING ROOM 1 [facility] OP RN [facility]   4/24/2024 10:45 AM Abelardo Lowery MD [facility]   5/22/2024  9:00 AM [facility] OP NURSING ROOM 1 [facility] OP RN [facility]   6/19/2024  9:00 AM [facility] OP NURSING ROOM 1 [facility] OP RN [facility]   6/19/2024 11:00 AM Ade Bonilla, APRN - CNP [facility] [place] [facility]   6/27/2024 10:20 AM Aimee Lucio MD [facility] Neurology -       Message sent to  to schedule appt with patient?  NO  [facility] can not fill, don't take patient's insurance      blood glucose test strips (ASCENSIA AUTODISC VI;ONE TOUCH ULTRA TEST VI) strip [[id number]]

## 2024-03-25 NOTE — DISCHARGE INSTRUCTIONS
vedolizumab  Pronunciation:  GEN REED ue mab  Brand:  Entyvio  What is vedolizumab?  Vedolizumab reduces the effects of a substance in the body that can cause inflammation.  Vedolizumab is used in adults with moderate to severe ulcerative colitis (UC), or moderate to severe Crohn's disease.  Vedolizumab treats active disease and may help keep UC or Crohn's symptoms under control long term. Vedolizumab may also reduce the need for steroid medicines in helping to control symptoms long term.  Vedolizumab is usually given after other medicines have been tried without success.  Vedolizumab may also be used for purposes not listed in this medication guide.  What should I discuss with my healthcare provider before receiving vedolizumab?  You should not use vedolizumab if you are allergic to it.  To make sure vedolizumab is safe for you, tell your doctor if you have:  an active or recent infection;  tuberculosis (or if you have close contact with someone who has tuberculosis);  signs of infection such as fever, cough, or flu symptoms;  open sores or skin wounds;  weak immune system (caused by disease or by using certain medicine); or  if you are scheduled to receive any vaccines.  You should be up to date with all needed vaccinations before receiving vedolizumab.   FDA pregnancy category B. Vedolizumab is not expected to harm an unborn baby. Tell your doctor if you are pregnant or plan to become pregnant during treatment.  If you are pregnant, your name may be listed on a pregnancy registry. This is to track the outcome of the pregnancy and to evaluate any effects of vedolizumab on the baby.  It is not known whether vedolizumab passes into breast milk or if it could harm a nursing baby. Tell your doctor if you are breast-feeding a baby.  Do not give this medicine to anyone under 18 years old without medical advice.  How is vedolizumab given?  Before you start treatment with vedolizumab, your doctor may perform tests to  have any of these signs of an allergic reaction: hives; difficult breathing; swelling of your face, lips, tongue, or throat.  Some side effects may occur during the injection. Tell your caregiver right away if you feel warm or tingly, or if you have a severe headache, fast heart rate, pounding in your neck or ears, chest tightness, or trouble breathing.  Some people using a medicine similar to vedolizumab have developed a serious viral infection of the brain that can lead to disability or death. Call your doctor right away if you have any change in your mental state, decreased vision, or problems with speech or walking. These symptoms may start gradually and get worse quickly.  Stop using vedolizumab and call your doctor at once if you have:  fever, chills, body aches, cold or flu symptoms, mouth and throat ulcers, skin sores;  pain, warmth, swelling, or oozing around your anal area;  nausea, vomiting, severe diarrhea, diarrhea that is watery or bloody, stomach cramps, weight loss;  cough, pain when swallowing; or  liver problems --nausea, upper stomach pain, itching, tired feeling, loss of appetite, dark urine, shanita-colored stools, jaundice (yellowing of the skin or eyes).  Common side effects may include:  fever, sore throat, flu symptoms;  cold symptoms such as stuffy nose, sinus pain, sneezing, cough;  pain in your arms or legs;  tired feeling;  headache, joint pain, back pain;  rash, itching; or  nausea.  This is not a complete list of side effects and others may occur. Call your doctor for medical advice about side effects. You may report side effects to FDA at 4-846-FDA-3339.  What other drugs will affect vedolizumab?  Tell your doctor about all medicines you use, and those you start or stop using during your treatment with vedolizumab, especially:  natalizumab;  medicines to treat psoriasis or rheumatoid arthritis, such as etanercept or golimumab;  other medicines to treat Crohn's disease or ulcerative  Private car

## 2024-03-27 ENCOUNTER — HOSPITAL ENCOUNTER (OUTPATIENT)
Dept: NURSING | Age: 69
Setting detail: INFUSION SERIES
Discharge: HOME | End: 2024-03-27
Payer: MEDICARE

## 2024-03-27 VITALS
WEIGHT: 222 LBS | DIASTOLIC BLOOD PRESSURE: 87 MMHG | RESPIRATION RATE: 18 BRPM | SYSTOLIC BLOOD PRESSURE: 136 MMHG | TEMPERATURE: 97.8 F | HEART RATE: 81 BPM | HEIGHT: 62 IN | BODY MASS INDEX: 40.85 KG/M2

## 2024-03-27 DIAGNOSIS — K51.00 CHRONIC ULCERATIVE ENTEROCOLITIS WITHOUT COMPLICATION (HCC): Primary | ICD-10-CM

## 2024-03-27 LAB
ALBUMIN SERPL-MCNC: 3.7 G/DL (ref 3.4–5)
ALBUMIN/GLOB SERPL: 1 {RATIO} (ref 1.1–2.2)
ALP SERPL-CCNC: 112 U/L (ref 40–129)
ALT SERPL-CCNC: 20 U/L (ref 10–40)
ANION GAP SERPL CALCULATED.3IONS-SCNC: 12 MMOL/L (ref 3–16)
AST SERPL-CCNC: 41 U/L (ref 15–37)
BILIRUB SERPL-MCNC: 0.7 MG/DL (ref 0–1)
BUN SERPL-MCNC: 21 MG/DL (ref 7–20)
CALCIUM SERPL-MCNC: 9.1 MG/DL (ref 8.3–10.6)
CHLORIDE SERPL-SCNC: 98 MMOL/L (ref 99–110)
CO2 SERPL-SCNC: 28 MMOL/L (ref 21–32)
CREAT SERPL-MCNC: 1.1 MG/DL (ref 0.6–1.2)
DEPRECATED RDW RBC AUTO: 15 % (ref 12.4–15.4)
GFR SERPLBLD CREATININE-BSD FMLA CKD-EPI: 54 ML/MIN/{1.73_M2}
GLUCOSE SERPL-MCNC: 150 MG/DL (ref 70–99)
HCT VFR BLD AUTO: 35.9 % (ref 36–48)
HGB BLD-MCNC: 11.9 G/DL (ref 12–16)
MCH RBC QN AUTO: 31.7 PG (ref 26–34)
MCHC RBC AUTO-ENTMCNC: 33 G/DL (ref 31–36)
MCV RBC AUTO: 96.1 FL (ref 80–100)
PLATELET # BLD AUTO: 146 K/UL (ref 135–450)
PMV BLD AUTO: 9.5 FL (ref 5–10.5)
POTASSIUM SERPL-SCNC: 4.5 MMOL/L (ref 3.5–5.1)
PROT SERPL-MCNC: 7.3 G/DL (ref 6.4–8.2)
RBC # BLD AUTO: 3.74 M/UL (ref 4–5.2)
SODIUM SERPL-SCNC: 138 MMOL/L (ref 136–145)
WBC # BLD AUTO: 6 K/UL (ref 4–11)

## 2024-03-27 PROCEDURE — 85027 COMPLETE CBC AUTOMATED: CPT

## 2024-03-27 PROCEDURE — 99211 OFF/OP EST MAY X REQ PHY/QHP: CPT

## 2024-03-27 PROCEDURE — 6360000002 HC RX W HCPCS: Performed by: INTERNAL MEDICINE

## 2024-03-27 PROCEDURE — 80053 COMPREHEN METABOLIC PANEL: CPT

## 2024-03-27 PROCEDURE — 96365 THER/PROPH/DIAG IV INF INIT: CPT

## 2024-03-27 PROCEDURE — 2580000003 HC RX 258: Performed by: INTERNAL MEDICINE

## 2024-03-27 PROCEDURE — 86480 TB TEST CELL IMMUN MEASURE: CPT

## 2024-03-27 RX ORDER — SODIUM CHLORIDE 9 MG/ML
INJECTION, SOLUTION INTRAVENOUS CONTINUOUS
Status: ACTIVE | OUTPATIENT
Start: 2024-03-27 | End: 2024-03-27

## 2024-03-27 RX ORDER — SODIUM CHLORIDE 9 MG/ML
INJECTION, SOLUTION INTRAVENOUS CONTINUOUS
OUTPATIENT
Start: 2024-04-24

## 2024-03-27 RX ORDER — SODIUM CHLORIDE 0.9 % (FLUSH) 0.9 %
10 SYRINGE (ML) INJECTION PRN
Status: DISCONTINUED | OUTPATIENT
Start: 2024-03-27 | End: 2024-03-28 | Stop reason: HOSPADM

## 2024-03-27 RX ORDER — SODIUM CHLORIDE 0.9 % (FLUSH) 0.9 %
10 SYRINGE (ML) INJECTION PRN
OUTPATIENT
Start: 2024-04-24

## 2024-03-27 RX ADMIN — VEDOLIZUMAB 300 MG: 300 INJECTION, POWDER, LYOPHILIZED, FOR SOLUTION INTRAVENOUS at 09:50

## 2024-03-27 RX ADMIN — Medication 10 ML: at 09:49

## 2024-03-27 RX ADMIN — SODIUM CHLORIDE: 9 INJECTION, SOLUTION INTRAVENOUS at 09:49

## 2024-03-27 ASSESSMENT — PAIN SCALES - GENERAL: PAINLEVEL_OUTOF10: 0

## 2024-03-27 NOTE — PROGRESS NOTES
Pt here for entyvio infusion  pt without c/o's today  She reports that her stools are normal but she gets constipated at times   Pt denies any cramping, bloating bleeding or diarrhea  IV # 22 was started in RAC on 1st attempt per myself   Pt gayla well  Blood work drawn without difficulty Lab here as well to draw the yearly Quantiferon Gold test  Pt gayla well  Entyvio 300 mg IVPB starting to infuse  IV site unremarkable Will monitor

## 2024-03-27 NOTE — PROGRESS NOTES
Entyvio infused and tubing was flushed with 102 cc NS  IV removed without difficulty Cath tip intact  Pressure then pressure dressing was applied and secured with a coban dressing  IV site unremarkable  Discharge instructions reviewed with pt and copy was given  Understanding verbalized  Pt was then up to BR and then was discharged ambulatory in stable condition

## 2024-03-30 LAB
GAMMA INTERFERON BACKGROUND BLD IA-ACNC: 0.03 IU/ML
MITOGEN IGNF BCKGRD COR BLD-ACNC: 9.43 IU/ML
QUANTI TB GOLD PLUS: NEGATIVE
QUANTI TB1 MINUS NIL: 0 IU/ML (ref 0–0.34)
QUANTI TB2 MINUS NIL: 0 IU/ML (ref 0–0.34)

## 2024-04-15 ENCOUNTER — HOSPITAL ENCOUNTER (EMERGENCY)
Age: 69
Discharge: HOME | End: 2024-04-15
Attending: EMERGENCY MEDICINE
Payer: MEDICARE

## 2024-04-15 VITALS
DIASTOLIC BLOOD PRESSURE: 80 MMHG | TEMPERATURE: 98.8 F | HEART RATE: 84 BPM | RESPIRATION RATE: 16 BRPM | HEIGHT: 65 IN | OXYGEN SATURATION: 96 % | WEIGHT: 222.9 LBS | SYSTOLIC BLOOD PRESSURE: 139 MMHG | BODY MASS INDEX: 37.14 KG/M2

## 2024-04-15 DIAGNOSIS — L03.115 CELLULITIS OF RIGHT LOWER EXTREMITY: Primary | ICD-10-CM

## 2024-04-15 LAB
ALBUMIN SERPL-MCNC: 3.9 G/DL (ref 3.4–5)
ALBUMIN/GLOB SERPL: 1 {RATIO} (ref 1.1–2.2)
ALP SERPL-CCNC: 116 U/L (ref 40–129)
ALT SERPL-CCNC: 21 U/L (ref 10–40)
ANION GAP SERPL CALCULATED.3IONS-SCNC: 11 MMOL/L (ref 3–16)
AST SERPL-CCNC: 36 U/L (ref 15–37)
BACTERIA URNS QL MICRO: ABNORMAL /HPF
BASOPHILS # BLD: 0.1 K/UL (ref 0–0.2)
BASOPHILS NFR BLD: 0.8 %
BILIRUB SERPL-MCNC: 0.9 MG/DL (ref 0–1)
BILIRUB UR QL STRIP.AUTO: NEGATIVE
BUN SERPL-MCNC: 22 MG/DL (ref 7–20)
CALCIUM SERPL-MCNC: 9.6 MG/DL (ref 8.3–10.6)
CHLORIDE SERPL-SCNC: 100 MMOL/L (ref 99–110)
CLARITY UR: CLEAR
CO2 SERPL-SCNC: 28 MMOL/L (ref 21–32)
COLOR UR: ABNORMAL
CREAT SERPL-MCNC: 1.2 MG/DL (ref 0.6–1.2)
DEPRECATED RDW RBC AUTO: 14.5 % (ref 12.4–15.4)
EKG ATRIAL RATE: 83 BPM
EKG DIAGNOSIS: NORMAL
EKG P AXIS: 31 DEGREES
EKG P-R INTERVAL: 182 MS
EKG Q-T INTERVAL: 408 MS
EKG QRS DURATION: 110 MS
EKG QTC CALCULATION (BAZETT): 479 MS
EKG R AXIS: -57 DEGREES
EKG T AXIS: 17 DEGREES
EKG VENTRICULAR RATE: 83 BPM
EOSINOPHIL # BLD: 0.2 K/UL (ref 0–0.6)
EOSINOPHIL NFR BLD: 2.6 %
EPI CELLS #/AREA URNS HPF: ABNORMAL /HPF (ref 0–5)
GFR SERPLBLD CREATININE-BSD FMLA CKD-EPI: 49 ML/MIN/{1.73_M2}
GLUCOSE SERPL-MCNC: 134 MG/DL (ref 70–99)
GLUCOSE UR STRIP.AUTO-MCNC: NEGATIVE MG/DL
HCT VFR BLD AUTO: 34.8 % (ref 36–48)
HGB BLD-MCNC: 12 G/DL (ref 12–16)
HGB UR QL STRIP.AUTO: NEGATIVE
KETONES UR STRIP.AUTO-MCNC: ABNORMAL MG/DL
LACTATE BLDV-SCNC: 1.4 MMOL/L (ref 0.4–1.9)
LEUKOCYTE ESTERASE UR QL STRIP.AUTO: ABNORMAL
LYMPHOCYTES # BLD: 1.5 K/UL (ref 1–5.1)
LYMPHOCYTES NFR BLD: 19.4 %
MCH RBC QN AUTO: 32.1 PG (ref 26–34)
MCHC RBC AUTO-ENTMCNC: 34.4 G/DL (ref 31–36)
MCV RBC AUTO: 93.2 FL (ref 80–100)
MONOCYTES # BLD: 0.6 K/UL (ref 0–1.3)
MONOCYTES NFR BLD: 7 %
MUCOUS THREADS #/AREA URNS LPF: ABNORMAL /LPF
NEUTROPHILS # BLD: 5.6 K/UL (ref 1.7–7.7)
NEUTROPHILS NFR BLD: 70.2 %
NITRITE UR QL STRIP.AUTO: NEGATIVE
PH UR STRIP.AUTO: 6.5 [PH] (ref 5–8)
PLATELET # BLD AUTO: 143 K/UL (ref 135–450)
PMV BLD AUTO: 9.5 FL (ref 5–10.5)
POTASSIUM SERPL-SCNC: 4 MMOL/L (ref 3.5–5.1)
PROT SERPL-MCNC: 7.7 G/DL (ref 6.4–8.2)
PROT UR STRIP.AUTO-MCNC: NEGATIVE MG/DL
RBC # BLD AUTO: 3.73 M/UL (ref 4–5.2)
RBC #/AREA URNS HPF: ABNORMAL /HPF (ref 0–4)
SODIUM SERPL-SCNC: 139 MMOL/L (ref 136–145)
SP GR UR STRIP.AUTO: 1.02 (ref 1–1.03)
UA COMPLETE W REFLEX CULTURE PNL UR: ABNORMAL
UA DIPSTICK W REFLEX MICRO PNL UR: YES
URN SPEC COLLECT METH UR: ABNORMAL
UROBILINOGEN UR STRIP-ACNC: 1 E.U./DL
WBC # BLD AUTO: 8 K/UL (ref 4–11)
WBC #/AREA URNS HPF: ABNORMAL /HPF (ref 0–5)

## 2024-04-15 PROCEDURE — 85025 COMPLETE CBC W/AUTO DIFF WBC: CPT

## 2024-04-15 PROCEDURE — 6360000002 HC RX W HCPCS: Performed by: EMERGENCY MEDICINE

## 2024-04-15 PROCEDURE — 93010 ELECTROCARDIOGRAM REPORT: CPT | Performed by: INTERNAL MEDICINE

## 2024-04-15 PROCEDURE — 2580000003 HC RX 258: Performed by: EMERGENCY MEDICINE

## 2024-04-15 PROCEDURE — 80053 COMPREHEN METABOLIC PANEL: CPT

## 2024-04-15 PROCEDURE — 96365 THER/PROPH/DIAG IV INF INIT: CPT

## 2024-04-15 PROCEDURE — 87040 BLOOD CULTURE FOR BACTERIA: CPT

## 2024-04-15 PROCEDURE — 93005 ELECTROCARDIOGRAM TRACING: CPT | Performed by: EMERGENCY MEDICINE

## 2024-04-15 PROCEDURE — 81001 URINALYSIS AUTO W/SCOPE: CPT

## 2024-04-15 PROCEDURE — 83605 ASSAY OF LACTIC ACID: CPT

## 2024-04-15 PROCEDURE — 36415 COLL VENOUS BLD VENIPUNCTURE: CPT

## 2024-04-15 PROCEDURE — 99284 EMERGENCY DEPT VISIT MOD MDM: CPT

## 2024-04-15 PROCEDURE — 96366 THER/PROPH/DIAG IV INF ADDON: CPT

## 2024-04-15 RX ORDER — DOXYCYCLINE HYCLATE 100 MG
100 TABLET ORAL 2 TIMES DAILY
Qty: 20 TABLET | Refills: 0 | Status: SHIPPED | OUTPATIENT
Start: 2024-04-15 | End: 2024-04-25

## 2024-04-15 RX ADMIN — VANCOMYCIN HYDROCHLORIDE 1500 MG: 1 INJECTION, POWDER, LYOPHILIZED, FOR SOLUTION INTRAVENOUS at 12:11

## 2024-04-15 ASSESSMENT — LIFESTYLE VARIABLES
HOW OFTEN DO YOU HAVE A DRINK CONTAINING ALCOHOL: NEVER
HOW MANY STANDARD DRINKS CONTAINING ALCOHOL DO YOU HAVE ON A TYPICAL DAY: PATIENT DOES NOT DRINK

## 2024-04-15 ASSESSMENT — PAIN - FUNCTIONAL ASSESSMENT: PAIN_FUNCTIONAL_ASSESSMENT: NONE - DENIES PAIN

## 2024-04-15 NOTE — DISCHARGE INSTRUCTIONS
Your lab work here today was unremarkable.  Please keep your leg elevated.  If you develop significant swelling, increasing pain or shortness of breath please come back to the ER for repeat evaluation.

## 2024-04-16 NOTE — ED PROVIDER NOTES
PANEL W/ REFLEX TO MG FOR LOW K - Abnormal; Notable for the following components:    Glucose 134 (*)     BUN 22 (*)     Est, Glom Filt Rate 49 (*)     Albumin/Globulin Ratio 1.0 (*)     All other components within normal limits   URINALYSIS WITH REFLEX TO CULTURE - Abnormal; Notable for the following components:    Color, UA DARK YELLOW (*)     Ketones, Urine TRACE (*)     Leukocyte Esterase, Urine SMALL (*)     All other components within normal limits   MICROSCOPIC URINALYSIS - Abnormal; Notable for the following components:    Mucus, UA 1+ (*)     Bacteria, UA Rare (*)     All other components within normal limits   CULTURE, BLOOD 1    Narrative:     ORDER#: [id number]                          ORDERED BY: JASON YATES  SOURCE: Blood Antecubital-Lef              COLLECTED:  04/15/24 11:36  ANTIBIOTICS AT AJ.:                      RECEIVED :  04/16/24 00:20  If child <=2 yrs old please draw pediatric bottle.~Blood Culture 1   CULTURE, BLOOD 2    Narrative:     ORDER#: [id number]                          ORDERED BY: JASON YATES  SOURCE: Blood Antecubital-Rig              COLLECTED:  04/15/24 12:12  ANTIBIOTICS AT AJ.:                      RECEIVED :  04/16/24 00:20  If child <=2 yrs old please draw pediatric bottle.~Blood Culture #2   LACTATE, SEPSIS       All other labs were within normal range or not returned as of this dictation.    EMERGENCY DEPARTMENT COURSE and DIFFERENTIAL DIAGNOSIS/MDM:   Vitals:    Vitals:    04/15/24 1230 04/15/24 1300 04/15/24 1330 04/15/24 1425   BP: 139/66 (!) 160/68 (!) 143/79 139/80   Pulse: 83 83 86 84   Resp: 18 18 18 16   Temp:       TempSrc:       SpO2: 93% 95% 93% 96%   Weight:       Height:         Patient was given the following medications:  Medications   vancomycin (VANCOCIN) 1,500 mg in sodium chloride 0.9 % 500 mL IVPB (0 mg IntraVENous Stopped 4/15/24 1428)         Patient was reassessed multiple times while in the emergency department patient with moderate

## 2024-04-18 ENCOUNTER — CARE COORDINATION (OUTPATIENT)
Dept: CARE COORDINATION | Age: 69
End: 2024-04-18

## 2024-04-18 NOTE — CARE COORDINATION
ACM attempted outreach. Someone answered cell and said this was not then number for Ruthie. Attempted home number, disconnected. My chart message sent     ER visit on 4/15 for cellulitis

## 2024-04-19 ENCOUNTER — CARE COORDINATION (OUTPATIENT)
Dept: CARE COORDINATION | Age: 69
End: 2024-04-19

## 2024-04-19 NOTE — CARE COORDINATION
No return call. My chart message sent. Home number not working. Mobile call answers and says it is the wrong number. No further outreach at this time

## 2024-04-20 LAB
BACTERIA BLD CULT ORG #2: NORMAL
BACTERIA BLD CULT: NORMAL

## 2024-04-24 ENCOUNTER — HOSPITAL ENCOUNTER (OUTPATIENT)
Dept: NURSING | Age: 69
Setting detail: INFUSION SERIES
Discharge: HOME | End: 2024-04-24
Payer: MEDICARE

## 2024-04-24 ENCOUNTER — OFFICE VISIT (OUTPATIENT)
Dept: CARDIOLOGY CLINIC | Age: 69
End: 2024-04-24
Payer: MEDICARE

## 2024-04-24 ENCOUNTER — TELEPHONE (OUTPATIENT)
Dept: CARDIOLOGY CLINIC | Age: 69
End: 2024-04-24

## 2024-04-24 VITALS
WEIGHT: 219 LBS | OXYGEN SATURATION: 98 % | SYSTOLIC BLOOD PRESSURE: 124 MMHG | BODY MASS INDEX: 36.49 KG/M2 | HEART RATE: 84 BPM | DIASTOLIC BLOOD PRESSURE: 66 MMHG | HEIGHT: 65 IN

## 2024-04-24 VITALS
SYSTOLIC BLOOD PRESSURE: 154 MMHG | WEIGHT: 222 LBS | HEIGHT: 65 IN | RESPIRATION RATE: 16 BRPM | TEMPERATURE: 97.3 F | HEART RATE: 89 BPM | BODY MASS INDEX: 36.99 KG/M2 | DIASTOLIC BLOOD PRESSURE: 74 MMHG

## 2024-04-24 DIAGNOSIS — L97.929 VENOUS ULCER OF LEFT LEG (HCC): Primary | ICD-10-CM

## 2024-04-24 DIAGNOSIS — K51.00 CHRONIC ULCERATIVE ENTEROCOLITIS WITHOUT COMPLICATION (HCC): Primary | ICD-10-CM

## 2024-04-24 DIAGNOSIS — I83.022 VARICOSE VEINS OF LEFT LOWER EXTREMITY WITH ULCER OF CALF (CODE) (HCC): ICD-10-CM

## 2024-04-24 DIAGNOSIS — I83.029 VENOUS ULCER OF LEFT LEG (HCC): Primary | ICD-10-CM

## 2024-04-24 PROCEDURE — 3017F COLORECTAL CA SCREEN DOC REV: CPT | Performed by: INTERNAL MEDICINE

## 2024-04-24 PROCEDURE — G8427 DOCREV CUR MEDS BY ELIG CLIN: HCPCS | Performed by: INTERNAL MEDICINE

## 2024-04-24 PROCEDURE — 1123F ACP DISCUSS/DSCN MKR DOCD: CPT | Performed by: INTERNAL MEDICINE

## 2024-04-24 PROCEDURE — 2580000003 HC RX 258: Performed by: INTERNAL MEDICINE

## 2024-04-24 PROCEDURE — G8399 PT W/DXA RESULTS DOCUMENT: HCPCS | Performed by: INTERNAL MEDICINE

## 2024-04-24 PROCEDURE — 99214 OFFICE O/P EST MOD 30 MIN: CPT | Performed by: INTERNAL MEDICINE

## 2024-04-24 PROCEDURE — 96365 THER/PROPH/DIAG IV INF INIT: CPT

## 2024-04-24 PROCEDURE — 99211 OFF/OP EST MAY X REQ PHY/QHP: CPT

## 2024-04-24 PROCEDURE — 1036F TOBACCO NON-USER: CPT | Performed by: INTERNAL MEDICINE

## 2024-04-24 PROCEDURE — 6360000002 HC RX W HCPCS: Performed by: INTERNAL MEDICINE

## 2024-04-24 PROCEDURE — 3078F DIAST BP <80 MM HG: CPT | Performed by: INTERNAL MEDICINE

## 2024-04-24 PROCEDURE — 1090F PRES/ABSN URINE INCON ASSESS: CPT | Performed by: INTERNAL MEDICINE

## 2024-04-24 PROCEDURE — G8417 CALC BMI ABV UP PARAM F/U: HCPCS | Performed by: INTERNAL MEDICINE

## 2024-04-24 PROCEDURE — 3074F SYST BP LT 130 MM HG: CPT | Performed by: INTERNAL MEDICINE

## 2024-04-24 RX ORDER — SODIUM CHLORIDE 9 MG/ML
INJECTION, SOLUTION INTRAVENOUS CONTINUOUS
Status: ACTIVE | OUTPATIENT
Start: 2024-04-24 | End: 2024-04-24

## 2024-04-24 RX ORDER — SODIUM CHLORIDE 0.9 % (FLUSH) 0.9 %
10 SYRINGE (ML) INJECTION PRN
OUTPATIENT
Start: 2024-05-22

## 2024-04-24 RX ORDER — SODIUM CHLORIDE 9 MG/ML
INJECTION, SOLUTION INTRAVENOUS CONTINUOUS
OUTPATIENT
Start: 2024-05-22

## 2024-04-24 RX ORDER — SODIUM CHLORIDE 0.9 % (FLUSH) 0.9 %
10 SYRINGE (ML) INJECTION PRN
Status: DISCONTINUED | OUTPATIENT
Start: 2024-04-24 | End: 2024-04-25 | Stop reason: HOSPADM

## 2024-04-24 RX ADMIN — SODIUM CHLORIDE: 9 INJECTION, SOLUTION INTRAVENOUS at 09:09

## 2024-04-24 RX ADMIN — VEDOLIZUMAB 300 MG: 300 INJECTION, POWDER, LYOPHILIZED, FOR SOLUTION INTRAVENOUS at 09:10

## 2024-04-24 RX ADMIN — Medication 10 ML: at 09:08

## 2024-04-24 ASSESSMENT — PAIN SCALES - GENERAL: PAINLEVEL_OUTOF10: 0

## 2024-04-24 NOTE — PROGRESS NOTES
0.5 seconds is noted in the right greater saphenous  vein at the saphenofemoral junction, proximal thigh, below knee and distal  calf.  Reflux lasting longer than 0.5 seconds is noted in the right short saphenous  vein at the mid calf.     There is a  visualized at the ankle level ,measuring 2.45 mm in  diameter, with reflux of 1.1 seconds.  There is a  visualized at the ankle level ,measuring 2.1 mm in  diameter, with reflux of 1.1 seconds.  There is a  visualized at the mid calf level ,measuring 3.4 mm in  diameter, with reflux of 1.3 seconds.  There is a  visualized at the mid calf level ,measuring 3.0 mm in  diameter, with reflux of 1.2 seconds.     The short saphenous vein does communicate with the popliteal vein.  There is a vein of Giacomini present, with a diameter of 1.9 mm that did not  reflux.  The patient was lying supine on a stretcher, in reverse  Left Impression  There is no evidence of deep or superficial venous thrombosis noted in the  left lower extremity (veins imaged include the common femoral, deep femoral,  femoral, popliteal, greater saphenous and short saphenous).  There is no evidence of deep venous reflux noted in the left lower extremity.     Reflux lasting longer then 0.5 seconds is noted in the left greater saphenous  vein at the saphenofemoral junction, proximal thigh, below knee and calf.     There is a  visualized at the ankle level, measuring 2.3 mm in  diameter, with reflux of 3 seconds.  There is a  visualized at the mid calf level, measuring 4.4 mm in  diameter, with reflux of 2.3 seconds.     The short saphenous vein does communicate with the popliteal vein.  There is a vein of Giacomini present, with a diameter of 2.3 mm that did not  reflux.       REANNA     All above diagnostic testing and laboratory data was independently visualized and reviewed by me (not simply review of report)       Assessment and Plan   1) venous

## 2024-04-24 NOTE — PROGRESS NOTES
Pt here for entyvio infusion  Pt without c/o's today  She reports that her stools are normal but she gets constipated at times   Pt denies any cramping, bloating bleeding or diarrhea  IV # 22 was started in RFA on 1st attempt per myself   Pt gayla well  No blood work needed for today   Entyvio 300 mg IVPB starting to infuse  IV site unremarkable Will monitor

## 2024-04-26 ENCOUNTER — TELEMEDICINE (OUTPATIENT)
Dept: FAMILY MEDICINE CLINIC | Age: 69
End: 2024-04-26
Payer: MEDICARE

## 2024-04-26 DIAGNOSIS — E11.9 DIET-CONTROLLED TYPE 2 DIABETES MELLITUS (HCC): ICD-10-CM

## 2024-04-26 DIAGNOSIS — F33.0 MILD EPISODE OF RECURRENT MAJOR DEPRESSIVE DISORDER (HCC): ICD-10-CM

## 2024-04-26 DIAGNOSIS — E03.9 HYPOTHYROIDISM, UNSPECIFIED TYPE: Primary | ICD-10-CM

## 2024-04-26 PROCEDURE — 1090F PRES/ABSN URINE INCON ASSESS: CPT | Performed by: NURSE PRACTITIONER

## 2024-04-26 PROCEDURE — 99214 OFFICE O/P EST MOD 30 MIN: CPT | Performed by: NURSE PRACTITIONER

## 2024-04-26 PROCEDURE — 2022F DILAT RTA XM EVC RTNOPTHY: CPT | Performed by: NURSE PRACTITIONER

## 2024-04-26 PROCEDURE — G8399 PT W/DXA RESULTS DOCUMENT: HCPCS | Performed by: NURSE PRACTITIONER

## 2024-04-26 PROCEDURE — 3044F HG A1C LEVEL LT 7.0%: CPT | Performed by: NURSE PRACTITIONER

## 2024-04-26 PROCEDURE — 36415 COLL VENOUS BLD VENIPUNCTURE: CPT | Performed by: NURSE PRACTITIONER

## 2024-04-26 PROCEDURE — 1123F ACP DISCUSS/DSCN MKR DOCD: CPT | Performed by: NURSE PRACTITIONER

## 2024-04-26 PROCEDURE — 1036F TOBACCO NON-USER: CPT | Performed by: NURSE PRACTITIONER

## 2024-04-26 PROCEDURE — 3017F COLORECTAL CA SCREEN DOC REV: CPT | Performed by: NURSE PRACTITIONER

## 2024-04-26 PROCEDURE — G8427 DOCREV CUR MEDS BY ELIG CLIN: HCPCS | Performed by: NURSE PRACTITIONER

## 2024-04-26 PROCEDURE — G8417 CALC BMI ABV UP PARAM F/U: HCPCS | Performed by: NURSE PRACTITIONER

## 2024-04-26 RX ORDER — DULOXETIN HYDROCHLORIDE 60 MG/1
60 CAPSULE, DELAYED RELEASE ORAL DAILY
Qty: 90 CAPSULE | Refills: 1 | Status: SHIPPED | OUTPATIENT
Start: 2024-04-26

## 2024-04-26 ASSESSMENT — ANXIETY QUESTIONNAIRES
5. BEING SO RESTLESS THAT IT IS HARD TO SIT STILL: NOT AT ALL
1. FEELING NERVOUS, ANXIOUS, OR ON EDGE: SEVERAL DAYS
IF YOU CHECKED OFF ANY PROBLEMS ON THIS QUESTIONNAIRE, HOW DIFFICULT HAVE THESE PROBLEMS MADE IT FOR YOU TO DO YOUR WORK, TAKE CARE OF THINGS AT HOME, OR GET ALONG WITH OTHER PEOPLE: NOT DIFFICULT AT ALL
7. FEELING AFRAID AS IF SOMETHING AWFUL MIGHT HAPPEN: NOT AT ALL
3. WORRYING TOO MUCH ABOUT DIFFERENT THINGS: SEVERAL DAYS
4. TROUBLE RELAXING: NOT AT ALL
GAD7 TOTAL SCORE: 3
6. BECOMING EASILY ANNOYED OR IRRITABLE: NOT AT ALL
2. NOT BEING ABLE TO STOP OR CONTROL WORRYING: SEVERAL DAYS

## 2024-04-26 ASSESSMENT — ENCOUNTER SYMPTOMS
ABDOMINAL PAIN: 0
GASTROINTESTINAL NEGATIVE: 1
ALLERGIC/IMMUNOLOGIC NEGATIVE: 1
ANAL BLEEDING: 0
BLOOD IN STOOL: 0
NAUSEA: 0
SHORTNESS OF BREATH: 0
RESPIRATORY NEGATIVE: 1
EYES NEGATIVE: 1

## 2024-04-26 ASSESSMENT — PATIENT HEALTH QUESTIONNAIRE - PHQ9
SUM OF ALL RESPONSES TO PHQ9 QUESTIONS 1 & 2: 2
SUM OF ALL RESPONSES TO PHQ QUESTIONS 1-9: 3
4. FEELING TIRED OR HAVING LITTLE ENERGY: NOT AT ALL
SUM OF ALL RESPONSES TO PHQ QUESTIONS 1-9: 3
7. TROUBLE CONCENTRATING ON THINGS, SUCH AS READING THE NEWSPAPER OR WATCHING TELEVISION: NOT AT ALL
8. MOVING OR SPEAKING SO SLOWLY THAT OTHER PEOPLE COULD HAVE NOTICED. OR THE OPPOSITE, BEING SO FIGETY OR RESTLESS THAT YOU HAVE BEEN MOVING AROUND A LOT MORE THAN USUAL: NOT AT ALL
SUM OF ALL RESPONSES TO PHQ QUESTIONS 1-9: 3
3. TROUBLE FALLING OR STAYING ASLEEP: NOT AT ALL
10. IF YOU CHECKED OFF ANY PROBLEMS, HOW DIFFICULT HAVE THESE PROBLEMS MADE IT FOR YOU TO DO YOUR WORK, TAKE CARE OF THINGS AT HOME, OR GET ALONG WITH OTHER PEOPLE: NOT DIFFICULT AT ALL
9. THOUGHTS THAT YOU WOULD BE BETTER OFF DEAD, OR OF HURTING YOURSELF: NOT AT ALL
5. POOR APPETITE OR OVEREATING: NOT AT ALL
SUM OF ALL RESPONSES TO PHQ QUESTIONS 1-9: 3
1. LITTLE INTEREST OR PLEASURE IN DOING THINGS: SEVERAL DAYS
2. FEELING DOWN, DEPRESSED OR HOPELESS: SEVERAL DAYS
6. FEELING BAD ABOUT YOURSELF - OR THAT YOU ARE A FAILURE OR HAVE LET YOURSELF OR YOUR FAMILY DOWN: SEVERAL DAYS

## 2024-04-26 NOTE — PROGRESS NOTES
YAHAIRA Barragan CNP   alendronate (FOSAMAX) 70 MG tablet TAKE 1 TABLET EVERY 7 DAYS  Patient taking differently: Take 1 tablet by mouth every 7 days Patient takes on monday Yes Ade Bonilla APRN - CNP   folic acid (FOLVITE) 1 MG tablet TAKE 1 TABLET EVERY DAY Yes Ade Bonilla APRN - CNP   nystatin (MYCOSTATIN) 674016 UNIT/GM cream Apply topically 2 times daily. Yes Shayy Roy APRN - CNP   Vedolizumab (ENTYVIO IV) Infuse 300 mg intravenously every 30 days Last dose given on 4- Yes Provider, MD Lauren   ONE TOUCH LANCETS MISC 1 each by Does not apply route daily Yes Shayy Roy APRN - CNP       Allergies   Allergen Reactions    Amoxicillin      \"colitis\"    Pravastatin Rash    Wellbutrin [Bupropion] Other (See Comments) and Rash     Caused sores in mouth       PHYSICAL EXAMINATION:  [ INSTRUCTIONS:  \"[x]\" Indicates a positive item  \"[]\" Indicates a negative item  -- DELETE ALL ITEMS NOT EXAMINED]  Vital Signs: (As obtained by patient/caregiver or practitioner observation)     Blood pressure-          Heart rate-         Respiratory rate-         Temperature-            Pulse oximetry-      Constitutional: [x] Appears well-developed and well-nourished [x] No apparent distress                            [] Abnormal-   Mental status  [x] Alert and awake  [x] Oriented to person/place/time [x]Able to follow commands       Eyes:  EOM    [x]  Normal  [] Abnormal-  Sclera  [x]  Normal  [] Abnormal -         Discharge [x]  None visible  [] Abnormal -     HENT:   [x] Normocephalic, atraumatic.  [] Abnormal   [x] Mouth/Throat: Mucous membranes are moist.      External Ears [x] Normal  [] Abnormal-      Neck: [x] No visualized mass      Pulmonary/Chest: [x] Respiratory effort normal.  [x] No visualized signs of difficulty breathing or respiratory distress        [] Abnormal-      Musculoskeletal:   [x] Normal gait with no signs of ataxia         [x] Normal range of motion of

## 2024-04-27 ENCOUNTER — PATIENT MESSAGE (OUTPATIENT)
Dept: FAMILY MEDICINE CLINIC | Age: 69
End: 2024-04-27

## 2024-04-27 DIAGNOSIS — E11.22 TYPE 2 DIABETES MELLITUS WITH STAGE 3A CHRONIC KIDNEY DISEASE, WITHOUT LONG-TERM CURRENT USE OF INSULIN (HCC): Primary | ICD-10-CM

## 2024-04-27 DIAGNOSIS — N18.31 TYPE 2 DIABETES MELLITUS WITH STAGE 3A CHRONIC KIDNEY DISEASE, WITHOUT LONG-TERM CURRENT USE OF INSULIN (HCC): Primary | ICD-10-CM

## 2024-04-29 ENCOUNTER — NURSE ONLY (OUTPATIENT)
Dept: FAMILY MEDICINE CLINIC | Age: 69
End: 2024-04-29

## 2024-04-29 DIAGNOSIS — N18.31 TYPE 2 DIABETES MELLITUS WITH STAGE 3A CHRONIC KIDNEY DISEASE, WITHOUT LONG-TERM CURRENT USE OF INSULIN (HCC): ICD-10-CM

## 2024-04-29 DIAGNOSIS — E11.9 DIET-CONTROLLED TYPE 2 DIABETES MELLITUS (HCC): Primary | ICD-10-CM

## 2024-04-29 DIAGNOSIS — E11.22 TYPE 2 DIABETES MELLITUS WITH STAGE 3A CHRONIC KIDNEY DISEASE, WITHOUT LONG-TERM CURRENT USE OF INSULIN (HCC): ICD-10-CM

## 2024-04-29 LAB
T4 FREE SERPL-MCNC: 1.5 NG/DL (ref 0.9–1.8)
TSH SERPL DL<=0.005 MIU/L-ACNC: 7.66 UIU/ML (ref 0.27–4.2)

## 2024-04-29 RX ORDER — BLOOD SUGAR DIAGNOSTIC
1 STRIP MISCELLANEOUS DAILY
Qty: 100 EACH | Refills: 3 | Status: SHIPPED | OUTPATIENT
Start: 2024-04-29

## 2024-04-29 RX ORDER — BLOOD SUGAR DIAGNOSTIC
1 STRIP MISCELLANEOUS DAILY
Qty: 100 EACH | Refills: 3 | Status: CANCELLED | OUTPATIENT
Start: 2024-04-29

## 2024-04-29 RX ORDER — BLOOD-GLUCOSE METER
1 KIT MISCELLANEOUS DAILY
Qty: 1 KIT | Refills: 0 | Status: CANCELLED | OUTPATIENT
Start: 2024-04-29

## 2024-04-29 RX ORDER — BLOOD-GLUCOSE METER
EACH MISCELLANEOUS
Qty: 1 KIT | Refills: 0 | Status: SHIPPED | OUTPATIENT
Start: 2024-04-29

## 2024-04-29 NOTE — TELEPHONE ENCOUNTER
From: Ruthie Conte  To: Ade Bonilla  Sent: 4/27/2024 3:26 PM EDT  Subject: Followup answer re: blood test strips.     Called [facility]. My insurance does not cover strips I had been using and they are well in excess of $125. They do, however cover Accuchek. Meter will possibly be 15 to 30 and strips are covered. The will require new Rx for that brand and we are good to go. Thanks.

## 2024-04-30 LAB
EST. AVERAGE GLUCOSE BLD GHB EST-MCNC: 131.2 MG/DL
HBA1C MFR BLD: 6.2 %

## 2024-04-30 NOTE — TELEPHONE ENCOUNTER
Gabe morning Ruthie.  Prescriptions for Accu- check glucometer and strips have been sent to [facility].

## 2024-04-30 NOTE — TELEPHONE ENCOUNTER
Date of Procedure: Wednesday 9/11/24 @ [place] Office     Time of arrival: 10:45 am     Procedure time: 11:00 am     Spoke to Ruthie and she is agreeable to date and time. I emailed Ruthie all her RF Ablation instructions. Encouraged to call with any questions or concerns.

## 2024-05-02 DIAGNOSIS — E03.9 HYPOTHYROIDISM, UNSPECIFIED TYPE: Primary | ICD-10-CM

## 2024-05-02 RX ORDER — LEVOTHYROXINE SODIUM 0.15 MG/1
150 TABLET ORAL DAILY
Qty: 90 TABLET | Refills: 1 | Status: SHIPPED | OUTPATIENT
Start: 2024-05-02

## 2024-05-02 RX ORDER — LEVOTHYROXINE SODIUM 0.15 MG/1
150 TABLET ORAL DAILY
COMMUNITY
End: 2024-05-02 | Stop reason: SDUPTHER

## 2024-05-02 RX ORDER — LEVOTHYROXINE SODIUM 0.15 MG/1
150 TABLET ORAL DAILY
Qty: 90 TABLET | Refills: 1 | OUTPATIENT
Start: 2024-05-02

## 2024-05-03 ENCOUNTER — TELEPHONE (OUTPATIENT)
Dept: FAMILY MEDICINE CLINIC | Age: 69
End: 2024-05-03

## 2024-05-03 RX ORDER — ALENDRONATE SODIUM 70 MG/1
TABLET ORAL
Qty: 12 TABLET | Refills: 0 | Status: SHIPPED | OUTPATIENT
Start: 2024-05-03

## 2024-05-03 RX ORDER — LEVOTHYROXINE SODIUM 137 UG/1
137 TABLET ORAL DAILY
Qty: 90 TABLET | Refills: 3 | OUTPATIENT
Start: 2024-05-03

## 2024-05-03 NOTE — TELEPHONE ENCOUNTER
May apply for jardiance patient assistance but may be denied since she has medicare  hga1c was 6.2. jardiance was going to be used to help protect your kidney's more than anything. I do not think we need to add any diabetic medications at this time.

## 2024-05-03 NOTE — TELEPHONE ENCOUNTER
Refill Request     CONFIRM preferrred pharmacy with the patient.    If Mail Order Rx - Pend for 90 day refill.      Last Seen: Last Seen Department: 4/26/2024  Last Seen by PCP: 4/26/2024    Last Written: 8-1-2023    If no future appointment scheduled, route STAFF MESSAGE with patient name to the  Pool for scheduling.      Next Appointment:   Future Appointments   Date Time Provider Department [place]   5/22/2024  9:00 AM [facility] OP NURSING ROOM 1 [facility] OP RN [facility]   5/28/2024  8:00 AM [facility] VASCULAR, [facility]ASCULAR ROOM 1 [facility]   6/19/2024  9:00 AM [facility] OP NURSING ROOM 1 [facility] OP RN [facility]   6/19/2024 11:00 AM Ade Bonilla APRN - CNP [facility]   6/27/2024 10:20 AM Aimee Lucio MD [facility] Neurology -   8/1/2024  1:00 PM Ade Bonilla APRN - CNP [facility]   9/11/2024 11:00 AM SCHEDULE, VASCULAR PROCEDURE [facility]       Message sent to  to schedule appt with patient?  N/A      Requested Prescriptions     Pending Prescriptions Disp Refills    alendronate (FOSAMAX) 70 MG tablet [Pharmacy Med Name: ALENDRONATE SODIUM 70 MG Tablet] 12 tablet 3     Sig: TAKE 1 TABLET EVERY 7 DAYS

## 2024-05-03 NOTE — TELEPHONE ENCOUNTER
Patient states that insurance is not offering very much coverage for jardiance, states cost would be $565/month. Wants to know if she should try to apply for patient assistance or if PCP wants to prescribe another medication ? Please advise.

## 2024-05-22 ENCOUNTER — HOSPITAL ENCOUNTER (OUTPATIENT)
Dept: NURSING | Age: 69
Setting detail: INFUSION SERIES
Discharge: HOME | End: 2024-05-22
Payer: MEDICARE

## 2024-05-22 VITALS
DIASTOLIC BLOOD PRESSURE: 72 MMHG | SYSTOLIC BLOOD PRESSURE: 130 MMHG | BODY MASS INDEX: 36.49 KG/M2 | TEMPERATURE: 98.2 F | HEIGHT: 65 IN | HEART RATE: 81 BPM | RESPIRATION RATE: 16 BRPM | WEIGHT: 219 LBS

## 2024-05-22 DIAGNOSIS — K51.00 CHRONIC ULCERATIVE ENTEROCOLITIS WITHOUT COMPLICATION (HCC): Primary | ICD-10-CM

## 2024-05-22 LAB
ALBUMIN SERPL-MCNC: 3.8 G/DL (ref 3.4–5)
ALBUMIN/GLOB SERPL: 1.1 {RATIO} (ref 1.1–2.2)
ALP SERPL-CCNC: 114 U/L (ref 40–129)
ALT SERPL-CCNC: 28 U/L (ref 10–40)
ANION GAP SERPL CALCULATED.3IONS-SCNC: 9 MMOL/L (ref 3–16)
AST SERPL-CCNC: 58 U/L (ref 15–37)
BILIRUB SERPL-MCNC: 0.7 MG/DL (ref 0–1)
BUN SERPL-MCNC: 18 MG/DL (ref 7–20)
CALCIUM SERPL-MCNC: 9.1 MG/DL (ref 8.3–10.6)
CHLORIDE SERPL-SCNC: 100 MMOL/L (ref 99–110)
CO2 SERPL-SCNC: 28 MMOL/L (ref 21–32)
CREAT SERPL-MCNC: 1 MG/DL (ref 0.6–1.2)
DEPRECATED RDW RBC AUTO: 14.7 % (ref 12.4–15.4)
GFR SERPLBLD CREATININE-BSD FMLA CKD-EPI: 61 ML/MIN/{1.73_M2}
GLUCOSE SERPL-MCNC: 119 MG/DL (ref 70–99)
HCT VFR BLD AUTO: 35.5 % (ref 36–48)
HGB BLD-MCNC: 12 G/DL (ref 12–16)
MCH RBC QN AUTO: 31.7 PG (ref 26–34)
MCHC RBC AUTO-ENTMCNC: 33.7 G/DL (ref 31–36)
MCV RBC AUTO: 94.1 FL (ref 80–100)
PLATELET # BLD AUTO: 143 K/UL (ref 135–450)
PMV BLD AUTO: 9.5 FL (ref 5–10.5)
POTASSIUM SERPL-SCNC: 4.5 MMOL/L (ref 3.5–5.1)
PROT SERPL-MCNC: 7.3 G/DL (ref 6.4–8.2)
RBC # BLD AUTO: 3.77 M/UL (ref 4–5.2)
SODIUM SERPL-SCNC: 137 MMOL/L (ref 136–145)
WBC # BLD AUTO: 5.8 K/UL (ref 4–11)

## 2024-05-22 PROCEDURE — 80053 COMPREHEN METABOLIC PANEL: CPT

## 2024-05-22 PROCEDURE — 6360000002 HC RX W HCPCS: Performed by: INTERNAL MEDICINE

## 2024-05-22 PROCEDURE — 85027 COMPLETE CBC AUTOMATED: CPT

## 2024-05-22 PROCEDURE — 96365 THER/PROPH/DIAG IV INF INIT: CPT

## 2024-05-22 PROCEDURE — 99211 OFF/OP EST MAY X REQ PHY/QHP: CPT

## 2024-05-22 PROCEDURE — 2580000003 HC RX 258: Performed by: INTERNAL MEDICINE

## 2024-05-22 RX ORDER — SODIUM CHLORIDE 9 MG/ML
INJECTION, SOLUTION INTRAVENOUS CONTINUOUS
Status: ACTIVE | OUTPATIENT
Start: 2024-05-22 | End: 2024-05-22

## 2024-05-22 RX ORDER — SODIUM CHLORIDE 0.9 % (FLUSH) 0.9 %
10 SYRINGE (ML) INJECTION PRN
Status: DISCONTINUED | OUTPATIENT
Start: 2024-05-22 | End: 2024-05-23 | Stop reason: HOSPADM

## 2024-05-22 RX ORDER — SODIUM CHLORIDE 0.9 % (FLUSH) 0.9 %
10 SYRINGE (ML) INJECTION PRN
OUTPATIENT
Start: 2024-06-19

## 2024-05-22 RX ORDER — SODIUM CHLORIDE 9 MG/ML
INJECTION, SOLUTION INTRAVENOUS CONTINUOUS
OUTPATIENT
Start: 2024-06-19

## 2024-05-22 RX ADMIN — SODIUM CHLORIDE: 9 INJECTION, SOLUTION INTRAVENOUS at 08:48

## 2024-05-22 RX ADMIN — VEDOLIZUMAB 300 MG: 300 INJECTION, POWDER, LYOPHILIZED, FOR SOLUTION INTRAVENOUS at 08:49

## 2024-05-22 RX ADMIN — Medication 10 ML: at 08:47

## 2024-05-22 ASSESSMENT — PAIN SCALES - GENERAL: PAINLEVEL_OUTOF10: 0

## 2024-05-22 NOTE — PROGRESS NOTES
Pt here for entyvio infusion  Pt without c/o's today  She reports that her stools are normal but she gets constipated at times   Pt denies any cramping, bloating bleeding or diarrhea  IV # 22 was started in RAC on 1st attempt per myself   Pt gayla well  Blood work drawn without difficulty  Entyvio 300 mg IVPB starting to infuse  IV site unremarkable Will monitor

## 2024-05-22 NOTE — PROGRESS NOTES
Entyvio infused and tubing was flushed with 103 cc NS  IV removed without difficulty Cath tip intact  Pressure then pressure dressing was applied and secured with a coban dressing  IV site unremarkable  Discharge instructions reviewed with pt and copy was given  Understanding verbalized  Pt was then up to BR and then was discharged ambulatory in stable condition

## 2024-05-28 ENCOUNTER — HOSPITAL ENCOUNTER (OUTPATIENT)
Age: 69
Discharge: HOME | End: 2024-05-28
Payer: MEDICARE

## 2024-05-28 ENCOUNTER — TELEPHONE (OUTPATIENT)
Dept: CARDIOLOGY CLINIC | Age: 69
End: 2024-05-28

## 2024-05-28 ENCOUNTER — HOSPITAL ENCOUNTER (OUTPATIENT)
Age: 69
Discharge: HOME | End: 2024-05-30
Payer: MEDICARE

## 2024-05-28 DIAGNOSIS — I83.022 VARICOSE VEINS OF LEFT LOWER EXTREMITY WITH ULCER OF CALF (CODE) (HCC): ICD-10-CM

## 2024-05-28 DIAGNOSIS — I83.029 VENOUS ULCER OF LEFT LEG (HCC): ICD-10-CM

## 2024-05-28 DIAGNOSIS — L97.929 VENOUS ULCER OF LEFT LEG (HCC): ICD-10-CM

## 2024-05-28 LAB
VAS LEFT AASV MID DIAM: 0.6 MM
VAS LEFT AASV RFX: 4.1 S
VAS LEFT GSV AT KNEE DIAM: 0.2 MM
VAS LEFT GSV BK DIST DIAM: 0.3 MM
VAS LEFT GSV BK MID DIAM: 0.2 MM
VAS LEFT GSV JUNC DIAM: 0.9 MM
VAS LEFT GSV JUNC RFX: 4.4 S
VAS LEFT GSV THIGH PROX DIAM: 0.6 MM
VAS LEFT GSV THIGH PROX RFX: 3.7 S
VAS LEFT PERFORATOR 2 DIAM: 0.2 MM
VAS LEFT PERFORATOR 2 RFX: 4.2 S
VAS LEFT PERFORATOR DIAM: 0.1 MM
VAS LEFT PERFORATOR RFX: 1.7 S
VAS LEFT SSV DIST DIAM: 0.3 MM
VAS LEFT SSV MID DIAM: 0.4 MM
VAS LEFT SSV PROX DIAM: 0.1 MM

## 2024-05-28 PROCEDURE — 93971 EXTREMITY STUDY: CPT

## 2024-05-28 NOTE — TELEPHONE ENCOUNTER
Once testing results are reviewed and finalized by Dr. Lowery, we will reach out with the results and plan of care/any new recommendations.   Please notify patient.   Thanks

## 2024-05-28 NOTE — TELEPHONE ENCOUNTER
PT called and stated she had a mapping study done today. PT stated she was scheduled to see Beverley after the mapping study but the study took 2 hours that she was not expecting. PT wanted to call to let Beverley know. PT wants to know if she needs to schedule a procedure for her left leg? Please advise.  PT ph# [phone]

## 2024-05-30 ENCOUNTER — TELEPHONE (OUTPATIENT)
Dept: CARDIOLOGY CLINIC | Age: 69
End: 2024-05-30

## 2024-05-30 NOTE — TELEPHONE ENCOUNTER
Date of Procedure: Wednesday 9/11/24 @ [place] Office     Procedure time: 3:00 pm     Called and spoke to Ruthie and she is agreeable to date and time. I emailed Ruthie all her Varithena information/ instructions. Encouraged to call with any questions or concerns.

## 2024-06-16 SDOH — ECONOMIC STABILITY: FOOD INSECURITY: WITHIN THE PAST 12 MONTHS, YOU WORRIED THAT YOUR FOOD WOULD RUN OUT BEFORE YOU GOT MONEY TO BUY MORE.: SOMETIMES TRUE

## 2024-06-16 SDOH — ECONOMIC STABILITY: INCOME INSECURITY: HOW HARD IS IT FOR YOU TO PAY FOR THE VERY BASICS LIKE FOOD, HOUSING, MEDICAL CARE, AND HEATING?: HARD

## 2024-06-16 SDOH — ECONOMIC STABILITY: FOOD INSECURITY: WITHIN THE PAST 12 MONTHS, THE FOOD YOU BOUGHT JUST DIDN'T LAST AND YOU DIDN'T HAVE MONEY TO GET MORE.: SOMETIMES TRUE

## 2024-06-16 SDOH — ECONOMIC STABILITY: TRANSPORTATION INSECURITY
IN THE PAST 12 MONTHS, HAS LACK OF TRANSPORTATION KEPT YOU FROM MEETINGS, WORK, OR FROM GETTING THINGS NEEDED FOR DAILY LIVING?: YES

## 2024-06-18 NOTE — PROGRESS NOTES
[facility]  [facility]  [address] [place] [id number]  Dept: [phone]  Dept Fax: [phone]  Loc: [phone]    Ruthie Conte is a 68 y.o. female who presents today for her medical conditions/complaints as noted below.  Ruthie Conte is c/o of Anxiety, Depression, Hypertension (BP at home 140's over 70's.  Denies edema, SOB or chest pain), Diabetes (Fasting sugar today 117), Hypothyroidism, Anemia, and Other (Vitamin D deficiency )       Subjective:     Chief Complaint   Patient presents with    Anxiety    Depression    Hypertension     BP at home 140's over 70's.  Denies edema, SOB or chest pain    Diabetes     Fasting sugar today 117    Hypothyroidism    Anemia    Other     Vitamin D deficiency        HPI  Hypothyroidism: Recent symptoms: none. She denies fatigue, weight gain, weight loss, cold intolerance, heat intolerance, hair loss, dry skin, constipation, diarrhea, anxiety, tremor, palpitations, and dysphagia. Patient is  taking her medication consistently on an empty stomach.    No components found for: \"TSHREFLEX\"  Lab Results   Component Value Date    TSH 7.66 (H) 04/29/2024    TSH 19.08 (H) 02/15/2024    TSH 4.92 (H) 05/15/2023     Diabetes Mellitus Type 2:  Patient is here for follow up on diabetes.      Treatment Adherence:   Medication compliance:  Taking medication as prescribed.  Diet compliance:  Trying to eat a healthy, diabetic diet.    Weight trend:  Wt Readings from Last 3 Encounters:   06/19/24 97.5 kg (215 lb)   06/19/24 99.3 kg (218 lb 14.7 oz)   05/22/24 99.3 kg (219 lb)     Denies any hypoglycemia episodes.    Current symptoms/problems include:  Neuropathy is not present  Denies any open areas on feet/ulcerations  Denies any polyuria, polydipsia, or polyphagia, nausea, vomiting or diarrhea.    Home blood sugar records: fasting range: 117 today, patient tests 1 time(s) per day  Eye exam current (withinone year): yes  Denies blurry

## 2024-06-19 ENCOUNTER — HOSPITAL ENCOUNTER (OUTPATIENT)
Dept: NURSING | Age: 69
Setting detail: INFUSION SERIES
Discharge: HOME | End: 2024-06-19
Payer: MEDICARE

## 2024-06-19 ENCOUNTER — PATIENT MESSAGE (OUTPATIENT)
Dept: FAMILY MEDICINE CLINIC | Age: 69
End: 2024-06-19

## 2024-06-19 ENCOUNTER — OFFICE VISIT (OUTPATIENT)
Dept: FAMILY MEDICINE CLINIC | Age: 69
End: 2024-06-19
Payer: MEDICARE

## 2024-06-19 VITALS
HEART RATE: 81 BPM | BODY MASS INDEX: 36.47 KG/M2 | WEIGHT: 218.92 LBS | HEIGHT: 65 IN | SYSTOLIC BLOOD PRESSURE: 141 MMHG | DIASTOLIC BLOOD PRESSURE: 69 MMHG | RESPIRATION RATE: 16 BRPM | TEMPERATURE: 98 F

## 2024-06-19 VITALS
DIASTOLIC BLOOD PRESSURE: 78 MMHG | WEIGHT: 215 LBS | HEIGHT: 61 IN | HEART RATE: 93 BPM | OXYGEN SATURATION: 99 % | SYSTOLIC BLOOD PRESSURE: 134 MMHG | BODY MASS INDEX: 40.59 KG/M2

## 2024-06-19 DIAGNOSIS — E03.9 HYPOTHYROIDISM, UNSPECIFIED TYPE: ICD-10-CM

## 2024-06-19 DIAGNOSIS — E53.8 B12 DEFICIENCY: ICD-10-CM

## 2024-06-19 DIAGNOSIS — E55.9 VITAMIN D DEFICIENCY: ICD-10-CM

## 2024-06-19 DIAGNOSIS — N18.31 TYPE 2 DIABETES MELLITUS WITH STAGE 3A CHRONIC KIDNEY DISEASE, WITHOUT LONG-TERM CURRENT USE OF INSULIN (HCC): Primary | ICD-10-CM

## 2024-06-19 DIAGNOSIS — E53.8 FOLATE DEFICIENCY: ICD-10-CM

## 2024-06-19 DIAGNOSIS — F41.9 ANXIETY AND DEPRESSION: ICD-10-CM

## 2024-06-19 DIAGNOSIS — E78.2 MIXED HYPERLIPIDEMIA: ICD-10-CM

## 2024-06-19 DIAGNOSIS — K51.00 CHRONIC ULCERATIVE ENTEROCOLITIS WITHOUT COMPLICATION (HCC): Primary | ICD-10-CM

## 2024-06-19 DIAGNOSIS — F32.A ANXIETY AND DEPRESSION: ICD-10-CM

## 2024-06-19 DIAGNOSIS — E11.22 TYPE 2 DIABETES MELLITUS WITH STAGE 3A CHRONIC KIDNEY DISEASE, WITHOUT LONG-TERM CURRENT USE OF INSULIN (HCC): Primary | ICD-10-CM

## 2024-06-19 DIAGNOSIS — G25.81 RESTLESS LEGS SYNDROME: ICD-10-CM

## 2024-06-19 DIAGNOSIS — I10 PRIMARY HYPERTENSION: ICD-10-CM

## 2024-06-19 LAB
BILIRUBIN, POC: NORMAL
BLOOD URINE, POC: NORMAL
CLARITY, POC: CLEAR
COLOR, POC: YELLOW
GLUCOSE URINE, POC: NORMAL
KETONES, POC: NORMAL
LEUKOCYTE EST, POC: NORMAL
NITRITE, POC: NORMAL
PH, POC: 8.5
PROTEIN, POC: NORMAL
SPECIFIC GRAVITY, POC: 1.02
UROBILINOGEN, POC: 0.2

## 2024-06-19 PROCEDURE — 6360000002 HC RX W HCPCS: Performed by: INTERNAL MEDICINE

## 2024-06-19 PROCEDURE — G8399 PT W/DXA RESULTS DOCUMENT: HCPCS | Performed by: NURSE PRACTITIONER

## 2024-06-19 PROCEDURE — 96365 THER/PROPH/DIAG IV INF INIT: CPT

## 2024-06-19 PROCEDURE — 81002 URINALYSIS NONAUTO W/O SCOPE: CPT | Performed by: NURSE PRACTITIONER

## 2024-06-19 PROCEDURE — 3017F COLORECTAL CA SCREEN DOC REV: CPT | Performed by: NURSE PRACTITIONER

## 2024-06-19 PROCEDURE — G8427 DOCREV CUR MEDS BY ELIG CLIN: HCPCS | Performed by: NURSE PRACTITIONER

## 2024-06-19 PROCEDURE — 3078F DIAST BP <80 MM HG: CPT | Performed by: NURSE PRACTITIONER

## 2024-06-19 PROCEDURE — 2580000003 HC RX 258: Performed by: INTERNAL MEDICINE

## 2024-06-19 PROCEDURE — 1123F ACP DISCUSS/DSCN MKR DOCD: CPT | Performed by: NURSE PRACTITIONER

## 2024-06-19 PROCEDURE — 3075F SYST BP GE 130 - 139MM HG: CPT | Performed by: NURSE PRACTITIONER

## 2024-06-19 PROCEDURE — 99214 OFFICE O/P EST MOD 30 MIN: CPT | Performed by: NURSE PRACTITIONER

## 2024-06-19 PROCEDURE — 3044F HG A1C LEVEL LT 7.0%: CPT | Performed by: NURSE PRACTITIONER

## 2024-06-19 PROCEDURE — 99211 OFF/OP EST MAY X REQ PHY/QHP: CPT

## 2024-06-19 PROCEDURE — G8417 CALC BMI ABV UP PARAM F/U: HCPCS | Performed by: NURSE PRACTITIONER

## 2024-06-19 PROCEDURE — 2022F DILAT RTA XM EVC RTNOPTHY: CPT | Performed by: NURSE PRACTITIONER

## 2024-06-19 PROCEDURE — 36415 COLL VENOUS BLD VENIPUNCTURE: CPT | Performed by: NURSE PRACTITIONER

## 2024-06-19 PROCEDURE — 1036F TOBACCO NON-USER: CPT | Performed by: NURSE PRACTITIONER

## 2024-06-19 PROCEDURE — 1090F PRES/ABSN URINE INCON ASSESS: CPT | Performed by: NURSE PRACTITIONER

## 2024-06-19 RX ORDER — SODIUM CHLORIDE 9 MG/ML
INJECTION, SOLUTION INTRAVENOUS CONTINUOUS
OUTPATIENT
Start: 2024-07-17

## 2024-06-19 RX ORDER — LIDOCAINE HCL 4 %
CREAM (GRAM) TOPICAL
Qty: 12 CAPSULE | Refills: 2 | Status: SHIPPED | OUTPATIENT
Start: 2024-06-19

## 2024-06-19 RX ORDER — SODIUM CHLORIDE 0.9 % (FLUSH) 0.9 %
10 SYRINGE (ML) INJECTION PRN
Status: DISCONTINUED | OUTPATIENT
Start: 2024-06-19 | End: 2024-06-20 | Stop reason: HOSPADM

## 2024-06-19 RX ORDER — SODIUM CHLORIDE 9 MG/ML
INJECTION, SOLUTION INTRAVENOUS CONTINUOUS
Status: ACTIVE | OUTPATIENT
Start: 2024-06-19 | End: 2024-06-19

## 2024-06-19 RX ORDER — SODIUM CHLORIDE 0.9 % (FLUSH) 0.9 %
10 SYRINGE (ML) INJECTION PRN
OUTPATIENT
Start: 2024-07-17

## 2024-06-19 RX ADMIN — SODIUM CHLORIDE: 9 INJECTION, SOLUTION INTRAVENOUS at 09:34

## 2024-06-19 RX ADMIN — VEDOLIZUMAB 300 MG: 300 INJECTION, POWDER, LYOPHILIZED, FOR SOLUTION INTRAVENOUS at 09:34

## 2024-06-19 RX ADMIN — Medication 10 ML: at 09:33

## 2024-06-19 ASSESSMENT — ANXIETY QUESTIONNAIRES
7. FEELING AFRAID AS IF SOMETHING AWFUL MIGHT HAPPEN: NOT AT ALL
5. BEING SO RESTLESS THAT IT IS HARD TO SIT STILL: NOT AT ALL
1. FEELING NERVOUS, ANXIOUS, OR ON EDGE: SEVERAL DAYS
3. WORRYING TOO MUCH ABOUT DIFFERENT THINGS: NOT AT ALL
4. TROUBLE RELAXING: NOT AT ALL
GAD7 TOTAL SCORE: 1
6. BECOMING EASILY ANNOYED OR IRRITABLE: NOT AT ALL
2. NOT BEING ABLE TO STOP OR CONTROL WORRYING: NOT AT ALL
IF YOU CHECKED OFF ANY PROBLEMS ON THIS QUESTIONNAIRE, HOW DIFFICULT HAVE THESE PROBLEMS MADE IT FOR YOU TO DO YOUR WORK, TAKE CARE OF THINGS AT HOME, OR GET ALONG WITH OTHER PEOPLE: NOT DIFFICULT AT ALL

## 2024-06-19 ASSESSMENT — PATIENT HEALTH QUESTIONNAIRE - PHQ9
SUM OF ALL RESPONSES TO PHQ QUESTIONS 1-9: 2
SUM OF ALL RESPONSES TO PHQ QUESTIONS 1-9: 2
2. FEELING DOWN, DEPRESSED OR HOPELESS: SEVERAL DAYS
5. POOR APPETITE OR OVEREATING: NOT AT ALL
SUM OF ALL RESPONSES TO PHQ9 QUESTIONS 1 & 2: 2
1. LITTLE INTEREST OR PLEASURE IN DOING THINGS: SEVERAL DAYS
SUM OF ALL RESPONSES TO PHQ QUESTIONS 1-9: 2
4. FEELING TIRED OR HAVING LITTLE ENERGY: NOT AT ALL
6. FEELING BAD ABOUT YOURSELF - OR THAT YOU ARE A FAILURE OR HAVE LET YOURSELF OR YOUR FAMILY DOWN: NOT AT ALL
7. TROUBLE CONCENTRATING ON THINGS, SUCH AS READING THE NEWSPAPER OR WATCHING TELEVISION: NOT AT ALL
9. THOUGHTS THAT YOU WOULD BE BETTER OFF DEAD, OR OF HURTING YOURSELF: NOT AT ALL
8. MOVING OR SPEAKING SO SLOWLY THAT OTHER PEOPLE COULD HAVE NOTICED. OR THE OPPOSITE, BEING SO FIGETY OR RESTLESS THAT YOU HAVE BEEN MOVING AROUND A LOT MORE THAN USUAL: NOT AT ALL
3. TROUBLE FALLING OR STAYING ASLEEP: NOT AT ALL
SUM OF ALL RESPONSES TO PHQ QUESTIONS 1-9: 2

## 2024-06-19 ASSESSMENT — PAIN DESCRIPTION - DESCRIPTORS: DESCRIPTORS: ACHING

## 2024-06-19 ASSESSMENT — ENCOUNTER SYMPTOMS
SHORTNESS OF BREATH: 0
BLOOD IN STOOL: 0
RESPIRATORY NEGATIVE: 1
ALLERGIC/IMMUNOLOGIC NEGATIVE: 1
ANAL BLEEDING: 0
EYES NEGATIVE: 1
GASTROINTESTINAL NEGATIVE: 1
NAUSEA: 0
ABDOMINAL PAIN: 0

## 2024-06-19 ASSESSMENT — PAIN SCALES - GENERAL: PAINLEVEL_OUTOF10: 6

## 2024-06-19 ASSESSMENT — PAIN DESCRIPTION - ORIENTATION: ORIENTATION: RIGHT

## 2024-06-19 ASSESSMENT — PAIN DESCRIPTION - LOCATION: LOCATION: KNEE

## 2024-06-19 ASSESSMENT — PAIN DESCRIPTION - PAIN TYPE: TYPE: CHRONIC PAIN

## 2024-06-19 NOTE — DISCHARGE INSTRUCTIONS
vedolizumab  Pronunciation:  GEN REED ue mab  Brand:  Entyvio  What is vedolizumab?  Vedolizumab reduces the effects of a substance in the body that can cause inflammation.  Vedolizumab is used in adults with moderate to severe ulcerative colitis (UC), or moderate to severe Crohn's disease.  Vedolizumab treats active disease and may help keep UC or Crohn's symptoms under control long term. Vedolizumab may also reduce the need for steroid medicines in helping to control symptoms long term.  Vedolizumab is usually given after other medicines have been tried without success.  Vedolizumab may also be used for purposes not listed in this medication guide.  What should I discuss with my healthcare provider before receiving vedolizumab?  You should not use vedolizumab if you are allergic to it.  To make sure vedolizumab is safe for you, tell your doctor if you have:  an active or recent infection;  tuberculosis (or if you have close contact with someone who has tuberculosis);  signs of infection such as fever, cough, or flu symptoms;  open sores or skin wounds;  weak immune system (caused by disease or by using certain medicine); or  if you are scheduled to receive any vaccines.  You should be up to date with all needed vaccinations before receiving vedolizumab.   FDA pregnancy category B. Vedolizumab is not expected to harm an unborn baby. Tell your doctor if you are pregnant or plan to become pregnant during treatment.  If you are pregnant, your name may be listed on a pregnancy registry. This is to track the outcome of the pregnancy and to evaluate any effects of vedolizumab on the baby.  It is not known whether vedolizumab passes into breast milk or if it could harm a nursing baby. Tell your doctor if you are breast-feeding a baby.  Do not give this medicine to anyone under 18 years old without medical advice.  How is vedolizumab given?  Before you start treatment with vedolizumab, your doctor may perform tests to  colitis, such as adalimumab, certolizumab, infliximab; or  other drugs that weaken the immune system such as cancer medicine, steroids,   WHere can I get more information?  Your doctor or pharmacist can provide more information about vedolizumab.    RETURN ON JULY 17,2024 AT 0900 FOR ENTYVIO INFUSION    FOLLOW UP WITH DR. BARTON AS NEEDED OR SCHEDULED    CALL US IF YOU NEED TO CANCEL OR RESCHEDULE YOUR APPOINTMENT  [phone]

## 2024-06-19 NOTE — PROGRESS NOTES
Pt here for entyvio infusion  Pt reporting rt knee discomfort today rated a 5 out of 10 at present but states it is worse when I walk  She reports that her stools are normal but she gets constipated at times   Pt denies any cramping, bloating bleeding or diarrhea  IV # 22 was started in Rt hand on 1st attempt per myself   Pt gayla well  No blood work needed for today   Entyvio 300 mg IVPB starting to infuse  IV site unremarkable Will monitor

## 2024-06-20 LAB
BASOPHILS # BLD: 0 K/UL (ref 0–0.2)
BASOPHILS NFR BLD: 0.5 %
CHOLEST SERPL-MCNC: 127 MG/DL (ref 0–199)
CREAT UR-MCNC: 66.2 MG/DL (ref 28–259)
DEPRECATED RDW RBC AUTO: 14.4 % (ref 12.4–15.4)
EOSINOPHIL # BLD: 0.2 K/UL (ref 0–0.6)
EOSINOPHIL NFR BLD: 3.4 %
HCT VFR BLD AUTO: 35.4 % (ref 36–48)
HDLC SERPL-MCNC: 40 MG/DL (ref 40–60)
HGB BLD-MCNC: 12.1 G/DL (ref 12–16)
LDLC SERPL CALC-MCNC: 72 MG/DL
LYMPHOCYTES # BLD: 1.5 K/UL (ref 1–5.1)
LYMPHOCYTES NFR BLD: 24.3 %
MCH RBC QN AUTO: 32.4 PG (ref 26–34)
MCHC RBC AUTO-ENTMCNC: 34.1 G/DL (ref 31–36)
MCV RBC AUTO: 95 FL (ref 80–100)
MICROALBUMIN UR DL<=1MG/L-MCNC: <1.2 MG/DL
MICROALBUMIN/CREAT UR: NORMAL MG/G (ref 0–30)
MONOCYTES # BLD: 0.4 K/UL (ref 0–1.3)
MONOCYTES NFR BLD: 6.6 %
NEUTROPHILS # BLD: 3.9 K/UL (ref 1.7–7.7)
NEUTROPHILS NFR BLD: 65.2 %
PLATELET # BLD AUTO: 141 K/UL (ref 135–450)
PMV BLD AUTO: 10 FL (ref 5–10.5)
RBC # BLD AUTO: 3.73 M/UL (ref 4–5.2)
TRIGL SERPL-MCNC: 76 MG/DL (ref 0–150)
TSH SERPL DL<=0.005 MIU/L-ACNC: 0.76 UIU/ML (ref 0.27–4.2)
VLDLC SERPL CALC-MCNC: 15 MG/DL
WBC # BLD AUTO: 6 K/UL (ref 4–11)

## 2024-06-21 ENCOUNTER — OFFICE VISIT (OUTPATIENT)
Dept: FAMILY MEDICINE CLINIC | Age: 69
End: 2024-06-21
Payer: MEDICARE

## 2024-06-21 VITALS
SYSTOLIC BLOOD PRESSURE: 128 MMHG | DIASTOLIC BLOOD PRESSURE: 84 MMHG | HEIGHT: 55 IN | WEIGHT: 216 LBS | BODY MASS INDEX: 49.99 KG/M2

## 2024-06-21 DIAGNOSIS — Z00.00 MEDICARE ANNUAL WELLNESS VISIT, SUBSEQUENT: Primary | ICD-10-CM

## 2024-06-21 DIAGNOSIS — Z72.3 INACTIVITY: ICD-10-CM

## 2024-06-21 PROCEDURE — 3074F SYST BP LT 130 MM HG: CPT | Performed by: NURSE PRACTITIONER

## 2024-06-21 PROCEDURE — 1123F ACP DISCUSS/DSCN MKR DOCD: CPT | Performed by: NURSE PRACTITIONER

## 2024-06-21 PROCEDURE — 3017F COLORECTAL CA SCREEN DOC REV: CPT | Performed by: NURSE PRACTITIONER

## 2024-06-21 PROCEDURE — G0439 PPPS, SUBSEQ VISIT: HCPCS | Performed by: NURSE PRACTITIONER

## 2024-06-21 PROCEDURE — 3079F DIAST BP 80-89 MM HG: CPT | Performed by: NURSE PRACTITIONER

## 2024-06-21 ASSESSMENT — PATIENT HEALTH QUESTIONNAIRE - PHQ9
SUM OF ALL RESPONSES TO PHQ QUESTIONS 1-9: 3
4. FEELING TIRED OR HAVING LITTLE ENERGY: SEVERAL DAYS
SUM OF ALL RESPONSES TO PHQ QUESTIONS 1-9: 3
6. FEELING BAD ABOUT YOURSELF - OR THAT YOU ARE A FAILURE OR HAVE LET YOURSELF OR YOUR FAMILY DOWN: NOT AT ALL
8. MOVING OR SPEAKING SO SLOWLY THAT OTHER PEOPLE COULD HAVE NOTICED. OR THE OPPOSITE, BEING SO FIGETY OR RESTLESS THAT YOU HAVE BEEN MOVING AROUND A LOT MORE THAN USUAL: NOT AT ALL
10. IF YOU CHECKED OFF ANY PROBLEMS, HOW DIFFICULT HAVE THESE PROBLEMS MADE IT FOR YOU TO DO YOUR WORK, TAKE CARE OF THINGS AT HOME, OR GET ALONG WITH OTHER PEOPLE: NOT DIFFICULT AT ALL
3. TROUBLE FALLING OR STAYING ASLEEP: NOT AT ALL
9. THOUGHTS THAT YOU WOULD BE BETTER OFF DEAD, OR OF HURTING YOURSELF: NOT AT ALL
SUM OF ALL RESPONSES TO PHQ QUESTIONS 1-9: 3
SUM OF ALL RESPONSES TO PHQ9 QUESTIONS 1 & 2: 2
1. LITTLE INTEREST OR PLEASURE IN DOING THINGS: SEVERAL DAYS
SUM OF ALL RESPONSES TO PHQ QUESTIONS 1-9: 3
5. POOR APPETITE OR OVEREATING: NOT AT ALL
7. TROUBLE CONCENTRATING ON THINGS, SUCH AS READING THE NEWSPAPER OR WATCHING TELEVISION: NOT AT ALL

## 2024-06-21 NOTE — PROGRESS NOTES
Medicare Annual Wellness Visit    Ruthie Conte is here for Medicare AWV    Assessment & Plan   Medicare annual wellness visit, subsequent  Inactivity  Inactivity Interventions:  Recommendations: patient agrees to exercise for at least 150 minutes/week  See AVS for additional education material  Body mass index (BMI) of 50-59.9 in adult (HCC)  Obesity Interventions:  low carbohydrate diet, exercise for at least 150 minutes/week  See AVS for additional education material  Recommendations for Preventive Services Due: see orders and patient instructions/AVS.  Recommended screening schedule for the next 5-10 years is provided to the patient in written form: see Patient Instructions/AVS.     Return in 1 year (on 6/21/2025) for Annual Medicare visit.     Subjective       Patient's complete Health Risk Assessment and screening values have been reviewed and are found in Flowsheets. The following problems were reviewed today and where indicated follow up appointments were made and/or referrals ordered.    Positive Risk Factor Screenings with Interventions:                Activity, Diet, and Weight:  On average, how many days per week do you engage in moderate to strenuous exercise (like a brisk walk)?: 0 days  On average, how many minutes do you engage in exercise at this level?: 0 min    Do you eat balanced/healthy meals regularly?: Yes    Body mass index is 58.39 kg/m². (!) Abnormal        Inactivity Interventions:  Recommendations: patient agrees to exercise for at least 150 minutes/week  See AVS for additional education material  Obesity Interventions:  low carbohydrate diet, exercise for at least 150 minutes/week  See AVS for additional education material            Dentist Screen:  Have you seen the dentist within the past year?: (!) No    Intervention:  Advised to schedule with their dentist     Vision Screen:  Do you have difficulty driving, watching TV, or doing any of your daily activities because of your

## 2024-06-21 NOTE — TELEPHONE ENCOUNTER
From: Ruthie Conte  To: Ade Bonilla  Sent: 6/19/2024 8:30 PM EDT  Subject: Diabetic meds    Per our conversation today, I called [employer]. There were some injectables that were possible and a little cheaper. I believe a 30 day supply only was available.     I predominantly checked on Jardiance and Ozempic. Jardiance was so expensive because it was the full deductable. It is cheaper than Ozempic overall. I priced both out. Ozempic has a $900 deductable. 1st script covers 3 months. The remaining are in 1 month supply at $167. Totals $1902 for year.     Jardiance is $549 deductable for 90. $111 per month thereafter. Total $1770.     Having said all that, it is out of reach til our court settlement comes which we are hoping for soon. So I would need to wait. If I remember correctly, Ozempic also helps in weight loss. I have been losing my discipline and lack of appetite ( same old thing all the time).     I will adhere to your advice.     PS can you have someone msg me Dr Do hours? I need to book with him and maybe do my knee again. Thanks

## 2024-06-21 NOTE — PATIENT INSTRUCTIONS
blankets and never go to sleep with a heating pad, which can cause serious burns even if on a low setting.    Use fire-resistant mattress covers and pillows, and NEVER smoke in bed.    Keep a phone next to the bed that is programmed to dial 911 at the push of a button.      If you have a chronic condition, you may want to sign on with an automatic call-in service. Typically the system includes a small pendant that connects directly to an emergency medical voice-response system. You should also make arrangements to stay in contact with someonefriend, neighbor, family memberon a regular schedule.   Fire Prevention   According to the National S.A.F.E. (Smoke Alarms for Every) Home Foundation, senior citizens are one of the two highest risk groups for death and serious injuries due to residential fires.   When cooking, wear short-sleeved items, never a bulky long-sleeved robe.    The [facility]'s Safety Checklist for Older Consumers emphasizes the importance of checking basements, garages, workshops and storage areas for fire hazards, such as volatile liquids, piles of old rags or clothing and overloaded circuits.    Never smoke in bed or when lying down on a couch or recliner chair.    Small portable electric or kerosene heaters are responsible for many home fires and should be used cautiously if at all. If you do use one, be sure to keep them away from flammable materials.    In case of fire, make sure you have a pre-established emergency exit plan.    Have a professional check your fireplace and other fuel-burning appliances yearly.    Helping Hands   Baby boomers entering the rocha years will continue to see the development of new products to help older adults live safely and independently in spite of age-related changes.  Making Life More Livable  , by Usha Manning, lists over 1,000 products for \"living well in the mature years,\" such as bathing and mobility aids, household security devices, ergonomically designed

## 2024-06-27 ENCOUNTER — HOSPITAL ENCOUNTER (OUTPATIENT)
Dept: MAMMOGRAPHY | Age: 69
Discharge: HOME | End: 2024-06-27
Payer: MEDICARE

## 2024-06-27 ENCOUNTER — OFFICE VISIT (OUTPATIENT)
Age: 69
End: 2024-06-27
Payer: MEDICARE

## 2024-06-27 ENCOUNTER — TELEPHONE (OUTPATIENT)
Dept: MAMMOGRAPHY | Age: 69
End: 2024-06-27

## 2024-06-27 VITALS
SYSTOLIC BLOOD PRESSURE: 118 MMHG | DIASTOLIC BLOOD PRESSURE: 64 MMHG | OXYGEN SATURATION: 98 % | HEART RATE: 84 BPM | BODY MASS INDEX: 40.22 KG/M2 | HEIGHT: 61 IN | WEIGHT: 213 LBS

## 2024-06-27 VITALS — WEIGHT: 213 LBS | HEIGHT: 61 IN | BODY MASS INDEX: 40.22 KG/M2

## 2024-06-27 DIAGNOSIS — R93.0 ABNORMAL MRI OF THE HEAD: ICD-10-CM

## 2024-06-27 DIAGNOSIS — F07.81 POSTCONCUSSION SYNDROME: Primary | ICD-10-CM

## 2024-06-27 DIAGNOSIS — Z12.31 SCREENING MAMMOGRAM, ENCOUNTER FOR: ICD-10-CM

## 2024-06-27 PROCEDURE — 3078F DIAST BP <80 MM HG: CPT | Performed by: PSYCHIATRY & NEUROLOGY

## 2024-06-27 PROCEDURE — 3017F COLORECTAL CA SCREEN DOC REV: CPT | Performed by: PSYCHIATRY & NEUROLOGY

## 2024-06-27 PROCEDURE — 1123F ACP DISCUSS/DSCN MKR DOCD: CPT | Performed by: PSYCHIATRY & NEUROLOGY

## 2024-06-27 PROCEDURE — 3074F SYST BP LT 130 MM HG: CPT | Performed by: PSYCHIATRY & NEUROLOGY

## 2024-06-27 PROCEDURE — G8427 DOCREV CUR MEDS BY ELIG CLIN: HCPCS | Performed by: PSYCHIATRY & NEUROLOGY

## 2024-06-27 PROCEDURE — G8417 CALC BMI ABV UP PARAM F/U: HCPCS | Performed by: PSYCHIATRY & NEUROLOGY

## 2024-06-27 PROCEDURE — 1036F TOBACCO NON-USER: CPT | Performed by: PSYCHIATRY & NEUROLOGY

## 2024-06-27 PROCEDURE — 77063 BREAST TOMOSYNTHESIS BI: CPT

## 2024-06-27 PROCEDURE — 1090F PRES/ABSN URINE INCON ASSESS: CPT | Performed by: PSYCHIATRY & NEUROLOGY

## 2024-06-27 PROCEDURE — 99203 OFFICE O/P NEW LOW 30 MIN: CPT | Performed by: PSYCHIATRY & NEUROLOGY

## 2024-06-27 PROCEDURE — G8399 PT W/DXA RESULTS DOCUMENT: HCPCS | Performed by: PSYCHIATRY & NEUROLOGY

## 2024-06-27 NOTE — TELEPHONE ENCOUNTER
Left message regarding abnormal screening mammogram and the radiologist recommendation for additional imaging and possible ultrasound on the left breast. Given number to scheduling and department to set up follow up appointments.

## 2024-06-27 NOTE — PROGRESS NOTES
Neurology outpatient new visit    Patient name: Ruthie Conte      Chief Complaint:  Abnormal MRI brain.    History of present illness:  This is a 68 years old right-handed female.  The patient is here for evaluation due to abnormal MRI brain.  Unfortunately, the patient had significant head concussion after the fall back in November last year.  At that time, the patient had significant scalp hematoma from the CAT scan.  After the concussion, the patient reported frequent headache and dizziness.  So the patient ended up doing MRI brain for further evaluation.  The MRI brain showed no significant intracranial bleeding but it also showed nonspecific subcortical white matter changes.  It was also questionable for possible artifact.  The patient denies focal weakness or numbness during my assessment.  The patient reports spontaneous improvement of headache and dizziness over time.    Past medical history:    Past Medical History:   Diagnosis Date    Acute pancreatitis 10/21/2014    Acute renal failure with tubular necrosis ([facility]) 07/10/2015    Angina pectoris ([facility]) 05/23/2011    Cellulitis of left leg     Clostridium difficile infection 2015    Lumbar radiculitis 10/01/2019    Palpitations 07/17/2013    Primary osteoarthritis of left hip 11/18/2015    Shock ([facility]) 10/10/2017    Superficial thrombophlebitis of left leg 07/08/2015    Venous leg ulcers, bilateral ([facility]) 05/2023    came to wound care Sep 2023, healed Oct 2023       Past surgical history:    Past Surgical History:   Procedure Laterality Date    APPENDECTOMY  1962    CARDIAC CATHETERIZATION  05/2014    normal    COLONOSCOPY      CT NEEDLE BIOPSY LIVER PERCUTANEOUS  07/26/2023    CT NEEDLE BIOPSY LIVER PERCUTANEOUS [place] [facility] [place]    EPIDURAL STEROID INJECTION Left 01/22/2019    LEFT LUMBAR FOUR FIVE EPIDURAL STEROID INJECTION SITE CONFIRMED BY FLUOROSCOPY performed by DEAN Braden MD at [facility]    HIP ARTHROPLASTY Left 01/19/2016

## 2024-06-27 NOTE — PATIENT INSTRUCTIONS

## 2024-07-10 RX ORDER — ALENDRONATE SODIUM 70 MG/1
TABLET ORAL
Qty: 12 TABLET | Refills: 3 | Status: SHIPPED | OUTPATIENT
Start: 2024-07-10

## 2024-07-10 NOTE — TELEPHONE ENCOUNTER
Refill Request     CONFIRM preferrred pharmacy with the patient.    If Mail Order Rx - Pend for 90 day refill.      Last Seen: Last Seen Department: 6/21/2024  Last Seen by PCP: 6/21/2024    Last Written: 5/8/24    If no future appointment scheduled, route STAFF MESSAGE with patient name to the  Pool for scheduling.      Next Appointment:   Future Appointments   Date Time Provider Department Center   7/17/2024  9:00 AM [facility] OP NURSING ROOM 1 [facility] OP RN [facility]   7/22/2024 10:00 AM [facility] MAMMO RM 1 [facility] MAMMO [facility]   7/22/2024 10:30 AM [facility] US RM 2 [facility] ULTRA [facility]   7/23/2024 11:00 AM [facility] VASC LAB 1 [facility] BRET [facility]   8/14/2024  9:30 AM [facility] OP NURSING ROOM 1 [facility] RN [facility]   9/10/2024  9:00 AM [facility] OP NURSING ROOM 1 [facility] RN [facility]   9/11/2024  3:00 PM SCHEDULE, VASCULAR PROCEDURE [facility]   10/9/2024  9:00 AM [facility] OP NURSING ROOM 1 [facility] OP RN [facility]   11/6/2024  9:00 AM [facility] OP NURSING ROOM 1 [facility] OP RN [facility]   1/2/2025 11:00 AM Ade Bonilla, APRN - CNP [place]  [place] [facility]       Message sent to  to schedule appt with patient?  N/A      Requested Prescriptions     Pending Prescriptions Disp Refills    alendronate (FOSAMAX) 70 MG tablet [Pharmacy Med Name: ALENDRONATE SODIUM 70 MG Tablet] 12 tablet 3     Sig: TAKE 1 TABLET EVERY 7 DAYS

## 2024-07-17 ENCOUNTER — HOSPITAL ENCOUNTER (OUTPATIENT)
Dept: NURSING | Age: 69
Setting detail: INFUSION SERIES
Discharge: HOME | End: 2024-07-17
Payer: MEDICARE

## 2024-07-17 VITALS
WEIGHT: 212.96 LBS | RESPIRATION RATE: 16 BRPM | TEMPERATURE: 98.1 F | HEART RATE: 79 BPM | HEIGHT: 61 IN | SYSTOLIC BLOOD PRESSURE: 146 MMHG | DIASTOLIC BLOOD PRESSURE: 65 MMHG | BODY MASS INDEX: 40.21 KG/M2

## 2024-07-17 DIAGNOSIS — K51.00 CHRONIC ULCERATIVE ENTEROCOLITIS WITHOUT COMPLICATION (HCC): Primary | ICD-10-CM

## 2024-07-17 LAB
ALBUMIN SERPL-MCNC: 3.8 G/DL (ref 3.4–5)
ALBUMIN/GLOB SERPL: 1.2 {RATIO} (ref 1.1–2.2)
ALP SERPL-CCNC: 104 U/L (ref 40–129)
ALT SERPL-CCNC: 21 U/L (ref 10–40)
ANION GAP SERPL CALCULATED.3IONS-SCNC: 9 MMOL/L (ref 3–16)
AST SERPL-CCNC: 43 U/L (ref 15–37)
BILIRUB SERPL-MCNC: 0.7 MG/DL (ref 0–1)
BUN SERPL-MCNC: 18 MG/DL (ref 7–20)
CALCIUM SERPL-MCNC: 9.5 MG/DL (ref 8.3–10.6)
CHLORIDE SERPL-SCNC: 100 MMOL/L (ref 99–110)
CO2 SERPL-SCNC: 29 MMOL/L (ref 21–32)
CREAT SERPL-MCNC: 1 MG/DL (ref 0.6–1.2)
DEPRECATED RDW RBC AUTO: 14.5 % (ref 12.4–15.4)
GFR SERPLBLD CREATININE-BSD FMLA CKD-EPI: 61 ML/MIN/{1.73_M2}
GLUCOSE SERPL-MCNC: 119 MG/DL (ref 70–99)
HCT VFR BLD AUTO: 35.4 % (ref 36–48)
HGB BLD-MCNC: 11.9 G/DL (ref 12–16)
MCH RBC QN AUTO: 31.4 PG (ref 26–34)
MCHC RBC AUTO-ENTMCNC: 33.6 G/DL (ref 31–36)
MCV RBC AUTO: 93.4 FL (ref 80–100)
PLATELET # BLD AUTO: 148 K/UL (ref 135–450)
PMV BLD AUTO: 9.5 FL (ref 5–10.5)
POTASSIUM SERPL-SCNC: 4.7 MMOL/L (ref 3.5–5.1)
PROT SERPL-MCNC: 7.1 G/DL (ref 6.4–8.2)
RBC # BLD AUTO: 3.79 M/UL (ref 4–5.2)
SODIUM SERPL-SCNC: 138 MMOL/L (ref 136–145)
WBC # BLD AUTO: 5.6 K/UL (ref 4–11)

## 2024-07-17 PROCEDURE — 85027 COMPLETE CBC AUTOMATED: CPT

## 2024-07-17 PROCEDURE — 6360000002 HC RX W HCPCS: Performed by: INTERNAL MEDICINE

## 2024-07-17 PROCEDURE — 96365 THER/PROPH/DIAG IV INF INIT: CPT

## 2024-07-17 PROCEDURE — 80053 COMPREHEN METABOLIC PANEL: CPT

## 2024-07-17 PROCEDURE — 99211 OFF/OP EST MAY X REQ PHY/QHP: CPT

## 2024-07-17 PROCEDURE — 2580000003 HC RX 258: Performed by: INTERNAL MEDICINE

## 2024-07-17 RX ORDER — SODIUM CHLORIDE 9 MG/ML
INJECTION, SOLUTION INTRAVENOUS CONTINUOUS
Status: ACTIVE | OUTPATIENT
Start: 2024-07-17 | End: 2024-07-17

## 2024-07-17 RX ORDER — SODIUM CHLORIDE 9 MG/ML
INJECTION, SOLUTION INTRAVENOUS CONTINUOUS
OUTPATIENT
Start: 2024-08-14

## 2024-07-17 RX ORDER — SODIUM CHLORIDE 0.9 % (FLUSH) 0.9 %
10 SYRINGE (ML) INJECTION PRN
OUTPATIENT
Start: 2024-08-14

## 2024-07-17 RX ADMIN — SODIUM CHLORIDE: 9 INJECTION, SOLUTION INTRAVENOUS at 09:38

## 2024-07-17 RX ADMIN — VEDOLIZUMAB 300 MG: 300 INJECTION, POWDER, LYOPHILIZED, FOR SOLUTION INTRAVENOUS at 09:39

## 2024-07-17 ASSESSMENT — PAIN SCALES - GENERAL: PAINLEVEL_OUTOF10: 0

## 2024-07-17 NOTE — PROGRESS NOTES
Entyvio infused and tubing was flushed with 101 cc NS  IV removed without difficulty Cath tip intact  Pressure then pressure dressing was applied and secured with a coban dressing  IV site unremarkable  Discharge instructions reviewed with pt and copy was given  Understanding verbalized  Pt was then up to BR and then was discharged ambulatory in stable condition

## 2024-07-17 NOTE — PROGRESS NOTES
Pt here for entyvio infusion  Pt without c/o's today  She reports that her stools are normal but she is starting to get a little diarrhea at times  Pt denies any cramping, bloating or bleeding  IV # 22 was started in RAC on 1st attempt per myself   Pt gayla well  Blood work drawn without difficulty  Entyvio 300 mg IVPB starting to infuse  IV site unremarkable Will monitor

## 2024-07-17 NOTE — DISCHARGE INSTRUCTIONS
vedolizumab  Pronunciation:  GEN REED ue mab  Brand:  Entyvio  What is vedolizumab?  Vedolizumab reduces the effects of a substance in the body that can cause inflammation.  Vedolizumab is used in adults with moderate to severe ulcerative colitis (UC), or moderate to severe Crohn's disease.  Vedolizumab treats active disease and may help keep UC or Crohn's symptoms under control long term. Vedolizumab may also reduce the need for steroid medicines in helping to control symptoms long term.  Vedolizumab is usually given after other medicines have been tried without success.  Vedolizumab may also be used for purposes not listed in this medication guide.  What should I discuss with my healthcare provider before receiving vedolizumab?  You should not use vedolizumab if you are allergic to it.  To make sure vedolizumab is safe for you, tell your doctor if you have:  an active or recent infection;  tuberculosis (or if you have close contact with someone who has tuberculosis);  signs of infection such as fever, cough, or flu symptoms;  open sores or skin wounds;  weak immune system (caused by disease or by using certain medicine); or  if you are scheduled to receive any vaccines.  You should be up to date with all needed vaccinations before receiving vedolizumab.   FDA pregnancy category B. Vedolizumab is not expected to harm an unborn baby. Tell your doctor if you are pregnant or plan to become pregnant during treatment.  If you are pregnant, your name may be listed on a pregnancy registry. This is to track the outcome of the pregnancy and to evaluate any effects of vedolizumab on the baby.  It is not known whether vedolizumab passes into breast milk or if it could harm a nursing baby. Tell your doctor if you are breast-feeding a baby.  Do not give this medicine to anyone under 18 years old without medical advice.  How is vedolizumab given?  Before you start treatment with vedolizumab, your doctor may perform tests to  make sure you do not have tuberculosis or other infections.  Vedolizumab is injected into a vein through an IV. A healthcare provider will give you this injection.  Vedolizumab must be given slowly, and the IV infusion can take about 30 minutes to complete.  You will be watched closely for a short time after receiving vedolizumab, to make sure you do not have an allergic reaction to the medication.  Vedolizumab is usually given on a schedule that starts the day of your first injection. Your next injections will be given at 2 weeks and 6 weeks after the first injection. Then you will receive an injection every 8 weeks (or every 2 months) thereafter.  It may take up to several weeks before your symptoms improve. Keep using the medication as directed and tell your doctor if your symptoms do not improve after 14 weeks of treatment.  What happens if I miss a dose?  Call your doctor for instructions if you miss an appointment for your vedolizumab injection.  What happens if I overdose?  Since this medicine is given by a healthcare professional in a medical setting, an overdose is unlikely to occur.  What should I avoid while receiving vedolizumab?  Do not receive a \"live\" vaccine while using vedolizumab, and avoid coming into contact with anyone who has recently received a live vaccine. There is a chance that the virus could be passed on to you. Live vaccines include measles, mumps, rubella (MMR), polio, rotavirus, typhoid, yellow fever, varicella (chickenpox), zoster (shingles), and nasal flu (influenza) vaccine.  You may receive \"killed-virus\" vaccines such as a flu shot, polio vaccine, rabies vaccine, or hepatitis A vaccine. Ask your doctor before receiving any vaccine while you are being treated with vedolizumab.  Avoid being near people who are sick or have infections. Tell your doctor at once if you develop signs of infection.  What are the possible side effects of vedolizumab?  Get emergency medical help if you

## 2024-07-22 ENCOUNTER — HOSPITAL ENCOUNTER (OUTPATIENT)
Dept: MAMMOGRAPHY | Age: 69
Discharge: HOME | End: 2024-07-22
Payer: MEDICARE

## 2024-07-22 ENCOUNTER — HOSPITAL ENCOUNTER (OUTPATIENT)
Dept: ULTRASOUND IMAGING | Age: 69
Discharge: HOME | End: 2024-07-22
Payer: MEDICARE

## 2024-07-22 DIAGNOSIS — R92.8 ABNORMAL MAMMOGRAM: ICD-10-CM

## 2024-07-22 PROCEDURE — 76642 ULTRASOUND BREAST LIMITED: CPT

## 2024-07-22 PROCEDURE — G0279 TOMOSYNTHESIS, MAMMO: HCPCS

## 2024-07-23 ENCOUNTER — TELEPHONE (OUTPATIENT)
Dept: CARDIOLOGY CLINIC | Age: 69
End: 2024-07-23

## 2024-07-23 ENCOUNTER — HOSPITAL ENCOUNTER (OUTPATIENT)
Age: 69
Discharge: HOME | End: 2024-07-25
Attending: INTERNAL MEDICINE
Payer: MEDICARE

## 2024-07-23 DIAGNOSIS — R22.42 LOCALIZED SWELLING OF LEFT LOWER EXTREMITY: ICD-10-CM

## 2024-07-23 DIAGNOSIS — M71.22 BAKER'S CYST OF KNEE, LEFT: ICD-10-CM

## 2024-07-23 DIAGNOSIS — I83.022 VARICOSE VEINS OF LEFT LOWER EXTREMITY WITH ULCER OF CALF (CODE) (HCC): ICD-10-CM

## 2024-07-23 DIAGNOSIS — E08.51: ICD-10-CM

## 2024-07-23 PROCEDURE — 93926 LOWER EXTREMITY STUDY: CPT

## 2024-07-23 NOTE — TELEPHONE ENCOUNTER
Lorene from vascular lab phoned office. She states the patient is scheduled for a vascular doppler today at 11 am. The problem is the test is supposed to look at a bakers cyst, which she states is a soft tissue study, not vascular. If it needs to be vascular then the bakers cyst shouldn't be on the DX. She does not know which test is accurate, one is vascular and would be performed by her and the other isn't and would be somewhere else. The issue is of course, insurance and coverage and what actually needs to be looked at.    Lorene [phone], She needs to know what to do.

## 2024-07-23 NOTE — TELEPHONE ENCOUNTER
Message sent to Dr. Patel,(currently in procedure at [facility]) to discuss, message provided phone number to Lorene as well. [place]'s cyst was mentioned in  order but also has varicose veins and localized swelling. Patient has history of vein procedures. Per Dr. Lowery note,   \"S/p varithena of the left leg 3/6/2024\".     Message received from Dr. Lowery 7/23 at 1530 requesting vascular lab number. Number forwarded to Dr. Lowery. I called and spoke to Lorene in [place] vascular lab who states study was completed. I provided her with Dr. Lowery's number. I am sending message to Sarah UKLKARNI per Dr. Lowery's request.

## 2024-07-25 LAB
VAS LEFT ABI: 1.13
VAS LEFT ARM BP: 149 MMHG
VAS LEFT ATA DIST PSV: 92.2 CM/S
VAS LEFT CFA DIST PSV: 84 CM/S
VAS LEFT CFA PROX PSV: 101 CM/S
VAS LEFT DORSALIS PEDIS BP: 169 MMHG
VAS LEFT PERONEAL MID PSV: 114 CM/S
VAS LEFT PFA PROX PSV: 71.7 CM/S
VAS LEFT POP A DIST PSV: 126 CM/S
VAS LEFT POP A PROX PSV: 97.5 CM/S
VAS LEFT POP A PROX VEL RATIO: 0.95
VAS LEFT PTA BP: 165 MMHG
VAS LEFT PTA DIST PSV: 73.9 CM/S
VAS LEFT PTA MID PSV: 49.2 CM/S
VAS LEFT SFA DIST PSV: 103 CM/S
VAS LEFT SFA DIST VEL RATIO: 1.11
VAS LEFT SFA MID PSV: 92.6 CM/S
VAS LEFT SFA MID VEL RATIO: 0.92
VAS LEFT SFA PROX PSV: 101 CM/S
VAS LEFT SFA PROX VEL RATIO: 1
VAS RIGHT ABI: 1.13
VAS RIGHT ARM BP: 150 MMHG
VAS RIGHT DORSALIS PEDIS BP: 146 MMHG
VAS RIGHT PTA BP: 170 MMHG

## 2024-08-04 DIAGNOSIS — I87.2 VENOUS STASIS DERMATITIS OF BOTH LOWER EXTREMITIES: ICD-10-CM

## 2024-08-05 NOTE — TELEPHONE ENCOUNTER
Please call patient and clarify this request as she stated in April that this medication was no longer being used

## 2024-08-05 NOTE — TELEPHONE ENCOUNTER
Refill Request     CONFIRM preferrred pharmacy with the patient.    If Mail Order Rx - Pend for 90 day refill.      Last Seen: Last Seen Department: 6/21/2024  Last Seen by PCP: 6/21/2024    Last Written: 1/4/24    If no future appointment scheduled, route STAFF MESSAGE with patient name to the  Pool for scheduling.      Next Appointment:   Future Appointments   Date Time Provider Department Center   8/14/2024  9:30 AM [facility] 1 [facility]   9/10/2024  9:00 AM [facility] 1 [facility]   9/11/2024  3:00 [facility]   10/9/2024  9:00 AM [facility] 1 [facility]   11/6/2024  9:00 AM [facility] NURSING ROOM 1 [facility]   1/2/2025 11:00 AM Ade Bonilla[facility] APR[facility] - [facility]       Message sent to  to schedule appt with patient?  N/A      Requested Prescriptions     Pending Prescriptions Disp Refills    triamcinolone (KENALOG) 0.1 % cream [Pharmacy Med Name: TRIAMCINOLONE 0.1% CREAM] 45 g 1     Sig: APPLY TO AFFECTED AREA TWICE A DAY

## 2024-08-05 NOTE — TELEPHONE ENCOUNTER
Refill Request     CONFIRM preferrred pharmacy with the patient.    If Mail Order Rx - Pend for 90 day refill.      Last Seen: Last Seen Department: 6/21/2024  Last Seen by PCP: 6/21/2024    Last Written: 1/4/24    If no future appointment scheduled, route STAFF MESSAGE with patient name to the  Pool for scheduling.      Next Appointment:   Future Appointments   Date Time Provider Department Center   8/14/2024  9:30 AM [facility] [place] 1 [facility] [place] [facility]   9/10/2024  9:00 AM [facility] 1 [facility]   9/11/2024  3:00 [facility]   10/9/2024  9:00 AM [facility] 1 [facility]   11/6/2024  9:00 AM [facility]SING ROOM 1 [facility]   1/2/2025 11:00 AM Ade [facility]       Message sent to  to schedule appt with patient?  N/A      Requested Prescriptions     Pending Prescriptions Disp Refills    triamcinolone (KENALOG) 0.1 % cream [Pharmacy Med Name: TRIAMCINOLONE 0.1% CREAM] 45 g 1     Sig: APPLY TO AFFECTED AREA TWICE A DAY

## 2024-08-06 RX ORDER — TRIAMCINOLONE ACETONIDE 1 MG/G
CREAM TOPICAL
Qty: 45 G | Refills: 1 | OUTPATIENT
Start: 2024-08-06

## 2024-08-14 ENCOUNTER — HOSPITAL ENCOUNTER (OUTPATIENT)
Dept: NURSING | Age: 69
Setting detail: INFUSION SERIES
Discharge: HOME | End: 2024-08-14
Payer: MEDICARE

## 2024-08-14 VITALS
HEART RATE: 81 BPM | DIASTOLIC BLOOD PRESSURE: 75 MMHG | TEMPERATURE: 98 F | RESPIRATION RATE: 16 BRPM | SYSTOLIC BLOOD PRESSURE: 140 MMHG | HEIGHT: 63 IN | BODY MASS INDEX: 38.09 KG/M2 | WEIGHT: 215 LBS

## 2024-08-14 DIAGNOSIS — K51.00 CHRONIC ULCERATIVE ENTEROCOLITIS WITHOUT COMPLICATION (HCC): Primary | ICD-10-CM

## 2024-08-14 PROCEDURE — 96365 THER/PROPH/DIAG IV INF INIT: CPT

## 2024-08-14 PROCEDURE — 99211 OFF/OP EST MAY X REQ PHY/QHP: CPT

## 2024-08-14 PROCEDURE — 2580000003 HC RX 258: Performed by: INTERNAL MEDICINE

## 2024-08-14 PROCEDURE — 6360000002 HC RX W HCPCS: Performed by: INTERNAL MEDICINE

## 2024-08-14 RX ORDER — SODIUM CHLORIDE 0.9 % (FLUSH) 0.9 %
10 SYRINGE (ML) INJECTION PRN
Status: DISCONTINUED | OUTPATIENT
Start: 2024-08-14 | End: 2024-08-15 | Stop reason: HOSPADM

## 2024-08-14 RX ORDER — SODIUM CHLORIDE 0.9 % (FLUSH) 0.9 %
10 SYRINGE (ML) INJECTION PRN
OUTPATIENT
Start: 2024-09-11

## 2024-08-14 RX ORDER — SODIUM CHLORIDE 9 MG/ML
INJECTION, SOLUTION INTRAVENOUS CONTINUOUS
OUTPATIENT
Start: 2024-09-11

## 2024-08-14 RX ORDER — SODIUM CHLORIDE 9 MG/ML
INJECTION, SOLUTION INTRAVENOUS CONTINUOUS
Status: ACTIVE | OUTPATIENT
Start: 2024-08-14 | End: 2024-08-14

## 2024-08-14 RX ADMIN — VEDOLIZUMAB 300 MG: 300 INJECTION, POWDER, LYOPHILIZED, FOR SOLUTION INTRAVENOUS at 09:50

## 2024-08-14 RX ADMIN — Medication 10 ML: at 09:49

## 2024-08-14 RX ADMIN — SODIUM CHLORIDE: 9 INJECTION, SOLUTION INTRAVENOUS at 09:49

## 2024-08-14 ASSESSMENT — PAIN SCALES - GENERAL: PAINLEVEL_OUTOF10: 0

## 2024-08-14 NOTE — PROGRESS NOTES
Pt here for entyvio infusion  Pt without c/o's today  She reports that her stools are mostly normal now   Pt denies any diarrhea, cramping, bloating or bleeding  IV # 22 was started in RAC on 1st attempt per myself   Pt gayla well  No blood work needed for today  Entyvio 300 mg IVPB starting to infuse  IV site unremarkable Will monitor

## 2024-08-15 ENCOUNTER — PATIENT MESSAGE (OUTPATIENT)
Dept: FAMILY MEDICINE CLINIC | Age: 69
End: 2024-08-15

## 2024-08-15 DIAGNOSIS — N18.31 TYPE 2 DIABETES MELLITUS WITH STAGE 3A CHRONIC KIDNEY DISEASE, WITHOUT LONG-TERM CURRENT USE OF INSULIN (HCC): ICD-10-CM

## 2024-08-15 DIAGNOSIS — I87.2 VENOUS STASIS DERMATITIS OF BOTH LOWER EXTREMITIES: ICD-10-CM

## 2024-08-15 DIAGNOSIS — E11.9 DIET-CONTROLLED TYPE 2 DIABETES MELLITUS (HCC): ICD-10-CM

## 2024-08-15 DIAGNOSIS — E11.22 TYPE 2 DIABETES MELLITUS WITH STAGE 3A CHRONIC KIDNEY DISEASE, WITHOUT LONG-TERM CURRENT USE OF INSULIN (HCC): ICD-10-CM

## 2024-08-15 RX ORDER — TRIAMCINOLONE ACETONIDE 1 MG/G
CREAM TOPICAL
Qty: 80 G | Refills: 1 | Status: SHIPPED | OUTPATIENT
Start: 2024-08-15

## 2024-08-15 RX ORDER — BLOOD SUGAR DIAGNOSTIC
1 STRIP MISCELLANEOUS DAILY
Qty: 100 EACH | Refills: 3 | Status: SHIPPED | OUTPATIENT
Start: 2024-08-15

## 2024-08-15 NOTE — TELEPHONE ENCOUNTER
Refill Request     CONFIRM preferrred pharmacy with the patient.    If Mail Order Rx - Pend for 90 day refill.      Last Seen: Last Seen Department: 6/21/2024  Last Seen by PCP: 6/21/2024    Last Written: 4-    If no future appointment scheduled, route STAFF MESSAGE with patient name to the  Pool for scheduling.      Next Appointment:   Future Appointments   Date Time Provider Department [place]   9/10/2024  9:00 AM [facility] N[facility] 1 [facility] RN [facility]   9/11/2024  3:00 PM SCHEDULE, VASCULAR PROCEDURE [facility]   10/9/2024  9:00 AM [facility] NURSING ROOM 1 [facility] RN [facility]   11/6/2024  9:00 AM [facility] NURSING ROOM 1 [facility]   12/4/2024  9:00 AM [facility] NURSING ROOM 1 [facility] RN [facility]   1/2/2025  9:30 AM [facility] NURSING ROOM 1 [facility] RN [facility]   1/2/2025 11:00 AM Ade Bonilla, APRN - CNP [place] [facility] DEP       Message sent to  to schedule appt with patient?  N/A      Requested Prescriptions     Pending Prescriptions Disp Refills    Magnesium Oxide (GNP MAGNESIUM OXIDE) 250 MG TABS tablet 540 tablet 2     Sig: TAKE 2 TABLETS THREE TIMES DAILY

## 2024-08-15 NOTE — TELEPHONE ENCOUNTER
Refill Request     CONFIRM preferrred pharmacy with the patient.    If Mail Order Rx - Pend for 90 day refill.      Last Seen: Last Seen Department: 2024  Last Seen by PCP: 2024    Last Written: 2024    If no future appointment scheduled, route STAFF MESSAGE with patient name to the  Pool for scheduling.      Next Appointment:   Future Appointments   Date Time Provider Department [place]   9/10/2024  9:00 AM [facility] OP NURSING ROOM 1 [facility] OP RN [facility]   2024  3:00 PM SCHEDULE, VASCULAR PROCEDURE [facility]   10/9/2024  9:00 AM [facility] OP [facility]URSING ROOM 1 [facility] OP RN [facility]   2024  9:00 AM [facility] OP [facility]URSING ROOM 1 [facility] RN [facility]   2024  9:00 AM [facility] OP NURSING ROOM 1 [facility] OP RN [facility]   2025  9:30 AM [facility] OP NURSING ROOM 1 [facility] OP RN [facility]   2025 11:00 AM Ade Bonilla, APRN - CNP [facility] DEP       Message sent to  to schedule appt with patient?  N/A      Requested Prescriptions     Pending Prescriptions Disp Refills    blood glucose test strips (ACCU-CHEK FÉLIX PLUS) strip 100 each 3     Si each by In Vitro route daily As needed.

## 2024-09-10 ENCOUNTER — HOSPITAL ENCOUNTER (OUTPATIENT)
Dept: NURSING | Age: 69
Setting detail: INFUSION SERIES
Discharge: HOME | End: 2024-09-10
Payer: MEDICARE

## 2024-09-10 VITALS
BODY MASS INDEX: 38.09 KG/M2 | HEART RATE: 83 BPM | WEIGHT: 215 LBS | HEIGHT: 63 IN | SYSTOLIC BLOOD PRESSURE: 148 MMHG | TEMPERATURE: 97.9 F | DIASTOLIC BLOOD PRESSURE: 71 MMHG | RESPIRATION RATE: 16 BRPM

## 2024-09-10 DIAGNOSIS — K51.00 CHRONIC ULCERATIVE ENTEROCOLITIS WITHOUT COMPLICATION (HCC): Primary | ICD-10-CM

## 2024-09-10 LAB
ALBUMIN SERPL-MCNC: 3.7 G/DL (ref 3.4–5)
ALBUMIN/GLOB SERPL: 0.9 {RATIO} (ref 1.1–2.2)
ALP SERPL-CCNC: 101 U/L (ref 40–129)
ALT SERPL-CCNC: 15 U/L (ref 10–40)
ANION GAP SERPL CALCULATED.3IONS-SCNC: 8 MMOL/L (ref 3–16)
AST SERPL-CCNC: 30 U/L (ref 15–37)
BILIRUB SERPL-MCNC: 0.7 MG/DL (ref 0–1)
BUN SERPL-MCNC: 23 MG/DL (ref 7–20)
CALCIUM SERPL-MCNC: 9.5 MG/DL (ref 8.3–10.6)
CHLORIDE SERPL-SCNC: 102 MMOL/L (ref 99–110)
CO2 SERPL-SCNC: 30 MMOL/L (ref 21–32)
CREAT SERPL-MCNC: 1 MG/DL (ref 0.6–1.2)
DEPRECATED RDW RBC AUTO: 14.5 % (ref 12.4–15.4)
GFR SERPLBLD CREATININE-BSD FMLA CKD-EPI: 61 ML/MIN/{1.73_M2}
GLUCOSE SERPL-MCNC: 105 MG/DL (ref 70–99)
HCT VFR BLD AUTO: 34.3 % (ref 36–48)
HGB BLD-MCNC: 11.7 G/DL (ref 12–16)
MCH RBC QN AUTO: 31.7 PG (ref 26–34)
MCHC RBC AUTO-ENTMCNC: 34 G/DL (ref 31–36)
MCV RBC AUTO: 93 FL (ref 80–100)
PLATELET # BLD AUTO: 144 K/UL (ref 135–450)
PMV BLD AUTO: 9.6 FL (ref 5–10.5)
POTASSIUM SERPL-SCNC: 4.6 MMOL/L (ref 3.5–5.1)
PROT SERPL-MCNC: 7.8 G/DL (ref 6.4–8.2)
RBC # BLD AUTO: 3.69 M/UL (ref 4–5.2)
SODIUM SERPL-SCNC: 140 MMOL/L (ref 136–145)
WBC # BLD AUTO: 5.4 K/UL (ref 4–11)

## 2024-09-10 PROCEDURE — 6360000002 HC RX W HCPCS: Performed by: INTERNAL MEDICINE

## 2024-09-10 PROCEDURE — 96365 THER/PROPH/DIAG IV INF INIT: CPT

## 2024-09-10 PROCEDURE — 80053 COMPREHEN METABOLIC PANEL: CPT

## 2024-09-10 PROCEDURE — 99211 OFF/OP EST MAY X REQ PHY/QHP: CPT

## 2024-09-10 PROCEDURE — 2580000003 HC RX 258: Performed by: INTERNAL MEDICINE

## 2024-09-10 PROCEDURE — 85027 COMPLETE CBC AUTOMATED: CPT

## 2024-09-10 RX ORDER — SODIUM CHLORIDE 0.9 % (FLUSH) 0.9 %
10 SYRINGE (ML) INJECTION PRN
Status: DISCONTINUED | OUTPATIENT
Start: 2024-09-10 | End: 2024-09-11 | Stop reason: HOSPADM

## 2024-09-10 RX ORDER — SODIUM CHLORIDE 9 MG/ML
100 INJECTION, SOLUTION INTRAVENOUS CONTINUOUS
Status: ACTIVE | OUTPATIENT
Start: 2024-09-10 | End: 2024-09-10

## 2024-09-10 RX ORDER — SODIUM CHLORIDE 9 MG/ML
INJECTION, SOLUTION INTRAVENOUS CONTINUOUS
OUTPATIENT
Start: 2024-10-08

## 2024-09-10 RX ORDER — SODIUM CHLORIDE 0.9 % (FLUSH) 0.9 %
10 SYRINGE (ML) INJECTION PRN
OUTPATIENT
Start: 2024-10-08

## 2024-09-10 RX ADMIN — SODIUM CHLORIDE 100 ML: 9 INJECTION, SOLUTION INTRAVENOUS at 09:20

## 2024-09-10 RX ADMIN — Medication 10 ML: at 09:20

## 2024-09-10 RX ADMIN — VEDOLIZUMAB 300 MG: 300 INJECTION, POWDER, LYOPHILIZED, FOR SOLUTION INTRAVENOUS at 09:21

## 2024-09-10 ASSESSMENT — PAIN SCALES - GENERAL: PAINLEVEL_OUTOF10: 0

## 2024-09-11 ENCOUNTER — PROCEDURE VISIT (OUTPATIENT)
Dept: CARDIOLOGY CLINIC | Age: 69
End: 2024-09-11

## 2024-09-11 DIAGNOSIS — I83.12 VARICOSE VEINS OF BOTH LOWER EXTREMITIES WITH INFLAMMATION: Primary | ICD-10-CM

## 2024-09-11 DIAGNOSIS — I83.11 VARICOSE VEINS OF BOTH LOWER EXTREMITIES WITH INFLAMMATION: Primary | ICD-10-CM

## 2024-09-12 ENCOUNTER — HOSPITAL ENCOUNTER (OUTPATIENT)
Age: 69
Discharge: HOME | End: 2024-09-12
Payer: MEDICARE

## 2024-09-12 PROCEDURE — 83993 ASSAY FOR CALPROTECTIN FECAL: CPT

## 2024-09-14 LAB — CALPROTECTIN STL-MCNT: 22 UG/G

## 2024-09-18 DIAGNOSIS — F33.0 MILD EPISODE OF RECURRENT MAJOR DEPRESSIVE DISORDER (HCC): ICD-10-CM

## 2024-09-18 RX ORDER — DULOXETIN HYDROCHLORIDE 60 MG/1
60 CAPSULE, DELAYED RELEASE ORAL DAILY
Qty: 90 CAPSULE | Refills: 3 | Status: SHIPPED | OUTPATIENT
Start: 2024-09-18

## 2024-09-18 RX ORDER — LEVOTHYROXINE SODIUM 150 UG/1
150 TABLET ORAL DAILY
Qty: 90 TABLET | Refills: 3 | Status: SHIPPED | OUTPATIENT
Start: 2024-09-18

## 2024-10-08 ENCOUNTER — HOSPITAL ENCOUNTER (OUTPATIENT)
Dept: NURSING | Age: 69
Setting detail: INFUSION SERIES
Discharge: HOME | End: 2024-10-08
Payer: MEDICARE

## 2024-10-08 VITALS
DIASTOLIC BLOOD PRESSURE: 69 MMHG | BODY MASS INDEX: 38.09 KG/M2 | TEMPERATURE: 98.2 F | HEART RATE: 70 BPM | WEIGHT: 214.95 LBS | SYSTOLIC BLOOD PRESSURE: 142 MMHG | RESPIRATION RATE: 16 BRPM | HEIGHT: 63 IN

## 2024-10-08 DIAGNOSIS — K51.00 CHRONIC ULCERATIVE ENTEROCOLITIS WITHOUT COMPLICATION (HCC): Primary | ICD-10-CM

## 2024-10-08 PROCEDURE — 96365 THER/PROPH/DIAG IV INF INIT: CPT

## 2024-10-08 PROCEDURE — 2580000003 HC RX 258: Performed by: INTERNAL MEDICINE

## 2024-10-08 PROCEDURE — 99211 OFF/OP EST MAY X REQ PHY/QHP: CPT

## 2024-10-08 PROCEDURE — 6360000002 HC RX W HCPCS: Performed by: INTERNAL MEDICINE

## 2024-10-08 RX ORDER — SODIUM CHLORIDE 0.9 % (FLUSH) 0.9 %
10 SYRINGE (ML) INJECTION PRN
OUTPATIENT
Start: 2024-11-05

## 2024-10-08 RX ORDER — SODIUM CHLORIDE 9 MG/ML
100 INJECTION, SOLUTION INTRAVENOUS CONTINUOUS
Status: ACTIVE | OUTPATIENT
Start: 2024-10-08 | End: 2024-10-08

## 2024-10-08 RX ORDER — SODIUM CHLORIDE 9 MG/ML
INJECTION, SOLUTION INTRAVENOUS CONTINUOUS
OUTPATIENT
Start: 2024-11-05

## 2024-10-08 RX ADMIN — VEDOLIZUMAB 300 MG: 300 INJECTION, POWDER, LYOPHILIZED, FOR SOLUTION INTRAVENOUS at 09:54

## 2024-10-08 RX ADMIN — SODIUM CHLORIDE 100 ML: 9 INJECTION, SOLUTION INTRAVENOUS at 09:53

## 2024-10-08 ASSESSMENT — PAIN SCALES - GENERAL: PAINLEVEL_OUTOF10: 0

## 2024-11-06 ENCOUNTER — HOSPITAL ENCOUNTER (OUTPATIENT)
Dept: NURSING | Age: 69
Setting detail: INFUSION SERIES
Discharge: HOME | End: 2024-11-06
Payer: MEDICARE

## 2024-11-06 VITALS
HEIGHT: 63 IN | WEIGHT: 214.95 LBS | TEMPERATURE: 98 F | RESPIRATION RATE: 16 BRPM | SYSTOLIC BLOOD PRESSURE: 144 MMHG | HEART RATE: 74 BPM | BODY MASS INDEX: 38.09 KG/M2 | DIASTOLIC BLOOD PRESSURE: 77 MMHG

## 2024-11-06 DIAGNOSIS — K51.00 CHRONIC ULCERATIVE ENTEROCOLITIS WITHOUT COMPLICATION (HCC): Primary | ICD-10-CM

## 2024-11-06 LAB
ALBUMIN SERPL-MCNC: 4 G/DL (ref 3.4–5)
ALBUMIN/GLOB SERPL: 1.4 {RATIO} (ref 1.1–2.2)
ALP SERPL-CCNC: 91 U/L (ref 40–129)
ALT SERPL-CCNC: 12 U/L (ref 10–40)
ANION GAP SERPL CALCULATED.3IONS-SCNC: 9 MMOL/L (ref 3–16)
AST SERPL-CCNC: 30 U/L (ref 15–37)
BILIRUB SERPL-MCNC: 0.6 MG/DL (ref 0–1)
BUN SERPL-MCNC: 22 MG/DL (ref 7–20)
CALCIUM SERPL-MCNC: 9.4 MG/DL (ref 8.3–10.6)
CHLORIDE SERPL-SCNC: 104 MMOL/L (ref 99–110)
CO2 SERPL-SCNC: 28 MMOL/L (ref 21–32)
CREAT SERPL-MCNC: 1 MG/DL (ref 0.6–1.2)
DEPRECATED RDW RBC AUTO: 14.8 % (ref 12.4–15.4)
GFR SERPLBLD CREATININE-BSD FMLA CKD-EPI: 61 ML/MIN/{1.73_M2}
GLUCOSE SERPL-MCNC: 106 MG/DL (ref 70–99)
HCT VFR BLD AUTO: 35.4 % (ref 36–48)
HGB BLD-MCNC: 12 G/DL (ref 12–16)
MCH RBC QN AUTO: 31.7 PG (ref 26–34)
MCHC RBC AUTO-ENTMCNC: 33.9 G/DL (ref 31–36)
MCV RBC AUTO: 93.5 FL (ref 80–100)
PLATELET # BLD AUTO: 147 K/UL (ref 135–450)
PMV BLD AUTO: 9.7 FL (ref 5–10.5)
POTASSIUM SERPL-SCNC: 5 MMOL/L (ref 3.5–5.1)
PROT SERPL-MCNC: 6.9 G/DL (ref 6.4–8.2)
RBC # BLD AUTO: 3.79 M/UL (ref 4–5.2)
SODIUM SERPL-SCNC: 141 MMOL/L (ref 136–145)
WBC # BLD AUTO: 6.1 K/UL (ref 4–11)

## 2024-11-06 PROCEDURE — 99211 OFF/OP EST MAY X REQ PHY/QHP: CPT

## 2024-11-06 PROCEDURE — 85027 COMPLETE CBC AUTOMATED: CPT

## 2024-11-06 PROCEDURE — 6360000002 HC RX W HCPCS: Performed by: INTERNAL MEDICINE

## 2024-11-06 PROCEDURE — 80053 COMPREHEN METABOLIC PANEL: CPT

## 2024-11-06 PROCEDURE — 96365 THER/PROPH/DIAG IV INF INIT: CPT

## 2024-11-06 PROCEDURE — 2580000003 HC RX 258: Performed by: INTERNAL MEDICINE

## 2024-11-06 RX ORDER — SODIUM CHLORIDE 9 MG/ML
INJECTION, SOLUTION INTRAVENOUS CONTINUOUS
OUTPATIENT
Start: 2024-12-04

## 2024-11-06 RX ORDER — SODIUM CHLORIDE 0.9 % (FLUSH) 0.9 %
10 SYRINGE (ML) INJECTION PRN
OUTPATIENT
Start: 2024-12-04

## 2024-11-06 RX ADMIN — VEDOLIZUMAB 300 MG: 300 INJECTION, POWDER, LYOPHILIZED, FOR SOLUTION INTRAVENOUS at 09:34

## 2024-11-06 ASSESSMENT — PAIN SCALES - GENERAL: PAINLEVEL_OUTOF10: 0

## 2024-11-06 NOTE — PROGRESS NOTES
Entyvio infused  IV removed without difficulty Cath tip intact  Pressure then pressure dressing was applied and secured with a coban dressing  IV site unremarkable  Discharge instructions reviewed with pt and copy was given  Understanding verbalized  Pt was then up to BR and then was discharged ambulatory in stable condition

## 2024-11-06 NOTE — PROGRESS NOTES
Pt here for entyvio infusion  Pt without c/o's today  She reports that her stools are mostly normal now   Pt denies any diarrhea, cramping, bloating or bleeding  IV # 22 was started in RAC on 1st attempt per myself  Blood for lab work was drawn without difficulty  Pt gayla well  Entyvio 300 mg IVPB starting to infuse  IV site unremarkable Will monitor

## 2024-11-08 DIAGNOSIS — M48.062 LUMBAR STENOSIS WITH NEUROGENIC CLAUDICATION: ICD-10-CM

## 2024-11-08 DIAGNOSIS — M54.16 LUMBAR RADICULITIS: ICD-10-CM

## 2024-11-08 RX ORDER — GABAPENTIN 100 MG/1
100 CAPSULE ORAL NIGHTLY
Qty: 7 CAPSULE | Refills: 0 | OUTPATIENT
Start: 2024-11-08 | End: 2024-11-15

## 2024-11-08 NOTE — TELEPHONE ENCOUNTER
Please call patient and clarify this request...  chart states medication was discontinued (and it was prescribed by someone else)

## 2024-11-08 NOTE — TELEPHONE ENCOUNTER
Refill Request     CONFIRM preferrred pharmacy with the patient.    If Mail Order Rx - Pend for 90 day refill.      Last Seen: Last Seen Department: 6/21/2024  Last Seen by PCP: 6/21/2024    Last Written: 4/24/24 and she will not have enough why she waits on her mail order to get here     If no future appointment scheduled, route STAFF MESSAGE with patient name to the  Pool for scheduling.      Next Appointment:   Future Appointments   Date Time Provider Department Center   12/4/2024  9:00 AM [facility]URSING ROOM 1 [facility] OP RN [place] [facility]   1/2/2025  9:30 AM [facility]   1/2/2025 11:00 AM Ade Bonilla, APRN - CNP [facility]   1/30/2025  9:30 AM [facility]   2/27/2025  9:30 AM [facility] 1 [facility]   3/27/2025  9:30 AM [facility]   4[facility]24/2025  9:30 AM [facility]       Message sent to  to schedule appt with patient?  N/A      Requested Prescriptions     Pending Prescriptions Disp Refills    gabapentin (NEURONTIN) 100 MG capsule 7 capsule 0     Sig: Take 1 capsule by mouth nightly for 7 days.

## 2024-11-13 DIAGNOSIS — E53.8 B12 DEFICIENCY: ICD-10-CM

## 2024-11-13 DIAGNOSIS — M07.60 ENTEROPATHIC ARTHRITIS: ICD-10-CM

## 2024-11-13 DIAGNOSIS — I87.2 VENOUS STASIS DERMATITIS OF BOTH LOWER EXTREMITIES: ICD-10-CM

## 2024-11-13 DIAGNOSIS — F33.0 MILD EPISODE OF RECURRENT MAJOR DEPRESSIVE DISORDER (HCC): ICD-10-CM

## 2024-11-13 DIAGNOSIS — Z13.6 SCREENING FOR CARDIOVASCULAR CONDITION: ICD-10-CM

## 2024-11-13 RX ORDER — DULOXETIN HYDROCHLORIDE 60 MG/1
60 CAPSULE, DELAYED RELEASE ORAL DAILY
Qty: 90 CAPSULE | Refills: 3 | Status: SHIPPED | OUTPATIENT
Start: 2024-11-13

## 2024-11-13 RX ORDER — FOLIC ACID 1 MG/1
1000 TABLET ORAL DAILY
Qty: 90 TABLET | Refills: 3 | Status: SHIPPED | OUTPATIENT
Start: 2024-11-13

## 2024-11-13 RX ORDER — LEVOTHYROXINE SODIUM 150 UG/1
150 TABLET ORAL DAILY
Qty: 90 TABLET | Refills: 0 | Status: SHIPPED | OUTPATIENT
Start: 2024-11-13

## 2024-11-13 RX ORDER — CELECOXIB 100 MG/1
CAPSULE ORAL
Qty: 180 CAPSULE | Refills: 3 | Status: SHIPPED | OUTPATIENT
Start: 2024-11-13

## 2024-11-13 RX ORDER — ALENDRONATE SODIUM 70 MG/1
70 TABLET ORAL
Qty: 12 TABLET | Refills: 3 | Status: SHIPPED | OUTPATIENT
Start: 2024-11-13

## 2024-11-13 RX ORDER — TRIAMCINOLONE ACETONIDE 1 MG/G
CREAM TOPICAL
Qty: 80 G | Refills: 1 | Status: SHIPPED | OUTPATIENT
Start: 2024-11-13

## 2024-11-13 RX ORDER — SIMVASTATIN 20 MG
20 TABLET ORAL NIGHTLY
Qty: 90 TABLET | Refills: 3 | Status: SHIPPED | OUTPATIENT
Start: 2024-11-13

## 2024-11-22 NOTE — TELEPHONE ENCOUNTER
Refill Request     CONFIRM preferrred pharmacy with the patient.    If Mail Order Rx - Pend for 90 day refill.      Last Seen: Last Seen Department: 6/21/2024  Last Seen by PCP: 6/21/2024    Last Written:     If no future appointment scheduled, route STAFF MESSAGE with patient name to the  Pool for scheduling.      Next Appointment:   Future Appointments   Date Time Provider Department Center   12/4/2024  9:00 AM [facility]   1/2/2025  9:30 AM [facility] 1 [facility]   1/2/2025 11:00 AM Ade Bonilla, APR[facility] - [facility]   1/30/2025  9:30 AM [facility] 1 [facility]   2/27/2025  9:30 AM [facility] 1 [facility]   3/27/2025  9:30 AM [facility] 1 [facility]   4/24/2025  9:30 AM [facility] 1 [facility]       Message sent to  to schedule appt with patient?        Requested Prescriptions     Pending Prescriptions Disp Refills    gabapentin (NEURONTIN) 100 MG capsule 90 capsule 2     Sig: Take 1 capsule by mouth 2 times daily for 270 doses.

## 2024-11-23 RX ORDER — GABAPENTIN 100 MG/1
100 CAPSULE ORAL 2 TIMES DAILY
Qty: 90 CAPSULE | Refills: 2 | OUTPATIENT
Start: 2024-11-23 | End: 2025-04-07

## 2024-11-24 DIAGNOSIS — Z13.6 SCREENING FOR CARDIOVASCULAR CONDITION: ICD-10-CM

## 2024-11-24 DIAGNOSIS — M07.60 ENTEROPATHIC ARTHRITIS: ICD-10-CM

## 2024-11-25 ENCOUNTER — TELEPHONE (OUTPATIENT)
Dept: ADMINISTRATIVE | Age: 69
End: 2024-11-25

## 2024-11-25 RX ORDER — GABAPENTIN 100 MG/1
100 CAPSULE ORAL 3 TIMES DAILY
Qty: 270 CAPSULE | Refills: 0 | Status: SHIPPED | OUTPATIENT
Start: 2024-11-25 | End: 2025-02-23

## 2024-11-25 RX ORDER — CELECOXIB 100 MG/1
CAPSULE ORAL
Qty: 180 CAPSULE | Refills: 3 | OUTPATIENT
Start: 2024-11-25

## 2024-11-25 RX ORDER — SIMVASTATIN 20 MG
20 TABLET ORAL NIGHTLY
Qty: 90 TABLET | Refills: 3 | OUTPATIENT
Start: 2024-11-25

## 2024-11-25 NOTE — TELEPHONE ENCOUNTER
Submitted PA for GNP Magnesium Oxide 250MG tablets   Via [facility] Key: [id number]  STATUS: NOT SENT     Please provide Dx and ICD- 10 code to submit pa request     If this requires a response please respond to the pool ( P [facility] MEDICATION PRE-AUTH).      Thank you please advise patient.

## 2024-11-26 ENCOUNTER — TELEPHONE (OUTPATIENT)
Dept: FAMILY MEDICINE CLINIC | Age: 69
End: 2024-11-26

## 2024-11-26 NOTE — TELEPHONE ENCOUNTER
The medication was DENIED; DENIAL letter is uploaded to MEDIA.    Generic Denial: Drug is not covered by the plan ( Plan Exclusion)           Note :        If you want an APPEAL; please note in this encounter what new information you would like to APPEAL with.  Once complete route back to PA POOL.    If this requires a response please respond to the pool ( P [facility] MEDICATION PRE-AUTH).      Thank you please advise patient.

## 2024-11-26 NOTE — TELEPHONE ENCOUNTER
Pt called regarding her appt in Jan. Her insurance isn't covering PCP after jan 1.     She will have to switch to Dr. Alves.       Would you be able to see her before the new year?     Pt also wants to know if she can increase his Cymbalta, she is under a a lot of stress.       Pt is where that the pcp is out the office til next week.

## 2024-11-26 NOTE — TELEPHONE ENCOUNTER
Submitted PA for GNP Magnesium Oxide 250MG tablets   Via [facility] Key: [id number] STATUS: PENDING.    Follow up done daily; if no decision with in three days we will refax.  If another three days goes by with no decision will call the insurance for status.

## 2024-12-04 ENCOUNTER — HOSPITAL ENCOUNTER (OUTPATIENT)
Dept: NURSING | Age: 69
Setting detail: INFUSION SERIES
Discharge: HOME | End: 2024-12-04
Payer: MEDICARE

## 2024-12-04 VITALS
HEART RATE: 83 BPM | SYSTOLIC BLOOD PRESSURE: 146 MMHG | BODY MASS INDEX: 35.26 KG/M2 | HEIGHT: 63 IN | RESPIRATION RATE: 16 BRPM | WEIGHT: 199 LBS | DIASTOLIC BLOOD PRESSURE: 82 MMHG | TEMPERATURE: 97.8 F

## 2024-12-04 DIAGNOSIS — K51.00 CHRONIC ULCERATIVE ENTEROCOLITIS WITHOUT COMPLICATION (HCC): Primary | ICD-10-CM

## 2024-12-04 PROCEDURE — 96365 THER/PROPH/DIAG IV INF INIT: CPT

## 2024-12-04 PROCEDURE — 6360000002 HC RX W HCPCS: Performed by: INTERNAL MEDICINE

## 2024-12-04 PROCEDURE — 2580000003 HC RX 258: Performed by: INTERNAL MEDICINE

## 2024-12-04 PROCEDURE — 99211 OFF/OP EST MAY X REQ PHY/QHP: CPT

## 2024-12-04 PROCEDURE — 80280 DRUG ASSAY VEDOLIZUMAB: CPT

## 2024-12-04 PROCEDURE — 82397 CHEMILUMINESCENT ASSAY: CPT

## 2024-12-04 RX ORDER — SODIUM CHLORIDE 9 MG/ML
INJECTION, SOLUTION INTRAVENOUS CONTINUOUS
Status: CANCELLED | OUTPATIENT
Start: 2025-01-01

## 2024-12-04 RX ORDER — SODIUM CHLORIDE 0.9 % (FLUSH) 0.9 %
10 SYRINGE (ML) INJECTION PRN
OUTPATIENT
Start: 2025-01-01

## 2024-12-04 RX ADMIN — VEDOLIZUMAB 300 MG: 300 INJECTION, POWDER, LYOPHILIZED, FOR SOLUTION INTRAVENOUS at 09:16

## 2024-12-04 ASSESSMENT — PAIN SCALES - GENERAL: PAINLEVEL_OUTOF10: 0

## 2024-12-04 NOTE — PROGRESS NOTES
Pt here for entyvio infusion  Pt without c/o's today  She reports that her stools are mostly normal now but she occasionally has trouble with constipation   Pt denies any diarrhea, cramping, bloating or bleeding  IV # 22 was started in RAC on 1st attempt per myself  Blood for Entyvio level  was drawn without difficulty and hand delivered to lab  Pt gayla well  Entyvio 300 mg IVPB starting to infuse  IV site unremarkable Will monitor

## 2024-12-16 LAB — MISCELLANEOUS LAB TEST ORDER: NORMAL

## 2024-12-27 NOTE — DISCHARGE INSTRUCTIONS
vedolizumab  Pronunciation:  GEN REED ue mab  Brand:  Entyvio  What is vedolizumab?  Vedolizumab reduces the effects of a substance in the body that can cause inflammation.  Vedolizumab is used in adults with moderate to severe ulcerative colitis (UC), or moderate to severe Crohn's disease.  Vedolizumab treats active disease and may help keep UC or Crohn's symptoms under control long term. Vedolizumab may also reduce the need for steroid medicines in helping to control symptoms long term.  Vedolizumab is usually given after other medicines have been tried without success.  Vedolizumab may also be used for purposes not listed in this medication guide.  What should I discuss with my healthcare provider before receiving vedolizumab?  You should not use vedolizumab if you are allergic to it.  To make sure vedolizumab is safe for you, tell your doctor if you have:  an active or recent infection;  tuberculosis (or if you have close contact with someone who has tuberculosis);  signs of infection such as fever, cough, or flu symptoms;  open sores or skin wounds;  weak immune system (caused by disease or by using certain medicine); or  if you are scheduled to receive any vaccines.  You should be up to date with all needed vaccinations before receiving vedolizumab.   FDA pregnancy category B. Vedolizumab is not expected to harm an unborn baby. Tell your doctor if you are pregnant or plan to become pregnant during treatment.  If you are pregnant, your name may be listed on a pregnancy registry. This is to track the outcome of the pregnancy and to evaluate any effects of vedolizumab on the baby.  It is not known whether vedolizumab passes into breast milk or if it could harm a nursing baby. Tell your doctor if you are breast-feeding a baby.  Do not give this medicine to anyone under 18 years old without medical advice.  How is vedolizumab given?  Before you start treatment with vedolizumab, your doctor may perform tests to  colitis, such as adalimumab, certolizumab, infliximab; or  other drugs that weaken the immune system such as cancer medicine, steroids,   WHere can I get more information?  Your doctor or pharmacist can provide more information about vedolizumab.    RETURN ON JAN 29,2025 AT 0830 FOR ENTYVIO INFUSION    FOLLOW UP WITH DR. BARTON AS NEEDED OR SCHEDULED    CALL US IF YOU NEED TO CANCEL OR RESCHEDULE YOUR APPOINTMENT  [phone]

## 2025-01-02 ENCOUNTER — HOSPITAL ENCOUNTER (OUTPATIENT)
Dept: NURSING | Age: 70
Setting detail: INFUSION SERIES
Discharge: HOME | End: 2025-01-02
Payer: MEDICARE

## 2025-01-02 VITALS
SYSTOLIC BLOOD PRESSURE: 138 MMHG | WEIGHT: 199 LBS | HEART RATE: 74 BPM | HEIGHT: 63 IN | BODY MASS INDEX: 35.26 KG/M2 | DIASTOLIC BLOOD PRESSURE: 82 MMHG | RESPIRATION RATE: 16 BRPM | TEMPERATURE: 98 F

## 2025-01-02 DIAGNOSIS — K51.00 CHRONIC ULCERATIVE ENTEROCOLITIS WITHOUT COMPLICATION (HCC): Primary | ICD-10-CM

## 2025-01-02 LAB
ALBUMIN SERPL-MCNC: 3.5 G/DL (ref 3.4–5)
ALBUMIN/GLOB SERPL: 0.9 {RATIO} (ref 1.1–2.2)
ALP SERPL-CCNC: 93 U/L (ref 40–129)
ALT SERPL-CCNC: 15 U/L (ref 10–40)
ANION GAP SERPL CALCULATED.3IONS-SCNC: 8 MMOL/L (ref 3–16)
AST SERPL-CCNC: 31 U/L (ref 15–37)
BILIRUB SERPL-MCNC: 0.6 MG/DL (ref 0–1)
BUN SERPL-MCNC: 28 MG/DL (ref 7–20)
CALCIUM SERPL-MCNC: 8.6 MG/DL (ref 8.3–10.6)
CHLORIDE SERPL-SCNC: 102 MMOL/L (ref 99–110)
CO2 SERPL-SCNC: 29 MMOL/L (ref 21–32)
CREAT SERPL-MCNC: 1.1 MG/DL (ref 0.6–1.2)
DEPRECATED RDW RBC AUTO: 14.3 % (ref 12.4–15.4)
GFR SERPLBLD CREATININE-BSD FMLA CKD-EPI: 54 ML/MIN/{1.73_M2}
GLUCOSE SERPL-MCNC: 100 MG/DL (ref 70–99)
HCT VFR BLD AUTO: 36.1 % (ref 36–48)
HGB BLD-MCNC: 12.2 G/DL (ref 12–16)
MCH RBC QN AUTO: 32.5 PG (ref 26–34)
MCHC RBC AUTO-ENTMCNC: 33.9 G/DL (ref 31–36)
MCV RBC AUTO: 95.8 FL (ref 80–100)
PLATELET # BLD AUTO: 139 K/UL (ref 135–450)
PMV BLD AUTO: 9.4 FL (ref 5–10.5)
POTASSIUM SERPL-SCNC: 4.4 MMOL/L (ref 3.5–5.1)
PROT SERPL-MCNC: 7.3 G/DL (ref 6.4–8.2)
RBC # BLD AUTO: 3.76 M/UL (ref 4–5.2)
SODIUM SERPL-SCNC: 139 MMOL/L (ref 136–145)
WBC # BLD AUTO: 5.6 K/UL (ref 4–11)

## 2025-01-02 PROCEDURE — 96365 THER/PROPH/DIAG IV INF INIT: CPT

## 2025-01-02 PROCEDURE — 80053 COMPREHEN METABOLIC PANEL: CPT

## 2025-01-02 PROCEDURE — 6360000002 HC RX W HCPCS: Performed by: INTERNAL MEDICINE

## 2025-01-02 PROCEDURE — 85027 COMPLETE CBC AUTOMATED: CPT

## 2025-01-02 PROCEDURE — 2580000003 HC RX 258: Performed by: INTERNAL MEDICINE

## 2025-01-02 PROCEDURE — 99211 OFF/OP EST MAY X REQ PHY/QHP: CPT

## 2025-01-02 RX ADMIN — VEDOLIZUMAB 300 MG: 300 INJECTION, POWDER, LYOPHILIZED, FOR SOLUTION INTRAVENOUS at 09:40

## 2025-01-02 ASSESSMENT — PAIN SCALES - GENERAL: PAINLEVEL_OUTOF10: 0

## 2025-01-02 NOTE — PROGRESS NOTES
Pt here for entyvio infusion  Pt without c/o's today  She reports that her stools are mostly normal now   Pt denies any diarrhea, cramping, bloating or bleeding  New Entyvio infusion consent obtained for 2025  IV # 22 was started in RAC on 2nd attempt per S Tiffany RN   Blood for lab work was drawn without difficulty  Pt gayla well  Entyvio 300 mg IVPB starting to infuse  IV site unremarkable Will monitor

## 2025-01-16 DIAGNOSIS — E53.8 B12 DEFICIENCY: ICD-10-CM

## 2025-01-16 RX ORDER — FOLIC ACID 1 MG/1
1000 TABLET ORAL DAILY
Qty: 90 TABLET | Refills: 1 | Status: SHIPPED | OUTPATIENT
Start: 2025-01-16

## 2025-01-16 NOTE — TELEPHONE ENCOUNTER
Refill Request     CONFIRM preferrred pharmacy with the patient.    If Mail Order Rx - Pend for 90 day refill.      Last Seen: Last Seen Department: 6/21/2024  Last Seen by PCP: 6/21/2024    Last Written: 11/13/24    If no future appointment scheduled, route STAFF MESSAGE with patient name to the  Pool for scheduling.      Next Appointment:   Future Appointments   Date Time Provider Department Center   1/29/2025  8:30 AM [facility] [place] [facility]   2/19/2025  9:20 AM Ade Bonilla, APRN - CNP [facility]   2/26/2025  9:00 AM [facility]   3/26/2025  9:00 AM [facility]   4/23/2025  9:00 AM [facility]   5/21/2025  9:30 AM [facility]   6/18/2025  9:30 AM [facility]       Message sent to  to schedule appt with patient?  N/A      Requested Prescriptions     Pending Prescriptions Disp Refills    folic acid (FOLVITE) 1 MG tablet 90 tablet 0     Sig: Take 1 tablet by mouth daily

## 2025-01-29 ENCOUNTER — HOSPITAL ENCOUNTER (OUTPATIENT)
Dept: NURSING | Age: 70
Setting detail: INFUSION SERIES
Discharge: HOME | End: 2025-01-29
Payer: MEDICARE

## 2025-01-29 VITALS
HEART RATE: 76 BPM | WEIGHT: 199.08 LBS | BODY MASS INDEX: 35.27 KG/M2 | RESPIRATION RATE: 16 BRPM | TEMPERATURE: 97.9 F | SYSTOLIC BLOOD PRESSURE: 138 MMHG | HEIGHT: 63 IN | DIASTOLIC BLOOD PRESSURE: 75 MMHG

## 2025-01-29 DIAGNOSIS — K51.00 CHRONIC ULCERATIVE ENTEROCOLITIS WITHOUT COMPLICATION (HCC): Primary | ICD-10-CM

## 2025-01-29 PROCEDURE — 96365 THER/PROPH/DIAG IV INF INIT: CPT

## 2025-01-29 PROCEDURE — 6360000002 HC RX W HCPCS: Performed by: INTERNAL MEDICINE

## 2025-01-29 PROCEDURE — 2580000003 HC RX 258: Performed by: INTERNAL MEDICINE

## 2025-01-29 PROCEDURE — 99211 OFF/OP EST MAY X REQ PHY/QHP: CPT

## 2025-01-29 RX ADMIN — VEDOLIZUMAB 300 MG: 300 INJECTION, POWDER, LYOPHILIZED, FOR SOLUTION INTRAVENOUS at 08:53

## 2025-01-29 ASSESSMENT — PAIN SCALES - GENERAL: PAINLEVEL_OUTOF10: 0

## 2025-01-29 NOTE — PROGRESS NOTES
Pt here for entyvio infusion  Pt without c/o's today  She reports that she has been having some constipation but otherwise her stools are mostly normal  Pt denies any diarrhea, cramping, bloating but reports she will have a little bleeding when she is constipated  IV # 22 was started in LAC on my  2nd attempt Pt gayla well  No lab work needed for today  Entyvio 300 mg IVPB starting to infuse  IV site unremarkable Will monitor         1.64

## 2025-02-12 ASSESSMENT — PATIENT HEALTH QUESTIONNAIRE - PHQ9
3. TROUBLE FALLING OR STAYING ASLEEP: SEVERAL DAYS
5. POOR APPETITE OR OVEREATING: SEVERAL DAYS
7. TROUBLE CONCENTRATING ON THINGS, SUCH AS READING THE NEWSPAPER OR WATCHING TELEVISION: SEVERAL DAYS
1. LITTLE INTEREST OR PLEASURE IN DOING THINGS: NEARLY EVERY DAY
8. MOVING OR SPEAKING SO SLOWLY THAT OTHER PEOPLE COULD HAVE NOTICED. OR THE OPPOSITE, BEING SO FIGETY OR RESTLESS THAT YOU HAVE BEEN MOVING AROUND A LOT MORE THAN USUAL: NOT AT ALL
5. POOR APPETITE OR OVEREATING: SEVERAL DAYS
8. MOVING OR SPEAKING SO SLOWLY THAT OTHER PEOPLE COULD HAVE NOTICED. OR THE OPPOSITE - BEING SO FIDGETY OR RESTLESS THAT YOU HAVE BEEN MOVING AROUND A LOT MORE THAN USUAL: NOT AT ALL
SUM OF ALL RESPONSES TO PHQ QUESTIONS 1-9: 10
4. FEELING TIRED OR HAVING LITTLE ENERGY: SEVERAL DAYS
2. FEELING DOWN, DEPRESSED OR HOPELESS: MORE THAN HALF THE DAYS
1. LITTLE INTEREST OR PLEASURE IN DOING THINGS: NEARLY EVERY DAY
SUM OF ALL RESPONSES TO PHQ QUESTIONS 1-9: 10
9. THOUGHTS THAT YOU WOULD BE BETTER OFF DEAD, OR OF HURTING YOURSELF: NOT AT ALL
SUM OF ALL RESPONSES TO PHQ QUESTIONS 1-9: 10
10. IF YOU CHECKED OFF ANY PROBLEMS, HOW DIFFICULT HAVE THESE PROBLEMS MADE IT FOR YOU TO DO YOUR WORK, TAKE CARE OF THINGS AT HOME, OR GET ALONG WITH OTHER PEOPLE: VERY DIFFICULT
SUM OF ALL RESPONSES TO PHQ9 QUESTIONS 1 & 2: 5
9. THOUGHTS THAT YOU WOULD BE BETTER OFF DEAD, OR OF HURTING YOURSELF: NOT AT ALL
4. FEELING TIRED OR HAVING LITTLE ENERGY: SEVERAL DAYS
SUM OF ALL RESPONSES TO PHQ QUESTIONS 1-9: 10
7. TROUBLE CONCENTRATING ON THINGS, SUCH AS READING THE NEWSPAPER OR WATCHING TELEVISION: SEVERAL DAYS
3. TROUBLE FALLING OR STAYING ASLEEP: SEVERAL DAYS
6. FEELING BAD ABOUT YOURSELF - OR THAT YOU ARE A FAILURE OR HAVE LET YOURSELF OR YOUR FAMILY DOWN: SEVERAL DAYS
SUM OF ALL RESPONSES TO PHQ QUESTIONS 1-9: 10
2. FEELING DOWN, DEPRESSED OR HOPELESS: MORE THAN HALF THE DAYS
10. IF YOU CHECKED OFF ANY PROBLEMS, HOW DIFFICULT HAVE THESE PROBLEMS MADE IT FOR YOU TO DO YOUR WORK, TAKE CARE OF THINGS AT HOME, OR GET ALONG WITH OTHER PEOPLE: VERY DIFFICULT
6. FEELING BAD ABOUT YOURSELF - OR THAT YOU ARE A FAILURE OR HAVE LET YOURSELF OR YOUR FAMILY DOWN: SEVERAL DAYS

## 2025-02-18 PROBLEM — I83.022: Status: ACTIVE | Noted: 2025-02-18

## 2025-02-18 SDOH — ECONOMIC STABILITY: FOOD INSECURITY: WITHIN THE PAST 12 MONTHS, THE FOOD YOU BOUGHT JUST DIDN'T LAST AND YOU DIDN'T HAVE MONEY TO GET MORE.: SOMETIMES TRUE

## 2025-02-18 SDOH — ECONOMIC STABILITY: FOOD INSECURITY: WITHIN THE PAST 12 MONTHS, YOU WORRIED THAT YOUR FOOD WOULD RUN OUT BEFORE YOU GOT MONEY TO BUY MORE.: OFTEN TRUE

## 2025-02-18 SDOH — ECONOMIC STABILITY: TRANSPORTATION INSECURITY
IN THE PAST 12 MONTHS, HAS THE LACK OF TRANSPORTATION KEPT YOU FROM MEDICAL APPOINTMENTS OR FROM GETTING MEDICATIONS?: YES

## 2025-02-18 SDOH — ECONOMIC STABILITY: INCOME INSECURITY: IN THE LAST 12 MONTHS, WAS THERE A TIME WHEN YOU WERE NOT ABLE TO PAY THE MORTGAGE OR RENT ON TIME?: NO

## 2025-02-19 ENCOUNTER — OFFICE VISIT (OUTPATIENT)
Dept: FAMILY MEDICINE CLINIC | Age: 70
End: 2025-02-19

## 2025-02-19 VITALS
DIASTOLIC BLOOD PRESSURE: 76 MMHG | OXYGEN SATURATION: 99 % | HEART RATE: 76 BPM | HEIGHT: 63 IN | WEIGHT: 193 LBS | SYSTOLIC BLOOD PRESSURE: 132 MMHG | BODY MASS INDEX: 34.2 KG/M2

## 2025-02-19 DIAGNOSIS — F33.0 MILD EPISODE OF RECURRENT MAJOR DEPRESSIVE DISORDER: ICD-10-CM

## 2025-02-19 DIAGNOSIS — N18.31 TYPE 2 DIABETES MELLITUS WITH STAGE 3A CHRONIC KIDNEY DISEASE, WITHOUT LONG-TERM CURRENT USE OF INSULIN (HCC): ICD-10-CM

## 2025-02-19 DIAGNOSIS — M45.0 ANKYLOSING SPONDYLITIS OF MULTIPLE SITES IN SPINE (HCC): ICD-10-CM

## 2025-02-19 DIAGNOSIS — E11.22 TYPE 2 DIABETES MELLITUS WITH STAGE 3A CHRONIC KIDNEY DISEASE, WITHOUT LONG-TERM CURRENT USE OF INSULIN (HCC): ICD-10-CM

## 2025-02-19 DIAGNOSIS — E08.51 DIABETES MELLITUS DUE TO UNDERLYING CONDITION WITH DIABETIC PERIPHERAL ANGIOPATHY WITHOUT GANGRENE, WITHOUT LONG-TERM CURRENT USE OF INSULIN (HCC): ICD-10-CM

## 2025-02-19 DIAGNOSIS — E53.8 FOLATE DEFICIENCY: ICD-10-CM

## 2025-02-19 DIAGNOSIS — I10 PRIMARY HYPERTENSION: ICD-10-CM

## 2025-02-19 DIAGNOSIS — E03.9 HYPOTHYROIDISM, UNSPECIFIED TYPE: ICD-10-CM

## 2025-02-19 DIAGNOSIS — E78.2 MIXED HYPERLIPIDEMIA: ICD-10-CM

## 2025-02-19 DIAGNOSIS — E55.9 VITAMIN D DEFICIENCY: ICD-10-CM

## 2025-02-19 DIAGNOSIS — Z13.31 POSITIVE DEPRESSION SCREENING: ICD-10-CM

## 2025-02-19 DIAGNOSIS — E83.42 HYPOMAGNESEMIA: ICD-10-CM

## 2025-02-19 DIAGNOSIS — K51.80 OTHER ULCERATIVE COLITIS WITHOUT COMPLICATION (HCC): ICD-10-CM

## 2025-02-19 DIAGNOSIS — I83.022 VARICOSE VEINS OF LEFT LOWER EXTREMITY WITH ULCER OF CALF (CODE) (HCC): Primary | ICD-10-CM

## 2025-02-19 LAB
25(OH)D3 SERPL-MCNC: 75.5 NG/ML
CHOLEST SERPL-MCNC: 135 MG/DL (ref 0–199)
FOLATE SERPL-MCNC: 19.6 NG/ML (ref 4.78–24.2)
HDLC SERPL-MCNC: 49 MG/DL (ref 40–60)
LDLC SERPL CALC-MCNC: 73 MG/DL
MAGNESIUM SERPL-MCNC: 1.9 MG/DL (ref 1.8–2.4)
TRIGL SERPL-MCNC: 65 MG/DL (ref 0–150)
TSH SERPL DL<=0.005 MIU/L-ACNC: 1.14 UIU/ML (ref 0.27–4.2)
VIT B12 SERPL-MCNC: 269 PG/ML (ref 211–911)
VLDLC SERPL CALC-MCNC: 13 MG/DL

## 2025-02-19 RX ORDER — LIDOCAINE HCL 4 %
CREAM (GRAM) TOPICAL
Qty: 12 CAPSULE | Refills: 2 | Status: SHIPPED | OUTPATIENT
Start: 2025-02-19

## 2025-02-19 RX ORDER — LIDOCAINE HCL 4 %
CREAM (GRAM) TOPICAL
Qty: 12 CAPSULE | Refills: 2 | Status: SHIPPED | OUTPATIENT
Start: 2025-02-19 | End: 2025-02-19 | Stop reason: CLARIF

## 2025-02-19 SDOH — ECONOMIC STABILITY: FOOD INSECURITY: WITHIN THE PAST 12 MONTHS, YOU WORRIED THAT YOUR FOOD WOULD RUN OUT BEFORE YOU GOT MONEY TO BUY MORE.: NEVER TRUE

## 2025-02-19 SDOH — ECONOMIC STABILITY: FOOD INSECURITY: WITHIN THE PAST 12 MONTHS, THE FOOD YOU BOUGHT JUST DIDN'T LAST AND YOU DIDN'T HAVE MONEY TO GET MORE.: NEVER TRUE

## 2025-02-19 ASSESSMENT — ANXIETY QUESTIONNAIRES
IF YOU CHECKED OFF ANY PROBLEMS ON THIS QUESTIONNAIRE, HOW DIFFICULT HAVE THESE PROBLEMS MADE IT FOR YOU TO DO YOUR WORK, TAKE CARE OF THINGS AT HOME, OR GET ALONG WITH OTHER PEOPLE: NOT DIFFICULT AT ALL
3. WORRYING TOO MUCH ABOUT DIFFERENT THINGS: SEVERAL DAYS
6. BECOMING EASILY ANNOYED OR IRRITABLE: SEVERAL DAYS
4. TROUBLE RELAXING: SEVERAL DAYS
1. FEELING NERVOUS, ANXIOUS, OR ON EDGE: SEVERAL DAYS
2. NOT BEING ABLE TO STOP OR CONTROL WORRYING: SEVERAL DAYS
GAD7 TOTAL SCORE: 6
5. BEING SO RESTLESS THAT IT IS HARD TO SIT STILL: SEVERAL DAYS
7. FEELING AFRAID AS IF SOMETHING AWFUL MIGHT HAPPEN: NOT AT ALL

## 2025-02-19 ASSESSMENT — ENCOUNTER SYMPTOMS
SHORTNESS OF BREATH: 0
BLOOD IN STOOL: 0
ANAL BLEEDING: 0
GASTROINTESTINAL NEGATIVE: 1
NAUSEA: 0
ALLERGIC/IMMUNOLOGIC NEGATIVE: 1
RESPIRATORY NEGATIVE: 1
EYES NEGATIVE: 1
ABDOMINAL PAIN: 0

## 2025-02-20 LAB
EST. AVERAGE GLUCOSE BLD GHB EST-MCNC: 105.4 MG/DL
HBA1C MFR BLD: 5.3 %

## 2025-02-20 NOTE — DISCHARGE INSTRUCTIONS
vedolizumab  Pronunciation:  GEN REED ue mab  Brand:  Entyvio  What is vedolizumab?  Vedolizumab reduces the effects of a substance in the body that can cause inflammation.  Vedolizumab is used in adults with moderate to severe ulcerative colitis (UC), or moderate to severe Crohn's disease.  Vedolizumab treats active disease and may help keep UC or Crohn's symptoms under control long term. Vedolizumab may also reduce the need for steroid medicines in helping to control symptoms long term.  Vedolizumab is usually given after other medicines have been tried without success.  Vedolizumab may also be used for purposes not listed in this medication guide.  What should I discuss with my healthcare provider before receiving vedolizumab?  You should not use vedolizumab if you are allergic to it.  To make sure vedolizumab is safe for you, tell your doctor if you have:  an active or recent infection;  tuberculosis (or if you have close contact with someone who has tuberculosis);  signs of infection such as fever, cough, or flu symptoms;  open sores or skin wounds;  weak immune system (caused by disease or by using certain medicine); or  if you are scheduled to receive any vaccines.  You should be up to date with all needed vaccinations before receiving vedolizumab.   FDA pregnancy category B. Vedolizumab is not expected to harm an unborn baby. Tell your doctor if you are pregnant or plan to become pregnant during treatment.  If you are pregnant, your name may be listed on a pregnancy registry. This is to track the outcome of the pregnancy and to evaluate any effects of vedolizumab on the baby.  It is not known whether vedolizumab passes into breast milk or if it could harm a nursing baby. Tell your doctor if you are breast-feeding a baby.  Do not give this medicine to anyone under 18 years old without medical advice.  How is vedolizumab given?  Before you start treatment with vedolizumab, your doctor may perform tests to  colitis, such as adalimumab, certolizumab, infliximab; or  other drugs that weaken the immune system such as cancer medicine, steroids,   WHere can I get more information?  Your doctor or pharmacist can provide more information about vedolizumab.    RETURN ON MARCH 26,2025 AT 0900 FOR ENTYVIO INFUSION    FOLLOW UP WITH DR. BARTON AS NEEDED OR SCHEDULED    CALL US IF YOU NEED TO CANCEL OR RESCHEDULE YOUR APPOINTMENT  [phone]

## 2025-02-24 NOTE — PROGRESS NOTES
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  Has established with new rheumatologist.  Still with some trigger finger of 2 digits of the R hand but not as severe.

## 2025-02-26 ENCOUNTER — HOSPITAL ENCOUNTER (OUTPATIENT)
Dept: NURSING | Age: 70
Setting detail: INFUSION SERIES
Discharge: HOME | End: 2025-02-26
Payer: MEDICARE

## 2025-02-26 VITALS
SYSTOLIC BLOOD PRESSURE: 128 MMHG | TEMPERATURE: 97.8 F | DIASTOLIC BLOOD PRESSURE: 80 MMHG | RESPIRATION RATE: 16 BRPM | BODY MASS INDEX: 34.18 KG/M2 | HEIGHT: 63 IN | WEIGHT: 192.9 LBS | HEART RATE: 89 BPM

## 2025-02-26 DIAGNOSIS — K51.00 CHRONIC ULCERATIVE ENTEROCOLITIS WITHOUT COMPLICATION (HCC): Primary | ICD-10-CM

## 2025-02-26 LAB
ALBUMIN SERPL-MCNC: 3.6 G/DL (ref 3.4–5)
ALBUMIN/GLOB SERPL: 0.9 {RATIO} (ref 1.1–2.2)
ALP SERPL-CCNC: 101 U/L (ref 40–129)
ALT SERPL-CCNC: 20 U/L (ref 10–40)
ANION GAP SERPL CALCULATED.3IONS-SCNC: 8 MMOL/L (ref 3–16)
AST SERPL-CCNC: 34 U/L (ref 15–37)
BILIRUB SERPL-MCNC: 0.5 MG/DL (ref 0–1)
BUN SERPL-MCNC: 23 MG/DL (ref 7–20)
CALCIUM SERPL-MCNC: 9.1 MG/DL (ref 8.3–10.6)
CHLORIDE SERPL-SCNC: 102 MMOL/L (ref 99–110)
CO2 SERPL-SCNC: 32 MMOL/L (ref 21–32)
CREAT SERPL-MCNC: 1 MG/DL (ref 0.6–1.2)
DEPRECATED RDW RBC AUTO: 14.3 % (ref 12.4–15.4)
GFR SERPLBLD CREATININE-BSD FMLA CKD-EPI: 61 ML/MIN/{1.73_M2}
GLUCOSE SERPL-MCNC: 89 MG/DL (ref 70–99)
HCT VFR BLD AUTO: 37.3 % (ref 36–48)
HGB BLD-MCNC: 12.7 G/DL (ref 12–16)
MCH RBC QN AUTO: 32.3 PG (ref 26–34)
MCHC RBC AUTO-ENTMCNC: 33.9 G/DL (ref 31–36)
MCV RBC AUTO: 95.2 FL (ref 80–100)
PLATELET # BLD AUTO: 150 K/UL (ref 135–450)
PMV BLD AUTO: 9.2 FL (ref 5–10.5)
POTASSIUM SERPL-SCNC: 5.6 MMOL/L (ref 3.5–5.1)
PROT SERPL-MCNC: 7.6 G/DL (ref 6.4–8.2)
RBC # BLD AUTO: 3.92 M/UL (ref 4–5.2)
SODIUM SERPL-SCNC: 142 MMOL/L (ref 136–145)
WBC # BLD AUTO: 5.8 K/UL (ref 4–11)

## 2025-02-26 PROCEDURE — 85027 COMPLETE CBC AUTOMATED: CPT

## 2025-02-26 PROCEDURE — 6360000002 HC RX W HCPCS: Performed by: INTERNAL MEDICINE

## 2025-02-26 PROCEDURE — 96365 THER/PROPH/DIAG IV INF INIT: CPT

## 2025-02-26 PROCEDURE — 2580000003 HC RX 258: Performed by: INTERNAL MEDICINE

## 2025-02-26 PROCEDURE — 80053 COMPREHEN METABOLIC PANEL: CPT

## 2025-02-26 PROCEDURE — 99211 OFF/OP EST MAY X REQ PHY/QHP: CPT

## 2025-02-26 RX ADMIN — VEDOLIZUMAB 300 MG: 300 INJECTION, POWDER, LYOPHILIZED, FOR SOLUTION INTRAVENOUS at 09:27

## 2025-02-26 ASSESSMENT — PAIN SCALES - GENERAL: PAINLEVEL_OUTOF10: 0

## 2025-02-26 NOTE — PROGRESS NOTES
Pt here for entyvio infusion  Pt without c/o's today  She reports that she has been having some constipation but otherwise her stools are mostly normal  Pt denies any diarrhea, cramping, bloating but reports she will have a little bleeding when she is constipated  IV # 22 was started in L[facility] on my  1st attempt  Blood for lab work was drawn without difficulty Pt gayla well  Entyvio 300 mg IVPB starting to infuse  IV site unremarkable Will monitor

## 2025-03-03 RX ORDER — DULOXETIN HYDROCHLORIDE 30 MG/1
30 CAPSULE, DELAYED RELEASE ORAL DAILY
Qty: 90 CAPSULE | Refills: 0 | Status: SHIPPED | OUTPATIENT
Start: 2025-03-03

## 2025-03-03 NOTE — TELEPHONE ENCOUNTER
Refill Request     CONFIRM preferrred pharmacy with the patient.    If Mail Order Rx - Pend for 90 day refill.      Last Seen: Last Seen Department: 2/19/2025  Last Seen by PCP: 2/19/2025    Last Written: 12/6/24    If no future appointment scheduled, route STAFF MESSAGE with patient name to the  Pool for scheduling.      Next Appointment:   Future Appointments   Date Time Provider Department Center   3/14/2025  2:20 PM dAe Bonilla, APRN - CNP [facility]   3/26/2025  9:00 AM [facility] OP [facility] 1 [facility]   4/23/2025  9:00 AM [facility]   5/21/2025  9:30 AM [facility] OP NURSING ROOM 1 [facility] OP RN [facility]   6/18/2025  9:30 AM [facility] OP [facility]   8/20/2025 10:20 AM Ade Bonilla, APRN - CNP [facility]       Message sent to  to schedule appt with patient?  NO      Requested Prescriptions     Pending Prescriptions Disp Refills    DULoxetine (CYMBALTA) 30 MG extended release capsule [Pharmacy Med Name: DULOXETINE HCL DR 30 MG CAP] 90 capsule 0     Sig: Take 1 capsule by mouth daily Take along with the 60 mg for total of 90 mg daily

## 2025-03-26 ENCOUNTER — HOSPITAL ENCOUNTER (OUTPATIENT)
Dept: NURSING | Age: 70
Setting detail: INFUSION SERIES
Discharge: HOME | End: 2025-03-26
Payer: MEDICARE

## 2025-03-26 VITALS
RESPIRATION RATE: 16 BRPM | HEIGHT: 63 IN | DIASTOLIC BLOOD PRESSURE: 78 MMHG | HEART RATE: 79 BPM | SYSTOLIC BLOOD PRESSURE: 136 MMHG | WEIGHT: 188 LBS | TEMPERATURE: 97.8 F | BODY MASS INDEX: 33.31 KG/M2

## 2025-03-26 DIAGNOSIS — K51.00 CHRONIC ULCERATIVE ENTEROCOLITIS WITHOUT COMPLICATION (HCC): Primary | ICD-10-CM

## 2025-03-26 PROCEDURE — 80280 DRUG ASSAY VEDOLIZUMAB: CPT

## 2025-03-26 PROCEDURE — 96365 THER/PROPH/DIAG IV INF INIT: CPT

## 2025-03-26 PROCEDURE — 99211 OFF/OP EST MAY X REQ PHY/QHP: CPT

## 2025-03-26 PROCEDURE — 6360000002 HC RX W HCPCS: Performed by: INTERNAL MEDICINE

## 2025-03-26 PROCEDURE — 82397 CHEMILUMINESCENT ASSAY: CPT

## 2025-03-26 PROCEDURE — 2580000003 HC RX 258: Performed by: INTERNAL MEDICINE

## 2025-03-26 RX ADMIN — VEDOLIZUMAB 300 MG: 300 INJECTION, POWDER, LYOPHILIZED, FOR SOLUTION INTRAVENOUS at 09:31

## 2025-03-26 ASSESSMENT — PAIN SCALES - GENERAL: PAINLEVEL_OUTOF10: 0

## 2025-03-26 NOTE — PROGRESS NOTES
Entyvio infused  IV removed without difficulty Cath tip intact  Pressure then pressure dressing was applied and secured with a coban dressing  IV site unremarkable  Discharge instructions reviewed with pt and copy was given  Understanding verbalized  Pt was up to BR and then was discharged ambulatory in stable condition

## 2025-03-26 NOTE — PROGRESS NOTES
Pt here for entyvio infusion  Pt without c/o's today  She reports that her stools are mostly normal now but she still has trouble with constipation on occasion   Pt denies any diarrhea, cramping, bloating or bleeding  IV # 22 was started in RAC on 1st attempt per myself  Blood for Entyvio level  was drawn without difficulty and hand delivered to lab  Pt gayla well  Entyvio 300 mg IVPB starting to infuse  IV site unremarkable Will monitor

## 2025-03-31 DIAGNOSIS — E53.8 B12 DEFICIENCY: ICD-10-CM

## 2025-03-31 RX ORDER — FOLIC ACID 1 MG/1
1000 TABLET ORAL DAILY
Qty: 100 TABLET | Refills: 2 | Status: SHIPPED | OUTPATIENT
Start: 2025-03-31

## 2025-03-31 NOTE — TELEPHONE ENCOUNTER
Refill Request     CONFIRM preferrred pharmacy with the patient.    If Mail Order Rx - Pend for 90 day refill.      Last Seen: Last Seen Department: 2/19/2025  Last Seen by PCP: 2/19/2025    Last Written: 1.16.25    If no future appointment scheduled, route STAFF MESSAGE with patient name to the  Pool for scheduling.      Next Appointment:   Future Appointments   Date Time Provider Department Center   4/23/2025  9:00 AM [facility] OP NURSING ROOM 1 [facility] OP RN [facility]   5/21/2025  9:30 AM [facility] OP NURSING ROOM 1 [facility] RN [place] [facility]   6/18/2025  9:30 AM [facility] OP NURSING ROOM 1 [facility] RN [place] [facility]   7/16/2025  8:30 AM [facility] OP NURSING ROOM 1 [facility] OP RN [place] [facility]   8/13/2025  8:30 AM [facility] OP NURSING ROOM 1 [facility] OP RN [place] [facility]   8/20/2025 10:20 AM Ade Bonilla, APRN - CNP [facility] DEP   9/10/2025  8:30 AM [facility] OP NURSING ROOM 1 [facility] OP RN [place] [facility]   10/8/2025  9:00 AM [facility] OP NURSING ROOM 1 [facility] OP RN [place] [facility]   11/5/2025  8:30 AM [facility] OP NURSING ROOM 1 [facility] OP RN [place] [facility]   12/3/2025  9:00 AM [facility] OP NURSING ROOM 1 [facility] OP RN [place] [facility]       Message sent to  to schedule appt with patient?  N/A      Requested Prescriptions     Pending Prescriptions Disp Refills    folic acid (FOLVITE) 1 MG tablet [Pharmacy Med Name: Folic Acid 1 MG Oral Tablet] 100 tablet 2     Sig: TAKE 1 TABLET BY MOUTH DAILY

## 2025-04-02 LAB — MISCELLANEOUS LAB TEST ORDER: NORMAL

## 2025-04-09 DIAGNOSIS — E55.9 VITAMIN D DEFICIENCY: ICD-10-CM

## 2025-04-09 DIAGNOSIS — I87.2 VENOUS STASIS DERMATITIS OF BOTH LOWER EXTREMITIES: ICD-10-CM

## 2025-04-09 RX ORDER — DULOXETIN HYDROCHLORIDE 30 MG/1
30 CAPSULE, DELAYED RELEASE ORAL DAILY
Qty: 90 CAPSULE | Refills: 1 | Status: SHIPPED | OUTPATIENT
Start: 2025-04-09

## 2025-04-09 RX ORDER — TRIAMCINOLONE ACETONIDE 1 MG/G
CREAM TOPICAL
Qty: 80 G | Refills: 1 | Status: SHIPPED | OUTPATIENT
Start: 2025-04-09

## 2025-04-09 RX ORDER — LIDOCAINE HCL 4 %
CREAM (GRAM) TOPICAL
Qty: 12 CAPSULE | Refills: 2 | Status: SHIPPED | OUTPATIENT
Start: 2025-04-09

## 2025-04-09 NOTE — TELEPHONE ENCOUNTER
Refill Request     CONFIRM preferrred pharmacy with the patient.    If Mail Order Rx - Pend for 90 day refill.      Last Seen: Last Seen Department: 2/19/2025  Last Seen by PCP: 2/19/2025    Last Written:     If no future appointment scheduled, route STAFF MESSAGE with patient name to the  Pool for scheduling.      Next Appointment:   Future Appointments   Date Time Provider Department Center   4/23/2025  9:00 AM [facility] OP NURSING ROOM 1 [facility] OP RN [place] [facility]   5/21/2025  9:30 AM [facility] OP NURSING ROOM 1 [facility] [place] [facility]   6/18/2025  9:30 AM [facility] OP NURSING ROOM 1 [facility] OP RN [place] [facility]   7/16/2025  8:30 AM [facility] OP NURSING RO[facility] 1 [facility] RN [place] [facility]   8/13/2025  8:30 AM [facility] OP NURSING ROOM 1 [facility] OP RN [place] [facility]   8/20/2025 10:20 AM Ade Bonilla, APRN - CNP [facility]   9/10/2025  8:30 AM [facility] OP NURSING ROOM 1 [facility] OP RN [place] [facility]   10/8/2025  9:00 AM [facility] OP NURSING ROOM 1 [facility] OP RN [place] [facility]   11/5/2025  8:30 AM [facility] OP NURSING ROOM 1 [facility] OP RN [place] [facility]   12/3/2025  9:00 AM [facility] OP NURSING ROOM 1 [facility] OP RN [place] [facility]       Message sent to  to schedule appt with patient?  NO      Requested Prescriptions     Pending Prescriptions Disp Refills    DULoxetine (CYMBALTA) 30 MG extended release capsule 90 capsule 0     Sig: Take 1 capsule by mouth daily Take along with the 60 mg for total of 90 mg daily    vitamin D ([employer] VITAMIN D3) 250 MCG (20469 UT) CAPS capsule 12 capsule 2     Sig: TAKE 1 CAPSULE BY MOUTH ONCE WEEKLY    Magnesium Oxide (GNP MAGNESIUM OXIDE) 250 MG TABS tablet 540 tablet 0     Sig: TAKE 2 TABLETS THREE TIMES DAILY    triamcinolone (KENALOG) 0.1 % cream 80 g 1     Sig: APPLY TO AFFECTED AREA TWICE A DAY

## 2025-04-10 DIAGNOSIS — E53.8 B12 DEFICIENCY: ICD-10-CM

## 2025-04-10 RX ORDER — FOLIC ACID 1 MG/1
1000 TABLET ORAL DAILY
Qty: 100 TABLET | Refills: 2 | Status: SHIPPED | OUTPATIENT
Start: 2025-04-10

## 2025-04-10 RX ORDER — GABAPENTIN 100 MG/1
100 CAPSULE ORAL 3 TIMES DAILY
Qty: 270 CAPSULE | Refills: 0 | Status: SHIPPED | OUTPATIENT
Start: 2025-04-10 | End: 2025-07-09

## 2025-04-10 NOTE — TELEPHONE ENCOUNTER
Refill Request     CONFIRM preferrred pharmacy with the patient.    If Mail Order Rx - Pend for 90 day refill.      Last Seen: Last Seen Department: 2/19/2025  Last Seen by PCP: 2/19/2025    Last Written: 2.23.2025    If no future appointment scheduled, route STAFF MESSAGE with patient name to the  Pool for scheduling.      Next Appointment:   Future Appointments   Date Time Provider Department Center   4/23/2025  9:00 AM [facility] OP NURSING ROOM 1 [facility] OP RN [place] [facility]   5/21/2025  9:30 AM [facility] OP NURSING ROOM 1 [facility] OP RN [place] [facility]   6/18/2025  9:30 AM [facility] OP NURSING ROOM 1 [facility] OP RN [place] [facility]   7/16/2025  8:30 AM [facility] OP NURSING ROOM 1 [facility] OP RN [place] [facility]   8/13/2025  8:30 AM [facility] OP NURSING ROOM 1 [facility] OP RN [place] [facility]   8/20/2025 10:20 AM Ade Bonilla, APRN - CNP [facility] DEP   9/10/2025  8:30 AM [facility] OP NURSING ROOM 1 [facility] OP RN [place] [facility]   10/8/2025  9:00 AM [facility] OP NURSING ROOM 1 [facility] OP RN [place] [facility]   11/5/2025  8:30 AM [facility] OP NURSING ROOM 1 [facility] OP RN [place] [facility]   12/3/2025  9:00 AM [facility] OP NURSING ROOM 1 [facility] RN [place] [facility]       Message sent to  to schedule appt with patient?  NO      Requested Prescriptions     Pending Prescriptions Disp Refills    gabapentin (NEURONTIN) 100 MG capsule 270 capsule 0     Sig: Take 1 capsule by mouth 3 times daily for 90 days.

## 2025-04-23 ENCOUNTER — HOSPITAL ENCOUNTER (OUTPATIENT)
Dept: NURSING | Age: 70
Setting detail: INFUSION SERIES
Discharge: HOME | End: 2025-04-23
Payer: MEDICARE

## 2025-04-23 VITALS
WEIGHT: 188.05 LBS | HEIGHT: 63 IN | BODY MASS INDEX: 33.32 KG/M2 | SYSTOLIC BLOOD PRESSURE: 143 MMHG | DIASTOLIC BLOOD PRESSURE: 76 MMHG | RESPIRATION RATE: 16 BRPM | TEMPERATURE: 98 F | HEART RATE: 76 BPM

## 2025-04-23 DIAGNOSIS — K51.90 ULCERATIVE COLITIS WITHOUT COMPLICATIONS, UNSPECIFIED LOCATION (HCC): ICD-10-CM

## 2025-04-23 DIAGNOSIS — K52.9 COLITIS: ICD-10-CM

## 2025-04-23 DIAGNOSIS — K51.00 CHRONIC ULCERATIVE ENTEROCOLITIS WITHOUT COMPLICATION (HCC): Primary | ICD-10-CM

## 2025-04-23 LAB
ALBUMIN SERPL-MCNC: 3.7 G/DL (ref 3.4–5)
ALBUMIN/GLOB SERPL: 0.9 {RATIO} (ref 1.1–2.2)
ALP SERPL-CCNC: 93 U/L (ref 40–129)
ALT SERPL-CCNC: 19 U/L (ref 10–40)
ANION GAP SERPL CALCULATED.3IONS-SCNC: 11 MMOL/L (ref 3–16)
AST SERPL-CCNC: 34 U/L (ref 15–37)
BILIRUB SERPL-MCNC: 0.7 MG/DL (ref 0–1)
BUN SERPL-MCNC: 23 MG/DL (ref 7–20)
CALCIUM SERPL-MCNC: 9 MG/DL (ref 8.3–10.6)
CHLORIDE SERPL-SCNC: 101 MMOL/L (ref 99–110)
CO2 SERPL-SCNC: 27 MMOL/L (ref 21–32)
CREAT SERPL-MCNC: 0.9 MG/DL (ref 0.6–1.2)
DEPRECATED RDW RBC AUTO: 14 % (ref 12.4–15.4)
GFR SERPLBLD CREATININE-BSD FMLA CKD-EPI: 69 ML/MIN/{1.73_M2}
GLUCOSE SERPL-MCNC: 92 MG/DL (ref 70–99)
HCT VFR BLD AUTO: 37.5 % (ref 36–48)
HGB BLD-MCNC: 12.6 G/DL (ref 12–16)
MCH RBC QN AUTO: 31.5 PG (ref 26–34)
MCHC RBC AUTO-ENTMCNC: 33.6 G/DL (ref 31–36)
MCV RBC AUTO: 93.7 FL (ref 80–100)
PLATELET # BLD AUTO: 163 K/UL (ref 135–450)
PMV BLD AUTO: 10 FL (ref 5–10.5)
POTASSIUM SERPL-SCNC: 4.4 MMOL/L (ref 3.5–5.1)
PROT SERPL-MCNC: 7.6 G/DL (ref 6.4–8.2)
RBC # BLD AUTO: 4 M/UL (ref 4–5.2)
SODIUM SERPL-SCNC: 139 MMOL/L (ref 136–145)
WBC # BLD AUTO: 6.6 K/UL (ref 4–11)

## 2025-04-23 PROCEDURE — 6360000002 HC RX W HCPCS: Performed by: INTERNAL MEDICINE

## 2025-04-23 PROCEDURE — 86480 TB TEST CELL IMMUN MEASURE: CPT

## 2025-04-23 PROCEDURE — 2580000003 HC RX 258: Performed by: INTERNAL MEDICINE

## 2025-04-23 PROCEDURE — 80053 COMPREHEN METABOLIC PANEL: CPT

## 2025-04-23 PROCEDURE — 99211 OFF/OP EST MAY X REQ PHY/QHP: CPT

## 2025-04-23 PROCEDURE — 85027 COMPLETE CBC AUTOMATED: CPT

## 2025-04-23 PROCEDURE — 96365 THER/PROPH/DIAG IV INF INIT: CPT

## 2025-04-23 RX ADMIN — VEDOLIZUMAB 300 MG: 300 INJECTION, POWDER, LYOPHILIZED, FOR SOLUTION INTRAVENOUS at 09:21

## 2025-04-23 ASSESSMENT — PAIN SCALES - GENERAL: PAINLEVEL_OUTOF10: 0

## 2025-04-23 NOTE — PROGRESS NOTES
Pt here for entyvio infusion  Pt without c/o's today  She reports that her stools are mostly normal now but she still has trouble with constipation on occasion   Pt denies any diarrhea, cramping, bloating or bleeding  IV # 22 was started in RFA on my 2nd  attempt  Blood for lab work as well as a Quantiferon Gold level  was drawn without difficulty and hand delivered to lab  Pt gayla well  Entyvio 300 mg IVPB starting to infuse  IV site unremarkable Will monitor

## 2025-04-26 LAB
GAMMA INTERFERON BACKGROUND BLD IA-ACNC: 0.05 IU/ML
M TB IFN-G BLD-IMP: NEGATIVE
M TB IFN-G CD4+ BCKGRND COR BLD-ACNC: 0.01 IU/ML
M TB IFN-G CD4+CD8+ BCKGRND COR BLD-ACNC: 0.01 IU/ML
MITOGEN IGNF BCKGRD COR BLD-ACNC: 9.95 IU/ML

## 2025-04-29 RX ORDER — LEVOTHYROXINE SODIUM 150 UG/1
150 TABLET ORAL DAILY
Qty: 90 TABLET | Refills: 2 | Status: SHIPPED | OUTPATIENT
Start: 2025-04-29

## 2025-04-29 NOTE — TELEPHONE ENCOUNTER
Refill Request     CONFIRM preferrred pharmacy with the patient.    If Mail Order Rx - Pend for 90 day refill.      Last Seen: Last Seen Department: 2/19/2025  Last Seen by PCP: 2/19/2025    Last Written: 11-    If no future appointment scheduled, route STAFF MESSAGE with patient name to the  Pool for scheduling.      Next Appointment:   Future Appointments   Date Time Provider Department Center   5/21/2025  9:30 AM [facility] OP NURSING ROOM 1 [facility] RN [place] [facility]   6/18/2025  9:30 AM [facility]UR[facility] 1 [facility] RN [place] [facility]   7/16/2025  8:30 AM [facility]UR[facility] RO[facility] 1 [facility] OP RN [place] [facility]   8/13/2025  8:30 AM [facility] OP NURSING ROOM 1 [facility] OP RN [facility]   8/20/2025 10:20 AM Ade Bonilla, APRN - CNP [place] [facility]   9/10/2025  8:30 AM [facility] OP NURSING ROOM 1 [facility] OP RN [place] [facility]   10/8/2025  9:00 AM [facility] OP NURSING ROOM 1 [facility] OP RN [place] [facility]   11/5/2025  8:30 AM [facility]URSING RO[facility] 1 [facility] RN [facility]   12/3/2025  9:00 AM [facility]       Message sent to  to schedule appt with patient?  N/A      Requested Prescriptions     Pending Prescriptions Disp Refills    levothyroxine (SYNTHROID) 150 MCG tablet [Pharmacy Med Name: levothyroxine 150 mcg tablet] 90 tablet 11     Sig: TAKE ONE TABLET BY MOUTH DAILY

## 2025-05-12 NOTE — DISCHARGE INSTRUCTIONS
vedolizumab  Pronunciation:  GEN REED ue mab  Brand:  Entyvio  What is vedolizumab?  Vedolizumab reduces the effects of a substance in the body that can cause inflammation.  Vedolizumab is used in adults with moderate to severe ulcerative colitis (UC), or moderate to severe Crohn's disease.  Vedolizumab treats active disease and may help keep UC or Crohn's symptoms under control long term. Vedolizumab may also reduce the need for steroid medicines in helping to control symptoms long term.  Vedolizumab is usually given after other medicines have been tried without success.  Vedolizumab may also be used for purposes not listed in this medication guide.  What should I discuss with my healthcare provider before receiving vedolizumab?  You should not use vedolizumab if you are allergic to it.  To make sure vedolizumab is safe for you, tell your doctor if you have:  an active or recent infection;  tuberculosis (or if you have close contact with someone who has tuberculosis);  signs of infection such as fever, cough, or flu symptoms;  open sores or skin wounds;  weak immune system (caused by disease or by using certain medicine); or  if you are scheduled to receive any vaccines.  You should be up to date with all needed vaccinations before receiving vedolizumab.   FDA pregnancy category B. Vedolizumab is not expected to harm an unborn baby. Tell your doctor if you are pregnant or plan to become pregnant during treatment.  If you are pregnant, your name may be listed on a pregnancy registry. This is to track the outcome of the pregnancy and to evaluate any effects of vedolizumab on the baby.  It is not known whether vedolizumab passes into breast milk or if it could harm a nursing baby. Tell your doctor if you are breast-feeding a baby.  Do not give this medicine to anyone under 18 years old without medical advice.  How is vedolizumab given?  Before you start treatment with vedolizumab, your doctor may perform tests to  colitis, such as adalimumab, certolizumab, infliximab; or  other drugs that weaken the immune system such as cancer medicine, steroids,   WHere can I get more information?  Your doctor or pharmacist can provide more information about vedolizumab.    RETURN ON JUNE 25,2025  AT 0900  FOR ENTYVIO INFUSION    FOLLOW UP WITH DR. BARTON  AS NEEDED OR SCHEDULED    CALL US IF YOU NEED TO CANCEL OR RESCHEDULE YOUR APPOINTMENT  [phone]

## 2025-05-19 RX ORDER — GABAPENTIN 100 MG/1
100 CAPSULE ORAL 3 TIMES DAILY
Qty: 270 CAPSULE | Refills: 0 | Status: SHIPPED | OUTPATIENT
Start: 2025-05-19 | End: 2025-08-17

## 2025-05-19 NOTE — TELEPHONE ENCOUNTER
Refill Request     CONFIRM preferrred pharmacy with the patient.    If Mail Order Rx - Pend for 90 day refill.      Last Seen: Last Seen Department: 2/19/2025  Last Seen by PCP: 2/19/2025    Last Written: 4/22/2025    If no future appointment scheduled, route STAFF MESSAGE with patient name to the  Pool for scheduling.      Next Appointment:   Future Appointments   Date Time Provider Department Center   5/21/2025  9:30 AM [facility]UR[facility] RO[facility] 1 [facility] RN [place] [facility]   6/18/2025  9:30 AM [facility]UR[facility] RO[facility] 1 [facility] [place] [facility]   7/16/2025  8:30 AM [facility] NURSING RO[facility] 1 [facility] [place] [facility]   8/13/2025  8:30 AM [facility] NURSING RO[facility] 1 [facility] RN [place] [facility]   8/20/2025 10:20 AM Ade Bonilla, APRN - CNP [place] [facility]   9/10/2025  8:30 AM [facility]   10/8/2025  9:00 AM [facility] OP NURSING ROOM 1 [facility] OP RN [place] [facility]   11/5/2025  8:30 AM [facility] OP NURSING RO[facility] 1 [facility] RN [place] [facility]   12/3/2025  9:00 AM [facility] [place] [facility]       Message sent to  to schedule appt with patient?  NO      Requested Prescriptions     Pending Prescriptions Disp Refills    gabapentin (NEURONTIN) 100 MG capsule [Pharmacy Med Name: gabapentin 100 mg capsule] 270 capsule 0     Sig: Take 1 capsule by mouth 3 times daily.

## 2025-05-21 ENCOUNTER — HOSPITAL ENCOUNTER (OUTPATIENT)
Dept: NURSING | Age: 70
Setting detail: INFUSION SERIES
Discharge: HOME | End: 2025-05-21
Payer: MEDICARE

## 2025-05-21 VITALS
SYSTOLIC BLOOD PRESSURE: 130 MMHG | DIASTOLIC BLOOD PRESSURE: 85 MMHG | TEMPERATURE: 97.9 F | HEART RATE: 88 BPM | BODY MASS INDEX: 33.32 KG/M2 | WEIGHT: 188.05 LBS | RESPIRATION RATE: 16 BRPM | HEIGHT: 63 IN

## 2025-05-21 DIAGNOSIS — I87.2 VENOUS STASIS DERMATITIS OF BOTH LOWER EXTREMITIES: ICD-10-CM

## 2025-05-21 DIAGNOSIS — K51.00 CHRONIC ULCERATIVE ENTEROCOLITIS WITHOUT COMPLICATION (HCC): Primary | ICD-10-CM

## 2025-05-21 PROCEDURE — 6360000002 HC RX W HCPCS: Performed by: INTERNAL MEDICINE

## 2025-05-21 PROCEDURE — 99211 OFF/OP EST MAY X REQ PHY/QHP: CPT

## 2025-05-21 PROCEDURE — 2580000003 HC RX 258: Performed by: INTERNAL MEDICINE

## 2025-05-21 PROCEDURE — 96365 THER/PROPH/DIAG IV INF INIT: CPT

## 2025-05-21 RX ORDER — TRIAMCINOLONE ACETONIDE 1 MG/G
CREAM TOPICAL 2 TIMES DAILY
Qty: 80 G | Refills: 1 | Status: SHIPPED | OUTPATIENT
Start: 2025-05-21

## 2025-05-21 RX ADMIN — VEDOLIZUMAB 300 MG: 300 INJECTION, POWDER, LYOPHILIZED, FOR SOLUTION INTRAVENOUS at 09:41

## 2025-05-21 ASSESSMENT — PAIN SCALES - GENERAL: PAINLEVEL_OUTOF10: 0

## 2025-05-21 NOTE — PROGRESS NOTES
Pt here for entyvio infusion  Pt without c/o's today  She reports she still has trouble with constipation at times and then has alvaro stools  Pt denies any diarrhea, cramping, bloating or bleeding  IV # 22 was started in [facility] on my 1st attempt Pt gayla well  No lab work needed for today  Entyvio 300 mg IVPB starting to infuse  IV site unremarkable Will monitor

## 2025-05-21 NOTE — TELEPHONE ENCOUNTER
Refill Request     CONFIRM preferrred pharmacy with the patient.    If Mail Order Rx - Pend for 90 day refill.      Last Seen: Last Seen Department: 2/19/2025  Last Seen by PCP: 2/19/2025    Last Written: 4/9/25    If no future appointment scheduled, route STAFF MESSAGE with patient name to the  Pool for scheduling.      Next Appointment:   Future Appointments   Date Time Provider Department Center   5/21/2025  9:30 AM [facility] OP NURSING ROOM 1 [facility] RN [place] [facility]   6/18/2025  9:30 AM [facility] OP NURSING ROOM 1 [facility] OP RN [place] [facility]   7/16/2025  8:30 AM [facility] OP NURSING ROOM 1 [facility] OP RN [place] [facility]   8/13/2025  8:30 AM [facility] OP NURSING ROOM 1 [facility] OP RN [facility]   8/20/2025 10:20 AM Ade Bonilla, APRN - CNP [place] [facility] DE[facility]   9/10/2025  8:30 AM [facility] OP NURSING ROOM 1 [facility] OP RN [facility]   10/8/2025  9:00 AM [facility] OP NURSING ROOM 1 [facility] RN [place] [facility]   11/5/2025  8:30 AM [facility] OP NURSING ROOM 1 [facility] RN [place] [facility]   12/3/2025  9:00 AM [facility] OP NURSING ROOM 1 [facility] OP RN [facility]       Message sent to  to schedule appt with patient?  N/A      Requested Prescriptions     Pending Prescriptions Disp Refills    triamcinolone (KENALOG) 0.1 % cream [Pharmacy Med Name: triamcinolone acetonide 0.1 % topical cream] 80 g 1     Sig: APPLY TO AFFECTED AREA TWICE DAILY

## 2025-06-20 NOTE — DISCHARGE INSTRUCTIONS
vedolizumab  Pronunciation:  GEN REED ue mab  Brand:  Entyvio  What is vedolizumab?  Vedolizumab reduces the effects of a substance in the body that can cause inflammation.  Vedolizumab is used in adults with moderate to severe ulcerative colitis (UC), or moderate to severe Crohn's disease.  Vedolizumab treats active disease and may help keep UC or Crohn's symptoms under control long term. Vedolizumab may also reduce the need for steroid medicines in helping to control symptoms long term.  Vedolizumab is usually given after other medicines have been tried without success.  Vedolizumab may also be used for purposes not listed in this medication guide.  What should I discuss with my healthcare provider before receiving vedolizumab?  You should not use vedolizumab if you are allergic to it.  To make sure vedolizumab is safe for you, tell your doctor if you have:  an active or recent infection;  tuberculosis (or if you have close contact with someone who has tuberculosis);  signs of infection such as fever, cough, or flu symptoms;  open sores or skin wounds;  weak immune system (caused by disease or by using certain medicine); or  if you are scheduled to receive any vaccines.  You should be up to date with all needed vaccinations before receiving vedolizumab.   FDA pregnancy category B. Vedolizumab is not expected to harm an unborn baby. Tell your doctor if you are pregnant or plan to become pregnant during treatment.  If you are pregnant, your name may be listed on a pregnancy registry. This is to track the outcome of the pregnancy and to evaluate any effects of vedolizumab on the baby.  It is not known whether vedolizumab passes into breast milk or if it could harm a nursing baby. Tell your doctor if you are breast-feeding a baby.  Do not give this medicine to anyone under 18 years old without medical advice.  How is vedolizumab given?  Before you start treatment with vedolizumab, your doctor may perform tests to  have any of these signs of an allergic reaction: hives; difficult breathing; swelling of your face, lips, tongue, or throat.  Some side effects may occur during the injection. Tell your caregiver right away if you feel warm or tingly, or if you have a severe headache, fast heart rate, pounding in your neck or ears, chest tightness, or trouble breathing.  Some people using a medicine similar to vedolizumab have developed a serious viral infection of the brain that can lead to disability or death. Call your doctor right away if you have any change in your mental state, decreased vision, or problems with speech or walking. These symptoms may start gradually and get worse quickly.  Stop using vedolizumab and call your doctor at once if you have:  fever, chills, body aches, cold or flu symptoms, mouth and throat ulcers, skin sores;  pain, warmth, swelling, or oozing around your anal area;  nausea, vomiting, severe diarrhea, diarrhea that is watery or bloody, stomach cramps, weight loss;  cough, pain when swallowing; or  liver problems --nausea, upper stomach pain, itching, tired feeling, loss of appetite, dark urine, shanita-colored stools, jaundice (yellowing of the skin or eyes).  Common side effects may include:  fever, sore throat, flu symptoms;  cold symptoms such as stuffy nose, sinus pain, sneezing, cough;  pain in your arms or legs;  tired feeling;  headache, joint pain, back pain;  rash, itching; or  nausea.  This is not a complete list of side effects and others may occur. Call your doctor for medical advice about side effects. You may report side effects to FDA at 2-631-FDA-8874.  What other drugs will affect vedolizumab?  Tell your doctor about all medicines you use, and those you start or stop using during your treatment with vedolizumab, especially:  natalizumab;  medicines to treat psoriasis or rheumatoid arthritis, such as etanercept or golimumab;  other medicines to treat Crohn's disease or ulcerative

## 2025-06-25 ENCOUNTER — HOSPITAL ENCOUNTER (OUTPATIENT)
Dept: NURSING | Age: 70
Setting detail: INFUSION SERIES
Discharge: HOME | End: 2025-06-25
Payer: MEDICARE

## 2025-06-25 VITALS
DIASTOLIC BLOOD PRESSURE: 80 MMHG | SYSTOLIC BLOOD PRESSURE: 138 MMHG | WEIGHT: 183 LBS | TEMPERATURE: 98 F | HEART RATE: 79 BPM | BODY MASS INDEX: 32.43 KG/M2 | RESPIRATION RATE: 16 BRPM | HEIGHT: 63 IN

## 2025-06-25 DIAGNOSIS — K51.00 CHRONIC ULCERATIVE ENTEROCOLITIS WITHOUT COMPLICATION (HCC): Primary | ICD-10-CM

## 2025-06-25 LAB
ALBUMIN SERPL-MCNC: 3.6 G/DL (ref 3.4–5)
ALBUMIN/GLOB SERPL: 1.1 {RATIO} (ref 1.1–2.2)
ALP SERPL-CCNC: 97 U/L (ref 40–129)
ALT SERPL-CCNC: 24 U/L (ref 10–40)
ANION GAP SERPL CALCULATED.3IONS-SCNC: 12 MMOL/L (ref 3–16)
AST SERPL-CCNC: 37 U/L (ref 15–37)
BILIRUB SERPL-MCNC: 0.6 MG/DL (ref 0–1)
BUN SERPL-MCNC: 19 MG/DL (ref 7–20)
CALCIUM SERPL-MCNC: 9.6 MG/DL (ref 8.3–10.6)
CHLORIDE SERPL-SCNC: 102 MMOL/L (ref 99–110)
CO2 SERPL-SCNC: 28 MMOL/L (ref 21–32)
CREAT SERPL-MCNC: 1 MG/DL (ref 0.6–1.2)
DEPRECATED RDW RBC AUTO: 13.6 % (ref 12.4–15.4)
GFR SERPLBLD CREATININE-BSD FMLA CKD-EPI: 60 ML/MIN/{1.73_M2}
GLUCOSE SERPL-MCNC: 92 MG/DL (ref 70–99)
HCT VFR BLD AUTO: 36.4 % (ref 36–48)
HGB BLD-MCNC: 12.5 G/DL (ref 12–16)
MCH RBC QN AUTO: 31.8 PG (ref 26–34)
MCHC RBC AUTO-ENTMCNC: 34.3 G/DL (ref 31–36)
MCV RBC AUTO: 92.7 FL (ref 80–100)
PLATELET # BLD AUTO: 158 K/UL (ref 135–450)
PMV BLD AUTO: 10.1 FL (ref 5–10.5)
POTASSIUM SERPL-SCNC: 4.8 MMOL/L (ref 3.5–5.1)
PROT SERPL-MCNC: 6.9 G/DL (ref 6.4–8.2)
RBC # BLD AUTO: 3.92 M/UL (ref 4–5.2)
SODIUM SERPL-SCNC: 142 MMOL/L (ref 136–145)
WBC # BLD AUTO: 5.6 K/UL (ref 4–11)

## 2025-06-25 PROCEDURE — 80053 COMPREHEN METABOLIC PANEL: CPT

## 2025-06-25 PROCEDURE — 96365 THER/PROPH/DIAG IV INF INIT: CPT

## 2025-06-25 PROCEDURE — 6360000002 HC RX W HCPCS: Performed by: INTERNAL MEDICINE

## 2025-06-25 PROCEDURE — 85027 COMPLETE CBC AUTOMATED: CPT

## 2025-06-25 PROCEDURE — 99211 OFF/OP EST MAY X REQ PHY/QHP: CPT

## 2025-06-25 PROCEDURE — 2580000003 HC RX 258: Performed by: INTERNAL MEDICINE

## 2025-06-25 RX ADMIN — VEDOLIZUMAB 300 MG: 300 INJECTION, POWDER, LYOPHILIZED, FOR SOLUTION INTRAVENOUS at 09:41

## 2025-06-25 ASSESSMENT — PAIN SCALES - GENERAL: PAINLEVEL_OUTOF10: 0

## 2025-06-25 NOTE — PROGRESS NOTES
Pt here for entyvio infusion  Pt without c/o's today  She reports she still has trouble with constipation at times but otherwise her bowels are pretty normal  Pt denies any diarrhea, cramping, bloating or bleeding  IV # 22 was started in [facility] on my 1st attempt Pt gayla well  Blood for lab work was drawn without issues  Pt gayla well  Entyvio 300 mg IVPB starting to infuse  IV site unremarkable Will monitor

## 2025-07-18 RX ORDER — DULOXETIN HYDROCHLORIDE 30 MG/1
CAPSULE, DELAYED RELEASE ORAL
Qty: 90 CAPSULE | Refills: 11 | Status: SHIPPED | OUTPATIENT
Start: 2025-07-18

## 2025-07-18 NOTE — TELEPHONE ENCOUNTER
Refill Request     CONFIRM preferred pharmacy with the patient.    If Mail Order Rx - Pend for 90 day refill.      Last Seen: Last Seen Department: 2/19/2025  Last Seen by PCP: 2/19/2025    Last Written: 4/9/25     If no future appointment scheduled:  Review the last OV with PCP and review information for follow-up visit,  Route STAFF MESSAGE with patient name to the  Pool for scheduling with the following information:            -  Timing of next visit           -  Visit type ie Physical, OV, etc           -  Diagnoses/Reason ie. COPD, HTN - Do not use MEDICATION, Follow-up or CHECK UP - Give reason for visit      Next Appointment:   Future Appointments   Date Time Provider Department Center   7/23/2025  9:00 AM [facility] [place] [facility]   8/20/2025 10:20 AM Ade Bonilla, APRN - CNP [place] [facility]   8/27/2025  9:30 [facility] [place] [facility]   9/24/2025  9:00 AM [facility] [place] [facility]   10/22/2025  9:00 [facility] [place] [facility]   11/26/2025  9:00 [facility] [place] [facility]   12/24/2025  9:00 AM [facility] 1 [facility] [place] [facility]       Message sent to  to schedule appt with patient?  NO      Requested Prescriptions     Pending Prescriptions Disp Refills    Magnesium Oxide (MAGNESIUM-OXIDE) 250 MG TABS tablet [Pharmacy Med Name: magnesium 250 mg (as magnesium oxide) tablet] 540 tablet 1     Sig: TAKE TWO TABLETS BY MOUTH THREE TIMES DAILY    DULoxetine (CYMBALTA) 30 MG extended release capsule [Pharmacy Med Name: duloxetine 30 mg capsule,delayed release] 90 capsule 11     Sig: TAKE ONE CAPSULE BY MOUTH DAILY TAKE ALONG WITH THE 60 MG FOR A TOTAL OF 90 MG DAILY

## 2025-07-20 DIAGNOSIS — I87.2 VENOUS STASIS DERMATITIS OF BOTH LOWER EXTREMITIES: ICD-10-CM

## 2025-07-21 RX ORDER — TRIAMCINOLONE ACETONIDE 1 MG/G
CREAM TOPICAL
Qty: 80 G | Refills: 1 | Status: SHIPPED | OUTPATIENT
Start: 2025-07-21

## 2025-07-21 NOTE — TELEPHONE ENCOUNTER
Refill Request     CONFIRM preferred pharmacy with the patient.    If Mail Order Rx - Pend for 90 day refill.      Last Seen: Last Seen Department: 2/19/2025  Last Seen by PCP: 2/19/2025    Last Written: 5/21/25     If no future appointment scheduled:  Review the last OV with PCP and review information for follow-up visit,  Route STAFF MESSAGE with patient name to the  Pool for scheduling with the following information:            -  Timing of next visit           -  Visit type ie Physical, OV, etc           -  Diagnoses/Reason ie. COPD, HTN - Do not use MEDICATION, Follow-up or CHECK UP - Give reason for visit      Next Appointment:   Future Appointments   Date Time Provider Department Center   7/23/2025  9:00 AM [facility] RO[facility] 1 [facility] [place] [facility]   8/20/2025 10:20 AM Ade Bonilla, APRN - CNP [place] [facility]   8/27/2025  9:30 AM [facility] [place] [facility]   9/24/2025  9:00 AM [facility] [place] [facility]   10/22/2025  9:00 AM [facility] [place] [facility]   11/26/2025  9:00 AM [facility] [place] [facility]   12/24/2025  9:00 AM [facility] ROOM 1 [facility]       Message sent to  to schedule appt with patient?  NO      Requested Prescriptions     Pending Prescriptions Disp Refills    triamcinolone (KENALOG) 0.1 % cream [Pharmacy Med Name: triamcinolone acetonide 0.1 % topical cream] 80 g 1     Sig: APPLY TO THE AFFECTED AREA(S) TWICE DAILY

## 2025-07-23 ENCOUNTER — HOSPITAL ENCOUNTER (OUTPATIENT)
Dept: NURSING | Age: 70
Setting detail: INFUSION SERIES
Discharge: HOME | End: 2025-07-23
Payer: MEDICARE

## 2025-07-23 VITALS
WEIGHT: 182.98 LBS | HEART RATE: 83 BPM | SYSTOLIC BLOOD PRESSURE: 123 MMHG | TEMPERATURE: 97.8 F | HEIGHT: 63 IN | DIASTOLIC BLOOD PRESSURE: 55 MMHG | BODY MASS INDEX: 32.42 KG/M2 | RESPIRATION RATE: 16 BRPM

## 2025-07-23 DIAGNOSIS — K51.00 CHRONIC ULCERATIVE ENTEROCOLITIS WITHOUT COMPLICATION (HCC): Primary | ICD-10-CM

## 2025-07-23 PROCEDURE — 2580000003 HC RX 258: Performed by: INTERNAL MEDICINE

## 2025-07-23 PROCEDURE — 99211 OFF/OP EST MAY X REQ PHY/QHP: CPT

## 2025-07-23 PROCEDURE — 6360000002 HC RX W HCPCS: Performed by: INTERNAL MEDICINE

## 2025-07-23 PROCEDURE — 96365 THER/PROPH/DIAG IV INF INIT: CPT

## 2025-07-23 RX ADMIN — VEDOLIZUMAB 300 MG: 300 INJECTION, POWDER, LYOPHILIZED, FOR SOLUTION INTRAVENOUS at 09:05

## 2025-07-23 ASSESSMENT — PAIN SCALES - GENERAL: PAINLEVEL_OUTOF10: 0

## 2025-07-23 NOTE — PROGRESS NOTES
Pt here for entyvio infusion  Pt without c/o's today  She reports she still has trouble with constipation at times but otherwise her bowels are pretty normal  Pt denies any diarrhea, cramping, bloating or bleeding  IV # 22 was started in [facility] on my 1st attempt Pt gayla well  No blood work needed for today   Entyvio 300 mg IVPB starting to infuse  IV site unremarkable Will monitor

## 2025-08-15 RX ORDER — GABAPENTIN 100 MG/1
CAPSULE ORAL
Qty: 270 CAPSULE | Refills: 0 | Status: SHIPPED | OUTPATIENT
Start: 2025-08-15 | End: 2025-11-13

## 2025-08-15 RX ORDER — DULOXETIN HYDROCHLORIDE 30 MG/1
CAPSULE, DELAYED RELEASE ORAL
Qty: 90 CAPSULE | Refills: 1 | Status: SHIPPED | OUTPATIENT
Start: 2025-08-15

## 2025-08-18 ENCOUNTER — TELEPHONE (OUTPATIENT)
Dept: FAMILY MEDICINE CLINIC | Age: 70
End: 2025-08-18

## 2025-08-27 ENCOUNTER — HOSPITAL ENCOUNTER (OUTPATIENT)
Dept: NURSING | Age: 70
Setting detail: INFUSION SERIES
Discharge: HOME | End: 2025-08-27

## 2025-08-29 DIAGNOSIS — E55.9 VITAMIN D DEFICIENCY: ICD-10-CM

## 2025-08-29 RX ORDER — ALENDRONATE SODIUM 70 MG/1
70 TABLET ORAL
Qty: 12 TABLET | Refills: 0 | Status: SHIPPED | OUTPATIENT
Start: 2025-08-29

## 2025-08-29 RX ORDER — LIDOCAINE HCL 4 %
CREAM (GRAM) TOPICAL
Qty: 12 CAPSULE | Refills: 0 | Status: SHIPPED | OUTPATIENT
Start: 2025-08-29

## 2025-08-31 DIAGNOSIS — E55.9 VITAMIN D DEFICIENCY: ICD-10-CM

## (undated) DEVICE — SET ADMIN PRIMING 7ML L30IN 7.35LB 20 GTT 2ND RLER CLMP

## (undated) DEVICE — SOLUTION IRRIG 250ML STRL H2O PLAS POUR BTL USP

## (undated) DEVICE — AIRLIFE™ NASAL OXYGEN CANNULA CURVED, FLARED TIP, WITH 7 FEET (2.1 M) CRUSH RESISTANT TUBING, OVER-THE-EAR STYLE: Brand: AIRLIFE™

## (undated) DEVICE — CONMED SCOPE SAVER BITE BLOCK, 20X27 MM: Brand: SCOPE SAVER

## (undated) DEVICE — 3M(TM) TRANSPORE SURGICAL TAPE 1527-1: Brand: 3M™ TRANSPORE™

## (undated) DEVICE — NEEDLE HYPO 25GA L1.5IN BLU POLYPR HUB S STL REG BVL STR

## (undated) DEVICE — SURGICAL PROCEDURE PACK EYE ANDRSN

## (undated) DEVICE — ELECTRODE ECG MONITR FOAM TEAR DROP ADLT RED

## (undated) DEVICE — GLOVE ORANGE PI 8   MSG9080

## (undated) DEVICE — GLOVE SURG SZ 65 L12IN FNGR THK94MIL STD WHT LTX FREE

## (undated) DEVICE — ECHOTIP PROCORE, HD ULTRASOUND BIOPSY NEEDLE: Brand: ECHOTIP PROCORE

## (undated) DEVICE — 4.5 MM INCISOR PLUS STRAIGHT                                    BLADES, POWER/EP-1, VIOLET, PACKAGED                                    6 PER BOX, STERILE

## (undated) DEVICE — FORCEP BX STD CAP 240CM RAD JAW 4

## (undated) DEVICE — Device

## (undated) DEVICE — PACK PROCEDURE SURG SURGERYARTHROSCOPY KNEE

## (undated) DEVICE — SUTURE ETHLN SZ 4-0 L18IN NONABSORBABLE BLK L19MM PS-2 3/8 1667H

## (undated) DEVICE — UNIVERSAL BLOCK TRAY: Brand: MEDLINE INDUSTRIES, INC.

## (undated) DEVICE — STERILE POLYISOPRENE POWDER-FREE SURGICAL GLOVES: Brand: PROTEXIS

## (undated) DEVICE — TOWEL,OR,DSP,ST,BLUE,STD,4/PK,20PK/CS: Brand: MEDLINE

## (undated) DEVICE — CATHETER IV 20GA L1.25IN PNK FEP SFTY STR HUB RADPQ DISP

## (undated) DEVICE — 3M™ TEGADERM™ TRANSPARENT FILM DRESSING FRAME STYLE, 1624W, 2-3/8 IN X 2-3/4 IN (6 CM X 7 CM), 100/CT 4CT/CASE: Brand: 3M™ TEGADERM™

## (undated) DEVICE — Z INACTIVE NO SUPPLIER SOLUTIONIRRIG 3000ML 0.9% SOD CHL FLX CONT [79720808] [HOSPIRA WORLDWIDE INC]

## (undated) DEVICE — TOWEL OR BLUEE 16X26IN ST 8 PACK ORB08 16X26ORTWL

## (undated) DEVICE — GLOVE,SURG,SENSICARE,ALOE,LF,PF,7: Brand: MEDLINE

## (undated) DEVICE — SET GRAV VENT NVENT CK VLV 3 NDL FREE PRT 10 GTT

## (undated) DEVICE — NEEDLE FLTR 19GA L1.5IN WALL THK5UM BRN POLYPR HUB S STL

## (undated) DEVICE — ENDO CARRY-ON PROCEDURE KIT INCLUDES SUCTION TUBING, LUBRICANT, GAUZE, BIOHAZARD STICKER, TRANSPORT PAD AND INTERCEPT BEDSIDE KIT.: Brand: ENDO CARRY-ON PROCEDURE KIT

## (undated) DEVICE — SOLUTION IV 1000ML LAC RINGERS PH 6.5 INJ USP VIAFLX PLAS

## (undated) DEVICE — ALCOHOL RUBBING 16OZ 70% ISO

## (undated) DEVICE — GAUZE,SPONGE,4"X4",16PLY,STRL,LF,10/TRAY: Brand: MEDLINE

## (undated) DEVICE — CHLORAPREP 26ML ORANGE

## (undated) DEVICE — MANIFOLD SURG NEPTUNE WST MGMT

## (undated) DEVICE — SILICONE I/A TIP STRAIGHT: Brand: ALCON

## (undated) DEVICE — GOWN,SIRUS,NONRNF,3XL,18/CS: Brand: MEDLINE

## (undated) DEVICE — GLOVE SURG SZ 85 L12IN FNGR THK94MIL STD WHT ISOLEX LTX